# Patient Record
Sex: FEMALE | Race: BLACK OR AFRICAN AMERICAN | NOT HISPANIC OR LATINO
[De-identification: names, ages, dates, MRNs, and addresses within clinical notes are randomized per-mention and may not be internally consistent; named-entity substitution may affect disease eponyms.]

---

## 2017-02-08 ENCOUNTER — APPOINTMENT (OUTPATIENT)
Dept: FAMILY MEDICINE | Facility: CLINIC | Age: 82
End: 2017-02-08

## 2017-02-08 VITALS
HEART RATE: 68 BPM | RESPIRATION RATE: 14 BRPM | OXYGEN SATURATION: 98 % | HEIGHT: 62 IN | DIASTOLIC BLOOD PRESSURE: 70 MMHG | SYSTOLIC BLOOD PRESSURE: 108 MMHG | WEIGHT: 144.31 LBS | BODY MASS INDEX: 26.56 KG/M2

## 2017-03-15 ENCOUNTER — APPOINTMENT (OUTPATIENT)
Dept: CARDIOLOGY | Facility: CLINIC | Age: 82
End: 2017-03-15

## 2017-05-02 ENCOUNTER — RX RENEWAL (OUTPATIENT)
Age: 82
End: 2017-05-02

## 2017-05-05 ENCOUNTER — RX RENEWAL (OUTPATIENT)
Age: 82
End: 2017-05-05

## 2017-06-07 ENCOUNTER — APPOINTMENT (OUTPATIENT)
Dept: FAMILY MEDICINE | Facility: CLINIC | Age: 82
End: 2017-06-07

## 2017-06-07 VITALS
RESPIRATION RATE: 15 BRPM | HEIGHT: 62 IN | TEMPERATURE: 98 F | SYSTOLIC BLOOD PRESSURE: 189 MMHG | DIASTOLIC BLOOD PRESSURE: 81 MMHG | OXYGEN SATURATION: 98 % | WEIGHT: 144 LBS | HEART RATE: 70 BPM | BODY MASS INDEX: 26.5 KG/M2

## 2017-06-07 VITALS — DIASTOLIC BLOOD PRESSURE: 86 MMHG | SYSTOLIC BLOOD PRESSURE: 126 MMHG

## 2017-06-19 ENCOUNTER — RESULT CHARGE (OUTPATIENT)
Age: 82
End: 2017-06-19

## 2017-06-20 ENCOUNTER — APPOINTMENT (OUTPATIENT)
Dept: CARDIOLOGY | Facility: CLINIC | Age: 82
End: 2017-06-20

## 2017-06-20 ENCOUNTER — NON-APPOINTMENT (OUTPATIENT)
Age: 82
End: 2017-06-20

## 2017-06-20 VITALS — SYSTOLIC BLOOD PRESSURE: 110 MMHG | DIASTOLIC BLOOD PRESSURE: 80 MMHG | HEART RATE: 70 BPM

## 2017-06-20 VITALS
HEIGHT: 60 IN | HEART RATE: 63 BPM | SYSTOLIC BLOOD PRESSURE: 174 MMHG | OXYGEN SATURATION: 100 % | WEIGHT: 144 LBS | BODY MASS INDEX: 28.27 KG/M2 | DIASTOLIC BLOOD PRESSURE: 78 MMHG | RESPIRATION RATE: 17 BRPM

## 2017-06-22 ENCOUNTER — RX RENEWAL (OUTPATIENT)
Age: 82
End: 2017-06-22

## 2017-07-03 LAB — HBA1C MFR BLD HPLC: 6.2

## 2017-07-14 ENCOUNTER — APPOINTMENT (OUTPATIENT)
Dept: FAMILY MEDICINE | Facility: CLINIC | Age: 82
End: 2017-07-14

## 2017-07-14 VITALS
BODY MASS INDEX: 28.19 KG/M2 | WEIGHT: 143.56 LBS | OXYGEN SATURATION: 100 % | HEART RATE: 80 BPM | HEIGHT: 60 IN | SYSTOLIC BLOOD PRESSURE: 100 MMHG | RESPIRATION RATE: 16 BRPM | DIASTOLIC BLOOD PRESSURE: 68 MMHG

## 2017-07-14 LAB
GLUCOSE BLDC GLUCOMTR-MCNC: ABNORMAL
GLUCOSE SERPL-MCNC: ABNORMAL

## 2017-10-11 ENCOUNTER — APPOINTMENT (OUTPATIENT)
Dept: FAMILY MEDICINE | Facility: CLINIC | Age: 82
End: 2017-10-11

## 2017-10-16 ENCOUNTER — APPOINTMENT (OUTPATIENT)
Dept: FAMILY MEDICINE | Facility: CLINIC | Age: 82
End: 2017-10-16
Payer: MEDICARE

## 2017-10-16 VITALS
DIASTOLIC BLOOD PRESSURE: 84 MMHG | BODY MASS INDEX: 28.17 KG/M2 | RESPIRATION RATE: 16 BRPM | HEART RATE: 81 BPM | SYSTOLIC BLOOD PRESSURE: 120 MMHG | WEIGHT: 143.5 LBS | TEMPERATURE: 98.1 F | OXYGEN SATURATION: 100 % | HEIGHT: 60 IN

## 2017-10-16 LAB — GLUCOSE BLDC GLUCOMTR-MCNC: ABNORMAL

## 2017-10-16 PROCEDURE — G0008: CPT

## 2017-10-16 PROCEDURE — 90686 IIV4 VACC NO PRSV 0.5 ML IM: CPT

## 2017-10-16 PROCEDURE — 99213 OFFICE O/P EST LOW 20 MIN: CPT | Mod: 25

## 2018-01-17 ENCOUNTER — APPOINTMENT (OUTPATIENT)
Dept: CARDIOLOGY | Facility: CLINIC | Age: 83
End: 2018-01-17

## 2018-01-31 ENCOUNTER — RX RENEWAL (OUTPATIENT)
Age: 83
End: 2018-01-31

## 2018-02-13 ENCOUNTER — RX RENEWAL (OUTPATIENT)
Age: 83
End: 2018-02-13

## 2018-02-21 ENCOUNTER — APPOINTMENT (OUTPATIENT)
Dept: FAMILY MEDICINE | Facility: CLINIC | Age: 83
End: 2018-02-21
Payer: MEDICARE

## 2018-02-21 VITALS
HEIGHT: 60 IN | OXYGEN SATURATION: 97 % | RESPIRATION RATE: 16 BRPM | HEART RATE: 77 BPM | BODY MASS INDEX: 28.48 KG/M2 | DIASTOLIC BLOOD PRESSURE: 70 MMHG | TEMPERATURE: 98 F | SYSTOLIC BLOOD PRESSURE: 90 MMHG | WEIGHT: 145.06 LBS

## 2018-02-21 PROCEDURE — 99213 OFFICE O/P EST LOW 20 MIN: CPT

## 2018-04-11 ENCOUNTER — RX RENEWAL (OUTPATIENT)
Age: 83
End: 2018-04-11

## 2018-05-15 ENCOUNTER — RX RENEWAL (OUTPATIENT)
Age: 83
End: 2018-05-15

## 2018-06-04 ENCOUNTER — RX RENEWAL (OUTPATIENT)
Age: 83
End: 2018-06-04

## 2018-06-04 LAB — HBA1C MFR BLD HPLC: 5.7

## 2018-06-20 ENCOUNTER — APPOINTMENT (OUTPATIENT)
Dept: FAMILY MEDICINE | Facility: CLINIC | Age: 83
End: 2018-06-20
Payer: MEDICARE

## 2018-06-20 VITALS
HEIGHT: 62 IN | BODY MASS INDEX: 27.05 KG/M2 | DIASTOLIC BLOOD PRESSURE: 75 MMHG | SYSTOLIC BLOOD PRESSURE: 109 MMHG | HEART RATE: 69 BPM | OXYGEN SATURATION: 97 % | WEIGHT: 147 LBS | RESPIRATION RATE: 15 BRPM

## 2018-06-20 PROCEDURE — 99213 OFFICE O/P EST LOW 20 MIN: CPT

## 2018-06-20 NOTE — PHYSICAL EXAM
[No Acute Distress] : no acute distress [Well Nourished] : well nourished [Well Developed] : well developed [Well-Appearing] : well-appearing [Normal Sclera/Conjunctiva] : normal sclera/conjunctiva [PERRL] : pupils equal round and reactive to light [Normal Outer Ear/Nose] : the outer ears and nose were normal in appearance [No JVD] : no jugular venous distention [Supple] : supple [Clear to Auscultation] : lungs were clear to auscultation bilaterally [Regular Rhythm] : with a regular rhythm [No Murmur] : no murmur heard [No Edema] : there was no peripheral edema [Soft] : abdomen soft [Non Tender] : non-tender [No HSM] : no HSM [Normal Bowel Sounds] : normal bowel sounds [No Hernias] : no hernias [Normal Supraclavicular Nodes] : no supraclavicular lymphadenopathy [Normal Anterior Cervical Nodes] : no anterior cervical lymphadenopathy [No CVA Tenderness] : no CVA  tenderness [No Spinal Tenderness] : no spinal tenderness [Comprehensive Foot Exam Normal] : Right and left foot were examined and both feet are normal. No ulcers in either foot. Toes are normal and with full ROM.  Normal tactile sensation with monofilament testing throughout both feet

## 2018-06-20 NOTE — DATA REVIEWED
[FreeTextEntry1] : She is doing well we will let water a lipid profile as one has not been done and also renal and HbA1c she will do a she'll receive a packet for FITP test

## 2018-06-20 NOTE — HISTORY OF PRESENT ILLNESS
[FreeTextEntry1] : Diabetes,coronary artery disease hyperlipidemia [de-identified] : The patient is seen here today for her diabetes coronary artery disease and hyperlipidemia she feels well review of systems is totally unremarkable no chest pain lightheadedness shortness of breath etc. she has a visit with her ophthalmologist about 6 months ago with a good report he is in need of refill of the metoprolol she will be visiting with cardiology within a week

## 2018-07-12 ENCOUNTER — APPOINTMENT (OUTPATIENT)
Dept: CARDIOLOGY | Facility: CLINIC | Age: 83
End: 2018-07-12
Payer: MEDICARE

## 2018-07-12 ENCOUNTER — NON-APPOINTMENT (OUTPATIENT)
Age: 83
End: 2018-07-12

## 2018-07-12 VITALS
HEART RATE: 60 BPM | OXYGEN SATURATION: 98 % | WEIGHT: 141 LBS | DIASTOLIC BLOOD PRESSURE: 70 MMHG | RESPIRATION RATE: 15 BRPM | SYSTOLIC BLOOD PRESSURE: 120 MMHG | BODY MASS INDEX: 25.79 KG/M2

## 2018-07-12 VITALS — SYSTOLIC BLOOD PRESSURE: 100 MMHG | DIASTOLIC BLOOD PRESSURE: 70 MMHG | HEART RATE: 72 BPM

## 2018-07-12 PROCEDURE — 99214 OFFICE O/P EST MOD 30 MIN: CPT

## 2018-07-12 PROCEDURE — 93000 ELECTROCARDIOGRAM COMPLETE: CPT

## 2018-08-06 ENCOUNTER — RX RENEWAL (OUTPATIENT)
Age: 83
End: 2018-08-06

## 2018-08-06 ENCOUNTER — LABORATORY RESULT (OUTPATIENT)
Age: 83
End: 2018-08-06

## 2018-10-24 ENCOUNTER — APPOINTMENT (OUTPATIENT)
Dept: FAMILY MEDICINE | Facility: CLINIC | Age: 83
End: 2018-10-24
Payer: MEDICARE

## 2018-10-24 VITALS
SYSTOLIC BLOOD PRESSURE: 94 MMHG | OXYGEN SATURATION: 97 % | WEIGHT: 142 LBS | DIASTOLIC BLOOD PRESSURE: 68 MMHG | HEART RATE: 66 BPM | TEMPERATURE: 98.2 F | BODY MASS INDEX: 26.13 KG/M2 | HEIGHT: 62 IN | RESPIRATION RATE: 14 BRPM

## 2018-10-24 PROCEDURE — 90686 IIV4 VACC NO PRSV 0.5 ML IM: CPT

## 2018-10-24 PROCEDURE — G0008: CPT

## 2018-10-24 PROCEDURE — 99213 OFFICE O/P EST LOW 20 MIN: CPT | Mod: 25

## 2018-10-24 NOTE — HISTORY OF PRESENT ILLNESS
[FreeTextEntry1] : CAD, HPTN DM [de-identified] : Patient is seen here in followup she feels well review of systems is unremarkable she is also here for flu shot she is up-to-date on her pneumococcal vaccines review of systems fails to reveal any new issues she does have some chronic low back pain. He has followup with cardiology back in July with a good report her metoprolol was reduced to 50 mg twice a day

## 2018-10-24 NOTE — ASSESSMENT
[FreeTextEntry1] : Patient is doing quite well she is given a flu shot we'll have her glipizide 2.5 discontinued in an attempt to eventually get her office are fine. His we'll repeat a renal profile and HbA1c in a month or so\mark Weber

## 2018-10-24 NOTE — REVIEW OF SYSTEMS
[Joint Pain] : no joint pain [Muscle Weakness] : no muscle weakness [Muscle Pain] : no muscle pain [Back Pain] : back pain [Negative] : Genitourinary

## 2018-10-24 NOTE — PHYSICAL EXAM
[No Acute Distress] : no acute distress [Well Nourished] : well nourished [Well Developed] : well developed [Normal Sclera/Conjunctiva] : normal sclera/conjunctiva [PERRL] : pupils equal round and reactive to light [No JVD] : no jugular venous distention [Supple] : supple [No Lymphadenopathy] : no lymphadenopathy [No Respiratory Distress] : no respiratory distress  [Clear to Auscultation] : lungs were clear to auscultation bilaterally [No Accessory Muscle Use] : no accessory muscle use [Normal Rate] : normal rate  [Regular Rhythm] : with a regular rhythm [No Murmur] : no murmur heard [No Abdominal Bruit] : a ~M bruit was not heard ~T in the abdomen [Soft] : abdomen soft [No HSM] : no HSM [No CVA Tenderness] : no CVA  tenderness [No Spinal Tenderness] : no spinal tenderness [No Joint Swelling] : no joint swelling [Grossly Normal Strength/Tone] : grossly normal strength/tone [Normal Gait] : normal gait [Memory Grossly Normal] : memory grossly normal [Normal Mood] : the mood was normal

## 2018-11-02 ENCOUNTER — RX RENEWAL (OUTPATIENT)
Age: 83
End: 2018-11-02

## 2019-01-15 ENCOUNTER — APPOINTMENT (OUTPATIENT)
Dept: CARDIOLOGY | Facility: CLINIC | Age: 84
End: 2019-01-15
Payer: MEDICARE

## 2019-01-15 LAB
HBA1C MFR BLD HPLC: 5.5
LDLC SERPL DIRECT ASSAY-MCNC: 58

## 2019-01-25 ENCOUNTER — RX RENEWAL (OUTPATIENT)
Age: 84
End: 2019-01-25

## 2019-01-30 ENCOUNTER — APPOINTMENT (OUTPATIENT)
Dept: FAMILY MEDICINE | Facility: CLINIC | Age: 84
End: 2019-01-30

## 2019-01-31 ENCOUNTER — RX RENEWAL (OUTPATIENT)
Age: 84
End: 2019-01-31

## 2019-02-04 ENCOUNTER — APPOINTMENT (OUTPATIENT)
Dept: FAMILY MEDICINE | Facility: CLINIC | Age: 84
End: 2019-02-04
Payer: MEDICARE

## 2019-02-04 VITALS
RESPIRATION RATE: 16 BRPM | SYSTOLIC BLOOD PRESSURE: 122 MMHG | HEART RATE: 75 BPM | DIASTOLIC BLOOD PRESSURE: 74 MMHG | WEIGHT: 142.25 LBS | OXYGEN SATURATION: 98 % | TEMPERATURE: 97.4 F | BODY MASS INDEX: 26.18 KG/M2 | HEIGHT: 62 IN

## 2019-02-04 PROCEDURE — 99213 OFFICE O/P EST LOW 20 MIN: CPT

## 2019-02-04 NOTE — PHYSICAL EXAM
[No Acute Distress] : no acute distress [Well Nourished] : well nourished [Well Developed] : well developed [Well-Appearing] : well-appearing [Normal Sclera/Conjunctiva] : normal sclera/conjunctiva [PERRL] : pupils equal round and reactive to light [EOMI] : extraocular movements intact [Normal Outer Ear/Nose] : the outer ears and nose were normal in appearance [Normal Oropharynx] : the oropharynx was normal [Normal TMs] : both tympanic membranes were normal [Normal Nasal Mucosa] : the nasal mucosa was normal [No JVD] : no jugular venous distention [Supple] : supple [No Lymphadenopathy] : no lymphadenopathy [Thyroid Normal, No Nodules] : the thyroid was normal and there were no nodules present [No Respiratory Distress] : no respiratory distress  [Clear to Auscultation] : lungs were clear to auscultation bilaterally [No Accessory Muscle Use] : no accessory muscle use [Normal Rate] : normal rate  [Regular Rhythm] : with a regular rhythm [No Murmur] : no murmur heard [No Edema] : there was no peripheral edema [Soft] : abdomen soft [Non Tender] : non-tender [Non-distended] : non-distended [No Masses] : no abdominal mass palpated [No HSM] : no HSM [Normal Supraclavicular Nodes] : no supraclavicular lymphadenopathy [Normal Posterior Cervical Nodes] : no posterior cervical lymphadenopathy [Normal Anterior Cervical Nodes] : no anterior cervical lymphadenopathy [No CVA Tenderness] : no CVA  tenderness [No Spinal Tenderness] : no spinal tenderness [No Joint Swelling] : no joint swelling [No Rash] : no rash [No Skin Lesions] : no skin lesions [Normal Gait] : normal gait [Coordination Grossly Intact] : coordination grossly intact [Speech Grossly Normal] : speech grossly normal

## 2019-02-04 NOTE — REVIEW OF SYSTEMS
[Headache] : no headache [Fainting] : no fainting [Memory Loss] : no memory loss [Negative] : Musculoskeletal

## 2019-02-04 NOTE — HISTORY OF PRESENT ILLNESS
[FreeTextEntry1] : diabetes hypertension [de-identified] : \par Patient is here to followup on her diabetes and hypertension also arthroscopic cardiovascular disease she feels well is due to see cardiology next week at the time of her last visit due to excellent glucose control we cut her glipizide in half to 2.5 mg laboratory work done several weeks ago reveals her fasting sugar to be below 100 and her HbA1c to be 5.5 which is excellent control on the lower dosage review of systems is unremarkable

## 2019-02-04 NOTE — ASSESSMENT
[FreeTextEntry1] : The patient is doing quite well interval history is unremarkable exam is unremarkable she is doing well on 2.5 the glipizide we will discontinue this medication entirely and repeat her renal profile and afebrile and A1c in 3 weeks she will follow up with cardiology as scheduled next week and here in several months barring any unexpected events

## 2019-02-07 ENCOUNTER — NON-APPOINTMENT (OUTPATIENT)
Age: 84
End: 2019-02-07

## 2019-02-07 ENCOUNTER — APPOINTMENT (OUTPATIENT)
Dept: CARDIOLOGY | Facility: CLINIC | Age: 84
End: 2019-02-07
Payer: MEDICARE

## 2019-02-07 VITALS
HEIGHT: 62 IN | BODY MASS INDEX: 26.13 KG/M2 | SYSTOLIC BLOOD PRESSURE: 186 MMHG | WEIGHT: 142 LBS | OXYGEN SATURATION: 99 % | DIASTOLIC BLOOD PRESSURE: 72 MMHG | HEART RATE: 75 BPM | RESPIRATION RATE: 16 BRPM

## 2019-02-07 VITALS — SYSTOLIC BLOOD PRESSURE: 140 MMHG | DIASTOLIC BLOOD PRESSURE: 84 MMHG | HEART RATE: 76 BPM

## 2019-02-07 PROCEDURE — 93000 ELECTROCARDIOGRAM COMPLETE: CPT

## 2019-02-07 PROCEDURE — 93306 TTE W/DOPPLER COMPLETE: CPT

## 2019-02-07 PROCEDURE — 99214 OFFICE O/P EST MOD 30 MIN: CPT

## 2019-02-07 NOTE — PHYSICAL EXAM
[General Appearance - Well Developed] : well developed [Normal Appearance] : normal appearance [Well Groomed] : well groomed [General Appearance - Well Nourished] : well nourished [No Deformities] : no deformities [General Appearance - In No Acute Distress] : no acute distress [Normal Jugular Venous A Waves Present] : normal jugular venous A waves present [Normal Jugular Venous V Waves Present] : normal jugular venous V waves present [No Jugular Venous Giang A Waves] : no jugular venous giang A waves [Respiration, Rhythm And Depth] : normal respiratory rhythm and effort [Exaggerated Use Of Accessory Muscles For Inspiration] : no accessory muscle use [Auscultation Breath Sounds / Voice Sounds] : lungs were clear to auscultation bilaterally [Abdomen Soft] : soft [Abdomen Tenderness] : non-tender [Abdomen Mass (___ Cm)] : no abdominal mass palpated [FreeTextEntry1] : patient unable to walk on treadmill [Nail Clubbing] : no clubbing of the fingernails [Cyanosis, Localized] : no localized cyanosis [Petechial Hemorrhages (___cm)] : no petechial hemorrhages [Skin Color & Pigmentation] : normal skin color and pigmentation [] : no rash [No Venous Stasis] : no venous stasis [Skin Lesions] : no skin lesions [No Skin Ulcers] : no skin ulcer [No Xanthoma] : no  xanthoma was observed [Oriented To Time, Place, And Person] : oriented to person, place, and time [Affect] : the affect was normal [Mood] : the mood was normal [No Anxiety] : not feeling anxious [5th Left ICS - MCL] : palpated at the 5th LICS in the midclavicular line [Normal] : normal [Normal Rate] : normal [Rhythm Regular] : regular [I] : a grade 1 [Right Carotid Bruit] : no bruit heard over the right carotid [Left Carotid Bruit] : left carotid bruit heard [Right Femoral Bruit] : no bruit heard over the right femoral artery [Left Femoral Bruit] : no bruit heard over the left femoral artery [No Abnormalities] : the abdominal aorta was not enlarged and no bruit was heard [No Pitting Edema] : no pitting edema present [Rt] : no varicose veins of the right leg [Lt] : no varicose veins of the left leg

## 2019-02-07 NOTE — REASON FOR VISIT
[Follow-Up - Clinic] : a clinic follow-up of [Coronary Artery Disease] : coronary artery disease [Hyperlipidemia] : hyperlipidemia [Hypertension] : hypertension [FreeTextEntry1] : Patient returns for followup. Feeling well. Offers no complaints of chest discomfort shortness of breath palpitations dizziness or syncope. LDL cholesterol in July was 58. She underwent echocardiography today which revealed normal left ventricular systolic function with concentric left ventricular hypertrophy and mild to moderate mitral insufficiency\par

## 2019-02-07 NOTE — ASSESSMENT
[FreeTextEntry1] : : Impression:\par 1. Hypertension well-controlled today\par 2. Probable  coronary disease based on vasodilator myocardial perfusion imaging. On appropriate medical therapy with aspirin statin beta blocker and ARB  and without any symptoms whatsoever to suggest angina pectoris. Patient has refused cardiac catheterization\par 3.Dyslipidemia with LDL at goal at last check\par \par Plan:\par 1.continue current regimen

## 2019-03-11 ENCOUNTER — RX RENEWAL (OUTPATIENT)
Age: 84
End: 2019-03-11

## 2019-04-25 ENCOUNTER — RX RENEWAL (OUTPATIENT)
Age: 84
End: 2019-04-25

## 2019-04-29 LAB — HBA1C MFR BLD HPLC: 6.5

## 2019-05-06 ENCOUNTER — APPOINTMENT (OUTPATIENT)
Dept: FAMILY MEDICINE | Facility: CLINIC | Age: 84
End: 2019-05-06
Payer: MEDICARE

## 2019-05-06 VITALS
OXYGEN SATURATION: 97 % | SYSTOLIC BLOOD PRESSURE: 114 MMHG | HEART RATE: 69 BPM | BODY MASS INDEX: 26.14 KG/M2 | RESPIRATION RATE: 16 BRPM | DIASTOLIC BLOOD PRESSURE: 64 MMHG | HEIGHT: 62 IN | WEIGHT: 142.06 LBS

## 2019-05-06 PROCEDURE — 99213 OFFICE O/P EST LOW 20 MIN: CPT

## 2019-05-06 NOTE — PHYSICAL EXAM
[No Acute Distress] : no acute distress [Well Nourished] : well nourished [Well Developed] : well developed [Well-Appearing] : well-appearing [Normal Sclera/Conjunctiva] : normal sclera/conjunctiva [Normal Oropharynx] : the oropharynx was normal [Normal Outer Ear/Nose] : the outer ears and nose were normal in appearance [Supple] : supple [No JVD] : no jugular venous distention [No Respiratory Distress] : no respiratory distress  [No Lymphadenopathy] : no lymphadenopathy [Thyroid Normal, No Nodules] : the thyroid was normal and there were no nodules present [Clear to Auscultation] : lungs were clear to auscultation bilaterally [Normal Rate] : normal rate  [No Murmur] : no murmur heard [Regular Rhythm] : with a regular rhythm [No Edema] : there was no peripheral edema [No HSM] : no HSM [Soft] : abdomen soft

## 2019-05-06 NOTE — HISTORY OF PRESENT ILLNESS
[de-identified] : Patient is here in followup for diabetes her glipizide had been reduced from 5 mg daily to 2.5 mg her A1c at at this last checking did go up from 5.5-6.5 fasting sugar 132 review of systems is unremarkable patient otherwise feels well she is recently seen cardiology with a good report review of systems is unremarkable [FreeTextEntry1] : diabetes

## 2019-05-06 NOTE — PLAN
[FreeTextEntry1] : The patient's recent blood work reveals her A1c was 9-6.5 from 5.5 and her fasting sugars up to 132 her renal status is stable. We had reduced her glipizide to 2.5 mg and has been a somewhat significant increase in A1c we will start her on 500 mg of metformin once a day as her creatinine clearance is above 40 and we will observe the results the hope will be that if he maintains good control we may be able to actually discontinue the sulfonylurea she will have repeat A1c in one month followup visit in 3 months but telephonically just after her lab work

## 2019-06-07 ENCOUNTER — RX RENEWAL (OUTPATIENT)
Age: 84
End: 2019-06-07

## 2019-07-05 ENCOUNTER — RX RENEWAL (OUTPATIENT)
Age: 84
End: 2019-07-05

## 2019-07-23 ENCOUNTER — RX RENEWAL (OUTPATIENT)
Age: 84
End: 2019-07-23

## 2019-07-30 LAB — HBA1C MFR BLD HPLC: 6.2

## 2019-08-05 ENCOUNTER — APPOINTMENT (OUTPATIENT)
Dept: FAMILY MEDICINE | Facility: CLINIC | Age: 84
End: 2019-08-05
Payer: MEDICARE

## 2019-08-05 VITALS
WEIGHT: 140.06 LBS | BODY MASS INDEX: 25.77 KG/M2 | DIASTOLIC BLOOD PRESSURE: 72 MMHG | SYSTOLIC BLOOD PRESSURE: 110 MMHG | HEART RATE: 70 BPM | RESPIRATION RATE: 16 BRPM | HEIGHT: 62 IN | OXYGEN SATURATION: 98 %

## 2019-08-05 PROCEDURE — G0439: CPT

## 2019-08-05 NOTE — HISTORY OF PRESENT ILLNESS
[FreeTextEntry1] : diabetes [de-identified] : Shahram diabetes CAD and hypertension has been feeling quite well has seen cardiology recently and will be seen again in the near future reports are good recent laboratory reveals an HbA1c of 6.2 that was after we reduced her glipizide from 5-2.5 mg. Based on that we will DC her glipizide and maintain her on metformin alone review of systems is unremarkable

## 2019-08-05 NOTE — HEALTH RISK ASSESSMENT
[Good] : ~his/her~  mood as  good [] : No [No] : No [2 - 4 times a month (2 pts)] : 2-4 times a month (2 points) [Never (0 pts)] : Never (0 points) [One fall no injury in past year] : Patient reported one fall in the past year without injury [de-identified] : tripped on irregular sidewalk [0] : 2) Feeling down, depressed, or hopeless: Not at all (0) [Patient reported mammogram was normal] : Patient reported mammogram was normal [Patient declined PAP Smear] : Patient declined PAP Smear [Patient declined bone density test] : Patient declined bone density test [Patient reported colonoscopy was normal] : Patient reported colonoscopy was normal [HIV test declined] : HIV test declined [Hepatitis C test declined] : Hepatitis C test declined [Change in mental status noted] : No change in mental status noted [Behavior] : denies difficulty with behavior [Language] : denies difficulty with language [Learning/Retaining New Information] : denies difficulty learning/retaining new information [Handling Complex Tasks] : denies difficulty handling complex tasks [Spatial Ability and Orientation] : denies difficulty with spatial ability and orientation [Reasoning] : denies difficulty with reasoning [None] : None [Retired] : retired [Alone] : lives alone [Single] : single [High Risk Behavior] : no high risk behavior [Fully functional (bathing, dressing, toileting, transferring, walking, feeding)] : Fully functional (bathing, dressing, toileting, transferring, walking, feeding) [Feels Safe at Home] : Feels safe at home [Reports changes in hearing] : Reports no changes in hearing [Fully functional (using the telephone, shopping, preparing meals, housekeeping, doing laundry, using] : Fully functional and needs no help or supervision to perform IADLs (using the telephone, shopping, preparing meals, housekeeping, doing laundry, using transportation, managing medications and managing finances) [Reports changes in dental health] : Reports no changes in dental health [Reports changes in vision] : Reports no changes in vision [Carbon Monoxide Detector] : carbon monoxide detector [Smoke Detector] : smoke detector [Seat Belt] :  uses seat belt [MammogramDate] : 8/2010 [ColonoscopyDate] : 8/2013 [MammogramComments] : normal [ColonoscopyComments] : declibned repeat [With Patient/Caregiver] : With Patient/Caregiver [Designated Healthcare Proxy] : Designated healthcare proxy [Name: ___] : Health Care Proxy's Name: [unfilled]  [Relationship: ___] : Relationship: [unfilled] [AdvancecareDate] : 08/19

## 2019-08-05 NOTE — PLAN
[FreeTextEntry1] : The patient's medications have been reviewed and reconciled her immunizations are reviewed from her diagnoses reviewed her exam is essentially unremarkable she is doing quite well in all areas we will order repeat lab work in several months she will followup on the effect of discontinuing the glipizide we'll helpful to maintain her on metformin alone last lab work revealed the renal function to be stable she is not in favor of the knee and preventive services such as mammogram colonoscopy pelvic tach etc. she has had a hysterectomy

## 2019-08-22 ENCOUNTER — NON-APPOINTMENT (OUTPATIENT)
Age: 84
End: 2019-08-22

## 2019-08-22 ENCOUNTER — APPOINTMENT (OUTPATIENT)
Dept: CARDIOLOGY | Facility: CLINIC | Age: 84
End: 2019-08-22
Payer: MEDICARE

## 2019-08-22 VITALS
HEART RATE: 62 BPM | SYSTOLIC BLOOD PRESSURE: 192 MMHG | WEIGHT: 140 LBS | DIASTOLIC BLOOD PRESSURE: 72 MMHG | BODY MASS INDEX: 25.76 KG/M2 | OXYGEN SATURATION: 100 % | HEIGHT: 62 IN | RESPIRATION RATE: 16 BRPM

## 2019-08-22 VITALS — DIASTOLIC BLOOD PRESSURE: 70 MMHG | HEART RATE: 72 BPM | SYSTOLIC BLOOD PRESSURE: 120 MMHG

## 2019-08-22 VITALS — SYSTOLIC BLOOD PRESSURE: 186 MMHG | DIASTOLIC BLOOD PRESSURE: 70 MMHG

## 2019-08-22 PROCEDURE — 99214 OFFICE O/P EST MOD 30 MIN: CPT

## 2019-08-22 PROCEDURE — 93000 ELECTROCARDIOGRAM COMPLETE: CPT

## 2019-08-22 NOTE — REASON FOR VISIT
[Follow-Up - Clinic] : a clinic follow-up of [Coronary Artery Disease] : coronary artery disease [Hypertension] : hypertension [FreeTextEntry1] : Patient returns for followup. Feeling well. Offers no complaints of chest discomfort shortness of breath palpitations dizziness or syncope.\par

## 2019-10-28 ENCOUNTER — RX RENEWAL (OUTPATIENT)
Age: 84
End: 2019-10-28

## 2019-12-09 ENCOUNTER — APPOINTMENT (OUTPATIENT)
Dept: FAMILY MEDICINE | Facility: CLINIC | Age: 84
End: 2019-12-09
Payer: MEDICARE

## 2019-12-09 VITALS
OXYGEN SATURATION: 97 % | HEART RATE: 71 BPM | HEIGHT: 62 IN | SYSTOLIC BLOOD PRESSURE: 100 MMHG | BODY MASS INDEX: 25.59 KG/M2 | WEIGHT: 139.06 LBS | RESPIRATION RATE: 16 BRPM | DIASTOLIC BLOOD PRESSURE: 60 MMHG

## 2019-12-09 PROCEDURE — 90686 IIV4 VACC NO PRSV 0.5 ML IM: CPT

## 2019-12-09 PROCEDURE — 99213 OFFICE O/P EST LOW 20 MIN: CPT | Mod: 25

## 2019-12-09 PROCEDURE — G0008: CPT

## 2019-12-09 NOTE — HISTORY OF PRESENT ILLNESS
[FreeTextEntry1] : diabetes mellitus CAD [de-identified] : Seen here in followup on her diabetes mellitus and coronary artery disease she said that its worst he several months ago with a good cardiological report no change in medications at her last visit with us we reduced her glipizide from 5 mg to 2.5 mg and her recent laboratory work reveals an HbA1c of 6.1 and a fasting sugar of 118 which represent excellent control she also is now taking metformin 500 mg once a day recent laboratory work also showed her creatinine to be 1.17 and her LDL is 70

## 2019-12-09 NOTE — ASSESSMENT
[FreeTextEntry1] : The patient is given an influenza shot her recent laboratory work is excellent we will discontinue her glipizide at this time maintain her on metformin and repeat a renal profile HbA1c in 4-5 weeks with a followup visit if needed it is felt that her diabetes is under excellent control and we may be up to avoid a sulfonylurea and its risks of hypoglycemia except

## 2019-12-13 LAB
HBA1C MFR BLD HPLC: 6.1
LDLC SERPL DIRECT ASSAY-MCNC: 70
MICROALBUMIN/CREAT 24H UR-RTO: 46

## 2020-01-21 ENCOUNTER — RX RENEWAL (OUTPATIENT)
Age: 85
End: 2020-01-21

## 2020-01-27 ENCOUNTER — RX RENEWAL (OUTPATIENT)
Age: 85
End: 2020-01-27

## 2020-03-11 ENCOUNTER — APPOINTMENT (OUTPATIENT)
Dept: CARDIOLOGY | Facility: CLINIC | Age: 85
End: 2020-03-11
Payer: MEDICARE

## 2020-03-11 ENCOUNTER — NON-APPOINTMENT (OUTPATIENT)
Age: 85
End: 2020-03-11

## 2020-03-11 VITALS — SYSTOLIC BLOOD PRESSURE: 192 MMHG | DIASTOLIC BLOOD PRESSURE: 86 MMHG

## 2020-03-11 VITALS
OXYGEN SATURATION: 99 % | HEIGHT: 62 IN | SYSTOLIC BLOOD PRESSURE: 198 MMHG | WEIGHT: 136 LBS | RESPIRATION RATE: 16 BRPM | HEART RATE: 62 BPM | DIASTOLIC BLOOD PRESSURE: 68 MMHG | BODY MASS INDEX: 25.03 KG/M2

## 2020-03-11 VITALS — SYSTOLIC BLOOD PRESSURE: 100 MMHG | DIASTOLIC BLOOD PRESSURE: 72 MMHG | HEART RATE: 72 BPM

## 2020-03-11 PROCEDURE — 99214 OFFICE O/P EST MOD 30 MIN: CPT

## 2020-03-11 PROCEDURE — 93000 ELECTROCARDIOGRAM COMPLETE: CPT

## 2020-03-11 NOTE — ASSESSMENT
[FreeTextEntry1] : : Impression:\par 1. Hypertension well-controlled today\par 2. Probable  coronary disease based on vasodilator myocardial perfusion imaging. On appropriate medical therapy with aspirin statin beta blocker and ARB  and without any symptoms whatsoever to suggest angina pectoris. Patient has refused cardiac catheterization\par 3.Dyslipidemia with LDL at goal \par \par Plan:\par 1.continue current regimen

## 2020-03-11 NOTE — REASON FOR VISIT
[Follow-Up - Clinic] : a clinic follow-up of [Coronary Artery Disease] : coronary artery disease [Hypertension] : hypertension [FreeTextEntry1] : Patient returns for followup. Feeling well. Offers no complaints of chest discomfort shortness of breath palpitations dizziness or syncope.Recent lipid profile included an LDL cholesterol of 70\par

## 2020-04-10 ENCOUNTER — APPOINTMENT (OUTPATIENT)
Dept: FAMILY MEDICINE | Facility: CLINIC | Age: 85
End: 2020-04-10
Payer: MEDICARE

## 2020-04-10 PROCEDURE — 99442: CPT

## 2020-04-13 ENCOUNTER — APPOINTMENT (OUTPATIENT)
Dept: FAMILY MEDICINE | Facility: CLINIC | Age: 85
End: 2020-04-13

## 2020-05-15 ENCOUNTER — RX RENEWAL (OUTPATIENT)
Age: 85
End: 2020-05-15

## 2020-06-24 ENCOUNTER — APPOINTMENT (OUTPATIENT)
Dept: FAMILY MEDICINE | Facility: CLINIC | Age: 85
End: 2020-06-24
Payer: MEDICARE

## 2020-06-24 VITALS
OXYGEN SATURATION: 96 % | DIASTOLIC BLOOD PRESSURE: 68 MMHG | RESPIRATION RATE: 16 BRPM | SYSTOLIC BLOOD PRESSURE: 90 MMHG | TEMPERATURE: 99 F | HEIGHT: 62 IN | WEIGHT: 130 LBS | HEART RATE: 63 BPM | BODY MASS INDEX: 23.92 KG/M2

## 2020-06-24 VITALS — SYSTOLIC BLOOD PRESSURE: 106 MMHG | DIASTOLIC BLOOD PRESSURE: 70 MMHG

## 2020-06-24 PROCEDURE — 99214 OFFICE O/P EST MOD 30 MIN: CPT

## 2020-06-24 NOTE — PLAN
[FreeTextEntry1] : \par Hyperlipidemia: stable. Continue medication as ordered, continue to monitor status\par \par Hypertension: Well controlled, on the low end. Trial d/c of hydrochlorothiazide. Continue to monitor BP. continue medications as ordered. Continue to monitor status\par \par Diabetes: See how pt is doing at next visit/blood work to see if she needs order placed for glucometer. D/c glipizide d/t age and CR. Increase metformin to 500 mg BID, f/u in a couple weeks to see how pt is doing and check blood work. Continue to monitor GFR and status

## 2020-06-24 NOTE — HISTORY OF PRESENT ILLNESS
[FreeTextEntry1] : follow up [de-identified] : Pt says she has been doing well and staying at home during pandemic. She says she has been sleeping well but sleep schedule disturbed from watching tv and reading during pandemic. Pt eating and drinking well, and going to the bathroom okay. Pt denies any SOB, cough, chest pain, palpitations, N/V/D/C, fever, chills, headache, dizziness, sore throat, depression, or anxiety. Pt has not been checking sugars at home her machine broke and needs a new one. No other health concerns today.\par

## 2020-07-15 ENCOUNTER — APPOINTMENT (OUTPATIENT)
Dept: FAMILY MEDICINE | Facility: CLINIC | Age: 85
End: 2020-07-15
Payer: MEDICARE

## 2020-07-15 VITALS
WEIGHT: 131 LBS | OXYGEN SATURATION: 96 % | DIASTOLIC BLOOD PRESSURE: 60 MMHG | SYSTOLIC BLOOD PRESSURE: 116 MMHG | HEIGHT: 62 IN | RESPIRATION RATE: 16 BRPM | BODY MASS INDEX: 24.11 KG/M2 | TEMPERATURE: 98.8 F | HEART RATE: 68 BPM

## 2020-07-15 VITALS — DIASTOLIC BLOOD PRESSURE: 60 MMHG | SYSTOLIC BLOOD PRESSURE: 112 MMHG

## 2020-07-15 PROCEDURE — 99213 OFFICE O/P EST LOW 20 MIN: CPT

## 2020-07-15 NOTE — PLAN
[FreeTextEntry1] : 7/15/2020: \par HLD + DM: well controlled, continue medication as ordered\par \par HTN: Well controlled. Wrote out directions for pt to d/c HCTZ for now. Continue to monitor and check BP at next visit\par \par Pt has f/u with cardiology in Sept, will f/u in fall and come for flu shot\par \par 6/24/2020:\par Hyperlipidemia: stable. Continue medication as ordered, continue to monitor status\par \par Hypertension: Well controlled, on the low end. Trial d/c of hydrochlorothiazide. Continue to monitor BP. continue medications as ordered. Continue to monitor status\par \par Diabetes: See how pt is doing at next visit/blood work to see if she needs order placed for glucometer. D/c glipizide d/t age and CR. Increase metformin to 500 mg BID, f/u in a couple weeks to see how pt is doing and check blood work. Continue to monitor GFR and status

## 2020-07-15 NOTE — PHYSICAL EXAM
[Normal Sclera/Conjunctiva] : normal sclera/conjunctiva [No Edema] : there was no peripheral edema [Normal] : affect was normal and insight and judgment were intact [Pedal Pulses Present] : the pedal pulses are present

## 2020-07-15 NOTE — HISTORY OF PRESENT ILLNESS
[de-identified] : 7/15/2020: Pt reports that she has been doing well and trying to maintain her weight which is hard not being able to do anything during the pandemic. She increased her metformin to BID and d/c glipizide a long time ago. The HCTZ was not written down so she forgot to trial d/c this medication. She reports no changes from last visit or any other health concerns today.\par \par 6/24/2020: Pt says she has been doing well and staying at home during pandemic. She says she has been sleeping well but sleep schedule disturbed from watching tv and reading during pandemic. Pt eating and drinking well, and going to the bathroom okay. Pt denies any SOB, cough, chest pain, palpitations, N/V/D/C, fever, chills, headache, dizziness, sore throat, depression, or anxiety. Pt has not been checking sugars at home her machine broke and needs a new one. No other health concerns today.\par  [FreeTextEntry1] : follow up

## 2020-08-10 ENCOUNTER — RX RENEWAL (OUTPATIENT)
Age: 85
End: 2020-08-10

## 2020-08-21 ENCOUNTER — RX RENEWAL (OUTPATIENT)
Age: 85
End: 2020-08-21

## 2020-10-01 ENCOUNTER — NON-APPOINTMENT (OUTPATIENT)
Age: 85
End: 2020-10-01

## 2020-10-01 ENCOUNTER — APPOINTMENT (OUTPATIENT)
Dept: CARDIOLOGY | Facility: CLINIC | Age: 85
End: 2020-10-01
Payer: MEDICARE

## 2020-10-01 VITALS
HEART RATE: 71 BPM | WEIGHT: 130 LBS | OXYGEN SATURATION: 100 % | TEMPERATURE: 97.5 F | BODY MASS INDEX: 23.92 KG/M2 | RESPIRATION RATE: 16 BRPM | DIASTOLIC BLOOD PRESSURE: 78 MMHG | SYSTOLIC BLOOD PRESSURE: 120 MMHG | HEIGHT: 62 IN

## 2020-10-01 VITALS — HEART RATE: 72 BPM | DIASTOLIC BLOOD PRESSURE: 80 MMHG | SYSTOLIC BLOOD PRESSURE: 110 MMHG

## 2020-10-01 PROCEDURE — 93000 ELECTROCARDIOGRAM COMPLETE: CPT

## 2020-10-01 PROCEDURE — 99214 OFFICE O/P EST MOD 30 MIN: CPT

## 2020-11-06 ENCOUNTER — RX RENEWAL (OUTPATIENT)
Age: 85
End: 2020-11-06

## 2020-11-09 ENCOUNTER — RX RENEWAL (OUTPATIENT)
Age: 85
End: 2020-11-09

## 2020-11-10 ENCOUNTER — APPOINTMENT (OUTPATIENT)
Dept: FAMILY MEDICINE | Facility: CLINIC | Age: 85
End: 2020-11-10
Payer: MEDICARE

## 2020-11-10 VITALS
OXYGEN SATURATION: 98 % | BODY MASS INDEX: 23.74 KG/M2 | SYSTOLIC BLOOD PRESSURE: 130 MMHG | DIASTOLIC BLOOD PRESSURE: 80 MMHG | TEMPERATURE: 97.8 F | WEIGHT: 129 LBS | RESPIRATION RATE: 16 BRPM | HEART RATE: 67 BPM | HEIGHT: 62 IN

## 2020-11-10 PROCEDURE — 99072 ADDL SUPL MATRL&STAF TM PHE: CPT

## 2020-11-10 PROCEDURE — 99213 OFFICE O/P EST LOW 20 MIN: CPT | Mod: 25

## 2020-11-10 PROCEDURE — 90662 IIV NO PRSV INCREASED AG IM: CPT

## 2020-11-10 PROCEDURE — G0008: CPT

## 2020-11-10 NOTE — PLAN
[FreeTextEntry1] : Patient seems to doing well she is asymptomatic has seen cardiology approximately 1 month ago with good reports she denies any chest pain or shortness of breath or other issues laboratory work in June was reasonable we will repeat a A1c a CBC and a renal profile and she will be given an influenza shot she is up-to-date with pneumococcal shots.

## 2020-11-10 NOTE — HISTORY OF PRESENT ILLNESS
[FreeTextEntry1] : CAD IFG [de-identified] : Patient here for follow-up on her CAD impaired fasting glucose diastolic dysfunction etc. she has seen cardiology with a good report 1 month ago review of systems is unremarkable she is in need of a flu shot

## 2020-11-10 NOTE — PHYSICAL EXAM
[No Acute Distress] : no acute distress [Well Nourished] : well nourished [Well Developed] : well developed [Well-Appearing] : well-appearing [Normal Sclera/Conjunctiva] : normal sclera/conjunctiva [PERRL] : pupils equal round and reactive to light [Normal Outer Ear/Nose] : the outer ears and nose were normal in appearance [Normal Oropharynx] : the oropharynx was normal [No JVD] : no jugular venous distention [No Lymphadenopathy] : no lymphadenopathy [No Respiratory Distress] : no respiratory distress  [No Accessory Muscle Use] : no accessory muscle use [Normal Rate] : normal rate  [Regular Rhythm] : with a regular rhythm [No Murmur] : no murmur heard [No Edema] : there was no peripheral edema [Soft] : abdomen soft [Non-distended] : non-distended [No Masses] : no abdominal mass palpated [No HSM] : no HSM [Normal Bowel Sounds] : normal bowel sounds [Normal Supraclavicular Nodes] : no supraclavicular lymphadenopathy [Normal Posterior Cervical Nodes] : no posterior cervical lymphadenopathy [Normal Anterior Cervical Nodes] : no anterior cervical lymphadenopathy [No CVA Tenderness] : no CVA  tenderness [No Spinal Tenderness] : no spinal tenderness [No Focal Deficits] : no focal deficits [Normal Gait] : normal gait [Speech Grossly Normal] : speech grossly normal [Memory Grossly Normal] : memory grossly normal [Normal Affect] : the affect was normal

## 2020-11-16 ENCOUNTER — RX RENEWAL (OUTPATIENT)
Age: 85
End: 2020-11-16

## 2021-01-29 ENCOUNTER — RX RENEWAL (OUTPATIENT)
Age: 86
End: 2021-01-29

## 2021-02-01 ENCOUNTER — RX RENEWAL (OUTPATIENT)
Age: 86
End: 2021-02-01

## 2021-02-22 LAB — HBA1C MFR BLD HPLC: 5.5

## 2021-03-26 ENCOUNTER — APPOINTMENT (OUTPATIENT)
Dept: FAMILY MEDICINE | Facility: CLINIC | Age: 86
End: 2021-03-26
Payer: MEDICARE

## 2021-03-26 VITALS
TEMPERATURE: 97.5 F | DIASTOLIC BLOOD PRESSURE: 82 MMHG | HEIGHT: 62 IN | BODY MASS INDEX: 23.08 KG/M2 | HEART RATE: 81 BPM | SYSTOLIC BLOOD PRESSURE: 122 MMHG | RESPIRATION RATE: 16 BRPM | OXYGEN SATURATION: 100 % | WEIGHT: 125.44 LBS

## 2021-03-26 PROCEDURE — 99214 OFFICE O/P EST MOD 30 MIN: CPT

## 2021-03-26 PROCEDURE — 99072 ADDL SUPL MATRL&STAF TM PHE: CPT

## 2021-03-26 NOTE — PHYSICAL EXAM
[Normal Sclera/Conjunctiva] : normal sclera/conjunctiva [No Edema] : there was no peripheral edema [No Extremity Clubbing/Cyanosis] : no extremity clubbing/cyanosis [Normal Gait] : normal gait [Normal] : affect was normal and insight and judgment were intact [Comprehensive Foot Exam Normal] : Right and left foot were examined and both feet are normal. No ulcers in either foot. Toes are normal and with full ROM.  Normal tactile sensation with monofilament testing throughout both feet

## 2021-03-26 NOTE — PLAN
[FreeTextEntry1] : \par CAD/HTN/HLD: continue care and medications under cardiology, well controlled at this time. Pt has f/u on 4/29/21. Patient verbalized understanding. \par \par DM: reviewed blood work, last a1c 5.6. Well controlled continue medications as ordered. Will order blood work for 3ms with microalbumin/cr, will return for f/u in 3 months. Optho referral placed. Diabetic foot exam normal. Patient verbalized understanding. \par \par Phone numbers/online website provided to patient to schedule covid vaccine. Patient verbalized understanding.

## 2021-03-26 NOTE — HISTORY OF PRESENT ILLNESS
[FreeTextEntry1] : diabetes f/u [de-identified] : Patient reports she is not routinely checking blood sugar of pressures at home, reports she has been taking medications as directed and has been doing well. Patient needs new optho, no issues last saw a year ago insurance no longer covered at that practice. She has been unable to get in to get covid vaccine. She checks feet daily, does not see podiatry, declines any neuropathy or issues. Pt reports sleeping well, eating and drinking well, and going to the bathroom okay. Pt denies any SOB, cough, chest pain, palpitations, N/V/D/C, fever, chills, headache, or dizziness. No other health concerns today.

## 2021-04-07 ENCOUNTER — APPOINTMENT (OUTPATIENT)
Dept: DISASTER EMERGENCY | Facility: OTHER | Age: 86
End: 2021-04-07

## 2021-04-28 ENCOUNTER — APPOINTMENT (OUTPATIENT)
Dept: DISASTER EMERGENCY | Facility: OTHER | Age: 86
End: 2021-04-28

## 2021-05-25 ENCOUNTER — APPOINTMENT (OUTPATIENT)
Dept: CARDIOLOGY | Facility: CLINIC | Age: 86
End: 2021-05-25
Payer: MEDICARE

## 2021-05-25 ENCOUNTER — NON-APPOINTMENT (OUTPATIENT)
Age: 86
End: 2021-05-25

## 2021-05-25 VITALS — SYSTOLIC BLOOD PRESSURE: 140 MMHG | DIASTOLIC BLOOD PRESSURE: 90 MMHG | HEART RATE: 72 BPM

## 2021-05-25 VITALS
WEIGHT: 125 LBS | TEMPERATURE: 96.2 F | OXYGEN SATURATION: 98 % | BODY MASS INDEX: 24.54 KG/M2 | DIASTOLIC BLOOD PRESSURE: 100 MMHG | SYSTOLIC BLOOD PRESSURE: 153 MMHG | HEIGHT: 60 IN | RESPIRATION RATE: 16 BRPM | HEART RATE: 75 BPM

## 2021-05-25 PROCEDURE — 99214 OFFICE O/P EST MOD 30 MIN: CPT

## 2021-05-25 PROCEDURE — 93000 ELECTROCARDIOGRAM COMPLETE: CPT

## 2021-05-25 NOTE — REASON FOR VISIT
[Hyperlipidemia] : hyperlipidemia [Hypertension] : hypertension [Coronary Artery Disease] : coronary artery disease [FreeTextEntry1] : Patient returns for followup. Feeling well. Offers no complaints of chest discomfort shortness of breath palpitations dizziness or syncope.Most recent lipid profile reveals a total cholesterol 137 HDL 46 LDL 73\par

## 2021-05-25 NOTE — ASSESSMENT
[FreeTextEntry1] : : Impression:\par 1. Hypertension reasonably well controlled today in a patient who has not yet taken her medication\par 2. Probable  coronary disease based on vasodilator myocardial perfusion imaging. On appropriate medical therapy with aspirin statin beta blocker and ARB  and without any symptoms whatsoever to suggest angina pectoris. Patient has refused cardiac catheterization\par 3.Dyslipidemia with LDL at goal\par \par Plan:\par 1.continue current regimen\par 2. Patient advised to take her medication prior to coming to the office next time\par

## 2021-06-18 ENCOUNTER — APPOINTMENT (OUTPATIENT)
Dept: FAMILY MEDICINE | Facility: CLINIC | Age: 86
End: 2021-06-18

## 2021-06-23 ENCOUNTER — APPOINTMENT (OUTPATIENT)
Dept: FAMILY MEDICINE | Facility: CLINIC | Age: 86
End: 2021-06-23
Payer: MEDICARE

## 2021-06-23 VITALS
RESPIRATION RATE: 16 BRPM | BODY MASS INDEX: 24.74 KG/M2 | HEIGHT: 60 IN | DIASTOLIC BLOOD PRESSURE: 88 MMHG | WEIGHT: 126 LBS | SYSTOLIC BLOOD PRESSURE: 130 MMHG | HEART RATE: 80 BPM | TEMPERATURE: 97.5 F

## 2021-06-23 DIAGNOSIS — E87.5 HYPERKALEMIA: ICD-10-CM

## 2021-06-23 DIAGNOSIS — I51.89 OTHER ILL-DEFINED HEART DISEASES: ICD-10-CM

## 2021-06-23 PROCEDURE — 99213 OFFICE O/P EST LOW 20 MIN: CPT

## 2021-06-23 NOTE — ASSESSMENT
[FreeTextEntry1] : Potassium is now well within normal range she does not need any refills other laboratory is acceptable she does demonstrate proteinuria creatinine is fairly stable she is on ARB's has had her Covid shots will be followed up again in 3 months or as needed no cardiopulmonary symptomatology

## 2021-06-23 NOTE — HISTORY OF PRESENT ILLNESS
[de-identified] : Patient with chronic kidney disease and hypertension hyperlipidemia seen here in follow-up had an elevated potassium but repeat was normal she has increased her fluid intake taking her usual medications had a visit with  and he was happy with her general progress review of systems is unremarkable she thinks she might of lost a pound or 2 our record indicates her weight to be essentially stable

## 2021-06-23 NOTE — HISTORY OF PRESENT ILLNESS
[de-identified] : Patient with chronic kidney disease and hypertension hyperlipidemia seen here in follow-up had an elevated potassium but repeat was normal she has increased her fluid intake taking her usual medications had a visit with  and he was happy with her general progress review of systems is unremarkable she thinks she might of lost a pound or 2 our record indicates her weight to be essentially stable

## 2021-06-23 NOTE — PHYSICAL EXAM
[No Acute Distress] : no acute distress [Well Nourished] : well nourished [Well Developed] : well developed [Normal Sclera/Conjunctiva] : normal sclera/conjunctiva [PERRL] : pupils equal round and reactive to light [No JVD] : no jugular venous distention [No Respiratory Distress] : no respiratory distress  [No Accessory Muscle Use] : no accessory muscle use [Clear to Auscultation] : lungs were clear to auscultation bilaterally [Normal Rate] : normal rate  [Regular Rhythm] : with a regular rhythm [No Murmur] : no murmur heard [No Edema] : there was no peripheral edema [Soft] : abdomen soft [Non Tender] : non-tender [No HSM] : no HSM [Normal Supraclavicular Nodes] : no supraclavicular lymphadenopathy [Normal Anterior Cervical Nodes] : no anterior cervical lymphadenopathy [No CVA Tenderness] : no CVA  tenderness [No Spinal Tenderness] : no spinal tenderness

## 2021-07-23 ENCOUNTER — INPATIENT (INPATIENT)
Facility: HOSPITAL | Age: 86
LOS: 1 days | Discharge: ANOTHER HOSPITAL | DRG: 291 | End: 2021-07-25
Attending: INTERNAL MEDICINE | Admitting: INTERNAL MEDICINE
Payer: COMMERCIAL

## 2021-07-23 ENCOUNTER — EMERGENCY (EMERGENCY)
Facility: HOSPITAL | Age: 86
LOS: 1 days | Discharge: HOME | End: 2021-07-23
Attending: STUDENT IN AN ORGANIZED HEALTH CARE EDUCATION/TRAINING PROGRAM | Admitting: STUDENT IN AN ORGANIZED HEALTH CARE EDUCATION/TRAINING PROGRAM
Payer: COMMERCIAL

## 2021-07-23 VITALS — SYSTOLIC BLOOD PRESSURE: 104 MMHG | HEART RATE: 135 BPM | DIASTOLIC BLOOD PRESSURE: 69 MMHG | RESPIRATION RATE: 25 BRPM

## 2021-07-23 VITALS
DIASTOLIC BLOOD PRESSURE: 70 MMHG | RESPIRATION RATE: 18 BRPM | WEIGHT: 126.1 LBS | HEIGHT: 63 IN | TEMPERATURE: 96 F | HEART RATE: 149 BPM | SYSTOLIC BLOOD PRESSURE: 110 MMHG | OXYGEN SATURATION: 98 %

## 2021-07-23 LAB
ALBUMIN SERPL ELPH-MCNC: 3.6 G/DL — SIGNIFICANT CHANGE UP (ref 3.3–5)
ALP SERPL-CCNC: 64 U/L — SIGNIFICANT CHANGE UP (ref 30–120)
ALT FLD-CCNC: 41 U/L DA — SIGNIFICANT CHANGE UP (ref 10–60)
ANION GAP SERPL CALC-SCNC: 16 MMOL/L — SIGNIFICANT CHANGE UP (ref 5–17)
AST SERPL-CCNC: 42 U/L — HIGH (ref 10–40)
BASOPHILS # BLD AUTO: 0.02 K/UL — SIGNIFICANT CHANGE UP (ref 0–0.2)
BASOPHILS NFR BLD AUTO: 0.3 % — SIGNIFICANT CHANGE UP (ref 0–2)
BILIRUB SERPL-MCNC: 0.4 MG/DL — SIGNIFICANT CHANGE UP (ref 0.2–1.2)
BUN SERPL-MCNC: 41 MG/DL — HIGH (ref 7–23)
CALCIUM SERPL-MCNC: 9 MG/DL — SIGNIFICANT CHANGE UP (ref 8.4–10.5)
CHLORIDE SERPL-SCNC: 100 MMOL/L — SIGNIFICANT CHANGE UP (ref 96–108)
CO2 SERPL-SCNC: 18 MMOL/L — LOW (ref 22–31)
CREAT SERPL-MCNC: 2.3 MG/DL — HIGH (ref 0.5–1.3)
EOSINOPHIL # BLD AUTO: 0.03 K/UL — SIGNIFICANT CHANGE UP (ref 0–0.5)
EOSINOPHIL NFR BLD AUTO: 0.5 % — SIGNIFICANT CHANGE UP (ref 0–6)
GLUCOSE BLDC GLUCOMTR-MCNC: 129 MG/DL — HIGH (ref 70–99)
GLUCOSE SERPL-MCNC: 156 MG/DL — HIGH (ref 70–99)
HCT VFR BLD CALC: 43.2 % — SIGNIFICANT CHANGE UP (ref 34.5–45)
HGB BLD-MCNC: 13.3 G/DL — SIGNIFICANT CHANGE UP (ref 11.5–15.5)
IMM GRANULOCYTES NFR BLD AUTO: 0.3 % — SIGNIFICANT CHANGE UP (ref 0–1.5)
LACTATE SERPL-SCNC: 4.5 MMOL/L — CRITICAL HIGH (ref 0.7–2)
LACTATE SERPL-SCNC: 5.9 MMOL/L — CRITICAL HIGH (ref 0.7–2)
LIDOCAIN IGE QN: 155 U/L — SIGNIFICANT CHANGE UP (ref 73–393)
LYMPHOCYTES # BLD AUTO: 1.18 K/UL — SIGNIFICANT CHANGE UP (ref 1–3.3)
LYMPHOCYTES # BLD AUTO: 17.8 % — SIGNIFICANT CHANGE UP (ref 13–44)
MCHC RBC-ENTMCNC: 26.4 PG — LOW (ref 27–34)
MCHC RBC-ENTMCNC: 30.8 GM/DL — LOW (ref 32–36)
MCV RBC AUTO: 85.9 FL — SIGNIFICANT CHANGE UP (ref 80–100)
MONOCYTES # BLD AUTO: 0.17 K/UL — SIGNIFICANT CHANGE UP (ref 0–0.9)
MONOCYTES NFR BLD AUTO: 2.6 % — SIGNIFICANT CHANGE UP (ref 2–14)
NEUTROPHILS # BLD AUTO: 5.22 K/UL — SIGNIFICANT CHANGE UP (ref 1.8–7.4)
NEUTROPHILS NFR BLD AUTO: 78.5 % — HIGH (ref 43–77)
NRBC # BLD: 0 /100 WBCS — SIGNIFICANT CHANGE UP (ref 0–0)
NT-PROBNP SERPL-SCNC: HIGH PG/ML (ref 0–450)
PLATELET # BLD AUTO: 255 K/UL — SIGNIFICANT CHANGE UP (ref 150–400)
POTASSIUM SERPL-MCNC: 4.9 MMOL/L — SIGNIFICANT CHANGE UP (ref 3.5–5.3)
POTASSIUM SERPL-SCNC: 4.9 MMOL/L — SIGNIFICANT CHANGE UP (ref 3.5–5.3)
PROT SERPL-MCNC: 7.3 G/DL — SIGNIFICANT CHANGE UP (ref 6–8.3)
RBC # BLD: 5.03 M/UL — SIGNIFICANT CHANGE UP (ref 3.8–5.2)
RBC # FLD: 14.6 % — HIGH (ref 10.3–14.5)
SARS-COV-2 RNA SPEC QL NAA+PROBE: SIGNIFICANT CHANGE UP
SODIUM SERPL-SCNC: 134 MMOL/L — LOW (ref 135–145)
TROPONIN I SERPL-MCNC: 0 NG/ML — LOW (ref 0.02–0.06)
WBC # BLD: 6.64 K/UL — SIGNIFICANT CHANGE UP (ref 3.8–10.5)
WBC # FLD AUTO: 6.64 K/UL — SIGNIFICANT CHANGE UP (ref 3.8–10.5)

## 2021-07-23 PROCEDURE — 36415 COLL VENOUS BLD VENIPUNCTURE: CPT

## 2021-07-23 PROCEDURE — 96361 HYDRATE IV INFUSION ADD-ON: CPT

## 2021-07-23 PROCEDURE — 87040 BLOOD CULTURE FOR BACTERIA: CPT

## 2021-07-23 PROCEDURE — 85025 COMPLETE CBC W/AUTO DIFF WBC: CPT

## 2021-07-23 PROCEDURE — 99291 CRITICAL CARE FIRST HOUR: CPT

## 2021-07-23 PROCEDURE — 99284 EMERGENCY DEPT VISIT MOD MDM: CPT

## 2021-07-23 PROCEDURE — 74176 CT ABD & PELVIS W/O CONTRAST: CPT

## 2021-07-23 PROCEDURE — 93005 ELECTROCARDIOGRAM TRACING: CPT

## 2021-07-23 PROCEDURE — 83880 ASSAY OF NATRIURETIC PEPTIDE: CPT

## 2021-07-23 PROCEDURE — 94664 DEMO&/EVAL PT USE INHALER: CPT

## 2021-07-23 PROCEDURE — 80053 COMPREHEN METABOLIC PANEL: CPT

## 2021-07-23 PROCEDURE — 87635 SARS-COV-2 COVID-19 AMP PRB: CPT

## 2021-07-23 PROCEDURE — 96365 THER/PROPH/DIAG IV INF INIT: CPT

## 2021-07-23 PROCEDURE — 99291 CRITICAL CARE FIRST HOUR: CPT | Mod: 25

## 2021-07-23 PROCEDURE — 93010 ELECTROCARDIOGRAM REPORT: CPT

## 2021-07-23 PROCEDURE — 71045 X-RAY EXAM CHEST 1 VIEW: CPT | Mod: 26

## 2021-07-23 PROCEDURE — 71045 X-RAY EXAM CHEST 1 VIEW: CPT

## 2021-07-23 PROCEDURE — 94660 CPAP INITIATION&MGMT: CPT

## 2021-07-23 PROCEDURE — 83690 ASSAY OF LIPASE: CPT

## 2021-07-23 PROCEDURE — 84484 ASSAY OF TROPONIN QUANT: CPT

## 2021-07-23 PROCEDURE — 74176 CT ABD & PELVIS W/O CONTRAST: CPT | Mod: 26,MA

## 2021-07-23 PROCEDURE — 82962 GLUCOSE BLOOD TEST: CPT

## 2021-07-23 PROCEDURE — 83605 ASSAY OF LACTIC ACID: CPT

## 2021-07-23 PROCEDURE — 96375 TX/PRO/DX INJ NEW DRUG ADDON: CPT

## 2021-07-23 RX ORDER — SODIUM CHLORIDE 9 MG/ML
1000 INJECTION INTRAMUSCULAR; INTRAVENOUS; SUBCUTANEOUS ONCE
Refills: 0 | Status: COMPLETED | OUTPATIENT
Start: 2021-07-23 | End: 2021-07-23

## 2021-07-23 RX ORDER — PIPERACILLIN AND TAZOBACTAM 4; .5 G/20ML; G/20ML
3.38 INJECTION, POWDER, LYOPHILIZED, FOR SOLUTION INTRAVENOUS ONCE
Refills: 0 | Status: COMPLETED | OUTPATIENT
Start: 2021-07-23 | End: 2021-07-23

## 2021-07-23 RX ORDER — FAMOTIDINE 10 MG/ML
20 INJECTION INTRAVENOUS ONCE
Refills: 0 | Status: COMPLETED | OUTPATIENT
Start: 2021-07-23 | End: 2021-07-23

## 2021-07-23 RX ORDER — ONDANSETRON 8 MG/1
4 TABLET, FILM COATED ORAL ONCE
Refills: 0 | Status: COMPLETED | OUTPATIENT
Start: 2021-07-23 | End: 2021-07-23

## 2021-07-23 RX ORDER — METOPROLOL TARTRATE 50 MG
5 TABLET ORAL ONCE
Refills: 0 | Status: COMPLETED | OUTPATIENT
Start: 2021-07-23 | End: 2021-07-23

## 2021-07-23 RX ADMIN — PIPERACILLIN AND TAZOBACTAM 200 GRAM(S): 4; .5 INJECTION, POWDER, LYOPHILIZED, FOR SOLUTION INTRAVENOUS at 21:04

## 2021-07-23 RX ADMIN — SODIUM CHLORIDE 1000 MILLILITER(S): 9 INJECTION INTRAMUSCULAR; INTRAVENOUS; SUBCUTANEOUS at 20:00

## 2021-07-23 RX ADMIN — PIPERACILLIN AND TAZOBACTAM 3.38 GRAM(S): 4; .5 INJECTION, POWDER, LYOPHILIZED, FOR SOLUTION INTRAVENOUS at 21:34

## 2021-07-23 RX ADMIN — ONDANSETRON 4 MILLIGRAM(S): 8 TABLET, FILM COATED ORAL at 18:05

## 2021-07-23 RX ADMIN — SODIUM CHLORIDE 1000 MILLILITER(S): 9 INJECTION INTRAMUSCULAR; INTRAVENOUS; SUBCUTANEOUS at 19:30

## 2021-07-23 RX ADMIN — Medication 5 MILLIGRAM(S): at 18:27

## 2021-07-23 RX ADMIN — FAMOTIDINE 20 MILLIGRAM(S): 10 INJECTION INTRAVENOUS at 18:05

## 2021-07-23 RX ADMIN — SODIUM CHLORIDE 1000 MILLILITER(S): 9 INJECTION INTRAMUSCULAR; INTRAVENOUS; SUBCUTANEOUS at 18:05

## 2021-07-23 RX ADMIN — SODIUM CHLORIDE 1000 MILLILITER(S): 9 INJECTION INTRAMUSCULAR; INTRAVENOUS; SUBCUTANEOUS at 19:24

## 2021-07-23 NOTE — ED PROVIDER NOTE - PMH
HLD (hyperlipidemia)    HTN (hypertension)     Atherosclerosis    CKD (chronic kidney disease)    DM (diabetes mellitus)    HLD (hyperlipidemia)    HTN (hypertension)    Hyperkalemia

## 2021-07-23 NOTE — CONSULT NOTE ADULT - SUBJECTIVE AND OBJECTIVE BOX
Patient is a 85y old  Female who presents with a chief complaint of N/V abdominal pain     HPI: 86y/o female with PMH of CKD, dCHF, HTN, HLD, DM presents to  with 3 day history of nausea, vomiting, ab pain, Niece Rashmi saw the patient today and she started to have sharp abdominal pain which is why she brought her to the ER. In the Er noted to be tachycardic. Appeared to be sinus tach, however EKG read SVT and ER provider spoke to Dr. Vargas who thought it could be a slow flutter and recommended beta blocker. She got 2L IVF and 5mg IVP lopressor. IVF seemed to help, but after the lopressor her BP dropped. She then went to CT scan for the abdominal pain, she went to lie flat and had trouble breathing requiring BiPAP.     Patient seen and examined at the bedside. SHe was found sitting up in bed on BiPAP 10/5 40%, RR 23. She is awake and alert and offers no complaints at this time. /77.     PAST MEDICAL & SURGICAL HISTORY:  HTN (hypertension)    HLD (hyperlipidemia)    DM (diabetes mellitus)    Hyperkalemia    CKD (chronic kidney disease)    Atherosclerosis        Review of Systems:  CONSTITUTIONAL: No fever, chills, or fatigue  EYES: No eye pain, visual disturbances, or discharge  ENMT:  No difficulty hearing, tinnitus, vertigo; No sinus or throat pain  NECK: No pain or stiffness  RESPIRATORY: No cough, wheezing, chills or hemoptysis; No shortness of breath  CARDIOVASCULAR: No chest pain, palpitations, dizziness, or leg swelling  GASTROINTESTINAL: No abdominal or epigastric pain. No nausea, vomiting, or hematemesis; No diarrhea or constipation. No melena or hematochezia.  GENITOURINARY: No dysuria, frequency, hematuria, or incontinence  NEUROLOGICAL: No headaches, memory loss, loss of strength, numbness, or tremors  SKIN: No itching, burning, rashes, or lesions   MUSCULOSKELETAL: No joint pain or swelling; No muscle, back, or extremity pain  PSYCHIATRIC: No depression, anxiety, mood swings, or difficulty sleeping      Medications:                                  ICU Vital Signs Last 24 Hrs  T(C): 36.1 (23 Jul 2021 22:02), Max: 36.1 (23 Jul 2021 22:02)  T(F): 97 (23 Jul 2021 22:02), Max: 97 (23 Jul 2021 22:02)  HR: 135 (23 Jul 2021 22:18) (118 - 149)  BP: 124/70 (23 Jul 2021 22:18) (80/- - 124/70)  RR: 25 (23 Jul 2021 22:18) (17 - 40)  SpO2: 98% (23 Jul 2021 19:10) (97% - 98%)          I&O's Detail        LABS:                        13.3   6.64  )-----------( 255      ( 23 Jul 2021 18:09 )             43.2     07-23    134<L>  |  100  |  41<H>  ----------------------------<  156<H>  4.9   |  18<L>  |  2.30<H>    Ca    9.0      23 Jul 2021 18:03    TPro  7.3  /  Alb  3.6  /  TBili  0.4  /  DBili  x   /  AST  42<H>  /  ALT  41  /  AlkPhos  64  07-23      CARDIAC MARKERS ( 23 Jul 2021 18:03 )  .000 ng/mL / x     / x     / x     / x          CAPILLARY BLOOD GLUCOSE            CULTURES:      Physical Examination:    General:  Alert, oriented, interactive, nonfocal    HEENT: Pupils equal, reactive to light.  Symmetric.    PULM: rales b/l     CVS: tachycardic rate and regular rhythm, no murmurs, rubs, or gallops    ABD: Soft, nondistended, nontender, normoactive bowel sounds, no masses    EXT: No edema, nontender    SKIN: Warm and well perfused, no rashes noted.    NEURO: A&Ox3, strength 5/5 all extremities, cranial nerves grossly intact, no focal deficits    POCUS: b line b/l lung pattern, LV systolic function appears severely reduced, no pericardial effusion, unable to visualize IVC      RADIOLOGY: EXAM:  CT ABDOMEN AND PELVIS                                  PROCEDURE DATE:  07/23/2021          INTERPRETATION:  CLINICAL INFORMATION: nausea, vomiting, abdominal pain nausea, vomiting, abdominal pain    COMPARISON: 6.1.12.    CONTRAST/COMPLICATIONS:  IV Contrast: NONE  Oral Contrast: NONE  Complications: None reported at time of study completion    PROCEDURE:  CT of the Abdomen and Pelvis was performed.  Sagittal and coronal reformats were performed.    FINDINGS:    LOWER CHEST: Small rightpleural effusion.    LIVER: Within normal limits.  BILE DUCTS: Normal caliber.  GALLBLADDER: Within normal limits.  SPLEEN: Within normal limits.  PANCREAS: Within normal limits.  ADRENALS: Within normal limits.  KIDNEYS/URETERS: Mild renal atrophy.    BLADDER: Within normal limits.  REPRODUCTIVE ORGANS: Hysterectomy. No adnexal mass.    BOWEL: No bowel obstruction.  PERITONEUM: Mild ascites.  VESSELS:  Within normal limits.  RETROPERITONEUM/LYMPH NODES: No lymphadenopathy.  ABDOMINAL WALL: Within normal limits.  BONES: Within normal limits.    IMPRESSION: Motion artifact limits evaluation of the upper abdomen.    No cause for abdominal pain is visualized.    Right pleural effusion.        --- End of Report ---      TARAN ALMANZA MD; Attending Radiologist  This document has been electronically signed. Jul 23 2021  8:33PM        TIME SPENT: 45 min   Evaluating/treating patient, reviewing data/labs/imaging, discussing case with multidisciplinary team, discussing plan/goals of care with patient/family. Non-inclusive of procedure time.

## 2021-07-23 NOTE — ED CLERICAL - CLERICAL COMMENTS
called [facility] ems for transport to [facility] at [id number]. [facility] em[facility] eta within the hour

## 2021-07-23 NOTE — CONSULT NOTE ADULT - ASSESSMENT
84y/o female with PMH of CKD, dCHF, HTN, HLD, DM presents to  with 3 day history of nausea, vomiting, ab pain. Found to have lactate of 5, tachycardia, hypotension post beta blocker, and orthopnea. Symptoms most likely from reduced LV function which appears to be new after looking at [facility]. Cannot r/o sepsis tho with lactate elevation and left shift.     Problem List:  1) Cardiomyopathy   2) acute pulmonary edema   3) r/o sepsis   4) DANE on CKD  5) acute hypoxic respiratory failure    Being transferred to [facility] ER due to bed situation at   Would cover for sepsis given lactate elevation and left shit, hypothermia, she is from home so zosyn should cover, obtain blood cultures urine culture, UA, legionella, GI PCR, MRSA screen  S/p 2L IVF in ER, would hold off on further IVF for now as her LV function appears poor and most likely why she had pulmonary edema after lying flat requiring BiPAP.  Appears to be sinus tachycardia on monitor at rate of 135. This tachycardia may be compensatory due to LV dysfunction and would NOT treat with beta blocker. It would be better to leave the patient at 130 HR then to give betablocker  Would benefit from lasix would dose 60mg IV lasix now. It is hard to evaluate fluid status as she had vomiting for a few days with poor PO intake. BNP is also 14K.   Currently on BiPAP 10/5 40% FiO2, RR 22, appears comfortable, attempt to wean to nasal canula  Will NEED official echo and cardio consult  Troponin neg, but would trend x3 along with CPK  Trend lactate  Spoke to arnaldo Caraballo and updated her on patient's clinical condition and transfer to [facility]. She will be transferred to [facility] ER and be evaluated by [facility] ICU for possible admission  Please call with questions or concerns

## 2021-07-23 NOTE — ED PROVIDER NOTE - CLINICAL SUMMARY MEDICAL DECISION MAKING FREE TEXT BOX
PT here w/ n/v/d possibly related to bad sandwich however tachycardic in ED. Will hydrate, check EKG and get CT abd.

## 2021-07-23 NOTE — ED PROVIDER NOTE - PROGRESS NOTE DETAILS
HR was consistently 146-148, EKG reviewed by Dr. Vines, likely atypical aflutter, recommending rate control. patient has did not take her metoprolol today. metoprolol 5mg give, only slight decrease of hr- HR now 138 but BP 80/63. Fluid bolus in progress. patient without complaint- states she feels fine. HR was consistently 146-148, read by EKG machine as SVT, but P waves noted. EKG reviewed by Dr. Vines, likely atypical aflutter, recommending rate control. patient has did not take her metoprolol today. metoprolol 5mg give, only slight decrease of hr- HR now 138 but BP 80/63. Fluid bolus in progress. patient without complaint- states she feels fine. patient given 2 left NS, HR improved to 120s, BP still low. patient sent to CT where she became acutely short of breath with increased work of breathing and abdominal breathing- patient started on BiPAP with improvement of symptoms. IC team at  contacted, requesting ICU team evaluation here. patient seen by ICU PA here, recommending transfer to  ED for further evaluation but ICU team. Dr. Good aware of case. Lactate elevated, will cover with Zosyn.

## 2021-07-23 NOTE — ED PROVIDER NOTE - OBJECTIVE STATEMENT
84 y/o F w/ PMHx of DM, HTN on metformin, HLD, hyperkalemia ,atherosclerosis, CKD, presents to the ED c/o vomiting x 4 days. Pt niece at bedside states pt has abd pain but denies fevers. Pt reports she ate a bad sandwich and symptoms began. Pt endorses dizziness, abd pain and n/v/d. Denies chest pain, SOB, hematuria and blood in stool. Pt had hysterectomy at 29 years old. PCP: Dr. Patel.  Medication: Olmesartan (20 mg), Atorvastatin, Doxazosin (2mg), Metoprolol (100mg 1/2 tablet a day). 86 y/o F w/ PMHx of DM, HTN, HLD, hyperkalemia ,atherosclerosis, CKD, presents to the ED c/o vomiting x 4 days. Pt niece at bedside states pt has abd pain but denies fevers. Pt reports she ate a bad sandwich and symptoms began. Pt endorses dizziness, abd pain and n/v/d. Denies chest pain, SOB, hematuria and blood in stool. Pt had hysterectomy at 29 years old. PCP: Dr. Patel.  Medication: Olmesartan (20 mg), Atorvastatin, Doxazosin (2mg), Metoprolol (100mg 1/2 tablet a day).

## 2021-07-23 NOTE — ED PROVIDER NOTE - CRITICAL CARE ATTENDING CONTRIBUTION TO CARE
Upon my evaluation, this patient has a high probability of imminent or life-threatening deterioration which required my direct attention, intervention and personal management.  I have personally provided the amount of critical care time documented above excluding time spent on separate procedures.    Time spent in: direct patient care (not related to procedures), additional history taking, interpretation of diagnostic studies, documentation, consultation with other physicians, discussion with patient's family directly relating to patient's condition.

## 2021-07-23 NOTE — ED PROVIDER NOTE - PHYSICAL EXAMINATION
Vital signs as available reviewed.  General:  Comfortable, no acute distress.  Head:  Normocephalic, atraumatic.  Eyes:  Conjunctiva pink, no icterus.  Cardiovascular:  Tachycardic rate, no obvious murmur.  Respiratory:  Clear to auscultation, good air entry bilaterally.  Abdomen:  Soft, non-tender.  Musculoskeletal:  No deformity or calf tenderness.  Neurologic: Alert and oriented, moving all extremities.  Skin:  Warm and dry.

## 2021-07-23 NOTE — ED PROVIDER NOTE - DOMESTIC TRAVEL HIGH RISK QUESTION
Pt has signed on to [facility].  They will fill medications and get DME.  DME will arrive before 1700 to his home.  [facility] transport stretcher is set up for 1700 . Pt has around 6 steps and is currently a lift. They are aware of private cost of stretcher.  DC orders need to be faxed to [phone].  Updated pt/sister and bedside RN.  Kathryn DUKE CTS [id number]     No

## 2021-07-23 NOTE — ED ADULT NURSE NOTE - OBJECTIVE STATEMENT
84 y/o female received aox4 ambulatory c/o nausea and vomiting x4 days, pt reports that she hasn't been able to keep any food down. placed on cardiac monitor, HR noted sinus tachy at 144 bpm. IVL started, bloods drawn and sent to lab. IVF initiated.

## 2021-07-23 NOTE — ED PROVIDER NOTE - NS ED ROS FT
Constitutional: No fever.  Neurological: No headache, +dizziness.  Eyes: No vision changes.   Ears, Nose, Mouth, Throat: No congestion.  Cardiovascular: No chest pain.  Respiratory: No difficulty breathing.  Gastrointestinal: +nausea, +vomiting, +diarrhea, +abd pain  Genitourinary: No dysuria.  Musculoskeletal: No joint pain.  Integumentary (skin and/or breast): No rash.

## 2021-07-23 NOTE — ED ADULT NURSE NOTE - PMH
Atherosclerosis    CKD (chronic kidney disease)    DM (diabetes mellitus)    HLD (hyperlipidemia)    HTN (hypertension)    Hyperkalemia

## 2021-07-24 VITALS — SYSTOLIC BLOOD PRESSURE: 120 MMHG | RESPIRATION RATE: 20 BRPM | DIASTOLIC BLOOD PRESSURE: 88 MMHG | HEART RATE: 137 BPM

## 2021-07-24 DIAGNOSIS — J96.90 RESPIRATORY FAILURE, UNSPECIFIED, UNSPECIFIED WHETHER WITH HYPOXIA OR HYPERCAPNIA: ICD-10-CM

## 2021-07-24 LAB
A1C WITH ESTIMATED AVERAGE GLUCOSE RESULT: 5.9 % — HIGH (ref 4–5.6)
ALBUMIN SERPL ELPH-MCNC: 3.2 G/DL — LOW (ref 3.3–5)
ALP SERPL-CCNC: 61 U/L — SIGNIFICANT CHANGE UP (ref 40–120)
ALT FLD-CCNC: 47 U/L — SIGNIFICANT CHANGE UP (ref 12–78)
ANION GAP SERPL CALC-SCNC: 11 MMOL/L — SIGNIFICANT CHANGE UP (ref 5–17)
ANION GAP SERPL CALC-SCNC: 12 MMOL/L — SIGNIFICANT CHANGE UP (ref 5–17)
ANION GAP SERPL CALC-SCNC: 13 MMOL/L — SIGNIFICANT CHANGE UP (ref 5–17)
APPEARANCE UR: ABNORMAL
AST SERPL-CCNC: 47 U/L — HIGH (ref 15–37)
BASE EXCESS BLDA CALC-SCNC: -11.9 MMOL/L — LOW (ref -2–2)
BASE EXCESS BLDA CALC-SCNC: -12.3 MMOL/L — LOW (ref -2–2)
BASOPHILS # BLD AUTO: 0.01 K/UL — SIGNIFICANT CHANGE UP (ref 0–0.2)
BASOPHILS NFR BLD AUTO: 0.1 % — SIGNIFICANT CHANGE UP (ref 0–2)
BILIRUB SERPL-MCNC: 0.4 MG/DL — SIGNIFICANT CHANGE UP (ref 0.2–1.2)
BILIRUB UR-MCNC: NEGATIVE — SIGNIFICANT CHANGE UP
BLOOD GAS COMMENTS ARTERIAL: SIGNIFICANT CHANGE UP
BLOOD GAS COMMENTS ARTERIAL: SIGNIFICANT CHANGE UP
BUN SERPL-MCNC: 44 MG/DL — HIGH (ref 7–23)
BUN SERPL-MCNC: 44 MG/DL — HIGH (ref 7–23)
BUN SERPL-MCNC: 46 MG/DL — HIGH (ref 7–23)
CALCIUM SERPL-MCNC: 7.9 MG/DL — LOW (ref 8.5–10.1)
CALCIUM SERPL-MCNC: 8.1 MG/DL — LOW (ref 8.5–10.1)
CALCIUM SERPL-MCNC: 8.3 MG/DL — LOW (ref 8.5–10.1)
CHLORIDE SERPL-SCNC: 103 MMOL/L — SIGNIFICANT CHANGE UP (ref 96–108)
CHLORIDE SERPL-SCNC: 109 MMOL/L — HIGH (ref 96–108)
CHLORIDE SERPL-SCNC: 109 MMOL/L — HIGH (ref 96–108)
CHOLEST SERPL-MCNC: 92 MG/DL — SIGNIFICANT CHANGE UP
CO2 SERPL-SCNC: 17 MMOL/L — LOW (ref 22–31)
CO2 SERPL-SCNC: 18 MMOL/L — LOW (ref 22–31)
CO2 SERPL-SCNC: 25 MMOL/L — SIGNIFICANT CHANGE UP (ref 22–31)
COLOR SPEC: YELLOW — SIGNIFICANT CHANGE UP
CREAT SERPL-MCNC: 2.3 MG/DL — HIGH (ref 0.5–1.3)
CREAT SERPL-MCNC: 2.7 MG/DL — HIGH (ref 0.5–1.3)
CREAT SERPL-MCNC: 2.7 MG/DL — HIGH (ref 0.5–1.3)
DIFF PNL FLD: ABNORMAL
EOSINOPHIL # BLD AUTO: 0 K/UL — SIGNIFICANT CHANGE UP (ref 0–0.5)
EOSINOPHIL NFR BLD AUTO: 0 % — SIGNIFICANT CHANGE UP (ref 0–6)
ESTIMATED AVERAGE GLUCOSE: 123 MG/DL — HIGH (ref 68–114)
GLUCOSE SERPL-MCNC: 108 MG/DL — HIGH (ref 70–99)
GLUCOSE SERPL-MCNC: 113 MG/DL — HIGH (ref 70–99)
GLUCOSE SERPL-MCNC: 92 MG/DL — SIGNIFICANT CHANGE UP (ref 70–99)
GLUCOSE UR QL: NEGATIVE — SIGNIFICANT CHANGE UP
HCO3 BLDA-SCNC: 15 MMOL/L — LOW (ref 23–27)
HCO3 BLDA-SCNC: 16 MMOL/L — LOW (ref 23–27)
HCT VFR BLD CALC: 42.6 % — SIGNIFICANT CHANGE UP (ref 34.5–45)
HDLC SERPL-MCNC: 36 MG/DL — LOW
HGB BLD-MCNC: 12.9 G/DL — SIGNIFICANT CHANGE UP (ref 11.5–15.5)
HOROWITZ INDEX BLDA+IHG-RTO: 28 — SIGNIFICANT CHANGE UP
HOROWITZ INDEX BLDA+IHG-RTO: 32 — SIGNIFICANT CHANGE UP
IMM GRANULOCYTES NFR BLD AUTO: 0.6 % — SIGNIFICANT CHANGE UP (ref 0–1.5)
KETONES UR-MCNC: ABNORMAL
LACTATE SERPL-SCNC: 3.9 MMOL/L — HIGH (ref 0.7–2)
LACTATE SERPL-SCNC: 4.4 MMOL/L — CRITICAL HIGH (ref 0.7–2)
LACTATE SERPL-SCNC: 4.9 MMOL/L — CRITICAL HIGH (ref 0.7–2)
LACTATE SERPL-SCNC: 5 MMOL/L — CRITICAL HIGH (ref 0.7–2)
LEUKOCYTE ESTERASE UR-ACNC: ABNORMAL
LIPID PNL WITH DIRECT LDL SERPL: 42 MG/DL — SIGNIFICANT CHANGE UP
LYMPHOCYTES # BLD AUTO: 1.4 K/UL — SIGNIFICANT CHANGE UP (ref 1–3.3)
LYMPHOCYTES # BLD AUTO: 16.8 % — SIGNIFICANT CHANGE UP (ref 13–44)
MAGNESIUM SERPL-MCNC: 1.6 MG/DL — SIGNIFICANT CHANGE UP (ref 1.6–2.6)
MAGNESIUM SERPL-MCNC: 1.7 MG/DL — SIGNIFICANT CHANGE UP (ref 1.6–2.6)
MAGNESIUM SERPL-MCNC: 2.3 MG/DL — SIGNIFICANT CHANGE UP (ref 1.6–2.6)
MCHC RBC-ENTMCNC: 25.8 PG — LOW (ref 27–34)
MCHC RBC-ENTMCNC: 30.3 GM/DL — LOW (ref 32–36)
MCV RBC AUTO: 85.2 FL — SIGNIFICANT CHANGE UP (ref 80–100)
MONOCYTES # BLD AUTO: 0.39 K/UL — SIGNIFICANT CHANGE UP (ref 0–0.9)
MONOCYTES NFR BLD AUTO: 4.7 % — SIGNIFICANT CHANGE UP (ref 2–14)
NEUTROPHILS # BLD AUTO: 6.48 K/UL — SIGNIFICANT CHANGE UP (ref 1.8–7.4)
NEUTROPHILS NFR BLD AUTO: 77.8 % — HIGH (ref 43–77)
NITRITE UR-MCNC: NEGATIVE — SIGNIFICANT CHANGE UP
NON HDL CHOLESTEROL: 56 MG/DL — SIGNIFICANT CHANGE UP
NRBC # BLD: 0 /100 WBCS — SIGNIFICANT CHANGE UP (ref 0–0)
PCO2 BLDA: 25 MMHG — LOW (ref 32–46)
PCO2 BLDA: 28 MMHG — LOW (ref 32–46)
PH BLDA: 7.29 — LOW (ref 7.35–7.45)
PH BLDA: 7.33 — LOW (ref 7.35–7.45)
PH UR: 5 — SIGNIFICANT CHANGE UP (ref 5–8)
PHOSPHATE SERPL-MCNC: 4.5 MG/DL — SIGNIFICANT CHANGE UP (ref 2.5–4.5)
PHOSPHATE SERPL-MCNC: 5.6 MG/DL — HIGH (ref 2.5–4.5)
PHOSPHATE SERPL-MCNC: 5.9 MG/DL — HIGH (ref 2.5–4.5)
PLATELET # BLD AUTO: 171 K/UL — SIGNIFICANT CHANGE UP (ref 150–400)
PO2 BLDA: 155 MMHG — HIGH (ref 74–108)
PO2 BLDA: 170 MMHG — HIGH (ref 74–108)
POTASSIUM SERPL-MCNC: 3.7 MMOL/L — SIGNIFICANT CHANGE UP (ref 3.5–5.3)
POTASSIUM SERPL-MCNC: 4.6 MMOL/L — SIGNIFICANT CHANGE UP (ref 3.5–5.3)
POTASSIUM SERPL-MCNC: 5.7 MMOL/L — HIGH (ref 3.5–5.3)
POTASSIUM SERPL-SCNC: 3.7 MMOL/L — SIGNIFICANT CHANGE UP (ref 3.5–5.3)
POTASSIUM SERPL-SCNC: 4.6 MMOL/L — SIGNIFICANT CHANGE UP (ref 3.5–5.3)
POTASSIUM SERPL-SCNC: 5.7 MMOL/L — HIGH (ref 3.5–5.3)
PROT SERPL-MCNC: 6.8 G/DL — SIGNIFICANT CHANGE UP (ref 6–8.3)
PROT UR-MCNC: 100
RBC # BLD: 5 M/UL — SIGNIFICANT CHANGE UP (ref 3.8–5.2)
RBC # FLD: 14.7 % — HIGH (ref 10.3–14.5)
SAO2 % BLDA: 99 % — HIGH (ref 92–96)
SAO2 % BLDA: 99 % — HIGH (ref 92–96)
SODIUM SERPL-SCNC: 138 MMOL/L — SIGNIFICANT CHANGE UP (ref 135–145)
SODIUM SERPL-SCNC: 139 MMOL/L — SIGNIFICANT CHANGE UP (ref 135–145)
SODIUM SERPL-SCNC: 140 MMOL/L — SIGNIFICANT CHANGE UP (ref 135–145)
SP GR SPEC: 1.02 — SIGNIFICANT CHANGE UP (ref 1.01–1.02)
TRIGL SERPL-MCNC: 73 MG/DL — SIGNIFICANT CHANGE UP
TROPONIN I SERPL-MCNC: <.015 NG/ML — SIGNIFICANT CHANGE UP (ref 0.01–0.04)
UROBILINOGEN FLD QL: 4
WBC # BLD: 8.33 K/UL — SIGNIFICANT CHANGE UP (ref 3.8–10.5)
WBC # FLD AUTO: 8.33 K/UL — SIGNIFICANT CHANGE UP (ref 3.8–10.5)

## 2021-07-24 PROCEDURE — 99291 CRITICAL CARE FIRST HOUR: CPT | Mod: 25

## 2021-07-24 PROCEDURE — 71045 X-RAY EXAM CHEST 1 VIEW: CPT | Mod: 26

## 2021-07-24 PROCEDURE — 93308 TTE F-UP OR LMTD: CPT | Mod: 26,GC

## 2021-07-24 PROCEDURE — 76604 US EXAM CHEST: CPT | Mod: 26

## 2021-07-24 PROCEDURE — 99291 CRITICAL CARE FIRST HOUR: CPT

## 2021-07-24 RX ORDER — MILRINONE LACTATE 1 MG/ML
0.12 INJECTION, SOLUTION INTRAVENOUS
Qty: 20 | Refills: 0 | Status: DISCONTINUED | OUTPATIENT
Start: 2021-07-24 | End: 2021-07-25

## 2021-07-24 RX ORDER — SODIUM BICARBONATE 1 MEQ/ML
0.26 SYRINGE (ML) INTRAVENOUS
Qty: 150 | Refills: 0 | Status: DISCONTINUED | OUTPATIENT
Start: 2021-07-24 | End: 2021-07-24

## 2021-07-24 RX ORDER — DILTIAZEM HCL 120 MG
15 CAPSULE, EXT RELEASE 24 HR ORAL ONCE
Refills: 0 | Status: DISCONTINUED | OUTPATIENT
Start: 2021-07-24 | End: 2021-07-24

## 2021-07-24 RX ORDER — SODIUM CHLORIDE 9 MG/ML
500 INJECTION INTRAMUSCULAR; INTRAVENOUS; SUBCUTANEOUS ONCE
Refills: 0 | Status: COMPLETED | OUTPATIENT
Start: 2021-07-24 | End: 2021-07-24

## 2021-07-24 RX ORDER — SODIUM CHLORIDE 9 MG/ML
1000 INJECTION, SOLUTION INTRAVENOUS
Refills: 0 | Status: DISCONTINUED | OUTPATIENT
Start: 2021-07-24 | End: 2021-07-25

## 2021-07-24 RX ORDER — AMIODARONE HYDROCHLORIDE 400 MG/1
150 TABLET ORAL ONCE
Refills: 0 | Status: COMPLETED | OUTPATIENT
Start: 2021-07-24 | End: 2021-07-24

## 2021-07-24 RX ORDER — PHENYLEPHRINE HYDROCHLORIDE 10 MG/ML
0.5 INJECTION INTRAVENOUS
Qty: 40 | Refills: 0 | Status: DISCONTINUED | OUTPATIENT
Start: 2021-07-24 | End: 2021-07-25

## 2021-07-24 RX ORDER — DILTIAZEM HCL 120 MG
10 CAPSULE, EXT RELEASE 24 HR ORAL ONCE
Refills: 0 | Status: DISCONTINUED | OUTPATIENT
Start: 2021-07-24 | End: 2021-07-24

## 2021-07-24 RX ORDER — INSULIN HUMAN 100 [IU]/ML
10 INJECTION, SOLUTION SUBCUTANEOUS ONCE
Refills: 0 | Status: COMPLETED | OUTPATIENT
Start: 2021-07-24 | End: 2021-07-24

## 2021-07-24 RX ORDER — SODIUM BICARBONATE 1 MEQ/ML
50 SYRINGE (ML) INTRAVENOUS ONCE
Refills: 0 | Status: COMPLETED | OUTPATIENT
Start: 2021-07-24 | End: 2021-07-24

## 2021-07-24 RX ORDER — PIPERACILLIN AND TAZOBACTAM 4; .5 G/20ML; G/20ML
3.38 INJECTION, POWDER, LYOPHILIZED, FOR SOLUTION INTRAVENOUS ONCE
Refills: 0 | Status: DISCONTINUED | OUTPATIENT
Start: 2021-07-24 | End: 2021-07-27

## 2021-07-24 RX ORDER — METOPROLOL TARTRATE 50 MG
50 TABLET ORAL
Refills: 0 | Status: DISCONTINUED | OUTPATIENT
Start: 2021-07-24 | End: 2021-07-24

## 2021-07-24 RX ORDER — PIPERACILLIN AND TAZOBACTAM 4; .5 G/20ML; G/20ML
3.38 INJECTION, POWDER, LYOPHILIZED, FOR SOLUTION INTRAVENOUS EVERY 6 HOURS
Refills: 0 | Status: DISCONTINUED | OUTPATIENT
Start: 2021-07-24 | End: 2021-07-27

## 2021-07-24 RX ORDER — CHLORHEXIDINE GLUCONATE 213 G/1000ML
1 SOLUTION TOPICAL
Refills: 0 | Status: DISCONTINUED | OUTPATIENT
Start: 2021-07-24 | End: 2021-07-25

## 2021-07-24 RX ORDER — METOPROLOL TARTRATE 50 MG
25 TABLET ORAL
Refills: 0 | Status: DISCONTINUED | OUTPATIENT
Start: 2021-07-24 | End: 2021-07-24

## 2021-07-24 RX ORDER — SODIUM CHLORIDE 9 MG/ML
500 INJECTION, SOLUTION INTRAVENOUS ONCE
Refills: 0 | Status: COMPLETED | OUTPATIENT
Start: 2021-07-24 | End: 2021-07-24

## 2021-07-24 RX ORDER — FUROSEMIDE 40 MG
40 TABLET ORAL ONCE
Refills: 0 | Status: COMPLETED | OUTPATIENT
Start: 2021-07-24 | End: 2021-07-24

## 2021-07-24 RX ORDER — DEXTROSE 50 % IN WATER 50 %
50 SYRINGE (ML) INTRAVENOUS ONCE
Refills: 0 | Status: COMPLETED | OUTPATIENT
Start: 2021-07-24 | End: 2021-07-24

## 2021-07-24 RX ORDER — CEFTRIAXONE 500 MG/1
1000 INJECTION, POWDER, FOR SOLUTION INTRAMUSCULAR; INTRAVENOUS ONCE
Refills: 0 | Status: COMPLETED | OUTPATIENT
Start: 2021-07-24 | End: 2021-07-24

## 2021-07-24 RX ORDER — ASPIRIN/CALCIUM CARB/MAGNESIUM 324 MG
81 TABLET ORAL DAILY
Refills: 0 | Status: DISCONTINUED | OUTPATIENT
Start: 2021-07-24 | End: 2021-07-25

## 2021-07-24 RX ORDER — CEFTRIAXONE 500 MG/1
INJECTION, POWDER, FOR SOLUTION INTRAMUSCULAR; INTRAVENOUS
Refills: 0 | Status: DISCONTINUED | OUTPATIENT
Start: 2021-07-24 | End: 2021-07-25

## 2021-07-24 RX ORDER — DEXTROSE 50 % IN WATER 50 %
25 SYRINGE (ML) INTRAVENOUS ONCE
Refills: 0 | Status: DISCONTINUED | OUTPATIENT
Start: 2021-07-24 | End: 2021-07-25

## 2021-07-24 RX ORDER — ATORVASTATIN CALCIUM 80 MG/1
10 TABLET, FILM COATED ORAL AT BEDTIME
Refills: 0 | Status: DISCONTINUED | OUTPATIENT
Start: 2021-07-24 | End: 2021-07-25

## 2021-07-24 RX ORDER — SODIUM ZIRCONIUM CYCLOSILICATE 10 G/10G
5 POWDER, FOR SUSPENSION ORAL ONCE
Refills: 0 | Status: COMPLETED | OUTPATIENT
Start: 2021-07-24 | End: 2021-07-24

## 2021-07-24 RX ORDER — METOPROLOL TARTRATE 50 MG
25 TABLET ORAL EVERY 6 HOURS
Refills: 0 | Status: DISCONTINUED | OUTPATIENT
Start: 2021-07-24 | End: 2021-07-25

## 2021-07-24 RX ORDER — INSULIN LISPRO 100/ML
VIAL (ML) SUBCUTANEOUS
Refills: 0 | Status: DISCONTINUED | OUTPATIENT
Start: 2021-07-24 | End: 2021-07-25

## 2021-07-24 RX ORDER — METOPROLOL TARTRATE 50 MG
5 TABLET ORAL ONCE
Refills: 0 | Status: COMPLETED | OUTPATIENT
Start: 2021-07-24 | End: 2021-07-24

## 2021-07-24 RX ORDER — SODIUM BICARBONATE 1 MEQ/ML
0.2 SYRINGE (ML) INTRAVENOUS
Qty: 150 | Refills: 0 | Status: DISCONTINUED | OUTPATIENT
Start: 2021-07-24 | End: 2021-07-24

## 2021-07-24 RX ORDER — NOREPINEPHRINE BITARTRATE/D5W 8 MG/250ML
0.05 PLASTIC BAG, INJECTION (ML) INTRAVENOUS
Qty: 8 | Refills: 0 | Status: DISCONTINUED | OUTPATIENT
Start: 2021-07-24 | End: 2021-07-24

## 2021-07-24 RX ORDER — AMIODARONE HYDROCHLORIDE 400 MG/1
400 TABLET ORAL EVERY 8 HOURS
Refills: 0 | Status: DISCONTINUED | OUTPATIENT
Start: 2021-07-24 | End: 2021-07-24

## 2021-07-24 RX ORDER — SODIUM CHLORIDE 9 MG/ML
1000 INJECTION, SOLUTION INTRAVENOUS
Refills: 0 | Status: DISCONTINUED | OUTPATIENT
Start: 2021-07-24 | End: 2021-07-24

## 2021-07-24 RX ORDER — HEPARIN SODIUM 5000 [USP'U]/ML
5000 INJECTION INTRAVENOUS; SUBCUTANEOUS EVERY 8 HOURS
Refills: 0 | Status: DISCONTINUED | OUTPATIENT
Start: 2021-07-24 | End: 2021-07-25

## 2021-07-24 RX ORDER — DEXTROSE 50 % IN WATER 50 %
15 SYRINGE (ML) INTRAVENOUS ONCE
Refills: 0 | Status: DISCONTINUED | OUTPATIENT
Start: 2021-07-24 | End: 2021-07-25

## 2021-07-24 RX ORDER — AMIODARONE HYDROCHLORIDE 400 MG/1
0.5 TABLET ORAL
Qty: 900 | Refills: 0 | Status: DISCONTINUED | OUTPATIENT
Start: 2021-07-24 | End: 2021-07-25

## 2021-07-24 RX ORDER — CEFTRIAXONE 500 MG/1
1000 INJECTION, POWDER, FOR SOLUTION INTRAMUSCULAR; INTRAVENOUS EVERY 24 HOURS
Refills: 0 | Status: DISCONTINUED | OUTPATIENT
Start: 2021-07-25 | End: 2021-07-25

## 2021-07-24 RX ORDER — DEXTROSE 50 % IN WATER 50 %
12.5 SYRINGE (ML) INTRAVENOUS ONCE
Refills: 0 | Status: DISCONTINUED | OUTPATIENT
Start: 2021-07-24 | End: 2021-07-25

## 2021-07-24 RX ORDER — AMIODARONE HYDROCHLORIDE 400 MG/1
1 TABLET ORAL
Qty: 900 | Refills: 0 | Status: DISCONTINUED | OUTPATIENT
Start: 2021-07-24 | End: 2021-07-25

## 2021-07-24 RX ORDER — CALCIUM GLUCONATE 100 MG/ML
1 VIAL (ML) INTRAVENOUS ONCE
Refills: 0 | Status: COMPLETED | OUTPATIENT
Start: 2021-07-24 | End: 2021-07-24

## 2021-07-24 RX ORDER — AMIODARONE HYDROCHLORIDE 400 MG/1
TABLET ORAL
Refills: 0 | Status: DISCONTINUED | OUTPATIENT
Start: 2021-07-24 | End: 2021-07-24

## 2021-07-24 RX ORDER — GLUCAGON INJECTION, SOLUTION 0.5 MG/.1ML
1 INJECTION, SOLUTION SUBCUTANEOUS ONCE
Refills: 0 | Status: DISCONTINUED | OUTPATIENT
Start: 2021-07-24 | End: 2021-07-25

## 2021-07-24 RX ORDER — MAGNESIUM SULFATE 500 MG/ML
2 VIAL (ML) INJECTION ONCE
Refills: 0 | Status: COMPLETED | OUTPATIENT
Start: 2021-07-24 | End: 2021-07-24

## 2021-07-24 RX ADMIN — Medication 50 GRAM(S): at 08:26

## 2021-07-24 RX ADMIN — SODIUM CHLORIDE 500 MILLILITER(S): 9 INJECTION, SOLUTION INTRAVENOUS at 22:19

## 2021-07-24 RX ADMIN — Medication 25 MILLIGRAM(S): at 01:58

## 2021-07-24 RX ADMIN — Medication 75 MEQ/KG/HR: at 04:13

## 2021-07-24 RX ADMIN — CHLORHEXIDINE GLUCONATE 1 APPLICATION(S): 213 SOLUTION TOPICAL at 08:26

## 2021-07-24 RX ADMIN — ATORVASTATIN CALCIUM 10 MILLIGRAM(S): 80 TABLET, FILM COATED ORAL at 21:03

## 2021-07-24 RX ADMIN — HEPARIN SODIUM 5000 UNIT(S): 5000 INJECTION INTRAVENOUS; SUBCUTANEOUS at 06:17

## 2021-07-24 RX ADMIN — Medication 40 MILLIGRAM(S): at 14:18

## 2021-07-24 RX ADMIN — Medication 100 GRAM(S): at 06:17

## 2021-07-24 RX ADMIN — CEFTRIAXONE 100 MILLIGRAM(S): 500 INJECTION, POWDER, FOR SOLUTION INTRAMUSCULAR; INTRAVENOUS at 08:25

## 2021-07-24 RX ADMIN — Medication 5.6 MICROGRAM(S)/KG/MIN: at 15:11

## 2021-07-24 RX ADMIN — AMIODARONE HYDROCHLORIDE 618 MILLIGRAM(S): 400 TABLET ORAL at 08:22

## 2021-07-24 RX ADMIN — AMIODARONE HYDROCHLORIDE 400 MILLIGRAM(S): 400 TABLET ORAL at 21:03

## 2021-07-24 RX ADMIN — Medication 2: at 21:18

## 2021-07-24 RX ADMIN — Medication 50 MILLIEQUIVALENT(S): at 08:19

## 2021-07-24 RX ADMIN — AMIODARONE HYDROCHLORIDE 33.3 MG/MIN: 400 TABLET ORAL at 23:10

## 2021-07-24 RX ADMIN — HEPARIN SODIUM 5000 UNIT(S): 5000 INJECTION INTRAVENOUS; SUBCUTANEOUS at 14:19

## 2021-07-24 RX ADMIN — SODIUM CHLORIDE 1000 MILLILITER(S): 9 INJECTION INTRAMUSCULAR; INTRAVENOUS; SUBCUTANEOUS at 03:39

## 2021-07-24 RX ADMIN — INSULIN HUMAN 10 UNIT(S): 100 INJECTION, SOLUTION SUBCUTANEOUS at 08:23

## 2021-07-24 RX ADMIN — PHENYLEPHRINE HYDROCHLORIDE 11.2 MICROGRAM(S)/KG/MIN: 10 INJECTION INTRAVENOUS at 15:32

## 2021-07-24 RX ADMIN — MILRINONE LACTATE 2.24 MICROGRAM(S)/KG/MIN: 1 INJECTION, SOLUTION INTRAVENOUS at 16:22

## 2021-07-24 RX ADMIN — Medication 50 MILLILITER(S): at 08:20

## 2021-07-24 RX ADMIN — Medication 100 MEQ/KG/HR: at 06:55

## 2021-07-24 RX ADMIN — HEPARIN SODIUM 5000 UNIT(S): 5000 INJECTION INTRAVENOUS; SUBCUTANEOUS at 21:03

## 2021-07-24 RX ADMIN — ATORVASTATIN CALCIUM 10 MILLIGRAM(S): 80 TABLET, FILM COATED ORAL at 01:58

## 2021-07-24 RX ADMIN — Medication 81 MILLIGRAM(S): at 14:19

## 2021-07-24 RX ADMIN — AMIODARONE HYDROCHLORIDE 600 MILLIGRAM(S): 400 TABLET ORAL at 23:00

## 2021-07-24 RX ADMIN — SODIUM ZIRCONIUM CYCLOSILICATE 5 GRAM(S): 10 POWDER, FOR SUSPENSION ORAL at 06:18

## 2021-07-24 RX ADMIN — Medication 5 MILLIGRAM(S): at 21:18

## 2021-07-24 RX ADMIN — AMIODARONE HYDROCHLORIDE 400 MILLIGRAM(S): 400 TABLET ORAL at 14:19

## 2021-07-24 NOTE — PROVIDER CONTACT NOTE (EICU) - RECOMMENDATIONS
Plan  1. If there is recurrent abd pain, please consult surgery for ischemic bowel  2. Given high lactate and acidosis/hyperkalemia, need to address goals of care.  If pt is not fully functional, consider palliative care  3. LR at 125cc/hr  4. Trend lactate  5. NPO for now

## 2021-07-24 NOTE — PROVIDER CONTACT NOTE (EICU) - ASSESSMENT
Problem List  1. Concern of ischemic bowel, however, as per ICU PA, pt doesn't have abd pain now  2. Lactic acidosis  3. DANE, presumed, with hyperkalemia, non anion gap metabolic acidosis  4. Goals of care

## 2021-07-24 NOTE — PROGRESS NOTE ADULT - ATTENDING COMMENTS
Discussed with arnaldo Caraballo at bedside 85F PMH HTN, HLD, CAD, CKD, and prediabetes on metformin who presents with nausea, vomiting, and abdominal pain for 3 days, found to have atrial flutter with rapid ventricular response with acute systolic heart failure, mitral regurgitation, acute kidney injury, lactic acidosis, and hyperkalemia. Also with UTI. Now with cardiogenic shock.    1. Neuro: stable neurologic status, AAOx3, moving all extremities  2. CV: worsening hypotension today with cool extremities, bedside ultrasound with severe systolic dysfunction and at least moderate mitral regurgitation. VTI is 10-12 cm. Start milrinone. Continue amiodarone oral load, rate improved to 70s-90s. Change metoprolol to 25 mg q6h. Trend cardiac enzymes and lactate. Diuresis with furosemide. Start aspirin and statin  3. Pulm: stable respiratory status currently, on room air with SpO2>90%. B lines on ultrasound, diuresis with furosemide  4. GI: improved abdominal pain, start DASH/TLC diet  5. Renal: DANE 2/2 ATN, likely cardiorenal. Strict I/O's, trend kidney function and lytes  6. ID: UTI, start ceftriaxone. Follow-up blood and urine cultures  7. Heme: HSQ for DVT ppx  8. Endo: pre-DM, continue insulin coverage scale, a1c 5.9  9. Skin: no lines, midline placed today, keep oviedo  10. Dispo: prognosis guarded, discussed with patient and arnaldo Caraballo at bedside  CC time spent: 45 min

## 2021-07-24 NOTE — CONSULT NOTE ADULT - ASSESSMENT
Pt is an 84 y/o female with PMHx as above here with:    Assessment:  1. Dehydration/hypovolemia  2. Lactic acidosis  3. SOB- likely mild CHF exacerbation, resolved, on NC  4. Tachycardia- sepsis vs BB withdrawal, did not take metoprolol today, ?rate dependent LV dysfunction  5. R/o sepsis    Plan:  - Seen and examined in ED. Off BiPAP, on NC, denied ever having SOB. On NC, saturating high 90s, BP stable although still w/ tachycardia.   - Written for half dose home PO metoprolol given hx of hypotension after 5mg IV lopressor  - Metoprolol 25mg BID, first dose stat, w/ hold parameters  - Trend Fuentes, initially trop neg, BNP elevated  - Hold lasix, hold further IVF for now, drinking fluids w/o complaint, denies thirst  - Formal TTE  - Cardiology consult  - No leukocytosis, afebrile currently, f/u cultures, monitor off abx for now  - Trend lactate, stat ordered now    Dispo: In light of above, improvement in respiratory status, no acute complaints, BP stable, pt does not need ICU at this time, if condition worsens, please contact ICU for further evaluation. Will f/u later this evening.  Pt is an 86 y/o female with PMHx as above here with:    Assessment:  1. Dehydration/hypovolemia  2. Lactic acidosis  3. SOB- likely mild CHF exacerbation, resolved, on NC  4. Tachycardia- sepsis vs BB withdrawal, did not take metoprolol today, ?rate dependent LV dysfunction  5. DANE on CKD  6. R/o sepsis    Plan:  - Seen and examined in ED. Off BiPAP, on NC, denied ever having SOB. On NC, saturating high 90s, BP stable although still w/ tachycardia.   - Written for half dose home PO metoprolol given hx of hypotension after 5mg IV lopressor  - Metoprolol 25mg BID, first dose stat, w/ hold parameters  - Trend Fuentes, initially trop neg, BNP elevated  - Hold lasix, hold further IVF for now, drinking fluids w/o complaint, denies thirst  - Formal TTE  - Cardiology consult  - Trend Cr, avoid nephrotoxins, adhere to renal dose adjustments, strict I/Os, nephro consult  - No leukocytosis, afebrile currently, f/u cultures, monitor off abx for now  - Trend lactate, stat ordered now    Dispo: In light of above, improvement in respiratory status, no acute complaints, BP stable, pt does not need ICU at this time, if condition worsens, please contact ICU for further evaluation. Will f/u later this evening.  Pt is an 84 y/o female with PMHx as above here with:    Assessment:  1. Dehydration/hypovolemia  2. Lactic acidosis  3. SOB- likely mild CHF exacerbation, resolved, on NC  4. Tachycardia- sepsis vs BB withdrawal, did not take metoprolol today, ?rate dependent LV dysfunction  5. DANE on CKD  6. R/o sepsis    Plan:  - Seen and examined in ED. Off BiPAP, on NC, denied ever having SOB. Saturating high 90s. Maintain spo2 >90%  - BP stable although still w/ tachycardia.   - Written for half dose home PO metoprolol given hx of hypotension after 5mg IV lopressor  - Metoprolol 25mg BID, first dose stat, w/ hold parameters  - Trend Fuentes, initially trop neg, BNP elevated  - Hold lasix, hold further IVF for now, drinking fluids w/o complaint, denies thirst  - Formal TTE  - Cardiology consult  - Trend Cr, avoid nephrotoxins, adhere to renal dose adjustments, strict I/Os, nephro consult  - No leukocytosis, afebrile currently, f/u cultures, monitor off abx for now  - Trend lactate, stat ordered now    Dispo: In light of above, improvement in respiratory status, no acute complaints, BP stable, pt does not need ICU at this time, if condition worsens, please contact ICU for further evaluation. Will f/u later this evening.  Pt is an 84 y/o female with PMHx as above here with:    Assessment:  1. Dehydration/hypovolemia  2. Lactic acidosis  3. SOB- likely mild CHF exacerbation, resolved, on NC  4. Tachycardia- sepsis vs BB withdrawal, did not take metoprolol today, ?rate dependent LV dysfunction  5. DANE on CKD  6. R/o sepsis    Plan:  - Seen and examined in ED. Off BiPAP, on NC, denied ever having SOB. Saturating high 90s. Maintain spo2 >90%  - BP stable although still w/ tachycardia.   - Written for half dose home PO metoprolol given hx of hypotension after 5mg IV lopressor  - Metoprolol 25mg BID, first dose stat, w/ hold parameters  - Trend Fuentes, initially trop neg, BNP elevated  - Hold lasix, hold further IVF for now, drinking fluids w/o complaint, denies thirst  - Formal TTE  - Cardiology consult  - Trend Cr, avoid nephrotoxins, adhere to renal dose adjustments, strict I/Os, nephro consult  - No leukocytosis, afebrile currently, f/u cultures, monitor off abx for now  - Trend lactate, stat ordered now    Dispo: In light of above, improvement in respiratory status, no acute complaints, BP stable, pt does not need ICU at this time, if condition worsens, please contact ICU for further evaluation. Will f/u later this evening.     F/u: Lactate went up, but appears clinically the same, comfortable, asymptomatic, NAD. Ordered for gentle bicarb gtt and slow bolus over an hour, suspect still hypovolemic. BP stable. HR still in 130s. Monte ordered.     d/w eICU Dr Hooks, agrees pt does not need ICU at this time.

## 2021-07-24 NOTE — ED PROVIDER NOTE - OBJECTIVE STATEMENT
86 y/o F w/ PMHx of DM, HTN, HLD, hyperkalemia ,atherosclerosis, CKD, presents to the ED c/o vomiting x 4 days. Pt niece at bedside states pt has abd pain but denies fevers. Pt reports she ate a bad sandwich and symptoms began. Pt endorses dizziness, abd pain and n/v/d. Denies chest pain, SOB, hematuria and blood in stool. Pt had hysterectomy at 29 years old. PCP: Dr. Patel.  Medication: Olmesartan (20 mg), Atorvastatin, Doxazosin (2mg), Metoprolol (100mg 1/2 tablet a day).  at [facility] was treated for respiratory failure  due to  CHF,  effusions,  rapid flutter and started on BiPap CT of the abdomen was negative for the  acute abdominal pain, patient was transferred to  for admission also discussion with the ICU at  b prior to the transfer, we will notify of arrival 86 y/o F w/ PMHx of DM, HTN, HLD, hyperkalemia ,atherosclerosis, CKD, presents to the ED c/o vomiting x 4 days. Pt niece at bedside states pt has abd pain but denies fevers. Pt reports she ate a bad sandwich and symptoms began. Pt endorses dizziness, abd pain and n/v/d. Denies chest pain, SOB, hematuria and blood in stool. Pt had hysterectomy at 29 years old. PCP: Dr. Patel.  Medication: Olmesartan (20 mg), Atorvastatin, Doxazosin (2mg), Metoprolol (100mg 1/2 tablet a day).  at [facility] was treated for respiratory failure  due to  CHF,  effusions,  rapid flutter and started on BiPap CT of the abdomen was negative for the  acute abdominal pain, elevated lactate, treated with IV Zosyn, patient was transferred to  for admission also discussion with the ICU at  b prior to the transfer, we will notify of arrival

## 2021-07-24 NOTE — ED ADULT NURSE REASSESSMENT NOTE - NS ED NURSE REASSESS COMMENT FT1
24 g IV inserted in right top of thumb. ICU PA aware. As per ICU PA only start calcium gluconate at this time and restart sodium bicarbonate infusion after completion.

## 2021-07-24 NOTE — H&P ADULT - HISTORY OF PRESENT ILLNESS
85F PMH HTN, HLD, CAD, CKD, and prediabetes on metformin who presents with nausea, vomiting, and abdominal pain for 3 days, found to have atrial flutter with rapid ventricular response with acute systolic heart failure, mitral regurgitation, acute kidney injury, lactic acidosis, hyperkalemia, UTI and was transferred to [place] for further eval .  Pt is an 84 y/o female with PMHx of HFpEF, HTN, HLD, CAD, CKD, DM on metformin presented to [facility] w/ several days of abd pain, n/v/d after eating "bad turkey sandwich". Per pt, she denies being SOB @ that time. States she just "freaked out" and got "worked up" because she almost fell off table. BP improved, SOB improved, sent over to [facility] on BIPAP for ICU evaluation as without available space in [facility].

## 2021-07-24 NOTE — ED ADULT TRIAGE NOTE - CHIEF COMPLAINT QUOTE
transfer from [place], on bipap, went to [place] for eval for dizziness, vomiting and tachycardia.

## 2021-07-24 NOTE — ED ADULT NURSE NOTE - OBJECTIVE STATEMENT
84 y/o F patient presents to ED from home c/o 86 y/o F patient presents to ED from [facility] via EMS as transfer for admission. Patient reports she went to [place] ED with c/o dizziness and N/V/D x4 days after eating a bad sandwich. As per EMS patient was found to be tachycardic and short of breath at [place] and was placed on bipap. Patient arrives to ED A&Ox4 and on bipap. Patient placed on 3 L NC by ICU PA. Patient denies HA, dizziness, chest pain, abdominal pain, nausea, chills. Safety and comfort measures provided and maintained. ICU PA bedside.

## 2021-07-24 NOTE — CONSULT NOTE ADULT - SUBJECTIVE AND OBJECTIVE BOX
History of Present Illness:    Past Medical/Surgical History:    Medications:    Family History: Non-contributory family history of premature cardiovascular atherosclerotic disease    Social History: No tobacco, alcohol or drug use    Review of Systems:  General: No fevers, chills, weight loss or gain  Skin: No rashes, color changes  Cardiovascular: No chest pain, orthopnea  Respiratory: No shortness of breath, cough  Gastrointestinal: No nausea, abdominal pain  Genitourinary: No incontinence, pain with urination  Musculoskeletal: No pain, swelling, decreased range of motion  Neurological: No headache, weakness  Psychiatric: No depression, anxiety  Endocrine: No weight loss or gain, increased thirst  All other systems are comprehensively negative.    Physical Exam:  Vitals:        Vital Signs Last 24 Hrs  T(C): 36.3 (24 Jul 2021 05:10), Max: 36.3 (24 Jul 2021 05:10)  T(F): 97.4 (24 Jul 2021 05:10), Max: 97.4 (24 Jul 2021 05:10)  HR: 134 (24 Jul 2021 05:10) (134 - 137)  BP: 143/77 (24 Jul 2021 05:10) (103/59 - 143/77)  BP(mean): --  RR: 18 (24 Jul 2021 05:10) (18 - 20)  SpO2: --  General: NAD  HEENT: MMM  Neck: No JVD, no carotid bruit  Lungs: CTAB  CV: RRR, nl S1/S2, no M/R/G  Abdomen: S/NT/ND, +BS  Extremities: No LE edema, no cyanosis  Neuro: AAOx3, non-focal  Skin: No rash    Labs:                        12.9   8.33  )-----------( 171      ( 24 Jul 2021 03:36 )             42.6     07-24    138  |  109<H>  |  44<H>  ----------------------------<  108<H>  5.7<H>   |  17<L>  |  2.30<H>    Ca    8.1<L>      24 Jul 2021 03:36  Phos  5.9     07-24  Mg     1.7     07-24    TPro  6.8  /  Alb  3.2<L>  /  TBili  0.4  /  DBili  x   /  AST  47<H>  /  ALT  47  /  AlkPhos  61  07-24    CARDIAC MARKERS ( 24 Jul 2021 03:36 )  <.015 ng/mL / x     / x     / x     / x              ECG:      History of Present Illness: The patient is an 85 year old female with a history of HTN, HL, DM, CKD, CAD, chronic diastolic heart failure who presented with abdominal pain, nausea, vomiting, diarrhea. She states these symptoms started about one week ago. There has also been lightheadedness and dyspnea on exertion. No chest pain, palpitations or lower extremity swelling. In ED, she was noted to be in rapid atrial flutter, given metoprolol, her BPs dropped to 80s systolic.    Past Medical/Surgical History:  HTN, HL, DM, CKD, CAD, chronic diastolic heart failure    Medications:  MEDICATIONS  (STANDING):  aMIOdarone    Tablet   Oral   aMIOdarone    Tablet 400 milliGRAM(s) Oral every 8 hours  aMIOdarone IVPB 150 milliGRAM(s) IV Intermittent once  atorvastatin 10 milliGRAM(s) Oral at bedtime  cefTRIAXone   IVPB      cefTRIAXone   IVPB 1000 milliGRAM(s) IV Intermittent once  chlorhexidine 2% Cloths 1 Application(s) Topical <User Schedule>  dextrose 40% Gel 15 Gram(s) Oral once  dextrose 5%. 1000 milliLiter(s) (50 mL/Hr) IV Continuous <Continuous>  dextrose 5%. 1000 milliLiter(s) (100 mL/Hr) IV Continuous <Continuous>  dextrose 50% Injectable 25 Gram(s) IV Push once  dextrose 50% Injectable 12.5 Gram(s) IV Push once  dextrose 50% Injectable 25 Gram(s) IV Push once  dextrose 50% Injectable 50 milliLiter(s) IV Push once  glucagon  Injectable 1 milliGRAM(s) IntraMuscular once  heparin   Injectable 5000 Unit(s) SubCutaneous every 8 hours  insulin lispro (ADMELOG) corrective regimen sliding scale   SubCutaneous Before meals and at bedtime  insulin regular  human recombinant 10 Unit(s) IV Push once  magnesium sulfate  IVPB 2 Gram(s) IV Intermittent once  metoprolol tartrate 50 milliGRAM(s) Oral two times a day  sodium bicarbonate  Infusion 0.263 mEq/kG/Hr (100 mL/Hr) IV Continuous <Continuous>  sodium bicarbonate  Injectable 50 milliEquivalent(s) IV Push once    MEDICATIONS  (PRN):      Family History: Non-contributory family history of premature cardiovascular atherosclerotic disease    Social History: No tobacco, alcohol or drug use    Review of Systems:  General: No fevers, chills, weight loss or gain  Skin: No rashes, color changes  Cardiovascular: No chest pain, orthopnea  Respiratory: No shortness of breath, cough  Gastrointestinal: (+) nausea, abdominal pain  Genitourinary: No incontinence, pain with urination  Musculoskeletal: No pain, swelling, decreased range of motion  Neurological: No headache, weakness  Psychiatric: No depression, anxiety  Endocrine: No weight loss or gain, increased thirst  All other systems are comprehensively negative.    Physical Exam:  Vitals:        Vital Signs Last 24 Hrs  T(C): 36.3 (24 Jul 2021 05:10), Max: 36.3 (24 Jul 2021 05:10)  T(F): 97.4 (24 Jul 2021 05:10), Max: 97.4 (24 Jul 2021 05:10)  HR: 134 (24 Jul 2021 05:10) (134 - 137)  BP: 143/77 (24 Jul 2021 05:10) (103/59 - 143/77)  BP(mean): --  RR: 18 (24 Jul 2021 05:10) (18 - 20)  SpO2: --  General: NAD  HEENT: MMM  Neck: No JVD, no carotid bruit  Lungs: CTAB  CV: Tachycardic, nl S1/S2, no M/R/G  Abdomen: S/NT/ND, +BS  Extremities: No LE edema, no cyanosis, +cold extremities  Neuro: AAOx3, non-focal  Skin: No rash    Labs:                        12.9   8.33  )-----------( 171      ( 24 Jul 2021 03:36 )             42.6     07-24    138  |  109<H>  |  44<H>  ----------------------------<  108<H>  5.7<H>   |  17<L>  |  2.30<H>    Ca    8.1<L>      24 Jul 2021 03:36  Phos  5.9     07-24  Mg     1.7     07-24    TPro  6.8  /  Alb  3.2<L>  /  TBili  0.4  /  DBili  x   /  AST  47<H>  /  ALT  47  /  AlkPhos  61  07-24    CARDIAC MARKERS ( 24 Jul 2021 03:36 )  <.015 ng/mL / x     / x     / x     / x              ECG: Atypical atrial flutter, nonspecific ST abnormality    Telemetry: Atrial flutter

## 2021-07-24 NOTE — PROGRESS NOTE ADULT - ASSESSMENT
Neuro: Stable, no active concerns  Cardio: Acutely decompensated Heart Failure  Neuro: Stable, no active concerns  Cardio: Acutely decompensated Heart Failure--elevated BNP, mildly fluid OL on exam, no prior TTE noted per chart, but patient with history of HFpEF, bedside Echo showing moderate to severe LV systolic dysfunction, f/u official TTE. Will give trial of diuresis with lasix 40mg IVP x 1 for now, trops negative.  Pt with Uncontrolled Afib this AM--start amio load and lopressor. Hx of CAD--On ASA and statin.   Pulm: Overnight ABG c/w Metabolic acidosis, was placed on BIPAP overnight, now on 3L NC.   GI: Abdominal pain improved, DASH, carb consistent diet  Renal: DANE, Cr 2.7 this AM, likely pre-renal, s/p 2L IVF on admission, would be cautious with hydration given LV systolic dysfunction. Strict I's and O's, daily weight checks.   ID: Afebrile, no leukocytosis, UA with 11-25 wbc's and Moderate bacteria: start Rocephin for UTI. F/u Blood and Urine cx  Heme: On Heparin subQ for DVT PPX  Endo: DM on sliding scale  Skin: No oviedo, no lines        Neuro: Stable, no active concerns  Cardio: Acutely decompensated Heart Failure--elevated BNP, mildly fluid OL on exam, no prior TTE noted per chart, but patient with history of HFpEF, bedside Echo showing moderate to severe LV systolic dysfunction, f/u official TTE. Will give trial of diuresis with lasix 40mg IVP x 1 for now, trops negative.  Pt with Uncontrolled Afib this AM--start amio load and lopressor. Hx of CAD--On ASA and statin.   Pulm: Overnight ABG c/w Metabolic acidosis, was placed on BIPAP overnight, now on 3L NC.   GI: Abdominal pain improved, DASH, carb consistent diet  Renal: DANE, Cr 2.7 this AM, likely pre-renal, s/p 2L IVF on admission, would be cautious with hydration given LV systolic dysfunction. Strict I's and O's, daily weight checks.   ID: Afebrile, no leukocytosis, UA with 11-25 wbc's and Moderate bacteria: start Rocephin for UTI. F/u Blood and Urine cx  Heme: On Heparin subQ for DVT PPX  Endo: DM on sliding scale  Skin: No oviedo, no lines  Dispo: FULL CODE

## 2021-07-24 NOTE — CHART NOTE - NSCHARTNOTEFT_GEN_A_CORE
ICU f/u  Tachycardic, although mildly improved  BP has remained stable, if even higher w/ SBP stable in 120-130s  Some bouts of confusion, although last conversation A&Ox3  Lactate persistent, byproduct of lost IV access and insufficient resuscitation  Hyperkalemia cocktail incomplete also d/t lost IV access  Given multiple medical problems, need for access and persistent lactate, will move to ICU.  Of note, persistent lactate could also be from poor LV function, although more likely hypovolemia given n/v/d last few days. Inotropic support may seem risky given normotensive and already tachycardic to 130s. Will await TTE today.   d/w EICU. Still w/o abd pain, low suspicion for ischemic bowel, may need re-scan w/ IVC if can tolerate from renal standpoint.     Pt is an 86 y/o female with PMHx as above here with:    Assessment:  1. Dehydration/hypovolemia  2. Lactic acidosis/uremia  3. DANE on CKD  4. Hyperkalemia  5. R/o LV failure  6. Sepsis- UTI    Additional Plan, see consult note as well:    Admit to MICU  Gain IV access upon arrival  Stat hyperkalemia cocktail, PO lokelma  Increase bicarb gtt to 100cc/hr  Rocephin for UTI, f/u cultures  Monte placed, strict I/Os, trend cr, avoid nephrotoxins, adhere to renal dose adjustments  Consider re-scanning if continued rising lactate, w/ or w/o contrast  Increase metoprolol to 50 BID w/ hold parameters  F/u TTE, cardio consult  Trend lactate      Critical Care time: 35 mins assessing presenting problems of acute illness that poses high probability of life threatening deterioration or end organ damage/dysfunction.  Medical decision making including initiating plan of care, reviewing data, reviewing radiology, direct patient bedside evaluation and interpretation of vital signs, any necessary ventilator management, discussion with multidisciplinary team, discussing goals of care with patient/family, all non inclusive of procedures ICU f/u  Tachycardic, although mildly improved  BP has remained stable, if even higher w/ SBP stable in 120-130s  Some bouts of confusion, although last conversation A&Ox3  Lactate persistent, byproduct of lost IV access and insufficient resuscitation  Hyperkalemia cocktail incomplete also d/t lost IV access  Given multiple medical problems, need for access and persistent lactate, will move to ICU.  Of note, persistent lactate could also be from poor LV function, although more likely hypovolemia given n/v/d last few days. Inotropic support may seem risky given normotensive and already tachycardic to 130s. Will await TTE today.   d/w EICU. Still w/o abd pain, low suspicion for ischemic bowel, may need re-scan w/ IVC if can tolerate from renal standpoint.     Pt is an 84 y/o female with PMHx as above here with:    Assessment:  1. Dehydration/hypovolemia  2. Lactic acidosis/uremia  3. DANE on CKD  4. Hyperkalemia  5. R/o LV failure  6. Sepsis- UTI    Additional Plan, see consult note as well:    Admit to MICU  Gain IV access upon arrival  Stat hyperkalemia cocktail, PO lokelma  Increase bicarb gtt to 100cc/hr  Rocephin for UTI, f/u cultures  Monte placed, strict I/Os, trend cr, avoid nephrotoxins, adhere to renal dose adjustments  Consider re-scanning if continued rising lactate, w/ or w/o contrast  Increase metoprolol to 50 BID w/ hold parameters  F/u TTE, cardio consult  Trend lactate, check metformin level      Critical Care time: 35 mins assessing presenting problems of acute illness that poses high probability of life threatening deterioration or end organ damage/dysfunction.  Medical decision making including initiating plan of care, reviewing data, reviewing radiology, direct patient bedside evaluation and interpretation of vital signs, any necessary ventilator management, discussion with multidisciplinary team, discussing goals of care with patient/family, all non inclusive of procedures ICU f/u  Tachycardic, although mildly improved  BP has remained stable, if even higher w/ SBP stable in 120-130s  Some bouts of confusion, although last conversation A&Ox3  Lactate persistent, byproduct of lost IV access and insufficient resuscitation  Hyperkalemia cocktail incomplete also d/t lost IV access  Given multiple medical problems, need for access and persistent lactate, will move to ICU.  Of note, persistent lactate could also be from poor LV function, although more likely hypovolemia given n/v/d last few days. Inotropic support may seem risky given normotensive and already tachycardic to 130s. Will await TTE today.   d/w EICU. Still w/o abd pain, low suspicion for ischemic bowel, may need re-scan w/ IVC if can tolerate from renal standpoint.     Pt is an 84 y/o female with PMHx of HFpEF, HTN, HLD, CAD, CKD, DM on metformin here with:    Assessment:  1. Dehydration/hypovolemia  2. Metabolic acidosis- lactate, uremia  3. DANE on CKD  4. Hyperkalemia  5. Sepsis- UTI  6. R/o LV function    Additional Plan, see consult note as well:    Admit to MICU  Gain IV access upon arrival  Stat hyperkalemia cocktail, PO lokelma  Increase bicarb gtt to 100cc/hr  Rocephin for UTI, f/u cultures  Monte placed, strict I/Os, trend cr, avoid nephrotoxins, adhere to renal dose adjustments  Consider re-scanning if continued rising lactate, w/ or w/o contrast  Increase metoprolol to 50 BID w/ hold parameters  F/u TTE, cardio consult  Trend lactate, check metformin level      Critical Care time: 35 mins assessing presenting problems of acute illness that poses high probability of life threatening deterioration or end organ damage/dysfunction.  Medical decision making including initiating plan of care, reviewing data, reviewing radiology, direct patient bedside evaluation and interpretation of vital signs, any necessary ventilator management, discussion with multidisciplinary team, discussing goals of care with patient/family, all non inclusive of procedures ICU f/u  Tachycardic, although mildly improved  BP has remained stable, if even higher w/ SBP stable in 120-130s  Some bouts of confusion, although last conversation A&Ox3  Lactate persistent, byproduct of lost IV access and insufficient resuscitation  Hyperkalemia cocktail incomplete also d/t lost IV access  Given multiple medical problems, need for access and persistent lactate, will move to ICU.  Of note, persistent lactate could also be from poor LV function, although more likely hypovolemia given n/v/d last few days. Inotropic support may seem risky given normotensive and already tachycardic to 130s. Will await TTE today.   d/w EICU. Still w/o abd pain, low suspicion for ischemic bowel, may need re-scan w/ IVC if can tolerate from renal standpoint.     Pt is an 84 y/o female with PMHx of HFpEF, HTN, HLD, CAD, CKD, DM on metformin here with:    Assessment:  1. Dehydration/hypovolemia  2. Metabolic acidosis- lactate, uremia  3. DANE on CKD  4. Hyperkalemia  5. Sepsis- UTI  6. R/o LV function    Additional Plan, see consult note as well:    Admit to MICU  Gain IV access upon arrival  Stat hyperkalemia cocktail, PO lokelma  Increase bicarb gtt to 100cc/hr  Rocephin for UTI, f/u cultures  Monte placed, strict I/Os, trend cr, avoid nephrotoxins, adhere to renal dose adjustments  Consider re-scanning if continued rising lactate, w/ or w/o contrast  On BB w/ hold parameters  F/u TTE, cardio consult  Trend lactate, check metformin level      Critical Care time: 35 mins assessing presenting problems of acute illness that poses high probability of life threatening deterioration or end organ damage/dysfunction.  Medical decision making including initiating plan of care, reviewing data, reviewing radiology, direct patient bedside evaluation and interpretation of vital signs, any necessary ventilator management, discussion with multidisciplinary team, discussing goals of care with patient/family, all non inclusive of procedures

## 2021-07-24 NOTE — H&P ADULT - BP NONINVASIVE DIASTOLIC (MM HG)
[Restricted in physically strenuous activity but ambulatory and able to carry out work of a light or sedentary nature] : Status 1- Restricted in physically strenuous activity but ambulatory and able to carry out work of a light or sedentary nature, e.g., light house work, office work
[Thin] : thin
[Normal] : no peripheral adenopathy appreciated
[de-identified] : severe trismus, naccot evaluate oral cavity. no neck adenopathy 
61
no...

## 2021-07-24 NOTE — H&P ADULT - MS EXT PE MLT D E PC
Follow up in office tomorrow   Call office with questions or concerns   Keep eye shield in place until follow up tomorrow   Tylenol or motrin over the counter as needed for pain   Resume regular diet as tolerated and home medications as prescribed  No heavy lifting/bending       Preventing Surgical Site Infections     Hand washing reduces the risk of infection.   One risk of having surgery is an infection at the surgical site. The surgical site is any cut the surgeon makes in the skin to do the operation. Surgical site infections can range from minor skin infections to severe or even fatal ones involving tissue under the skin or organs. This sheet tells you more about surgical site infections, what hospitals are doing to prevent them, and how they are treated if they do occur. It also tells you what you can do to prevent these infections.  What causes surgical site infections?  Germs are everywhere. They’re on your skin, in the air, and on things you touch. Many germs are good. Some are harmful. Surgical site infections occur when harmful germs enter your body through the incision in your skin. Some infections are caused by germs that are in the air or on objects. But most are caused by germs found on and in your own body.  Who is at risk for surgical site infections?  Anyone can have a surgical site infection. Your risk is greater if you:  · Are an older adult  · Have a weak immune system or other health conditions or illnesses such as diabetes  · Take certain medicines such as steroids  · Are a smoker  · Have certain types of operations, such as abdominal surgery  · Have poor nutrition  · Are very overweight  · Have an operation that lasts longer than 2 hours  What are the symptoms of a surgical site infection?  · The infection usually starts with increased skin redness, pain, and swelling around the incision. Later you may notice a cloudy or greenish-yellow discharge from the incision and it may develop a foul  odor. The incision may separate or open up. You are also likely to have a fever and may feel very ill.  · Symptoms can appear any time from hours to weeks after surgery. Implants such as an artificial knee or hip can become infected at any time after the operation.    How are surgical site infections treated?  · Surgical site infections are treated with antibiotics. The type of medicine you get will depend on the germ thought to be causing the infection. Most serious wound infections need local wound care, and in some cases, further surgery.  · An infected skin wound may be reopened and cleaned. Sometimes, deep wounds need to be packed with gauze that is changed often until the wound starts to heal from the inside out. Your healthcare provider will figure out the best care needed to treat your surgical site infection.  · If an infection occurs where an implant is placed, the implant may be removed.  · If you have an infection deeper in your body, you may need another operation to treat it.    What hospitals do to prevent surgical site infections  Many hospitals take these steps to help prevent surgical site infections:  · Handwashing. Before the operation, your surgeon and all operating room staff scrub their hands and arms with an antiseptic soap.  · Clean skin. The site where your incision is made is carefully cleaned with an antiseptic solution.  · Sterile clothing and drapes. Members of your surgical team wear medical uniforms (scrub suits), long-sleeved surgical gowns, masks, caps, shoe covers, and sterile gloves. Your body is fully covered with a large sterile sheet (sterile drape) except for the spot where the incision is made.  · Clean air. Operating rooms have special air filters and positive pressure airflow to prevent unfiltered air from entering the room.  · Careful use of antibiotics. Antibiotics are given no more than 60 minutes before the incision is made and generally stopped within 24 hours after  surgery, depending on the type of surgery. This helps kill germs but prevents problems that can occur when antibiotics are taken longer.  · Controlled blood sugar levels. Your blood sugar level may rise because of the stress of the surgery. Your blood sugar level is watched closely to make sure it stays within a normal range. High blood sugar delays wound healing and increases the chances for infection.  · Controlled body temperature. A lower-than-normal temperature during or after surgery prevents oxygen from reaching the wound and makes it harder for your body to fight infection. Hospitals may warm IV fluids, increase the temperature in the operating room, and provide warm-air blankets.  · Proper hair removal. Any hair that must be removed is clipped right before the incision, not shaved with a razor. This prevents tiny nicks and cuts through which germs can enter.  · Wound care. After surgery, a closed wound is covered with a sterile dressing for a day or two. Open wounds are packed with sterile gauze and covered with a sterile dressing.  What you can do to prevent surgical site infections  · Ask questions. Learn what your hospital is doing to prevent infection.  · If your doctor tells you to, shower or bathe with plain soap the night before and the day of your operation. Follow the instructions you are given. You may be asked to use a special cleanser that you don’t rinse off.  · If you smoke, stop for the longest duration possible before and after the operation. Ask your doctor about ways to quit.  · Take antibiotics only when your healthcare provider tells you to. Using antibiotics when they’re not needed can create germs that are harder to kill. Also, finish the entire prescription of your antibiotics, even if you feel better.  · Be sure healthcare workers clean their hands with plain soap and water or with an alcohol-based hand  before and after caring for you. Don’t be afraid to remind them.  · After  surgery, eat healthy foods. Care for your incision as directed by your doctor or nurse.    When to call your healthcare provider  Call your healthcare provider if you have any of the following:  · Increased soreness, pain, or tenderness at the surgical site  · A red streak, increased redness, or puffiness near the incision  · Yellowish, cloudy, or bad-smelling discharge from the incision  · Stitches that dissolve before the wound heals  · Fever of 100.4°F (38°C) or higher, or as directed by your healthcare provider  · A tired feeling that doesn’t go away        How to Use Eye Drops     Step 1       Step 2         Step 3       Step 4      Step 1  · Wash your hands.  · Sit down and tilt your head back. Or, lie down with your head flat and look straight up at the ceiling.  Step 2  · Make a pocket in the lower lid of one eye. You can do this by pulling your lower lid down gently with your index finger. Or gently pull your lower lid out between your thumb and index finger.  · Look up.  Step 3  · Squeeze one eye drop into your lower lid. Be careful that the tip of the bottle does not touch any part of your eye or face.  · If you aren't sure that the drop got in, you can quickly squeeze a second eye drop. Your eye will only hold a very small amount of liquid. Any extra will run down your cheek. You can then wipe the extra liquid off your cheek.   · Gently close your eye. Don’t blink or wipe your eye.  · If you tend to close your eye before the drop gets in, try this: Close your eye and put one drop in the inside corner of your eye. Open your eye and let the drop run into your lower lid. Then close your eye again.  Step 4  · Keep your eye closed.  · Press down with your index finger on the inside corner of your eye. Count to 10 slowly. This helps keep the eye drop from getting into your bloodstream.  · Wipe away any extra drops before opening your eye.  · Repeat steps 1 through 4 for your other eye.  · If you use more  than one kind of drop in each eye, wait at least 5 minutes between drops.  CAUTION  Don’t share eye drops, and don’t use anyone else's medicine. Sharing eye drops can spread infection. It can also lead to complications if you use the wrong medicine.   Helpful tip   Try following steps 1 through 4 while looking into a mirror. Using a mirror can be helpful because it lets you see the eye drop go into your lower lid. This will help you avoid wasting any drops.               no pedal edema

## 2021-07-24 NOTE — ED PROVIDER NOTE - CARE PLAN
Principal Discharge DX:	Respiratory failure  Secondary Diagnosis:	CHF (congestive heart failure)  Secondary Diagnosis:	Atrial flutter  Secondary Diagnosis:	Abdominal pain  Secondary Diagnosis:	Lactic acid acidosis

## 2021-07-24 NOTE — ED ADULT NURSE REASSESSMENT NOTE - NS ED NURSE REASSESS COMMENT FT1
Monte catheter inserted as ordered by ICU PA using sterile technique. Second RN present to confirm sterility. Bedside drainage to gravity. Stat lock in place. Patient tolerated well.

## 2021-07-24 NOTE — CONSULT NOTE ADULT - ASSESSMENT
Pt is an 85W w/ PMHx of HFpEF, HTN, HLD, CAD, CKD, DM on metformin presented to [facility] w/ several days of abd pain, n/v/d after eating "bad turkey sandwich".   In ED @ [facility], pt found to be tachycardic w/ soft BP, lactate elevated to 5.9. Given 2L IVF and sent for CT A/P.  Pt continued to have persistently elevated lactate--was subsequently admitted to the ICU  ID c/s for UTI    Acute cystitis  UA+, UCx pending  BCx pending  trend temps/wbc/lactate  C/w ceftriaxone 1gm q24h    Abdominal Pain  N/V/D  -improved  -stool studies if worsening diarrhea    DANE  -renally dose medications    Covering for Dr. Sena  Infectious Diseases will continue to follow. Please call with any questions.   Sandra Rosales M.D.  [facility], Division of Infectious Diseases [phone]  For over the weekend and after hours, please call [phone]

## 2021-07-24 NOTE — H&P ADULT - NSICDXPASTMEDICALHX_GEN_ALL_CORE_FT
PAST MEDICAL HISTORY:  CAD (coronary artery disease)     HTN (hypertension)     Stage 4 chronic kidney disease

## 2021-07-24 NOTE — CONSULT NOTE ADULT - SUBJECTIVE AND OBJECTIVE BOX
Patient is a 85y old  Female who presents with a chief complaint of     BRIEF HOSPITAL COURSE:   Pt is an 84 y/o female with PMHx of HFpEF, HTN, HLD, CAD, CKD, DM on metformin presented to [facility] w/ several days of abd pain, n/v/d after eating "bad turkey sandwich". In ED @ [facility], pt found to be tachycardic w/ soft BP, lactate elevated to 5.9. Given 2L IVF and sent for CT A/P. While laying flat, pt became acutely SOB requiring BiPAP. Per pt, she denies being SOB @ that time. States she just "freaked out" and got "worked up" because she almost fell off table. BP improved, SOB improved, sent over to \[facility]\"" on BIPAP for ICU evaluation as without     Seen and examined in ED. Remains tachycardic to 130s-140s, appears sinus at times, although pt feels much better. Removed off BiPAP on 2-3L NC w/ Spo2 in high 90s. Breathing comfortably. /91, MAP high 80s, 90s.  Denies SOB, CP, h/a, dizziness, abd pain, n/v/d or any other acute complaints at this time.     PAST MEDICAL & SURGICAL HISTORY:    Allergies    No Known Allergies    Intolerances    Review of Systems:  CONSTITUTIONAL: No fever, chills, or fatigue  EYES: No eye pain, visual disturbances, or discharge  ENMT:  No difficulty hearing, tinnitus, vertigo; No sinus or throat pain  NECK: No pain or stiffness  RESPIRATORY: No cough, wheezing, chills or hemoptysis; No shortness of breath  CARDIOVASCULAR: No chest pain, palpitations, dizziness, or leg swelling  GASTROINTESTINAL: No abdominal or epigastric pain. No nausea, vomiting, or hematemesis; No diarrhea or constipation. No melena or hematochezia.  GENITOURINARY: No dysuria, frequency, hematuria, or incontinence  NEUROLOGICAL: No headaches, memory loss, loss of strength, numbness, or tremors  SKIN: No itching, burning, rashes, or lesions   MUSCULOSKELETAL: No joint pain or swelling; No muscle, back, or extremity pain  PSYCHIATRIC: No depression, anxiety, mood swings, or difficulty sleeping      Medications:    metoprolol tartrate 25 milliGRAM(s) Oral two times a day          heparin   Injectable 5000 Unit(s) SubCutaneous every 8 hours        atorvastatin 10 milliGRAM(s) Oral at bedtime  dextrose 40% Gel 15 Gram(s) Oral once  dextrose 50% Injectable 25 Gram(s) IV Push once  dextrose 50% Injectable 12.5 Gram(s) IV Push once  dextrose 50% Injectable 25 Gram(s) IV Push once  glucagon  Injectable 1 milliGRAM(s) IntraMuscular once  insulin lispro (ADMELOG) corrective regimen sliding scale   SubCutaneous Before meals and at bedtime    dextrose 5%. 1000 milliLiter(s) IV Continuous <Continuous>  dextrose 5%. 1000 milliLiter(s) IV Continuous <Continuous>                ICU Vital Signs Last 24 Hrs  T(C): --  T(F): --  HR: 137 (24 Jul 2021 00:00) (137 - 137)  BP: 120/88 (24 Jul 2021 00:00) (120/88 - 120/88)  BP(mean): --  ABP: --  ABP(mean): --  RR: 20 (24 Jul 2021 00:00) (20 - 20)  SpO2: --    Vital Signs Last 24 Hrs  T(C): --  T(F): --  HR: 137 (24 Jul 2021 00:00) (137 - 137)  BP: 120/88 (24 Jul 2021 00:00) (120/88 - 120/88)  BP(mean): --  RR: 20 (24 Jul 2021 00:00) (20 - 20)  SpO2: --        I&O's Detail        LABS:                CAPILLARY BLOOD GLUCOSE            CULTURES:      Physical Examination:      General: Well appearing, lying in bed in NAD      HEENT: Pupils equal, reactive to light.  Symmetric. No scleral icterus or injection    PULM: Clear to auscultation bilaterally, no wheezes, rales or rhonchi, no significant sputum production or increased respiratory effort    NECK: Supple, no lymphadenopathy, trachea midline    CVS: Sinus tach, no murmurs, +s1/s2    ABD: Soft, nondistended, nontender, normoactive bowel sounds    EXT: No edema, nontender    SKIN: Warm and well perfused    NEURO: Alert, oriented, interactive, nonfocal      POCUS: faint scattered b-lines, small rt pleural effusion    RADIOLOGY: CT a/p @ [facility] w/ small rt pleural effusion, otherwise poor upper abdominal study, no obvious acute pathology per radiology report   Patient is a 85y old  Female who presents with a chief complaint of     BRIEF HOSPITAL COURSE:   Pt is an 84 y/o female with PMHx of HFpEF, HTN, HLD, CAD, CKD, DM on metformin presented to [facility] w/ several days of abd pain, n/v/d after eating "bad turkey sandwich". In ED @ [facility], pt found to be tachycardic w/ soft BP, lactate elevated to 5.9. Given 2L IVF and sent for CT A/P. While laying flat, pt became acutely SOB requiring BiPAP. Per pt, she denies being SOB @ that time. States she just "freaked out" and got "worked up" because she almost fell off table. BP improved, SOB improved, sent over to [facility] on BIPAP for ICU evaluation as without available space in [facility].     Seen and examined in ED. Remains tachycardic to 130s-140s, appears sinus at times, although pt feels much better. Removed off BiPAP on 2-3L NC w/ Spo2 in high 90s. Breathing comfortably. /91, MAP high 80s, 90s.  Denies SOB, CP, h/a, dizziness, abd pain, n/v/d or any other acute complaints at this time.     PAST MEDICAL & SURGICAL HISTORY:    Allergies    No Known Allergies    Intolerances    Review of Systems:  CONSTITUTIONAL: No fever, chills, or fatigue  EYES: No eye pain, visual disturbances, or discharge  ENMT:  No difficulty hearing, tinnitus, vertigo; No sinus or throat pain  NECK: No pain or stiffness  RESPIRATORY: No cough, wheezing, chills or hemoptysis; No shortness of breath  CARDIOVASCULAR: No chest pain, palpitations, dizziness, or leg swelling  GASTROINTESTINAL: No abdominal or epigastric pain. No nausea, vomiting, or hematemesis; No diarrhea or constipation. No melena or hematochezia.  GENITOURINARY: No dysuria, frequency, hematuria, or incontinence  NEUROLOGICAL: No headaches, memory loss, loss of strength, numbness, or tremors  SKIN: No itching, burning, rashes, or lesions   MUSCULOSKELETAL: No joint pain or swelling; No muscle, back, or extremity pain  PSYCHIATRIC: No depression, anxiety, mood swings, or difficulty sleeping      Medications:    metoprolol tartrate 25 milliGRAM(s) Oral two times a day          heparin   Injectable 5000 Unit(s) SubCutaneous every 8 hours        atorvastatin 10 milliGRAM(s) Oral at bedtime  dextrose 40% Gel 15 Gram(s) Oral once  dextrose 50% Injectable 25 Gram(s) IV Push once  dextrose 50% Injectable 12.5 Gram(s) IV Push once  dextrose 50% Injectable 25 Gram(s) IV Push once  glucagon  Injectable 1 milliGRAM(s) IntraMuscular once  insulin lispro (ADMELOG) corrective regimen sliding scale   SubCutaneous Before meals and at bedtime    dextrose 5%. 1000 milliLiter(s) IV Continuous <Continuous>  dextrose 5%. 1000 milliLiter(s) IV Continuous <Continuous>                ICU Vital Signs Last 24 Hrs  T(C): --  T(F): --  HR: 137 (24 Jul 2021 00:00) (137 - 137)  BP: 120/88 (24 Jul 2021 00:00) (120/88 - 120/88)  BP(mean): --  ABP: --  ABP(mean): --  RR: 20 (24 Jul 2021 00:00) (20 - 20)  SpO2: --    Vital Signs Last 24 Hrs  T(C): --  T(F): --  HR: 137 (24 Jul 2021 00:00) (137 - 137)  BP: 120/88 (24 Jul 2021 00:00) (120/88 - 120/88)  BP(mean): --  RR: 20 (24 Jul 2021 00:00) (20 - 20)  SpO2: --        I&O's Detail        LABS:                CAPILLARY BLOOD GLUCOSE            CULTURES:      Physical Examination:      General: Well appearing, lying in bed in NAD      HEENT: Pupils equal, reactive to light.  Symmetric. No scleral icterus or injection    PULM: Clear to auscultation bilaterally, no wheezes, rales or rhonchi, no significant sputum production or increased respiratory effort    NECK: Supple, no lymphadenopathy, trachea midline    CVS: Sinus tach, no murmurs, +s1/s2    ABD: Soft, nondistended, nontender, normoactive bowel sounds    EXT: No edema, nontender    SKIN: Warm and well perfused    NEURO: Alert, oriented, interactive, nonfocal      POCUS: faint scattered b-lines, small rt pleural effusion    RADIOLOGY: CT a/p @ [facility] w/ small rt pleural effusion, otherwise poor upper abdominal study, no obvious acute pathology per radiology report

## 2021-07-24 NOTE — ED PROVIDER NOTE - CLINICAL SUMMARY MEDICAL DECISION MAKING FREE TEXT BOX
transfer from  for admission ... Dx respiratory failure, CHF, atrial flutter and abdominal pain , ICU consult and admission

## 2021-07-24 NOTE — CONSULT NOTE ADULT - CONSULT REASON
Atrial flutter
SOB
atrial arrhythmia  pleural eff  s/p BIPAP - resp distress  CKD  Met acidosis  lactic acidosis
Sepsis, UTI

## 2021-07-24 NOTE — CHART NOTE - NSCHARTNOTEFT_GEN_A_CORE
:  Alfa Villegas MD    INDICATION:    Respiratory Failure (J96.00)  Shock (R57.9)    PROCEDURE:  [x] LIMITED ECHO  [x] LIMITED CHEST  [ ] LIMITED RETROPERITONEAL  [ ] LIMITED ABDOMINAL  [ ] LIMITED DVT  [ ] NEEDLE GUIDANCE VASCULAR  [ ] NEEDLE GUIDANCE THORACENTESIS  [ ] NEEDLE GUIDANCE PARACENTESIS  [ ] NEEDLE GUIDANCE PERICARDIOCENTESIS  [ ] OTHER    FINDINGS:  Scattered B lines anteriorly bilaterally with small right pleural effusion  Severe LV systolic dysfunction  LV>RV  No significant pericardial effusion  Mild-moderate TR with estimated PASP 45-50 mm Hg consistent with mild pulmonary HTN  IVC dilated to 2.6 cm without inspiratory variation    INTERPRETATION:  Mild cardiogenic pulmonary edema  Severe LV systolic dysfunction  Normal RV size and systolic function  Mild pulmonary hypertension  Dilated IVC consistent with volume overloaded state    Images stored in ICU Ultrasound

## 2021-07-24 NOTE — H&P ADULT - ASSESSMENT
Pt is an 86 y/o female with PMHx as above here with:    Assessment:  1. Dehydration/hypovolemia  2. Lactic acidosis  3. SOB- likely mild CHF exacerbation, resolved, on NC  4. Tachycardia- sepsis vs BB withdrawal, did not take metoprolol today, ?rate dependent LV dysfunction  5. DANE on CKD  6. R/o sepsis    P- Formal TTE  - Cardiology consult called.   -fluids, pressors , antibiotics as per ICU

## 2021-07-24 NOTE — PROGRESS NOTE ADULT - SUBJECTIVE AND OBJECTIVE BOX
Interval events: Patient admitted overnight,      Review of Systems:  Constitutional: no fever, chills, fatigue  Neuro: no headache, numbness, weakness  Resp: no cough, wheezing, shortness of breath  CVS: no chest pain, palpitations, leg swelling  GI: no abdominal pain, nausea, vomiting, diarrhea   : no dysuria, frequency, incontinence  Skin: no itching, burning, rashes, or lesions   Msk: no joint pain or swelling  Psych: no depression, anxiety    T(F): 97.5 (21 @ 12:00), Max: 97.5 (21 @ 12:00)  HR: 96 (21 @ 12:00) (96 - 137)  BP: 87/63 (21 @ 12:00) (87/63 - 163/103)  RR: 20 (21 @ 12:00) (18 - 30)  SpO2: --  Wt(kg): --        CAPILLARY BLOOD GLUCOSE      POCT Blood Glucose.: 120 mg/dL (2021 11:41)    I&O's Summary    2021 07:01  -  2021 12:37  --------------------------------------------------------  IN: 500 mL / OUT: 20 mL / NET: 480 mL        Physical Exam:     Gen:  Neuro:  HEENT:  CV:  Pulm:  GI:  Ext:  Skin:    Meds:  heparin   Injectable 5000 Unit(s) SubCutaneous every 8 hours    cefTRIAXone   IVPB        aMIOdarone    Tablet   Oral   aMIOdarone    Tablet 400 milliGRAM(s) Oral every 8 hours  metoprolol tartrate 50 milliGRAM(s) Oral two times a day    atorvastatin 10 milliGRAM(s) Oral at bedtime  dextrose 40% Gel 15 Gram(s) Oral once  dextrose 50% Injectable 25 Gram(s) IV Push once  dextrose 50% Injectable 12.5 Gram(s) IV Push once  dextrose 50% Injectable 25 Gram(s) IV Push once  glucagon  Injectable 1 milliGRAM(s) IntraMuscular once  insulin lispro (ADMELOG) corrective regimen sliding scale   SubCutaneous Before meals and at bedtime              dextrose 5%. 1000 milliLiter(s) IV Continuous <Continuous>  dextrose 5%. 1000 milliLiter(s) IV Continuous <Continuous>  sodium bicarbonate  Infusion 0.263 mEq/kG/Hr IV Continuous <Continuous>      chlorhexidine 2% Cloths 1 Application(s) Topical <User Schedule>                                  12.9   8.33  )-----------( 171      ( 2021 03:36 )             42.6       07-24    140  |  109<H>  |  46<H>  ----------------------------<  113<H>  4.6   |  18<L>  |  2.70<H>    Ca    7.9<L>      2021 12:07  Phos  5.9     -  Mg     1.6     -24    TPro  6.8  /  Alb  3.2<L>  /  TBili  0.4  /  DBili  x   /  AST  47<H>  /  ALT  47  /  AlkPhos  61  -24    Lactate 5.0           07-24 @ 05:25    Lactate 4.9           07-24 @ 01:36      CARDIAC MARKERS ( 2021 12:07 )  .027 ng/mL / x     / x     / x     / x      CARDIAC MARKERS ( 2021 03:36 )  <.015 ng/mL / x     / x     / x     / x            Urinalysis Basic - ( 2021 05:01 )    Color: Yellow / Appearance: Slightly Turbid / S.025 / pH: x  Gluc: x / Ketone: Trace  / Bili: Negative / Urobili: 4   Blood: x / Protein: 100 / Nitrite: Negative   Leuk Esterase: Small / RBC: 25-50 /HPF / WBC 11-25   Sq Epi: x / Non Sq Epi: Moderate / Bacteria: Moderate            ABG - ( 2021 10:12 )  pH, Arterial: 7.33  pH, Blood: x     /  pCO2: 25    /  pO2: 170   / HCO3: 16    / Base Excess: -11.9 /  SaO2: 99                  Radiology: ***    Bedside Lung U/S: ***    Bedside Cardiac U/S: ***    CENTRAL LINE: Y/N   DATE INSERTED:   REMOVE: Y/N    BRADFORD: Y/N      DATE INSERTED:        REMOVE: Y/N    A-LINE: Y/N     DATE INSERTED:              REMOVE: Y/N    GLOBAL ISSUE/BEST PRACTICE:  Analgesia:  Sedation:  CAM-ICU:  HOB elevation: yes  Stress ulcer prophylaxis:  VTE prophylaxis:  Glycemic control:  Nutrition:    CODE STATUS: *** Interval events: Patient admitted overnight, tachycardic this AM 120s-130s, was started on Amio loading dose. Denies any chest pain, shortness of breath. Abdominal pain has improved.     Review of Systems:  Constitutional: no fever, chills, fatigue  Neuro: no headache, numbness, weakness  Resp: no cough, wheezing, shortness of breath  CVS: no chest pain, palpitations, leg swelling  GI: no abdominal pain, nausea, vomiting, diarrhea   : no dysuria, frequency, incontinence  Skin: no itching, burning, rashes, or lesions   Msk: no joint pain or swelling  Psych: no depression, anxiety    T(F): 97.5 (21 @ 12:00), Max: 97.5 (21 @ 12:00)  HR: 96 (21 @ 12:00) (96 - 137)  BP: 87/63 (21 @ 12:00) ( - 163/103)  RR: 20 (21 @ 12:00) (18 - 30)  SpO2: --  Wt(kg): --        CAPILLARY BLOOD GLUCOSE      POCT Blood Glucose.: 120 mg/dL (2021 11:41)    I&O's Summary    2021 07:01  -  2021 12:37  --------------------------------------------------------  IN: 500 mL / OUT: 20 mL / NET: 480 mL        Physical Exam:     T(C): 36.4 (21 @ 12:00), Max: 36.4 (21 @ 12:00)  HR: 96 (21 @ 12:00) (96 - 137)  BP: 87/63 (21 @ 12:00) (63 - 163/103)  RR: 20 (21 @ 12:00) (18 - 30)  SpO2: --    GENERAL: patient appears well, no acute distress, appropriately interactive  EYES: sclera clear, no exudates  ENMT: oropharynx clear without erythema, no exudates, moist mucous membranes  LUNGS: decreased bs at lung bases b/l; no wheezing or rhonchi appreciated  HEART: soft S1/S2, regular rate and rhythm, no murmurs noted; trace b/l lower extremity edema  GASTROINTESTINAL: abdomen is soft, nontender, nondistended, normoactive bowel sounds  INTEGUMENT: good skin turgor, warm skin, appears well perfused  MUSCULOSKELETAL: no clubbing or cyanosis, no obvious deformity  NEUROLOGIC: awake, alert, oriented x3, good muscle tone in all 4 extremities        Meds:  heparin   Injectable 5000 Unit(s) SubCutaneous every 8 hours    cefTRIAXone   IVPB        aMIOdarone    Tablet   Oral   aMIOdarone    Tablet 400 milliGRAM(s) Oral every 8 hours  metoprolol tartrate 50 milliGRAM(s) Oral two times a day    atorvastatin 10 milliGRAM(s) Oral at bedtime  dextrose 40% Gel 15 Gram(s) Oral once  dextrose 50% Injectable 25 Gram(s) IV Push once  dextrose 50% Injectable 12.5 Gram(s) IV Push once  dextrose 50% Injectable 25 Gram(s) IV Push once  glucagon  Injectable 1 milliGRAM(s) IntraMuscular once  insulin lispro (ADMELOG) corrective regimen sliding scale   SubCutaneous Before meals and at bedtime              dextrose 5%. 1000 milliLiter(s) IV Continuous <Continuous>  dextrose 5%. 1000 milliLiter(s) IV Continuous <Continuous>  sodium bicarbonate  Infusion 0.263 mEq/kG/Hr IV Continuous <Continuous>      chlorhexidine 2% Cloths 1 Application(s) Topical <User Schedule>                                  12.9   8.33  )-----------( 171      ( 2021 03:36 )             42.6       -24    140  |  109<H>  |  46<H>  ----------------------------<  113<H>  4.6   |  18<L>  |  2.70<H>    Ca    7.9<L>      2021 12:07  Phos  5.9       Mg     1.6         TPro  6.8  /  Alb  3.2<L>  /  TBili  0.4  /  DBili  x   /  AST  47<H>  /  ALT  47  /  AlkPhos  61  -    Lactate 5.0           - @ 05:25    Lactate 4.9            @ 01:36      CARDIAC MARKERS ( 2021 12:07 )  .027 ng/mL / x     / x     / x     / x      CARDIAC MARKERS ( 2021 03:36 )  <.015 ng/mL / x     / x     / x     / x            Urinalysis Basic - ( 2021 05:01 )    Color: Yellow / Appearance: Slightly Turbid / S.025 / pH: x  Gluc: x / Ketone: Trace  / Bili: Negative / Urobili: 4   Blood: x / Protein: 100 / Nitrite: Negative   Leuk Esterase: Small / RBC: 25-50 /HPF / WBC 11-25   Sq Epi: x / Non Sq Epi: Moderate / Bacteria: Moderate            ABG - ( 2021 10:12 )  pH, Arterial: 7.33  pH, Blood: x     /  pCO2: 25    /  pO2: 170   / HCO3: 16    / Base Excess: -11.9 /  SaO2: 99                  Radiology: ***    Bedside Lung U/S: ***    Bedside Cardiac U/S: ***    CENTRAL LINE: Y/N   DATE INSERTED:   REMOVE: Y/N    BRADFORD: Y/N      DATE INSERTED:        REMOVE: Y/N    A-LINE: Y/N     DATE INSERTED:              REMOVE: Y/N    GLOBAL ISSUE/BEST PRACTICE:  Analgesia:  Sedation:  CAM-ICU:  HOB elevation: yes  Stress ulcer prophylaxis:  VTE prophylaxis:  Glycemic control:  Nutrition:    CODE STATUS: *** Interval events: Patient admitted overnight, tachycardic this AM 120s-130s, was started on Amio loading dose. Denies any chest pain, shortness of breath. Abdominal pain has improved.     Review of Systems:  Constitutional: no fever, chills, fatigue  Neuro: no headache, numbness, weakness  Resp: no cough, wheezing, shortness of breath  CVS: no chest pain, palpitations, leg swelling  GI: no abdominal pain, nausea, vomiting, diarrhea   : no dysuria, frequency, incontinence  Skin: no itching, burning, rashes, or lesions   Msk: no joint pain or swelling  Psych: no depression, anxiety    T(F): 97.5 (21 @ 12:00), Max: 97.5 (21 @ 12:00)  HR: 96 (21 @ 12:00) (96 - 137)  BP: 87/63 (21 @ 12:00) ( - 163/103)  RR: 20 (21 @ 12:00) (18 - 30)  SpO2: --  Wt(kg): --        CAPILLARY BLOOD GLUCOSE      POCT Blood Glucose.: 120 mg/dL (2021 11:41)    I&O's Summary    2021 07:01  -  2021 12:37  --------------------------------------------------------  IN: 500 mL / OUT: 20 mL / NET: 480 mL        Physical Exam:     T(C): 36.4 (21 @ 12:00), Max: 36.4 (21 @ 12:00)  HR: 96 (21 @ 12:00) (96 - 137)  BP: 87/63 (21 @ 12:00) (63 - 163/103)  RR: 20 (21 @ 12:00) (18 - 30)  SpO2: --    GENERAL: patient appears well, no acute distress, appropriately interactive  EYES: sclera clear, no exudates  ENMT: oropharynx clear without erythema, no exudates, moist mucous membranes  LUNGS: decreased bs at lung bases b/l; no wheezing or rhonchi appreciated  HEART: soft S1/S2, regular rate and rhythm, no murmurs noted; trace b/l lower extremity edema  GASTROINTESTINAL: abdomen is soft, nontender, nondistended, normoactive bowel sounds  INTEGUMENT: good skin turgor, warm skin, appears well perfused  MUSCULOSKELETAL: no clubbing or cyanosis, no obvious deformity  NEUROLOGIC: awake, alert, oriented x3, good muscle tone in all 4 extremities        Meds:  heparin   Injectable 5000 Unit(s) SubCutaneous every 8 hours    cefTRIAXone   IVPB        aMIOdarone    Tablet   Oral   aMIOdarone    Tablet 400 milliGRAM(s) Oral every 8 hours  metoprolol tartrate 50 milliGRAM(s) Oral two times a day    atorvastatin 10 milliGRAM(s) Oral at bedtime  dextrose 40% Gel 15 Gram(s) Oral once  dextrose 50% Injectable 25 Gram(s) IV Push once  dextrose 50% Injectable 12.5 Gram(s) IV Push once  dextrose 50% Injectable 25 Gram(s) IV Push once  glucagon  Injectable 1 milliGRAM(s) IntraMuscular once  insulin lispro (ADMELOG) corrective regimen sliding scale   SubCutaneous Before meals and at bedtime              dextrose 5%. 1000 milliLiter(s) IV Continuous <Continuous>  dextrose 5%. 1000 milliLiter(s) IV Continuous <Continuous>  sodium bicarbonate  Infusion 0.263 mEq/kG/Hr IV Continuous <Continuous>      chlorhexidine 2% Cloths 1 Application(s) Topical <User Schedule>                                  12.9   8.33  )-----------( 171      ( 2021 03:36 )             42.6       -24    140  |  109<H>  |  46<H>  ----------------------------<  113<H>  4.6   |  18<L>  |  2.70<H>    Ca    7.9<L>      2021 12:07  Phos  5.9       Mg     1.6         TPro  6.8  /  Alb  3.2<L>  /  TBili  0.4  /  DBili  x   /  AST  47<H>  /  ALT  47  /  AlkPhos  61  -    Lactate 5.0           - @ 05:25    Lactate 4.9            @ 01:36      CARDIAC MARKERS ( 2021 12:07 )  .027 ng/mL / x     / x     / x     / x      CARDIAC MARKERS ( 2021 03:36 )  <.015 ng/mL / x     / x     / x     / x            Urinalysis Basic - ( 2021 05:01 )    Color: Yellow / Appearance: Slightly Turbid / S.025 / pH: x  Gluc: x / Ketone: Trace  / Bili: Negative / Urobili: 4   Blood: x / Protein: 100 / Nitrite: Negative   Leuk Esterase: Small / RBC: 25-50 /HPF / WBC 11-25   Sq Epi: x / Non Sq Epi: Moderate / Bacteria: Moderate            ABG - ( 2021 10:12 )  pH, Arterial: 7.33  pH, Blood: x     /  pCO2: 25    /  pO2: 170   / HCO3: 16    / Base Excess: -11.9 /  SaO2: 99                  Radiology:   CT abd/pelvis:     IMPRESSION: Motion artifact limits evaluation of the upper abdomen.    No cause for abdominal pain is visualized.    Right pleural effusion.    Bedside Cardiac U/S: IVC 2.5, appears to have moderate TR, moderate-severe LV systolic dysfunction    CENTRAL LINE: N    BRADFORD: Y/N      DATE INSERTED:        REMOVE: Y/N    A-LINE: Y/N     DATE INSERTED:              REMOVE: Y/N    GLOBAL ISSUE/BEST PRACTICE:  Analgesia:  Sedation:  CAM-ICU:  HOB elevation: yes  Stress ulcer prophylaxis:  VTE prophylaxis:  Glycemic control:  Nutrition:    CODE STATUS: *** Interval events: Patient admitted overnight, tachycardic this AM 120s-130s, was started on Amio loading dose. Denies any chest pain, shortness of breath. Abdominal pain has improved.     Review of Systems:  Constitutional: no fever, chills, fatigue  Neuro: no headache, numbness, weakness  Resp: no cough, wheezing, shortness of breath  CVS: no chest pain, palpitations, leg swelling  GI: no abdominal pain, nausea, vomiting, diarrhea   : no dysuria, frequency, incontinence  Skin: no itching, burning, rashes, or lesions   Msk: no joint pain or swelling  Psych: no depression, anxiety    T(F): 97.5 (21 @ 12:00), Max: 97.5 (21 @ 12:00)  HR: 96 (21 @ 12:00) (96 - 137)  BP: 87/63 (21 @ 12:00) ( - 163/103)  RR: 20 (21 @ 12:00) (18 - 30)  SpO2: --  Wt(kg): --        CAPILLARY BLOOD GLUCOSE      POCT Blood Glucose.: 120 mg/dL (2021 11:41)    I&O's Summary    2021 07:01  -  2021 12:37  --------------------------------------------------------  IN: 500 mL / OUT: 20 mL / NET: 480 mL        Physical Exam:     T(C): 36.4 (21 @ 12:00), Max: 36.4 (21 @ 12:00)  HR: 96 (21 @ 12:00) (96 - 137)  BP: 87/63 (21 @ 12:00) (63 - 163/103)  RR: 20 (21 @ 12:00) (18 - 30)  SpO2: --    GENERAL: patient appears well, no acute distress, appropriately interactive  EYES: sclera clear, no exudates  ENMT: oropharynx clear without erythema, no exudates, moist mucous membranes  LUNGS: decreased bs at lung bases b/l; no wheezing or rhonchi appreciated  HEART: soft S1/S2, regular rate and rhythm, no murmurs noted; trace b/l lower extremity edema  GASTROINTESTINAL: abdomen is soft, nontender, nondistended, normoactive bowel sounds  INTEGUMENT: good skin turgor, warm skin, appears well perfused  MUSCULOSKELETAL: no clubbing or cyanosis, no obvious deformity  NEUROLOGIC: awake, alert, oriented x3, good muscle tone in all 4 extremities        Meds:  heparin   Injectable 5000 Unit(s) SubCutaneous every 8 hours    cefTRIAXone   IVPB        aMIOdarone    Tablet   Oral   aMIOdarone    Tablet 400 milliGRAM(s) Oral every 8 hours  metoprolol tartrate 50 milliGRAM(s) Oral two times a day    atorvastatin 10 milliGRAM(s) Oral at bedtime  dextrose 40% Gel 15 Gram(s) Oral once  dextrose 50% Injectable 25 Gram(s) IV Push once  dextrose 50% Injectable 12.5 Gram(s) IV Push once  dextrose 50% Injectable 25 Gram(s) IV Push once  glucagon  Injectable 1 milliGRAM(s) IntraMuscular once  insulin lispro (ADMELOG) corrective regimen sliding scale   SubCutaneous Before meals and at bedtime              dextrose 5%. 1000 milliLiter(s) IV Continuous <Continuous>  dextrose 5%. 1000 milliLiter(s) IV Continuous <Continuous>  sodium bicarbonate  Infusion 0.263 mEq/kG/Hr IV Continuous <Continuous>      chlorhexidine 2% Cloths 1 Application(s) Topical <User Schedule>                                  12.9   8.33  )-----------( 171      ( 2021 03:36 )             42.6       -24    140  |  109<H>  |  46<H>  ----------------------------<  113<H>  4.6   |  18<L>  |  2.70<H>    Ca    7.9<L>      2021 12:07  Phos  5.9       Mg     1.6         TPro  6.8  /  Alb  3.2<L>  /  TBili  0.4  /  DBili  x   /  AST  47<H>  /  ALT  47  /  AlkPhos  61  -    Lactate 5.0           - @ 05:25    Lactate 4.9            @ 01:36      CARDIAC MARKERS ( 2021 12:07 )  .027 ng/mL / x     / x     / x     / x      CARDIAC MARKERS ( 2021 03:36 )  <.015 ng/mL / x     / x     / x     / x            Urinalysis Basic - ( 2021 05:01 )    Color: Yellow / Appearance: Slightly Turbid / S.025 / pH: x  Gluc: x / Ketone: Trace  / Bili: Negative / Urobili: 4   Blood: x / Protein: 100 / Nitrite: Negative   Leuk Esterase: Small / RBC: 25-50 /HPF / WBC 11-25   Sq Epi: x / Non Sq Epi: Moderate / Bacteria: Moderate            ABG - ( 2021 10:12 )  pH, Arterial: 7.33  pH, Blood: x     /  pCO2: 25    /  pO2: 170   / HCO3: 16    / Base Excess: -11.9 /  SaO2: 99                  Radiology:   CT abd/pelvis:     IMPRESSION: Motion artifact limits evaluation of the upper abdomen.    No cause for abdominal pain is visualized.    Right pleural effusion.    Bedside Cardiac U/S: IVC 2.5, appears to have moderate TR, moderate-severe LV systolic dysfunction    CENTRAL LINE: N    BRADFORD: N    A-LINE: N    GLOBAL ISSUE/BEST PRACTICE:  Analgesia: N  Sedation: N  HOB elevation: yes  Stress ulcer prophylaxis: N  VTE prophylaxis: Y  Glycemic control: Y  Nutrition: Y    CODE STATUS: FULL CODE

## 2021-07-24 NOTE — ED ADULT NURSE REASSESSMENT NOTE - NS ED NURSE REASSESS COMMENT FT1
Patient noted to be increasingly confused about place/situation at this time. ICU JOSE G Preston made aware. Charge nurse aware.

## 2021-07-24 NOTE — CONSULT NOTE ADULT - SUBJECTIVE AND OBJECTIVE BOX
[facility], Division of Infectious Diseases  TRAVIS Quintero, SUZY Andrews  [phone]    PAYAM PAPPAS  85y, Female  [id number]    HPI--  HPI:   Pt is an 86 y/o female with PMHx of HFpEF, HTN, HLD, CAD, CKD, DM on metformin presented to [facility] w/ several days of abd pain, n/v/d after eating "bad turkey sandwich". In ED @ [facility], pt found to be tachycardic w/ soft BP, lactate elevated to 5.9. Given 2L IVF and sent for CT A/P. While laying flat, pt became acutely SOB requiring BiPAP. Per pt, she denies being SOB @ that time. States she just "freaked out" and got "worked up" because she almost fell off table. BP improved, SOB improved, sent over to [facility] on BIPAP for ICU evaluation as without available space in [facility].     Seen and examined in ED. Remains tachycardic to 130s-140s, appears sinus at times, although pt feels much better. Removed off BiPAP on 2-3L NC w/ Spo2 in high 90s. Breathing comfortably. /91, MAP high 80s, 90s.  Denies SOB, CP, h/a, dizziness, abd pain, n/v/d or any other acute complaints at this time.   Pt continued to have persistently elevated lactate--was subsequently admitted to the ICU  Additional notes reviewed. tachycardic this AM 120s-130s, was started on Amio loading dose. Denies any chest pain, shortness of breath. Abdominal pain has improved.     Active Medications--  aMIOdarone    Tablet   Oral   aMIOdarone    Tablet 400 milliGRAM(s) Oral every 8 hours  aspirin  chewable 81 milliGRAM(s) Oral daily  atorvastatin 10 milliGRAM(s) Oral at bedtime  cefTRIAXone   IVPB      chlorhexidine 2% Cloths 1 Application(s) Topical <User Schedule>  dextrose 40% Gel 15 Gram(s) Oral once  dextrose 5%. 1000 milliLiter(s) IV Continuous <Continuous>  dextrose 5%. 1000 milliLiter(s) IV Continuous <Continuous>  dextrose 50% Injectable 25 Gram(s) IV Push once  dextrose 50% Injectable 12.5 Gram(s) IV Push once  dextrose 50% Injectable 25 Gram(s) IV Push once  furosemide   Injectable 40 milliGRAM(s) IV Push once  glucagon  Injectable 1 milliGRAM(s) IntraMuscular once  heparin   Injectable 5000 Unit(s) SubCutaneous every 8 hours  insulin lispro (ADMELOG) corrective regimen sliding scale   SubCutaneous Before meals and at bedtime  metoprolol tartrate 50 milliGRAM(s) Oral two times a day  sodium bicarbonate  Infusion 0.263 mEq/kG/Hr IV Continuous <Continuous>    Antimicrobials:   cefTRIAXone   IVPB        Immunologic:     ROS:  CONSTITUTIONAL: No fevers or chills. No weakness or headache. No weight changes.  EYES/ENT: No visual or hearing changes. No sore throat or throat pain .  NECK: No pain or stiffness  RESPIRATORY: No cough, wheezing, or hemoptysis. No shortness of breath  CARDIOVASCULAR: No chest pain or palpitations  GASTROINTESTINAL: No abdominal pain. No nausea or vomiting. No diarrhea or constipation.  GENITOURINARY: No dysuria, frequency or hematuria  NEUROLOGICAL: No numbness or weakness  SKIN: No itching or rashes  PSYCHIATRIC: Pleasant. Appropriate affect    Allergies: No Known Allergies    PMH --   PSH --   FH --   Social History --  EtOH: denies   Tobacco: denies   Drug Use: denies     Travel/Environmental/Occupational History:    Physical Exam--  Vital Signs Last 24 Hrs  T(F): 97.5 (2021 12:00), Max: 97.5 (2021 12:00)  HR: 75 (2021 13:00) (75 - 137)  BP: 94/52 (2021 13:00) (87/63 - 163/103)  RR: 18 (2021 13:00) (18 - 30)  SpO2: --  General: nontoxic-appearing, no acute distress  HEENT: NC/AT,  Neck: Not rigid. No sense of mass. No LAD  Lungs: decreased b/l breath sounds  Heart: Regular rate and rhythm. No murmur, rub or gallop.  Abdomen: Soft. Nondistended. Nontender. Bowel sounds present. No organomegaly.  Extremities: No cyanosis or clubbing. No edema.   Skin: Warm. Dry. Good turgor. No rash. No vasculitic stigmata.  Psychiatric: Appropriate affect and mood for situation.     Laboratory & Imaging Data--  CBC:                       12.9   8.33  )-----------( 171      ( 2021 03:36 )             42.6     CMP: 07-    140  |  109<H>  |  46<H>  ----------------------------<  113<H>  4.6   |  18<L>  |  2.70<H>    Ca    7.9<L>      2021 12:07  Phos  5.6       Mg     1.6         TPro  6.8  /  Alb  3.2<L>  /  TBili  0.4  /  DBili  x   /  AST  47<H>  /  ALT  47  /  AlkPhos  61      LIVER FUNCTIONS - ( 2021 03:36 )  Alb: 3.2 g/dL / Pro: 6.8 g/dL / ALK PHOS: 61 U/L / ALT: 47 U/L / AST: 47 U/L / GGT: x           Urinalysis Basic - ( 2021 05:01 )    Color: Yellow / Appearance: Slightly Turbid / S.025 / pH: x  Gluc: x / Ketone: Trace  / Bili: Negative / Urobili: 4   Blood: x / Protein: 100 / Nitrite: Negative   Leuk Esterase: Small / RBC: 25-50 /HPF / WBC 11-25   Sq Epi: x / Non Sq Epi: Moderate / Bacteria: Moderate        Microbiology: reviewed      Radiology: reviewed  PROCEDURE:  [x] LIMITED ECHO  [x] LIMITED CHEST  [ ] LIMITED RETROPERITONEAL  [ ] LIMITED ABDOMINAL  [ ] LIMITED DVT  [ ] NEEDLE GUIDANCE VASCULAR  [ ] NEEDLE GUIDANCE THORACENTESIS  [ ] NEEDLE GUIDANCE PARACENTESIS  [ ] NEEDLE GUIDANCE PERICARDIOCENTESIS  [ ] OTHER    FINDINGS:  Scattered B lines anteriorly bilaterally with small right pleural effusion  Severe LV systolic dysfunction  LV>RV  No significant pericardial effusion  Mild-moderate TR with estimated PASP 45-50 mm Hg consistent with mild pulmonary HTN  IVC dilated to 2.6 cm without inspiratory variation    INTERPRETATION:  Mild cardiogenic pulmonary edema  Severe LV systolic dysfunction  Normal RV size and systolic function  Mild pulmonary hypertension  Dilated IVC consistent with volume overloaded state    Images stored in ICU Ultrasound.

## 2021-07-24 NOTE — CONSULT NOTE ADULT - ASSESSMENT
The patient is an 85 year old female with a history of HTN, HL, DM, CKD, CAD, chronic diastolic heart failure who presented with abdominal pain, nausea, vomiting, diarrhea.    Plan:  - Dehydration and electrolyte abnormalities may have preceded atrial flutter  - Given ongoing lactic acidosis, possible new systolic heart failure, will start antiarrhythmic therapy in attempts to restore sinus rhythm  - Give amiodarone 150 mg IV. Since patient has poor IV access, start amiodarone 400 mg q8h.  - Continue metoprolol tartrate 50 mg bid  - Can give intermittent pushes of diltiazem or if needed, can start diltiazem drip in addition to above therapy  - Patient will likely eventually need full dose anticoagulation for thromboprophylaxis. However, due to DANE and possible abdominal issues that may require procedures/surgery, would hold off for now.  - Check echo  - Continue IV fluids  - On ceftriaxone for sepsis  - ICU care        31 minutes of critical care time spent with the patient and coordinating care with nursing, hospitalist, and consultants. Patient is critically ill requiring ICU care. The patient is high risk for deterioration and death. The patient is an 85 year old female with a history of HTN, HL, DM, CKD, CAD, chronic diastolic heart failure who presented with abdominal pain, nausea, vomiting, diarrhea.    Plan:  - Dehydration and electrolyte abnormalities may have preceded atrial flutter  - Given ongoing lactic acidosis, possible new systolic heart failure, will start antiarrhythmic therapy in attempts to restore sinus rhythm  - Give amiodarone 150 mg IV. Since patient has poor IV access, start amiodarone 400 mg q8h.  - Continue metoprolol tartrate 50 mg bid  - Can give intermittent pushes of diltiazem or if needed, can start diltiazem drip in addition to above therapy  - Patient will likely eventually need full dose anticoagulation for thromboprophylaxis. However, due to DANE and possible abdominal issues that may require procedures/surgery, would hold off for now.  - Check echo  - Continue IV fluids  - On ceftriaxone for sepsis  - ICU care    ADDENDUM:  - HR improved with metoprolol and amiodarone - rhythm atrial flutter with variable block  - However, BP trended down to 70-80s systolic  - Hold metoprolol; continue amiodarone load  - Cardiogenic shock - to be started on milrinone and norepinephrine  - Patient will eventually need to be transferred to tertiary center for further heart failure work-up/treatment, ischemic evaluation, and EP eval    31 minutes of critical care time spent with the patient and coordinating care with nursing, hospitalist, and consultants. Patient is critically ill requiring ICU care. The patient is high risk for deterioration and death.

## 2021-07-24 NOTE — CONSULT NOTE ADULT - SUBJECTIVE AND OBJECTIVE BOX
Date/Time Patient Seen:  		  Referring MD:   Data Reviewed	       Patient is a 85y old  Female who presents with a chief complaint of     Subjective/HPI    in bed  seen and examined  on o2 support  vs noted  labs reviewed  Imaging reviewed  H and P reviewed  [facility] record reviewed    ct abd noted    s/p BIPAP use  Cardio eval noted    History of Present Illness: The patient is an 85 year old female with a history of HTN, HL, DM, CKD, CAD, chronic diastolic heart failure who presented with abdominal pain, nausea, vomiting, diarrhea. She states these symptoms started about one week ago. There has also been lightheadedness and dyspnea on exertion. No chest pain, palpitations or lower extremity swelling. In ED, she was noted to be in rapid atrial flutter, given metoprolol, her BPs dropped to 80s systolic.    Past Medical/Surgical History:  HTN, HL, DM, CKD, CAD, chronic diastolic heart failure    ICU f/u  Tachycardic, although mildly improved  BP has remained stable, if even higher w/ SBP stable in 120-130s  Some bouts of confusion, although last conversation A&Ox3  Lactate persistent, byproduct of lost IV access and insufficient resuscitation  Hyperkalemia cocktail incomplete also d/t lost IV access  Given multiple medical problems, need for access and persistent lactate, will move to ICU.  Of note, persistent lactate could also be from poor LV function, although more likely hypovolemia given n/v/d last few days. Inotropic support may seem risky given normotensive and already tachycardic to 130s. Will await TTE today.   d/w EICU. Still w/o abd pain, low suspicion for ischemic bowel, may need re-scan w/ IVC if can tolerate from renal standpoint.            PAST MEDICAL & SURGICAL HISTORY:        Medication list         MEDICATIONS  (STANDING):  aMIOdarone    Tablet   Oral   aMIOdarone    Tablet 400 milliGRAM(s) Oral every 8 hours  atorvastatin 10 milliGRAM(s) Oral at bedtime  cefTRIAXone   IVPB      chlorhexidine 2% Cloths 1 Application(s) Topical <User Schedule>  dextrose 40% Gel 15 Gram(s) Oral once  dextrose 5%. 1000 milliLiter(s) (50 mL/Hr) IV Continuous <Continuous>  dextrose 5%. 1000 milliLiter(s) (100 mL/Hr) IV Continuous <Continuous>  dextrose 50% Injectable 25 Gram(s) IV Push once  dextrose 50% Injectable 12.5 Gram(s) IV Push once  dextrose 50% Injectable 25 Gram(s) IV Push once  glucagon  Injectable 1 milliGRAM(s) IntraMuscular once  heparin   Injectable 5000 Unit(s) SubCutaneous every 8 hours  insulin lispro (ADMELOG) corrective regimen sliding scale   SubCutaneous Before meals and at bedtime  metoprolol tartrate 50 milliGRAM(s) Oral two times a day  sodium bicarbonate  Infusion 0.263 mEq/kG/Hr (100 mL/Hr) IV Continuous <Continuous>    MEDICATIONS  (PRN):         Vitals log        ICU Vital Signs Last 24 Hrs  T(C): 36.3 (24 Jul 2021 07:23), Max: 36.3 (24 Jul 2021 05:10)  T(F): 97.3 (24 Jul 2021 07:23), Max: 97.4 (24 Jul 2021 05:10)  HR: 133 (24 Jul 2021 07:23) (133 - 137)  BP: 140/81 (24 Jul 2021 07:23) (103/59 - 143/77)  BP(mean): 103 (24 Jul 2021 07:23) (103 - 103)  ABP: --  ABP(mean): --  RR: 30 (24 Jul 2021 07:23) (18 - 30)  SpO2: --           Input and Output:  I&O's Detail      Lab Data                        12.9   8.33  )-----------( 171      ( 24 Jul 2021 03:36 )             42.6     07-24    138  |  109<H>  |  44<H>  ----------------------------<  108<H>  5.7<H>   |  17<L>  |  2.30<H>    Ca    8.1<L>      24 Jul 2021 03:36  Phos  5.9     07-24  Mg     1.7     07-24    TPro  6.8  /  Alb  3.2<L>  /  TBili  0.4  /  DBili  x   /  AST  47<H>  /  ALT  47  /  AlkPhos  61  07-24    ABG - ( 24 Jul 2021 02:00 )  pH, Arterial: 7.29  pH, Blood: x     /  pCO2: 28    /  pO2: 155   / HCO3: 15    / Base Excess: -12.3 /  SaO2: 99                CARDIAC MARKERS ( 24 Jul 2021 03:36 )  <.015 ng/mL / x     / x     / x     / x            Review of Systems	  s/p BIPAP  s/p resp distress      Objective     Physical Examination    heart s1s2  lung dec BS      Pertinent Lab findings & Imaging      Monte:  NO   Adequate UO     I&O's Detail           Discussed with:     Cultures:	        Radiology  ct abd reviewed - [facility] Study

## 2021-07-24 NOTE — CONSULT NOTE ADULT - ASSESSMENT
85F with a history of DM, HTN, HL, hyperkalemia, atherosclerosis, CKD,  met acidosis - lactic acidosis  atrial arrhythmia  ckd mohan -   CHF eval  I and O  serial abd exam - ct abd [facility]  on emp ABX -   monitor VS and HD and Sa  fio2 titration - off BIPAP - tolerating NC o2 support  TTE planned  on Anti Arrhythmic Rx regimen - Amio and B Blocker  Cardio following

## 2021-07-24 NOTE — PROVIDER CONTACT NOTE (CRITICAL VALUE NOTIFICATION) - ACTION/TREATMENT ORDERED:
will treat patient as per MD. no further orders at this time. will treat patient as per MD. no further orders at this time. ICU JOSE G Preston made aware.

## 2021-07-25 ENCOUNTER — INPATIENT (INPATIENT)
Facility: HOSPITAL | Age: 86
LOS: 19 days | Discharge: HOME | DRG: 246 | End: 2021-08-14
Attending: STUDENT IN AN ORGANIZED HEALTH CARE EDUCATION/TRAINING PROGRAM | Admitting: INTERNAL MEDICINE
Payer: COMMERCIAL

## 2021-07-25 ENCOUNTER — TRANSCRIPTION ENCOUNTER (OUTPATIENT)
Age: 86
End: 2021-07-25

## 2021-07-25 VITALS — SYSTOLIC BLOOD PRESSURE: 141 MMHG | DIASTOLIC BLOOD PRESSURE: 73 MMHG

## 2021-07-25 VITALS — DIASTOLIC BLOOD PRESSURE: 83 MMHG | SYSTOLIC BLOOD PRESSURE: 185 MMHG | RESPIRATION RATE: 31 BRPM | HEART RATE: 82 BPM

## 2021-07-25 DIAGNOSIS — Z90.710 ACQUIRED ABSENCE OF BOTH CERVIX AND UTERUS: Chronic | ICD-10-CM

## 2021-07-25 DIAGNOSIS — R57.0 CARDIOGENIC SHOCK: ICD-10-CM

## 2021-07-25 PROBLEM — N18.9 CHRONIC KIDNEY DISEASE, UNSPECIFIED: Chronic | Status: ACTIVE | Noted: 2021-07-23

## 2021-07-25 PROBLEM — I10 ESSENTIAL (PRIMARY) HYPERTENSION: Chronic | Status: ACTIVE | Noted: 2021-07-23

## 2021-07-25 PROBLEM — E87.5 HYPERKALEMIA: Chronic | Status: ACTIVE | Noted: 2021-07-23

## 2021-07-25 PROBLEM — I70.90 UNSPECIFIED ATHEROSCLEROSIS: Chronic | Status: ACTIVE | Noted: 2021-07-23

## 2021-07-25 PROBLEM — E78.5 HYPERLIPIDEMIA, UNSPECIFIED: Chronic | Status: ACTIVE | Noted: 2021-07-23

## 2021-07-25 LAB
A1C WITH ESTIMATED AVERAGE GLUCOSE RESULT: 5.7 % — HIGH (ref 4–5.6)
ALBUMIN SERPL ELPH-MCNC: 2.4 G/DL — LOW (ref 3.3–5)
ALBUMIN SERPL ELPH-MCNC: 2.7 G/DL — LOW (ref 3.3–5)
ALBUMIN SERPL ELPH-MCNC: 2.8 G/DL — LOW (ref 3.3–5)
ALP SERPL-CCNC: 39 U/L — LOW (ref 40–120)
ALP SERPL-CCNC: 49 U/L — SIGNIFICANT CHANGE UP (ref 40–120)
ALP SERPL-CCNC: 51 U/L — SIGNIFICANT CHANGE UP (ref 40–120)
ALT FLD-CCNC: 33 U/L — SIGNIFICANT CHANGE UP (ref 10–45)
ALT FLD-CCNC: 39 U/L — SIGNIFICANT CHANGE UP (ref 10–45)
ALT FLD-CCNC: 42 U/L — SIGNIFICANT CHANGE UP (ref 12–78)
ANION GAP SERPL CALC-SCNC: 12 MMOL/L — SIGNIFICANT CHANGE UP (ref 5–17)
ANION GAP SERPL CALC-SCNC: 13 MMOL/L — SIGNIFICANT CHANGE UP (ref 5–17)
ANION GAP SERPL CALC-SCNC: 17 MMOL/L — SIGNIFICANT CHANGE UP (ref 5–17)
APTT BLD: 31.9 SEC — SIGNIFICANT CHANGE UP (ref 27.5–35.5)
APTT BLD: >200 SEC — CRITICAL HIGH (ref 27.5–35.5)
AST SERPL-CCNC: 27 U/L — SIGNIFICANT CHANGE UP (ref 10–40)
AST SERPL-CCNC: 31 U/L — SIGNIFICANT CHANGE UP (ref 15–37)
AST SERPL-CCNC: 35 U/L — SIGNIFICANT CHANGE UP (ref 10–40)
BASE EXCESS BLDA CALC-SCNC: 1.1 MMOL/L — SIGNIFICANT CHANGE UP (ref -2–2)
BASE EXCESS BLDV CALC-SCNC: 1.8 MMOL/L — SIGNIFICANT CHANGE UP (ref -2–2)
BASOPHILS # BLD AUTO: 0.05 K/UL — SIGNIFICANT CHANGE UP (ref 0–0.2)
BASOPHILS NFR BLD AUTO: 0.5 % — SIGNIFICANT CHANGE UP (ref 0–2)
BILIRUB SERPL-MCNC: 0.3 MG/DL — SIGNIFICANT CHANGE UP (ref 0.2–1.2)
BILIRUB SERPL-MCNC: 0.3 MG/DL — SIGNIFICANT CHANGE UP (ref 0.2–1.2)
BILIRUB SERPL-MCNC: 0.7 MG/DL — SIGNIFICANT CHANGE UP (ref 0.2–1.2)
BLD GP AB SCN SERPL QL: NEGATIVE — SIGNIFICANT CHANGE UP
BUN SERPL-MCNC: 39 MG/DL — HIGH (ref 7–23)
BUN SERPL-MCNC: 39 MG/DL — HIGH (ref 7–23)
BUN SERPL-MCNC: 41 MG/DL — HIGH (ref 7–23)
CALCIUM SERPL-MCNC: 7.3 MG/DL — LOW (ref 8.4–10.5)
CALCIUM SERPL-MCNC: 7.9 MG/DL — LOW (ref 8.5–10.1)
CALCIUM SERPL-MCNC: 8.1 MG/DL — LOW (ref 8.4–10.5)
CHLORIDE SERPL-SCNC: 101 MMOL/L — SIGNIFICANT CHANGE UP (ref 96–108)
CHLORIDE SERPL-SCNC: 103 MMOL/L — SIGNIFICANT CHANGE UP (ref 96–108)
CHLORIDE SERPL-SCNC: 98 MMOL/L — SIGNIFICANT CHANGE UP (ref 96–108)
CHOLEST SERPL-MCNC: 68 MG/DL — SIGNIFICANT CHANGE UP
CK MB BLD-MCNC: 1.5 % — SIGNIFICANT CHANGE UP (ref 0–3.5)
CK MB CFR SERPL CALC: 4 NG/ML — HIGH (ref 0–3.8)
CK SERPL-CCNC: 271 U/L — HIGH (ref 25–170)
CO2 BLDA-SCNC: 26 MMOL/L — SIGNIFICANT CHANGE UP (ref 22–30)
CO2 BLDV-SCNC: 28 MMOL/L — SIGNIFICANT CHANGE UP (ref 22–30)
CO2 SERPL-SCNC: 17 MMOL/L — LOW (ref 22–31)
CO2 SERPL-SCNC: 21 MMOL/L — LOW (ref 22–31)
CO2 SERPL-SCNC: 24 MMOL/L — SIGNIFICANT CHANGE UP (ref 22–31)
COVID-19 SPIKE DOMAIN AB INTERP: POSITIVE
COVID-19 SPIKE DOMAIN ANTIBODY RESULT: 213 U/ML — HIGH
CREAT SERPL-MCNC: 2.4 MG/DL — HIGH (ref 0.5–1.3)
CREAT SERPL-MCNC: 2.61 MG/DL — HIGH (ref 0.5–1.3)
CREAT SERPL-MCNC: 2.75 MG/DL — HIGH (ref 0.5–1.3)
EOSINOPHIL # BLD AUTO: 0.08 K/UL — SIGNIFICANT CHANGE UP (ref 0–0.5)
EOSINOPHIL NFR BLD AUTO: 0.8 % — SIGNIFICANT CHANGE UP (ref 0–6)
ESTIMATED AVERAGE GLUCOSE: 117 MG/DL — HIGH (ref 68–114)
GAS PNL BLDA: SIGNIFICANT CHANGE UP
GLUCOSE SERPL-MCNC: 217 MG/DL — HIGH (ref 70–99)
GLUCOSE SERPL-MCNC: 87 MG/DL — SIGNIFICANT CHANGE UP (ref 70–99)
GLUCOSE SERPL-MCNC: 93 MG/DL — SIGNIFICANT CHANGE UP (ref 70–99)
HCO3 BLDA-SCNC: 25 MMOL/L — SIGNIFICANT CHANGE UP (ref 21–29)
HCO3 BLDV-SCNC: 27 MMOL/L — SIGNIFICANT CHANGE UP (ref 21–29)
HCT VFR BLD CALC: 23.4 % — LOW (ref 34.5–45)
HCT VFR BLD CALC: 25.7 % — LOW (ref 34.5–45)
HCT VFR BLD CALC: 26.1 % — LOW (ref 34.5–45)
HCT VFR BLD CALC: 27.4 % — LOW (ref 34.5–45)
HCT VFR BLD CALC: 32.1 % — LOW (ref 34.5–45)
HDLC SERPL-MCNC: 32 MG/DL — LOW
HGB BLD-MCNC: 10.5 G/DL — LOW (ref 11.5–15.5)
HGB BLD-MCNC: 7.7 G/DL — LOW (ref 11.5–15.5)
HGB BLD-MCNC: 8.2 G/DL — LOW (ref 11.5–15.5)
HGB BLD-MCNC: 8.6 G/DL — LOW (ref 11.5–15.5)
HGB BLD-MCNC: 8.8 G/DL — LOW (ref 11.5–15.5)
HOROWITZ INDEX BLDA+IHG-RTO: 21 — SIGNIFICANT CHANGE UP
HOROWITZ INDEX BLDV+IHG-RTO: 21 — SIGNIFICANT CHANGE UP
IMM GRANULOCYTES NFR BLD AUTO: 0.5 % — SIGNIFICANT CHANGE UP (ref 0–1.5)
INR BLD: 1.52 RATIO — HIGH (ref 0.88–1.16)
INR BLD: 1.88 RATIO — HIGH (ref 0.88–1.16)
LIPID PNL WITH DIRECT LDL SERPL: 27 MG/DL — SIGNIFICANT CHANGE UP
LYMPHOCYTES # BLD AUTO: 1.71 K/UL — SIGNIFICANT CHANGE UP (ref 1–3.3)
LYMPHOCYTES # BLD AUTO: 17.9 % — SIGNIFICANT CHANGE UP (ref 13–44)
MAGNESIUM SERPL-MCNC: 1.6 MG/DL — SIGNIFICANT CHANGE UP (ref 1.6–2.6)
MAGNESIUM SERPL-MCNC: 1.8 MG/DL — SIGNIFICANT CHANGE UP (ref 1.6–2.6)
MAGNESIUM SERPL-MCNC: 2.1 MG/DL — SIGNIFICANT CHANGE UP (ref 1.6–2.6)
MCHC RBC-ENTMCNC: 25.8 PG — LOW (ref 27–34)
MCHC RBC-ENTMCNC: 25.9 PG — LOW (ref 27–34)
MCHC RBC-ENTMCNC: 26.3 PG — LOW (ref 27–34)
MCHC RBC-ENTMCNC: 26.8 PG — LOW (ref 27–34)
MCHC RBC-ENTMCNC: 28.1 PG — SIGNIFICANT CHANGE UP (ref 27–34)
MCHC RBC-ENTMCNC: 31.9 GM/DL — LOW (ref 32–36)
MCHC RBC-ENTMCNC: 32.1 GM/DL — SIGNIFICANT CHANGE UP (ref 32–36)
MCHC RBC-ENTMCNC: 32.7 GM/DL — SIGNIFICANT CHANGE UP (ref 32–36)
MCHC RBC-ENTMCNC: 32.9 GM/DL — SIGNIFICANT CHANGE UP (ref 32–36)
MCHC RBC-ENTMCNC: 33 GM/DL — SIGNIFICANT CHANGE UP (ref 32–36)
MCV RBC AUTO: 79.1 FL — LOW (ref 80–100)
MCV RBC AUTO: 80.8 FL — SIGNIFICANT CHANGE UP (ref 80–100)
MCV RBC AUTO: 81.5 FL — SIGNIFICANT CHANGE UP (ref 80–100)
MCV RBC AUTO: 81.8 FL — SIGNIFICANT CHANGE UP (ref 80–100)
MCV RBC AUTO: 85.3 FL — SIGNIFICANT CHANGE UP (ref 80–100)
MONOCYTES # BLD AUTO: 0.93 K/UL — HIGH (ref 0–0.9)
MONOCYTES NFR BLD AUTO: 9.7 % — SIGNIFICANT CHANGE UP (ref 2–14)
NEUTROPHILS # BLD AUTO: 6.75 K/UL — SIGNIFICANT CHANGE UP (ref 1.8–7.4)
NEUTROPHILS NFR BLD AUTO: 70.6 % — SIGNIFICANT CHANGE UP (ref 43–77)
NON HDL CHOLESTEROL: 37 MG/DL — SIGNIFICANT CHANGE UP
NRBC # BLD: 0 /100 WBCS — SIGNIFICANT CHANGE UP (ref 0–0)
NT-PROBNP SERPL-SCNC: HIGH PG/ML (ref 0–300)
PCO2 BLDA: 36 MMHG — SIGNIFICANT CHANGE UP (ref 32–46)
PCO2 BLDV: 45 MMHG — SIGNIFICANT CHANGE UP (ref 35–50)
PH BLDA: 7.44 — SIGNIFICANT CHANGE UP (ref 7.35–7.45)
PH BLDV: 7.39 — SIGNIFICANT CHANGE UP (ref 7.35–7.45)
PHOSPHATE SERPL-MCNC: 3.9 MG/DL — SIGNIFICANT CHANGE UP (ref 2.5–4.5)
PHOSPHATE SERPL-MCNC: 4 MG/DL — SIGNIFICANT CHANGE UP (ref 2.5–4.5)
PHOSPHATE SERPL-MCNC: 4.3 MG/DL — SIGNIFICANT CHANGE UP (ref 2.5–4.5)
PLATELET # BLD AUTO: 158 K/UL — SIGNIFICANT CHANGE UP (ref 150–400)
PLATELET # BLD AUTO: 162 K/UL — SIGNIFICANT CHANGE UP (ref 150–400)
PLATELET # BLD AUTO: 162 K/UL — SIGNIFICANT CHANGE UP (ref 150–400)
PLATELET # BLD AUTO: 178 K/UL — SIGNIFICANT CHANGE UP (ref 150–400)
PLATELET # BLD AUTO: 233 K/UL — SIGNIFICANT CHANGE UP (ref 150–400)
PO2 BLDA: 130 MMHG — HIGH (ref 74–108)
PO2 BLDV: 35 MMHG — SIGNIFICANT CHANGE UP (ref 25–45)
POTASSIUM SERPL-MCNC: 3.4 MMOL/L — LOW (ref 3.5–5.3)
POTASSIUM SERPL-MCNC: 4.2 MMOL/L — SIGNIFICANT CHANGE UP (ref 3.5–5.3)
POTASSIUM SERPL-MCNC: 4.3 MMOL/L — SIGNIFICANT CHANGE UP (ref 3.5–5.3)
POTASSIUM SERPL-SCNC: 3.4 MMOL/L — LOW (ref 3.5–5.3)
POTASSIUM SERPL-SCNC: 4.2 MMOL/L — SIGNIFICANT CHANGE UP (ref 3.5–5.3)
POTASSIUM SERPL-SCNC: 4.3 MMOL/L — SIGNIFICANT CHANGE UP (ref 3.5–5.3)
PROT SERPL-MCNC: 4 G/DL — LOW (ref 6–8.3)
PROT SERPL-MCNC: 5.2 G/DL — LOW (ref 6–8.3)
PROT SERPL-MCNC: 5.7 G/DL — LOW (ref 6–8.3)
PROTHROM AB SERPL-ACNC: 17.4 SEC — HIGH (ref 10.6–13.6)
PROTHROM AB SERPL-ACNC: 21.9 SEC — HIGH (ref 10.6–13.6)
RBC # BLD: 2.87 M/UL — LOW (ref 3.8–5.2)
RBC # BLD: 3.06 M/UL — LOW (ref 3.8–5.2)
RBC # BLD: 3.18 M/UL — LOW (ref 3.8–5.2)
RBC # BLD: 3.35 M/UL — LOW (ref 3.8–5.2)
RBC # BLD: 4.06 M/UL — SIGNIFICANT CHANGE UP (ref 3.8–5.2)
RBC # FLD: 14.3 % — SIGNIFICANT CHANGE UP (ref 10.3–14.5)
RBC # FLD: 14.5 % — SIGNIFICANT CHANGE UP (ref 10.3–14.5)
RBC # FLD: 14.6 % — HIGH (ref 10.3–14.5)
RH IG SCN BLD-IMP: POSITIVE — SIGNIFICANT CHANGE UP
SAO2 % BLDA: 99 % — HIGH (ref 92–96)
SAO2 % BLDV: 56 % — LOW (ref 67–88)
SARS-COV-2 IGG+IGM SERPL QL IA: 213 U/ML — HIGH
SARS-COV-2 IGG+IGM SERPL QL IA: POSITIVE
SODIUM SERPL-SCNC: 132 MMOL/L — LOW (ref 135–145)
SODIUM SERPL-SCNC: 135 MMOL/L — SIGNIFICANT CHANGE UP (ref 135–145)
SODIUM SERPL-SCNC: 139 MMOL/L — SIGNIFICANT CHANGE UP (ref 135–145)
TRIGL SERPL-MCNC: 48 MG/DL — SIGNIFICANT CHANGE UP
TROPONIN T, HIGH SENSITIVITY RESULT: 310 NG/L — HIGH (ref 0–51)
WBC # BLD: 11.82 K/UL — HIGH (ref 3.8–10.5)
WBC # BLD: 12.88 K/UL — HIGH (ref 3.8–10.5)
WBC # BLD: 15.2 K/UL — HIGH (ref 3.8–10.5)
WBC # BLD: 9.57 K/UL — SIGNIFICANT CHANGE UP (ref 3.8–10.5)
WBC # BLD: 9.62 K/UL — SIGNIFICANT CHANGE UP (ref 3.8–10.5)
WBC # FLD AUTO: 11.82 K/UL — HIGH (ref 3.8–10.5)
WBC # FLD AUTO: 12.88 K/UL — HIGH (ref 3.8–10.5)
WBC # FLD AUTO: 15.2 K/UL — HIGH (ref 3.8–10.5)
WBC # FLD AUTO: 9.57 K/UL — SIGNIFICANT CHANGE UP (ref 3.8–10.5)
WBC # FLD AUTO: 9.62 K/UL — SIGNIFICANT CHANGE UP (ref 3.8–10.5)

## 2021-07-25 PROCEDURE — 84484 ASSAY OF TROPONIN QUANT: CPT

## 2021-07-25 PROCEDURE — 99292 CRITICAL CARE ADDL 30 MIN: CPT | Mod: 25

## 2021-07-25 PROCEDURE — 99291 CRITICAL CARE FIRST HOUR: CPT

## 2021-07-25 PROCEDURE — 99231 SBSQ HOSP IP/OBS SF/LOW 25: CPT

## 2021-07-25 PROCEDURE — 99291 CRITICAL CARE FIRST HOUR: CPT | Mod: 25

## 2021-07-25 PROCEDURE — 99283 EMERGENCY DEPT VISIT LOW MDM: CPT

## 2021-07-25 PROCEDURE — 83880 ASSAY OF NATRIURETIC PEPTIDE: CPT

## 2021-07-25 PROCEDURE — 86769 SARS-COV-2 COVID-19 ANTIBODY: CPT

## 2021-07-25 PROCEDURE — 82553 CREATINE MB FRACTION: CPT

## 2021-07-25 PROCEDURE — 99223 1ST HOSP IP/OBS HIGH 75: CPT

## 2021-07-25 PROCEDURE — 84100 ASSAY OF PHOSPHORUS: CPT

## 2021-07-25 PROCEDURE — 81001 URINALYSIS AUTO W/SCOPE: CPT

## 2021-07-25 PROCEDURE — 80048 BASIC METABOLIC PNL TOTAL CA: CPT

## 2021-07-25 PROCEDURE — 36556 INSERT NON-TUNNEL CV CATH: CPT

## 2021-07-25 PROCEDURE — 76604 US EXAM CHEST: CPT | Mod: 26

## 2021-07-25 PROCEDURE — 80053 COMPREHEN METABOLIC PANEL: CPT

## 2021-07-25 PROCEDURE — 82550 ASSAY OF CK (CPK): CPT

## 2021-07-25 PROCEDURE — 80061 LIPID PANEL: CPT

## 2021-07-25 PROCEDURE — 85610 PROTHROMBIN TIME: CPT

## 2021-07-25 PROCEDURE — 82962 GLUCOSE BLOOD TEST: CPT

## 2021-07-25 PROCEDURE — 93308 TTE F-UP OR LMTD: CPT | Mod: 26

## 2021-07-25 PROCEDURE — 36600 WITHDRAWAL OF ARTERIAL BLOOD: CPT

## 2021-07-25 PROCEDURE — 85730 THROMBOPLASTIN TIME PARTIAL: CPT

## 2021-07-25 PROCEDURE — 93010 ELECTROCARDIOGRAM REPORT: CPT

## 2021-07-25 PROCEDURE — 71045 X-RAY EXAM CHEST 1 VIEW: CPT | Mod: 26

## 2021-07-25 PROCEDURE — 83735 ASSAY OF MAGNESIUM: CPT

## 2021-07-25 PROCEDURE — 82803 BLOOD GASES ANY COMBINATION: CPT

## 2021-07-25 PROCEDURE — 83036 HEMOGLOBIN GLYCOSYLATED A1C: CPT

## 2021-07-25 PROCEDURE — 71045 X-RAY EXAM CHEST 1 VIEW: CPT

## 2021-07-25 PROCEDURE — 93306 TTE W/DOPPLER COMPLETE: CPT | Mod: 26

## 2021-07-25 PROCEDURE — 36415 COLL VENOUS BLD VENIPUNCTURE: CPT

## 2021-07-25 PROCEDURE — 85025 COMPLETE CBC W/AUTO DIFF WBC: CPT

## 2021-07-25 PROCEDURE — 36620 INSERTION CATHETER ARTERY: CPT

## 2021-07-25 PROCEDURE — G0480: CPT

## 2021-07-25 PROCEDURE — 83605 ASSAY OF LACTIC ACID: CPT

## 2021-07-25 PROCEDURE — 93306 TTE W/DOPPLER COMPLETE: CPT

## 2021-07-25 RX ORDER — DEXTROSE 50 % IN WATER 50 %
15 SYRINGE (ML) INTRAVENOUS ONCE
Refills: 0 | Status: DISCONTINUED | OUTPATIENT
Start: 2021-07-25 | End: 2021-07-28

## 2021-07-25 RX ORDER — GLUCAGON INJECTION, SOLUTION 0.5 MG/.1ML
1 INJECTION, SOLUTION SUBCUTANEOUS ONCE
Refills: 0 | Status: DISCONTINUED | OUTPATIENT
Start: 2021-07-25 | End: 2021-07-28

## 2021-07-25 RX ORDER — AMIODARONE HYDROCHLORIDE 400 MG/1
50 TABLET ORAL ONCE
Refills: 0 | Status: DISCONTINUED | OUTPATIENT
Start: 2021-07-25 | End: 2021-07-25

## 2021-07-25 RX ORDER — AMIODARONE HYDROCHLORIDE 400 MG/1
150 TABLET ORAL ONCE
Refills: 0 | Status: COMPLETED | OUTPATIENT
Start: 2021-07-25 | End: 2021-07-25

## 2021-07-25 RX ORDER — HEPARIN SODIUM 5000 [USP'U]/ML
2000 INJECTION INTRAVENOUS; SUBCUTANEOUS EVERY 6 HOURS
Refills: 0 | Status: DISCONTINUED | OUTPATIENT
Start: 2021-07-25 | End: 2021-07-25

## 2021-07-25 RX ORDER — ATORVASTATIN CALCIUM 80 MG/1
40 TABLET, FILM COATED ORAL AT BEDTIME
Refills: 0 | Status: DISCONTINUED | OUTPATIENT
Start: 2021-07-25 | End: 2021-07-25

## 2021-07-25 RX ORDER — DEXTROSE 50 % IN WATER 50 %
12.5 SYRINGE (ML) INTRAVENOUS ONCE
Refills: 0 | Status: DISCONTINUED | OUTPATIENT
Start: 2021-07-25 | End: 2021-07-28

## 2021-07-25 RX ORDER — AMIODARONE HYDROCHLORIDE 400 MG/1
0.5 TABLET ORAL
Qty: 900 | Refills: 0 | Status: DISCONTINUED | OUTPATIENT
Start: 2021-07-25 | End: 2021-07-26

## 2021-07-25 RX ORDER — HEPARIN SODIUM 5000 [USP'U]/ML
4500 INJECTION INTRAVENOUS; SUBCUTANEOUS EVERY 6 HOURS
Refills: 0 | Status: DISCONTINUED | OUTPATIENT
Start: 2021-07-25 | End: 2021-07-25

## 2021-07-25 RX ORDER — HEPARIN SODIUM 5000 [USP'U]/ML
INJECTION INTRAVENOUS; SUBCUTANEOUS
Qty: 25000 | Refills: 0 | Status: DISCONTINUED | OUTPATIENT
Start: 2021-07-25 | End: 2021-07-25

## 2021-07-25 RX ORDER — NOREPINEPHRINE BITARTRATE/D5W 8 MG/250ML
0.25 PLASTIC BAG, INJECTION (ML) INTRAVENOUS
Qty: 8 | Refills: 0 | Status: DISCONTINUED | OUTPATIENT
Start: 2021-07-25 | End: 2021-07-26

## 2021-07-25 RX ORDER — DEXTROSE 50 % IN WATER 50 %
25 SYRINGE (ML) INTRAVENOUS ONCE
Refills: 0 | Status: DISCONTINUED | OUTPATIENT
Start: 2021-07-25 | End: 2021-08-14

## 2021-07-25 RX ORDER — SODIUM CHLORIDE 9 MG/ML
1000 INJECTION, SOLUTION INTRAVENOUS
Refills: 0 | Status: DISCONTINUED | OUTPATIENT
Start: 2021-07-25 | End: 2021-07-28

## 2021-07-25 RX ORDER — DOXAZOSIN MESYLATE 4 MG
2 TABLET ORAL AT BEDTIME
Refills: 0 | Status: DISCONTINUED | OUTPATIENT
Start: 2021-07-25 | End: 2021-07-26

## 2021-07-25 RX ORDER — METOPROLOL TARTRATE 50 MG
50 TABLET ORAL
Refills: 0 | Status: DISCONTINUED | OUTPATIENT
Start: 2021-07-25 | End: 2021-07-25

## 2021-07-25 RX ORDER — HEPARIN SODIUM 5000 [USP'U]/ML
5000 INJECTION INTRAVENOUS; SUBCUTANEOUS EVERY 8 HOURS
Refills: 0 | Status: DISCONTINUED | OUTPATIENT
Start: 2021-07-25 | End: 2021-07-25

## 2021-07-25 RX ORDER — METOPROLOL TARTRATE 50 MG
25 TABLET ORAL EVERY 6 HOURS
Refills: 0 | Status: DISCONTINUED | OUTPATIENT
Start: 2021-07-25 | End: 2021-07-25

## 2021-07-25 RX ORDER — MAGNESIUM SULFATE 500 MG/ML
2 VIAL (ML) INJECTION ONCE
Refills: 0 | Status: COMPLETED | OUTPATIENT
Start: 2021-07-25 | End: 2021-07-25

## 2021-07-25 RX ORDER — DIGOXIN 250 MCG
0.5 TABLET ORAL ONCE
Refills: 0 | Status: COMPLETED | OUTPATIENT
Start: 2021-07-25 | End: 2021-07-25

## 2021-07-25 RX ORDER — ACETAMINOPHEN 500 MG
1000 TABLET ORAL ONCE
Refills: 0 | Status: COMPLETED | OUTPATIENT
Start: 2021-07-25 | End: 2021-07-25

## 2021-07-25 RX ORDER — FUROSEMIDE 40 MG
40 TABLET ORAL ONCE
Refills: 0 | Status: COMPLETED | OUTPATIENT
Start: 2021-07-25 | End: 2021-07-25

## 2021-07-25 RX ORDER — CHLORHEXIDINE GLUCONATE 213 G/1000ML
1 SOLUTION TOPICAL
Refills: 0 | Status: DISCONTINUED | OUTPATIENT
Start: 2021-07-25 | End: 2021-07-29

## 2021-07-25 RX ORDER — NOREPINEPHRINE BITARTRATE/D5W 8 MG/250ML
0.05 PLASTIC BAG, INJECTION (ML) INTRAVENOUS
Qty: 8 | Refills: 0 | Status: DISCONTINUED | OUTPATIENT
Start: 2021-07-25 | End: 2021-07-25

## 2021-07-25 RX ORDER — HEPARIN SODIUM 5000 [USP'U]/ML
4500 INJECTION INTRAVENOUS; SUBCUTANEOUS ONCE
Refills: 0 | Status: COMPLETED | OUTPATIENT
Start: 2021-07-25 | End: 2021-07-25

## 2021-07-25 RX ORDER — POTASSIUM CHLORIDE 20 MEQ
40 PACKET (EA) ORAL DAILY
Refills: 0 | Status: DISCONTINUED | OUTPATIENT
Start: 2021-07-25 | End: 2021-07-25

## 2021-07-25 RX ORDER — ATORVASTATIN CALCIUM 80 MG/1
10 TABLET, FILM COATED ORAL AT BEDTIME
Refills: 0 | Status: DISCONTINUED | OUTPATIENT
Start: 2021-07-25 | End: 2021-07-29

## 2021-07-25 RX ORDER — CALCIUM GLUCONATE 100 MG/ML
2 VIAL (ML) INTRAVENOUS ONCE
Refills: 0 | Status: COMPLETED | OUTPATIENT
Start: 2021-07-25 | End: 2021-07-25

## 2021-07-25 RX ORDER — MILRINONE LACTATE 1 MG/ML
0.12 INJECTION, SOLUTION INTRAVENOUS
Qty: 20 | Refills: 0 | Status: DISCONTINUED | OUTPATIENT
Start: 2021-07-25 | End: 2021-07-26

## 2021-07-25 RX ADMIN — Medication 81 MILLIGRAM(S): at 11:28

## 2021-07-25 RX ADMIN — HEPARIN SODIUM 4500 UNIT(S): 5000 INJECTION INTRAVENOUS; SUBCUTANEOUS at 08:05

## 2021-07-25 RX ADMIN — ATORVASTATIN CALCIUM 10 MILLIGRAM(S): 80 TABLET, FILM COATED ORAL at 22:21

## 2021-07-25 RX ADMIN — Medication 5.87 MICROGRAM(S)/KG/MIN: at 17:35

## 2021-07-25 RX ADMIN — HEPARIN SODIUM 1100 UNIT(S)/HR: 5000 INJECTION INTRAVENOUS; SUBCUTANEOUS at 08:05

## 2021-07-25 RX ADMIN — Medication 40 MILLIGRAM(S): at 10:49

## 2021-07-25 RX ADMIN — Medication 0.5 MICROGRAM(S): at 10:40

## 2021-07-25 RX ADMIN — CEFTRIAXONE 100 MILLIGRAM(S): 500 INJECTION, POWDER, FOR SOLUTION INTRAMUSCULAR; INTRAVENOUS at 05:46

## 2021-07-25 RX ADMIN — CHLORHEXIDINE GLUCONATE 1 APPLICATION(S): 213 SOLUTION TOPICAL at 05:45

## 2021-07-25 RX ADMIN — MILRINONE LACTATE 2.35 MICROGRAM(S)/KG/MIN: 1 INJECTION, SOLUTION INTRAVENOUS at 18:36

## 2021-07-25 RX ADMIN — Medication 29.3 MICROGRAM(S)/KG/MIN: at 22:20

## 2021-07-25 RX ADMIN — Medication 50 GRAM(S): at 23:59

## 2021-07-25 RX ADMIN — Medication 25 MILLIGRAM(S): at 10:51

## 2021-07-25 RX ADMIN — AMIODARONE HYDROCHLORIDE 16.7 MG/MIN: 400 TABLET ORAL at 18:35

## 2021-07-25 RX ADMIN — PHENYLEPHRINE HYDROCHLORIDE 11.2 MICROGRAM(S)/KG/MIN: 10 INJECTION INTRAVENOUS at 05:45

## 2021-07-25 RX ADMIN — Medication 2: at 11:29

## 2021-07-25 RX ADMIN — HEPARIN SODIUM 5000 UNIT(S): 5000 INJECTION INTRAVENOUS; SUBCUTANEOUS at 05:45

## 2021-07-25 RX ADMIN — Medication 400 MILLIGRAM(S): at 23:13

## 2021-07-25 RX ADMIN — AMIODARONE HYDROCHLORIDE 16.7 MG/MIN: 400 TABLET ORAL at 22:20

## 2021-07-25 RX ADMIN — Medication 40 MILLIEQUIVALENT(S): at 08:04

## 2021-07-25 RX ADMIN — AMIODARONE HYDROCHLORIDE 618 MILLIGRAM(S): 400 TABLET ORAL at 10:45

## 2021-07-25 NOTE — PROGRESS NOTE ADULT - ASSESSMENT
85F with a history of DM, HTN, HL, hyperkalemia, atherosclerosis, CKD,  met acidosis - lactic acidosis  atrial arrhythmia  ckd mohan -   CHF eval - TTE report - on Ionotropic Meds - Pressors - MAP monitoring - I and O  serial abd exam - ct abd reviewed ([facility] ED study)  on emp ABX - ID eval noted - reviewed -   monitor VS and HD and Sa  fio2 titration - off BIPAP - tolerating NC o2 support  TTE planned  on Anti Arrhythmic Rx regimen - Amio and B Blocker  Cardio following

## 2021-07-25 NOTE — DISCHARGE NOTE NURSING/CASE MANAGEMENT/SOCIAL WORK - PATIENT PORTAL LINK FT
You can access the FollowMyHealth Patient Portal offered by [facility] by registering at the following website: [email or web address]. By joining [employer]’s FollowMyHealth portal, you will also be able to view your health information using other applications (apps) compatible with our system.

## 2021-07-25 NOTE — PROGRESS NOTE ADULT - SUBJECTIVE AND OBJECTIVE BOX
Date/Time Patient Seen:  		  Referring MD:   Data Reviewed	       Patient is a 85y old  Female who presents with a chief complaint of Transfer from [facility] for abdominal pain (24 Jul 2021 16:42)      Subjective/HPI     PAST MEDICAL & SURGICAL HISTORY:  HTN (hypertension)    CAD (coronary artery disease)    Stage 4 chronic kidney disease          Medication list         MEDICATIONS  (STANDING):  aMIOdarone Infusion 1 mG/Min (33.3 mL/Hr) IV Continuous <Continuous>  aMIOdarone Infusion 0.5 mG/Min (16.7 mL/Hr) IV Continuous <Continuous>  aspirin  chewable 81 milliGRAM(s) Oral daily  atorvastatin 10 milliGRAM(s) Oral at bedtime  cefTRIAXone   IVPB      cefTRIAXone   IVPB 1000 milliGRAM(s) IV Intermittent every 24 hours  chlorhexidine 2% Cloths 1 Application(s) Topical <User Schedule>  dextrose 40% Gel 15 Gram(s) Oral once  dextrose 5%. 1000 milliLiter(s) (50 mL/Hr) IV Continuous <Continuous>  dextrose 5%. 1000 milliLiter(s) (100 mL/Hr) IV Continuous <Continuous>  dextrose 50% Injectable 25 Gram(s) IV Push once  dextrose 50% Injectable 12.5 Gram(s) IV Push once  dextrose 50% Injectable 25 Gram(s) IV Push once  glucagon  Injectable 1 milliGRAM(s) IntraMuscular once  heparin   Injectable 5000 Unit(s) SubCutaneous every 8 hours  insulin lispro (ADMELOG) corrective regimen sliding scale   SubCutaneous Before meals and at bedtime  milrinone Infusion 0.125 MICROgram(s)/kG/Min (2.24 mL/Hr) IV Continuous <Continuous>  phenylephrine    Infusion 0.5 MICROgram(s)/kG/Min (11.2 mL/Hr) IV Continuous <Continuous>    MEDICATIONS  (PRN):         Vitals log        ICU Vital Signs Last 24 Hrs  T(C): 36.5 (25 Jul 2021 04:17), Max: 36.5 (25 Jul 2021 00:03)  T(F): 97.7 (25 Jul 2021 04:17), Max: 97.7 (25 Jul 2021 00:03)  HR: 125 (25 Jul 2021 05:00) (72 - 149)  BP: 67/46 (25 Jul 2021 02:00) (67/46 - 166/93)  BP(mean): 75 (25 Jul 2021 01:00) (48 - 131)  ABP: 83/64 (25 Jul 2021 05:00) (72/58 - 91/60)  ABP(mean): 73 (25 Jul 2021 05:00) (64 - 76)  RR: 27 (25 Jul 2021 05:00) (18 - 33)  SpO2: 100% (25 Jul 2021 05:00) (92% - 100%)           Input and Output:  I&O's Detail    24 Jul 2021 07:01  -  25 Jul 2021 05:56  --------------------------------------------------------  IN:    Amiodarone: 532.9 mL    IV PiggyBack: 50 mL    IV PiggyBack: 200 mL    IV PiggyBack: 100 mL    Milrinone: 30.9 mL    Norepinephrine: 5.6 mL    Phenylephrine: 188.1 mL    Sodium Bicarbonate: 1200 mL  Total IN: 2307.5 mL    OUT:    Indwelling Catheter - Urethral (mL): 1340 mL  Total OUT: 1340 mL    Total NET: 967.5 mL          Lab Data                        12.9   8.33  )-----------( 171      ( 24 Jul 2021 03:36 )             42.6     07-24    139  |  103  |  44<H>  ----------------------------<  92  3.7   |  25  |  2.70<H>    Ca    8.3<L>      24 Jul 2021 22:29  Phos  4.5     07-24  Mg     2.3     07-24    TPro  6.8  /  Alb  3.2<L>  /  TBili  0.4  /  DBili  x   /  AST  47<H>  /  ALT  47  /  AlkPhos  61  07-24    ABG - ( 24 Jul 2021 10:12 )  pH, Arterial: 7.33  pH, Blood: x     /  pCO2: 25    /  pO2: 170   / HCO3: 16    / Base Excess: -11.9 /  SaO2: 99                CARDIAC MARKERS ( 24 Jul 2021 12:07 )  .027 ng/mL / x     / 153 U/L / x     / 3.4 ng/mL  CARDIAC MARKERS ( 24 Jul 2021 03:36 )  <.015 ng/mL / x     / x     / x     / x            Review of Systems	      Objective     Physical Examination    heart s1s2  lung dec BS      Pertinent Lab findings & Imaging      Mell:  NO   Adequate UO     I&O's Detail    24 Jul 2021 07:01  -  25 Jul 2021 05:56  --------------------------------------------------------  IN:    Amiodarone: 532.9 mL    IV PiggyBack: 50 mL    IV PiggyBack: 200 mL    IV PiggyBack: 100 mL    Milrinone: 30.9 mL    Norepinephrine: 5.6 mL    Phenylephrine: 188.1 mL    Sodium Bicarbonate: 1200 mL  Total IN: 2307.5 mL    OUT:    Indwelling Catheter - Urethral (mL): 1340 mL  Total OUT: 1340 mL    Total NET: 967.5 mL               Discussed with:     Cultures:	        Radiology

## 2021-07-25 NOTE — DIETITIAN INITIAL EVALUATION ADULT. - OTHER INFO
84 y/o female with pmhx of HTN, HLD, CAD, CKD who presented with nausea/vomiting, abdominal pain x 3 days found to be in aflutter with RVR and acute CHF with newly found severe systolic dysfunction (TTE pending official read) resulting in DANE, lactic acidosis.     Pt A+Ox4 during visit. Observed consuming breakfast 86 y/o female with pmhx of HTN, HLD, CAD, CKD who presented with nausea/vomiting, abdominal pain x 3 days found to be in aflutter with RVR and acute CHF with newly found severe systolic dysfunction (TTE pending official read) resulting in DANE, lactic acidosis.     Pt A+Ox4 during visit. Observed consuming breakfast meal. Tolerating well with fair appetite/po intake. No report difficulty chewing/swallowing. No N/V/abdominal pain. Last BM pta. States UBW 126lbs. Current weight 132lbs. Increase in weight likely in setting of fluid retention. Generalized 1+edema. Pta follows no added salt diet and watches sugars with hx of pre-diabetes. Usually consumes 2 meals/day with 1-2 snack throughtout the day. Declined use of supplements and vitamins/minerals pta. Declined diet education. 84 y/o female with pmhx of HTN, HLD, CAD, CKD who presented with nausea/vomiting, abdominal pain x 3 days found to be in aflutter with RVR and acute CHF with newly found severe systolic dysfunction (TTE pending official read) resulting in DANE, lactic acidosis.     Pt A+Ox4 during visit; observed consuming breakfast meal. Tolerating well with fair appetite/po intake. No report difficulty chewing/swallowing. No N/V/abdominal pain. Last BM pta. States UBW 126lbs. Current weight 132lbs. Increase in weight likely in setting of fluid retention. Generalized 1+edema. Pta follows no added salt diet and watches sugars with hx of pre-diabetes. Usually consumes 2 meals/day with 1-2 snack throughout the day. Declined use of supplements and vitamins/minerals pta. Declined diet education. 86 y/o female with pmhx of HTN, HLD, CAD, CKD who presented with nausea/vomiting, abdominal pain x 3 days found to be in aflutter with RVR and acute CHF with newly found severe systolic dysfunction (TTE pending official read) resulting in DANE, lactic acidosis.     Pt A+Ox4 during visit; observed consuming breakfast meal. Tolerating well with fair appetite/po intake, ~75% breakfast consumed per plate waste observation. No report difficulty chewing/swallowing. No further N/V/abdominal pain. Last BM pta. States UBW 126lbs. Current weight 132lbs. Increase in weight likely in setting of fluid retention. Generalized 1+edema. Pta follows no added salt diet and watches sugars with hx of pre-diabetes. Usually consumes 2 meals/day with 1-2 snack throughout the day.  Declined use of supplements and vitamins/minerals pta. Declined diet education. Declined need for ONS, encourage continued consumption of HBV protein sources with meals.

## 2021-07-25 NOTE — PROCEDURE NOTE - NSPROCDETAILS_GEN_ALL_CORE
location identified, draped/prepped, sterile technique used/sterile dressing applied/sterile technique, catheter placed/Trendelenburg position/ultrasound guidance
location identified, draped/prepped, sterile technique used/blood seen on insertion/dressing applied/flushes easily/secured in place
location identified, draped/prepped, sterile technique used, needle inserted/introduced/positive blood return obtained via catheter/connected to a pressurized flush line/sutured in place/hemostasis with direct pressure, dressing applied/Seldinger technique/all materials/supplies accounted for at end of procedure

## 2021-07-25 NOTE — H&P ADULT - ATTENDING COMMENTS
Patient seen and examined. Agree with assessment and plan as outlined above.  85 year-old woman with HTN, DM admitted to outside hospital with suspected cardiogenic shock initially treated with IVFs for suspected abdominal process. Patient with initially elevated lactate started on IV inotropic support and low dose pressors. TTE reviewed consistent with biventricular systolic dysfunction with bilateral pleural effusions. On examination, patient not in distress but with evidence of right sided volume overload. Bedside IJ TLC placed. Check cvp and central O2 sat. Titrate milrinone based on central O2 sat. Low dose pressor support to maintain map>60 mmHg. Elevated creatinine likely due to ATN. Hold nephrotoxins and maintain map>60 mmHg. If patient not improving, to consider IABP for cardiogenic shock. IV heparin gtt for atrial fibrillation. Patient seen and examined. Agree with assessment and plan as outlined above.  85 year-old woman with HTN, DM admitted to outside hospital with suspected cardiogenic shock initially treated with IVFs for suspected abdominal process. Patient with initially elevated lactate started on IV inotropic support and low dose pressors. TTE reviewed consistent with biventricular systolic dysfunction with bilateral pleural effusions. On examination, patient not in distress but with evidence of right sided volume overload. Bedside IJ TLC placed. Check cvp and central O2 sat. Titrate milrinone based on central O2 sat. Low dose pressor support to maintain map>60 mmHg. Elevated creatinine likely due to ATN. Hold nephrotoxins and maintain map>60 mmHg. If patient not improving, to consider IABP for cardiogenic shock. Heparin gtt on hold secondary to right axillary hematoma at site of prior arterial line.

## 2021-07-25 NOTE — PROGRESS NOTE ADULT - SUBJECTIVE AND OBJECTIVE BOX
Patient is a 85y old  Female who presents with a chief complaint of Transfer from [place] for abdominal pain (2021 05:56)    24 hour events: ***    REVIEW OF SYSTEMS  Constitutional: No fever, chills, fatigue  Neuro: No headache, numbness, weakness  Resp: No cough, wheezing, shortness of breath  CVS: No chest pain, palpitations, leg swelling  GI: No abdominal pain, nausea, vomiting, diarrhea   : No dysuria, frequency, incontinence  Skin: No itching, burning, rashes, or lesions   Msk: No joint pain or swelling  Psych: No depression, anxiety, mood swings  Heme: No bleeding    T(F): 97.7 (21 @ 04:17), Max: 97.7 (21 @ 00:03)  HR: 132 (21 @ 07:00) (72 - 149)  BP: 67/46 (21 @ 02:00) (67/46 - 166/93)  RR: 25 (21 @ 07:00) (18 - 33)  SpO2: 100% (21 @ 07:00) (92% - 100%)  Wt(kg): --            I&O's Summary     @ 07:01  -   @ 07:00  --------------------------------------------------------  IN: 2384.6 mL / OUT: 1425 mL / NET: 959.6 mL      PHYSICAL EXAM  General:   CNS:   HEENT:   Resp:   CVS:   Abd:   Ext:   Skin:     MEDICATIONS  cefTRIAXone   IVPB   cefTRIAXone   IVPB IV Intermittent    aMIOdarone Infusion IV Continuous  aMIOdarone Infusion IV Continuous  milrinone Infusion IV Continuous  phenylephrine    Infusion IV Continuous    atorvastatin Oral  dextrose 40% Gel Oral  dextrose 50% Injectable IV Push  dextrose 50% Injectable IV Push  dextrose 50% Injectable IV Push  glucagon  Injectable IntraMuscular  insulin lispro (ADMELOG) corrective regimen sliding scale SubCutaneous          aspirin  chewable Oral  heparin   Injectable IV Push  heparin   Injectable IV Push PRN  heparin   Injectable IV Push PRN  heparin  Infusion. IV Continuous        dextrose 5%. IV Continuous  dextrose 5%. IV Continuous  potassium chloride    Tablet ER Oral      chlorhexidine 2% Cloths Topical                            10.5   9.57  )-----------( 233      ( 2021 06:02 )             32.1       07-    139  |  103  |  41<H>  ----------------------------<  93  3.4<L>   |  24  |  2.40<H>    Ca    7.9<L>      2021 06:02  Phos  4.0       Mg     2.1         TPro  5.7<L>  /  Alb  2.7<L>  /  TBili  0.3  /  DBili  x   /  AST  31  /  ALT  42  /  AlkPhos  51      Lactate 3.9            @ 22:29    Lactate 4.4           07 @ 12:07      CARDIAC MARKERS ( 2021 12:07 )  .027 ng/mL / x     / 153 U/L / x     / 3.4 ng/mL  CARDIAC MARKERS ( 2021 03:36 )  <.015 ng/mL / x     / x     / x     / x            Urinalysis Basic - ( 2021 05:01 )    Color: Yellow / Appearance: Slightly Turbid / S.025 / pH: x  Gluc: x / Ketone: Trace  / Bili: Negative / Urobili: 4   Blood: x / Protein: 100 / Nitrite: Negative   Leuk Esterase: Small / RBC: 25-50 /HPF / WBC 11-25   Sq Epi: x / Non Sq Epi: Moderate / Bacteria: Moderate            Radiology: ***  Bedside lung ultrasound: ***  Bedside ECHO: ***    CENTRAL LINE: Y/N          DATE INSERTED:              REMOVE: Y/N  BRADFORD: Y/N                        DATE INSERTED:              REMOVE: Y/N  A-LINE: Y/N                       DATE INSERTED:              REMOVE: Y/N    GLOBAL ISSUE/BEST PRACTICE  Analgesia:   Sedation:   CAM-ICU:   HOB elevation: yes  Stress ulcer prophylaxis:   VTE prophylaxis:   Glycemic control:   Nutrition:     CODE STATUS: ***  [facility] discussion: Y       Patient is a 85F who presents with a chief complaint of Transfer from [place] for abdominal pain (2021 05:56).    24 Hour Events:  Patient HR persistently >130 overnight with short stints of rate control. Patient was given lopressor and switched from amiodarone PO to amiodarone infusion with HR maintained between 130-150. Patient was also started on [place] for low SBP overnight.     REVIEW OF SYSTEMS  Constitutional: No fever, chills, fatigue.  Neuro: No headache, numbness, weakness.  Resp: No cough, wheezing, shortness of breath.  CVS: No chest pain, palpitations, leg swelling.  GI: No abdominal pain, nausea, vomiting, diarrhea .  : No dysuria, frequency, incontinence.  Skin: No itching, burning, rashes, or lesions.   Msk: No joint pain or swelling.    VITAL SIGNS  [Today's VS reviewed by provider]  T(F): 97.7 (21 @ 04:17), Max: 97.7 (21 @ 00:03)  HR: 132 (21 @ 07:00) (72 - 149)  BP: 67/46 (21 @ 02:00) (67/46 - 166/93)  RR: 25 (21 @ 07:00) (18 - 33)  SpO2: 100% (21 @ 07:00) (92% - 100%)  Wt(kg): --            I&O's Summary     @ 07:01  -   @ 07:00  --------------------------------------------------------  IN: 2384.6 mL / OUT: 1425 mL / NET: 959.6 mL      PHYSICAL EXAM  General: Well-appearing, NAD.   CNS: ANOx3; NCAT.  HEENT: PERRLA, EOMI; MMM.   Resp: CTABL.   CVS: Pronounced tachycardia; Normal S1, S2; No m/r/g.   Abd: [place], NTND.   Ext: No C/C/E.  Skin: WWP.     MEDICATIONS  cefTRIAXone   IVPB   cefTRIAXone   IVPB IV Intermittent    aMIOdarone Infusion IV Continuous  aMIOdarone Infusion IV Continuous  milrinone Infusion IV Continuous  phenylephrine    Infusion IV Continuous    atorvastatin Oral  dextrose 40% Gel Oral  dextrose 50% Injectable IV Push  dextrose 50% Injectable IV Push  dextrose 50% Injectable IV Push  glucagon  Injectable IntraMuscular  insulin lispro (ADMELOG) corrective regimen sliding scale SubCutaneous          aspirin  chewable Oral  heparin   Injectable IV Push  heparin   Injectable IV Push PRN  heparin   Injectable IV Push PRN  heparin  Infusion. IV Continuous        dextrose 5%. IV Continuous  dextrose 5%. IV Continuous  potassium chloride    Tablet ER Oral      chlorhexidine 2% Cloths Topical                            10.5   9.57  )-----------( 233      ( 2021 06:02 )             32.1       07-25    139  |  103  |  41<H>  ----------------------------<  93  3.4<L>   |  24  |  2.40<H>    Ca    7.9<L>      2021 06:02  Phos  4.0     07-25  Mg     2.1     07-25    TPro  5.7<L>  /  Alb  2.7<L>  /  TBili  0.3  /  DBili  x   /  AST  31  /  ALT  42  /  AlkPhos  51  07-25    Lactate 3.9           07-24 @ 22:29    Lactate 4.4           07-24 @ 12:07      CARDIAC MARKERS ( 2021 12:07 )  .027 ng/mL / x     / 153 U/L / x     / 3.4 ng/mL  CARDIAC MARKERS ( 2021 03:36 )  <.015 ng/mL / x     / x     / x     / x            Urinalysis Basic - ( 2021 05:01 )    Color: Yellow / Appearance: Slightly Turbid / S.025 / pH: x  Gluc: x / Ketone: Trace  / Bili: Negative / Urobili: 4   Blood: x / Protein: 100 / Nitrite: Negative   Leuk Esterase: Small / RBC: 25-50 /HPF / WBC 11-25   Sq Epi: x / Non Sq Epi: Moderate / Bacteria: Moderate      Radiology: Yes  EXAM:  ECHO TTE WO CON COMP W DOPP    PROCEDURE DATE:  2021    INTERPRETATION:  Ordering Physician: MIC CHAN [id number]  Indication: Acute systolic heart failure  Technician: NISA  Study Quality: Good  A complete echocardiographic study was performed utilizing standard protocol including spectral and color Doppler in all echocardiographic windows.  Height: 160 cm  Weight: 57 kg  BSA: 1.59 m2  Blood Pressure: 163/103 mmHg  MEASUREMENTS  IVS: 1.4 cm  PWT: 0.8 cm  LA: 3.7 cm  AO: 2.8 cm  LVIDd: 3.9 cm  LVIDs: 3.7 cm  LVEF: 25%  RVSP: 32 mmHg  RA Pressure: 8 mmHg  FINDINGS  General: The patient was in rapid atrial flutter during the study period.  Left Ventricle: The left ventricle is normal in size. Increased left ventricular wall thickness. There is global hypokinesis. The left ventricular systolic function is severely reduced with an estimated EF of 25%.  Right Ventricle: The right ventricle is normal in size and function.  Left Atrium: The left atrium is dilated.  Right Atrium: The right atrium is dilated.  Mitral Valve: The mitral valve leaflets are tethered beyond the annular plane. Moderate central mitral regurgitation.  Aortic Valve: Aortic valve sclerosis. No aortic regurgitation.  Tricuspid Valve: The tricuspid valve is structurally normal. Moderate tricuspid regurgitation. Estimated pulmonary artery systolic pressure is 32 mmHg.  Pulmonic Valve: The pulmonic valve is not well visualized.  Diastolic Function: Diastolic dysfunction with elevated left ventricular filling pressures.  Pericardium/Pleura: No pericardial effusion visualized.  Aorta: The aortic root is normal in size.  IMPRESSION:  1. Severely reduced left ventricular systolic function.  2. Increased left ventricular wall thickness.  3. Bi-atrial enlargement.  4. Moderate mitral regurgitation.  5. Moderate tricuspid regurgitation.    Bedside lung ultrasound: No.   Bedside ECHO: No.     CENTRAL LINE: N  BRADFORD: Y  A-LINE: Y --> Malfunctioning, d/c'ed    GLOBAL ISSUE/BEST PRACTICE  Analgesia: No.  Sedation: No.  HOB Elevation: Yes.  Stress Ulcer Prophylaxis: No.    VTE Prophylaxis: Yes.    Glycemic Control: Yes.  Nutrition: Yes.    CODE STATUS: Full Code.

## 2021-07-25 NOTE — H&P ADULT - ASSESSMENT
====================ASSESSMENT AND PLAN==============      ====================CARDIOVASCULAR==================    Mechaincal Circulatory Support:  [ ] IABP   [ ] Impella 2.5   [ ] Impella CP  Settings:     ==Hemodynamics==     (Date) CVP:      PCWP:        PA S/D:            Cardiac Output:             Cardiac Index:            SVR:    Preload (fluids, diuretics):  Afterload (anti-hypertensives, pressors):  Inotropes:    ==Pump==    Echo Date:  LVEF:                              Regional Wall Motion Abnormaility?:  [ ]Yes   [ ] No, If Yes, Details  Diastolic function:  RV function:   Any change frim prior?: [ ] Yes   [ ] No, If Yes, Details:   Volume status:    ==Coronaries==    Last ischemic workup:   Antiplatelet regimen:   Anticoagulant:   Statin:   Beta blocker:    ==Rhythm==    Current rhythm:  AM EKG Interpretation:   Anti-arrhythmic therapies:   TVP with settings:         ====================== NEUROLOGY=====================  Sedation [ ]Yes   [ ] No  Delirium [ ]Yes   [ ] No      ==================== RESPIRATORY======================  Mechanical Ventilation [ ]    BIPAP [ ]   HFNC [ ]   NC [ ]                 ===================== RENAL =========================    Renal Replacement Therapy:  [ ] CRRT      [ ] IHD, Last Session:    Fluid removal:     [ ] Diuretic therapy, Regimen:       ==================== GASTROINTESTINAL===================    Diet:  Last BM:   Indication for Stress Ulcer Prophylaxis, [ ] Yes    [ ] No   If Yes, Medication:       ========================INFECTIOUS DISEASE================  T(C): --  WBC Count: 6.64 K/uL (07-23-21 @ 18:09)      Culture - Blood (collected 07-23-21 @ 21:10)  Source: .Blood Blood-Peripheral  Preliminary Report (07-24-21 @ 22:01):    No growth to date.    Culture - Blood (collected 07-23-21 @ 21:10)  Source: .Blood Blood-Peripheral  Preliminary Report (07-24-21 @ 22:01):    No growth to date.        Current Antibiotics/Antifungals with start date:       ===================HEMATOLOGIC/ONC ===================  Hemoglobin: 13.3 g/dL (07-23-21 @ 18:09)    Platelet Count - Automated: 255 K/uL (07-23-21 @ 18:09)    Chemical VTE Prophylaxis:  [ ] Lovenox    [ ] SQH   [ ]NA  Systemic Anticogaulation:  [ ] Yes    [ ] No,  If Yes, Medication and Indication:       =======================    ENDOCRINE  =====================  Insulin drip  [ ] Yes    [ ] No  Basal Insulin [ ] Yes    [ ] No  Bolus insulin [ ] Yes    [ ] No  Sliding Scale  [ ] Yes    [ ] No  Hgb A1c            ======================= LINES/TUBES  =====================  [place]: (Date placed)  Central Line: (Date placed)  HD Catheter: (Date placed)  Arterial Line: (Date placed)  Endotracheal Tube: (Date placed)  Monte: (Date placed)     ====================ASSESSMENT AND PLAN==============  85 F w/ PMH HFpEF, HTN, HLD, CAD, C2D, DM transferred to [facility] from [facility] for further work up/management for CHF    ====================CARDIOVASCULAR==================          ====================== NEUROLOGY=====================  Sedation [ ]Yes   [ ] No  Delirium [ ]Yes   [ ] No      ==================== RESPIRATORY======================  Mechanical Ventilation [ ]    BIPAP [ ]   HFNC [ ]   NC [ ]                 ===================== RENAL =========================    Renal Replacement Therapy:  [ ] CRRT      [ ] IHD, Last Session:    Fluid removal:     [ ] Diuretic therapy, Regimen:       ==================== GASTROINTESTINAL===================    Diet:  Last BM:   Indication for Stress Ulcer Prophylaxis, [ ] Yes    [ ] No   If Yes, Medication:       ========================INFECTIOUS DISEASE================  T(C): --  WBC Count: 6.64 K/uL (07-23-21 @ 18:09)      Culture - Blood (collected 07-23-21 @ 21:10)  Source: .Blood Blood-Peripheral  Preliminary Report (07-24-21 @ 22:01):    No growth to date.    Culture - Blood (collected 07-23-21 @ 21:10)  Source: .Blood Blood-Peripheral  Preliminary Report (07-24-21 @ 22:01):    No growth to date.        Current Antibiotics/Antifungals with start date:       ===================HEMATOLOGIC/ONC ===================  Hemoglobin: 13.3 g/dL (07-23-21 @ 18:09)    Platelet Count - Automated: 255 K/uL (07-23-21 @ 18:09)    Chemical VTE Prophylaxis:  [ ] Lovenox    [ ] SQH   [ ]NA  Systemic Anticogaulation:  [ ] Yes    [ ] No,  If Yes, Medication and Indication:       =======================    ENDOCRINE  =====================  Insulin drip  [ ] Yes    [ ] No  Basal Insulin [ ] Yes    [ ] No  Bolus insulin [ ] Yes    [ ] No  Sliding Scale  [ ] Yes    [ ] No  Hgb A1c            ======================= LINES/TUBES  =====================  [place]: (Date placed)  Central Line: (Date placed)  HD Catheter: (Date placed)  Arterial Line: (Date placed)  Endotracheal Tube: (Date placed)  Monte: (Date placed)     ====================ASSESSMENT AND PLAN==============  85 F w/ PMH HFpEF, HTN, HLD, CAD, C2D, DM transferred to [facility] from [facility] for further work up/management for CHF    ====================CARDIOVASCULAR==================  - Atorvastatin 10 mg q24  - doxasozin 2 mg q 24  - 50 mg metoprolol tartrate q 12  - F/U echo  - Possible ischemic w/u    ====================== NEUROLOGY=====================  AxOx3  - No active issues      ==================== RESPIRATORY======================  - Satting well on room air  - No active issues      ===================== RENAL =========================  Creatinine elevated at 2.3 Unclear if etiology is DANE or CKD  - Will f/u w/ PCP to establish baseline    ==================== GASTROINTESTINAL===================    Diet: DASH  No active issues      ========================INFECTIOUS DISEASE================  T(C): --  WBC Count: 6.64 K/uL (07-23-21 @ 18:09)      Culture - Blood (collected 07-23-21 @ 21:10)  Source: .Blood Blood-Peripheral  Preliminary Report (07-24-21 @ 22:01):    No growth to date.    Culture - Blood (collected 07-23-21 @ 21:10)  Source: .Blood Blood-Peripheral  Preliminary Report (07-24-21 @ 22:01):    No growth to date.    - No active issues      ===================HEMATOLOGIC/ONC ===================  Hemoglobin: 13.3 g/dL (07-23-21 @ 18:09)    Platelet Count - Automated: 255 K/uL (07-23-21 @ 18:09)    Chemical VTE Prophylaxis:  [ ] Lovenox    [X ] SQH   [ ]NA   No active issues      =======================    ENDOCRINE  =====================  Insulin drip  [ ] Yes    [ ] No  Basal Insulin [ ] Yes    [ ] No  Bolus insulin [ ] Yes    [ ] No  Sliding Scale  [X ] Yes    [ ] No  - History of diabetes; On SSI  - CTM  - f/u TSH, lipis, h1ac        ======================= LINES/TUBES  =====================  [place]: (Date placed)  Central Line: (Date placed)  HD Catheter: (Date placed)  Arterial Line: (Date placed)  Endotracheal Tube: (Date placed)  Monte: (Date placed)     ====================ASSESSMENT AND PLAN==============  85 F w/ PMH HFpEF, HTN, HLD, CAD, C2D, DM transferred to [facility] from [facility] for further work up/management for CHF    ====================CARDIOVASCULAR==================  - Atorvastatin 10 mg q24  - doxasozin 2 mg q 24  - 50 mg metoprolol tartrate q 12  - holding omesartan given unclear etiology of creatinine levels (DANE vs CKD)  - F/U echo  - Possible ischemic w/u    ====================== NEUROLOGY=====================  AxOx3  - No active issues      ==================== RESPIRATORY======================  - Satting well on room air  - No active issues      ===================== RENAL =========================  Creatinine elevated at 2.3 Unclear if etiology is DANE or CKD  - Will f/u w/ PCP to establish baseline    ==================== GASTROINTESTINAL===================    Diet: DASH  No active issues      ========================INFECTIOUS DISEASE================  T(C): --  WBC Count: 6.64 K/uL (07-23-21 @ 18:09)      Culture - Blood (collected 07-23-21 @ 21:10)  Source: .Blood Blood-Peripheral  Preliminary Report (07-24-21 @ 22:01):    No growth to date.    Culture - Blood (collected 07-23-21 @ 21:10)  Source: .Blood Blood-Peripheral  Preliminary Report (07-24-21 @ 22:01):    No growth to date.    - No active issues      ===================HEMATOLOGIC/ONC ===================  Hemoglobin: 13.3 g/dL (07-23-21 @ 18:09)    Platelet Count - Automated: 255 K/uL (07-23-21 @ 18:09)    Chemical VTE Prophylaxis:  [ ] Lovenox    [X ] SQH   [ ]NA   No active issues      =======================    ENDOCRINE  =====================  Insulin drip  [ ] Yes    [ ] No  Basal Insulin [ ] Yes    [ ] No  Bolus insulin [ ] Yes    [ ] No  Sliding Scale  [X ] Yes    [ ] No  - History of diabetes; On SSI  - CTM  - f/u TSH, lipis, h1ac        ======================= LINES/TUBES  =====================  [place]: (Date placed)  Central Line: (Date placed)  HD Catheter: (Date placed)  Arterial Line: (Date placed)  Endotracheal Tube: (Date placed)  Monte: (Date placed)     ====================ASSESSMENT AND PLAN==============  85 F w/ PMH HFpEF, HTN, HLD, CAD, C2D, DM transferred to [facility] from [facility] for further work up/management for CHF    ====================CARDIOVASCULAR==================  - Atorvastatin 10 mg q24  - doxasozin 2 mg q 24  - 50 mg metoprolol tartrate q 12  - holding omesartan given unclear etiology of creatinine levels (DANE vs CKD)  - F/U echo  - Possible ischemic w/u    ====================== NEUROLOGY=====================  AxOx3  - No active issues      ==================== RESPIRATORY======================  - Satting well on room air  - No active issues      ===================== RENAL =========================  Creatinine elevated at 2.3 Unclear if etiology is DANE or CKD  - Will f/u w/ PCP to establish baseline  - f/u urine lytes, U/S    ==================== GASTROINTESTINAL===================    Diet: DASH  No active issues      ========================INFECTIOUS DISEASE================  T(C): --  WBC Count: 6.64 K/uL (07-23-21 @ 18:09)      Culture - Blood (collected 07-23-21 @ 21:10)  Source: .Blood Blood-Peripheral  Preliminary Report (07-24-21 @ 22:01):    No growth to date.    Culture - Blood (collected 07-23-21 @ 21:10)  Source: .Blood Blood-Peripheral  Preliminary Report (07-24-21 @ 22:01):    No growth to date.    - No active issues      ===================HEMATOLOGIC/ONC ===================  Hemoglobin: 13.3 g/dL (07-23-21 @ 18:09)    Platelet Count - Automated: 255 K/uL (07-23-21 @ 18:09)    Chemical VTE Prophylaxis:  [ ] Lovenox    [X ] SQH   [ ]NA   No active issues      =======================    ENDOCRINE  =====================  Insulin drip  [ ] Yes    [ ] No  Basal Insulin [ ] Yes    [ ] No  Bolus insulin [ ] Yes    [ ] No  Sliding Scale  [X ] Yes    [ ] No  - History of diabetes; On SSI  - CTM  - f/u TSH, lipis, h1ac        ======================= LINES/TUBES  =====================  [place]: (Date placed)  Central Line: (Date placed)  HD Catheter: (Date placed)  Arterial Line: (Date placed)  Endotracheal Tube: (Date placed)  Monte: (Date placed)

## 2021-07-25 NOTE — PROGRESS NOTE ADULT - SUBJECTIVE AND OBJECTIVE BOX
Chief Complaint: Abdominal pain    Interval Events: Started on milrinone and phenylephrine with some improvement in hemodynamics and kidney function.    Review of Systems:  General: No fevers, chills, weight loss or gain  Skin: No rashes, color changes  Cardiovascular: No chest pain, orthopnea  Respiratory: No shortness of breath, cough  Gastrointestinal: No nausea, abdominal pain  Genitourinary: No incontinence, pain with urination  Musculoskeletal: No pain, swelling, decreased range of motion  Neurological: No headache, weakness  Psychiatric: No depression, anxiety  Endocrine: No weight loss or gain, increased thirst  All other systems are comprehensively negative.    Physical Exam:  Vitals:        Vital Signs Last 24 Hrs  T(C): 36.5 (25 Jul 2021 04:17), Max: 36.5 (25 Jul 2021 00:03)  T(F): 97.7 (25 Jul 2021 04:17), Max: 97.7 (25 Jul 2021 00:03)  HR: 127 (25 Jul 2021 07:48) (72 - 149)  BP: 67/46 (25 Jul 2021 02:00) (67/46 - 166/93)  BP(mean): 75 (25 Jul 2021 01:00) (48 - 131)  RR: 25 (25 Jul 2021 07:00) (18 - 33)  SpO2: 100% (25 Jul 2021 07:00) (92% - 100%)  General: NAD  HEENT: MMM  Neck: No JVD, no carotid bruit  Lungs: CTAB  CV: RRR, nl S1/S2, no M/R/G  Abdomen: S/NT/ND, +BS  Extremities: No LE edema, no cyanosis  Neuro: AAOx3, non-focal  Skin: No rash    Labs:                        10.5   9.57  )-----------( 233      ( 25 Jul 2021 06:02 )             32.1     07-25    139  |  103  |  41<H>  ----------------------------<  93  3.4<L>   |  24  |  2.40<H>    Ca    7.9<L>      25 Jul 2021 06:02  Phos  4.0     07-25  Mg     2.1     07-25    TPro  5.7<L>  /  Alb  2.7<L>  /  TBili  0.3  /  DBili  x   /  AST  31  /  ALT  42  /  AlkPhos  51  07-25    CARDIAC MARKERS ( 24 Jul 2021 12:07 )  .027 ng/mL / x     / 153 U/L / x     / 3.4 ng/mL  CARDIAC MARKERS ( 24 Jul 2021 03:36 )  <.015 ng/mL / x     / x     / x     / x          PT/INR - ( 25 Jul 2021 08:26 )   PT: 17.4 sec;   INR: 1.52 ratio         PTT - ( 25 Jul 2021 08:26 )  PTT:31.9 sec    Telemetry: Atrial flutter

## 2021-07-25 NOTE — H&P ADULT - NSHPLABSRESULTS_GEN_ALL_CORE
LABS: Personally reviewed labs, imaging, and ECG                          13.3   6.64  )-----------( 255      ( 23 Jul 2021 18:09 )             43.2       07-23    134<L>  |  100  |  41<H>  ----------------------------<  156<H>  4.9   |  18<L>  |  2.30<H>    Ca    9.0      23 Jul 2021 18:03    TPro  7.3  /  Alb  3.6  /  TBili  0.4  /  DBili  x   /  AST  42<H>  /  ALT  41  /  AlkPhos  64  07-23       LIVER FUNCTIONS - ( 23 Jul 2021 18:03 )  Alb: 3.6 g/dL / Pro: 7.3 g/dL / ALK PHOS: 64 U/L / ALT: 41 U/L DA / AST: 42 U/L / GGT: x                            Lactate Trend  07-23 @ 20:08 Lactate:4.5   07-23 @ 18:03 Lactate:5.9       CARDIAC MARKERS ( 23 Jul 2021 18:03 )  .000 ng/mL / x     / x     / x     / x            CAPILLARY BLOOD GLUCOSE      POCT Blood Glucose.: 129 mg/dL (23 Jul 2021 23:17)        Culture Results:   No growth to date. (07-23 @ 21:10)  Culture Results:   No growth to date. (07-23 @ 21:10)      RADIOLOGY & ADDITIONAL TESTS:

## 2021-07-25 NOTE — DISCHARGE NOTE PROVIDER - REASON FOR ADMISSION
Transfer from [place] for abdominal pain.  Transfer from [place] for abdominal pain Transfer from [place] for abdominal pain.

## 2021-07-25 NOTE — PROVIDER CONTACT NOTE (CHANGE IN STATUS NOTIFICATION) - ASSESSMENT
Right arm increasingly swelling and firm.  Pt remains to have +1 pulse on right radial however pt endorses difficulty moving right arm and tightness.  NP Wolfgang at bedside compressing hematoma. Heparin gtt turned off as ordered. Repeat CBC sent and T&S pending for patient.

## 2021-07-25 NOTE — PROGRESS NOTE ADULT - ASSESSMENT
The patient is an 85 year old female with a history of HTN, HL, DM, CKD, CAD, chronic diastolic heart failure who presented with abdominal pain, nausea, vomiting, diarrhea.    Plan:  - Continue milrinone 0.125 mcg/kg/min for cardiogenic shock  - Continue phenylephrine and titrate to MAP of 65  - Atrial flutter rates varying from 80s to 130s depending on block  - Received amiodarone 150 mg IV. Continue amiodarone drip.  - Continue heparin drip  - Echo with severely reduced LV systolic function, moderate MR, moderate TR  - Does not appear significantly wet on exam although would give intermittent furosemide to try and make net even  - The patient remains in critical condition and will need further tertiary care. She will need EP eval and likely need ALANNA/DCCV and eventual ablation. She will need a left and right heart catheterization. Additionally, if hemodynamics do not improve, she may need to be evaluated by the heart failure team.  - Will discuss with [facility] to transfer for further care    31 minutes of critical care time spent with the patient and coordinating care with nursing, hospitalist, and consultants. Patient is critically ill requiring ICU care. The patient is high risk for deterioration and death.

## 2021-07-25 NOTE — PROGRESS NOTE ADULT - SUBJECTIVE AND OBJECTIVE BOX
Patient is a 85y old  Female who presents with a chief complaint of     BRIEF HOSPITAL COURSE: 86 y/o female with pmhx of HTN, HLD, CAD, CKD who presented with nausea/vomiting, abdominal pain x 3 days foudn to be in aflutter with RVR and acute CHF with newly found severe systolic dysfunction (TTE pending official read) resulting in DANE, lactic acidosis.     Events last 24 hours: hyperkalemia improved. started on milrinone with improvement in urine output. hypotensive, bolused 500 cc and restarted phenylephrine. abdominal pain resolved.    PAST MEDICAL & SURGICAL HISTORY:      Review of Systems:  CONSTITUTIONAL: No fever, chills, or fatigue  EYES: No eye pain, visual disturbances, or discharge  ENMT:  No difficulty hearing, tinnitus, vertigo; No sinus or throat pain  NECK: No pain or stiffness  RESPIRATORY: No cough, wheezing, chills or hemoptysis; No shortness of breath  CARDIOVASCULAR: No chest pain, palpitations, dizziness, or leg swelling  GASTROINTESTINAL: No abdominal or epigastric pain. No nausea, vomiting, or hematemesis; No diarrhea or constipation. No melena or hematochezia.  GENITOURINARY: No dysuria, frequency, hematuria, or incontinence  NEUROLOGICAL: No headaches, memory loss, loss of strength, numbness, or tremors  SKIN: No itching, burning, rashes, or lesions   MUSCULOSKELETAL: No joint pain or swelling; No muscle, back, or extremity pain  PSYCHIATRIC: No depression, anxiety, mood swings, or difficulty sleeping      Medications:  cefTRIAXone   IVPB        aMIOdarone Infusion 1 mG/Min IV Continuous <Continuous>  aMIOdarone Infusion 0.5 mG/Min IV Continuous <Continuous>  metoprolol tartrate 25 milliGRAM(s) Oral every 6 hours  milrinone Infusion 0.125 MICROgram(s)/kG/Min IV Continuous <Continuous>  phenylephrine    Infusion 0.5 MICROgram(s)/kG/Min IV Continuous <Continuous>          aspirin  chewable 81 milliGRAM(s) Oral daily  heparin   Injectable 5000 Unit(s) SubCutaneous every 8 hours        atorvastatin 10 milliGRAM(s) Oral at bedtime  dextrose 40% Gel 15 Gram(s) Oral once  dextrose 50% Injectable 25 Gram(s) IV Push once  dextrose 50% Injectable 12.5 Gram(s) IV Push once  dextrose 50% Injectable 25 Gram(s) IV Push once  glucagon  Injectable 1 milliGRAM(s) IntraMuscular once  insulin lispro (ADMELOG) corrective regimen sliding scale   SubCutaneous Before meals and at bedtime    dextrose 5%. 1000 milliLiter(s) IV Continuous <Continuous>  dextrose 5%. 1000 milliLiter(s) IV Continuous <Continuous>      chlorhexidine 2% Cloths 1 Application(s) Topical <User Schedule>            ICU Vital Signs Last 24 Hrs  T(C): 36.4 (2021 20:25), Max: 36.4 (2021 12:00)  T(F): 97.5 (2021 20:25), Max: 97.5 (2021 12:00)  HR: 121 (2021 00:00) (72 - 149)  BP: 70/50 (2021 00:00) (69/53 - 166/93)  BP(mean): 55 (2021 00:00) (48 - 131)  ABP: --  ABP(mean): --  RR: 22 (2021 00:00) (18 - 32)  SpO2: 100% (2021 23:00) (92% - 100%)      ABG - ( 2021 10:12 )  pH, Arterial: 7.33  pH, Blood: x     /  pCO2: 25    /  pO2: 170   / HCO3: 16    / Base Excess: -11.9 /  SaO2: 99                  I&O's Detail    2021 07:01  -  2021 00:03  --------------------------------------------------------  IN:    Amiodarone: 66.6 mL    IV PiggyBack: 200 mL    IV PiggyBack: 50 mL    IV PiggyBack: 50 mL    Milrinone: 17.7 mL    Norepinephrine: 5.6 mL    Phenylephrine: 33.6 mL    Sodium Bicarbonate: 1200 mL  Total IN: 1623.5 mL    OUT:    Indwelling Catheter - Urethral (mL): 970 mL  Total OUT: 970 mL    Total NET: 653.5 mL            LABS:                        12.9   8.33  )-----------( 171      ( 2021 03:36 )             42.6     07-24    139  |  103  |  44<H>  ----------------------------<  92  3.7   |  25  |  2.70<H>    Ca    8.3<L>      2021 22:29  Phos  4.5     07  Mg     2.3     -24    TPro  6.8  /  Alb  3.2<L>  /  TBili  0.4  /  DBili  x   /  AST  47<H>  /  ALT  47  /  AlkPhos  61  07-24      CARDIAC MARKERS ( 2021 12:07 )  .027 ng/mL / x     / 153 U/L / x     / 3.4 ng/mL  CARDIAC MARKERS ( 2021 03:36 )  <.015 ng/mL / x     / x     / x     / x          CAPILLARY BLOOD GLUCOSE      POCT Blood Glucose.: 165 mg/dL (2021 21:16)      Urinalysis Basic - ( 2021 05:01 )    Color: Yellow / Appearance: Slightly Turbid / S.025 / pH: x  Gluc: x / Ketone: Trace  / Bili: Negative / Urobili: 4   Blood: x / Protein: 100 / Nitrite: Negative   Leuk Esterase: Small / RBC: 25-50 /HPF / WBC 11-25   Sq Epi: x / Non Sq Epi: Moderate / Bacteria: Moderate      CULTURES:      Physical Examination:    General: No acute distress.      HEENT: Pupils equal, reactive to light.  Symmetric.    PULM: Clear to auscultation bilaterally, no significant sputum production    NECK: Supple, no lymphadenopathy, trachea midline    CVS: Regular rate and rhythm, no murmurs, rubs, or gallops    ABD: Soft, nondistended, nontender, normoactive bowel sounds, no masses    EXT: No edema, nontender    SKIN: cool extremities no rashes noted.    NEURO: Alert, oriented, interactive, nonfocal    DEVICES: midline, oviedo    RADIOLOGY: admission cxr reviewed    CRITICAL CARE TIME SPENT: 45 minutes of critical care time spent providing medical care for patient's acute illness/conditions that impairs at least one vital organ system and/or poses a high risk of imminent or life threatening deterioration in the patient's condition. It includes time spent evaluating and treating the patient's acute illness as well as time spent reviewing labs, radiology, discussing goals of care with patient and/or patient's family, and discussing the case with a multidisciplinary team in an effort to prevent further life threatening deterioration or end organ damage. This time is independent of any procedures performed.

## 2021-07-25 NOTE — CONSULT NOTE ADULT - ASSESSMENT
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 On 7/21, patient said" she ate a bad turkey sandwich" and woke up 7/22 with bad abdominal pain, N/V, and dizziness. She then went to OSH ED where she was found to be tachycardic, hypotension, elevated lactate at 5.9 and transferred to [facility] for concern of cardiogenic shock. At OSH, axillary arterial line was placed in the ICU. Upon arrival in the CCU, noted to be hypotensive and Hgb drop from 13 to 8 in addition to R proximal arm swelling/TTP.     1. Combined hemorrhagic and cardiogenic shock     -MvO2 56 on milrinone 0.125, trend MvO2 with CVP   -transfuse to goal Hg>8, monitor CVP via TLC during transfuse process, IV lasix PRN, vascular surgery eval   -eventually will need PA-C for filling pressure assessment once acute hemorrhagic component is stabilized   -introduce afterload reducing agents when able, hold off for now   -repeat TTE in AM and eventual ischemic evaluation when able   -monitor on telemetry and maintain goal K~4.5 and Mg~2.5    Discussed with Dr. Chen     Thank you for allowing us to participate in the care of your patient. If you have any questions or concerns please do not hesitate to contact us 24/7.   All Cardiology service information can be found 24/7 on [email or web address], password: [email or web address] MD  PGY-5 Cardiology Fellow, [facility]

## 2021-07-25 NOTE — CONSULT NOTE ADULT - TIME BILLING
- Review of notes/records, telemetry, vital signs and daily labs.   - General and cardiovascular physical examination.  - Generation of cardiovascular treatment plan.  - Coordination of care with primary team.

## 2021-07-25 NOTE — CONSULT NOTE ADULT - ATTENDING COMMENTS
86 y/o female with h/o chronic HFpEF, HTN, DLP, CAD (h/o abnormal stress test), DM 2 and CKD 2 who presented to [facility] with nausea, vomiting and dizziness. She was found to be tachycardic, hypotensive with elevated lactate concerning for cardiogenic shock. She was given IVF, empiric antibiotics and underwent abd CT which ws unremarkable. Her ekg showed new onset aflutter and her TTE showed newly diagnosed LV systolic dysfunction with LVEF 25%, mod MR and mod TR. She was started on milrinone and levophed gtt. She was transferred to [facility] for further management. Her hospital course was complicated by tachypnea requiring bipap placement, acute anemia requiring PRBCs transfusion thought to be due bleeding from arterial line and heparin gtt. Her milrinone gtt was dc’ed due to hypotension. Her CVP has been low ~1-2 suggesting hypovolemia and she has been volume resuscitated.   Agree with fluid repletion for now.   Should monitor CVP, SVO2, LFTs and lactate.  Eventually will need ischemia w/u. Has h/o abnormal stress test.   Rhythm control for aflutter when feasible.

## 2021-07-25 NOTE — PATIENT PROFILE ADULT - NSPROGENOTHERPROVIDER_GEN_A_NUR
Called to notify of unexpected dramatic drop in hgb from 15 down to 8.1.  He was hemodynamically stable at diabetes check yesterday, if still asymptomatic, recommend WIC immediately to recheck and if low/ lower,  further plan from there.      If sx of shortness of breath, palp, lightheaded, chest pain, etc - needs ER immediately.    Regardless we need a colonoscopy asap as this is likely the most likely source of bleeding.     I talked to both Regis and his wife Ninfa and they are heading to [facility] now because Regis feels fine, no sx at all. No rectal bleeding, no black stools. No sign of bleeding or feeling unwell at all.    none

## 2021-07-25 NOTE — CONSULT NOTE ADULT - ATTENDING COMMENTS
I have discussed the case with the surgical house staff. I have personally seen, examined, and participated in the care of this patient. I have reviewed all pertinent clinical information.    s/p brachial maryellen removal. initial consult with concern for bleeding.   right arm swelling  right arm with motor and sensory intact. doppler brachial radial signals. hand not ischemic.   h/h remains relatively stable with slight downward trend.  duplex without evidence of active bleeding or pseudoaneurysm. there is a hematoma.    recommendations  right arm elevation and light compression.   trend h/h  if there is ongoing concern for bleed, recommend cta RUE.  reconsult vascular as needed.

## 2021-07-25 NOTE — PROGRESS NOTE ADULT - ATTENDING COMMENTS
85F PMH HTN, HLD, CAD, CKD, and prediabetes on metformin who presents with nausea, vomiting, and abdominal pain for 3 days, found to have atrial flutter with rapid ventricular response with acute systolic heart failure, mitral regurgitation, acute kidney injury, lactic acidosis, and hyperkalemia. Also with UTI. Now with cardiogenic shock.    1. Neuro: stable neurologic status, AAOx3, moving all extremities  2. CV: cardiogenic shock, continue milrinone at 0.125 mcg/kg/min. VTI is reduced to 8cm today (from 10-12 yesterday). Continue phenylephrine for goal MAP>65. Continue amiodarone IV load, give additional 150 mg IVPB. Give 0.5 mg digoxin and start metoprolol 25 mg q6h. Diuresis with furosemide 40 mg IV. Continue aspirin and statin  3. Pulm: stable respiratory status currently, on room air with SpO2>90%. B lines on ultrasound, diuresis with furosemide  4. GI: resolved abdominal pain, continue DASH/TLC diet  5. Renal: DANE 2/2 ATN, likely cardiorenal. Strict I/O's, trend kidney function and lytes  6. ID: UTI, continue ceftriaxone. Follow-up blood and urine cultures  7. Heme: HSQ for DVT ppx  8. Endo: pre-DM, continue insulin coverage scale, a1c 5.9  9. Skin: no lines, L midline placed 7/24, keep oviedo  10. Dispo: prognosis guarded, discussed with patient at bedside. Plan for transfer to [facility] CCU for EP & interventional cardiology eval  CC time spent: 45 min 85F PMH HTN, HLD, CAD, CKD, and prediabetes on metformin who presents with nausea, vomiting, and abdominal pain for 3 days, found to have atrial flutter with rapid ventricular response with acute systolic heart failure, mitral regurgitation, acute kidney injury, lactic acidosis, and hyperkalemia. Also with UTI. Now with cardiogenic shock.    1. Neuro: stable neurologic status, AAOx3, moving all extremities  2. CV: cardiogenic shock, continue milrinone at 0.125 mcg/kg/min. VTI is reduced to 8cm today (from 10-12 yesterday). Continue phenylephrine for goal MAP>65. Continue amiodarone IV load, give additional 150 mg IVPB. Give 0.5 mg digoxin and start metoprolol 25 mg q6h. A/C with heparin. Diuresis with furosemide 40 mg IV. Continue aspirin and statin  3. Pulm: stable respiratory status currently, on room air with SpO2>90%. B lines on ultrasound, diuresis with furosemide  4. GI: resolved abdominal pain, continue DASH/TLC diet  5. Renal: DANE 2/2 ATN, likely cardiorenal. Strict I/O's, trend kidney function and lytes  6. ID: UTI, continue ceftriaxone. Follow-up blood and urine cultures  7. Heme: HSQ for DVT ppx  8. Endo: pre-DM, continue insulin coverage scale, a1c 5.9  9. Skin: no lines, L midline placed 7/24, keep oviedo  10. Dispo: prognosis guarded, discussed with patient at bedside. Plan for transfer to [facility] CCU for EP & interventional cardiology eval  CC time spent: 45 min

## 2021-07-25 NOTE — CONSULT NOTE ADULT - SUBJECTIVE AND OBJECTIVE BOX
CC: Patient is a 85y old  Female who presents with a chief complaint of Transfer for Cardiogenic Shock (25 Jul 2021 14:00)    HPI: 85 F w/ PMH HFpEF, HTN, HLD, CAD, C2D, DM transferred to [facility] from OSH for further management. On 7/21, patient said" she ate a bad turkey sandwich" and woke up 7/22 with bad abdominal pain, N/V, and dizziness. She then went to OSH ED where she was found to be tachycardic w/ soft BP, elevated lactate at 5.9. She was given 2 L IVF and sent for CT a/P but be came acutely SOB while laying flat in CT requiring BiPAP. Patient denies being SOB of that time or ever. She said she just had some anxiety.  In [facility] ED, patient vitals were 110/70,  sinus rhythm, and has extremely unremarkable ROS with no current abdominal pain, N/V, or dizziness She is saturating on room air in high 90s  (25 Jul 2021 14:00)    Vascular surgery called this evening for increased swelling in RUE. Per nurse patient had a R brachial a-line from OSH that was removed yesterday. Patient on hep gtt, now held. Patient had some swelling yesterday that was worsened today. Patient endorsing some pain in RUE.      PMH  HTN (hypertension)  HLD (hyperlipidemia)  DM (diabetes mellitus)  Hyperkalemia  CKD (chronic kidney disease)  Atherosclerosis    PSH  S/P hysterectomy      Physical Exam  T(C): --  HR: 116 (07-25-21 @ 19:00) (82 - 116)  BP: 122/106 (07-25-21 @ 13:30) (113/94 - 185/83)  RR: 58 (07-25-21 @ 19:00) (18 - 58)  SpO2: 96% (07-25-21 @ 19:00) (92% - 99%)  Wt(kg): --  Tmax: T(C): --  Wt(kg): --    Gen: NAD  HEENT: normocephalic, atraumatic, no scleral icterus  Pulm: unlabored respirations  Ext: R proximal arm swelling and ecchymosis, remains soft  +radial, brachial and axillary signals  motor and sensory intact     07-25-21  -  07-25-21  --------------------------------------------------------  IN:    Norepinephrine: 11.8 mL    Oral Fluid: 240 mL  Total IN: 251.8 mL    OUT:    Indwelling Catheter - Urethral (mL): 1050 mL  Total OUT: 1050 mL    Total NET: -798.2 mL        Labs:                        7.7    12.88 )-----------( 178      ( 25 Jul 2021 18:48 )             23.4     07-25    135  |  101  |  39<H>  ----------------------------<  87  4.3   |  21<L>  |  2.61<H>    Ca    8.1<L>      25 Jul 2021 15:54  Phos  3.9     07-25  Mg     1.8     07-25    TPro  5.2<L>  /  Alb  2.8<L>  /  TBili  0.3  /  DBili  x   /  AST  35  /  ALT  39  /  AlkPhos  49  07-25    PT/INR - ( 25 Jul 2021 15:54 )   PT: 21.9 sec;   INR: 1.88 ratio         PTT - ( 25 Jul 2021 15:54 )  PTT:>200.0 sec    ABG - ( 25 Jul 2021 15:50 )  pH, Arterial: 7.44  pH, Blood: x     /  pCO2: 36    /  pO2: 130   / HCO3: 25    / Base Excess: 1.1   /  SaO2: 99

## 2021-07-25 NOTE — H&P ADULT - NSHPPHYSICALEXAM_GEN_ALL_CORE
VITALS:   T(C): --  HR: 92 (07-25-21 @ 13:23) (82 - 92)  BP: 113/94 (07-25-21 @ 13:23) (113/94 - 185/83)  RR: 25 (07-25-21 @ 13:23) (25 - 31)  SpO2: --    PHYSICAL EXAM:     GENERAL: NAD, lying in bed comfortably  HEAD:  Atraumatic, Normocephalic  EYES: EOMI, PERRLA, conjunctiva and sclera clear  ENT: Moist mucous membranes  NECK: Supple, No JVD  CHEST/LUNG: Clear to auscultation bilaterally; No rales, rhonchi, wheezing, or rubs. Unlabored respirations  HEART: Regular rate and rhythm; No murmurs, rubs, or gallops  ABDOMEN: normal bowel sounds; Soft, nontender, nondistended  EXTREMITIES:  2+ Peripheral Pulses, brisk capillary refill. No clubbing, cyanosis, or edema  Neurological:  A&Ox3, no focal deficits   SKIN: No rashes or lesions  PSYCH: normal affect and mood VITALS:   T(C): --  HR: 92 (07-25-21 @ 13:23) (82 - 92)  BP: 113/94 (07-25-21 @ 13:23) (113/94 - 185/83)  RR: 25 (07-25-21 @ 13:23) (25 - 31)  SpO2: --    PHYSICAL EXAM:     GENERAL: NAD, lying in bed comfortably  HEAD:  Atraumatic, Normocephalic  EYES: EOMI, conjunctiva and sclera clear  ENT: Moist mucous membranes  NECK: Supple, No JVD  CHEST/LUNG: Slight rales on right lower lung field. No rhonchi, wheezing, or rubs. Unlabored respirations  HEART: Regular rate and rhythm; No murmurs, rubs, or gallops  ABDOMEN: normal bowel sounds; Soft, nontender, nondistended  EXTREMITIES:  2+ Peripheral Pulses, brisk capillary refill. No clubbing, cyanosis, or edema  Neurological:  A&Ox3, no focal deficits   SKIN: No rashes or lesions  PSYCH: normal affect and mood

## 2021-07-25 NOTE — PROGRESS NOTE ADULT - SUBJECTIVE AND OBJECTIVE BOX
PAYAM PAPPAS  MRN-[id number]  Patient is a 85y old  Female who presents with a chief complaint of Transfer for Cardiogenic Shock (25 Jul 2021 19:09)    HPI:  HPI: 85 F w/ PMH HFpEF, HTN, HLD, CAD, C2D, DM transferred to [facility] from OSH for further management. On 7/21, patient said" she ate a bad turkey sandwich" and woke up 7/22 with bad abdominal pain, N/V, and dizziness. She then went to OSH ED where she was found to be tachycardic w/ soft BP, elevated lactate at 5.9. She was given 2 L IVF and sent for CT a/P but be came acutely SOB while laying flat in CT requiring BiPAP. Patient denies being SOB of that time or ever. She said she just had some anxiety.    In [facility] ED today, patient vitals were 110/70,  sinus rhythm, and has extremely unremarkable ROS with no current abdominal pain, N/V, or dizziness She is saturating on room air in high 90s  (25 Jul 2021 14:00)      Hospital Course:  7/25 cardiogenic shock    24 HOUR EVENTS:    REVIEW OF SYSTEMS:   Constitutional: No fevers, or chills  Eyes/ENT: No visual changes  Respiratory: No cough, wheezing, hemoptysis  Cardiovascular: No chest pain, no palpitations  Gastrointestinal: No abdominal pain.   Genitourinary: No dysuria  Neurological: No numbness, no weakness  Skin: No itching, rashes    ICU Vital Signs Last 24 Hrs  T(C): 36.4 (25 Jul 2021 19:15), Max: 36.4 (25 Jul 2021 19:15)  T(F): 97.6 (25 Jul 2021 19:15), Max: 97.6 (25 Jul 2021 19:15)  HR: 116 (25 Jul 2021 19:30) (82 - 116)  BP: 122/106 (25 Jul 2021 13:30) (113/94 - 185/83)  BP(mean): 111 (25 Jul 2021 13:30) (100 - 111)  ABP: 88/34 (25 Jul 2021 19:30) (78/34 - 158/54)  ABP(mean): 48 (25 Jul 2021 19:30) (46 - 116)  RR: 28 (25 Jul 2021 19:30) (18 - 58)  SpO2: 96% (25 Jul 2021 19:15) (92% - 99%)      CVP(mm Hg): --  CO: --  CI: --  PA: --  PA(mean): --  PA(direct): --  PCWP: --  LA: --  RA: --  SVR: --  SVRI: --  PVR: --  PVRI: --  I&O's Summary    25 Jul 2021 07:01  -  25 Jul 2021 19:37  --------------------------------------------------------  IN: 283.7 mL / OUT: 1050 mL / NET: -766.3 mL        CAPILLARY BLOOD GLUCOSE    CAPILLARY BLOOD GLUCOSE      POCT Blood Glucose.: 129 mg/dL (23 Jul 2021 23:17)      PHYSICAL EXAM:   General: No acute distress  Eyes: EOMI, PERRLA, conjunctiva and sclera clear  Chest/Lung: Slight rales on right lower lung field. No rhonchi, wheezing, or rubs. Unlabored respirations  Heart: Regular rate, regular rhythm. Normal S1/S2. No murmurs, rubs, or gallops.  Abdomen: Soft, nontender, nondistended. Normal bowel sounds.  Extremites: 2+ peripheral pulses B/L. No clubbing, cyanosis, or edema.  Neurology: A&O x3, no focal deficits  Skin: No rashes or lesions  ============================I/O===========================   I&O's Detail    25 Jul 2021 07:01  -  25 Jul 2021 19:37  --------------------------------------------------------  IN:    Amiodarone: 16.7 mL    Milrinone: 2.3 mL    Norepinephrine: 11.8 mL    Oral Fluid: 240 mL    PRBCs (Packed Red Blood Cells): 12.9 mL  Total IN: 283.7 mL    OUT:    Indwelling Catheter - Urethral (mL): 1050 mL  Total OUT: 1050 mL    Total NET: -766.3 mL        ============================ LABS =========================                        7.7    12.88 )-----------( 178      ( 25 Jul 2021 18:48 )             23.4     07-25    135  |  101  |  39<H>  ----------------------------<  87  4.3   |  21<L>  |  2.61<H>    Ca    8.1<L>      25 Jul 2021 15:54  Phos  3.9     07-25  Mg     1.8     07-25    TPro  5.2<L>  /  Alb  2.8<L>  /  TBili  0.3  /  DBili  x   /  AST  35  /  ALT  39  /  AlkPhos  49  07-25    LIVER FUNCTIONS - ( 25 Jul 2021 15:54 )  Alb: 2.8 g/dL / Pro: 5.2 g/dL / ALK PHOS: 49 U/L / ALT: 39 U/L / AST: 35 U/L / GGT: x           PT/INR - ( 25 Jul 2021 15:54 )   PT: 21.9 sec;   INR: 1.88 ratio         PTT - ( 25 Jul 2021 15:54 )  PTT:>200.0 sec  ABG - ( 25 Jul 2021 15:50 )  pH, Arterial: 7.44  pH, Blood: x     /  pCO2: 36    /  pO2: 130   / HCO3: 25    / Base Excess: 1.1   /  SaO2: 99                ======================Micro/Rad/Cardio=================  Telemetry: Reviewed   EKG: Reviewed  CXR: Reviewed  Culture: Reviewed   Echo: Reviewed  Cath: Reviewed  ======================================================  PAST MEDICAL & SURGICAL HISTORY:  HTN (hypertension)    HLD (hyperlipidemia)    DM (diabetes mellitus)    Hyperkalemia    CKD (chronic kidney disease)    Atherosclerosis    S/P hysterectomy      ====================ASSESSMENT ==============  Cardiogenic Shock  Hx of CAD  Hx of HTN  DMT2  Anemia  CKD  Leukocytosis       Plan:  ====================== NEUROLOGY=====================  A&Ox3, nonfocal   - Continue to monitor neuro status per protocol      ==================== RESPIRATORY======================  - Satting well on room air  - No active issues    ====================CARDIOVASCULAR==================  Cardiogenic shock  - Atorvastatin 10 mg q24  - doxasozin 2 mg q 24  - 50 mg metoprolol tartrate q 12  - holding omesartan given unclear etiology of creatinine levels (DANE vs CKD)  - F/U echo  - Possible ischemic w/u    Hx of HTN  - c/w Levophed for pressor support  - Inotropic support with IV Primacor     Hx of CAD  - C/w Lipitor     aMIOdarone Infusion 0.5 mG/Min (16.7 mL/Hr) IV Continuous <Continuous>  doxazosin 2 milliGRAM(s) Oral at bedtime  metoprolol tartrate 50 milliGRAM(s) Oral two times a day  milrinone Infusion 0.125 MICROgram(s)/kG/Min (2.35 mL/Hr) IV Continuous <Continuous>  norepinephrine Infusion 0.05 MICROgram(s)/kG/Min (5.87 mL/Hr) IV Continuous <Continuous>    ===================HEMATOLOGIC/ONC ===================  Anemia  - Continue to monitor CBC  - Continue Heparin for venous thromboembolism prophylaxis.     heparin   Injectable 5000 Unit(s) SubCutaneous every 8 hours    ===================== RENAL =========================  Creatinine elevated at 2.71 Unclear if etiology DANE or CKD  - Will f/u w/ PCP to establish baseline  - f/u urine lytes, U/S      ==================== GASTROINTESTINAL===================  -Diet: DASH  -No active issues      =======================    ENDOCRINE  =====================  History of Type 2 Diabetes Mellitus, requiring glucose control with insulin gtt, titrate as per insulin drip protocol along with hourly glucose checks.     dextrose 5%. 1000 milliLiter(s) (50 mL/Hr) IV Continuous <Continuous>  dextrose 5%. 1000 milliLiter(s) (100 mL/Hr) IV Continuous <Continuous>  dextrose 40% Gel 15 Gram(s) Oral once  dextrose 50% Injectable 25 Gram(s) IV Push once  dextrose 50% Injectable 12.5 Gram(s) IV Push once  dextrose 50% Injectable 25 Gram(s) IV Push once  glucagon  Injectable 1 milliGRAM(s) IntraMuscular once    ========================INFECTIOUS DISEASE================  Afebrile, but elevated WBC at 12.88  Monitor WBC levels      Patient requires continuous monitoring with bedside rhythm monitoring, pulse ox monitoring, and intermittent blood gas analysis. Care plan discussed with ICU care team. Patient remained critical and at risk for life threatening decompensation.  Patient seen, examined and plan discussed with CCU team during rounds.     I have personally provided 35 minutes of critical care time excluding time spent on separate procedures.    By signing my name below, I, Gladys Martins, attest that this documentation has been prepared under the direction and in the presence of Jazmin Goss NP  Electronically signed: Nasrin Hendrickson, 07-25-21 @ 19:37    I, Jazmin Goss NP, personally performed the services described in this documentation. all medical record entries made by the scribe were at my direction and in my presence. I have reviewed the chart and agree that the record reflects my personal performance and is accurate and complete  Electronically signed: Jazmin Goss NP       PAYAM PAPPAS  MRN-[id number]  Patient is a 85y old  Female who presents with a chief complaint of Transfer for Cardiogenic Shock (25 Jul 2021 19:09)    HPI:  HPI: 85 F w/ PMH HFpEF, HTN, HLD, CAD, C2D, DM transferred to [facility] from OSH for further management. On 7/21, patient said" she ate a bad turkey sandwich" and woke up 7/22 with bad abdominal pain, N/V, and dizziness. She then went to OSH ED where she was found to be tachycardic w/ soft BP, elevated lactate at 5.9. She was given 2 L IVF and sent for CT a/P but be came acutely SOB while laying flat in CT requiring BiPAP. Patient denies being SOB of that time or ever. She said she just had some anxiety.    In [facility] ED today, patient vitals were 110/70,  sinus rhythm, and has extremely unremarkable ROS with no current abdominal pain, N/V, or dizziness She is saturating on room air in high 90s  (25 Jul 2021 14:00)      Hospital Course:  7/25. R arm increased swelling 2/2 arterial line.  Vascular consulted. 2U PRBC give     24 HOUR EVENTS:      REVIEW OF SYSTEMS:   Constitutional: No fevers, or chills  Eyes/ENT: No visual changes  Respiratory: No cough, wheezing, hemoptysis  Cardiovascular: No chest pain, no palpitations  Gastrointestinal: No abdominal pain.   Genitourinary: No dysuria  Neurological: No numbness, no weakness  Skin: No itching, rashes    ICU Vital Signs Last 24 Hrs  T(C): 36.4 (25 Jul 2021 19:15), Max: 36.4 (25 Jul 2021 19:15)  T(F): 97.6 (25 Jul 2021 19:15), Max: 97.6 (25 Jul 2021 19:15)  HR: 116 (25 Jul 2021 19:30) (82 - 116)  BP: 122/106 (25 Jul 2021 13:30) (113/94 - 185/83)  BP(mean): 111 (25 Jul 2021 13:30) (100 - 111)  ABP: 88/34 (25 Jul 2021 19:30) (78/34 - 158/54)  ABP(mean): 48 (25 Jul 2021 19:30) (46 - 116)  RR: 28 (25 Jul 2021 19:30) (18 - 58)  SpO2: 96% (25 Jul 2021 19:15) (92% - 99%)      I&O's Summary    25 Jul 2021 07:01  -  25 Jul 2021 19:37  --------------------------------------------------------  IN: 283.7 mL / OUT: 1050 mL / NET: -766.3 mL        CAPILLARY BLOOD GLUCOSE    CAPILLARY BLOOD GLUCOSE      POCT Blood Glucose.: 129 mg/dL (23 Jul 2021 23:17)      PHYSICAL EXAM:   General: No acute distress  Eyes: EOMI, PERRLA, conjunctiva and sclera clear  Chest/Lung: Slight rales on right lower lung field. No rhonchi, wheezing, or rubs. Unlabored respirations  Heart: Regular rate, regular rhythm. Normal S1/S2. No murmurs, rubs, or gallops.  Abdomen: Soft, nontender, nondistended. Normal bowel sounds.  Extremites: 2+ peripheral pulses B/L. No clubbing, cyanosis, or edema.  Neurology: A&O x3, no focal deficits  Skin: No rashes or lesions  ============================I/O===========================   I&O's Detail    25 Jul 2021 07:01  -  25 Jul 2021 19:37  --------------------------------------------------------  IN:    Amiodarone: 16.7 mL    Milrinone: 2.3 mL    Norepinephrine: 11.8 mL    Oral Fluid: 240 mL    PRBCs (Packed Red Blood Cells): 12.9 mL  Total IN: 283.7 mL    OUT:    Indwelling Catheter - Urethral (mL): 1050 mL  Total OUT: 1050 mL    Total NET: -766.3 mL        ============================ LABS =========================                        7.7    12.88 )-----------( 178      ( 25 Jul 2021 18:48 )             23.4     07-25    135  |  101  |  39<H>  ----------------------------<  87  4.3   |  21<L>  |  2.61<H>    Ca    8.1<L>      25 Jul 2021 15:54  Phos  3.9     07-25  Mg     1.8     07-25    TPro  5.2<L>  /  Alb  2.8<L>  /  TBili  0.3  /  DBili  x   /  AST  35  /  ALT  39  /  AlkPhos  49  07-25    LIVER FUNCTIONS - ( 25 Jul 2021 15:54 )  Alb: 2.8 g/dL / Pro: 5.2 g/dL / ALK PHOS: 49 U/L / ALT: 39 U/L / AST: 35 U/L / GGT: x           PT/INR - ( 25 Jul 2021 15:54 )   PT: 21.9 sec;   INR: 1.88 ratio         PTT - ( 25 Jul 2021 15:54 )  PTT:>200.0 sec  ABG - ( 25 Jul 2021 15:50 )  pH, Arterial: 7.44  pH, Blood: x     /  pCO2: 36    /  pO2: 130   / HCO3: 25    / Base Excess: 1.1   /  SaO2: 99                ======================Micro/Rad/Cardio=================  Telemetry: Reviewed   EKG: Reviewed  CXR: Reviewed  Culture: Reviewed   Echo: Reviewed  Cath: Reviewed  ======================================================  PAST MEDICAL & SURGICAL HISTORY:  HTN (hypertension)    HLD (hyperlipidemia)    DM (diabetes mellitus)    Hyperkalemia    CKD (chronic kidney disease)    Atherosclerosis    S/P hysterectomy      ====================ASSESSMENT ==============  Cardiogenic Shock  Hx of CAD  Hx of HTN  DMT2  Anemia  CKD  Leukocytosis       Plan:  ====================== NEUROLOGY=====================  A&Ox3, nonfocal   - Continue to monitor neuro status per protocol      ==================== RESPIRATORY======================  - Satting well on room air  - No active issues    ====================CARDIOVASCULAR==================  Cardiogenic shock  - Atorvastatin 10 mg q24  -c/w milrinone 0.125, trending VBG sat/lactate/CMP  -c/w levophed gtt, titrate with goal MAP >60-65  - doxasozin 2 mg q 24  - holding BB due to hypotension   - holding omesartan given unclear etiology of creatinine levels (DANE vs CKD)  - F/U echo  - Possible ischemic w/u      Hx of CAD  - C/w Lipitor     aMIOdarone Infusion 0.5 mG/Min (16.7 mL/Hr) IV Continuous <Continuous>  doxazosin 2 milliGRAM(s) Oral at bedtime  metoprolol tartrate 50 milliGRAM(s) Oral two times a day  milrinone Infusion 0.125 MICROgram(s)/kG/Min (2.35 mL/Hr) IV Continuous <Continuous>  norepinephrine Infusion 0.05 MICROgram(s)/kG/Min (5.87 mL/Hr) IV Continuous <Continuous>    ===================HEMATOLOGIC/ONC ===================  Hemorrhage   -R arm hematoma  -Planned for CTA, risk vs benefit if active bleeding noted with need OR. Vascular is following   -s/p 2U PRBC, trending H/H  -Neuro/pulse checks to right arm q1h as per vascular   -holding heparin gtt due to active bleeding     heparin   Injectable 5000 Unit(s) SubCutaneous every 8 hours    ===================== RENAL =========================  Creatinine elevated at 2.71 Unclear if etiology DANE or CKD  - Will f/u w/ PCP to establish baseline  - f/u urine lytes, U/S      ==================== GASTROINTESTINAL===================  -Diet: DASH  -No active issues      =======================    ENDOCRINE  =====================  History of Type 2 Diabetes Mellitus, requiring glucose control with insulin gtt, titrate as per insulin drip protocol along with hourly glucose checks.     dextrose 5%. 1000 milliLiter(s) (50 mL/Hr) IV Continuous <Continuous>  dextrose 5%. 1000 milliLiter(s) (100 mL/Hr) IV Continuous <Continuous>  dextrose 40% Gel 15 Gram(s) Oral once  dextrose 50% Injectable 25 Gram(s) IV Push once  dextrose 50% Injectable 12.5 Gram(s) IV Push once  dextrose 50% Injectable 25 Gram(s) IV Push once  glucagon  Injectable 1 milliGRAM(s) IntraMuscular once    ========================INFECTIOUS DISEASE================  Afebrile, but elevated WBC at 12.88  Monitor WBC levels      Patient requires continuous monitoring with bedside rhythm monitoring, pulse ox monitoring, and intermittent blood gas analysis. Care plan discussed with ICU care team. Patient remained critical and at risk for life threatening decompensation.  Patient seen, examined and plan discussed with CCU team during rounds.     I have personally provided 35 minutes of critical care time excluding time spent on separate procedures.    By signing my name below, I, Gladys Martins, attest that this documentation has been prepared under the direction and in the presence of Jazmin Goss NP  Electronically signed: Nasrin Hendrickson, 07-25-21 @ 19:37    I, Jazmin Goss NP, personally performed the services described in this documentation. all medical record entries made by the scribe were at my direction and in my presence. I have reviewed the chart and agree that the record reflects my personal performance and is accurate and complete  Electronically signed: Jazmin Goss NP

## 2021-07-25 NOTE — PROGRESS NOTE ADULT - ASSESSMENT
Patient is a 85F w/PMHX HTN, HLD, CAD, CKD, and pre-diabetes on metformin who initially presented to [facility] with 3d nausea, vomiting, and abd pain, found to be in acute systolic heart failure and atrial flutter w/RVR, as well as MR, DANE, lactic acidosis, hyperkalemia, and UTI.  Subsequently transferred to [facility] for ICU evaluation.  Atrial flutter with RVR treated with amiodarone and lopressor; CHF treated with renally maximized-dosed milrinone in the setting of DANE.  TTE  (7/24) with showing severely reduced left ventricular end systolic dysfunction with LVEF of 25%, increased left ventricular wall thickness, bi-atrial enlargement, and moderate tricuspid and mitral regurgitation. Patient awaiting transfer to [facility] for synchronized cardioversion and possible cardiac catheterization / ischemic workup.    #Neurologic  - ANOx3 w/o neurologic issues at present; Patient mentating well without analgesic requirements.    #Cardiovascular  - S/p cardiogenic shock.   - S/p TTE (7/24) showing severely reduced left ventricular end systolic dysfunction with LVEF of 25%.  - Milrinone 0.125 ug/kg/min  - Amiodarone   - Metoprolol   - Digoxin    #Respiratory  #RFUE  -  #Gastrointestinal/Diet  - DASH/TLC Diet.    #Endocrine  - Corrective ISS.     #Heme/AC  - SQH.  - [id number].     #Disposition  - Transfer to [facility] for synchronized cardioversion and cardiac catheterization with ischemic workup.

## 2021-07-25 NOTE — CONSULT NOTE ADULT - ASSESSMENT
85y year old Female w RUE swelling after brachial a line removal on hep gtt (now held)    - Recommend stat RUE CTA to r/o active bleed  - Adequately hydrate for CTA  - Neurovascular checks to R arm  - Plan discussed with vascualr surgery fellow, Dr. Lee    Vascular Surgery  [id number]

## 2021-07-25 NOTE — DISCHARGE NOTE PROVIDER - NSDCMRMEDTOKEN_GEN_ALL_CORE_FT
atorvastatin 10 mg oral tablet: 1 tab(s) orally once a day  doxazosin 2 mg oral tablet: 1 tab(s) orally once a day  Lopressor 100 mg oral tablet: 0.5 tab(s) orally 2 times a day  metFORMIN 500 mg oral tablet: 1 tab(s) orally 2 times a day  olmesartan 20 mg oral tablet: 1 tab(s) orally once a day

## 2021-07-25 NOTE — CHART NOTE - NSCHARTNOTEFT_GEN_A_CORE
:  Alfa Villegas MD    INDICATION:    Respiratory Failure (J96.00)  Shock (R57.9)    PROCEDURE:  [x] LIMITED ECHO  [x] LIMITED CHEST  [ ] LIMITED RETROPERITONEAL  [ ] LIMITED ABDOMINAL  [ ] LIMITED DVT  [ ] NEEDLE GUIDANCE VASCULAR  [ ] NEEDLE GUIDANCE THORACENTESIS  [ ] NEEDLE GUIDANCE PARACENTESIS  [ ] NEEDLE GUIDANCE PERICARDIOCENTESIS  [ ] OTHER    FINDINGS:  Scattered B lines anteriorly bilaterally with small right pleural effusion  Severe LV systolic dysfunction  At least moderate mitral regurgitation  LVOT VTI 8.5 cm  LV>RV  No significant pericardial effusion  IVC dilated >2cm without inspiratory variation    INTERPRETATION:  Mild cardiogenic pulmonary edema  Severe LV systolic dysfunction with low cardiac output  At least moderate MR  Normal RV size and systolic function  Dilated IVC consistent with volume overloaded state    Images stored in ICU Ultrasound

## 2021-07-25 NOTE — H&P ADULT - HISTORY OF PRESENT ILLNESS
HPI:   HPI: 85 F w/ PMH HFpEF, HTN, HLD, CAD, C2D, DM transferred to [facility] from [facility] for further management. On 7/21, patient said" she ate a bad turkey sandwich" and woke up 7/22 with bad abdominal pain, N/V, and dizziness. She sa   HPI: 85 F w/ PMH HFpEF, HTN, HLD, CAD, C2D, DM transferred to [facility] from OSH for further management. On 7/21, patient said" she ate a bad turkey sandwich" and woke up 7/22 with bad abdominal pain, N/V, and dizziness. She then went to [facility] ED where she was found to be tachycardic w/ soft BP, elevated lactate at 5.9. She was given 2 L IVF and sent for CT a/P but be came acutely SOB while laying flat in CT requiring BiPAP. Patient denies being SOB of that time or ever. She said she just had some anxiety.    In [facility] ED today, patient vitals were 110/70,  sinus rhythm, and has extremely unremarkable ROS with no current abdominal pain, N/V, or dizziness She is saturating on room air in high 90s

## 2021-07-25 NOTE — PROGRESS NOTE ADULT - ASSESSMENT
86 y/o female with pmhx of HTN, HLD, CAD, CKD now with:    1. acute systolic heart failure  2. cardiogenic shock  3. DANE  4. lactic acidosis  5. UTI  5. afib RVR    -rates have remained persistently > 130 with short stints of rate control. bp initially stable earlier in night and was given additional dose of lopressor. hours later became hypotensive. converted PO amio to IV amio loading dose and rates have improved from 150 to 130 thus far. titrating phenylephrine for goal MAP 65-70. based on renal function, milrinone is at max dose of 0.125 mcg. f/u CrCl and adjust dose as necessary.    - lactate improving  - urine output improving. strict I&O. no urgent need for HD  - TTE pending official read. trend cardiac enzymes. if stabilized, will likely need a cath eventually.  - anticoagulation being held for now per cardio, consider starting in am.  - on rocephrin for UTI. cultures pending.

## 2021-07-25 NOTE — PROVIDER CONTACT NOTE (CHANGE IN STATUS NOTIFICATION) - SITUATION
Pt transfer from OSH s/p removal of right axillary a-line at OSH. Upon arrival to [facility] CICU, pt with no complaints of pain but RN noted to have swelling at right axillary site. ARASELI Goss and MD Alarcon at bedside and aware at this time.

## 2021-07-25 NOTE — CONSULT NOTE ADULT - SUBJECTIVE AND OBJECTIVE BOX
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 On , patient said" she ate a bad turkey sandwich" and woke up  with bad abdominal pain, N/V, and dizziness. She then went to OSH ED where she was found to be tachycardic, hypotension, elevated lactate at 5.9 and transferred to [facility] for concern of cardiogenic shock. At OSH, axillary arterial line was placed in the ICU. Upon arrival in the CCU, noted to be hypotensive and Hgb drop from 13 to 8 in addition to R proximal arm swelling/TTP.     PMHx:   HTN (hypertension)    HLD (hyperlipidemia)    DM (diabetes mellitus)    Hyperkalemia    CKD (chronic kidney disease)    Atherosclerosis        PSHx:   S/P hysterectomy        Allergies:  No Known Allergies      Home Meds:    Current Medications:   aMIOdarone Infusion 0.5 mG/Min IV Continuous <Continuous>  atorvastatin 10 milliGRAM(s) Oral at bedtime  chlorhexidine 4% Liquid 1 Application(s) Topical <User Schedule>  dextrose 40% Gel 15 Gram(s) Oral once  dextrose 5%. 1000 milliLiter(s) IV Continuous <Continuous>  dextrose 5%. 1000 milliLiter(s) IV Continuous <Continuous>  dextrose 50% Injectable 25 Gram(s) IV Push once  dextrose 50% Injectable 12.5 Gram(s) IV Push once  dextrose 50% Injectable 25 Gram(s) IV Push once  doxazosin 2 milliGRAM(s) Oral at bedtime  glucagon  Injectable 1 milliGRAM(s) IntraMuscular once  milrinone Infusion 0.125 MICROgram(s)/kG/Min IV Continuous <Continuous>  norepinephrine Infusion 0.25 MICROgram(s)/kG/Min IV Continuous <Continuous>      FAMILY HISTORY:  FH: MI (myocardial infarction) (Father, Mother)        Social History: Personally reviewed   No tobacco, EtOH or IVDU     REVIEW OF SYSTEMS:  Constitutional:     [x ] negative [ ] fevers [ ] chills [ ] weight loss [ ] weight gain  HEENT:                  [x ] negative [ ] dry eyes [ ] eye irritation [ ] postnasal drip [ ] nasal congestion  CV:                         [ x] negative  [ ] chest pain [ ] orthopnea [ ] palpitations [ ] murmur  Resp:                     [x ] negative [ ] cough [ ] shortness of breath [ ] dyspnea [ ] wheezing [ ] sputum [ ]hemoptysis  GI:                          [ x] negative [ ] nausea [ ] vomiting [ ] diarrhea [ ] constipation [ ] abd pain [ ] dysphagia   :                        [ x] negative [ ] dysuria [ ] nocturia [ ] hematuria [ ] increased urinary frequency  Musculoskeletal: [x ] negative [ ] back pain [ ] myalgias [ ] arthralgias [ ] fracture  Skin:                       [ x] negative [ ] rash [ ] itch  Neurological:        [ x] negative [ ] headache [ ] dizziness [ ] syncope [ ] weakness [ ] numbness  Psychiatric:           [ x] negative [ ] anxiety [ ] depression  Endocrine:            [ x] negative [ ] diabetes [ ] thyroid problem  Heme/Lymph:      [ x] negative [ ] anemia [ ] bleeding problem  Allergic/Immune: [ x] negative [ ] itchy eyes [ ] nasal discharge [ ] hives [ ] angioedema    [ x] All other systems negative  [ ] Unable to assess ROS due to      Physical Exam:  T(F): 97.7 (), Max: 98.1 ()  HR: 122 () (82 - 122)  BP: 122/106 () (113/94 - 185/83)  RR: 44 ()  SpO2: 98% ()    NAD on NC   +RIJ TLC   Bibasilar crackles   Tachycardiac, regular rhythm, no MRG   Soft, NTND   Warm ext, 1+ edema, R proximal arm ecchymosis and swelling, mild TTP, intact sensory and motor function       Cardiovascular Diagnostic Testing:  TTE 2021   Conclusions:  1. Calcified aortic valve with  grosslymoderately  decreased opening. Peak transaortic valve gradient equals  15 mm Hg, mean transaortic valve gradient equals 9 mm Hg.  2. Moderately dilated left atrium.  LA volume index = 46  cc/m2.  3. Endocardial visualization enhanced with intravenous  injection of Ultrasonic Enhancing Agent (Definity). Severe  global left ventricular systolic dysfunction.  4. Normal right ventricular size with decreased right  ventricular systolic function.  5. Bilateral pleural effusions.      Imaging:      Labs: Personally reviewed                        7.7    1288 )-----------( 178      ( 2021 18:48 )             23.4         135  |  101  |  39<H>  ----------------------------<  87  4.3   |  21<L>  |  2.61<H>    Ca    8.1<L>      2021 15:54  Phos  3.9       Mg     1.8         TPro  5.2<L>  /  Alb  2.8<L>  /  TBili  0.3  /  DBili  x   /  AST  35  /  ALT  39  /  AlkPhos  49      PT/INR - ( 2021 15:54 )   PT: 21.9 sec;   INR: 1.88 ratio         PTT - ( 2021 15:54 )  PTT:>200.0 sec  Serum Pro-Brain Natriuretic Peptide: 77958 pg/mL ( @ 15:54)  Serum Pro-Brain Natriuretic Peptide: 19386 pg/mL ( @ 21:26)    Total Cholesterol: 68  LDL: --  HDL: 32  T

## 2021-07-25 NOTE — H&P ADULT - NSHPSOCIALHISTORY_GEN_ALL_CORE
Marital Status:  Living Situation:  Occupation:  Tobacco Use:  Alcohol Use:  Drug Use:  Sexual History: Tobacco Use: denies  Alcohol Use: 1x beer per day  Drug Use: denies

## 2021-07-25 NOTE — H&P ADULT - NSHPREVIEWOFSYSTEMS_GEN_ALL_CORE
REVIEW OF SYSTEMS:  CONSTITUTIONAL: No fever, chills, night sweats, or fatigue  EYES: No eye pain, visual disturbances, or discharge  ENMT:  No difficulty hearing, tinnitus, vertigo; No sinus or throat pain  NECK: No pain or stiffness  RESPIRATORY: No cough, wheezing, or hemoptysis; No shortness of breath  CARDIOVASCULAR: No chest pain, palpitations, dizziness, or leg swelling  GASTROINTESTINAL: No abdominal or epigastric pain. No nausea, vomiting, or hematemesis; No diarrhea or constipation. No melena or hematochezia.  GENITOURINARY: No dysuria, frequency, hematuria, or incontinence  NEUROLOGICAL: No headaches, memory loss, loss of strength, numbness, or tremors  SKIN: No itching, burning, rashes, or lesions   LYMPH NODES: No enlarged glands  ENDOCRINE: No heat or cold intolerance; No hair loss  MUSCULOSKELETAL: No joint pain or swelling; No muscle, back, or extremity pain  PSYCHIATRIC: No depression, anxiety, mood swings, or difficulty sleeping  HEME/LYMPH: No easy bruising, or bleeding gums  ALLERGY AND IMMUNOLOGIC: No hives or eczema

## 2021-07-25 NOTE — DISCHARGE NOTE PROVIDER - NSDCCPCAREPLAN_GEN_ALL_CORE_FT
PRINCIPAL DISCHARGE DIAGNOSIS  Diagnosis: CHF (congestive heart failure)  Assessment and Plan of Treatment:       SECONDARY DISCHARGE DIAGNOSES  Diagnosis: Atrial flutter  Assessment and Plan of Treatment:     Diagnosis: Abdominal pain  Assessment and Plan of Treatment:     Diagnosis: Lactic acid acidosis  Assessment and Plan of Treatment:     Diagnosis: CHF (congestive heart failure)  Assessment and Plan of Treatment:

## 2021-07-25 NOTE — PROGRESS NOTE ADULT - SUBJECTIVE AND OBJECTIVE BOX
[facility], Division of Infectious Diseases  TRAVIS Quintero Y. Patel, S. Shah  [phone]    Name: PAYAM PAPPAS  Age: 85y  Gender: Female  MRN: [id number]    Interval History:  Patient seen and examined at bedside this morning in the ICU  No acute overnight events. Eating breakfast  No complaints  Notes reviewed    Antibiotics:  cefTRIAXone   IVPB      cefTRIAXone   IVPB 1000 milliGRAM(s) IV Intermittent every 24 hours      Medications:  aMIOdarone Infusion 1 mG/Min IV Continuous <Continuous>  aMIOdarone Infusion 0.5 mG/Min IV Continuous <Continuous>  aspirin  chewable 81 milliGRAM(s) Oral daily  atorvastatin 10 milliGRAM(s) Oral at bedtime  cefTRIAXone   IVPB      cefTRIAXone   IVPB 1000 milliGRAM(s) IV Intermittent every 24 hours  chlorhexidine 2% Cloths 1 Application(s) Topical <User Schedule>  dextrose 40% Gel 15 Gram(s) Oral once  dextrose 5%. 1000 milliLiter(s) IV Continuous <Continuous>  dextrose 5%. 1000 milliLiter(s) IV Continuous <Continuous>  dextrose 50% Injectable 25 Gram(s) IV Push once  dextrose 50% Injectable 12.5 Gram(s) IV Push once  dextrose 50% Injectable 25 Gram(s) IV Push once  glucagon  Injectable 1 milliGRAM(s) IntraMuscular once  heparin   Injectable 4500 Unit(s) IV Push every 6 hours PRN  heparin   Injectable 2000 Unit(s) IV Push every 6 hours PRN  heparin  Infusion.  Unit(s)/Hr IV Continuous <Continuous>  insulin lispro (ADMELOG) corrective regimen sliding scale   SubCutaneous Before meals and at bedtime  milrinone Infusion 0.125 MICROgram(s)/kG/Min IV Continuous <Continuous>  phenylephrine    Infusion 0.5 MICROgram(s)/kG/Min IV Continuous <Continuous>  potassium chloride    Tablet ER 40 milliEquivalent(s) Oral daily      Review of Systems:  A 10-point review of systems was obtained.   Review of systems otherwise negative except as previously noted.    Allergies: No Known Allergies    For details regarding the patient's past medical history, social history, family history, and other miscellaneous elements, please refer the initial infectious diseases consultation and/or the admitting history and physical examination for this admission.    Objective:  Vitals:   T(C): 36.5 (21 @ 04:17), Max: 36.5 (21 @ 00:03)  HR: 127 (21 @ 07:48) (72 - 149)  BP: 67/46 (21 @ 02:00) (67/46 - 166/93)  RR: 25 (21 @ 07:00) (18 - 33)  SpO2: 100% (21 @ 07:00) (92% - 100%)    Physical Examination:  General: no acute distress  HEENT: NC/AT, EOMI, anicteric, no oral lesions  Neck: supple, no palpable LAD  Cardio: S1, S2 heard, RRR, no murmurs  Resp: decreased b/l breath sounds  Abd: soft, NT, ND, + bowel sounds  Ext: no edema or cyanosis  Skin: warm, dry, no visible rash      Laboratory Studies:  CBC:                       10.5   9.57  )-----------( 233      ( 2021 06:02 )             32.1     CMP:     139  |  103  |  41<H>  ----------------------------<  93  3.4<L>   |  24  |  2.40<H>    Ca    7.9<L>      2021 06:02  Phos  4.0       Mg     2.1         TPro  5.7<L>  /  Alb  2.7<L>  /  TBili  0.3  /  DBili  x   /  AST  31  /  ALT  42  /  AlkPhos  51  07-25    LIVER FUNCTIONS - ( 2021 06:02 )  Alb: 2.7 g/dL / Pro: 5.7 g/dL / ALK PHOS: 51 U/L / ALT: 42 U/L / AST: 31 U/L / GGT: x           Urinalysis Basic - ( 2021 05:01 )    Color: Yellow / Appearance: Slightly Turbid / S.025 / pH: x  Gluc: x / Ketone: Trace  / Bili: Negative / Urobili: 4   Blood: x / Protein: 100 / Nitrite: Negative   Leuk Esterase: Small / RBC: 25-50 /HPF / WBC 11-25   Sq Epi: x / Non Sq Epi: Moderate / Bacteria: Moderate        Microbiology: reviewed      Radiology: reviewed

## 2021-07-25 NOTE — PROCEDURE NOTE - ADDITIONAL PROCEDURE DETAILS
under ultrasound guidance  procedure performed independent of documented critical care time
under ultrasound guidance, appropriate anatomy identified. needle tip visualized entering appropriate vessel, wire visualized in appropriate vasculature. images obtained.    procedure performed independent of documented critical care time    diagnosis: cardiogenic shock. unreliable NIV on inotropes/pressors

## 2021-07-25 NOTE — DISCHARGE NOTE PROVIDER - HOSPITAL COURSE
85F PMH HTN, HLD, CAD, CKD, and prediabetes on metformin who presents with nausea, vomiting, and abdominal pain for 3 days, found to have atrial flutter with rapid ventricular response with acute systolic heart failure, mitral regurgitation, acute kidney injury, lactic acidosis, hyperkalemia, UTI and was transferred to [place] for further eval .  Pt is an 86 y/o female with PMHx of HFpEF, HTN, HLD, CAD, CKD, DM on metformin presented to [facility] w/ several days of abd pain, n/v/d after eating "bad turkey sandwich". Per pt, she denies being SOB @ that time. States she just "freaked out" and got "worked up" because she almost fell off table. BP improved, SOB improved, sent over to hospitals on BIPAP for ICU evaluation as without available space in [facility].     BRIEF HOSPITAL COURSE: 86 y/o female with pmhx of HTN, HLD, CAD, CKD who presented with nausea/vomiting, abdominal pain x 3 days foudn to be in aflutter with RVR and acute CHF with newly found severe systolic dysfunction (TTE pending official read) resulting in DANE, lactic acidosis.     Events last 24 hours: hyperkalemia improved. started on milrinone with improvement in urine output. hypotensive, bolused 500 cc and restarted phenylephrine. abdominal pain resolved.      Transfer to [facility] on 7/25 for treatement of atrial flutter/RVR w/ synchronizxed cardioversion and possible History and Physical   Patient is a 84 y/o Female w/PMHX HTN, HLD, CAD, CKD, and pre-diabetes on metformin who initially presented to [facility] with 3d nausea, vomiting, and abd pain after "[eating] a bad turkey sandwich", found to have found to be in acute systolic heart failure and atrial flutter w/RVR, as well as MR, DANE, lactic acidosis, hyperkalemia, and UTI.  Patient states there was no shortness of beath at the time her initial admission, but states she "freaked out" and got "worked up" because she almost fell off table. Patients BP and SOB improved, and she was subsequently transferred to [facility] on BiPAP for ICU evaluation.     Hospital Course:   7/24/21  Patient admitted to [facility] with HTN, HLD, CAD, DANE vs.CKD, pre-diabetes on metformin, and hyperkalemia. Atrial flutter with RVR treated with amiodarone. CHF treated with renally dosed milrinone in the setting of DANE. Hyperkalemia (5.7) treated with sodium bicarbonate and lokelma with restoration of normokalemia. At [place], patient underwent TTE  (7/24) with showing severely reduced left ventricular end systolic dysfunction with LVEF of 25%, increased left ventricular wall thickness, bi-atrial enlargement, and moderate tricuspid and mitral regurgitation. Started on milrinone with improvement in urine output. hypotensive; Bolused 500 cc and restarted phenylephrine. abdominal pain resolved.     7/25/21  Dc'ed malfunctioning A-line, renally dose milrinone , Metoprolol 25q6h, back on Dane, Amio 150 mg IV push x1, 0.5 mg IV Digoxin (x1), and 40 Lasix IVP.       Transfer to [facility] on 7/25 for treatement of atrial flutter/RVR w/ synchronizxed cardioversion and possible

## 2021-07-26 LAB
A1C WITH ESTIMATED AVERAGE GLUCOSE RESULT: 5.6 % — SIGNIFICANT CHANGE UP (ref 4–5.6)
ALBUMIN SERPL ELPH-MCNC: 2.5 G/DL — LOW (ref 3.3–5)
ALBUMIN SERPL ELPH-MCNC: 2.6 G/DL — LOW (ref 3.3–5)
ALP SERPL-CCNC: 43 U/L — SIGNIFICANT CHANGE UP (ref 40–120)
ALP SERPL-CCNC: 43 U/L — SIGNIFICANT CHANGE UP (ref 40–120)
ALT FLD-CCNC: 23 U/L — SIGNIFICANT CHANGE UP (ref 10–45)
ALT FLD-CCNC: 34 U/L — SIGNIFICANT CHANGE UP (ref 10–45)
ANION GAP SERPL CALC-SCNC: 11 MMOL/L — SIGNIFICANT CHANGE UP (ref 5–17)
ANION GAP SERPL CALC-SCNC: 13 MMOL/L — SIGNIFICANT CHANGE UP (ref 5–17)
AST SERPL-CCNC: 15 U/L — SIGNIFICANT CHANGE UP (ref 10–40)
AST SERPL-CCNC: 26 U/L — SIGNIFICANT CHANGE UP (ref 10–40)
BASE EXCESS BLDV CALC-SCNC: -1.9 MMOL/L — SIGNIFICANT CHANGE UP (ref -2–2)
BASE EXCESS BLDV CALC-SCNC: -2.3 MMOL/L — LOW (ref -2–2)
BASE EXCESS BLDV CALC-SCNC: 0.3 MMOL/L — SIGNIFICANT CHANGE UP (ref -2–2)
BILIRUB SERPL-MCNC: 0.4 MG/DL — SIGNIFICANT CHANGE UP (ref 0.2–1.2)
BILIRUB SERPL-MCNC: 0.8 MG/DL — SIGNIFICANT CHANGE UP (ref 0.2–1.2)
BUN SERPL-MCNC: 35 MG/DL — HIGH (ref 7–23)
BUN SERPL-MCNC: 38 MG/DL — HIGH (ref 7–23)
CA-I SERPL-SCNC: 1.12 MMOL/L — SIGNIFICANT CHANGE UP (ref 1.12–1.3)
CA-I SERPL-SCNC: 1.12 MMOL/L — SIGNIFICANT CHANGE UP (ref 1.12–1.3)
CA-I SERPL-SCNC: 1.16 MMOL/L — SIGNIFICANT CHANGE UP (ref 1.12–1.3)
CALCIUM SERPL-MCNC: 7.7 MG/DL — LOW (ref 8.4–10.5)
CALCIUM SERPL-MCNC: 8.3 MG/DL — LOW (ref 8.4–10.5)
CHLORIDE BLDV-SCNC: 102 MMOL/L — SIGNIFICANT CHANGE UP (ref 96–108)
CHLORIDE BLDV-SCNC: 103 MMOL/L — SIGNIFICANT CHANGE UP (ref 96–108)
CHLORIDE BLDV-SCNC: 103 MMOL/L — SIGNIFICANT CHANGE UP (ref 96–108)
CHLORIDE SERPL-SCNC: 100 MMOL/L — SIGNIFICANT CHANGE UP (ref 96–108)
CHLORIDE SERPL-SCNC: 104 MMOL/L — SIGNIFICANT CHANGE UP (ref 96–108)
CO2 BLDV-SCNC: 24 MMOL/L — SIGNIFICANT CHANGE UP (ref 22–30)
CO2 BLDV-SCNC: 24 MMOL/L — SIGNIFICANT CHANGE UP (ref 22–30)
CO2 BLDV-SCNC: 26 MMOL/L — SIGNIFICANT CHANGE UP (ref 22–30)
CO2 SERPL-SCNC: 18 MMOL/L — LOW (ref 22–31)
CO2 SERPL-SCNC: 22 MMOL/L — SIGNIFICANT CHANGE UP (ref 22–31)
CREAT ?TM UR-MCNC: 38 MG/DL — SIGNIFICANT CHANGE UP
CREAT ?TM UR-MCNC: 38 MG/DL — SIGNIFICANT CHANGE UP
CREAT SERPL-MCNC: 2.57 MG/DL — HIGH (ref 0.5–1.3)
CREAT SERPL-MCNC: 2.63 MG/DL — HIGH (ref 0.5–1.3)
ESTIMATED AVERAGE GLUCOSE: 114 MG/DL — SIGNIFICANT CHANGE UP (ref 68–114)
GAS PNL BLDV: 129 MMOL/L — LOW (ref 135–145)
GAS PNL BLDV: 133 MMOL/L — LOW (ref 135–145)
GAS PNL BLDV: 135 MMOL/L — SIGNIFICANT CHANGE UP (ref 135–145)
GAS PNL BLDV: SIGNIFICANT CHANGE UP
GLUCOSE BLDC GLUCOMTR-MCNC: 121 MG/DL — HIGH (ref 70–99)
GLUCOSE BLDC GLUCOMTR-MCNC: 124 MG/DL — HIGH (ref 70–99)
GLUCOSE BLDC GLUCOMTR-MCNC: 143 MG/DL — HIGH (ref 70–99)
GLUCOSE BLDV-MCNC: 123 MG/DL — HIGH (ref 70–99)
GLUCOSE BLDV-MCNC: 142 MG/DL — HIGH (ref 70–99)
GLUCOSE BLDV-MCNC: 161 MG/DL — HIGH (ref 70–99)
GLUCOSE SERPL-MCNC: 124 MG/DL — HIGH (ref 70–99)
GLUCOSE SERPL-MCNC: 164 MG/DL — HIGH (ref 70–99)
HCO3 BLDV-SCNC: 23 MMOL/L — SIGNIFICANT CHANGE UP (ref 21–29)
HCO3 BLDV-SCNC: 23 MMOL/L — SIGNIFICANT CHANGE UP (ref 21–29)
HCO3 BLDV-SCNC: 25 MMOL/L — SIGNIFICANT CHANGE UP (ref 21–29)
HCT VFR BLD CALC: 23.6 % — LOW (ref 34.5–45)
HCT VFR BLD CALC: 24.4 % — LOW (ref 34.5–45)
HCT VFR BLD CALC: 29.9 % — LOW (ref 34.5–45)
HCT VFR BLD CALC: 33.9 % — LOW (ref 34.5–45)
HCT VFR BLDA CALC: 27 % — LOW (ref 39–50)
HCT VFR BLDA CALC: 32 % — LOW (ref 39–50)
HCT VFR BLDA CALC: 35 % — LOW (ref 39–50)
HGB BLD CALC-MCNC: 10.4 G/DL — LOW (ref 11.5–15.5)
HGB BLD CALC-MCNC: 11.4 G/DL — LOW (ref 11.5–15.5)
HGB BLD CALC-MCNC: 8.6 G/DL — LOW (ref 11.5–15.5)
HGB BLD-MCNC: 10.2 G/DL — LOW (ref 11.5–15.5)
HGB BLD-MCNC: 11.3 G/DL — LOW (ref 11.5–15.5)
HGB BLD-MCNC: 7.9 G/DL — LOW (ref 11.5–15.5)
HGB BLD-MCNC: 8.3 G/DL — LOW (ref 11.5–15.5)
HOROWITZ INDEX BLDV+IHG-RTO: 21 — SIGNIFICANT CHANGE UP
HOROWITZ INDEX BLDV+IHG-RTO: 28 — SIGNIFICANT CHANGE UP
LACTATE BLDV-MCNC: 1.4 MMOL/L — SIGNIFICANT CHANGE UP (ref 0.7–2)
LACTATE BLDV-MCNC: 1.6 MMOL/L — SIGNIFICANT CHANGE UP (ref 0.7–2)
LACTATE BLDV-MCNC: 2.6 MMOL/L — HIGH (ref 0.7–2)
MAGNESIUM SERPL-MCNC: 1.8 MG/DL — SIGNIFICANT CHANGE UP (ref 1.6–2.6)
MAGNESIUM SERPL-MCNC: 2.1 MG/DL — SIGNIFICANT CHANGE UP (ref 1.6–2.6)
MCHC RBC-ENTMCNC: 29.3 PG — SIGNIFICANT CHANGE UP (ref 27–34)
MCHC RBC-ENTMCNC: 29.3 PG — SIGNIFICANT CHANGE UP (ref 27–34)
MCHC RBC-ENTMCNC: 29.5 PG — SIGNIFICANT CHANGE UP (ref 27–34)
MCHC RBC-ENTMCNC: 29.7 PG — SIGNIFICANT CHANGE UP (ref 27–34)
MCHC RBC-ENTMCNC: 33.3 GM/DL — SIGNIFICANT CHANGE UP (ref 32–36)
MCHC RBC-ENTMCNC: 33.5 GM/DL — SIGNIFICANT CHANGE UP (ref 32–36)
MCHC RBC-ENTMCNC: 34 GM/DL — SIGNIFICANT CHANGE UP (ref 32–36)
MCHC RBC-ENTMCNC: 34.1 GM/DL — SIGNIFICANT CHANGE UP (ref 32–36)
MCV RBC AUTO: 86.2 FL — SIGNIFICANT CHANGE UP (ref 80–100)
MCV RBC AUTO: 86.9 FL — SIGNIFICANT CHANGE UP (ref 80–100)
MCV RBC AUTO: 87.4 FL — SIGNIFICANT CHANGE UP (ref 80–100)
MCV RBC AUTO: 88.5 FL — SIGNIFICANT CHANGE UP (ref 80–100)
NRBC # BLD: 0 /100 WBCS — SIGNIFICANT CHANGE UP (ref 0–0)
OSMOLALITY SERPL: 288 MOSMOL/KG — SIGNIFICANT CHANGE UP (ref 280–301)
OSMOLALITY UR: 296 MOS/KG — LOW (ref 300–900)
OTHER CELLS CSF MANUAL: 11 ML/DL — LOW (ref 18–22)
OTHER CELLS CSF MANUAL: 9 ML/DL — LOW (ref 18–22)
OTHER CELLS CSF MANUAL: 9 ML/DL — LOW (ref 18–22)
PCO2 BLDV: 42 MMHG — SIGNIFICANT CHANGE UP (ref 35–50)
PCO2 BLDV: 43 MMHG — SIGNIFICANT CHANGE UP (ref 35–50)
PCO2 BLDV: 43 MMHG — SIGNIFICANT CHANGE UP (ref 35–50)
PH BLDV: 7.35 — SIGNIFICANT CHANGE UP (ref 7.35–7.45)
PH BLDV: 7.35 — SIGNIFICANT CHANGE UP (ref 7.35–7.45)
PH BLDV: 7.39 — SIGNIFICANT CHANGE UP (ref 7.35–7.45)
PHOSPHATE SERPL-MCNC: 3.7 MG/DL — SIGNIFICANT CHANGE UP (ref 2.5–4.5)
PHOSPHATE SERPL-MCNC: 4.6 MG/DL — HIGH (ref 2.5–4.5)
PLATELET # BLD AUTO: 113 K/UL — LOW (ref 150–400)
PLATELET # BLD AUTO: 115 K/UL — LOW (ref 150–400)
PLATELET # BLD AUTO: 89 K/UL — LOW (ref 150–400)
PLATELET # BLD AUTO: 96 K/UL — LOW (ref 150–400)
PO2 BLDV: 37 MMHG — SIGNIFICANT CHANGE UP (ref 25–45)
PO2 BLDV: 38 MMHG — SIGNIFICANT CHANGE UP (ref 25–45)
PO2 BLDV: 42 MMHG — SIGNIFICANT CHANGE UP (ref 25–45)
POTASSIUM BLDV-SCNC: 3.9 MMOL/L — SIGNIFICANT CHANGE UP (ref 3.5–5.3)
POTASSIUM BLDV-SCNC: 3.9 MMOL/L — SIGNIFICANT CHANGE UP (ref 3.5–5.3)
POTASSIUM BLDV-SCNC: 4 MMOL/L — SIGNIFICANT CHANGE UP (ref 3.5–5.3)
POTASSIUM SERPL-MCNC: 4.2 MMOL/L — SIGNIFICANT CHANGE UP (ref 3.5–5.3)
POTASSIUM SERPL-MCNC: 4.4 MMOL/L — SIGNIFICANT CHANGE UP (ref 3.5–5.3)
POTASSIUM SERPL-SCNC: 4.2 MMOL/L — SIGNIFICANT CHANGE UP (ref 3.5–5.3)
POTASSIUM SERPL-SCNC: 4.4 MMOL/L — SIGNIFICANT CHANGE UP (ref 3.5–5.3)
PROT ?TM UR-MCNC: 9 MG/DL — SIGNIFICANT CHANGE UP (ref 0–12)
PROT SERPL-MCNC: 4.3 G/DL — LOW (ref 6–8.3)
PROT SERPL-MCNC: 4.3 G/DL — LOW (ref 6–8.3)
PROT/CREAT UR-RTO: 0.2 RATIO — SIGNIFICANT CHANGE UP (ref 0–0.2)
RBC # BLD: 2.7 M/UL — LOW (ref 3.8–5.2)
RBC # BLD: 2.83 M/UL — LOW (ref 3.8–5.2)
RBC # BLD: 3.44 M/UL — LOW (ref 3.8–5.2)
RBC # BLD: 3.83 M/UL — SIGNIFICANT CHANGE UP (ref 3.8–5.2)
RBC # FLD: 15.6 % — HIGH (ref 10.3–14.5)
RBC # FLD: 15.7 % — HIGH (ref 10.3–14.5)
RBC # FLD: 15.7 % — HIGH (ref 10.3–14.5)
RBC # FLD: 15.9 % — HIGH (ref 10.3–14.5)
SAO2 % BLDV: 66 % — LOW (ref 67–88)
SAO2 % BLDV: 68 % — SIGNIFICANT CHANGE UP (ref 67–88)
SAO2 % BLDV: 73 % — SIGNIFICANT CHANGE UP (ref 67–88)
SODIUM SERPL-SCNC: 131 MMOL/L — LOW (ref 135–145)
SODIUM SERPL-SCNC: 137 MMOL/L — SIGNIFICANT CHANGE UP (ref 135–145)
SODIUM UR-SCNC: 70 MMOL/L — SIGNIFICANT CHANGE UP
UUN UR-MCNC: 277 MG/DL — SIGNIFICANT CHANGE UP
WBC # BLD: 13.27 K/UL — HIGH (ref 3.8–10.5)
WBC # BLD: 16.19 K/UL — HIGH (ref 3.8–10.5)
WBC # BLD: 9.45 K/UL — SIGNIFICANT CHANGE UP (ref 3.8–10.5)
WBC # BLD: 9.52 K/UL — SIGNIFICANT CHANGE UP (ref 3.8–10.5)
WBC # FLD AUTO: 13.27 K/UL — HIGH (ref 3.8–10.5)
WBC # FLD AUTO: 16.19 K/UL — HIGH (ref 3.8–10.5)
WBC # FLD AUTO: 9.45 K/UL — SIGNIFICANT CHANGE UP (ref 3.8–10.5)
WBC # FLD AUTO: 9.52 K/UL — SIGNIFICANT CHANGE UP (ref 3.8–10.5)

## 2021-07-26 PROCEDURE — 71045 X-RAY EXAM CHEST 1 VIEW: CPT | Mod: 26

## 2021-07-26 PROCEDURE — 99291 CRITICAL CARE FIRST HOUR: CPT

## 2021-07-26 PROCEDURE — 93010 ELECTROCARDIOGRAM REPORT: CPT

## 2021-07-26 PROCEDURE — 36556 INSERT NON-TUNNEL CV CATH: CPT

## 2021-07-26 PROCEDURE — 93931 UPPER EXTREMITY STUDY: CPT | Mod: 26,RT

## 2021-07-26 PROCEDURE — 99223 1ST HOSP IP/OBS HIGH 75: CPT

## 2021-07-26 PROCEDURE — 99291 CRITICAL CARE FIRST HOUR: CPT | Mod: 25

## 2021-07-26 PROCEDURE — 99292 CRITICAL CARE ADDL 30 MIN: CPT | Mod: 25

## 2021-07-26 PROCEDURE — 76770 US EXAM ABDO BACK WALL COMP: CPT | Mod: 26

## 2021-07-26 RX ORDER — INSULIN LISPRO 100/ML
VIAL (ML) SUBCUTANEOUS
Refills: 0 | Status: DISCONTINUED | OUTPATIENT
Start: 2021-07-26 | End: 2021-08-14

## 2021-07-26 RX ORDER — DIGOXIN 250 MCG
125 TABLET ORAL EVERY OTHER DAY
Refills: 0 | Status: DISCONTINUED | OUTPATIENT
Start: 2021-07-26 | End: 2021-07-27

## 2021-07-26 RX ORDER — METFORMIN HYDROCHLORIDE 850 MG/1
1 TABLET ORAL
Qty: 0 | Refills: 0 | DISCHARGE

## 2021-07-26 RX ORDER — INSULIN LISPRO 100/ML
VIAL (ML) SUBCUTANEOUS AT BEDTIME
Refills: 0 | Status: DISCONTINUED | OUTPATIENT
Start: 2021-07-26 | End: 2021-08-14

## 2021-07-26 RX ORDER — SODIUM CHLORIDE 9 MG/ML
500 INJECTION INTRAMUSCULAR; INTRAVENOUS; SUBCUTANEOUS ONCE
Refills: 0 | Status: COMPLETED | OUTPATIENT
Start: 2021-07-26 | End: 2021-07-26

## 2021-07-26 RX ORDER — NOREPINEPHRINE BITARTRATE/D5W 8 MG/250ML
0.22 PLASTIC BAG, INJECTION (ML) INTRAVENOUS
Qty: 8 | Refills: 0 | Status: DISCONTINUED | OUTPATIENT
Start: 2021-07-26 | End: 2021-07-26

## 2021-07-26 RX ORDER — ALBUMIN HUMAN 25 %
250 VIAL (ML) INTRAVENOUS ONCE
Refills: 0 | Status: COMPLETED | OUTPATIENT
Start: 2021-07-26 | End: 2021-07-26

## 2021-07-26 RX ORDER — OLMESARTAN MEDOXOMIL 5 MG/1
1 TABLET, FILM COATED ORAL
Qty: 0 | Refills: 0 | DISCHARGE

## 2021-07-26 RX ORDER — METOPROLOL TARTRATE 50 MG
0.5 TABLET ORAL
Qty: 0 | Refills: 0 | DISCHARGE

## 2021-07-26 RX ORDER — DOXAZOSIN MESYLATE 4 MG
1 TABLET ORAL
Qty: 0 | Refills: 0 | DISCHARGE

## 2021-07-26 RX ORDER — ATORVASTATIN CALCIUM 80 MG/1
1 TABLET, FILM COATED ORAL
Qty: 0 | Refills: 0 | DISCHARGE

## 2021-07-26 RX ADMIN — Medication 125 MILLILITER(S): at 11:35

## 2021-07-26 RX ADMIN — ATORVASTATIN CALCIUM 10 MILLIGRAM(S): 80 TABLET, FILM COATED ORAL at 21:00

## 2021-07-26 RX ADMIN — Medication 1000 MILLIGRAM(S): at 00:00

## 2021-07-26 RX ADMIN — Medication 25.8 MICROGRAM(S)/KG/MIN: at 09:08

## 2021-07-26 RX ADMIN — CHLORHEXIDINE GLUCONATE 1 APPLICATION(S): 213 SOLUTION TOPICAL at 05:59

## 2021-07-26 RX ADMIN — Medication 1 DROP(S): at 17:53

## 2021-07-26 RX ADMIN — SODIUM CHLORIDE 500 MILLILITER(S): 9 INJECTION INTRAMUSCULAR; INTRAVENOUS; SUBCUTANEOUS at 06:35

## 2021-07-26 RX ADMIN — SODIUM CHLORIDE 500 MILLILITER(S): 9 INJECTION INTRAMUSCULAR; INTRAVENOUS; SUBCUTANEOUS at 09:07

## 2021-07-26 RX ADMIN — MILRINONE LACTATE 2.35 MICROGRAM(S)/KG/MIN: 1 INJECTION, SOLUTION INTRAVENOUS at 09:07

## 2021-07-26 RX ADMIN — Medication 200 GRAM(S): at 01:15

## 2021-07-26 RX ADMIN — Medication 125 MICROGRAM(S): at 21:45

## 2021-07-26 NOTE — PROGRESS NOTE ADULT - SUBJECTIVE AND OBJECTIVE BOX
PAYAM PAPPAS  MRN-[id number]  Patient is a 85y old  Female who presents with a chief complaint of Transfer for Cardiogenic Shock (26 Jul 2021 13:17)    HPI:  HPI: 85 F w/ PMH HFpEF, HTN, HLD, CAD, C2D, DM transferred to [facility] from OSH for further management. On 7/21, patient said" she ate a bad turkey sandwich" and woke up 7/22 with bad abdominal pain, N/V, and dizziness. She then went to OSH ED where she was found to be tachycardic w/ soft BP, elevated lactate at 5.9. She was given 2 L IVF and sent for CT a/P but be came acutely SOB while laying flat in CT requiring BiPAP. Patient denies being SOB of that time or ever. She said she just had some anxiety.    In [facility] ED today, patient vitals were 110/70,  sinus rhythm, and has extremely unremarkable ROS with no current abdominal pain, N/V, or dizziness She is saturating on room air in high 90s  (25 Jul 2021 14:00)      Hospital Course:  7/25. R arm increased swelling 2/2 arterial line.  Vascular consulted. 2U PRBC give     24 HOUR EVENTS:    REVIEW OF SYSTEMS:   Constitutional: No weakness, fevers, or chills  Eyes/ENT: No visual changes  Respiratory: No cough, wheezing, hemoptysis  Cardiovascular: No chest pain, no palpitations  Gastrointestinal: No abdominal pain. No nausea, vomiting, hematemesis.   Genitourinary: No dysuria  Neurological: No numbness, no weakness  Skin: No itching, rashes    ICU Vital Signs Last 24 Hrs  T(C): 37 (26 Jul 2021 19:00), Max: 37 (26 Jul 2021 19:00)  T(F): 98.6 (26 Jul 2021 19:00), Max: 98.6 (26 Jul 2021 19:00)  HR: 130 (26 Jul 2021 21:00) (104 - 138)  BP: 112/52 (26 Jul 2021 21:00) (75/50 - 188/71)  BP(mean): 76 (26 Jul 2021 21:00) (64 - 108)  ABP: 112/42 (26 Jul 2021 15:15) (60/44 - 140/54)  ABP(mean): 62 (26 Jul 2021 15:15) (50 - 104)  RR: 30 (26 Jul 2021 21:00) (7 - 39)  SpO2: 100% (26 Jul 2021 21:00) (91% - 100%)        I&O's Summary    25 Jul 2021 07:01  -  26 Jul 2021 07:00  --------------------------------------------------------  IN: 1988.1 mL / OUT: 1390 mL / NET: 598.1 mL    26 Jul 2021 07:01  -  26 Jul 2021 22:22  --------------------------------------------------------  IN: 1135.1 mL / OUT: 1030 mL / NET: 105.1 mL      CAPILLARY BLOOD GLUCOSE      POCT Blood Glucose.: 143 mg/dL (26 Jul 2021 21:42)      PHYSICAL EXAM:   General: No acute distress  Eyes: EOMI, PERRLA, conjunctiva and sclera clear  Chest/Lung: Slight rales on right lower lung field. No rhonchi, wheezing, or rubs. Unlabored respirations  Heart: Regular rate, regular rhythm. Normal S1/S2. No murmurs, rubs, or gallops.  Abdomen: Soft, nontender, nondistended. Normal bowel sounds.  Extremites: 2+ peripheral pulses B/L. No clubbing, cyanosis, or edema.  Neurology: A&O x3, no focal deficits  Skin: No rashes or lesions    ============================I/O===========================   I&O's Detail    25 Jul 2021 07:01  -  26 Jul 2021 07:00  --------------------------------------------------------  IN:    Amiodarone: 100.2 mL    IV PiggyBack: 700 mL    Milrinone: 27.6 mL    Norepinephrine: 81.8 mL    Norepinephrine: 125.8 mL    Norepinephrine: 54 mL    Oral Fluid: 360 mL    PRBCs (Packed Red Blood Cells): 38.7 mL    Sodium Chloride 0.9% Bolus: 500 mL  Total IN: 1988.1 mL    OUT:    Indwelling Catheter - Urethral (mL): 1390 mL  Total OUT: 1390 mL    Total NET: 598.1 mL      26 Jul 2021 07:01  -  26 Jul 2021 22:22  --------------------------------------------------------  IN:    IV PiggyBack: 250 mL    Milrinone: 6.9 mL    Norepinephrine: 78.2 mL    Oral Fluid: 300 mL    Sodium Chloride 0.9% Bolus: 500 mL  Total IN: 1135.1 mL    OUT:    Indwelling Catheter - Urethral (mL): 1030 mL  Total OUT: 1030 mL    Total NET: 105.1 mL        ============================ LABS =========================                        7.9    9.45  )-----------( 89       ( 26 Jul 2021 20:15 )             23.6     07-26    137  |  104  |  35<H>  ----------------------------<  124<H>  4.2   |  22  |  2.57<H>    Ca    7.7<L>      26 Jul 2021 16:08  Phos  3.7     07-26  Mg     1.8     07-26    TPro  4.3<L>  /  Alb  2.6<L>  /  TBili  0.4  /  DBili  x   /  AST  15  /  ALT  23  /  AlkPhos  43  07-26    Troponin T, High Sensitivity Result: 310 ng/L (07-25-21 @ 15:54)    CKMB Units: 4.0 ng/mL (07-25-21 @ 15:54)  CKMB Units: 3.4 ng/mL (07-24-21 @ 12:07)    Creatine Kinase, Serum: 271 U/L (07-25-21 @ 15:54)  Creatine Kinase, Serum: 153 U/L (07-24-21 @ 12:07)    CPK Mass Assay %: 1.5 % (07-25-21 @ 15:54)  CPK Mass Assay %: 2.2 % (07-24-21 @ 12:07)        LIVER FUNCTIONS - ( 26 Jul 2021 16:08 )  Alb: 2.6 g/dL / Pro: 4.3 g/dL / ALK PHOS: 43 U/L / ALT: 23 U/L / AST: 15 U/L / GGT: x           PT/INR - ( 25 Jul 2021 15:54 )   PT: 21.9 sec;   INR: 1.88 ratio         PTT - ( 25 Jul 2021 15:54 )  PTT:>200.0 sec  ABG - ( 25 Jul 2021 23:20 )  pH, Arterial: 7.42  pH, Blood: x     /  pCO2: 30    /  pO2: 132   / HCO3: 19    / Base Excess: -4.2  /  SaO2: 99                Blood Gas Venous - Lactate: 1.4 mmoL/L (07-26-21 @ 16:03)  Blood Gas Venous - Lactate: 1.6 mmoL/L (07-26-21 @ 08:47)  Blood Gas Venous - Lactate: 2.6 mmoL/L (07-26-21 @ 02:55)  Blood Gas Arterial, Lactate: 4.9 mmol/L (07-25-21 @ 23:20)  Lactate, Blood: 3.9 mmol/L (07-24-21 @ 22:29)  Lactate, Blood: 4.4 mmol/L (07-24-21 @ 12:07)  Lactate, Blood: 5.0 mmol/L (07-24-21 @ 05:25)  Lactate, Blood: 4.9 mmol/L (07-24-21 @ 01:36)      ======================Micro/Rad/Cardio=================  Telemetry: Reviewed   EKG: Reviewed  CXR: Reviewed  Culture: Reviewed   Echo: Reviewed  ======================================================  PAST MEDICAL & SURGICAL HISTORY:  HTN (hypertension)    HLD (hyperlipidemia)    DM (diabetes mellitus)    Hyperkalemia    CKD (chronic kidney disease)    Atherosclerosis    HTN (hypertension)    CAD (coronary artery disease)    Stage 4 chronic kidney disease    S/P hysterectomy      ====================ASSESSMENT ==============  AdHF / Cardiogenic shock   Aflutter vs atach   Hx of CAD  Hypertension   CKD   Anemia   DMT2     Plan:  ====================== NEUROLOGY=====================  A&Ox3, nonfocal   - Continue to monitor neuro status per protocol    ==================== RESPIRATORY======================  Stable on RA, SpO2 %  - continue pulse ox monitoring, follow ABGs     ====================CARDIOVASCULAR==================  Cardiogenic shock  - holding BB due to hypotension   - holding omesartan given unclear etiology of creatinine levels (DANE vs CKD)  - TTE 7/25: 29%, severe global LV dysfunction, decreased RV function   - Possible ischemic w/u    Aflutter vs Atach   - rate control with digoxin   - tele monitoring     Hx of CAD  - C/w Lipitor     atorvastatin 10 milliGRAM(s) Oral at bedtime  digoxin     Tablet 125 MICROGram(s) Oral every other day    ===================HEMATOLOGIC/ONC ===================  Hemorrhage   -R arm hematoma  -Planned for CTA, risk vs benefit if active bleeding noted with need OR. Vascular is following   -s/p 2U PRBC, trending H/H  -Neuro/pulse checks to right arm q1h as per vascular   -holding heparin gtt due to active bleeding     ===================== RENAL =========================  Creatinine elevated at 2.57 Unclear if etiology DANE or CKD  - Will f/u w/ PCP to establish baseline  - continue monitoring urine output, I&Os, BUN/Cr     ==================== GASTROINTESTINAL===================  No active issues   - Tolerating PO DASH/TLC diet.     =======================    ENDOCRINE  =====================  Hx of DM2   - glucose control with admelog SS     dextrose 40% Gel 15 Gram(s) Oral once  glucagon  Injectable 1 milliGRAM(s) IntraMuscular once  insulin lispro (ADMELOG) corrective regimen sliding scale   SubCutaneous three times a day before meals  insulin lispro (ADMELOG) corrective regimen sliding scale   SubCutaneous at bedtime    ========================INFECTIOUS DISEASE================  Afebrile, WBC within normal limits  - Continue trending WBC and monitoring fever curve     Patient requires continuous monitoring with bedside rhythm monitoring, pulse ox monitoring, and intermittent blood gas analysis. Care plan discussed with ICU care team. Patient remained critical and at risk for life threatening decompensation.  Patient seen, examined and plan discussed with CCU team during rounds.     I have personally provided 35 minutes of critical care time excluding time spent on separate procedures.    By signing my name below, IRachel, attest that this documentation has been prepared under the direction and in the presence of Jazmin Goss NP   Electronically signed: Nasrin Desai, 07-26-21 @ 22:22    Jazmin ROBERTO, personally performed the services described in this documentation. all medical record entries made by the scribe were at my direction and in my presence. I have reviewed the chart and agree that the record reflects my personal performance and is accurate and complete  Electronically signed: Jazmin Goss NP        PAYAM PAPPAS  MRN-[id number]  Patient is a 85y old  Female who presents with a chief complaint of Transfer for Cardiogenic Shock (26 Jul 2021 13:17)    HPI:  HPI: 85 F w/ PMH HFpEF, HTN, HLD, CAD, C2D, DM transferred to [facility] from OSH for further management. On 7/21, patient said" she ate a bad turkey sandwich" and woke up 7/22 with bad abdominal pain, N/V, and dizziness. She then went to OSH ED where she was found to be tachycardic w/ soft BP, elevated lactate at 5.9. She was given 2 L IVF and sent for CT a/P but be came acutely SOB while laying flat in CT requiring BiPAP. Patient denies being SOB of that time or ever. She said she just had some anxiety.    In [facility] ED today, patient vitals were 110/70,  sinus rhythm, and has extremely unremarkable ROS with no current abdominal pain, N/V, or dizziness She is saturating on room air in high 90s  (25 Jul 2021 14:00)      Hospital Course:  7/25. R arm increased swelling 2/2 arterial line.  Vascular consulted. 2U PRBC given     24 HOUR EVENTS:  weaned off milrinone gtt.     REVIEW OF SYSTEMS:   Constitutional: No weakness, fevers, or chills  Eyes/ENT: No visual changes  Respiratory: No cough, wheezing, hemoptysis  Cardiovascular: No chest pain, no palpitations  Gastrointestinal: No abdominal pain. No nausea, vomiting, hematemesis.   Genitourinary: No dysuria  Neurological: No numbness, no weakness  Skin: No itching, rashes    ICU Vital Signs Last 24 Hrs  T(C): 37 (26 Jul 2021 19:00), Max: 37 (26 Jul 2021 19:00)  T(F): 98.6 (26 Jul 2021 19:00), Max: 98.6 (26 Jul 2021 19:00)  HR: 130 (26 Jul 2021 21:00) (104 - 138)  BP: 112/52 (26 Jul 2021 21:00) (75/50 - 188/71)  BP(mean): 76 (26 Jul 2021 21:00) (64 - 108)  ABP: 112/42 (26 Jul 2021 15:15) (60/44 - 140/54)  ABP(mean): 62 (26 Jul 2021 15:15) (50 - 104)  RR: 30 (26 Jul 2021 21:00) (7 - 39)  SpO2: 100% (26 Jul 2021 21:00) (91% - 100%)        I&O's Summary    25 Jul 2021 07:01  -  26 Jul 2021 07:00  --------------------------------------------------------  IN: 1988.1 mL / OUT: 1390 mL / NET: 598.1 mL    26 Jul 2021 07:01  -  26 Jul 2021 22:22  --------------------------------------------------------  IN: 1135.1 mL / OUT: 1030 mL / NET: 105.1 mL      CAPILLARY BLOOD GLUCOSE      POCT Blood Glucose.: 143 mg/dL (26 Jul 2021 21:42)      PHYSICAL EXAM:   General: No acute distress  Eyes: EOMI, PERRLA, conjunctiva and sclera clear  Chest/Lung: Slight rales on right lower lung field. No rhonchi, wheezing, or rubs. Unlabored respirations  Heart: Regular rate, regular rhythm. Normal S1/S2. No murmurs, rubs, or gallops.  Abdomen: Soft, nontender, nondistended. Normal bowel sounds.  Extremites: 2+ peripheral pulses B/L. No clubbing, cyanosis, or edema.  Neurology: A&O x3, no focal deficits  Skin: No rashes or lesions    ============================I/O===========================   I&O's Detail    25 Jul 2021 07:01  -  26 Jul 2021 07:00  --------------------------------------------------------  IN:    Amiodarone: 100.2 mL    IV PiggyBack: 700 mL    Milrinone: 27.6 mL    Norepinephrine: 81.8 mL    Norepinephrine: 125.8 mL    Norepinephrine: 54 mL    Oral Fluid: 360 mL    PRBCs (Packed Red Blood Cells): 38.7 mL    Sodium Chloride 0.9% Bolus: 500 mL  Total IN: 1988.1 mL    OUT:    Indwelling Catheter - Urethral (mL): 1390 mL  Total OUT: 1390 mL    Total NET: 598.1 mL      26 Jul 2021 07:01  -  26 Jul 2021 22:22  --------------------------------------------------------  IN:    IV PiggyBack: 250 mL    Milrinone: 6.9 mL    Norepinephrine: 78.2 mL    Oral Fluid: 300 mL    Sodium Chloride 0.9% Bolus: 500 mL  Total IN: 1135.1 mL    OUT:    Indwelling Catheter - Urethral (mL): 1030 mL  Total OUT: 1030 mL    Total NET: 105.1 mL        ============================ LABS =========================                        7.9    9.45  )-----------( 89       ( 26 Jul 2021 20:15 )             23.6     07-26    137  |  104  |  35<H>  ----------------------------<  124<H>  4.2   |  22  |  2.57<H>    Ca    7.7<L>      26 Jul 2021 16:08  Phos  3.7     07-26  Mg     1.8     07-26    TPro  4.3<L>  /  Alb  2.6<L>  /  TBili  0.4  /  DBili  x   /  AST  15  /  ALT  23  /  AlkPhos  43  07-26    Troponin T, High Sensitivity Result: 310 ng/L (07-25-21 @ 15:54)    CKMB Units: 4.0 ng/mL (07-25-21 @ 15:54)  CKMB Units: 3.4 ng/mL (07-24-21 @ 12:07)    Creatine Kinase, Serum: 271 U/L (07-25-21 @ 15:54)  Creatine Kinase, Serum: 153 U/L (07-24-21 @ 12:07)    CPK Mass Assay %: 1.5 % (07-25-21 @ 15:54)  CPK Mass Assay %: 2.2 % (07-24-21 @ 12:07)        LIVER FUNCTIONS - ( 26 Jul 2021 16:08 )  Alb: 2.6 g/dL / Pro: 4.3 g/dL / ALK PHOS: 43 U/L / ALT: 23 U/L / AST: 15 U/L / GGT: x           PT/INR - ( 25 Jul 2021 15:54 )   PT: 21.9 sec;   INR: 1.88 ratio         PTT - ( 25 Jul 2021 15:54 )  PTT:>200.0 sec  ABG - ( 25 Jul 2021 23:20 )  pH, Arterial: 7.42  pH, Blood: x     /  pCO2: 30    /  pO2: 132   / HCO3: 19    / Base Excess: -4.2  /  SaO2: 99                Blood Gas Venous - Lactate: 1.4 mmoL/L (07-26-21 @ 16:03)  Blood Gas Venous - Lactate: 1.6 mmoL/L (07-26-21 @ 08:47)  Blood Gas Venous - Lactate: 2.6 mmoL/L (07-26-21 @ 02:55)  Blood Gas Arterial, Lactate: 4.9 mmol/L (07-25-21 @ 23:20)  Lactate, Blood: 3.9 mmol/L (07-24-21 @ 22:29)  Lactate, Blood: 4.4 mmol/L (07-24-21 @ 12:07)  Lactate, Blood: 5.0 mmol/L (07-24-21 @ 05:25)  Lactate, Blood: 4.9 mmol/L (07-24-21 @ 01:36)      ======================Micro/Rad/Cardio=================  Telemetry: Reviewed   EKG: Reviewed  CXR: Reviewed  Culture: Reviewed   Echo: Reviewed  ======================================================  PAST MEDICAL & SURGICAL HISTORY:  HTN (hypertension)    HLD (hyperlipidemia)    DM (diabetes mellitus)    Hyperkalemia    CKD (chronic kidney disease)    Atherosclerosis    HTN (hypertension)    CAD (coronary artery disease)    Stage 4 chronic kidney disease    S/P hysterectomy      ====================ASSESSMENT ==============  AdHF / Cardiogenic shock   Aflutter vs atach   Hx of CAD  Hypertension   CKD   Anemia   DMT2     Plan:  ====================== NEUROLOGY=====================  A&Ox3, nonfocal   - Continue to monitor neuro status per protocol    ==================== RESPIRATORY======================  Stable on RA, SpO2 %  - continue pulse ox monitoring, follow ABGs     ====================CARDIOVASCULAR==================  Cardiogenic shock  - holding BB due to hypotension   - holding omesartan given unclear etiology of creatinine levels (DANE vs CKD)  - TTE 7/25: 29%, severe global LV dysfunction, decreased RV function   - Possible ischemic w/u  -weaned off milrinone gtt, trending VBG sats     Aflutter vs Atach   - rate control with digoxin   - tele monitoring     Hx of CAD  - C/w Lipitor     atorvastatin 10 milliGRAM(s) Oral at bedtime  digoxin     Tablet 125 MICROGram(s) Oral every other day    ===================HEMATOLOGIC/ONC ===================  Hemorrhage   -R arm hematoma  -Planned for CTA, risk vs benefit if active bleeding noted with need OR. Vascular is following   -s/p 2U PRBC, trending H/H  -Neuro/pulse checks to right arm q1h as per vascular   -holding heparin gtt due to active bleeding     ===================== RENAL =========================  Creatinine elevated at 2.57 Unclear if etiology DANE or CKD  - Will f/u w/ PCP to establish baseline  - continue monitoring urine output, I&Os, BUN/Cr     ==================== GASTROINTESTINAL===================  No active issues   - Tolerating PO DASH/TLC diet.     =======================    ENDOCRINE  =====================  Hx of DM2   - glucose control with admelog SS     dextrose 40% Gel 15 Gram(s) Oral once  glucagon  Injectable 1 milliGRAM(s) IntraMuscular once  insulin lispro (ADMELOG) corrective regimen sliding scale   SubCutaneous three times a day before meals  insulin lispro (ADMELOG) corrective regimen sliding scale   SubCutaneous at bedtime    ========================INFECTIOUS DISEASE================  Afebrile, WBC within normal limits  - Continue trending WBC and monitoring fever curve     Patient requires continuous monitoring with bedside rhythm monitoring, pulse ox monitoring, and intermittent blood gas analysis. Care plan discussed with ICU care team. Patient remained critical and at risk for life threatening decompensation.  Patient seen, examined and plan discussed with CCU team during rounds.     I have personally provided 35 minutes of critical care time excluding time spent on separate procedures.    By signing my name below, I, Rachel Fong, attest that this documentation has been prepared under the direction and in the presence of Jazmin Goss NP   Electronically signed: Nasrin Desai, 07-26-21 @ 22:22    Jazmin ROBERTO, personally performed the services described in this documentation. all medical record entries made by the scribe were at my direction and in my presence. I have reviewed the chart and agree that the record reflects my personal performance and is accurate and complete  Electronically signed: Jazmin Goss, ARASELI

## 2021-07-26 NOTE — PROGRESS NOTE ADULT - SUBJECTIVE AND OBJECTIVE BOX
Events:    Review Of Systems:  Constitutional: denies fever, chills, Fatigue   HEENT: denies Blurred vision, Eye Pain, Headache   Respiratory: denies Cough, Wheezing , Shortness of breath  Cardiovascular: denies Chest Pain, Palpitations,  VEGA   Gastrointestinal: denies Abdominal Pain, Diarrhea, Constipation   Genitourinary: denies Nocturia, Dysuria, Incontinence  Extremities: denies Swelling, Joint Pain  Neurologic: denies Focal deficit, Paresthesias, Syncope  Lymphatic: denies Swelling, Lymphadenopathy   Skin: denies Rash, Ecchymoses, Wounds   Psychiatry: denies Depression, Suicidal/Homicidal Ideation, anxiety  [X ] 10 point review of systems is otherwise negative except as mentioned above         Medications:  atorvastatin 10 milliGRAM(s) Oral at bedtime  chlorhexidine 4% Liquid 1 Application(s) Topical <User Schedule>  dextrose 40% Gel 15 Gram(s) Oral once  dextrose 5%. 1000 milliLiter(s) IV Continuous <Continuous>  dextrose 5%. 1000 milliLiter(s) IV Continuous <Continuous>  dextrose 50% Injectable 25 Gram(s) IV Push once  dextrose 50% Injectable 12.5 Gram(s) IV Push once  dextrose 50% Injectable 25 Gram(s) IV Push once  glucagon  Injectable 1 milliGRAM(s) IntraMuscular once  milrinone Infusion 0.125 MICROgram(s)/kG/Min IV Continuous <Continuous>  norepinephrine Infusion 0.22 MICROgram(s)/kG/Min IV Continuous <Continuous>  sodium chloride 0.9% Bolus 500 milliLiter(s) IV Bolus once    PMH/PSH/FH/SH: [ ] Unchanged  Vitals:  T(C): 36.6 (21 @ 04:00), Max: 36.9 (21 @ 02:10)  HR: 138 (21 @ 08:00) (82 - 138)  BP: 83/57 (21 @ 04:00) (83/57 - 185/83)  BP(mean): 100 (21 @ 04:45) (65 - 111)  RR: 22 (21 @ 08:00) (7 - 59)  SpO2: 100% (21 @ 04:00) (91% - 100%)  Wt(kg): --  Daily     Daily Weight in k.9 (2021 06:30)  I&O's Summary    2021 07:  -  2021 07:00  --------------------------------------------------------  IN: 1988.1 mL / OUT: 1390 mL / NET: 598.1 mL    2021 07:  -  2021 08:21  --------------------------------------------------------  IN: 16.4 mL / OUT: 85 mL / NET: -68.6 mL        Physical Exam:  Appearance: [ ] Normal [ ] NAD  Eyes: [ ] PERRL [ ] EOMI  HENT: [ ] Normal oral muscosa [ ]NC/AT  Cardiovascular: [ ] S1 [ ] S2 [ ] RRR [ ] No m/r/g [ ]No edema [ ] JVP  Procedural Access Site: [ ] No hematoma [ ] Non-tender to palpation [ ] 2+ pulse [ ] No bruit [ ] No Ecchymosis  Respiratory: [ ] Clear to auscultation bilaterally  Gastrointestinal: [ ] Soft [ ] Non-tender [ ] Non-distended [ ] BS+  Musculoskeletal: [ ] No clubbing [ ] No joint deformity   Neurologic: [ ] Non-focal  Lymphatic: [ ] No lymphadenopathy  Psychiatry: [ ] AAOx3 [ ] Mood & affect appropriate  Skin: [ ] No rashes [ ] No ecchymoses [ ] No cyanosis        131<L>  |  100  |  38<H>  ----------------------------<  164<H>  4.4   |  18<L>  |  2.63<H>    Ca    8.3<L>      2021 02:58  Phos  4.6       Mg     2.1         TPro  4.3<L>  /  Alb  2.5<L>  /  TBili  0.8  /  DBili  x   /  AST  26  /  ALT  34  /  AlkPhos  43      PT/INR - ( 2021 15:54 )   PT: 21.9 sec;   INR: 1.88 ratio         PTT - ( 2021 15:54 )  PTT:>200.0 sec  CARDIAC MARKERS ( 2021 15:54 )  x     / x     / 271 U/L / x     / 4.0 ng/mL      Serum Pro-Brain Natriuretic Peptide: 27047 pg/mL ( @ 15:54)  Serum Pro-Brain Natriuretic Peptide: 14288 pg/mL ( @ 21:26)    Total Cholesterol: 68  LDL: --  HDL: 32  T        ECG:    Echo:    Stress Testing:     Cath:    Imaging:    Interpretation of Telemetry:   HPI:  85 F w/ PMH HFpEF, HTN, HLD, CAD, C2D, DM transferred to [facility] from OSH for further management. On , patient said" she ate a bad turkey sandwich" and woke up  with bad abdominal pain, N/V, and dizziness. [facility] then went to [facility] where she was found to be tachycardic w/ soft BP, elevated lactate at 5.9. She was given 2 L IVF and sent for CT a/P but be came acutely SOB while laying flat in CT requiring BiPAP. Patient denies being SOB of that time or ever. She said she just had some anxiety.In [facility] ED today, patient vitals were 110/70,  sinus rhythm, and has extremely unremarkable ROS with no current abdominal pain, N/V, or dizziness She is saturating on room air in high 90s  (2021 14:00). She was started on Milrinone and noted to have an acute HGB drop of 13 to 8.5. She was transfused 2 U of PRBCs and given fluid and HGB responded. She was noted to have acute swelling of her RUE at the site of an axillary a line. Heparin was discontinued. Levophed was started.     Events: No acute events. Milrinone was discontinued as pt lactate cleared and looks to be more in hypovolemic shock. Pt given 500 cc of NS and albumin w appropriate response in UO.     Review Of Systems:  Constitutional: denies fever, chills, Fatigue   HEENT: denies Blurred vision, Eye Pain, Headache   Respiratory: denies Cough, Wheezing , Shortness of breath  Cardiovascular: denies Chest Pain, Palpitations,  VEGA   Gastrointestinal: denies Abdominal Pain, Diarrhea, Constipation   Genitourinary: denies Nocturia, Dysuria, Incontinence  Extremities: denies Swelling, Joint Pain  Neurologic: denies Focal deficit, Paresthesias, Syncope  Lymphatic: denies Swelling, Lymphadenopathy   Skin: denies Rash, Ecchymoses, Wounds   Psychiatry: denies Depression, Suicidal/Homicidal Ideation, anxiety  [X ] 10 point review of systems is otherwise negative except as mentioned above         Medications:  atorvastatin 10 milliGRAM(s) Oral at bedtime  chlorhexidine 4% Liquid 1 Application(s) Topical <User Schedule>  dextrose 40% Gel 15 Gram(s) Oral once  dextrose 5%. 1000 milliLiter(s) IV Continuous <Continuous>  dextrose 5%. 1000 milliLiter(s) IV Continuous <Continuous>  dextrose 50% Injectable 25 Gram(s) IV Push once  dextrose 50% Injectable 12.5 Gram(s) IV Push once  dextrose 50% Injectable 25 Gram(s) IV Push once  glucagon  Injectable 1 milliGRAM(s) IntraMuscular once  milrinone Infusion 0.125 MICROgram(s)/kG/Min IV Continuous <Continuous>  norepinephrine Infusion 0.22 MICROgram(s)/kG/Min IV Continuous <Continuous>  sodium chloride 0.9% Bolus 500 milliLiter(s) IV Bolus once    Vitals:  T(C): 36.6 (21 @ 04:00), Max: 36.9 (21 @ 02:10)  HR: 138 (21 @ 08:00) (82 - 138)  BP: 83/57 (21 @ 04:00) (83/57 - 185/83)  BP(mean): 100 (21 @ 04:45) (65 - 111)  RR: 22 (21 @ 08:00) (7 - 59)  SpO2: 100% (21 @ 04:00) (91% - 100%)    Daily     Daily Weight in k.9 (2021 06:30)  I&O's Summary    2021 07:  -  2021 07:00  --------------------------------------------------------  IN: 1988.1 mL / OUT: 1390 mL / NET: 598.1 mL    2021 07:01  -  2021 08:21  --------------------------------------------------------  IN: 16.4 mL / OUT: 85 mL / NET: -68.6 mL    Physical Exam:  Appearance: [X ] Normal [X ] NAD  Eyes: [X ] PERRL [X ] EOMI  HENT: [X ] Normal oral muscosa [ X]NC/AT  Cardiovascular: [ X] S1 [ X] S2 [X] RRR . No edema. NO JVD  Procedural Access Site: RUE softly distended. + radial pulse palpable. warm digits.   Respiratory: [X ] Clear to auscultation bilaterally  Gastrointestinal: [ X] Soft [X ] Non-tender [ X] Non-distended   Musculoskeletal: [X ] No clubbing [X ] No joint deformity   Neurologic: [X ] Non-focal  Lymphatic: [X ] No lymphadenopathy  Psychiatry: [ X] AAOx3 [X ] Mood & affect appropriate  Skin: [X ] No rashes [ x] No ecchymoses [X ] No cyanosis        131<L>  |  100  |  38<H>  ----------------------------<  164<H>  4.4   |  18<L>  |  2.63<H>    Ca    8.3<L>      2021 02:58  Phos  4.6       Mg     2.1         TPro  4.3<L>  /  Alb  2.5<L>  /  TBili  0.8  /  DBili  x   /  AST  26  /  ALT  34  /  AlkPhos  43      PT/INR - ( 2021 15:54 )   PT: 21.9 sec;   INR: 1.88 ratio         PTT - ( 2021 15:54 )  PTT:>200.0 sec  CARDIAC MARKERS ( 2021 15:54 )  x     / x     / 271 U/L / x     / 4.0 ng/mL    Serum Pro-Brain Natriuretic Peptide: 26409 pg/mL ( @ 15:54)  Serum Pro-Brain Natriuretic Peptide: 41755 pg/mL ( @ 21:26)    Total Cholesterol: 68  HDL: 32  T    ECG: < from: 12 Lead ECG (21 @ 17:04) >  Diagnosis Line Supraventricular tachycardia  Nonspecific ST and T wave abnormality  Abnormal ECG  No previous ECGs available    Echo: < from: TTE with Doppler (w/Cont) (21 @ 13:58) >  1. Calcified aortic valve with  grosslymoderately  decreased opening. Peak transaortic valve gradient equals  15 mm Hg, mean transaortic valve gradient equals 9 mm Hg.  2. Moderately dilated left atrium.  LA volume index = 46  cc/m2.  3. Endocardial visualization enhanced with intravenous  injection of Ultrasonic Enhancing Agent (Definity). Severe  global left ventricular systolic dysfunction.  4. Normal right ventricular size with decreased right  ventricular systolic function.  5. Bilateral pleural effusions.    Imaging: < from: US Duplex Arterial Upper Ext Ltd, Right (21 @ 12:32) >  Impression:    No right axillary arterial pseudoaneurysm.    Elongated, thin fluid collectionlikely representing hematoma measuring 6.2 cm x 6 mm x 2.4 cm at the right upper arm anteromedially.    Interpretation of Telemetry:   HPI:  85 F w/ PMH HFpEF, HTN, HLD, CAD, C2D, DM transferred to [facility] from OSH for further management. On , patient said" she ate a bad turkey sandwich" and woke up  with bad abdominal pain, N/V, and dizziness. She then went to [facility] ED where she was found to be tachycardic w/ soft BP, elevated lactate at 5.9. She was given 2 L IVF and sent for CT a/P but be came acutely SOB while laying flat in CT requiring BiPAP. Patient denies being SOB of that time or ever. She said she just had some anxiety.In [facility] ED today, patient vitals were 110/70,  sinus rhythm, and has extremely unremarkable ROS with no current abdominal pain, N/V, or dizziness She is saturating on room air in high 90s  (2021 14:00). She was started on Milrinone and noted to have an acute HGB drop of 13 to 8.5. She was transfused 2 U of PRBCs and given fluid and HGB responded. She was noted to have acute swelling of her RUE at the site of an axillary a line. Heparin was discontinued. Levophed was started.     Events: No acute events. Milrinone was discontinued as pt lactate cleared and looks to be more in hypovolemic shock. Pt given 500 cc of NS and albumin w appropriate response in UO.     Review Of Systems:  Constitutional: denies fever, chills, Fatigue   HEENT: denies Blurred vision, Eye Pain, Headache   Respiratory: denies Cough, Wheezing , Shortness of breath  Cardiovascular: denies Chest Pain, Palpitations,  VEGA   Gastrointestinal: denies Abdominal Pain, Diarrhea, Constipation   Genitourinary: denies Nocturia, Dysuria, Incontinence  Extremities: denies Swelling, Joint Pain  Neurologic: denies Focal deficit, Paresthesias, Syncope  Lymphatic: denies Swelling, Lymphadenopathy   Skin: denies Rash, Ecchymoses, Wounds   Psychiatry: denies Depression, Suicidal/Homicidal Ideation, anxiety  [X ] 10 point review of systems is otherwise negative except as mentioned above         Medications:  atorvastatin 10 milliGRAM(s) Oral at bedtime  chlorhexidine 4% Liquid 1 Application(s) Topical <User Schedule>  dextrose 40% Gel 15 Gram(s) Oral once  dextrose 5%. 1000 milliLiter(s) IV Continuous <Continuous>  dextrose 5%. 1000 milliLiter(s) IV Continuous <Continuous>  dextrose 50% Injectable 25 Gram(s) IV Push once  dextrose 50% Injectable 12.5 Gram(s) IV Push once  dextrose 50% Injectable 25 Gram(s) IV Push once  glucagon  Injectable 1 milliGRAM(s) IntraMuscular once  milrinone Infusion 0.125 MICROgram(s)/kG/Min IV Continuous <Continuous>  norepinephrine Infusion 0.22 MICROgram(s)/kG/Min IV Continuous <Continuous>  sodium chloride 0.9% Bolus 500 milliLiter(s) IV Bolus once    Vitals:  T(C): 36.6 (21 @ 04:00), Max: 36.9 (21 @ 02:10)  HR: 138 (21 @ 08:00) (82 - 138)  BP: 83/57 (21 @ 04:00) (83/57 - 185/83)  BP(mean): 100 (21 @ 04:45) (65 - 111)  RR: 22 (21 @ 08:00) (7 - 59)  SpO2: 100% (21 @ 04:00) (91% - 100%)    Daily     Daily Weight in k.9 (2021 06:30)  I&O's Summary    2021 07:  -  2021 07:00  --------------------------------------------------------  IN: 1988.1 mL / OUT: 1390 mL / NET: 598.1 mL    2021 07:01  -  2021 08:21  --------------------------------------------------------  IN: 16.4 mL / OUT: 85 mL / NET: -68.6 mL    Physical Exam:  Appearance: [X ] Normal [X ] NAD  Eyes: [X ] PERRL [X ] EOMI  HENT: [X ] Normal oral muscosa [ X]NC/AT  Cardiovascular: [ X] S1 [ X] S2 [X] RRR . No edema. NO JVD  Procedural Access Site: RUE softly distended. + radial pulse palpable. warm digits.   Respiratory: [X ] Clear to auscultation bilaterally  Gastrointestinal: [ X] Soft [X ] Non-tender [ X] Non-distended   Musculoskeletal: [X ] No clubbing [X ] No joint deformity   Neurologic: [X ] Non-focal  Lymphatic: [X ] No lymphadenopathy  Psychiatry: [ X] AAOx3 [X ] Mood & affect appropriate  Skin: [X ] No rashes [ x] No ecchymoses [X ] No cyanosis        131<L>  |  100  |  38<H>  ----------------------------<  164<H>  4.4   |  18<L>  |  2.63<H>    Ca    8.3<L>      2021 02:58  Phos  4.6       Mg     2.1         TPro  4.3<L>  /  Alb  2.5<L>  /  TBili  0.8  /  DBili  x   /  AST  26  /  ALT  34  /  AlkPhos  43      PT/INR - ( 2021 15:54 )   PT: 21.9 sec;   INR: 1.88 ratio         PTT - ( 2021 15:54 )  PTT:>200.0 sec  CARDIAC MARKERS ( 2021 15:54 )  x     / x     / 271 U/L / x     / 4.0 ng/mL    Serum Pro-Brain Natriuretic Peptide: 70197 pg/mL ( @ 15:54)  Serum Pro-Brain Natriuretic Peptide: 30903 pg/mL ( @ 21:26)    Total Cholesterol: 68  HDL: 32  T    ECG: < from: 12 Lead ECG (21 @ 17:04) >  Diagnosis Line Supraventricular tachycardia  Nonspecific ST and T wave abnormality  Abnormal ECG  No previous ECGs available    Echo: < from: TTE with Doppler (w/Cont) (21 @ 13:58) >  1. Calcified aortic valve with  grosslymoderately  decreased opening. Peak transaortic valve gradient equals  15 mm Hg, mean transaortic valve gradient equals 9 mm Hg.  2. Moderately dilated left atrium.  LA volume index = 46  cc/m2.  3. Endocardial visualization enhanced with intravenous  injection of Ultrasonic Enhancing Agent (Definity). Severe  global left ventricular systolic dysfunction.  4. Normal right ventricular size with decreased right  ventricular systolic function.  5. Bilateral pleural effusions.    Imaging: < from: US Duplex Arterial Upper Ext Ltd, Right (21 @ 12:32) >  Impression:    No right axillary arterial pseudoaneurysm.    Elongated, thin fluid collectionlikely representing hematoma measuring 6.2 cm x 6 mm x 2.4 cm at the right upper arm anteromedially.    Interpretation of Telemetry: ATach vs AFlutter? 130s

## 2021-07-26 NOTE — PROGRESS NOTE ADULT - ASSESSMENT
85y year old Female w RUE swelling after brachial a line removal on hep gtt (now held)    - RUE duplex ordered, please make sure done today  - Neurovascular checks to R arm    Vascular Surgery  [id number]

## 2021-07-26 NOTE — PROCEDURE NOTE - NSINDICATIONS_GEN_A_CORE
critical illness/emergency venous access/hemodynamic monitoring/hypertonic/irritant infusion/venous access/volume resuscitation
arterial puncture to obtain ABG's/blood sampling/critical patient/monitoring purposes

## 2021-07-26 NOTE — PROGRESS NOTE ADULT - SUBJECTIVE AND OBJECTIVE BOX
GENERAL SURGERY PROGRESS NOTE     PAYAM PAPPAS  85y  Female  Hospital day :1d    OVERNIGHT EVENTS: no acute events overnight     T(F): 97.8 (07-26-21 @ 04:00), Max: 98.4 (07-26-21 @ 02:10)  HR: 106 (07-26-21 @ 08:30) (82 - 138)  BP: 83/57 (07-26-21 @ 04:00) (83/57 - 185/83)  ABP: 110/62 (07-26-21 @ 08:30) (60/44 - 158/54)  ABP(mean): 80 (07-26-21 @ 08:30) (46 - 116)  RR: 22 (07-26-21 @ 08:30) (7 - 59)  SpO2: 100% (07-26-21 @ 04:00) (91% - 100%)    DIET/FLUIDS: dextrose 5%. 1000 milliLiter(s) IV Continuous <Continuous>  dextrose 5%. 1000 milliLiter(s) IV Continuous <Continuous>  sodium chloride 0.9% Bolus 500 milliLiter(s) IV Bolus once      URINE:   07-25-21 @ 07:01  -  07-26-21 @ 07:00  --------------------------------------------------------  OUT: 1390 mL     Indwelling Urethral Catheter:     Connect To:  Leg Bag    Indication:  Improved Comfort Care (07-25-21 @ 18:21)    PHYSICAL EXAM:  Gen: NAD  HEENT: normocephalic, atraumatic, no scleral icterus  Pulm: unlabored respirations  Ext: R proximal arm swelling and ecchymosis, remains soft, palpable radial pulse this AM  motor and sensory intact     LABS  Labs:  CAPILLARY BLOOD GLUCOSE                              11.3   16.19 )-----------( 113      ( 26 Jul 2021 02:58 )             33.9         07-26    131<L>  |  100  |  38<H>  ----------------------------<  164<H>  4.4   |  18<L>  |  2.63<H>      Calcium, Total Serum: 8.3 mg/dL (07-26-21 @ 02:58)      LFTs:             4.3  | 0.8  | 26       ------------------[43      ( 26 Jul 2021 02:58 )  2.5  | x    | 34          Lipase:x      Amylase:x         Blood Gas Venous - Lactate: 1.6 mmoL/L (07-26-21 @ 08:47)  Blood Gas Venous - Lactate: 2.6 mmoL/L (07-26-21 @ 02:55)  Blood Gas Arterial, Lactate: 4.9 mmol/L (07-25-21 @ 23:20)  Lactate, Blood: 4.5 mmol/L (07-23-21 @ 20:08)  Lactate, Blood: 5.9 mmol/L (07-23-21 @ 18:03)    ABG - ( 25 Jul 2021 23:20 )  pH: 7.42  /  pCO2: 30    /  pO2: 132   / HCO3: 19    / Base Excess: -4.2  /  SaO2: 99              ABG - ( 25 Jul 2021 15:50 )  pH: 7.44  /  pCO2: 36    /  pO2: 130   / HCO3: 25    / Base Excess: 1.1   /  SaO2: 99                Coags:     21.9   ----< 1.88    ( 25 Jul 2021 15:54 )     >200.0       CARDIAC MARKERS ( 25 Jul 2021 15:54 )  x     / x     / 271 U/L / x     / 4.0 ng/mL      Serum Pro-Brain Natriuretic Peptide: 80113 pg/mL (07-25-21 @ 15:54)  Serum Pro-Brain Natriuretic Peptide: 45455 pg/mL (07-23-21 @ 21:26)          Culture - Blood (collected 23 Jul 2021 21:10)  Source: .Blood Blood-Peripheral  Preliminary Report (24 Jul 2021 22:01):    No growth to date.    Culture - Blood (collected 23 Jul 2021 21:10)  Source: .Blood Blood-Peripheral  Preliminary Report (24 Jul 2021 22:01):    No growth to date.

## 2021-07-26 NOTE — PROCEDURE NOTE - NSCHLORHEXIDINEBATH_GEN_A_CORE
4% solution/2% wipes/To be discontinued when line removed
4% solution/2% wipes/To be discontinued when line removed

## 2021-07-26 NOTE — PROGRESS NOTE ADULT - ASSESSMENT
84 yo F w/ pmh of HFpEF, HTN, HLD, CAD, T2DM, tx to [facility] after being found to be in AdHF possible tachycardia induced with new DANE. She was also noted to have acute hypovolemic shock secondary to anemia requiring blood transfusions.     Neuro  - A and O x 4 no concerns. Not in any acute pain. Maintain bedrest for today.     Resp  - On 4 L NC wean as tolerated.  - Chest XR showing no consolidations     CV   AFlutter vs ATach  -    84 yo F w/ pmh of HFpEF, HTN, HLD, CAD, T2DM, tx to [facility] after being found to be in AdHF possible tachycardia induced with new DANE. She was also noted to have acute hypovolemic shock secondary to anemia requiring blood transfusions.     Neuro  - A and O x 4 no concerns. Not in any acute pain. Maintain bedrest for today.     Resp  - On 4 L NC wean as tolerated.  - Chest XR showing no consolidations     CV   AFlutter vs ATach  - EP consulted  - Will resume AC once CBC remains stable    AdHF  - Likely tachy induced, no cardiac cath due to DANE  - Appears hypovoilemic , fluid resuscitate gently. CVP 0  - Wean Levophed off  - Central line piulled back 4 cm repeat Chest XR    GI  - Continue DASH diet, PPI  - Bowel regimen      - Keep oviedo in place for crit care monitoring    Renal  - Monitor Cr, new DANE likely secondary to AdHF and hypovolemic shock. ELectrolytes stable. Check urine lytes    ID  - WBC 16, no fevers, continue to monitor off antibiotics    heme  - Off AC and hepari, will resume heparin gtt low dose when CBC remains stable    Vascular  - Hannah access bleeding now appears stable. Duplex without pseudo. Vascular following. No e/o compartment. CTA deferred given DANE    Endo  - Glucose 170-207. Glucose monitoring     Mimi Douglas, DNP

## 2021-07-26 NOTE — PROGRESS NOTE ADULT - ATTENDING COMMENTS
85 year-old woman with HTN, DM admitted to outside hospital with suspected cardiogenic shock, hospital course complicated by hematoma of the RUE and component of hemorrhagic shock    Lactate normalized and pressors have been weaning down.  Milrinone on hold, will check PA sat and lactate to make sure remains stable.   Continue statin  New cardiomyopathy as per previous TTE 2019 (NL EF) possibly related to tachycardia?, will obtain EPS consult  Per outpatient notes pt previously refused cath   RUE with good cap refill, radial pulse by doppler, warm extremity, continue to monitor   If H/H remains stable after 2 unit PRBCs, then may consider restarting AC.  Monitor PLTS closely.    DANE likely in setting of shock, previous creat was normal as per labs 06/2021 (allscripts), check urine lytes   no fever, elevated WBC, no obvious signs of infection.  FS adequate   Adjust RIJ (07/25)and re-check xray

## 2021-07-26 NOTE — CONSULT NOTE ADULT - ASSESSMENT
85 year old female PMH T2DM, HTN, HLD  HTN, HLD, CAD, C2D, DM transferred to [facility] from [facility] where she presented with abdominal pain. Patient states she ate  a "bad turkey sandwich" and woke up 7/22 with bad abdominal pain, N/V, and dizziness. She went to ED where she was found to be tachycardic with soft B/P, elevated lactate at 5.9. She received  2 L IVF and sent for CT a/p but be came acutely SOB while laying flat in CT requiring BiPAP. Patient states she was never SOB and that it was anxiety. ECHO reveal new low EF, TTE 2/2019 with normal EF now 25 %.    1. New onset HFrEF 25% in the setting of tachycardia   2. New onset Atrial Flutter with RVR     continue to monitor on telemetry  Keep K+>4, MG++>2       85 year old female PMH T2DM, HTN, HLD  HTN, HLD, CAD, C2D, DM transferred to [facility] from [facility] where she presented with abdominal pain. Patient states she ate  a "bad turkey sandwich" and woke up 7/22 with bad abdominal pain, N/V, and dizziness. She went to ED where she was found to be tachycardic with soft B/P, elevated lactate at 5.9. She received  2 L IVF and sent for CT a/p but be came acutely SOB while laying flat in CT requiring BiPAP. Patient states she was never SOB and that it was anxiety. ECHO reveal new low EF, TTE 2/2019 with normal EF now 25 %.    1. New onset HFrEF 25% in the setting of tachycardia   2. New onset Atrial Flutter with RVR     continue to monitor on telemetry  Keep K+>4, MG++>2  further recommendation pending discussion with attending      85 year old female PMH T2DM, HTN, HLD  HTN, HLD, CAD, CKD, (per Dr Ross cardiology in [facility] CAD dx probable based on myocardial profusion scan, May 2021, patient refused cardiac cath) transferred to [facility] from [facility] where she presented with abdominal pain. Patient states she ate  a "bad turkey sandwich" and woke up 7/22 with bad abdominal pain, N/V, and dizziness. She went to ED where she was found to be tachycardic with soft B/P, elevated lactate at 5.9. She received  2 L IVF and sent for CT a/p but be came acutely SOB while laying flat in CT requiring BiPAP. Patient states she was never SOB and that it was anxiety. ECHO reveal new low EF, TTE 2/2019 with normal EF now 25 %.    1. New onset HFrEF 25% in the setting of tachycardia   2. New onset Atrial Flutter with RVR     continue to monitor on telemetry  Keep K+>4, MG++>2  further recommendation pending discussion with attending      85 year old female PMH T2DM, HTN, HLD  HTN, HLD, CAD, CKD, (per Dr Ross cardiology in [facility] CAD dx probable based on myocardial profusion scan, May 2021, patient refused cardiac cath) transferred to [facility] from [facility] where she presented with abdominal pain. Patient states she ate  a "bad turkey sandwich" and woke up 7/22 with bad abdominal pain, N/V, and dizziness. She went to ED where she was found to be tachycardic with soft B/P, elevated lactate at 5.9. She received  2 L IVF and sent for CT a/p but be came acutely SOB while laying flat in CT requiring BiPAP. Patient states she was never SOB and that it was anxiety. ECHO reveal new low EF, TTE 2/2019 with normal EF now 25 %.    1. New onset HFrEF 25% in the setting of tachycardia   2. New onset Atrial Flutter with RVR     continue to monitor on telemetry  Keep K+>4, MG++>2  further recommendation pending discussion with attending     addendum  rate control until patient can tolerate uninterrupted AC  can consider small dose digoxin  plans for eventual ALANNA/DCCV   [id number]

## 2021-07-26 NOTE — CONSULT NOTE ADULT - SUBJECTIVE AND OBJECTIVE BOX
Chief Complaint : palpitations    HPI: 85 year old female PMH T2DM, HTN, HLD CAD HFpEF, HTN, HLD, CAD, C2D, DM transferred to [facility] from OSH for further management. On 7/21, patient said" she ate a bad turkey sandwich" and woke up 7/22 with bad abdominal pain, N/V, and dizziness. She then went to [place] ED where she was found to be tachycardic w/ soft BP, elevated lactate at 5.9. She was given 2 L IVF and sent for CT a/P but be came acutely SOB while laying flat in CT requiring BiPAP. Patient denies being SOB of that time or ever. She said she just had some anxiety.    In [facility] ED today, patient vitals were 110/70,  sinus rhythm, and has extremely unremarkable ROS with no current abdominal pain, N/V, or dizziness She is saturating on room air in high 90s  (25 Jul 2021 14:00)    PAST MEDICAL & SURGICAL HISTORY:  HTN (hypertension)  HLD (hyperlipidemia)  DM (diabetes mellitus)  Hyperkalemia  CKD (chronic kidney disease)  Atherosclerosis  HTN (hypertension)  CAD (coronary artery disease)    S/P hysterectomy    SOCIAL HISTORY:  FAMILY HISTORY:  FH: MI (myocardial infarction) (Father, Mother)        MEDICATIONS  (STANDING):  atorvastatin 10 milliGRAM(s) Oral at bedtime  chlorhexidine 4% Liquid 1 Application(s) Topical <User Schedule>  dextrose 40% Gel 15 Gram(s) Oral once  dextrose 5%. 1000 milliLiter(s) (50 mL/Hr) IV Continuous <Continuous>  dextrose 5%. 1000 milliLiter(s) (100 mL/Hr) IV Continuous <Continuous>  dextrose 50% Injectable 25 Gram(s) IV Push once  dextrose 50% Injectable 12.5 Gram(s) IV Push once  dextrose 50% Injectable 25 Gram(s) IV Push once  glucagon  Injectable 1 milliGRAM(s) IntraMuscular once  insulin lispro (ADMELOG) corrective regimen sliding scale   SubCutaneous three times a day before meals  insulin lispro (ADMELOG) corrective regimen sliding scale   SubCutaneous at bedtime  norepinephrine Infusion 0.22 MICROgram(s)/kG/Min (25.8 mL/Hr) IV Continuous <Continuous>    MEDICATIONS  (PRN):      Allergies    No Known Allergies    Intolerances          REVIEW OF SYSTEM:    Constitutional: denies fever, chills, fatigue  Neuro: denies headache, numbness, weakness, dizziness  Resp: denies cough, wheezing, shortness of breath  CVS: denies chest pain, palpitations, leg swelling  GI: denies abdominal pain, nausea, vomiting, diarrhea   : denies dysuria, frequency, incontinence  Skin: denies itching, burning, rashes, or lesions   Msk: denies joint pain or swelling                Vital Signs Last 24 Hrs  T(C): 36.6 (26 Jul 2021 04:00), Max: 36.9 (26 Jul 2021 02:10)  T(F): 97.8 (26 Jul 2021 04:00), Max: 98.4 (26 Jul 2021 02:10)  HR: 118 (26 Jul 2021 14:00) (104 - 138)  BP: 106/53 (26 Jul 2021 14:00) (83/57 - 188/71)  BP(mean): 77 (26 Jul 2021 14:00) (65 - 108)  RR: 18 (26 Jul 2021 14:00) (7 - 59)  SpO2: 100% (26 Jul 2021 14:00) (91% - 100%)    Physical Exam:  General : well developed, well nourished,  and no acute distress  Neuro : Alert and oriented x 3, no focal deficits  HEENT : Sclera clear, no JVD, no Lymphadeopathy, no carotid bruits, neck supple  Lungs: Clear to Ascultation, no wheezing , rales or rhonchi   Cardiovascular : + 1 +2, RRR, no murmurs, no rubs  GI : abdomen soft, NT, ND, + BS   : no suprapubic tenderness  Extremities : No edema, + 2 DP and +2 PT, feet warm   Skin : Left infraclavicular implant site intact,  no hematoma, no bleeding,  no ecchymosis,   right groin flat no hematoma, no bleeding, no ecchymosis     TELE:   EKG:    LABS:                        10.2   13.27 )-----------( 115      ( 26 Jul 2021 08:53 )             29.9     07-26    131<L>  |  100  |  38<H>  ----------------------------<  164<H>  4.4   |  18<L>  |  2.63<H>    Ca    8.3<L>      26 Jul 2021 02:58  Phos  4.6     07-26  Mg     2.1     07-26    TPro  4.3<L>  /  Alb  2.5<L>  /  TBili  0.8  /  DBili  x   /  AST  26  /  ALT  34  /  AlkPhos  43  07-26    PT/INR - ( 25 Jul 2021 15:54 )   PT: 21.9 sec;   INR: 1.88 ratio         PTT - ( 25 Jul 2021 15:54 )  PTT:>200.0 sec      RADIOLOGY & ADDITIONAL STUDIES:    IMPRESSION:  ECHO 7/24/21  1. Severely reduced left ventricular systolic function. EF 25 %  2. Increased left ventricular wallthickness.  3. Bi-atrial enlargement.  4. Moderate mitral regurgitation.  5. Moderate tricuspid regurgitation.               Chief Complaint : palpitations, denies any symptoms of SOB    HPI: 85 year old female PMH T2DM, HTN, HLD  HTN, HLD, CAD, C2D, DM transferred to [facility] from [facility] where she presented with abdominal pain. Patient states she ate  a "bad turkey sandwich" and woke up 7/22 with bad abdominal pain, N/V, and dizziness. She went to ED where she was found to be tachycardic with soft B/P, elevated lactate at 5.9. She received  2 L IVF and sent for CT a/p but be came acutely SOB while laying flat in CT requiring BiPAP. Patient denies being SOB of that time or ever. She said she just had some anxiety. In [facility] ED today, patient vitals were 110/70,  sinus rhythm, and has extremely unremarkable ROS with no current abdominal pain, N/V, or dizziness.     PAST MEDICAL & SURGICAL HISTORY:  HTN (hypertension)  HLD (hyperlipidemia)  DM (diabetes mellitus)  Hyperkalemia  CKD (chronic kidney disease)  Atherosclerosis  HTN (hypertension)  CAD (coronary artery disease)    S/P hysterectomy    SOCIAL HISTORY:  FAMILY HISTORY:  FH: MI (myocardial infarction) (Father, Mother)    MEDICATIONS  (STANDING):  atorvastatin 10 milliGRAM(s) Oral at bedtime  chlorhexidine 4% Liquid 1 Application(s) Topical <User Schedule>  dextrose 40% Gel 15 Gram(s) Oral once  dextrose 5%. 1000 milliLiter(s) (50 mL/Hr) IV Continuous <Continuous>  dextrose 5%. 1000 milliLiter(s) (100 mL/Hr) IV Continuous <Continuous>  dextrose 50% Injectable 25 Gram(s) IV Push once  dextrose 50% Injectable 12.5 Gram(s) IV Push once  dextrose 50% Injectable 25 Gram(s) IV Push once  glucagon  Injectable 1 milliGRAM(s) IntraMuscular once  insulin lispro (ADMELOG) corrective regimen sliding scale   SubCutaneous three times a day before meals  insulin lispro (ADMELOG) corrective regimen sliding scale   SubCutaneous at bedtime  norepinephrine Infusion 0.22 MICROgram(s)/kG/Min (25.8 mL/Hr) IV Continuous <Continuous>    AllergiesNo Known Allergies    REVIEW OF SYSTEM:  Constitutional: denies fever, chills, fatigue  Neuro: denies headache, numbness, weakness, dizziness  Resp: denies cough, wheezing, shortness of breath  CVS: denies chest pain, + palpitations, leg swelling  GI: denies abdominal pain, nausea, vomiting, diarrhea   : denies dysuria, frequency, incontinence  Skin: denies itching, burning, rashes, or lesions   Msk: denies joint pain or swelling     Vital Signs Last 24 Hrs  T(C): 36.6 (26 Jul 2021 04:00), Max: 36.9 (26 Jul 2021 02:10)  T(F): 97.8 (26 Jul 2021 04:00), Max: 98.4 (26 Jul 2021 02:10)  HR: 118 (26 Jul 2021 14:00) (104 - 138)  BP: 106/53 (26 Jul 2021 14:00) (83/57 - 188/71)  BP(mean): 77 (26 Jul 2021 14:00) (65 - 108)  RR: 18 (26 Jul 2021 14:00) (7 - 59)  SpO2: 100% (26 Jul 2021 14:00) (91% - 100%)    Physical Exam:  General : Elderly well developed, well nourished,  and no acute distress  Neuro : Alert and oriented x 3, no focal deficits,   HEENT : Sclera clear, no JVD, RIJ no carotid bruits, neck supple, right eye chronic ptosis since childhood   Lungs: Clear to Ascultation, no wheezing , rales or rhonchi   Cardiovascular : + 1 +2, RRR, no murmurs, no rubs  GI : abdomen soft, NT, ND, + BS   : no suprapubic tenderness  Extremities : trace edema BLE ,+ 2 DP and +2 PT, feet warm   Skin : warm dry    TELE:   EKG:  Serum Pro-Brain Natriuretic Peptide: 00259 pg/mL (07.25.21 @ 15:54)   LABS:                      10.2   13.27 )-----------( 115      ( 26 Jul 2021 08:53 )             29.9     07-26    131<L>  |  100  |  38<H>  ----------------------------<  164<H>  4.4   |  18<L>  |  2.63<H>    Ca    8.3<L>      26 Jul 2021 02:58  Phos  4.6     07-26  Mg     2.1     07-26    TPro  4.3<L>  /  Alb  2.5<L>  /  TBili  0.8  /  DBili  x   /  AST  26  /  ALT  34  /  AlkPhos  43  07-26    PT/INR - ( 25 Jul 2021 15:54 )   PT: 21.9 sec;   INR: 1.88 ratio    PTT - ( 25 Jul 2021 15:54 )  PTT:>200.0 sec    RADIOLOGY & ADDITIONAL STUDIES:    IMPRESSION:  ECHO 7/24/21  1. Severely reduced left ventricular systolic function. EF 25 %  2. Increased left ventricular wallthickness.  3. Bi-atrial enlargement.  4. Moderate mitral regurgitation.  5. Moderate tricuspid regurgitation.               Chief Complaint : palpitations, denies any symptoms of SOB    HPI: 85 year old female PMH T2DM, HTN, HLD  HTN, HLD, CAD, C2D, DM transferred to [facility] from [facility] where she presented with abdominal pain. Patient states she ate  a "bad turkey sandwich" and woke up 7/22 with bad abdominal pain, N/V, and dizziness. She went to ED where she was found to be tachycardic with soft B/P, elevated lactate at 5.9. She received  2 L IVF and sent for CT a/p but be came acutely SOB while laying flat in CT requiring BiPAP. Patient denies being SOB of that time or ever. She said she just had some anxiety. In [facility] ED today, patient vitals were 110/70,  sinus rhythm, and has extremely unremarkable ROS with no current abdominal pain, N/V, or dizziness.     PAST MEDICAL & SURGICAL HISTORY:  HTN (hypertension)  HLD (hyperlipidemia)  DM (diabetes mellitus)  Hyperkalemia  CKD (chronic kidney disease)  Atherosclerosis  HTN (hypertension)  CAD (coronary artery disease)    S/P hysterectomy    SOCIAL HISTORY:  FAMILY HISTORY:  FH: MI (myocardial infarction) (Father, Mother)    MEDICATIONS  (STANDING):  atorvastatin 10 milliGRAM(s) Oral at bedtime  chlorhexidine 4% Liquid 1 Application(s) Topical <User Schedule>  dextrose 40% Gel 15 Gram(s) Oral once  dextrose 5%. 1000 milliLiter(s) (50 mL/Hr) IV Continuous <Continuous>  dextrose 5%. 1000 milliLiter(s) (100 mL/Hr) IV Continuous <Continuous>  dextrose 50% Injectable 25 Gram(s) IV Push once  dextrose 50% Injectable 12.5 Gram(s) IV Push once  dextrose 50% Injectable 25 Gram(s) IV Push once  glucagon  Injectable 1 milliGRAM(s) IntraMuscular once  insulin lispro (ADMELOG) corrective regimen sliding scale   SubCutaneous three times a day before meals  insulin lispro (ADMELOG) corrective regimen sliding scale   SubCutaneous at bedtime  norepinephrine Infusion 0.22 MICROgram(s)/kG/Min (25.8 mL/Hr) IV Continuous <Continuous>    AllergiesNo Known Allergies    REVIEW OF SYSTEM:  Constitutional: denies fever, chills, fatigue  Neuro: denies headache, numbness, weakness, dizziness  Resp: denies cough, wheezing, shortness of breath  CVS: denies chest pain, + palpitations, leg swelling  GI: denies abdominal pain, nausea, vomiting, diarrhea   : denies dysuria, frequency, incontinence  Skin: denies itching, burning, rashes, or lesions   Msk: denies joint pain or swelling     Vital Signs Last 24 Hrs  T(C): 36.6 (26 Jul 2021 04:00), Max: 36.9 (26 Jul 2021 02:10)  T(F): 97.8 (26 Jul 2021 04:00), Max: 98.4 (26 Jul 2021 02:10)  HR: 118 (26 Jul 2021 14:00) (104 - 138)  BP: 106/53 (26 Jul 2021 14:00) (83/57 - 188/71)  BP(mean): 77 (26 Jul 2021 14:00) (65 - 108)  RR: 18 (26 Jul 2021 14:00) (7 - 59)  SpO2: 100% (26 Jul 2021 14:00) (91% - 100%)    Physical Exam:  General : Elderly well developed, well nourished,  and no acute distress  Neuro : Alert and oriented x 3, no focal deficits,   HEENT : Sclera clear, no JVD, RIJ no carotid bruits, neck supple, right eye chronic ptosis since childhood   Lungs: Clear to Ascultation, no wheezing , rales or rhonchi   Cardiovascular : + 1 +2, RRR, no murmurs, no rubs  GI : abdomen soft, NT, ND, + BS   : no suprapubic tenderness  Extremities : trace edema BLE ,+ 2 DP and +2 PT, feet warm   Skin : warm dry    TELE: Atrial flutter 2:1 ,3:1   EKG: Atrial flutter 124 bpm  Serum Pro-Brain Natriuretic Peptide: 48869 pg/mL (07.25.21 @ 15:54)   LABS:                      10.2   13.27 )-----------( 115      ( 26 Jul 2021 08:53 )             29.9     07-26    131<L>  |  100  |  38<H>  ----------------------------<  164<H>  4.4   |  18<L>  |  2.63<H>    Ca    8.3<L>      26 Jul 2021 02:58  Phos  4.6     07-26  Mg     2.1     07-26    TPro  4.3<L>  /  Alb  2.5<L>  /  TBili  0.8  /  DBili  x   /  AST  26  /  ALT  34  /  AlkPhos  43  07-26    PT/INR - ( 25 Jul 2021 15:54 )   PT: 21.9 sec;   INR: 1.88 ratio    PTT - ( 25 Jul 2021 15:54 )  PTT:>200.0 sec    RADIOLOGY & ADDITIONAL STUDIES:    IMPRESSION:  ECHO 7/24/21  1. Severely reduced left ventricular systolic function. EF 25 %  2. Increased left ventricular wallthickness.  3. Bi-atrial enlargement.  4. Moderate mitral regurgitation.  5. Moderate tricuspid regurgitation.               Chief Complaint : palpitations, denies any symptoms of SOB    HPI: 85 year old female PMH T2DM, HTN, HLD  HTN, HLD, CAD, C2D, DM transferred to [facility] from [facility] where she presented with abdominal pain. Patient states she ate  a "bad turkey sandwich" and woke up 7/22 with bad abdominal pain, N/V, and dizziness. She went to ED where she was found to be tachycardic with soft B/P, elevated lactate at 5.9. She received  2 L IVF and sent for CT a/p but be came acutely SOB while laying flat in CT requiring BiPAP. Patient denies being SOB of that time or ever. She said she just had some anxiety. In [facility] ED today, patient vitals were 110/70,  sinus rhythm, and has extremely unremarkable ROS with no current abdominal pain, N/V, or dizziness.     PAST MEDICAL & SURGICAL HISTORY:  HTN (hypertension)  HLD (hyperlipidemia)  DM (diabetes mellitus)  Hyperkalemia  CKD (chronic kidney disease)  Atherosclerosis  HTN (hypertension)  CAD (coronary artery disease)    S/P hysterectomy    SOCIAL HISTORY:  FAMILY HISTORY:  FH: MI (myocardial infarction) (Father, Mother)    MEDICATIONS  (STANDING):  atorvastatin 10 milliGRAM(s) Oral at bedtime  chlorhexidine 4% Liquid 1 Application(s) Topical <User Schedule>  dextrose 40% Gel 15 Gram(s) Oral once  dextrose 5%. 1000 milliLiter(s) (50 mL/Hr) IV Continuous <Continuous>  dextrose 5%. 1000 milliLiter(s) (100 mL/Hr) IV Continuous <Continuous>  dextrose 50% Injectable 25 Gram(s) IV Push once  dextrose 50% Injectable 12.5 Gram(s) IV Push once  dextrose 50% Injectable 25 Gram(s) IV Push once  glucagon  Injectable 1 milliGRAM(s) IntraMuscular once  insulin lispro (ADMELOG) corrective regimen sliding scale   SubCutaneous three times a day before meals  insulin lispro (ADMELOG) corrective regimen sliding scale   SubCutaneous at bedtime  norepinephrine Infusion 0.22 MICROgram(s)/kG/Min (25.8 mL/Hr) IV Continuous <Continuous>    AllergiesNo Known Allergies    REVIEW OF SYSTEM:  Constitutional: denies fever, chills, fatigue  Neuro: denies headache, numbness, weakness, dizziness  Resp: denies cough, wheezing, shortness of breath  CVS: denies chest pain, + palpitations, leg swelling  GI: denies abdominal pain, nausea, vomiting, diarrhea   : denies dysuria, frequency, incontinence  Skin: denies itching, burning, rashes, or lesions   Msk: denies joint pain or swelling     Vital Signs Last 24 Hrs  T(C): 36.6 (26 Jul 2021 04:00), Max: 36.9 (26 Jul 2021 02:10)  T(F): 97.8 (26 Jul 2021 04:00), Max: 98.4 (26 Jul 2021 02:10)  HR: 118 (26 Jul 2021 14:00) (104 - 138)  BP: 106/53 (26 Jul 2021 14:00) (83/57 - 188/71)  BP(mean): 77 (26 Jul 2021 14:00) (65 - 108)  RR: 18 (26 Jul 2021 14:00) (7 - 59)  SpO2: 100% (26 Jul 2021 14:00) (91% - 100%)    Physical Exam:  General : Elderly well developed, well nourished,  and no acute distress  Neuro : Alert and oriented x 3, no focal deficits,   HEENT : Sclera clear, no JVD, RIJ no carotid bruits, neck supple, right eye chronic ptosis since childhood   Lungs: Clear to Ascultation, no wheezing , rales or rhonchi   Cardiovascular : + 1 +2, RRR, no murmurs, no rubs  GI : abdomen soft, NT, ND, + BS   : no suprapubic tenderness  Extremities : trace edema BLE ,+ 2 DP and +2 PT, feet warm   Skin : warm dry    TELE: Atrial flutter 2:1 ,3:1   EKG: Atrial flutter 124 bpm  Serum Pro-Brain Natriuretic Peptide: 36933 pg/mL (07.25.21 @ 15:54)   LABS:    Troponin T, High Sensitivity Result: 310: Specimen not hemolyzed                     10.2   13.27 )-----------( 115      ( 26 Jul 2021 08:53 )             29.9     07-26    131<L>  |  100  |  38<H>  ----------------------------<  164<H>  4.4   |  18<L>  |  2.63<H>    Ca    8.3<L>      26 Jul 2021 02:58  Phos  4.6     07-26  Mg     2.1     07-26    TPro  4.3<L>  /  Alb  2.5<L>  /  TBili  0.8  /  DBili  x   /  AST  26  /  ALT  34  /  AlkPhos  43  07-26    PT/INR - ( 25 Jul 2021 15:54 )   PT: 21.9 sec;   INR: 1.88 ratio    PTT - ( 25 Jul 2021 15:54 )  PTT:>200.0 sec    RADIOLOGY & ADDITIONAL STUDIES:    IMPRESSION:  ECHO 7/24/21  1. Severely reduced left ventricular systolic function. EF 25 %  2. Increased left ventricular wallthickness.  3. Bi-atrial enlargement.  4. Moderate mitral regurgitation.  5. Moderate tricuspid regurgitation.

## 2021-07-26 NOTE — PROCEDURE NOTE - NSPOSTCAREGUIDE_GEN_A_CORE
(2) assistive person
Verbal/written post procedure instructions were given to patient/caregiver/Instructed patient/caregiver to follow-up with primary care physician/Instructed patient/caregiver regarding signs and symptoms of infection/Keep the cast/splint/dressing clean and dry/Care for catheter as per unit/ICU protocols
Verbal/written post procedure instructions were given to patient/caregiver/Instructed patient/caregiver to follow-up with primary care physician/Instructed patient/caregiver regarding signs and symptoms of infection/Keep the cast/splint/dressing clean and dry/Care for catheter as per unit/ICU protocols

## 2021-07-27 DIAGNOSIS — I10 ESSENTIAL (PRIMARY) HYPERTENSION: ICD-10-CM

## 2021-07-27 DIAGNOSIS — I48.92 UNSPECIFIED ATRIAL FLUTTER: ICD-10-CM

## 2021-07-27 DIAGNOSIS — N17.9 ACUTE KIDNEY FAILURE, UNSPECIFIED: ICD-10-CM

## 2021-07-27 DIAGNOSIS — E11.69 TYPE 2 DIABETES MELLITUS WITH OTHER SPECIFIED COMPLICATION: ICD-10-CM

## 2021-07-27 DIAGNOSIS — D62 ACUTE POSTHEMORRHAGIC ANEMIA: ICD-10-CM

## 2021-07-27 DIAGNOSIS — I25.10 ATHEROSCLEROTIC HEART DISEASE OF NATIVE CORONARY ARTERY WITHOUT ANGINA PECTORIS: ICD-10-CM

## 2021-07-27 DIAGNOSIS — I25.118 ATHEROSCLEROTIC HEART DISEASE OF NATIVE CORONARY ARTERY WITH OTHER FORMS OF ANGINA PECTORIS: ICD-10-CM

## 2021-07-27 DIAGNOSIS — I50.21 ACUTE SYSTOLIC (CONGESTIVE) HEART FAILURE: ICD-10-CM

## 2021-07-27 LAB
ALBUMIN SERPL ELPH-MCNC: 2.6 G/DL — LOW (ref 3.3–5)
ALP SERPL-CCNC: 46 U/L — SIGNIFICANT CHANGE UP (ref 40–120)
ALT FLD-CCNC: 23 U/L — SIGNIFICANT CHANGE UP (ref 10–45)
ANION GAP SERPL CALC-SCNC: 10 MMOL/L — SIGNIFICANT CHANGE UP (ref 5–17)
AST SERPL-CCNC: 17 U/L — SIGNIFICANT CHANGE UP (ref 10–40)
BILIRUB SERPL-MCNC: 0.5 MG/DL — SIGNIFICANT CHANGE UP (ref 0.2–1.2)
BUN SERPL-MCNC: 32 MG/DL — HIGH (ref 7–23)
CALCIUM SERPL-MCNC: 8 MG/DL — LOW (ref 8.4–10.5)
CHLORIDE SERPL-SCNC: 106 MMOL/L — SIGNIFICANT CHANGE UP (ref 96–108)
CO2 SERPL-SCNC: 23 MMOL/L — SIGNIFICANT CHANGE UP (ref 22–31)
COVID-19 SPIKE DOMAIN AB INTERP: POSITIVE
COVID-19 SPIKE DOMAIN ANTIBODY RESULT: 116 U/ML — HIGH
CREAT SERPL-MCNC: 2.62 MG/DL — HIGH (ref 0.5–1.3)
GLUCOSE BLDC GLUCOMTR-MCNC: 131 MG/DL — HIGH (ref 70–99)
GLUCOSE BLDC GLUCOMTR-MCNC: 134 MG/DL — HIGH (ref 70–99)
GLUCOSE BLDC GLUCOMTR-MCNC: 153 MG/DL — HIGH (ref 70–99)
GLUCOSE BLDC GLUCOMTR-MCNC: 87 MG/DL — SIGNIFICANT CHANGE UP (ref 70–99)
GLUCOSE SERPL-MCNC: 94 MG/DL — SIGNIFICANT CHANGE UP (ref 70–99)
HCT VFR BLD CALC: 23.9 % — LOW (ref 34.5–45)
HCT VFR BLD CALC: 25.1 % — LOW (ref 34.5–45)
HGB BLD-MCNC: 7.8 G/DL — LOW (ref 11.5–15.5)
HGB BLD-MCNC: 8.1 G/DL — LOW (ref 11.5–15.5)
MAGNESIUM SERPL-MCNC: 1.8 MG/DL — SIGNIFICANT CHANGE UP (ref 1.6–2.6)
MCHC RBC-ENTMCNC: 28.9 PG — SIGNIFICANT CHANGE UP (ref 27–34)
MCHC RBC-ENTMCNC: 29.1 PG — SIGNIFICANT CHANGE UP (ref 27–34)
MCHC RBC-ENTMCNC: 32.3 GM/DL — SIGNIFICANT CHANGE UP (ref 32–36)
MCHC RBC-ENTMCNC: 32.6 GM/DL — SIGNIFICANT CHANGE UP (ref 32–36)
MCV RBC AUTO: 88.5 FL — SIGNIFICANT CHANGE UP (ref 80–100)
MCV RBC AUTO: 90.3 FL — SIGNIFICANT CHANGE UP (ref 80–100)
NRBC # BLD: 0 /100 WBCS — SIGNIFICANT CHANGE UP (ref 0–0)
NRBC # BLD: 0 /100 WBCS — SIGNIFICANT CHANGE UP (ref 0–0)
PHOSPHATE SERPL-MCNC: 3.3 MG/DL — SIGNIFICANT CHANGE UP (ref 2.5–4.5)
PLATELET # BLD AUTO: 102 K/UL — LOW (ref 150–400)
PLATELET # BLD AUTO: 94 K/UL — LOW (ref 150–400)
POTASSIUM SERPL-MCNC: 4.1 MMOL/L — SIGNIFICANT CHANGE UP (ref 3.5–5.3)
POTASSIUM SERPL-SCNC: 4.1 MMOL/L — SIGNIFICANT CHANGE UP (ref 3.5–5.3)
PROT SERPL-MCNC: 4.5 G/DL — LOW (ref 6–8.3)
RBC # BLD: 2.7 M/UL — LOW (ref 3.8–5.2)
RBC # BLD: 2.78 M/UL — LOW (ref 3.8–5.2)
RBC # FLD: 15.9 % — HIGH (ref 10.3–14.5)
RBC # FLD: 16 % — HIGH (ref 10.3–14.5)
SARS-COV-2 IGG+IGM SERPL QL IA: 116 U/ML — HIGH
SARS-COV-2 IGG+IGM SERPL QL IA: POSITIVE
SODIUM SERPL-SCNC: 139 MMOL/L — SIGNIFICANT CHANGE UP (ref 135–145)
TSH SERPL-MCNC: 2.62 UIU/ML — SIGNIFICANT CHANGE UP (ref 0.27–4.2)
WBC # BLD: 9.31 K/UL — SIGNIFICANT CHANGE UP (ref 3.8–10.5)
WBC # BLD: 9.82 K/UL — SIGNIFICANT CHANGE UP (ref 3.8–10.5)
WBC # FLD AUTO: 9.31 K/UL — SIGNIFICANT CHANGE UP (ref 3.8–10.5)
WBC # FLD AUTO: 9.82 K/UL — SIGNIFICANT CHANGE UP (ref 3.8–10.5)

## 2021-07-27 PROCEDURE — 99291 CRITICAL CARE FIRST HOUR: CPT

## 2021-07-27 PROCEDURE — 93010 ELECTROCARDIOGRAM REPORT: CPT

## 2021-07-27 PROCEDURE — 99233 SBSQ HOSP IP/OBS HIGH 50: CPT

## 2021-07-27 PROCEDURE — 71045 X-RAY EXAM CHEST 1 VIEW: CPT | Mod: 26

## 2021-07-27 RX ORDER — HEPARIN SODIUM 5000 [USP'U]/ML
5000 INJECTION INTRAVENOUS; SUBCUTANEOUS EVERY 8 HOURS
Refills: 0 | Status: DISCONTINUED | OUTPATIENT
Start: 2021-07-27 | End: 2021-08-01

## 2021-07-27 RX ORDER — METOPROLOL TARTRATE 50 MG
12.5 TABLET ORAL ONCE
Refills: 0 | Status: COMPLETED | OUTPATIENT
Start: 2021-07-27 | End: 2021-07-27

## 2021-07-27 RX ORDER — METOPROLOL TARTRATE 50 MG
25 TABLET ORAL EVERY 8 HOURS
Refills: 0 | Status: DISCONTINUED | OUTPATIENT
Start: 2021-07-27 | End: 2021-07-27

## 2021-07-27 RX ORDER — LANOLIN ALCOHOL/MO/W.PET/CERES
3 CREAM (GRAM) TOPICAL ONCE
Refills: 0 | Status: COMPLETED | OUTPATIENT
Start: 2021-07-27 | End: 2021-07-27

## 2021-07-27 RX ORDER — POLYETHYLENE GLYCOL 3350 17 G/17G
17 POWDER, FOR SOLUTION ORAL DAILY
Refills: 0 | Status: DISCONTINUED | OUTPATIENT
Start: 2021-07-27 | End: 2021-08-14

## 2021-07-27 RX ORDER — MAGNESIUM SULFATE 500 MG/ML
2 VIAL (ML) INJECTION ONCE
Refills: 0 | Status: COMPLETED | OUTPATIENT
Start: 2021-07-27 | End: 2021-07-27

## 2021-07-27 RX ORDER — METOPROLOL TARTRATE 50 MG
25 TABLET ORAL
Refills: 0 | Status: DISCONTINUED | OUTPATIENT
Start: 2021-07-27 | End: 2021-07-27

## 2021-07-27 RX ORDER — METOPROLOL TARTRATE 50 MG
37.5 TABLET ORAL
Refills: 0 | Status: DISCONTINUED | OUTPATIENT
Start: 2021-07-27 | End: 2021-07-29

## 2021-07-27 RX ORDER — HYDRALAZINE HCL 50 MG
10 TABLET ORAL EVERY 8 HOURS
Refills: 0 | Status: DISCONTINUED | OUTPATIENT
Start: 2021-07-27 | End: 2021-07-27

## 2021-07-27 RX ORDER — POLYETHYLENE GLYCOL 3350 17 G/17G
17 POWDER, FOR SOLUTION ORAL ONCE
Refills: 0 | Status: DISCONTINUED | OUTPATIENT
Start: 2021-07-27 | End: 2021-07-27

## 2021-07-27 RX ADMIN — Medication 50 GRAM(S): at 03:35

## 2021-07-27 RX ADMIN — ATORVASTATIN CALCIUM 10 MILLIGRAM(S): 80 TABLET, FILM COATED ORAL at 21:24

## 2021-07-27 RX ADMIN — Medication 37.5 MILLIGRAM(S): at 19:01

## 2021-07-27 RX ADMIN — Medication 25 MILLIGRAM(S): at 12:04

## 2021-07-27 RX ADMIN — Medication 12.5 MILLIGRAM(S): at 13:30

## 2021-07-27 RX ADMIN — CHLORHEXIDINE GLUCONATE 1 APPLICATION(S): 213 SOLUTION TOPICAL at 05:08

## 2021-07-27 RX ADMIN — HEPARIN SODIUM 5000 UNIT(S): 5000 INJECTION INTRAVENOUS; SUBCUTANEOUS at 21:24

## 2021-07-27 RX ADMIN — Medication 3 MILLIGRAM(S): at 23:38

## 2021-07-27 RX ADMIN — Medication 1 DROP(S): at 21:30

## 2021-07-27 RX ADMIN — POLYETHYLENE GLYCOL 3350 17 GRAM(S): 17 POWDER, FOR SOLUTION ORAL at 12:02

## 2021-07-27 NOTE — PROGRESS NOTE ADULT - PROBLEM SELECTOR PLAN 2
- continue telemetry monitoring  - appreciate EP's evaluation and recommendations  - beta blocker initiated today, monitor hemodynamics closely, pending plan for ALANNA/DCCV once patient able to tolerate uninterrupted AC as below

## 2021-07-27 NOTE — PROGRESS NOTE ADULT - PROBLEM SELECTOR PLAN 2
- hsTrop 310 on admission which may be due to demand ischemia  - c/w statin, off AC/ASA d/t bleeding  - will start BB as above and will need eventual ischemic eval

## 2021-07-27 NOTE — PROGRESS NOTE ADULT - PROBLEM SELECTOR PLAN 1
- newly diagnosed HFrEF (had previous hx of HFpEF), needs ischemic evaluation once DANE has resolved  - appreciate HF team's evaluation and recommendations

## 2021-07-27 NOTE — PHYSICAL THERAPY INITIAL EVALUATION ADULT - PERTINENT HX OF CURRENT PROBLEM, REHAB EVAL
86 yo F w/ pmh of HFpEF, HTN, HLD, CAD, T2DM, tx to [facility] after being found to be in AdHF possible tachycardia induced with new DANE. She was also noted to have acute hypovolemic shock secondary to anemia requiring blood transfusions.

## 2021-07-27 NOTE — PROGRESS NOTE ADULT - ATTENDING COMMENTS
85 year-old woman with HTN, DM admitted to outside hospital with suspected cardiogenic shock, hospital course complicated by hematoma of the RUE and component of hemorrhagic shock    Lactate normalized and pressors have been weaned down and she remains hemodynamically stable.  On inotrope since yesterday.    Continue statin  New cardiomyopathy as per previous TTE 2019 (NL EF) possibly related to tachycardia?, EPS consult appreciated   Will start BB at this time   Per outpatient notes pt previously refused cath   RUE with good cap refill, radial pulse by doppler, warm extremity, continue to monitor   H/H has downtrended but now remains stable after 2 unit PRBCs, then may consider restarting AC   DANE likely in setting of shock, previous creat was normal as per labs 06/2021 (allscripts)  no fever, elevated WBC, no obvious signs of infection.  FS adequate   Will d/c NANCY (07/25) and ivelisse

## 2021-07-27 NOTE — DIETITIAN INITIAL EVALUATION ADULT. - ADD RECOMMEND
1. Monitor diet, PO intake, GI status, weight, labs, skin integrity 2. Continue current DASH diet as tolerated 3. Reinforce diet education as accepted

## 2021-07-27 NOTE — PROGRESS NOTE ADULT - ASSESSMENT
84 yo F w/ pmh of HFpEF, HTN, HLD, CAD, T2DM, tx to [facility] after being found to be in AdHF possible tachycardia induced with new DANE. She was also noted to have acute hypovolemic shock secondary to anemia requiring blood transfusions.     Neuro  - A and O x 4 no concerns. Not in any acute pain. Maintain bedrest for today.     Resp  - No oxygen requirements  - Chest XR showing no consolidations   - no active issues    CV   AFlutter vs ATach  - EP consulted  - Will resume AC once CBC remains stable  - Hgb stable, but can consider transfusing since Hgb < 8  - rate control with Lopressor 12.5 BID with goal of transitioning to Toprol XL. HR and BP better today  - consider starting digoxin per EP  - monitor on tele  - plan for eventual ALANNA/cardioversion    AdHF  - Likely tachy induced, no cardiac cath due to DANE  - appears euvolemic today with stable BP    GI  - Continue DASH diet, PPI  - Bowel regimen      - Keep oviedo in place for crit care monitoring    Renal  - Monitor Cr, new DANE likely secondary to AdHF and hypovolemic shock. ELectrolytes stable. Check urine lytes  - replete Mg    ID  - WBC 16, no fevers, continue to monitor off antibiotics    heme  - Off AC and heparin, will resume heparin gtt low dose when CBC remains stable    Vascular  - Hannah access bleeding now appears stable. Duplex without pseudo. Vascular following. No e/o compartment. CTA deferred given DANE    Endo  - Glucose 170-207. Glucose monitoring    84 yo F w/ pmh of HFpEF, HTN, HLD, CAD, T2DM, tx to [facility] after being found to be in AdHF possible tachycardia induced with new DANE. She was also noted to have acute hypovolemic shock secondary to anemia requiring blood transfusions.     Neuro  - A and O x 4 no concerns. Not in any acute pain.     Resp  - No oxygen requirements  - Chest XR showing no consolidations   - no active issues    CV   AFlutter vs ATach  - EP consulted  - Will resume AC once CBC remains stable  - Hgb stable, but can consider transfusing since Hgb < 8  - rate control with Lopressor 25 BID with goal of transitioning to Toprol XL. HR and BP better today  - monitor on tele  - plan for eventual ALANNA/cardioversion    AdHF  - Likely tachy induced, no cardiac cath due to DANE  - appears euvolemic today with stable BP    GI  - Continue DASH diet, PPI  - Bowel regimen      - Keep oviedo in place for crit care monitoring    Renal  - Monitor Cr, new DANE likely secondary to AdHF and hypovolemic shock. ELectrolytes stable. Check urine lytes  - replete Mg    ID  - WBC 16, no fevers, continue to monitor off antibiotics    heme  - Off AC and heparin, will resume heparin gtt low dose when CBC remains stable    Vascular  - Hannah access bleeding now appears stable. Duplex without pseudo. Vascular following. No e/o compartment. CTA deferred given DANE    Endo  - Glucose 170-207. Glucose monitoring

## 2021-07-27 NOTE — PROGRESS NOTE ADULT - PROBLEM SELECTOR PLAN 5
- requiring PRBC transfusion during hospitalization for hypervolemic shock  - A-line access bleeding now appears stable; RUE duplex without evidence of active bleeding or pseudoaneurysm, although there is a hematoma; no e/o compartment syndrome; CTA deferred given DANE  - f/u vascular recommendations  - monitor H&H closely  - per EP, once anemia stabilizes and patient able to be on uninterrupted AC, plan for ALANNA/DCCV for Aflutter as above

## 2021-07-27 NOTE — DIETITIAN INITIAL EVALUATION ADULT. - ORAL INTAKE PTA/DIET HISTORY
Pt was eating well with no changes in appetite. Pt was following a low salt diet, reports preparing own meals and consumes 2.5 meals/day. Confirms no known food allergies. Denies Hx of chewing or swallowing issues. Reports taking potassium supplement and garlic supplement PTA.

## 2021-07-27 NOTE — PROGRESS NOTE ADULT - PROBLEM SELECTOR PLAN 5
- s/p 2U PRBC and Hgb currently stable  - vascular surgery following  - RUE duplex without evidence of active bleeding or pseudoaneurysm, although there is a hematoma

## 2021-07-27 NOTE — CHART NOTE - NSCHARTNOTEFT_GEN_A_CORE
MAR Accept Note  Transfer to:  Medicine   Accepting Attending Physician:  Dr. Theresa Martinez  Assigned Room: 3 U 355W     Patient seen and examined.   Labs and data reviewed.   No findings precluding transfer of service.       HPI/CICU COURSE:   Please refer to MICU transfer note for full details. Briefly, this is a 85 F w/ PMH HFpEF, HTN, HLD, CAD, C2D, DM transferred to [facility] from OSH for further management. On 7/21, patient said" she ate a bad turkey sandwich" and woke up 7/22 with bad abdominal pain, N/V, and dizziness. She then went to [facility] ED where she was found to be tachycardic w/ soft BP, elevated lactate at 5.9. She was given 2 L IVF and sent for CT a/P but be came acutely SOB while laying flat in CT requiring BiPAP. Patient denies being SOB of that time or ever. She said she just had some anxiety.  In [facility] ED today, patient vitals were 110/70,  sinus rhythm, and has extremely unremarkable ROS with no current abdominal pain, N/V, or dizziness She is saturating on room air in high 90s  (25 Jul 2021 14:00). She was started on Milrinone and noted to have an acute HGB drop of 13 to 8.5. She was transfused 2 U of PRBCs and given fluid and HGB responded. She was noted to have acute swelling of her RUE at the site of an axillary a line. Heparin was discontinued. Levophed was started.  Milrinone was discontinued as pt lactate cleared and looks to be more in hypovolemic shock. Pt given 500 cc of NS and albumin w appropriate response in UO. CBC remained stable. Pt doing well off Milrinone.           FOR FOLLOW-UP:  [] Monitor Hgb s/p 2 units of pRBCs during this admission   [] monitor RUE hematoma, currently heparin on hold 2/2 to hematoma  [] f/u Vascualr agnieszka Wylie MD  Internal Medicine PGY-3

## 2021-07-27 NOTE — PROGRESS NOTE ADULT - ATTENDING COMMENTS
Pt denies SOB, pain or any other symptom. She is eager to go home.   BP has improved but remains tachycardic.  CVP remains low.   OK to start low dose betablocker to help with rate control.   Eventually will need ischemia work up and rhythm control.

## 2021-07-27 NOTE — DIETITIAN INITIAL EVALUATION ADULT. - PERTINENT LABORATORY DATA
7/26: POCT Gluc: 87, 143  BUN: 32 H, Cr: 2.62 H, Ca: 8.0 L, eGFR: 16 L   7/26: HbA1c: 5.6% (within normal limits)

## 2021-07-27 NOTE — DIETITIAN INITIAL EVALUATION ADULT. - PERTINENT MEDS FT
MEDICATIONS  (STANDING):  artificial  tears Solution 1 Drop(s) Both EYES two times a day  atorvastatin 10 milliGRAM(s) Oral at bedtime  chlorhexidine 4% Liquid 1 Application(s) Topical <User Schedule>  dextrose 40% Gel 15 Gram(s) Oral once  dextrose 5%. 1000 milliLiter(s) (50 mL/Hr) IV Continuous <Continuous>  dextrose 5%. 1000 milliLiter(s) (100 mL/Hr) IV Continuous <Continuous>  dextrose 50% Injectable 25 Gram(s) IV Push once  dextrose 50% Injectable 12.5 Gram(s) IV Push once  dextrose 50% Injectable 25 Gram(s) IV Push once  glucagon  Injectable 1 milliGRAM(s) IntraMuscular once  heparin   Injectable 5000 Unit(s) SubCutaneous every 8 hours  insulin lispro (ADMELOG) corrective regimen sliding scale   SubCutaneous three times a day before meals  insulin lispro (ADMELOG) corrective regimen sliding scale   SubCutaneous at bedtime  metoprolol tartrate 25 milliGRAM(s) Oral two times a day  polyethylene glycol 3350 17 Gram(s) Oral daily

## 2021-07-27 NOTE — PROGRESS NOTE ADULT - ASSESSMENT
85 year old female PMH T2DM, HTN, HLD  HTN, HLD, CAD, CKD, (per Dr Ross cardiology in [facility] CAD dx probable based on myocardial profusion scan, May 2021, patient refused cardiac cath) transferred to [facility] from [facility] where she presented with abdominal pain. Patient states she ate  a "bad turkey sandwich" and woke up 7/22 with bad abdominal pain, N/V, and dizziness. She went to ED where she was found to be tachycardic with soft B/P, elevated lactate at 5.9. She received  2 L IVF and sent for CT a/p but be came acutely SOB while laying flat in CT requiring BiPAP. Patient states she was never SOB and that it was anxiety. ECHO reveal new low EF, TTE 2/2019 with normal EF now 25 %.    1. New onset HFrEF 25% in the setting of tachycardia   2. New onset Atrial Flutter with RVR     continue to monitor on telemetry  Keep K+>4, MG++>2  off pressors and started on metoprolol 25mg BID  with improved rate control   can consider small dose digoxin if needed  plans rate control until patient can tolerate uninterrupted AC for eventual ALANNA/DCCV   [id number] 85 year old female PMH T2DM, HTN, HLD  HTN, HLD, CAD, CKD, (per Dr Ross cardiology in [facility] CAD dx probable based on myocardial profusion scan, May 2021, patient refused cardiac cath) transferred to [facility] from [facility] where she presented with abdominal pain. Patient states she ate  a "bad turkey sandwich" and woke up 7/22 with bad abdominal pain, N/V, and dizziness. She went to ED where she was found to be tachycardic with soft B/P, elevated lactate at 5.9. She received  2 L IVF and sent for CT a/p but be came acutely SOB while laying flat in CT requiring BiPAP. Patient states she was never SOB and that it was anxiety. ECHO reveal new low EF, TTE 2/2019 with normal EF now 25 %.    1. New onset HFrEF 25% in the setting of tachycardia   2. New onset Atrial Flutter with RVR     continue to monitor on telemetry  Keep K+>4, MG++>2  off pressors and started on metoprolol 25mg BID  with improved rate control   can consider small dose digoxin if needed  plans rate control until patient can tolerate uninterrupted AC for eventual ALANNA/DCCV   [id number]  addendum 18:36  titrate metoprolol for rate control as B/P permits  EP will Sign off recall when able to tolerate AC uninterrupted for ALANNA DCCV 85 year old female PMH T2DM, HTN, HLD  HTN, HLD, CAD, CKD, (per Dr Ross cardiology in [facility] CAD dx probable based on myocardial profusion scan, May 2021, patient refused cardiac cath) transferred to [facility] from [facility] where she presented with abdominal pain. Patient states she ate  a "bad turkey sandwich" and woke up 7/22 with bad abdominal pain, N/V, and dizziness. She went to ED where she was found to be tachycardic with soft B/P, elevated lactate at 5.9. She received  2 L IVF and sent for CT a/p but be came acutely SOB while laying flat in CT requiring BiPAP. Patient states she was never SOB and that it was anxiety. ECHO reveal new low EF, TTE 2/2019 with normal EF now 25 %.    1. New onset HFrEF 25% in the setting of tachycardia   2. New onset Atrial Flutter with RVR     continue to monitor on telemetry  Keep K+>4, MG++>2  off pressors and started on metoprolol 25mg BID  with improved rate control   can consider small dose digoxin if needed  plans rate control until patient can tolerate uninterrupted AC for eventual ALANNA/DCCV   [id number]  addendum 18:36  titrate metoprolol for rate control as B/P permits  noted EKG with low voltage, atrial arrhythmia, CKD we are recommending Amyloid work up with pyrophosphate scan and check light chains  EP will Sign off recall when able to tolerate AC uninterrupted for ALANNA DCCV  spoke with Latasha ARAIZA medicine team

## 2021-07-27 NOTE — PROGRESS NOTE ADULT - PROBLEM SELECTOR PLAN 4
- May be related to ATN given episodes of hypotension  - She now appears hemodynamically stable and slightly hypovolemic with low CVP  - PRN IVF as above and will monitor renal function closely - Per outpatient records, SCr was 1.14 on 5/21/21  - May be related to ATN given episodes of hypotension  - She now appears hemodynamically stable and slightly hypovolemic with low CVP  - PRN IVF as above and will monitor renal function closely

## 2021-07-27 NOTE — PROGRESS NOTE ADULT - PROBLEM SELECTOR PLAN 1
- please check central venous saturation and lactate  - if stable, would start BB  - agree with PRN albumin; goal CVP 6-8  - etiology of acute decline in LVEF may be tachy induced, but will need eventual ischemic eval once DANE has resolved - please check central venous saturation and lactate  - if stable, would start BB  - agree with PRN albumin; goal CVP 6-8  - acute decline in LVEF may be tachy induced, but will need eventual ischemic eval once DANE has resolved - please check central venous saturation and lactate  - if stable, would start BB  - agree with PRN albumin; goal CVP 6-8  - newly diagnosed HFrEF, needs ischemic eval once DANE has resolved

## 2021-07-27 NOTE — PROGRESS NOTE ADULT - ASSESSMENT
86 y/o female with h/o chronic HFpEF, HTN, DLP, CAD (h/o abnormal stress test), DM 2 and CKD 2 who presented to [facility] with nausea, vomiting and dizziness. She was found to be tachycardic, hypotensive with elevated lactate concerning for cardiogenic shock. She was given IVF, empiric antibiotics and underwent abd CT which ws unremarkable. Her ekg showed new onset aflutter and her TTE showed newly diagnosed LV systolic dysfunction with LVEF 25%, mod MR and mod TR. She was started on milrinone and levophed gtt. She was transferred to [facility] for further management. Her hospital course was complicated by tachypnea requiring bipap placement, acute anemia requiring PRBCs transfusion thought to be due bleeding from arterial line and heparin gtt. Her milrinone gtt was dc’ed 7/26 due to hypotension. Her CVP has been low (3 today) suggesting hypovolemia and she has been volume resuscitated. Currently hemodynamically stable off inotropes, but remains with an DANE. She is hypertensive and her Afib/flutter is not rate controlled.

## 2021-07-27 NOTE — DIETITIAN INITIAL EVALUATION ADULT. - PHYSCIAL ASSESSMENT
71 110% IBW (126 pounds UBW used for calculations)  Skin: no pressure injuries per flow sheets well nourished

## 2021-07-27 NOTE — PROGRESS NOTE ADULT - ASSESSMENT
85yoF w/ PMHx significant for HFpEF, HTN, HLD, CAD, NIDDM, was tx to [facility] from [place] after being found to be in ADHF / new acute systolic heart failure, possibly tachycardia induced (new Aflutter) with new DANE, requiring CICU for inotropes / pressors / BiPAP, now off; course c/b acute hypovolemic shock secondary to blood loss anemia requiring PRBC transfusion.

## 2021-07-27 NOTE — PHYSICAL THERAPY INITIAL EVALUATION ADULT - ADDITIONAL COMMENTS
Pt lives alone in a basement apt w/ 2-3 steps to neg. PTA indep w/ all ADLs w/ use of st cane during outdoor excursions. Pt reports assisted by niece (lives nearby) w/ groceries. Pt states able to stay at niece's home ([address]STE) if needed.

## 2021-07-27 NOTE — DIETITIAN INITIAL EVALUATION ADULT. - OTHER INFO
Upon RD visit, pt reports appetite has been intact. Pt reports consuming eggs, toast, juice and buttered roll at breakfast this morning, reports consuming >75%. Pt denies nausea, vomiting, constipation or diarrhea at this time. Last bowel movement 7/27 per flow sheets.    Pt reports UBW is ~126 pounds and denies any recent weight changes PTA. Per RD note from 7/25/21 (before pt transferred), pt was 132 pounds (likely edema). Current dosing weight of 137 pounds (7/27/21), weight gain likely secondary to edema. Will continue to monitor trends as available. Pt also confirms she is 5 feet, 3 inches.    Pt reports using no salt at home while cooking and declines need for additional diet education at this time. RD encouraged continued adequate intake at this time.

## 2021-07-27 NOTE — PROGRESS NOTE ADULT - PROBLEM SELECTOR PLAN 4
- in setting of CKD 2 as reported prior to this hospitalization  - etiology likely related to presenting cardiogenic shock and hypervolemic shock d/t blood loss  - continue to closely monitor along w/ UOP and electrolytes  - once improved, will continue ischemic workup w/ angiography per cardiology  - home ARB on hold

## 2021-07-27 NOTE — PROGRESS NOTE ADULT - SUBJECTIVE AND OBJECTIVE BOX
MEDICINE ACCEPTANCE NOTE - INCOMPLETE NOTE    Arian Silva M.D.  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 On 7/21, patient said" she ate a bad turkey sandwich" and woke up 7/22 with bad abdominal pain, N/V, and dizziness. She then went to [facility] ED where she was found to be tachycardic w/ soft BP, elevated lactate at 5.9. She was given 2 L IVF and sent for CT a/P but be came acutely SOB while laying flat in CT requiring BiPAP. Patient denies being SOB of that time or ever. She said she just had some anxiety.    In [facility] ED today, patient vitals were 110/70,  sinus rhythm, and has extremely unremarkable ROS with no current abdominal pain, N/V, or dizziness She is saturating on room air in high 90s  (25 Jul 2021 14:00)    PAST MEDICAL & SURGICAL HISTORY: HTN (hypertension); HLD (hyperlipidemia); DM (diabetes mellitus); Hyperkalemia; CKD (chronic kidney disease); Atherosclerosis; HTN (hypertension); CAD (coronary artery disease); Stage 4 chronic kidney disease; S/P hysterectomy    SOCIAL HISTORY:     FAMILY HISTORY: MI (myocardial infarction) (Father, Mother)    ALLERGIES: No Known Allergies    HOSPITAL COURSE:     SUBJECTIVE / ACUTE INTERVAL EVENTS:    Review of Systems  · Negative General Symptoms: no fever; no chills  · Negative Skin Symptoms: no rash; no itching  · Negative Ophthalmologic Symptoms: no scleral injection L; no scleral injection R  · Negative ENMT Symptoms: no throat pain; no dysphagia; no nasal congestion  · Negative Respiratory and Thorax Symptoms: no cough; no wheezing; no pleuritic chest pain  · Negative Cardiovascular Symptoms: no chest pain; no palpitations; no peripheral edema  · Negative Gastrointestinal Symptoms: no nausea; no vomiting; no diarrhea; no constipation; no change in bowel habits; no abdominal pain  · Negative General Genitourinary Symptoms: no flank pain L; no flank pain R; no dysuria  · Negative Musculoskeletal Symptoms: no joint swelling; no neck pain; no back pain  · Neurological: negative  · Psychiatric: negative  · Hematology/Lymphatics: negative  · Endocrine: negative    OBJECTIVE:   Vital Signs Last 24 Hrs  T(C): 36.9 (27 Jul 2021 14:30), Max: 37.1 (27 Jul 2021 01:00)  T(F): 98.4 (27 Jul 2021 14:30), Max: 98.7 (27 Jul 2021 01:00)  HR: 73 (27 Jul 2021 14:30) (68 - 140)  BP: 154/87 (27 Jul 2021 14:30) (108/58 - 173/82)  BP(mean): 74 (27 Jul 2021 13:05) (74 - 126)  RR: 18 (27 Jul 2021 14:30) (5 - 32)  SpO2: 96% (27 Jul 2021 14:30) (61% - 100%)    I&O's Summary  26 Jul 2021 07:01  -  27 Jul 2021 07:00  --------------------------------------------------------  IN: 1185.1 mL / OUT: 2405 mL / NET: -1219.9 mL    27 Jul 2021 07:01  -  27 Jul 2021 16:42  --------------------------------------------------------  IN: 300 mL / OUT: 700 mL / NET: -400 mL    Physical Examination:  GEN: thin man, laying in stretcher in NAD  PSYCH: A&Ox3, mood and affect appear appropriate   SKIN: intact, no e/o rash  NEURO: no focal neurologic deficits appreciated; CN II-XII intact, sensation intact B/L, motor 5/5 in all mm groups  EYES: PERRL, anicteric, EOMI, no vertical/horizontal nystagmus  HEAD: NC, AT  NECK: supple  RESPI: no accessory muscle use, B/L air entry, CTAB   CARDIO: regular rate/rhythm, no LE edema B/L  ABD: soft, NT, ND, +BS  EXT: patient able to move all extremities spontaneously  VASC: peripheral pulses palpated    Labs:  CAPILLARY BLOOD GLUCOSE  POCT Blood Glucose.: 131 mg/dL (27 Jul 2021 12:22)  POCT Blood Glucose.: 87 mg/dL (27 Jul 2021 08:31)  POCT Blood Glucose.: 143 mg/dL (26 Jul 2021 21:42)  POCT Blood Glucose.: 124 mg/dL (26 Jul 2021 17:17)                        8.1    9.82  )-----------( 102      ( 27 Jul 2021 11:18 )             25.1     07-27  139  |  106  |  32<H>  ----------------------------<  94  4.1   |  23  |  2.62<H>    Ca    8.0<L>      27 Jul 2021 02:23  Phos  3.3     07-27  Mg     1.8     07-27    TPro  4.5<L>  /  Alb  2.6<L>  /  TBili  0.5  /  DBili  x   /  AST  17  /  ALT  23  /  AlkPhos  46  07-27    Imaging Personally Reviewed:    Consultant(s) Notes Reviewed:    Care Discussed with Consultants/Other Providers:    MEDICATIONS  (STANDING):  artificial  tears Solution 1 Drop(s) Both EYES two times a day  atorvastatin 10 milliGRAM(s) Oral at bedtime  chlorhexidine 4% Liquid 1 Application(s) Topical <User Schedule>  dextrose 40% Gel 15 Gram(s) Oral once  dextrose 5%. 1000 milliLiter(s) (50 mL/Hr) IV Continuous <Continuous>  dextrose 5%. 1000 milliLiter(s) (100 mL/Hr) IV Continuous <Continuous>  dextrose 50% Injectable 25 Gram(s) IV Push once  dextrose 50% Injectable 12.5 Gram(s) IV Push once  dextrose 50% Injectable 25 Gram(s) IV Push once  glucagon  Injectable 1 milliGRAM(s) IntraMuscular once  heparin   Injectable 5000 Unit(s) SubCutaneous every 8 hours  insulin lispro (ADMELOG) corrective regimen sliding scale   SubCutaneous three times a day before meals  insulin lispro (ADMELOG) corrective regimen sliding scale   SubCutaneous at bedtime  metoprolol tartrate 37.5 milliGRAM(s) Oral two times a day  polyethylene glycol 3350 17 Gram(s) Oral daily   MEDICINE ACCEPTANCE NOTE - INCOMPLETE NOTE    Arian Silva M.D.  Division of Hospital Medicine  TEAMS or Pager: [phone]    HISTORY OF PRESENTING ILLNESS / HOSPITAL COURSE: 86 y/o female with h/o chronic HFpEF, HTN, DLP, CAD (h/o abnormal stress test), DM 2 and CKD 2 who presented to [facility] with nausea, vomiting and dizziness. She was found to be tachycardic, hypotensive with elevated lactate concerning for cardiogenic shock. She was given IVF, empiric antibiotics and underwent abd CT which ws unremarkable. Her ekg showed new onset aflutter and her TTE showed newly diagnosed LV systolic dysfunction with LVEF 25%, mod MR and mod TR. She was started on milrinone and levophed gtt. She was transferred to [facility] for further management. Her hospital course was complicated by tachypnea requiring bipap placement, acute anemia requiring PRBCs transfusion thought to be due bleeding from arterial line and heparin gtt. Her milrinone gtt was dc’ed 7/26 due to hypotension. Her CVP has been low (3 today) suggesting hypovolemia and she has been volume resuscitated. Currently hemodynamically stable off inotropes, but remains with an DANE. She is hypertensive and her Afib/flutter is not rate controlled.    PAST MEDICAL & SURGICAL HISTORY: HTN (hypertension); HLD (hyperlipidemia); DM (diabetes mellitus); Hyperkalemia; CKD (chronic kidney disease); Atherosclerosis; HTN (hypertension); CAD (coronary artery disease); Stage 4 chronic kidney disease; S/P hysterectomy    SOCIAL HISTORY: Nonsmoker, 1beer a day, no illicit drug use     FAMILY HISTORY: MI (myocardial infarction) (Father, Mother)    ALLERGIES: No Known Allergies    HOME MEDICATIONS:  atorvastatin 10 mg oral tablet: 1 tab(s) orally once a day (25 Jul 2021 14:34)  doxazosin 2 mg oral tablet: 1 tab(s) orally once a day (25 Jul 2021 14:34)  metFORMIN 500 mg oral tablet: 1 tab(s) orally 2 times a day (25 Jul 2021 14:34)  metoprolol tartrate 50 mg oral tablet: 1 tab(s) orally 2 times a day (25 Jul 2021 14:34)  olmesartan 20 mg oral tablet: 1 tab(s) orally once a day (25 Jul 2021 14:34)    SUBJECTIVE / ACUTE INTERVAL EVENTS:    Review of Systems  · Negative General Symptoms: no fever; no chills  · Negative Skin Symptoms: no rash; no itching  · Negative Ophthalmologic Symptoms: no scleral injection L; no scleral injection R  · Negative ENMT Symptoms: no throat pain; no dysphagia; no nasal congestion  · Negative Respiratory and Thorax Symptoms: no cough; no wheezing; no pleuritic chest pain  · Negative Cardiovascular Symptoms: no chest pain; no palpitations; no peripheral edema  · Negative Gastrointestinal Symptoms: no nausea; no vomiting; no diarrhea; no constipation; no change in bowel habits; no abdominal pain  · Negative General Genitourinary Symptoms: no flank pain L; no flank pain R; no dysuria  · Negative Musculoskeletal Symptoms: no joint swelling; no neck pain; no back pain  · Neurological: negative  · Psychiatric: negative  · Hematology/Lymphatics: negative  · Endocrine: negative    OBJECTIVE:   Vital Signs Last 24 Hrs  T(F): 98.4 (27 Jul 2021 14:30), Max: 98.7 (27 Jul 2021 01:00)  HR: 73 (27 Jul 2021 14:30) (68 - 140)  BP: 154/87 (27 Jul 2021 14:30) (108/58 - 173/82)  RR: 18 (27 Jul 2021 14:30) (5 - 32)  SpO2: 96% (27 Jul 2021 14:30) (61% - 100%)    I&O's Summary  26 Jul 2021 07:01  -  27 Jul 2021 07:00  --------------------------------------------------------  IN: 1185.1 mL / OUT: 2405 mL / NET: -1219.9 mL    27 Jul 2021 07:01  -  27 Jul 2021 16:42  --------------------------------------------------------  IN: 300 mL / OUT: 700 mL / NET: -400 mL    Physical Examination:  GEN: elderly woman, laying in bed in NAD  PSYCH: A&Ox3, mood and affect appear appropriate   SKIN: intact, no e/o rash  NEURO: no focal neurologic deficits appreciated; CN II-XII intact, sensation intact B/L, motor 5/5 in all mm groups  EYES: PERRL, anicteric, EOMI, no vertical/horizontal nystagmus  HEAD: NC, AT  NECK: supple  RESPI: no accessory muscle use, B/L air entry, CTAB   CARDIO: regular rate/rhythm, no LE edema B/L  ABD: soft, NT, ND, +BS  EXT: patient able to move all extremities spontaneously  VASC: peripheral pulses palpated    Labs:  CAPILLARY BLOOD GLUCOSE  POCT Blood Glucose.: 131 mg/dL (27 Jul 2021 12:22)  POCT Blood Glucose.: 87 mg/dL (27 Jul 2021 08:31)  POCT Blood Glucose.: 143 mg/dL (26 Jul 2021 21:42)  POCT Blood Glucose.: 124 mg/dL (26 Jul 2021 17:17)                        8.1    9.82  )-----------( 102      ( 27 Jul 2021 11:18 )             25.1     07-27  139  |  106  |  32<H>  ----------------------------<  94  4.1   |  23  |  2.62<H>    Ca    8.0<L>      27 Jul 2021 02:23  Phos  3.3     07-27  Mg     1.8     07-27    TPro  4.5<L>  /  Alb  2.6<L>  /  TBili  0.5  /  DBili  x   /  AST  17  /  ALT  23  /  AlkPhos  46  07-27    Imaging Personally Reviewed:  - CXR from 7/27 AM as reported: Right IJ line with tip at SVC/right atrial junction, not significantly changed in position. New opacity over right chest, may be artifact. Increased hazy right basilar opacity, likely a small layering right pleural effusion with associated passive atelectasis. Small left pleural effusion with likely associated passive atelectasis, not significantly changed. Left midlung linear atelectasis.    Consultant(s) Notes Reviewed: CCU, Heart Failure  Care Discussed with Consultants/Other Providers:    CURRENT MEDICATIONS  (STANDING):  artificial  tears Solution 1 Drop(s) Both EYES two times a day  atorvastatin 10 milliGRAM(s) Oral at bedtime  chlorhexidine 4% Liquid 1 Application(s) Topical <User Schedule>  dextrose 40% Gel 15 Gram(s) Oral once  dextrose 5%. 1000 milliLiter(s) (50 mL/Hr) IV Continuous <Continuous>  dextrose 5%. 1000 milliLiter(s) (100 mL/Hr) IV Continuous <Continuous>  dextrose 50% Injectable 25 Gram(s) IV Push once  dextrose 50% Injectable 12.5 Gram(s) IV Push once  dextrose 50% Injectable 25 Gram(s) IV Push once  glucagon  Injectable 1 milliGRAM(s) IntraMuscular once  heparin   Injectable 5000 Unit(s) SubCutaneous every 8 hours  insulin lispro (ADMELOG) corrective regimen sliding scale   SubCutaneous three times a day before meals  insulin lispro (ADMELOG) corrective regimen sliding scale   SubCutaneous at bedtime  metoprolol tartrate 37.5 milliGRAM(s) Oral two times a day  polyethylene glycol 3350 17 Gram(s) Oral daily   MEDICINE ACCEPTANCE NOTE - INCOMPLETE NOTE    Arian Silva M.D.  Division of Hospital Medicine  TEAMS or Pager: [phone]    HISTORY OF PRESENTING ILLNESS / HOSPITAL COURSE: 86 y/o female with h/o chronic HFpEF, HTN, DLP, CAD (h/o abnormal stress test), DM 2 and CKD 2 who presented to [facility] with nausea, vomiting and dizziness. She was found to be tachycardic, hypotensive with elevated lactate concerning for cardiogenic shock. She was given IVF, empiric antibiotics and underwent abd CT which ws unremarkable. Her ekg showed new onset aflutter and her TTE showed newly diagnosed LV systolic dysfunction with LVEF 25%, mod MR and mod TR. She was started on milrinone and levophed gtt. She was transferred to [facility] for further management. Her hospital course was complicated by tachypnea requiring bipap placement, acute anemia requiring PRBCs transfusion thought to be due bleeding from arterial line and heparin gtt. Her milrinone gtt was dc’ed 7/26 due to hypotension. Her CVP has been low (3 today) suggesting hypovolemia and she has been volume resuscitated. Currently hemodynamically stable off inotropes, but remains with an DANE. She is hypertensive and her Afib/flutter is not rate controlled.    PAST MEDICAL & SURGICAL HISTORY: HTN (hypertension); HLD (hyperlipidemia); DM (diabetes mellitus); Hyperkalemia; CKD (chronic kidney disease); Atherosclerosis; HTN (hypertension); CAD (coronary artery disease); Stage 4 chronic kidney disease; S/P hysterectomy    SOCIAL HISTORY: Nonsmoker, 1beer a day, no illicit drug use     FAMILY HISTORY: MI (myocardial infarction) (Father, Mother)    ALLERGIES: No Known Allergies    HOME MEDICATIONS:  atorvastatin 10 mg oral tablet: 1 tab(s) orally once a day (25 Jul 2021 14:34)  doxazosin 2 mg oral tablet: 1 tab(s) orally once a day (25 Jul 2021 14:34)  metFORMIN 500 mg oral tablet: 1 tab(s) orally 2 times a day (25 Jul 2021 14:34)  metoprolol tartrate 50 mg oral tablet: 1 tab(s) orally 2 times a day (25 Jul 2021 14:34)  olmesartan 20 mg oral tablet: 1 tab(s) orally once a day (25 Jul 2021 14:34)    SUBJECTIVE / ACUTE INTERVAL EVENTS: Patient seen and examined. No acute complaints or events upon transfer to medicine floor.     Review of Systems  · Negative General Symptoms: no fever; no chills  · Negative Skin Symptoms: no rash; no itching  · Negative Ophthalmologic Symptoms: no scleral injection L; no scleral injection R  · Negative ENMT Symptoms: no throat pain; no dysphagia; no nasal congestion  · Negative Respiratory and Thorax Symptoms: no cough; no wheezing; no pleuritic chest pain  · Negative Cardiovascular Symptoms: no chest pain; no palpitations; no peripheral edema  · Negative Gastrointestinal Symptoms: no nausea; no vomiting; no diarrhea; no constipation; no change in bowel habits; no abdominal pain  · Negative General Genitourinary Symptoms: no flank pain L; no flank pain R; no dysuria  · Negative Musculoskeletal Symptoms: no joint swelling; no neck pain; no back pain  · Neurological: negative  · Psychiatric: negative  · Hematology/Lymphatics: negative  · Endocrine: negative    OBJECTIVE:   Vital Signs Last 24 Hrs  T(F): 98.4 (27 Jul 2021 14:30), Max: 98.7 (27 Jul 2021 01:00)  HR: 73 (27 Jul 2021 14:30) (68 - 140)  BP: 154/87 (27 Jul 2021 14:30) (108/58 - 173/82)  RR: 18 (27 Jul 2021 14:30) (5 - 32)  SpO2: 96% (27 Jul 2021 14:30) (61% - 100%)    I&O's Summary  26 Jul 2021 07:01  -  27 Jul 2021 07:00  --------------------------------------------------------  IN: 1185.1 mL / OUT: 2405 mL / NET: -1219.9 mL    27 Jul 2021 07:01  -  27 Jul 2021 16:42  --------------------------------------------------------  IN: 300 mL / OUT: 700 mL / NET: -400 mL    Physical Examination:  GEN: elderly woman, laying in bed in NAD  PSYCH: A&Ox3, mood and affect appear appropriate   SKIN: intact, no e/o rash  NEURO: no focal neurologic deficits appreciated  EYES: PERRL, anicteric  HEAD: NC, AT  NECK: supple, no e/o elevated JVP  RESPI: no accessory muscle use, B/L air entry, B/L crackles  CARDIO: irregular rate/rhythm, no LE edema B/L  ABD: soft, NT, ND  EXT: patient able to move all extremities spontaneously  VASC: peripheral pulses palpated    Labs:  CAPILLARY BLOOD GLUCOSE  POCT Blood Glucose.: 131 mg/dL (27 Jul 2021 12:22)  POCT Blood Glucose.: 87 mg/dL (27 Jul 2021 08:31)  POCT Blood Glucose.: 143 mg/dL (26 Jul 2021 21:42)  POCT Blood Glucose.: 124 mg/dL (26 Jul 2021 17:17)                        8.1    9.82  )-----------( 102      ( 27 Jul 2021 11:18 )             25.1     07-27  139  |  106  |  32<H>  ----------------------------<  94  4.1   |  23  |  2.62<H>    Ca    8.0<L>      27 Jul 2021 02:23  Phos  3.3     07-27  Mg     1.8     07-27    TPro  4.5<L>  /  Alb  2.6<L>  /  TBili  0.5  /  DBili  x   /  AST  17  /  ALT  23  /  AlkPhos  46  07-27    Imaging Personally Reviewed:  - CXR from 7/27 AM as reported: Right IJ line with tip at SVC/right atrial junction, not significantly changed in position. New opacity over right chest, may be artifact. Increased hazy right basilar opacity, likely a small layering right pleural effusion with associated passive atelectasis. Small left pleural effusion with likely associated passive atelectasis, not significantly changed. Left midlung linear atelectasis.    Consultant(s) Notes Reviewed: CCU, Heart Failure, EP  Care Discussed with Consultants/Other Providers: NP April    CURRENT MEDICATIONS  (STANDING):  artificial  tears Solution 1 Drop(s) Both EYES two times a day  atorvastatin 10 milliGRAM(s) Oral at bedtime  chlorhexidine 4% Liquid 1 Application(s) Topical <User Schedule>  dextrose 40% Gel 15 Gram(s) Oral once  dextrose 5%. 1000 milliLiter(s) (50 mL/Hr) IV Continuous <Continuous>  dextrose 5%. 1000 milliLiter(s) (100 mL/Hr) IV Continuous <Continuous>  dextrose 50% Injectable 25 Gram(s) IV Push once  dextrose 50% Injectable 12.5 Gram(s) IV Push once  dextrose 50% Injectable 25 Gram(s) IV Push once  glucagon  Injectable 1 milliGRAM(s) IntraMuscular once  heparin   Injectable 5000 Unit(s) SubCutaneous every 8 hours  insulin lispro (ADMELOG) corrective regimen sliding scale   SubCutaneous three times a day before meals  insulin lispro (ADMELOG) corrective regimen sliding scale   SubCutaneous at bedtime  metoprolol tartrate 37.5 milliGRAM(s) Oral two times a day  polyethylene glycol 3350 17 Gram(s) Oral daily   MEDICINE ACCEPTANCE NOTE    Arian Silva M.D.  Division of Hospital Medicine  TEAMS or Pager: [phone]    HISTORY OF PRESENTING ILLNESS / HOSPITAL COURSE: 86 y/o female with h/o chronic HFpEF, HTN, DLP, CAD (h/o abnormal stress test), DM 2 and CKD 2 who presented to [facility] with nausea, vomiting and dizziness. She was found to be tachycardic, hypotensive with elevated lactate concerning for cardiogenic shock. She was given IVF, empiric antibiotics and underwent abd CT which ws unremarkable. Her ekg showed new onset aflutter and her TTE showed newly diagnosed LV systolic dysfunction with LVEF 25%, mod MR and mod TR. She was started on milrinone and levophed gtt. She was transferred to [facility] for further management. Her hospital course was complicated by tachypnea requiring bipap placement, acute anemia requiring PRBCs transfusion thought to be due bleeding from arterial line and heparin gtt. Her milrinone gtt was dc’ed 7/26 due to hypotension. Her CVP has been low (3 today) suggesting hypovolemia and she has been volume resuscitated. Currently hemodynamically stable off inotropes, but remains with an DANE. She is hypertensive and her Afib/flutter is not rate controlled.    PAST MEDICAL & SURGICAL HISTORY: HTN (hypertension); HLD (hyperlipidemia); DM (diabetes mellitus); Hyperkalemia; CKD (chronic kidney disease); Atherosclerosis; HTN (hypertension); CAD (coronary artery disease); Stage 4 chronic kidney disease; S/P hysterectomy    SOCIAL HISTORY: Nonsmoker, 1beer a day, no illicit drug use     FAMILY HISTORY: MI (myocardial infarction) (Father, Mother)    ALLERGIES: No Known Allergies    HOME MEDICATIONS:  atorvastatin 10 mg oral tablet: 1 tab(s) orally once a day (25 Jul 2021 14:34)  doxazosin 2 mg oral tablet: 1 tab(s) orally once a day (25 Jul 2021 14:34)  metFORMIN 500 mg oral tablet: 1 tab(s) orally 2 times a day (25 Jul 2021 14:34)  metoprolol tartrate 50 mg oral tablet: 1 tab(s) orally 2 times a day (25 Jul 2021 14:34)  olmesartan 20 mg oral tablet: 1 tab(s) orally once a day (25 Jul 2021 14:34)    SUBJECTIVE / ACUTE INTERVAL EVENTS: Patient seen and examined. No acute complaints or events upon transfer to medicine floor.     Review of Systems  · Negative General Symptoms: no fever; no chills  · Negative Skin Symptoms: no rash; no itching  · Negative Ophthalmologic Symptoms: no scleral injection L; no scleral injection R  · Negative ENMT Symptoms: no throat pain; no dysphagia; no nasal congestion  · Negative Respiratory and Thorax Symptoms: no cough; no wheezing; no pleuritic chest pain  · Negative Cardiovascular Symptoms: no chest pain; no palpitations; no peripheral edema  · Negative Gastrointestinal Symptoms: no nausea; no vomiting; no diarrhea; no constipation; no change in bowel habits; no abdominal pain  · Negative General Genitourinary Symptoms: no flank pain L; no flank pain R; no dysuria  · Negative Musculoskeletal Symptoms: no joint swelling; no neck pain; no back pain  · Neurological: negative  · Psychiatric: negative  · Hematology/Lymphatics: negative  · Endocrine: negative    OBJECTIVE:   Vital Signs Last 24 Hrs  T(F): 98.4 (27 Jul 2021 14:30), Max: 98.7 (27 Jul 2021 01:00)  HR: 73 (27 Jul 2021 14:30) (68 - 140)  BP: 154/87 (27 Jul 2021 14:30) (108/58 - 173/82)  RR: 18 (27 Jul 2021 14:30) (5 - 32)  SpO2: 96% (27 Jul 2021 14:30) (61% - 100%)    I&O's Summary  26 Jul 2021 07:01  -  27 Jul 2021 07:00  --------------------------------------------------------  IN: 1185.1 mL / OUT: 2405 mL / NET: -1219.9 mL    27 Jul 2021 07:01  -  27 Jul 2021 16:42  --------------------------------------------------------  IN: 300 mL / OUT: 700 mL / NET: -400 mL    Physical Examination:  GEN: elderly woman, laying in bed in NAD  PSYCH: A&Ox3, mood and affect appear appropriate   SKIN: intact, no e/o rash  NEURO: no focal neurologic deficits appreciated  EYES: PERRL, anicteric  HEAD: NC, AT  NECK: supple, no e/o elevated JVP  RESPI: no accessory muscle use, B/L air entry, B/L crackles  CARDIO: irregular rate/rhythm, no LE edema B/L  ABD: soft, NT, ND  EXT: patient able to move all extremities spontaneously  VASC: peripheral pulses palpated    Labs:  CAPILLARY BLOOD GLUCOSE  POCT Blood Glucose.: 131 mg/dL (27 Jul 2021 12:22)  POCT Blood Glucose.: 87 mg/dL (27 Jul 2021 08:31)  POCT Blood Glucose.: 143 mg/dL (26 Jul 2021 21:42)  POCT Blood Glucose.: 124 mg/dL (26 Jul 2021 17:17)                        8.1    9.82  )-----------( 102      ( 27 Jul 2021 11:18 )             25.1     07-27  139  |  106  |  32<H>  ----------------------------<  94  4.1   |  23  |  2.62<H>    Ca    8.0<L>      27 Jul 2021 02:23  Phos  3.3     07-27  Mg     1.8     07-27    TPro  4.5<L>  /  Alb  2.6<L>  /  TBili  0.5  /  DBili  x   /  AST  17  /  ALT  23  /  AlkPhos  46  07-27    Imaging Personally Reviewed:  - CXR from 7/27 AM as reported: Right IJ line with tip at SVC/right atrial junction, not significantly changed in position. New opacity over right chest, may be artifact. Increased hazy right basilar opacity, likely a small layering right pleural effusion with associated passive atelectasis. Small left pleural effusion with likely associated passive atelectasis, not significantly changed. Left midlung linear atelectasis.    Consultant(s) Notes Reviewed: CCU, Heart Failure, EP  Care Discussed with Consultants/Other Providers: NP April    CURRENT MEDICATIONS  (STANDING):  artificial  tears Solution 1 Drop(s) Both EYES two times a day  atorvastatin 10 milliGRAM(s) Oral at bedtime  chlorhexidine 4% Liquid 1 Application(s) Topical <User Schedule>  dextrose 40% Gel 15 Gram(s) Oral once  dextrose 5%. 1000 milliLiter(s) (50 mL/Hr) IV Continuous <Continuous>  dextrose 5%. 1000 milliLiter(s) (100 mL/Hr) IV Continuous <Continuous>  dextrose 50% Injectable 25 Gram(s) IV Push once  dextrose 50% Injectable 12.5 Gram(s) IV Push once  dextrose 50% Injectable 25 Gram(s) IV Push once  glucagon  Injectable 1 milliGRAM(s) IntraMuscular once  heparin   Injectable 5000 Unit(s) SubCutaneous every 8 hours  insulin lispro (ADMELOG) corrective regimen sliding scale   SubCutaneous three times a day before meals  insulin lispro (ADMELOG) corrective regimen sliding scale   SubCutaneous at bedtime  metoprolol tartrate 37.5 milliGRAM(s) Oral two times a day  polyethylene glycol 3350 17 Gram(s) Oral daily

## 2021-07-27 NOTE — DIETITIAN INITIAL EVALUATION ADULT. - CHIEF COMPLAINT
"86 y/o Female w/ pmh of HFpEF, HTN, HLD, CAD, T2DM, tx to [facility] after being found to be in AdHF possible tachycardia induced with new DAEN. She was also noted to have acute hypovolemic shock secondary to anemia requiring blood transfusions"

## 2021-07-27 NOTE — PROGRESS NOTE ADULT - SUBJECTIVE AND OBJECTIVE BOX
Events:    Review Of Systems:  Constitutional: denies fever, chills, Fatigue   HEENT: denies Blurred vision, Eye Pain, Headache   Respiratory: denies Cough, Wheezing , Shortness of breath  Cardiovascular: denies Chest Pain, Palpitations,  VEGA   Gastrointestinal: denies Abdominal Pain, Diarrhea, Constipation   Genitourinary: denies Nocturia, Dysuria, Incontinence  Extremities: denies Swelling, Joint Pain  Neurologic: denies Focal deficit, Paresthesias, Syncope  Lymphatic: denies Swelling, Lymphadenopathy   Skin: denies Rash, Ecchymoses, Wounds   Psychiatry: denies Depression, Suicidal/Homicidal Ideation, anxiety  [X ] 10 point review of systems is otherwise negative except as mentioned above         Medications:  artificial  tears Solution 1 Drop(s) Both EYES two times a day  atorvastatin 10 milliGRAM(s) Oral at bedtime  chlorhexidine 4% Liquid 1 Application(s) Topical <User Schedule>  dextrose 40% Gel 15 Gram(s) Oral once  dextrose 5%. 1000 milliLiter(s) IV Continuous <Continuous>  dextrose 5%. 1000 milliLiter(s) IV Continuous <Continuous>  dextrose 50% Injectable 25 Gram(s) IV Push once  dextrose 50% Injectable 12.5 Gram(s) IV Push once  dextrose 50% Injectable 25 Gram(s) IV Push once  digoxin     Tablet 125 MICROGram(s) Oral every other day  glucagon  Injectable 1 milliGRAM(s) IntraMuscular once  insulin lispro (ADMELOG) corrective regimen sliding scale   SubCutaneous three times a day before meals  insulin lispro (ADMELOG) corrective regimen sliding scale   SubCutaneous at bedtime    PMH/PSH/FH/SH: [ ] Unchanged  Vitals:  T(C): 37.1 (07-27-21 @ 01:00), Max: 37.1 (07-27-21 @ 01:00)  HR: 130 (07-27-21 @ 06:05) (86 - 138)  BP: 156/64 (07-27-21 @ 06:05) (75/50 - 188/71)  BP(mean): 94 (07-27-21 @ 06:05) (64 - 108)  RR: 23 (07-27-21 @ 06:05) (7 - 30)  SpO2: 98% (07-27-21 @ 06:05) (92% - 100%)  Wt(kg): --  Daily     Daily   I&O's Summary    25 Jul 2021 07:01  -  26 Jul 2021 07:00  --------------------------------------------------------  IN: 1988.1 mL / OUT: 1390 mL / NET: 598.1 mL    26 Jul 2021 07:01  -  27 Jul 2021 06:35  --------------------------------------------------------  IN: 1185.1 mL / OUT: 2405 mL / NET: -1219.9 mL        Physical Exam:  Appearance: [ ] Normal [ ] NAD  Eyes: [ ] PERRL [ ] EOMI  HENT: [ ] Normal oral muscosa [ ]NC/AT  Cardiovascular: [ ] S1 [ ] S2 [ ] RRR [ ] No m/r/g [ ]No edema [ ] JVP  Procedural Access Site: [ ] No hematoma [ ] Non-tender to palpation [ ] 2+ pulse [ ] No bruit [ ] No Ecchymosis  Respiratory: [ ] Clear to auscultation bilaterally  Gastrointestinal: [ ] Soft [ ] Non-tender [ ] Non-distended [ ] BS+  Musculoskeletal: [ ] No clubbing [ ] No joint deformity   Neurologic: [ ] Non-focal  Lymphatic: [ ] No lymphadenopathy  Psychiatry: [ ] AAOx3 [ ] Mood & affect appropriate  Skin: [ ] No rashes [ ] No ecchymoses [ ] No cyanosis    07-27    139  |  106  |  32<H>  ----------------------------<  94  4.1   |  23  |  2.62<H>    Ca    8.0<L>      27 Jul 2021 02:23  Phos  3.3     07-27  Mg     1.8     07-27    TPro  4.5<L>  /  Alb  2.6<L>  /  TBili  0.5  /  DBili  x   /  AST  17  /  ALT  23  /  AlkPhos  46  07-27    PT/INR - ( 25 Jul 2021 15:54 )   PT: 21.9 sec;   INR: 1.88 ratio         PTT - ( 25 Jul 2021 15:54 )  PTT:>200.0 sec  CARDIAC MARKERS ( 25 Jul 2021 15:54 )  x     / x     / 271 U/L / x     / 4.0 ng/mL      Serum Pro-Brain Natriuretic Peptide: 27673 pg/mL (07-25 @ 15:54)  Serum Pro-Brain Natriuretic Peptide: 52019 pg/mL (07-24 @ 12:07)  Serum Pro-Brain Natriuretic Peptide: 78567 pg/mL (07-23 @ 21:26)          ECG:    Echo:    Stress Testing:     Cath:    Imaging:    Interpretation of Telemetry:       PATIENT:  PAYAM PAPPAS  [id number]    CHIEF COMPLAINT:  Patient is a 85y old  Female who presents with a chief complaint of Transfer for Cardiogenic Shock (27 Jul 2021 06:35)      INTERVAL HISTORY/OVERNIGHT EVENTS: NAOE. Patient states she feels well and has no symptoms. Denies f/c/n/v/d, SOB, orthopnea, CP, syncope, presyncope, palpitatoins      REVIEW OF SYSTEMS:    Constitutional:     [X] negative [ ] fevers [ ] chills [ ] weight loss [ ] weight gain  HEENT:                  [X] negative [ ] dry eyes [ ] eye irritation [ ] postnasal drip [ ] nasal congestion  CV:                         [X] negative  [ ] chest pain [ ] orthopnea [ ] palpitations [ ] murmur  Resp:                     [X] negative [ ] cough [ ] shortness of breath [ ] dyspnea [ ] wheezing [ ] sputum [ ] hemoptysis  GI:                          [X] negative [ ] nausea [ ] vomiting [ ] diarrhea [ ] constipation [ ] abd pain [ ] dysphagia   :                        [X] negative [ ] dysuria [ ] nocturia [ ] hematuria [ ] increased urinary frequency  Musculoskeletal: [X] negative [ ] back pain [ ] myalgias [ ] arthralgias [ ] fracture  Skin:                       [X] negative [ ] rash [ ] itch  Neurological:        [X] negative [ ] headache [ ] dizziness [ ] syncope [ ] weakness [ ] numbness  Psychiatric:           [X] negative [ ] anxiety [ ] depression  Endocrine:            [X] negative [ ] diabetes [ ] thyroid problem  Heme/Lymph:      [X] negative [ ] anemia [ ] bleeding problem  Allergic/Immune: [X] negative [ ] itchy eyes [ ] nasal discharge [ ] hives [ ] angioedema    [X] All other systems negative  [ ] Unable to assess ROS because ________.    MEDICATIONS:  MEDICATIONS  (STANDING):  artificial  tears Solution 1 Drop(s) Both EYES two times a day  atorvastatin 10 milliGRAM(s) Oral at bedtime  chlorhexidine 4% Liquid 1 Application(s) Topical <User Schedule>  dextrose 40% Gel 15 Gram(s) Oral once  dextrose 5%. 1000 milliLiter(s) (50 mL/Hr) IV Continuous <Continuous>  dextrose 5%. 1000 milliLiter(s) (100 mL/Hr) IV Continuous <Continuous>  dextrose 50% Injectable 25 Gram(s) IV Push once  dextrose 50% Injectable 12.5 Gram(s) IV Push once  dextrose 50% Injectable 25 Gram(s) IV Push once  glucagon  Injectable 1 milliGRAM(s) IntraMuscular once  insulin lispro (ADMELOG) corrective regimen sliding scale   SubCutaneous three times a day before meals  insulin lispro (ADMELOG) corrective regimen sliding scale   SubCutaneous at bedtime    MEDICATIONS  (PRN):      ALLERGIES:  Allergies    No Known Allergies    Intolerances        OBJECTIVE:  ICU Vital Signs Last 24 Hrs  T(C): 37.1 (27 Jul 2021 01:00), Max: 37.1 (27 Jul 2021 01:00)  T(F): 98.7 (27 Jul 2021 01:00), Max: 98.7 (27 Jul 2021 01:00)  HR: 124 (27 Jul 2021 07:01) (86 - 134)  BP: 139/61 (27 Jul 2021 07:01) (75/50 - 188/71)  BP(mean): 88 (27 Jul 2021 07:01) (64 - 108)  ABP: 112/42 (26 Jul 2021 15:15) (70/50 - 118/88)  ABP(mean): 62 (26 Jul 2021 15:15) (56 - 102)  RR: 16 (27 Jul 2021 07:01) (12 - 30)  SpO2: 97% (27 Jul 2021 07:01) (92% - 100%)      Adult Advanced Hemodynamics Last 24 Hrs  CVP(mm Hg): --  CVP(cm H2O): --  CO: --  CI: --  PA: --  PA(mean): --  PCWP: --  SVR: --  SVRI: --  PVR: --  PVRI: --  CAPILLARY BLOOD GLUCOSE      POCT Blood Glucose.: 143 mg/dL (26 Jul 2021 21:42)  POCT Blood Glucose.: 124 mg/dL (26 Jul 2021 17:17)  POCT Blood Glucose.: 121 mg/dL (26 Jul 2021 11:18)    CAPILLARY BLOOD GLUCOSE      POCT Blood Glucose.: 143 mg/dL (26 Jul 2021 21:42)    I&O's Summary    26 Jul 2021 07:01  -  27 Jul 2021 07:00  --------------------------------------------------------  IN: 1185.1 mL / OUT: 2405 mL / NET: -1219.9 mL      Daily     Daily     PHYSICAL EXAMINATION:  General: NAD  HEENT: PERRL, EOMI, moist mucous membranes  Neurology: A&Ox3, nonfocal, JAIME x 4  Respiratory: CTA B/L, normal respiratory effort, no wheezes, crackles, rales  CV: irregular, equal S1 S2. +JVP 3cm above clavicle at 30 degrees  Abdominal: Soft, NT, ND +BS, Last BM 6 days ago  Extremities: No edema, + peripheral pulses  Incisions: no gross swelling of RUE  Tubes: central line c/d/i    LABS:  ABG - ( 25 Jul 2021 23:20 )  pH, Arterial: 7.42  pH, Blood: x     /  pCO2: 30    /  pO2: 132   / HCO3: 19    / Base Excess: -4.2  /  SaO2: 99                                      7.8    9.31  )-----------( 94       ( 27 Jul 2021 02:23 )             23.9     07-27    139  |  106  |  32<H>  ----------------------------<  94  4.1   |  23  |  2.62<H>    Ca    8.0<L>      27 Jul 2021 02:23  Phos  3.3     07-27  Mg     1.8     07-27    TPro  4.5<L>  /  Alb  2.6<L>  /  TBili  0.5  /  DBili  x   /  AST  17  /  ALT  23  /  AlkPhos  46  07-27    LIVER FUNCTIONS - ( 27 Jul 2021 02:23 )  Alb: 2.6 g/dL / Pro: 4.5 g/dL / ALK PHOS: 46 U/L / ALT: 23 U/L / AST: 17 U/L / GGT: x           PT/INR - ( 25 Jul 2021 15:54 )   PT: 21.9 sec;   INR: 1.88 ratio         PTT - ( 25 Jul 2021 15:54 )  PTT:>200.0 sec    CARDIAC MARKERS ( 25 Jul 2021 15:54 )  x     / x     / 271 U/L / x     / 4.0 ng/mL          TELEMETRY:     EKG:     IMAGING:

## 2021-07-27 NOTE — DIETITIAN INITIAL EVALUATION ADULT. - REASON
Nutrition Focused Physical exam deferred at this time; pt with no overt signs of muscle/fat loss noted upon visual exam

## 2021-07-27 NOTE — PROGRESS NOTE ADULT - SUBJECTIVE AND OBJECTIVE BOX
Subjective:  - NAEO  - resting comfortably in bed with no complaints    Medications:  artificial  tears Solution 1 Drop(s) Both EYES two times a day  atorvastatin 10 milliGRAM(s) Oral at bedtime  chlorhexidine 4% Liquid 1 Application(s) Topical <User Schedule>  dextrose 40% Gel 15 Gram(s) Oral once  dextrose 5%. 1000 milliLiter(s) IV Continuous <Continuous>  dextrose 5%. 1000 milliLiter(s) IV Continuous <Continuous>  dextrose 50% Injectable 25 Gram(s) IV Push once  dextrose 50% Injectable 12.5 Gram(s) IV Push once  dextrose 50% Injectable 25 Gram(s) IV Push once  glucagon  Injectable 1 milliGRAM(s) IntraMuscular once  heparin   Injectable 5000 Unit(s) SubCutaneous every 8 hours  insulin lispro (ADMELOG) corrective regimen sliding scale   SubCutaneous three times a day before meals  insulin lispro (ADMELOG) corrective regimen sliding scale   SubCutaneous at bedtime  metoprolol tartrate 37.5 milliGRAM(s) Oral two times a day  metoprolol tartrate 12.5 milliGRAM(s) Oral once  polyethylene glycol 3350 17 Gram(s) Oral daily      ICU Vital Signs Last 24 Hrs  T(C): 36.9, Max: 37.1 ( @ 01:00)  HR: 140 (86 - 140)  BP: 167/81 (75/50 - 173/82)  BP(mean): 115 (64 - 126)  ABP: 112/42 (112/42 - 112/42)  ABP(mean): 62 (62 - 62)  RR: 9 (9 - 31)  SpO2: 96% (61% - 100%)    Weight in k.9 (21)  Weight in k.1 (21)      I&O's Summary Last 24 Hrs    IN: 1185.1 mL / OUT: 2405 mL / NET: -1219.9 mL      Tele: Afib/flutter 80-130s    Physical Exam:    General: No distress. Comfortable.  Neck: JVP not elevated.  Respiratory: Crackles at the bases bilaterally  CV: Irregularly irregular, tachycardic. Normal S1 and S2. No murmurs, rub, or gallops. Radial pulses normal.  Abdomen: Soft, non-distended, non-tender  Skin: No skin lesions  Extremities: Warm, no edema  Neurology: Non-focal, alert and oriented times three.   Psych: Affect normal    Labs:    ( 21 @ 11:18 )               8.1    9.82  )--------( 102                  25.1     ( 21 @ 02:23 )     139  |  106  |  32  ---------------------<  94  4.1  |  23  |  2.62    Ca 8.0  Phos 3.3  Mg 1.8    ( 21 @ 02:23 )  TPro  4.5  /  Alb  2.6  /  TBili  0.5  /  DBili  x   /  AST  17  /  ALT  23  /  AlkPhos  46  ( 21 @ 16:08 )  TPro  4.3  /  Alb  2.6  /  TBili  0.4  /  DBili  x   /  AST  15  /  ALT  23  /  AlkPhos  43    PTT/PT/INR - ( 21 @ 15:54 )  PTT: >200.0 sec / PT: 21.9 sec / INR: 1.88 ratio    ( 21 @ 15:54 )  TropHS 310   /    / CKMB x      ( 21 @ 12:07 )  TropHS x     /    / CKMB x        Serum Pro-Brain Natriuretic Peptide: 41692 (21 @ 15:54)    VBG - ( 21 @ 16:03 )  pH: 7.39  / pCO2: 42    / pO2: 42    / Oxygen saturation: 73        Blood Gas Venous - Lactate: 1.4 (21 @ 16:03)

## 2021-07-27 NOTE — CHART NOTE - NSCHARTNOTEFT_GEN_A_CORE
PAYAM PAPPAS  MRN-[id number]    HPI:  85 F w/ PMH HFpEF, HTN, HLD, CAD, C2D, DM transferred to [facility] from OSH for further management. On 7/21, patient said" she ate a bad turkey sandwich" and woke up 7/22 with bad abdominal pain, N/V, and dizziness. She then went to OSH ED where she was found to be tachycardic w/ soft BP, elevated lactate at 5.9. She was given 2 L IVF and sent for CT a/P but be came acutely SOB while laying flat in CT requiring BiPAP. Patient denies being SOB of that time or ever. She said she just had some anxiety.In [facility] ED today, patient vitals were 110/70,  sinus rhythm, and has extremely unremarkable ROS with no current abdominal pain, N/V, or dizziness She is saturating on room air in high 90s  (25 Jul 2021 14:00). She was started on Milrinone and noted to have an acute HGB drop of 13 to 8.5. She was transfused 2 U of PRBCs and given fluid and HGB responded. She was noted to have acute swelling of her RUE at the site of an axillary a line. Heparin was discontinued. Levophed was started.  Milrinone was discontinued as pt lactate cleared and looks to be more in hypovolemic shock. Pt given 500 cc of NS and albumin w appropriate response in UO. CBC remained stable. Pt doing well off Milrinone.        =============================  Vital Signs Last 24 Hrs  T(C): 36.9 (27 Jul 2021 12:00), Max: 37.1 (27 Jul 2021 01:00)  T(F): 98.4 (27 Jul 2021 12:00), Max: 98.7 (27 Jul 2021 01:00)  HR: 140 (27 Jul 2021 12:05) (86 - 140)  BP: 167/81 (27 Jul 2021 12:05) (75/50 - 188/71)  BP(mean): 115 (27 Jul 2021 12:05) (64 - 126)  RR: 9 (27 Jul 2021 12:05) (9 - 31)  SpO2: 96% (27 Jul 2021 11:05) (61% - 100%)  ICU Vital Signs Last 24 Hrs  T(C): 36.9 (27 Jul 2021 12:00), Max: 37.1 (27 Jul 2021 01:00)  T(F): 98.4 (27 Jul 2021 12:00), Max: 98.7 (27 Jul 2021 01:00)  HR: 140 (27 Jul 2021 12:05) (86 - 140)  BP: 167/81 (27 Jul 2021 12:05) (75/50 - 188/71)  BP(mean): 115 (27 Jul 2021 12:05) (64 - 126)  ABP: 112/42 (26 Jul 2021 15:15) (112/42 - 112/42)  ABP(mean): 62 (26 Jul 2021 15:15) (62 - 62)  RR: 9 (27 Jul 2021 12:05) (9 - 31)  SpO2: 96% (27 Jul 2021 11:05) (61% - 100%)    ==============================    ==========  TELEMETRY:   ==========    ============================================  MEDICATIONS  (STANDING):  artificial  tears Solution 1 Drop(s) Both EYES two times a day  atorvastatin 10 milliGRAM(s) Oral at bedtime  chlorhexidine 4% Liquid 1 Application(s) Topical <User Schedule>  dextrose 40% Gel 15 Gram(s) Oral once  dextrose 5%. 1000 milliLiter(s) (50 mL/Hr) IV Continuous <Continuous>  dextrose 5%. 1000 milliLiter(s) (100 mL/Hr) IV Continuous <Continuous>  dextrose 50% Injectable 25 Gram(s) IV Push once  dextrose 50% Injectable 12.5 Gram(s) IV Push once  dextrose 50% Injectable 25 Gram(s) IV Push once  glucagon  Injectable 1 milliGRAM(s) IntraMuscular once  heparin   Injectable 5000 Unit(s) SubCutaneous every 8 hours  insulin lispro (ADMELOG) corrective regimen sliding scale   SubCutaneous three times a day before meals  insulin lispro (ADMELOG) corrective regimen sliding scale   SubCutaneous at bedtime  metoprolol tartrate 25 milliGRAM(s) Oral two times a day  polyethylene glycol 3350 17 Gram(s) Oral daily    MEDICATIONS  (PRN):    ============================================    ==========  FOLLOW UP:  ==========  84 yo F w/ pmh of HFpEF, HTN, HLD, CAD, T2DM, tx to [facility] after being found to be in AdHF possible tachycardia induced with new DANE. She was also noted to have acute hypovolemic shock secondary to anemia requiring blood transfusions.     Neuro  - A and O x 4 no concerns. Not in any acute pain. OOB to chair with PT    CV   AFlutter vs ATach  - EP consulted  - Will resume AC once CBC remains stable, BB started    AdHF  - Likely tachy induced, no cardiac cath due to DANE  - Will remove central line    GI  - Continue DASH diet, PPI  - Bowel regimen      - Remove oviedo    Renal  - Monitor Cr, new DANE likely secondary to AdHF and hypovolemic shock. ELectrolytes stable. Check urine lytes. Pt voiding well     ID  - WBC 12, no fevers, continue to monitor off antibiotics    heme  - Off AC and hepari, heparin SQ started     Vascular  - Hannah access bleeding now appears stable. Duplex without pseudo. Vascular following. No e/o compartment. CTA deferred given DANE    Endo  - Glucose well controled    Mimi Douglas [facility] NP    #[phone] Yes

## 2021-07-27 NOTE — PROGRESS NOTE ADULT - SUBJECTIVE AND OBJECTIVE BOX
24H hour events: feeling better today, off oxygen    MEDICATIONS:  heparin   Injectable 5000 Unit(s) SubCutaneous every 8 hours  metoprolol tartrate 25 milliGRAM(s) Oral two times a day  polyethylene glycol 3350 17 Gram(s) Oral daily  atorvastatin 10 milliGRAM(s) Oral at bedtime  dextrose 40% Gel 15 Gram(s) Oral once  dextrose 50% Injectable 25 Gram(s) IV Push once  dextrose 50% Injectable 12.5 Gram(s) IV Push once  dextrose 50% Injectable 25 Gram(s) IV Push once  glucagon  Injectable 1 milliGRAM(s) IntraMuscular once  insulin lispro (ADMELOG) corrective regimen sliding scale   SubCutaneous three times a day before meals  insulin lispro (ADMELOG) corrective regimen sliding scale   SubCutaneous at bedtime    artificial  tears Solution 1 Drop(s) Both EYES two times a day  chlorhexidine 4% Liquid 1 Application(s) Topical <User Schedule>  dextrose 5%. 1000 milliLiter(s) IV Continuous <Continuous>  dextrose 5%. 1000 milliLiter(s) IV Continuous <Continuous>    REVIEW OF SYSTEMS:  See HPI, otherwise ROS negative.    PHYSICAL EXAM:  T(C): 36.9 (07-27-21 @ 08:00), Max: 37.1 (07-27-21 @ 01:00)  HR: 131 (07-27-21 @ 11:05) (86 - 138)  BP: 173/82 (07-27-21 @ 11:05) (75/50 - 188/71)  RR: 25 (07-27-21 @ 11:05) (12 - 31)  SpO2: 96% (07-27-21 @ 11:05) (61% - 100%)    I&O's Summary    26 Jul 2021 07:01  -  27 Jul 2021 07:00  --------------------------------------------------------  IN: 1185.1 mL / OUT: 2405 mL / NET: -1219.9 mL    27 Jul 2021 07:01  -  27 Jul 2021 11:53  --------------------------------------------------------  IN: 150 mL / OUT: 500 mL / NET: -350 mL    Appearance: Alert. NAD	  Cardiovascular: +S1S2 irregular rate rhythm no murmur  Respiratory: CTA B/L	  Psychiatry: A & O x 3, Mood & affect appropriate  Gastrointestinal:  Soft, NT. ND. +BS	  Skin: No rashes	  Neurologic: Non-focal  Extremities: BLE edema resolved  Vascular: Peripheral pulses palpable 2+ bilaterally    LABS:  Serum Pro-Brain Natriuretic Peptide: 83146 pg/mL (07.25.21 @ 15:54)   LABS:    Troponin T, High Sensitivity Result: 310: Specimen not hemolyzed        	    CBC Full  -  ( 27 Jul 2021 11:18 )  WBC Count : 9.82 K/uL  Hemoglobin : 8.1 g/dL  Hematocrit : 25.1 %  Platelet Count - Automated : 102 K/uL  Mean Cell Volume : 90.3 fl  Mean Cell Hemoglobin : 29.1 pg  Mean Cell Hemoglobin Concentration : 32.3 gm/dL  Auto Neutrophil # : x  Auto Lymphocyte # : x  Auto Monocyte # : x  Auto Eosinophil # : x  Auto Basophil # : x  Auto Neutrophil % : x  Auto Lymphocyte % : x  Auto Monocyte % : x  Auto Eosinophil % : x  Auto Basophil % : x    07-27    139  |  106  |  32<H>  ----------------------------<  94  4.1   |  23  |  2.62<H>  07-26    137  |  104  |  35<H>  ----------------------------<  124<H>  4.2   |  22  |  2.57<H>    Ca    8.0<L>      27 Jul 2021 02:23  Ca    7.7<L>      26 Jul 2021 16:08  Phos  3.3     07-27  Phos  3.7     07-26  Mg     1.8     07-27  Mg     1.8     07-26    TPro  4.5<L>  /  Alb  2.6<L>  /  TBili  0.5  /  DBili  x   /  AST  17  /  ALT  23  /  AlkPhos  46  07-27  TPro  4.3<L>  /  Alb  2.6<L>  /  TBili  0.4  /  DBili  x   /  AST  15  /  ALT  23  /  AlkPhos  43  07-26      proBNP: Serum Pro-Brain Natriuretic Peptide: 70741 pg/mL (07-25 @ 15:54)  Serum Pro-Brain Natriuretic Peptide: 03573 pg/mL (07-24 @ 12:07)  Serum Pro-Brain Natriuretic Peptide: 03600 pg/mL (07-23 @ 21:26)    TSH: Thyroid Stimulating Hormone, Serum: 2.62 uIU/mL (07.26.21 @ 20:44)   TELE: Atrial flutter 2:1 ,3:1   EKG: Atrial flutter 106 bpm  RADIOLOGY:  < from: TTE with Doppler (w/Cont) (07.25.21 @ 13:58) >  ------------------------------------------------------------------------  PROCEDURE: Transthoracic echocardiogram with 2-D, M-Mode  and complete spectral and color flow Doppler. Verbal  consent was obtained for injection of  Ultrasonic Enhancing  Agent following a discussion of risks and benefits.  Following intravenous injection of Ultrasonic Enhancing  Agent , harmonic imaging was performed.  INDICATION: Heart failure, unspecified (I50.9)  ------------------------------------------------------------------------  Dimensions:    Normal Values:  LA:     3.5    2.0 - 4.0 cm  Ao:     2.8    2.0 - 3.8 cm  SEPTUM: 0.9    0.6 - 1.2 cm  PWT:    1.0    0.6 - 1.1 cm  LVIDd:  4.4    3.0 - 5.6 cm  LVIDs:  3.8    1.8 - 4.0 cm  Derived variables:  LVMI: 87 g/m2  RWT: 0.45  Fractional short: 14 %  EF (Teicholtz): 29 %  Doppler Peak Velocity (m/sec): AoV=1.5  ------------------------------------------------------------------------  Observations:  Mitral Valve: Normal mitral valve. Mild-moderate mitral  regurgitation.  Aortic Valve/Aorta: Calcified aortic valve with  grossly  moderately decreased opening. Peak transaortic valve  gradient equals 15 mm Hg, mean transaortic valve gradient  equals 9 mm Hg.  Aortic Root: 2.8 cm.  Left Atrium: Moderately dilated left atrium.  LA volume  index = 46 cc/m2.  Left Ventricle: Endocardial visualization enhanced with  intravenous injection of Ultrasonic Enhancing Agent  (Definity). Severe global left ventricular systolic  dysfunction. Normal left ventricular internal dimensions  and wall thicknesses.  Right Heart: Normal right atrium. Normal right ventricular  size with decreased right ventricular systolic function.  Normal tricuspid valve.Mild tricuspid regurgitation.  Normal pulmonic valve.  Pericardium/Pleura: Normal pericardium with trace  pericardial effusion.  Bilateral pleural effusions.  Hemodynamic: Estimated right atrial pressure is 8 mm Hg.  Estimated right ventricular systolic pressure equals 31 mm  Hg, assuming right atrial pressure equals 8 mm Hg,  consistent with normal pulmonary pressures.  ------------------------------------------------------------------------  Conclusions:  1. Calcified aortic valve with  grosslymoderately  decreased opening. Peak transaortic valve gradient equals  15 mm Hg, mean transaortic valve gradient equals 9 mm Hg.  2. Moderately dilated left atrium.  LA volume index = 46  cc/m2.  3. Endocardial visualization enhanced with intravenous  injection of Ultrasonic Enhancing Agent (Definity). Severe  global left ventricular systolic dysfunction.  4. Normal right ventricular size with decreased right  ventricular systolic function.  5. Bilateral pleural effusions.  ------------------------------------------------------------------------  Confirmed on  7/25/2021 - 16:15:28 by Mikaela Pérez M.D.  ------------------------------------------------------------------------

## 2021-07-27 NOTE — SBIRT NOTE ADULT - NSSBIRTALCPOSREINDET_GEN_A_CORE
Patient endorses social ETOH use of 1 beer at gatherings. Patient denies abuse. Positive reinforcement provided

## 2021-07-27 NOTE — SBIRT NOTE ADULT - NSSBIRTDRGACTION/INTER_GEN_A_CORE
I reviewed the H&P, I examined the patient, and there are no changes in the patient's condition.     Positive reinforcement

## 2021-07-28 LAB
ANION GAP SERPL CALC-SCNC: 16 MMOL/L — SIGNIFICANT CHANGE UP (ref 5–17)
BUN SERPL-MCNC: 25 MG/DL — HIGH (ref 7–23)
CALCIUM SERPL-MCNC: 9.1 MG/DL — SIGNIFICANT CHANGE UP (ref 8.4–10.5)
CHLORIDE SERPL-SCNC: 104 MMOL/L — SIGNIFICANT CHANGE UP (ref 96–108)
CO2 SERPL-SCNC: 16 MMOL/L — LOW (ref 22–31)
CREAT SERPL-MCNC: 2.02 MG/DL — HIGH (ref 0.5–1.3)
CULTURE RESULTS: SIGNIFICANT CHANGE UP
CULTURE RESULTS: SIGNIFICANT CHANGE UP
GLUCOSE BLDC GLUCOMTR-MCNC: 109 MG/DL — HIGH (ref 70–99)
GLUCOSE BLDC GLUCOMTR-MCNC: 119 MG/DL — HIGH (ref 70–99)
GLUCOSE BLDC GLUCOMTR-MCNC: 131 MG/DL — HIGH (ref 70–99)
GLUCOSE BLDC GLUCOMTR-MCNC: 98 MG/DL — SIGNIFICANT CHANGE UP (ref 70–99)
GLUCOSE SERPL-MCNC: 115 MG/DL — HIGH (ref 70–99)
KAPPA LC SER QL IFE: 1.86 MG/DL — SIGNIFICANT CHANGE UP (ref 0.33–1.94)
KAPPA/LAMBDA FREE LIGHT CHAIN RATIO, SERUM: 0.67 RATIO — SIGNIFICANT CHANGE UP (ref 0.26–1.65)
LAMBDA LC SER QL IFE: 2.79 MG/DL — HIGH (ref 0.57–2.63)
MAGNESIUM SERPL-MCNC: 2.1 MG/DL — SIGNIFICANT CHANGE UP (ref 1.6–2.6)
POTASSIUM SERPL-MCNC: 5.5 MMOL/L — HIGH (ref 3.5–5.3)
POTASSIUM SERPL-SCNC: 5.5 MMOL/L — HIGH (ref 3.5–5.3)
SODIUM SERPL-SCNC: 136 MMOL/L — SIGNIFICANT CHANGE UP (ref 135–145)
SPECIMEN SOURCE: SIGNIFICANT CHANGE UP
SPECIMEN SOURCE: SIGNIFICANT CHANGE UP

## 2021-07-28 PROCEDURE — 78830 RP LOCLZJ TUM SPECT W/CT 1: CPT | Mod: 26

## 2021-07-28 PROCEDURE — 99233 SBSQ HOSP IP/OBS HIGH 50: CPT

## 2021-07-28 RX ADMIN — Medication 37.5 MILLIGRAM(S): at 05:45

## 2021-07-28 RX ADMIN — HEPARIN SODIUM 5000 UNIT(S): 5000 INJECTION INTRAVENOUS; SUBCUTANEOUS at 13:02

## 2021-07-28 RX ADMIN — CHLORHEXIDINE GLUCONATE 1 APPLICATION(S): 213 SOLUTION TOPICAL at 08:00

## 2021-07-28 RX ADMIN — HEPARIN SODIUM 5000 UNIT(S): 5000 INJECTION INTRAVENOUS; SUBCUTANEOUS at 21:56

## 2021-07-28 RX ADMIN — Medication 1 DROP(S): at 05:45

## 2021-07-28 RX ADMIN — Medication 37.5 MILLIGRAM(S): at 18:47

## 2021-07-28 RX ADMIN — HEPARIN SODIUM 5000 UNIT(S): 5000 INJECTION INTRAVENOUS; SUBCUTANEOUS at 05:46

## 2021-07-28 RX ADMIN — Medication 1 DROP(S): at 18:46

## 2021-07-28 RX ADMIN — ATORVASTATIN CALCIUM 10 MILLIGRAM(S): 80 TABLET, FILM COATED ORAL at 21:56

## 2021-07-28 NOTE — PROGRESS NOTE ADULT - PROBLEM SELECTOR PLAN 1
-newly diagnosed HFrEF (had previous hx of HFpEF), needs ischemic evaluation once DANE has resolved  -metoprolol tartrate 37.5 milliGRAM(s) Oral two times a day  -optimize gdmt as tolerated.   -appreciate HF team's evaluation and recommendations  -NM amyloid scan not suggestive of cardiac amyloidosis.

## 2021-07-28 NOTE — PROGRESS NOTE ADULT - SUBJECTIVE AND OBJECTIVE BOX
INTERNAL MEDICINE PROGRESS NOTE    Dr. Wilian Doran DO  Attending Physician  Division of Hospital Medicine  [facility]  Available via Microsoft Teams (preferred)  Also available via Pager [phone]    SUBJECTIVE:  No acute complaints.     REVIEW OF SYSTEMS   8 point review of systems negative except for above.     PAST MEDICAL & SURGICAL HISTORY:  HTN (hypertension)    HLD (hyperlipidemia)    DM (diabetes mellitus)    Hyperkalemia    CKD (chronic kidney disease)    Atherosclerosis    HTN (hypertension)    CAD (coronary artery disease)    Stage 4 chronic kidney disease    S/P hysterectomy        MEDICATIONS  (STANDING):  artificial  tears Solution 1 Drop(s) Both EYES two times a day  atorvastatin 10 milliGRAM(s) Oral at bedtime  chlorhexidine 4% Liquid 1 Application(s) Topical <User Schedule>  dextrose 50% Injectable 25 Gram(s) IV Push once  dextrose 50% Injectable 25 Gram(s) IV Push once  heparin   Injectable 5000 Unit(s) SubCutaneous every 8 hours  insulin lispro (ADMELOG) corrective regimen sliding scale   SubCutaneous three times a day before meals  insulin lispro (ADMELOG) corrective regimen sliding scale   SubCutaneous at bedtime  metoprolol tartrate 37.5 milliGRAM(s) Oral two times a day  polyethylene glycol 3350 17 Gram(s) Oral daily    MEDICATIONS  (PRN):      Allergies    No Known Allergies    Intolerances        T(C): 36.4 (07-28-21 @ 12:27), Max: 36.7 (07-27-21 @ 20:40)  T(F): 97.5 (07-28-21 @ 12:27), Max: 98.1 (07-27-21 @ 20:40)  HR: 73 (07-28-21 @ 12:27) (67 - 73)  BP: 148/70 (07-28-21 @ 12:27) (126/54 - 148/70)  ABP: --  ABP(mean): --  RR: 18 (07-28-21 @ 12:27) (18 - 18)  SpO2: 96% (07-28-21 @ 12:27) (96% - 100%)      CONSTITUTIONAL: No acute distress.   HEENT:  Conjunctiva clear B/L.  Moist oral mucosa.   Cardiovascular: RRR with no murmurs. No JVD noted. No lower extremity edema B/L. Extremities are warm and well perfused.   Respiratory: Dec BS at bases. No wrr. No accessory muscle use.   Gastrointestinal:  Soft, nontender. Non-distended. Non-rigid. No CVA tenderness B/L.  SKIN/MSK:  RUE wrapped with ace-bandage. Compartments soft. Deferred undressing.   Neurologic:  Alert and awake. Moving all extremities. Following commands. Making eye contact.  No numbless and tingling.   Psych:  Normal affect. Normal Mood.     LABS                        8.1    9.82  )-----------( 102      ( 27 Jul 2021 11:18 )             25.1     07-27    139  |  106  |  32<H>  ----------------------------<  94  4.1   |  23  |  2.62<H>    Ca    8.0<L>      27 Jul 2021 02:23  Phos  3.3     07-27  Mg     1.8     07-27    TPro  4.5<L>  /  Alb  2.6<L>  /  TBili  0.5  /  DBili  x   /  AST  17  /  ALT  23  /  AlkPhos  46  07-27          ALL RECENT STUDIES REVIEWED INCLUDING REPORTS AVAILABLE     CARE DISCUSSED WITH ALL CONSULTANTS

## 2021-07-28 NOTE — PROGRESS NOTE ADULT - NSPROGADDITIONALINFOA_GEN_ALL_CORE
All consultant contribution to care greatly appreciated.   LABS pending today. Patient was off unit of amyloid scan.

## 2021-07-28 NOTE — PROGRESS NOTE ADULT - ASSESSMENT
85yoF w/ PMHx significant for HFpEF, HTN, HLD, CAD, NIDDM, was tx to [facility] from [place] after being found to be in ADHF / new acute systolic heart failure, possibly tachycardia induced (new Aflutter) with new DANE, requiring CICU for inotropes / pressors / BiPAP, now off; course c/b acute hypovolemic shock secondary to blood loss anemia requiring PRBC transfusion.

## 2021-07-28 NOTE — PROGRESS NOTE ADULT - PROBLEM SELECTOR PLAN 5
- requiring PRBC transfusion during hospitalization for hypervolemic shock  - A-line access bleeding now appears stable; RUE duplex without evidence of active bleeding or pseudoaneurysm, although there is a hematoma; no e/o compartment syndrome; CTA deferred given DANE  - Appreciate vascular recommendations  - monitor H&H   - per EP, once anemia stabilizes and patient able to be on uninterrupted AC, plan for ALANNA/DCCV for Aflutter as above

## 2021-07-29 LAB
ANION GAP SERPL CALC-SCNC: 12 MMOL/L — SIGNIFICANT CHANGE UP (ref 5–17)
BUN SERPL-MCNC: 22 MG/DL — SIGNIFICANT CHANGE UP (ref 7–23)
CALCIUM SERPL-MCNC: 9 MG/DL — SIGNIFICANT CHANGE UP (ref 8.4–10.5)
CHLORIDE SERPL-SCNC: 104 MMOL/L — SIGNIFICANT CHANGE UP (ref 96–108)
CO2 SERPL-SCNC: 24 MMOL/L — SIGNIFICANT CHANGE UP (ref 22–31)
CREAT SERPL-MCNC: 2.01 MG/DL — HIGH (ref 0.5–1.3)
GLUCOSE BLDC GLUCOMTR-MCNC: 104 MG/DL — HIGH (ref 70–99)
GLUCOSE BLDC GLUCOMTR-MCNC: 109 MG/DL — HIGH (ref 70–99)
GLUCOSE BLDC GLUCOMTR-MCNC: 123 MG/DL — HIGH (ref 70–99)
GLUCOSE BLDC GLUCOMTR-MCNC: 147 MG/DL — HIGH (ref 70–99)
GLUCOSE SERPL-MCNC: 112 MG/DL — HIGH (ref 70–99)
HCT VFR BLD CALC: 27.1 % — LOW (ref 34.5–45)
HCT VFR BLD CALC: 27.2 % — LOW (ref 34.5–45)
HGB BLD-MCNC: 8.2 G/DL — LOW (ref 11.5–15.5)
HGB BLD-MCNC: 8.4 G/DL — LOW (ref 11.5–15.5)
MCHC RBC-ENTMCNC: 28.8 PG — SIGNIFICANT CHANGE UP (ref 27–34)
MCHC RBC-ENTMCNC: 29.1 PG — SIGNIFICANT CHANGE UP (ref 27–34)
MCHC RBC-ENTMCNC: 30.3 GM/DL — LOW (ref 32–36)
MCHC RBC-ENTMCNC: 30.9 GM/DL — LOW (ref 32–36)
MCV RBC AUTO: 94.1 FL — SIGNIFICANT CHANGE UP (ref 80–100)
MCV RBC AUTO: 95.1 FL — SIGNIFICANT CHANGE UP (ref 80–100)
NRBC # BLD: 0 /100 WBCS — SIGNIFICANT CHANGE UP (ref 0–0)
NRBC # BLD: 0 /100 WBCS — SIGNIFICANT CHANGE UP (ref 0–0)
PLATELET # BLD AUTO: 123 K/UL — LOW (ref 150–400)
PLATELET # BLD AUTO: 127 K/UL — LOW (ref 150–400)
POTASSIUM SERPL-MCNC: 3.9 MMOL/L — SIGNIFICANT CHANGE UP (ref 3.5–5.3)
POTASSIUM SERPL-SCNC: 3.9 MMOL/L — SIGNIFICANT CHANGE UP (ref 3.5–5.3)
RBC # BLD: 2.85 M/UL — LOW (ref 3.8–5.2)
RBC # BLD: 2.89 M/UL — LOW (ref 3.8–5.2)
RBC # FLD: 17.1 % — HIGH (ref 10.3–14.5)
RBC # FLD: 17.3 % — HIGH (ref 10.3–14.5)
SODIUM SERPL-SCNC: 140 MMOL/L — SIGNIFICANT CHANGE UP (ref 135–145)
WBC # BLD: 9.64 K/UL — SIGNIFICANT CHANGE UP (ref 3.8–10.5)
WBC # BLD: 9.72 K/UL — SIGNIFICANT CHANGE UP (ref 3.8–10.5)
WBC # FLD AUTO: 9.64 K/UL — SIGNIFICANT CHANGE UP (ref 3.8–10.5)
WBC # FLD AUTO: 9.72 K/UL — SIGNIFICANT CHANGE UP (ref 3.8–10.5)

## 2021-07-29 PROCEDURE — 99232 SBSQ HOSP IP/OBS MODERATE 35: CPT

## 2021-07-29 PROCEDURE — 99233 SBSQ HOSP IP/OBS HIGH 50: CPT

## 2021-07-29 RX ORDER — POTASSIUM CHLORIDE 20 MEQ
20 PACKET (EA) ORAL ONCE
Refills: 0 | Status: COMPLETED | OUTPATIENT
Start: 2021-07-29 | End: 2021-07-29

## 2021-07-29 RX ORDER — METOPROLOL TARTRATE 50 MG
50 TABLET ORAL DAILY
Refills: 0 | Status: DISCONTINUED | OUTPATIENT
Start: 2021-07-30 | End: 2021-08-04

## 2021-07-29 RX ORDER — LANOLIN ALCOHOL/MO/W.PET/CERES
3 CREAM (GRAM) TOPICAL ONCE
Refills: 0 | Status: COMPLETED | OUTPATIENT
Start: 2021-07-29 | End: 2021-07-29

## 2021-07-29 RX ORDER — ATORVASTATIN CALCIUM 80 MG/1
40 TABLET, FILM COATED ORAL AT BEDTIME
Refills: 0 | Status: DISCONTINUED | OUTPATIENT
Start: 2021-07-29 | End: 2021-08-14

## 2021-07-29 RX ORDER — HYDRALAZINE HCL 50 MG
10 TABLET ORAL EVERY 8 HOURS
Refills: 0 | Status: DISCONTINUED | OUTPATIENT
Start: 2021-07-29 | End: 2021-07-30

## 2021-07-29 RX ORDER — LANOLIN ALCOHOL/MO/W.PET/CERES
1 CREAM (GRAM) TOPICAL ONCE
Refills: 0 | Status: COMPLETED | OUTPATIENT
Start: 2021-07-29 | End: 2021-07-29

## 2021-07-29 RX ADMIN — HEPARIN SODIUM 5000 UNIT(S): 5000 INJECTION INTRAVENOUS; SUBCUTANEOUS at 22:29

## 2021-07-29 RX ADMIN — ATORVASTATIN CALCIUM 40 MILLIGRAM(S): 80 TABLET, FILM COATED ORAL at 22:28

## 2021-07-29 RX ADMIN — Medication 1 DROP(S): at 17:36

## 2021-07-29 RX ADMIN — Medication 20 MILLIEQUIVALENT(S): at 12:05

## 2021-07-29 RX ADMIN — HEPARIN SODIUM 5000 UNIT(S): 5000 INJECTION INTRAVENOUS; SUBCUTANEOUS at 05:48

## 2021-07-29 RX ADMIN — Medication 37.5 MILLIGRAM(S): at 05:47

## 2021-07-29 RX ADMIN — Medication 37.5 MILLIGRAM(S): at 17:36

## 2021-07-29 RX ADMIN — HEPARIN SODIUM 5000 UNIT(S): 5000 INJECTION INTRAVENOUS; SUBCUTANEOUS at 14:45

## 2021-07-29 RX ADMIN — Medication 1 MILLIGRAM(S): at 01:18

## 2021-07-29 RX ADMIN — Medication 10 MILLIGRAM(S): at 22:29

## 2021-07-29 RX ADMIN — Medication 30 MILLILITER(S): at 23:17

## 2021-07-29 RX ADMIN — Medication 3 MILLIGRAM(S): at 23:17

## 2021-07-29 RX ADMIN — Medication 1 DROP(S): at 05:47

## 2021-07-29 RX ADMIN — Medication 10 MILLIGRAM(S): at 14:45

## 2021-07-29 NOTE — PROGRESS NOTE ADULT - PROBLEM SELECTOR PLAN 1
-newly diagnosed HFrEF (had previous hx of HFpEF), needs ischemic evaluation once DANE has resolved  -metoprolol tartrate 37.5 milliGRAM(s) Oral two times a day  -hydral 10 mg po tid.   -optimize gdmt as tolerated.   -appreciate HF team's evaluation and recommendations  -NM amyloid scan not suggestive of cardiac amyloidosis.  -HF recs nephro input for pre-cath nephro optimization.

## 2021-07-29 NOTE — CONSULT NOTE ADULT - REASON FOR ADMISSION
Transfer for Cardiogenic Shock

## 2021-07-29 NOTE — PROGRESS NOTE ADULT - ASSESSMENT
86 y/o female with h/o chronic HFpEF, HTN, DLP, CAD (h/o abnormal stress test), DM 2 and CKD 2 who presented to [facility] with nausea, vomiting and dizziness. She was found to be tachycardic, hypotensive with elevated lactate concerning for cardiogenic shock. She was given IVF, empiric antibiotics and underwent abd CT which ws unremarkable. Her ekg showed new onset aflutter and her TTE showed newly diagnosed LV systolic dysfunction with LVEF 25%, mod MR and mod TR. She was started on milrinone and levophed gtt. She was transferred to [facility] for further management. Her hospital course was complicated by tachypnea requiring bipap placement, acute anemia requiring PRBCs transfusion thought to be due bleeding from arterial line and heparin gtt. Her milrinone gtt was dc’ed 7/26 due to hypotension. CVP prior to transfer out of CICU had been low and received gentle IV fluid hydration.     Hemodynamically stable off inotropic support. She appears euvolemic on exam though markedly hypertensive for her degree of systolic dysfunction. Her clinical course is complicated by persistent renal dysfunction though gradually improving Scr. Would consult nephrology to guide appropriate timing for ischemic evaluation by [facility]. Plan is also for rhythm control with DCCV once able to tolerate AC without interruption.

## 2021-07-29 NOTE — PROGRESS NOTE ADULT - SUBJECTIVE AND OBJECTIVE BOX
Subjective:  - underwent Tc-PYP yesterday that was not suggestive of ATTR cardiac amyloid    Medications:  artificial  tears Solution 1 Drop(s) Both EYES two times a day  atorvastatin 40 milliGRAM(s) Oral at bedtime  dextrose 50% Injectable 25 Gram(s) IV Push once  dextrose 50% Injectable 25 Gram(s) IV Push once  heparin   Injectable 5000 Unit(s) SubCutaneous every 8 hours  insulin lispro (ADMELOG) corrective regimen sliding scale   SubCutaneous three times a day before meals  insulin lispro (ADMELOG) corrective regimen sliding scale   SubCutaneous at bedtime  metoprolol tartrate 37.5 milliGRAM(s) Oral two times a day  polyethylene glycol 3350 17 Gram(s) Oral daily  potassium chloride   Powder 20 milliEquivalent(s) Oral once      Physical Exam:    Vitals:  Vital Signs Last 24 Hours  T(C): 36.7 (21 @ 04:00), Max: 36.7 (21 @ 04:00)  HR: 75 (21 @ 05:45) (51 - 91)  BP: 144/64 (21 @ 05:45) (129/69 - 169/84)  RR: 18 (21 @ 05:45) (18 - 18)  SpO2: 99% (21 @ 05:45) (96% - 100%)    Weight in k.2 ( @ 04:00)    I&O's Summary    2021 07:01  -  2021 07:00  --------------------------------------------------------  IN: 600 mL / OUT: 1 mL / NET: 599 mL    Tele: AF 70-90s    General: No distress. Comfortable.  Neck: JVP not elevated.  Respiratory: Crackles at the bases bilaterally  CV: Irregularly irregular, tachycardic. Normal S1 and S2. No murmurs, rub, or gallops. Radial pulses normal.  Abdomen: Soft, non-distended, non-tender  Skin: No skin lesions  Extremities: Warm, no edema  Neurology: Non-focal, alert and oriented times three.   Psych: Affect normal    Labs:                        8.2    9.72  )-----------( 127       05:59 )             27.1         140  |  104  |  22  ----------------------------<  112<H>  3.9   |  24  |  2.01<H>    Ca    9.0      2021 05:59  Mg     2.1                 Serum Pro-Brain Natriuretic Peptide: 53319 pg/mL ( @ 15:54)  Serum Pro-Brain Natriuretic Peptide: 43846 pg/mL ( @ 12:07)  Serum Pro-Brain Natriuretic Peptide: 04245 pg/mL ( @ 21:26)           Subjective:  - underwent Tc-PYP yesterday that was not suggestive of ATTR cardiac amyloid  - feeling well, able to walk without SOB, CP and palpitations     Medications:  artificial  tears Solution 1 Drop(s) Both EYES two times a day  atorvastatin 40 milliGRAM(s) Oral at bedtime  dextrose 50% Injectable 25 Gram(s) IV Push once  dextrose 50% Injectable 25 Gram(s) IV Push once  heparin   Injectable 5000 Unit(s) SubCutaneous every 8 hours  insulin lispro (ADMELOG) corrective regimen sliding scale   SubCutaneous three times a day before meals  insulin lispro (ADMELOG) corrective regimen sliding scale   SubCutaneous at bedtime  metoprolol tartrate 37.5 milliGRAM(s) Oral two times a day  polyethylene glycol 3350 17 Gram(s) Oral daily  potassium chloride   Powder 20 milliEquivalent(s) Oral once      Physical Exam:    Vitals:  Vital Signs Last 24 Hours  T(C): 36.7 (21 @ 04:00), Max: 36.7 (21 @ 04:00)  HR: 75 (21 @ 05:45) (51 - 91)  BP: 144/64 (21 @ 05:45) (129/69 - 169/84)  RR: 18 (21 @ 05:45) (18 - 18)  SpO2: 99% (21 @ 05:45) (96% - 100%)    Weight in k.2 ( @ 04:00)    I&O's Summary    2021 07:01  -  2021 07:00  --------------------------------------------------------  IN: 600 mL / OUT: 1 mL / NET: 599 mL    Tele: AF 70-90s    General: No distress. Comfortable.  Neck: JVP not elevated.  Respiratory: Crackles at the bases bilaterally  CV: Irregularly irregular, tachycardic. Normal S1 and S2. No murmurs, rub, or gallops. Radial pulses normal.  Abdomen: Soft, non-distended, non-tender  Skin: No skin lesions  Extremities: Warm, no LE edema. R hand +2 edema  Neurology: Non-focal, alert and oriented times three.   Psych: Affect normal    Labs:                        8.2    9.72  )-----------( 127      ( 2021 05:59 )             27.1         140  |  104  |  22  ----------------------------<  112<H>  3.9   |  24  |  2.01<H>    Ca    9.0      2021 05:59  Mg     2.1                 Serum Pro-Brain Natriuretic Peptide: 39877 pg/mL ( @ 15:54)  Serum Pro-Brain Natriuretic Peptide: 46455 pg/mL ( @ 12:07)  Serum Pro-Brain Natriuretic Peptide: 29502 pg/mL ( @ 21:26)

## 2021-07-29 NOTE — PROGRESS NOTE ADULT - ATTENDING COMMENTS
Pt reports she feels well.   Denies SOB, CP, dizziness or palpitations.  She is eager to go home.  Looks close to euvolemia, lukewarm extremities.  Has newly diagnosed HFrEF, risk factors for CAD and h/o abnormal nuclear stress test in the past. Will benefit from ischemia work up.   Plan for eventual ALANNA/DCCV once able to tolerate uninterrupted AC. Consider starting heparin gtt as bleeding has subsided.   Will add GDMT as tolerated.

## 2021-07-29 NOTE — PROGRESS NOTE ADULT - PROBLEM SELECTOR PLAN 4
- in setting of CKD 2 as reported prior to this hospitalization  - etiology likely related to presenting cardiogenic shock and hypervolemic shock d/t blood loss  - continue to closely monitor along w/ UOP and electrolytes  - home ARB on hold

## 2021-07-29 NOTE — CONSULT NOTE ADULT - SUBJECTIVE AND OBJECTIVE BOX
seen examined. check cr in am and lytes. will determine at that point whether angio in am. full consult to follow.  seen examined. check cr in am and lytes. will determine at that point whether angio in am. full consult to follow.       [facility]  [phone]  NEPHROLOGY      INITIAL CONSULT NOTE  --------------------------------------------------------------------------------  HPI:       85 F w/ PMH HFpEF, HTN, HLD, CAD, C2D, DM transferred to [facility] from OSH for further management. On 7/21, patient said" she ate a bad turkey sandwich" and woke up 7/22 with  abdominal pain, N/V, and dizziness. She then went to [facility] ED where she was found to be tachycardic w/ soft BP, elevated lactate at 5.9. She was given 2 L IVF and sent for CT a/P but be came acutely SOB while laying flat in CT requiring BiPAP.  pt was admitted. found to in chf/cardiogenic shoc.  with acute hypovolemic shock secondary to anemia requiring blood transfusions.   pt also had DANE.now is being considered for coronary angiogram in am. renal consult called. given cr still elevated             PAST HISTORY  --------------------------------------------------------------------------------  PAST MEDICAL & SURGICAL HISTORY:  HTN (hypertension)    HLD (hyperlipidemia)    DM (diabetes mellitus)    Hyperkalemia    CKD (chronic kidney disease)    Atherosclerosis    HTN (hypertension)    CAD (coronary artery disease)    Stage 4 chronic kidney disease    S/P hysterectomy      FAMILY HISTORY:  FH: MI (myocardial infarction) (Father, Mother)      PAST SOCIAL HISTORY:  denies tobacco u se     ALLERGIES & MEDICATIONS  --------------------------------------------------------------------------------  Allergies    No Known Allergies    Intolerances      Standing Inpatient Medications  artificial  tears Solution 1 Drop(s) Both EYES two times a day  atorvastatin 40 milliGRAM(s) Oral at bedtime  dextrose 50% Injectable 25 Gram(s) IV Push once  dextrose 50% Injectable 25 Gram(s) IV Push once  heparin   Injectable 5000 Unit(s) SubCutaneous every 8 hours  hydrALAZINE 10 milliGRAM(s) Oral every 8 hours  insulin lispro (ADMELOG) corrective regimen sliding scale   SubCutaneous three times a day before meals  insulin lispro (ADMELOG) corrective regimen sliding scale   SubCutaneous at bedtime  polyethylene glycol 3350 17 Gram(s) Oral daily    PRN Inpatient Medications      REVIEW OF SYSTEMS  --------------------------------------------------------------------------------  Gen: No  fevers/chills   Skin: No rashes  Head/Eyes/Ears/Mouth: No headache; Normal hearing;  No sinus pain/discomfort, sore throat  Respiratory: No dyspnea, cough, wheezing  CV: No chest pain, orthopnea  GI: No abdominal pain, diarrhea, nausea, vomiting, melena  : No dysuria, decrease urination or hesitancy urinating  hematuria  MSK: No joint pain/swelling; no back pain  Neuro: No dizziness/lightheadedness  also with no edema     All other systems were reviewed and are negative, except as noted.    VITALS/PHYSICAL EXAM  --------------------------------------------------------------------------------  T(C): 36.3 (07-29-21 @ 22:28), Max: 36.7 (07-29-21 @ 04:00)  HR: 112 (07-29-21 @ 22:28) (66 - 112)  BP: 155/91 (07-29-21 @ 22:28) (144/64 - 169/84)  RR: 18 (07-29-21 @ 22:28) (18 - 18)  SpO2: 98% (07-29-21 @ 22:28) (95% - 100%)  Wt(kg): --        07-28-21 @ 07:01  -  07-29-21 @ 07:00  --------------------------------------------------------  IN: 600 mL / OUT: 1 mL / NET: 599 mL    07-29-21 @ 07:01  -  07-29-21 @ 23:32  --------------------------------------------------------  IN: 420 mL / OUT: 0 mL / NET: 420 mL      Physical Exam:  	Gen: Non toxic comfortable appearing   	no jvd   	Pulm: decrease bs  no rales or ronchi or wheezing  	CV: RRR, S1S2; no rub  	Back: No CVA tenderness  	Abd: +BS, soft, nontender/nondistended  	: No suprapubic tenderness  	UE: Warm, no cyanosis  no clubbing,  no edema; no asterixis  	LE: Warm, no cyanosis  no clubbing, no edema  	Neuro: alert and oriented. speech coherent   	Psych: Normal affect and mood  	Skin: Warm, no decrease skin turgor     LABS/STUDIES  --------------------------------------------------------------------------------              8.2    9.72  >-----------<  127      [07-29-21 @ 05:59]              27.1     140  |  104  |  22  ----------------------------<  112      [07-29-21 @ 05:59]  3.9   |  24  |  2.01        Ca     9.0     [07-29-21 @ 05:59]      Mg     2.1     [07-28-21 @ 17:38]            Creatinine Trend:  SCr 2.01 [07-29 @ 05:59]  SCr 2.02 [07-28 @ 17:38]  SCr 2.62 [07-27 @ 02:23]  SCr 2.57 [07-26 @ 16:08]  SCr 2.63 [07-26 @ 02:58]    Urinalysis - [07-24-21 @ 05:01]      Color Yellow / Appearance Slightly Turbid / SG 1.025 / pH 5.0      Gluc Negative / Ketone Trace  / Bili Negative / Urobili 4       Blood Moderate / Protein 100 / Leuk Est Small / Nitrite Negative      RBC 25-50 / WBC 11-25 / Hyaline  / Gran 0-2 / Sq Epi  / Non Sq Epi Moderate / Bacteria Moderate    Urine Creatinine 38      [07-26-21 @ 10:31]  Urine Protein 9      [07-26-21 @ 10:31]  Urine Sodium 70      [07-26-21 @ 10:31]  Urine Urea Nitrogen 277      [07-26-21 @ 15:22]  Urine Osmolality 296      [07-26-21 @ 10:32]    TSH 2.62      [07-26-21 @ 20:44]  Lipid: chol 68, TG 48, HDL 32, LDL --      [07-25-21 @ 20:34]      Free Light Chains: kappa 1.86, lambda 2.79, ratio = 0.67      [07-28 @ 08:47]

## 2021-07-29 NOTE — PROGRESS NOTE ADULT - PROBLEM SELECTOR PLAN 1
- stop lopressor after evening dose and start Toprol XL 50 mg QD tomorrow in favor of GDMT  - start HDZN 10 mg TID for afterload reduction and will gradually uptitrate as tolerates   - deferring RASSi and MRA given degree of renal dysfunction  - daily standing weights and strict I&Os  - newly diagnosed HFrEF, needs ischemic evaluation once deemed appropriate by nephrology to be able to receive anticipated dye load with [facility]

## 2021-07-29 NOTE — PROGRESS NOTE ADULT - PROBLEM SELECTOR PLAN 2
- continue telemetry monitoring  - appreciate EP's evaluation and recommendations  - beta blocker initiated today, monitor hemodynamics closely  - plan for possible ALANNA/DCCV once patient able to tolerate uninterrupted AC per EP.

## 2021-07-29 NOTE — CONSULT NOTE ADULT - ASSESSMENT
85 F w/ PMH HFpEF, HTN, HLD, CAD, C2D, DM transferred to [facility] from OSH. pt was admitted. found to in chf/cardiogenic shoc.  with acute hypovolemic shock secondary to anemia requiring blood transfusions.   pt also had NEMESIO being considered for coronary angiogram in am.       acute hypovolemic shock secondary to anemia requiring blood transfusions.    1- NEMESIO   2- CHF  3- proteinuria   4- DM     Nemesio in setting of cardiogenic shock likely   to have urine pro/cr ratio   renal sono when stable  her cr is improving and based on am labs/cr will decide in am re: coronary angio in am or need to be postponed 1 more day   d;w pt risk of worsening renal function with coronary angiogram  she understands   hydralazine 10 mg tid   when scheduled for coronary angio to receive tirso procedure IVF hydration

## 2021-07-29 NOTE — PROGRESS NOTE ADULT - ASSESSMENT
85yoF w/ PMHx significant for HFpEF, HTN, HLD, CAD, NIDDM, was tx to [facility] from [place] after being found to be in ADHF / new acute systolic heart failure, possibly tachycardia induced (new Aflutter) with new DANE, requiring CICU for inotropes / pressors / BiPAP, now off; course c/b acute hypovolemic shock secondary to blood loss anemia requiring PRBC transfusion.

## 2021-07-29 NOTE — PROGRESS NOTE ADULT - SUBJECTIVE AND OBJECTIVE BOX
INTERNAL MEDICINE PROGRESS NOTE    Dr. Wilian Doran DO  Attending Physician  Division of Hospital Medicine  [facility]  Available via Microsoft Teams (preferred)  Also available via Pager [phone]    SUBJECTIVE:  No acute complaints.     REVIEW OF SYSTEMS   12 point review of systems negative except for above.     PAST MEDICAL & SURGICAL HISTORY:  HTN (hypertension)    HLD (hyperlipidemia)    DM (diabetes mellitus)    Hyperkalemia    CKD (chronic kidney disease)    Atherosclerosis    HTN (hypertension)    CAD (coronary artery disease)    Stage 4 chronic kidney disease    S/P hysterectomy        MEDICATIONS  (STANDING):  artificial  tears Solution 1 Drop(s) Both EYES two times a day  atorvastatin 40 milliGRAM(s) Oral at bedtime  dextrose 50% Injectable 25 Gram(s) IV Push once  dextrose 50% Injectable 25 Gram(s) IV Push once  heparin   Injectable 5000 Unit(s) SubCutaneous every 8 hours  hydrALAZINE 10 milliGRAM(s) Oral every 8 hours  insulin lispro (ADMELOG) corrective regimen sliding scale   SubCutaneous three times a day before meals  insulin lispro (ADMELOG) corrective regimen sliding scale   SubCutaneous at bedtime  metoprolol tartrate 37.5 milliGRAM(s) Oral two times a day  polyethylene glycol 3350 17 Gram(s) Oral daily    MEDICATIONS  (PRN):      Allergies    No Known Allergies    Intolerances        T(C): 36.3 (07-29-21 @ 09:29), Max: 36.7 (07-29-21 @ 04:00)  T(F): 97.4 (07-29-21 @ 09:29), Max: 98 (07-29-21 @ 04:00)  HR: 76 (07-29-21 @ 09:29) (51 - 91)  BP: 154/76 (07-29-21 @ 09:29) (129/69 - 169/84)  ABP: --  ABP(mean): --  RR: 18 (07-29-21 @ 09:29) (18 - 18)  SpO2: 95% (07-29-21 @ 09:29) (95% - 100%)      CONSTITUTIONAL: No acute distress.   HEENT:  Conjunctiva clear B/L.  Moist oral mucosa.   Cardiovascular: RRR with no murmurs. No JVD noted. No lower extremity edema B/L. Extremities are warm and well perfused. Radial pulses 2+ B/L. Dorsalis pedis pulses 2+ B/L.    Respiratory: Lungs CTAB. No wrr. No accessory muscle use.   Gastrointestinal:  Soft, nontender. Non-distended. Non-rigid. No CVA tenderness B/L.  Neurologic:  Alert and awake. Moving all extremities. Following commands. Making eye contact. No numbness or tingling.   MSK / Skin: RUE ecchymoses in dependent parts of arm. Compartments soft.   Psych:  Normal affect. Normal Mood.     LABS                        8.2    9.72  )-----------( 127      ( 29 Jul 2021 05:59 )             27.1     07-29    140  |  104  |  22  ----------------------------<  112<H>  3.9   |  24  |  2.01<H>    Ca    9.0      29 Jul 2021 05:59  Mg     2.1     07-28            ALL RECENT STUDIES REVIEWED INCLUDING REPORTS AVAILABLE     CARE DISCUSSED WITH ALL CONSULTANTS

## 2021-07-29 NOTE — PROGRESS NOTE ADULT - PROBLEM SELECTOR PLAN 4
- Per outpatient records, SCr was 1.14 on 5/21/21  - May be related to ATN given episodes of hypotension  - Cr had peaked to 2.7, gradually improving, now 2.01  - Nephrology consult to guide decision on appropriate timing of LHC in relation to anticipated dye load

## 2021-07-30 LAB
ANION GAP SERPL CALC-SCNC: 11 MMOL/L — SIGNIFICANT CHANGE UP (ref 5–17)
BUN SERPL-MCNC: 22 MG/DL — SIGNIFICANT CHANGE UP (ref 7–23)
CALCIUM SERPL-MCNC: 9.3 MG/DL — SIGNIFICANT CHANGE UP (ref 8.4–10.5)
CHLORIDE SERPL-SCNC: 103 MMOL/L — SIGNIFICANT CHANGE UP (ref 96–108)
CO2 SERPL-SCNC: 23 MMOL/L — SIGNIFICANT CHANGE UP (ref 22–31)
CREAT SERPL-MCNC: 1.73 MG/DL — HIGH (ref 0.5–1.3)
GLUCOSE BLDC GLUCOMTR-MCNC: 115 MG/DL — HIGH (ref 70–99)
GLUCOSE BLDC GLUCOMTR-MCNC: 124 MG/DL — HIGH (ref 70–99)
GLUCOSE BLDC GLUCOMTR-MCNC: 125 MG/DL — HIGH (ref 70–99)
GLUCOSE BLDC GLUCOMTR-MCNC: 139 MG/DL — HIGH (ref 70–99)
GLUCOSE SERPL-MCNC: 107 MG/DL — HIGH (ref 70–99)
HCT VFR BLD CALC: 27 % — LOW (ref 34.5–45)
HGB BLD-MCNC: 8.2 G/DL — LOW (ref 11.5–15.5)
MCHC RBC-ENTMCNC: 29.1 PG — SIGNIFICANT CHANGE UP (ref 27–34)
MCHC RBC-ENTMCNC: 30.4 GM/DL — LOW (ref 32–36)
MCV RBC AUTO: 95.7 FL — SIGNIFICANT CHANGE UP (ref 80–100)
METFORMIN SERPL-MCNC: 3.3 MCG/ML — SIGNIFICANT CHANGE UP
NRBC # BLD: 0 /100 WBCS — SIGNIFICANT CHANGE UP (ref 0–0)
PLATELET # BLD AUTO: 154 K/UL — SIGNIFICANT CHANGE UP (ref 150–400)
POTASSIUM SERPL-MCNC: 4.3 MMOL/L — SIGNIFICANT CHANGE UP (ref 3.5–5.3)
POTASSIUM SERPL-SCNC: 4.3 MMOL/L — SIGNIFICANT CHANGE UP (ref 3.5–5.3)
RBC # BLD: 2.82 M/UL — LOW (ref 3.8–5.2)
RBC # FLD: 17.7 % — HIGH (ref 10.3–14.5)
SODIUM SERPL-SCNC: 137 MMOL/L — SIGNIFICANT CHANGE UP (ref 135–145)
WBC # BLD: 9.93 K/UL — SIGNIFICANT CHANGE UP (ref 3.8–10.5)
WBC # FLD AUTO: 9.93 K/UL — SIGNIFICANT CHANGE UP (ref 3.8–10.5)

## 2021-07-30 PROCEDURE — 99233 SBSQ HOSP IP/OBS HIGH 50: CPT

## 2021-07-30 RX ORDER — HYDRALAZINE HCL 50 MG
20 TABLET ORAL THREE TIMES A DAY
Refills: 0 | Status: DISCONTINUED | OUTPATIENT
Start: 2021-07-30 | End: 2021-08-02

## 2021-07-30 RX ADMIN — Medication 10 MILLIGRAM(S): at 05:13

## 2021-07-30 RX ADMIN — Medication 50 MILLIGRAM(S): at 05:23

## 2021-07-30 RX ADMIN — Medication 1 DROP(S): at 05:13

## 2021-07-30 RX ADMIN — ATORVASTATIN CALCIUM 40 MILLIGRAM(S): 80 TABLET, FILM COATED ORAL at 22:19

## 2021-07-30 RX ADMIN — HEPARIN SODIUM 5000 UNIT(S): 5000 INJECTION INTRAVENOUS; SUBCUTANEOUS at 14:00

## 2021-07-30 RX ADMIN — Medication 1 DROP(S): at 18:15

## 2021-07-30 RX ADMIN — Medication 10 MILLIGRAM(S): at 14:00

## 2021-07-30 RX ADMIN — Medication 20 MILLIGRAM(S): at 22:19

## 2021-07-30 RX ADMIN — HEPARIN SODIUM 5000 UNIT(S): 5000 INJECTION INTRAVENOUS; SUBCUTANEOUS at 05:14

## 2021-07-30 RX ADMIN — HEPARIN SODIUM 5000 UNIT(S): 5000 INJECTION INTRAVENOUS; SUBCUTANEOUS at 22:19

## 2021-07-30 NOTE — OCCUPATIONAL THERAPY INITIAL EVALUATION ADULT - LIVES WITH, PROFILE
Pt lives alone in APT with 2 steps down to enter and then one level set up of bedroom/bathroom. Pt uses shower stall w/ bench. Pt uses cane for ambulation and was Independent with ADLs PTA/alone

## 2021-07-30 NOTE — PROGRESS NOTE ADULT - SUBJECTIVE AND OBJECTIVE BOX
[facility]   [phone]  RENAL FOLLOW UP NOTE  --------------------------------------------------------------------------------  Chief Complaint:    24 hour events/subjective:    seen earlier   states feels well and is not short of breath     PAST HISTORY  --------------------------------------------------------------------------------  No significant changes to PMH, PSH, FHx, SHx, unless otherwise noted    ALLERGIES & MEDICATIONS  --------------------------------------------------------------------------------  Allergies    No Known Allergies    Intolerances      Standing Inpatient Medications  artificial  tears Solution 1 Drop(s) Both EYES two times a day  atorvastatin 40 milliGRAM(s) Oral at bedtime  dextrose 50% Injectable 25 Gram(s) IV Push once  dextrose 50% Injectable 25 Gram(s) IV Push once  heparin   Injectable 5000 Unit(s) SubCutaneous every 8 hours  hydrALAZINE 20 milliGRAM(s) Oral three times a day  insulin lispro (ADMELOG) corrective regimen sliding scale   SubCutaneous three times a day before meals  insulin lispro (ADMELOG) corrective regimen sliding scale   SubCutaneous at bedtime  metoprolol succinate ER 50 milliGRAM(s) Oral daily  polyethylene glycol 3350 17 Gram(s) Oral daily    PRN Inpatient Medications      REVIEW OF SYSTEMS  --------------------------------------------------------------------------------    Gen: denies  fevers/chills,  CVS: denies chest pain/palpitations  Resp: denies SOB/Cough  GI: Denies N/V/Abd pain  : Denies dysuria      VITALS/PHYSICAL EXAM  --------------------------------------------------------------------------------  T(C): 36.9 (07-30-21 @ 21:22), Max: 36.9 (07-30-21 @ 21:22)  HR: 74 (07-30-21 @ 21:22) (74 - 124)  BP: 148/92 (07-30-21 @ 21:22) (110/67 - 167/87)  RR: 18 (07-30-21 @ 21:22) (18 - 18)  SpO2: 92% (07-30-21 @ 21:22) (92% - 100%)  Wt(kg): --        07-29-21 @ 07:01  -  07-30-21 @ 07:00  --------------------------------------------------------  IN: 420 mL / OUT: 0 mL / NET: 420 mL    07-30-21 @ 07:01  -  07-30-21 @ 23:13  --------------------------------------------------------  IN: 360 mL / OUT: 0 mL / NET: 360 mL      Physical Exam:  	    	Gen: Non toxic comfortable appearing   	no jvd   	Pulm: decrease bs  no rales or ronchi or wheezing  	CV: RRR, S1S2; no rub  	Abd: +BS, soft, nontender/nondistended  	: No suprapubic tenderness  	UE: Warm, no cyanosis  no clubbing,  no edema; no asterixis  	LE: Warm, no cyanosis  no clubbing, no edema  	Neuro: alert and oriented. speech coherent       LABS/STUDIES  --------------------------------------------------------------------------------              8.2    9.93  >-----------<  154      [07-30-21 @ 06:45]              27.0     137  |  103  |  22  ----------------------------<  107      [07-30-21 @ 06:45]  4.3   |  23  |  1.73        Ca     9.3     [07-30-21 @ 06:45]            Creatinine Trend:  SCr 1.73 [07-30 @ 06:45]  SCr 2.01 [07-29 @ 05:59]  SCr 2.02 [07-28 @ 17:38]  SCr 2.62 [07-27 @ 02:23]  SCr 2.57 [07-26 @ 16:08]              Urinalysis - [07-24-21 @ 05:01]      Color Yellow / Appearance Slightly Turbid / SG 1.025 / pH 5.0      Gluc Negative / Ketone Trace  / Bili Negative / Urobili 4       Blood Moderate / Protein 100 / Leuk Est Small / Nitrite Negative      RBC 25-50 / WBC 11-25 / Hyaline  / Gran 0-2 / Sq Epi  / Non Sq Epi Moderate / Bacteria Moderate    Urine Creatinine 38      [07-26-21 @ 10:31]  Urine Protein 9      [07-26-21 @ 10:31]  Urine Sodium 70      [07-26-21 @ 10:31]  Urine Urea Nitrogen 277      [07-26-21 @ 15:22]  Urine Osmolality 296      [07-26-21 @ 10:32]    TSH 2.62      [07-26-21 @ 20:44]  Lipid: chol 68, TG 48, HDL 32, LDL --      [07-25-21 @ 20:34]    Free Light Chains: kappa 1.86, lambda 2.79, ratio = 0.67      [07-28 @ 08:47]

## 2021-07-30 NOTE — OCCUPATIONAL THERAPY INITIAL EVALUATION ADULT - ADL RETRAINING, OT EVAL
GOAL: Pt will tolerated standing at the sink for 5 minutes to perform grooming ADLs Independently in 2 weeks

## 2021-07-30 NOTE — PROGRESS NOTE ADULT - ATTENDING COMMENTS
Clinically improved.   Needs ischemia work up and GDMT optimization.   No evidence of active bleeding should resume AC for aflutter.  PLan for LHC once cleared by nephrology, creatinine continues to improve.

## 2021-07-30 NOTE — PROGRESS NOTE ADULT - PROBLEM SELECTOR PLAN 2
- hsTrop 310 on admission which may be due to demand ischemia  - c/w statin, off AC/ASA d/t bleeding  - BB as above and will need eventual ischemic eval

## 2021-07-30 NOTE — PROGRESS NOTE ADULT - ASSESSMENT
85 F w/ PMH HFpEF, HTN, HLD, CAD, C2D, DM transferred to [facility] from OSH. pt was admitted. found to in chf/cardiogenic shoc.  with acute hypovolemic shock secondary to anemia requiring blood transfusions.   pt also had NEMESIO being considered for coronary angiogram in am.       acute hypovolemic shock secondary to anemia requiring blood transfusions.    1- NEMESIO   2- CHF  3- proteinuria   4- DM     Nemesio in setting of cardiogenic shock likely   cr is improving   renal sono   her cr is improving  d;w pt risk of worsening renal function with coronary angiogram  she understands   hydralazine 10 mg tid   when scheduled for coronary angio to receive tirso procedure IVF hydration   d/w chf team as well   monitor for fluid overload

## 2021-07-30 NOTE — PROGRESS NOTE ADULT - PROBLEM SELECTOR PLAN 1
- increase HDZN to 20 mg TID for further afterload reduction  - continue Toprol XL 50 mg QD   - deferring RASSi and MRA given degree of renal dysfunction  - daily standing weights and strict I&Os  - newly diagnosed HFrEF, needs ischemic evaluation once deemed appropriate by nephrology to be able to receive anticipated dye load with [facility] - increase HDZN to 20 mg TID for further afterload reduction  - continue Toprol XL 50 mg QD   - deferring RASSi and MRA given degree of renal dysfunction  - daily standing weights and strict I&Os  - newly diagnosed HFrEF, needs ischemic evaluation once deemed appropriate by nephrology to be able to receive anticipated dye load with [facility]. PLEASE contact HF team once nephrology has cleared patient.

## 2021-07-30 NOTE — PROGRESS NOTE ADULT - ASSESSMENT
84 y/o female with h/o chronic HFpEF, HTN, DLP, CAD (h/o abnormal stress test), DM 2 and CKD 2 who presented to [facility] with nausea, vomiting and dizziness. She was found to be tachycardic, hypotensive with elevated lactate concerning for cardiogenic shock. She was given IVF, empiric antibiotics and underwent abd CT which ws unremarkable. Her ekg showed new onset aflutter and her TTE showed newly diagnosed LV systolic dysfunction with LVEF 25%, mod MR and mod TR. She was started on milrinone and levophed gtt. She was transferred to [facility] for further management. Her hospital course was complicated by tachypnea requiring bipap placement, acute anemia requiring PRBCs transfusion thought to be due bleeding from arterial line and heparin gtt. Her milrinone gtt was dc’ed 7/26 due to hypotension. CVP prior to transfer out of CICU had been low and received gentle IV fluid hydration.     Hemodynamically stable off inotropic support. She appears euvolemic on exam though markedly hypertensive for her degree of systolic dysfunction. Her clinical course is complicated by persistent renal dysfunction though gradually improving Scr. She is in need of ischemic evaluation, will coordinate with nephrology to guide appropriate timing for LHC given anticipated dye load. Her Hgb has been stable without evidence of further bleed, if vascular surgery in agreement would resume AC with Heparin infusion for CVA prophylaxis. Plan is also for rhythm control with DCCV once able to tolerate AC without interruption.    84 y/o female with h/o chronic HFpEF, HTN, DLP, CAD (h/o abnormal stress test), DM 2 and CKD 2 who presented to [facility] with nausea, vomiting and dizziness. She was found to be tachycardic, hypotensive with elevated lactate concerning for cardiogenic shock. She was given IVF, empiric antibiotics and underwent abd CT which ws unremarkable. Her ekg showed new onset aflutter and her TTE showed newly diagnosed LV systolic dysfunction with LVEF 25%, mod MR and mod TR. She was started on milrinone and levophed gtt. She was transferred to [facility] for further management. Her hospital course was complicated by tachypnea requiring bipap placement, acute anemia requiring PRBCs transfusion thought to be due bleeding from arterial line and heparin gtt. Her milrinone gtt was dc’ed 7/26 due to hypotension. CVP prior to transfer out of CICU had been low and received gentle IV fluid hydration.     Hemodynamically stable off inotropic support. She appears euvolemic on exam though markedly hypertensive for her degree of systolic dysfunction. Her clinical course is complicated by persistent renal dysfunction though gradually improving Scr. She is in need of ischemic evaluation, will coordinate with nephrology to guide appropriate timing for LHC given anticipated dye load. Her Hgb has been stable without evidence of further bleed, if vascular surgery in agreement would resume AC with Heparin infusion for CVA prophylaxis. Plan is also for rhythm control with DCCV once able to tolerate AC without interruption.     Pertinent Cardiac Studies  TTE 7/25: LVIDd 4.4 cm, LVEF 29% (global), normal RV size with decreased systolic function, mod dilated LA, mild-mod MR, calcified AV with grossly mod decreased opening (peak/mean gradient 15/9 respectively), mild TR, est RVSP 31 mmHg

## 2021-07-30 NOTE — PROGRESS NOTE ADULT - PROBLEM SELECTOR PLAN 4
- Per outpatient records, SCr was 1.14 on 5/21/21  - May be related to ATN given episodes of hypotension  - Cr had peaked to 2.7, gradually improving, now 1.7  - Nephrology to guide decision on appropriate timing of LHC given anticipated dye load

## 2021-07-30 NOTE — PROGRESS NOTE ADULT - SUBJECTIVE AND OBJECTIVE BOX
INTERNAL MEDICINE PROGRESS NOTE    Dr. Wilian Doran DO  Attending Physician  Division of Hospital Medicine  [facility]  Available via Microsoft Teams (preferred)  Also available via Pager [phone]    SUBJECTIVE:  No acute complaints.     REVIEW OF SYSTEMS   12 point review of systems negative except for above.     PAST MEDICAL & SURGICAL HISTORY:  HTN (hypertension)    HLD (hyperlipidemia)    DM (diabetes mellitus)    Hyperkalemia    CKD (chronic kidney disease)    Atherosclerosis    HTN (hypertension)    CAD (coronary artery disease)    Stage 4 chronic kidney disease    S/P hysterectomy        MEDICATIONS  (STANDING):  artificial  tears Solution 1 Drop(s) Both EYES two times a day  atorvastatin 40 milliGRAM(s) Oral at bedtime  dextrose 50% Injectable 25 Gram(s) IV Push once  dextrose 50% Injectable 25 Gram(s) IV Push once  heparin   Injectable 5000 Unit(s) SubCutaneous every 8 hours  hydrALAZINE 10 milliGRAM(s) Oral every 8 hours  insulin lispro (ADMELOG) corrective regimen sliding scale   SubCutaneous three times a day before meals  insulin lispro (ADMELOG) corrective regimen sliding scale   SubCutaneous at bedtime  metoprolol succinate ER 50 milliGRAM(s) Oral daily  polyethylene glycol 3350 17 Gram(s) Oral daily    MEDICATIONS  (PRN):      Allergies    No Known Allergies    Intolerances        T(C): 36.8 (07-30-21 @ 04:00), Max: 36.8 (07-30-21 @ 04:00)  T(F): 98.2 (07-30-21 @ 04:00), Max: 98.2 (07-30-21 @ 04:00)  HR: 124 (07-30-21 @ 05:18) (73 - 124)  BP: 159/80 (07-30-21 @ 05:18) (145/84 - 167/87)  ABP: --  ABP(mean): --  RR: 18 (07-30-21 @ 05:18) (18 - 18)  SpO2: 98% (07-30-21 @ 05:18) (98% - 100%)      CONSTITUTIONAL: No acute distress.   HEENT:  Conjunctiva clear B/L.  Moist oral mucosa.   Cardiovascular: RRR with no murmurs. No JVD noted. No lower extremity edema B/L. Extremities are warm and well perfused. Radial pulses 2+ B/L. Dorsalis pedis pulses 2+ B/L.    Respiratory: Lungs CTAB. No wrr. No accessory muscle use.   Gastrointestinal:  Soft, nontender. Non-distended. Non-rigid. No CVA tenderness B/L.  Neurologic:  Alert and awake. Moving all extremities. Following commands. Making eye contact. No numbness or tingling.   MSK / Skin: RUE ecchymoses in dependent parts of arm. Compartments soft.   Psych:  Normal affect. Normal Mood.     LABS                        8.2    9.93  )-----------( 154      ( 30 Jul 2021 06:45 )             27.0     07-30    137  |  103  |  22  ----------------------------<  107<H>  4.3   |  23  |  1.73<H>    Ca    9.3      30 Jul 2021 06:45  Mg     2.1     07-28            ALL RECENT STUDIES REVIEWED INCLUDING REPORTS AVAILABLE     CARE DISCUSSED WITH ALL CONSULTANTS

## 2021-07-30 NOTE — PROGRESS NOTE ADULT - PROBLEM SELECTOR PLAN 1
-newly diagnosed HFrEF (had previous hx of HFpEF), needs ischemic evaluation once Cr stable  -metoprolol tartrate 50 milliGRAM(s) Oral two times a day  -hydral 10 mg po tid.   -optimize gdmt as tolerated.   -appreciate HF team's evaluation and recommendations  -NM amyloid scan not suggestive of cardiac amyloidosis.  -Nephro consulted for pre-cath optimization.  -Will need clearance from vascular surgery to trial heparin drip.

## 2021-07-30 NOTE — PROGRESS NOTE ADULT - PROBLEM SELECTOR PLAN 5
- s/p 2U PRBC and Hgb currently stable  - RUE duplex without evidence of active bleeding or pseudoaneurysm, although there is a hematoma

## 2021-07-30 NOTE — PROGRESS NOTE ADULT - SUBJECTIVE AND OBJECTIVE BOX
Subjective:  - walking with PT and able to climb stairs without difficulties    Medications:  artificial  tears Solution 1 Drop(s) Both EYES two times a day  atorvastatin 40 milliGRAM(s) Oral at bedtime  dextrose 50% Injectable 25 Gram(s) IV Push once  dextrose 50% Injectable 25 Gram(s) IV Push once  heparin   Injectable 5000 Unit(s) SubCutaneous every 8 hours  hydrALAZINE 10 milliGRAM(s) Oral every 8 hours  insulin lispro (ADMELOG) corrective regimen sliding scale   SubCutaneous three times a day before meals  insulin lispro (ADMELOG) corrective regimen sliding scale   SubCutaneous at bedtime  metoprolol succinate ER 50 milliGRAM(s) Oral daily  polyethylene glycol 3350 17 Gram(s) Oral daily    Tele: AF 70-90s, up to 120s occasionally     Physical Exam:    General: No distress. Comfortable.  Neck: JVP does not elevated  Respiratory: Diminished bases bilaterally   CV: Irregularly irregular. Normal S1 and S2. No murmurs, rub, or gallops. Radial pulses normal.  Abdomen: Soft, non-distended, non-tender  Skin: No skin lesions  Extremities: Warm, no LE edema. RUE +2 edema with overlying ecchymosis   Neurology: Non-focal, alert and oriented times three.   Psych: Affect normal        I&O's Summary    29 Jul 2021 07:01  -  30 Jul 2021 07:00  --------------------------------------------------------  IN: 420 mL / OUT: 0 mL / NET: 420 mL        Tele:    General: No distress. Comfortable.  HEENT: EOM intact.  Neck: Neck supple. JVP not elevated. No masses  Chest: Clear to auscultation bilaterally  CV: Normal S1 and S2. No murmurs, rub, or gallops. Radial pulses normal.  Abdomen: Soft, non-distended, non-tender  Skin: No rashes or skin breakdown  Neurology: Alert and oriented times three. Sensation intact  Psych: Affect normal    Labs:                        8.2    9.93  )-----------( 154      ( 30 Jul 2021 06:45 )             27.0     07-30    137  |  103  |  22  ----------------------------<  107<H>  4.3   |  23  |  1.73<H>    Ca    9.3      30 Jul 2021 06:45  Mg     2.1     07-28            Serum Pro-Brain Natriuretic Peptide: 47161 pg/mL (07-25 @ 15:54)  Serum Pro-Brain Natriuretic Peptide: 25648 pg/mL (07-24 @ 12:07)  Serum Pro-Brain Natriuretic Peptide: 70743 pg/mL (07-23 @ 21:26)

## 2021-07-30 NOTE — PROGRESS NOTE ADULT - PROBLEM SELECTOR PLAN 2
- continue telemetry monitoring  - appreciate EP's evaluation and recommendations  - beta blocker initiated today, monitor hemodynamics closely  - plan for possible ALANNA/DCCV once patient able to tolerate uninterrupted AC per EP.  -Will need clearance from vascular surgery to trial heparin drip.

## 2021-07-30 NOTE — OCCUPATIONAL THERAPY INITIAL EVALUATION ADULT - PERTINENT HX OF CURRENT PROBLEM, REHAB EVAL
84 y/o female with h/o chronic HFpEF, HTN, DLP, CAD (h/o abnormal stress test), DM 2 and CKD 2 who presented to [facility] with nausea, vomiting and dizziness.

## 2021-07-30 NOTE — OCCUPATIONAL THERAPY INITIAL EVALUATION ADULT - PRECAUTIONS/LIMITATIONS, REHAB EVAL
She was found to be tachycardic, hypotensive with elevated lactate concerning for cardiogenic shock. She was given IVF, empiric antibiotics and underwent abd CT which ws unremarkable. Her ekg showed new onset aflutter and her TTE showed newly diagnosed LV systolic dysfunction with LVEF 25%, mod MR and mod TR. She was started on milrinone and levophed gtt. She was transferred to [facility] for further management. Her hospital course was complicated by tachypnea requiring bipap placement, acute anemia requiring PRBCs transfusion thought to be due bleeding from arterial line and heparin gtt. Her milrinone gtt was dc’ed 7/26 due to hypotension. CVP prior to transfer out of CICU had been low and received gentle IV fluid hydration.

## 2021-07-30 NOTE — PROGRESS NOTE ADULT - ASSESSMENT
85yoF w/ PMHx significant for HFpEF, HTN, HLD, CAD, NIDDM, was tx to [facility] from [place] after being found to be in ADHF / new acute systolic heart failure, possibly tachycardia induced (new Aflutter) with new DANE, requiring CICU for inotropes / pressors / BiPAP, now off; course c/b acute hypovolemic shock secondary to blood loss anemia requiring PRBC transfusion.

## 2021-07-30 NOTE — OCCUPATIONAL THERAPY INITIAL EVALUATION ADULT - DIAGNOSIS, OT EVAL
Pt p/w decreased strength, endurance/activity tolerance and balance impairing independence with ADLs and functional mobility

## 2021-07-30 NOTE — OCCUPATIONAL THERAPY INITIAL EVALUATION ADULT - BALANCE TRAINING, PT EVAL
GOAL: Pt will demonstrate improved static/dynamic balance to GOOD w/ AD in order to increase safety and independence in ADLs within 2 weeks

## 2021-07-30 NOTE — PROGRESS NOTE ADULT - PROBLEM SELECTOR PLAN 5
- requiring PRBC transfusion during hospitalization for hypervolemic shock  - A-line access bleeding now appears stable; RUE duplex without evidence of active bleeding or pseudoaneurysm, although there is a hematoma; no e/o compartment syndrome; CTA deferred given DANE  - Appreciate vascular recommendations  - monitor H&H  -Will need clearance from vascular surgery to trial heparin drip.

## 2021-07-31 LAB
ANION GAP SERPL CALC-SCNC: 13 MMOL/L — SIGNIFICANT CHANGE UP (ref 5–17)
ANION GAP SERPL CALC-SCNC: 13 MMOL/L — SIGNIFICANT CHANGE UP (ref 5–17)
APTT BLD: 27.9 SEC — SIGNIFICANT CHANGE UP (ref 27.5–35.5)
BUN SERPL-MCNC: 22 MG/DL — SIGNIFICANT CHANGE UP (ref 7–23)
BUN SERPL-MCNC: 22 MG/DL — SIGNIFICANT CHANGE UP (ref 7–23)
CALCIUM SERPL-MCNC: 9.4 MG/DL — SIGNIFICANT CHANGE UP (ref 8.4–10.5)
CALCIUM SERPL-MCNC: 9.6 MG/DL — SIGNIFICANT CHANGE UP (ref 8.4–10.5)
CHLORIDE SERPL-SCNC: 101 MMOL/L — SIGNIFICANT CHANGE UP (ref 96–108)
CHLORIDE SERPL-SCNC: 104 MMOL/L — SIGNIFICANT CHANGE UP (ref 96–108)
CO2 SERPL-SCNC: 24 MMOL/L — SIGNIFICANT CHANGE UP (ref 22–31)
CO2 SERPL-SCNC: 25 MMOL/L — SIGNIFICANT CHANGE UP (ref 22–31)
CREAT SERPL-MCNC: 1.7 MG/DL — HIGH (ref 0.5–1.3)
CREAT SERPL-MCNC: 1.71 MG/DL — HIGH (ref 0.5–1.3)
GLUCOSE BLDC GLUCOMTR-MCNC: 105 MG/DL — HIGH (ref 70–99)
GLUCOSE BLDC GLUCOMTR-MCNC: 111 MG/DL — HIGH (ref 70–99)
GLUCOSE BLDC GLUCOMTR-MCNC: 90 MG/DL — SIGNIFICANT CHANGE UP (ref 70–99)
GLUCOSE BLDC GLUCOMTR-MCNC: 95 MG/DL — SIGNIFICANT CHANGE UP (ref 70–99)
GLUCOSE SERPL-MCNC: 111 MG/DL — HIGH (ref 70–99)
GLUCOSE SERPL-MCNC: 130 MG/DL — HIGH (ref 70–99)
HCT VFR BLD CALC: 30.5 % — LOW (ref 34.5–45)
HGB BLD-MCNC: 9 G/DL — LOW (ref 11.5–15.5)
INR BLD: 1.23 RATIO — HIGH (ref 0.88–1.16)
MAGNESIUM SERPL-MCNC: 1.9 MG/DL — SIGNIFICANT CHANGE UP (ref 1.6–2.6)
MCHC RBC-ENTMCNC: 28.8 PG — SIGNIFICANT CHANGE UP (ref 27–34)
MCHC RBC-ENTMCNC: 29.5 GM/DL — LOW (ref 32–36)
MCV RBC AUTO: 97.8 FL — SIGNIFICANT CHANGE UP (ref 80–100)
NRBC # BLD: 0 /100 WBCS — SIGNIFICANT CHANGE UP (ref 0–0)
PHOSPHATE SERPL-MCNC: 3.4 MG/DL — SIGNIFICANT CHANGE UP (ref 2.5–4.5)
PLATELET # BLD AUTO: 179 K/UL — SIGNIFICANT CHANGE UP (ref 150–400)
POTASSIUM SERPL-MCNC: 4.6 MMOL/L — SIGNIFICANT CHANGE UP (ref 3.5–5.3)
POTASSIUM SERPL-MCNC: 5.7 MMOL/L — HIGH (ref 3.5–5.3)
POTASSIUM SERPL-SCNC: 4.6 MMOL/L — SIGNIFICANT CHANGE UP (ref 3.5–5.3)
POTASSIUM SERPL-SCNC: 5.7 MMOL/L — HIGH (ref 3.5–5.3)
PROCALCITONIN SERPL-MCNC: 0.19 NG/ML — HIGH (ref 0.02–0.1)
PROTHROM AB SERPL-ACNC: 14.6 SEC — HIGH (ref 10.6–13.6)
RBC # BLD: 3.12 M/UL — LOW (ref 3.8–5.2)
RBC # FLD: 18.7 % — HIGH (ref 10.3–14.5)
SODIUM SERPL-SCNC: 138 MMOL/L — SIGNIFICANT CHANGE UP (ref 135–145)
SODIUM SERPL-SCNC: 142 MMOL/L — SIGNIFICANT CHANGE UP (ref 135–145)
WBC # BLD: 12.81 K/UL — HIGH (ref 3.8–10.5)
WBC # FLD AUTO: 12.81 K/UL — HIGH (ref 3.8–10.5)

## 2021-07-31 PROCEDURE — 99233 SBSQ HOSP IP/OBS HIGH 50: CPT

## 2021-07-31 RX ORDER — SODIUM ZIRCONIUM CYCLOSILICATE 10 G/10G
10 POWDER, FOR SUSPENSION ORAL ONCE
Refills: 0 | Status: COMPLETED | OUTPATIENT
Start: 2021-07-31 | End: 2021-07-31

## 2021-07-31 RX ADMIN — SODIUM ZIRCONIUM CYCLOSILICATE 10 GRAM(S): 10 POWDER, FOR SUSPENSION ORAL at 08:30

## 2021-07-31 RX ADMIN — Medication 20 MILLIGRAM(S): at 21:44

## 2021-07-31 RX ADMIN — Medication 20 MILLIGRAM(S): at 06:16

## 2021-07-31 RX ADMIN — POLYETHYLENE GLYCOL 3350 17 GRAM(S): 17 POWDER, FOR SOLUTION ORAL at 13:14

## 2021-07-31 RX ADMIN — Medication 50 MILLIGRAM(S): at 06:16

## 2021-07-31 RX ADMIN — HEPARIN SODIUM 5000 UNIT(S): 5000 INJECTION INTRAVENOUS; SUBCUTANEOUS at 21:44

## 2021-07-31 RX ADMIN — Medication 1 DROP(S): at 17:02

## 2021-07-31 RX ADMIN — ATORVASTATIN CALCIUM 40 MILLIGRAM(S): 80 TABLET, FILM COATED ORAL at 21:44

## 2021-07-31 RX ADMIN — HEPARIN SODIUM 5000 UNIT(S): 5000 INJECTION INTRAVENOUS; SUBCUTANEOUS at 06:16

## 2021-07-31 RX ADMIN — HEPARIN SODIUM 5000 UNIT(S): 5000 INJECTION INTRAVENOUS; SUBCUTANEOUS at 13:14

## 2021-07-31 RX ADMIN — Medication 1 DROP(S): at 06:16

## 2021-07-31 RX ADMIN — Medication 20 MILLIGRAM(S): at 13:14

## 2021-07-31 NOTE — PROGRESS NOTE ADULT - PROBLEM SELECTOR PLAN 4
- in setting of CKD 2 as reported prior to this hospitalization  - etiology likely related to presenting cardiogenic shock and hypervolemic shock d/t blood loss  - continue to closely monitor along w/ UOP and electrolytes  - home ARB on hold  - mild hyperK noted on 7/31. Lokelma given. Resolved. Low K diet.

## 2021-07-31 NOTE — CHART NOTE - NSCHARTNOTEFT_GEN_A_CORE
Vascular paged for follow up from 7/26/21 rec for initiating hep gtt post A line hematoma   discussed with becca ultimately made reference to primary team making final decisions on resuming AC but plans to call back  awaiting call back,   Dr Doran notified of above       Department of Medicine   KENNETH Bowens [facility] [phone]

## 2021-07-31 NOTE — PROGRESS NOTE ADULT - ASSESSMENT
85yoF w/ PMHx significant for HFpEF, HTN, HLD, CAD, NIDDM, was tx to [facility] from [place] after being found to be in ADHF / new acute systolic heart failure, possibly tachycardia induced (new Aflutter) with new DANE, requiring CICU for inotropes / pressors / BiPAP, now off; course c/b acute hypovolemic shock secondary to blood loss anemia requiring PRBC transfusion.

## 2021-07-31 NOTE — PROGRESS NOTE ADULT - PROBLEM SELECTOR PLAN 1
-newly diagnosed HFrEF (had previous hx of HFpEF), needs ischemic evaluation once Cr stable  -metoprolol tartrate 50 milliGRAM(s) Oral two times a day  -hydral 20 mg po tid.   -optimize gdmt as tolerated.   -appreciate HF team's evaluation and recommendations  -NM amyloid scan not suggestive of cardiac amyloidosis.  -Nephro consulted for pre-cath optimization.  -Will need clearance from vascular surgery to trial heparin drip. Pending consult for today.

## 2021-07-31 NOTE — PROGRESS NOTE ADULT - PROBLEM SELECTOR PLAN 2
- continue telemetry monitoring  - appreciate EP's evaluation and recommendations  - beta blocker initiated today, monitor hemodynamics closely  - plan for possible ALANNA/DCCV once patient able to tolerate uninterrupted AC per EP.  -Will need clearance from vascular surgery to trial heparin drip as above.

## 2021-07-31 NOTE — PROGRESS NOTE ADULT - SUBJECTIVE AND OBJECTIVE BOX
INTERNAL MEDICINE PROGRESS NOTE    Dr. Wilian Doran DO  Attending Physician  Division of Hospital Medicine  [facility]  Available via Microsoft Teams (preferred)  Also available via Pager [phone]    SUBJECTIVE:  No acute complaints.     REVIEW OF SYSTEMS   12 point review of systems negative except for above.     PAST MEDICAL & SURGICAL HISTORY:  HTN (hypertension)    HLD (hyperlipidemia)    DM (diabetes mellitus)    Hyperkalemia    CKD (chronic kidney disease)    Atherosclerosis    HTN (hypertension)    CAD (coronary artery disease)    Stage 4 chronic kidney disease    S/P hysterectomy        MEDICATIONS  (STANDING):  artificial  tears Solution 1 Drop(s) Both EYES two times a day  atorvastatin 40 milliGRAM(s) Oral at bedtime  dextrose 50% Injectable 25 Gram(s) IV Push once  dextrose 50% Injectable 25 Gram(s) IV Push once  heparin   Injectable 5000 Unit(s) SubCutaneous every 8 hours  hydrALAZINE 20 milliGRAM(s) Oral three times a day  insulin lispro (ADMELOG) corrective regimen sliding scale   SubCutaneous three times a day before meals  insulin lispro (ADMELOG) corrective regimen sliding scale   SubCutaneous at bedtime  metoprolol succinate ER 50 milliGRAM(s) Oral daily  polyethylene glycol 3350 17 Gram(s) Oral daily    MEDICATIONS  (PRN):      Allergies    No Known Allergies    Intolerances        T(C): 36.2 (07-31-21 @ 13:03), Max: 37.1 (07-31-21 @ 04:29)  T(F): 97.2 (07-31-21 @ 13:03), Max: 98.8 (07-31-21 @ 04:29)  HR: 75 (07-31-21 @ 13:03) (74 - 75)  BP: 163/63 (07-31-21 @ 13:03) (136/78 - 163/63)  ABP: --  ABP(mean): --  RR: 18 (07-31-21 @ 13:03) (18 - 18)  SpO2: 97% (07-31-21 @ 13:03) (92% - 97%)    CONSTITUTIONAL: No acute distress.   HEENT:  Conjunctiva clear B/L.  Moist oral mucosa.   Cardiovascular: RRR with no murmurs. No JVD noted. No lower extremity edema B/L. Extremities are warm and well perfused. Radial pulses 2+ B/L. Dorsalis pedis pulses 2+ B/L.    Respiratory: Lungs CTAB. No wrr. No accessory muscle use.   Gastrointestinal:  Soft, nontender. Non-distended. Non-rigid. No CVA tenderness B/L.  Neurologic:  Alert and awake. Moving all extremities. Following commands. Making eye contact. No numbness or tingling.   MSK / Skin: RUE ecchymoses in dependent parts of arm. Compartments soft.   Psych:  Normal affect. Normal Mood.     LABS                        9.0    12.81 )-----------( 179      ( 31 Jul 2021 07:09 )             30.5     07-31    138  |  101  |  22  ----------------------------<  130<H>  4.6   |  24  |  1.70<H>    Ca    9.4      31 Jul 2021 11:48  Phos  3.4     07-31  Mg     1.9     07-31      PT/INR - ( 31 Jul 2021 11:48 )   PT: 14.6 sec;   INR: 1.23 ratio         PTT - ( 31 Jul 2021 11:48 )  PTT:27.9 sec      ALL RECENT STUDIES REVIEWED INCLUDING REPORTS AVAILABLE     CARE DISCUSSED WITH ALL CONSULTANTS

## 2021-07-31 NOTE — PROGRESS NOTE ADULT - PROBLEM SELECTOR PLAN 5
- requiring PRBC transfusion during hospitalization for hypervolemic shock  - A-line access bleeding now appears stable; RUE duplex without evidence of active bleeding or pseudoaneurysm, although there is a hematoma; no e/o compartment syndrome; CTA deferred given DANE  - Appreciate vascular recommendations -- Will need clearance from vascular surgery to trial heparin drip as above.  - monitor H&H  -Will need clearance from vascular surgery to trial heparin drip as above.

## 2021-07-31 NOTE — PROGRESS NOTE ADULT - NSPROGADDITIONALINFOA_GEN_ALL_CORE
All consultant contribution to care greatly appreciated. Leukocytosis noted. No clear source of infection.  Monitor off abx.    All consultant contribution to care greatly appreciated.

## 2021-08-01 LAB
ANION GAP SERPL CALC-SCNC: 12 MMOL/L — SIGNIFICANT CHANGE UP (ref 5–17)
ANION GAP SERPL CALC-SCNC: 6 MMOL/L — SIGNIFICANT CHANGE UP (ref 5–17)
APTT BLD: 36.1 SEC — HIGH (ref 27.5–35.5)
BUN SERPL-MCNC: 24 MG/DL — HIGH (ref 7–23)
BUN SERPL-MCNC: 25 MG/DL — HIGH (ref 7–23)
CALCIUM SERPL-MCNC: 7 MG/DL — LOW (ref 8.4–10.5)
CALCIUM SERPL-MCNC: 9.7 MG/DL — SIGNIFICANT CHANGE UP (ref 8.4–10.5)
CHLORIDE SERPL-SCNC: 101 MMOL/L — SIGNIFICANT CHANGE UP (ref 96–108)
CHLORIDE SERPL-SCNC: 106 MMOL/L — SIGNIFICANT CHANGE UP (ref 96–108)
CO2 SERPL-SCNC: 25 MMOL/L — SIGNIFICANT CHANGE UP (ref 22–31)
CO2 SERPL-SCNC: 25 MMOL/L — SIGNIFICANT CHANGE UP (ref 22–31)
CREAT SERPL-MCNC: 1.6 MG/DL — HIGH (ref 0.5–1.3)
CREAT SERPL-MCNC: 1.93 MG/DL — HIGH (ref 0.5–1.3)
GLUCOSE BLDC GLUCOMTR-MCNC: 106 MG/DL — HIGH (ref 70–99)
GLUCOSE BLDC GLUCOMTR-MCNC: 111 MG/DL — HIGH (ref 70–99)
GLUCOSE BLDC GLUCOMTR-MCNC: 118 MG/DL — HIGH (ref 70–99)
GLUCOSE BLDC GLUCOMTR-MCNC: 98 MG/DL — SIGNIFICANT CHANGE UP (ref 70–99)
GLUCOSE SERPL-MCNC: 129 MG/DL — HIGH (ref 70–99)
GLUCOSE SERPL-MCNC: 87 MG/DL — SIGNIFICANT CHANGE UP (ref 70–99)
HCT VFR BLD CALC: 29.8 % — LOW (ref 34.5–45)
HGB BLD-MCNC: 9.1 G/DL — LOW (ref 11.5–15.5)
INR BLD: 1.21 RATIO — HIGH (ref 0.88–1.16)
MAGNESIUM SERPL-MCNC: 1.6 MG/DL — SIGNIFICANT CHANGE UP (ref 1.6–2.6)
MCHC RBC-ENTMCNC: 29.8 PG — SIGNIFICANT CHANGE UP (ref 27–34)
MCHC RBC-ENTMCNC: 30.5 GM/DL — LOW (ref 32–36)
MCV RBC AUTO: 97.7 FL — SIGNIFICANT CHANGE UP (ref 80–100)
NRBC # BLD: 0 /100 WBCS — SIGNIFICANT CHANGE UP (ref 0–0)
PLATELET # BLD AUTO: 208 K/UL — SIGNIFICANT CHANGE UP (ref 150–400)
POTASSIUM SERPL-MCNC: 5.1 MMOL/L — SIGNIFICANT CHANGE UP (ref 3.5–5.3)
POTASSIUM SERPL-MCNC: 5.4 MMOL/L — HIGH (ref 3.5–5.3)
POTASSIUM SERPL-SCNC: 5.1 MMOL/L — SIGNIFICANT CHANGE UP (ref 3.5–5.3)
POTASSIUM SERPL-SCNC: 5.4 MMOL/L — HIGH (ref 3.5–5.3)
PROTHROM AB SERPL-ACNC: 14.4 SEC — HIGH (ref 10.6–13.6)
RBC # BLD: 3.05 M/UL — LOW (ref 3.8–5.2)
RBC # FLD: 19.9 % — HIGH (ref 10.3–14.5)
SODIUM SERPL-SCNC: 137 MMOL/L — SIGNIFICANT CHANGE UP (ref 135–145)
SODIUM SERPL-SCNC: 138 MMOL/L — SIGNIFICANT CHANGE UP (ref 135–145)
WBC # BLD: 11.62 K/UL — HIGH (ref 3.8–10.5)
WBC # FLD AUTO: 11.62 K/UL — HIGH (ref 3.8–10.5)

## 2021-08-01 PROCEDURE — 99233 SBSQ HOSP IP/OBS HIGH 50: CPT

## 2021-08-01 RX ORDER — HEPARIN SODIUM 5000 [USP'U]/ML
1100 INJECTION INTRAVENOUS; SUBCUTANEOUS
Qty: 25000 | Refills: 0 | Status: DISCONTINUED | OUTPATIENT
Start: 2021-08-01 | End: 2021-08-02

## 2021-08-01 RX ADMIN — Medication 50 MILLIGRAM(S): at 05:42

## 2021-08-01 RX ADMIN — HEPARIN SODIUM 5000 UNIT(S): 5000 INJECTION INTRAVENOUS; SUBCUTANEOUS at 05:40

## 2021-08-01 RX ADMIN — Medication 1 DROP(S): at 05:41

## 2021-08-01 RX ADMIN — Medication 20 MILLIGRAM(S): at 05:41

## 2021-08-01 RX ADMIN — POLYETHYLENE GLYCOL 3350 17 GRAM(S): 17 POWDER, FOR SOLUTION ORAL at 23:18

## 2021-08-01 RX ADMIN — Medication 20 MILLIGRAM(S): at 13:06

## 2021-08-01 RX ADMIN — ATORVASTATIN CALCIUM 40 MILLIGRAM(S): 80 TABLET, FILM COATED ORAL at 22:19

## 2021-08-01 RX ADMIN — Medication 20 MILLIGRAM(S): at 22:23

## 2021-08-01 RX ADMIN — HEPARIN SODIUM 5000 UNIT(S): 5000 INJECTION INTRAVENOUS; SUBCUTANEOUS at 13:07

## 2021-08-01 RX ADMIN — Medication 1 DROP(S): at 17:13

## 2021-08-01 RX ADMIN — HEPARIN SODIUM 11 UNIT(S)/HR: 5000 INJECTION INTRAVENOUS; SUBCUTANEOUS at 22:20

## 2021-08-01 NOTE — PROGRESS NOTE ADULT - PROBLEM SELECTOR PLAN 5
- requiring PRBC transfusion during hospitalization for hypervolemic shock  - A-line access bleeding now appears stable; RUE duplex without evidence of active bleeding or pseudoaneurysm, although there is a hematoma; no e/o compartment syndrome; CTA deferred given DANE  - Appreciate vascular recommendations -- -Will need clearance from vascular surgery to trial heparin drip. Verbally told by team that they defer the decision to us. Coags and cbc pending. We favor heparin drip challenge soon.  -Will need clearance from vascular surgery to trial heparin drip as above.

## 2021-08-01 NOTE — PROGRESS NOTE ADULT - PROBLEM SELECTOR PLAN 1
-newly diagnosed HFrEF (had previous hx of HFpEF), needs ischemic evaluation once Cr stable  -metoprolol tartrate 50 milliGRAM(s) Oral two times a day  -hydral 20 mg po tid.   -optimize gdmt as tolerated.   -appreciate HF team's evaluation and recommendations  -NM amyloid scan not suggestive of cardiac amyloidosis.  -Nephro consulted for pre-cath optimization.  -Will need clearance from vascular surgery to trial heparin drip. Verbally told by team that they defer the decision to us. Coags and cbc pending. We favor heparin drip challenge soon.

## 2021-08-01 NOTE — PROGRESS NOTE ADULT - PROBLEM SELECTOR PLAN 4
- in setting of CKD 2 as reported prior to this hospitalization  - etiology likely related to presenting cardiogenic shock and hypervolemic shock d/t blood loss  - continue to closely monitor along w/ UOP and electrolytes  - home ARB on hold  - mild hyperK noted on 7/31. Lokelma given. Resolved. Low K diet.  - pseudohyperK on 8/1. Repeat pending.

## 2021-08-01 NOTE — CHART NOTE - NSCHARTNOTEFT_GEN_A_CORE
85y year old Female w RUE swelling after brachial a line removal (07/25), previously on heparin gtt iso A flutter, held after RUE swelling, vascular consulted to r/o active bleed and any need for vascular intervention, reconsulted to evaluate whether it is ok to restart AC for Atrial flutter.     -07/26 duplex without evidence of active bleeding or pseudoaneurysm, evidence of hematoma   -h/h has been stable   -no contraindication to restarting AC from Vascular Surgery perspective   -care per primary team     Vascular Surgery  [phone]

## 2021-08-01 NOTE — PROGRESS NOTE ADULT - ASSESSMENT
85yoF w/ PMHx significant for HFpEF, HTN, HLD, CAD, NIDDM, was tx to [facility] from [place] after being found to be in ADHF / new acute systolic heart failure, possibly tachycardia induced (new Aflutter) with new DANE, requiring CICU for inotropes / pressors / BiPAP, now off; course c/b acute hypovolemic shock secondary to blood loss anemia requiring PRBC transfusion.

## 2021-08-01 NOTE — PROGRESS NOTE ADULT - NSPROGADDITIONALINFOA_GEN_ALL_CORE
Leukocytosis noted. No clear source of infection.  Monitor off abx.  Labs pending for today.   All consultant contribution to care greatly appreciated.

## 2021-08-01 NOTE — PROGRESS NOTE ADULT - SUBJECTIVE AND OBJECTIVE BOX
INTERNAL MEDICINE PROGRESS NOTE    Dr. Wilian Doran DO  Attending Physician  Division of Hospital Medicine  [facility]  Available via Microsoft Teams (preferred)  Also available via Pager [phone]    SUBJECTIVE:  No acute complaints.     REVIEW OF SYSTEMS   12 point review of systems negative except for above.     PAST MEDICAL & SURGICAL HISTORY:  HTN (hypertension)    HLD (hyperlipidemia)    DM (diabetes mellitus)    Hyperkalemia    CKD (chronic kidney disease)    Atherosclerosis    HTN (hypertension)    CAD (coronary artery disease)    Stage 4 chronic kidney disease    S/P hysterectomy        MEDICATIONS  (STANDING):  artificial  tears Solution 1 Drop(s) Both EYES two times a day  atorvastatin 40 milliGRAM(s) Oral at bedtime  dextrose 50% Injectable 25 Gram(s) IV Push once  dextrose 50% Injectable 25 Gram(s) IV Push once  heparin   Injectable 5000 Unit(s) SubCutaneous every 8 hours  hydrALAZINE 20 milliGRAM(s) Oral three times a day  insulin lispro (ADMELOG) corrective regimen sliding scale   SubCutaneous three times a day before meals  insulin lispro (ADMELOG) corrective regimen sliding scale   SubCutaneous at bedtime  metoprolol succinate ER 50 milliGRAM(s) Oral daily  polyethylene glycol 3350 17 Gram(s) Oral daily    MEDICATIONS  (PRN):      Allergies    No Known Allergies    Intolerances        T(C): 36.9 (08-01-21 @ 04:38), Max: 36.9 (08-01-21 @ 04:38)  T(F): 98.4 (08-01-21 @ 04:38), Max: 98.4 (08-01-21 @ 04:38)  HR: 91 (08-01-21 @ 04:38) (72 - 91)  BP: 154/89 (08-01-21 @ 04:38) (154/89 - 165/90)  ABP: --  ABP(mean): --  RR: 18 (08-01-21 @ 04:38) (18 - 18)  SpO2: 96% (08-01-21 @ 04:38) (96% - 97%)      CONSTITUTIONAL: No acute distress.   HEENT:  Conjunctiva clear B/L.  Moist oral mucosa.   Cardiovascular: RRR with no murmurs. No JVD noted. No lower extremity edema B/L. Extremities are warm and well perfused. Radial pulses 2+ B/L. Dorsalis pedis pulses 2+ B/L.    Respiratory: Lungs CTAB. No wrr. No accessory muscle use.   Gastrointestinal:  Soft, nontender. Non-distended. Non-rigid. No CVA tenderness B/L.  Neurologic:  Alert and awake. Moving all extremities. Following commands. Making eye contact. No numbness or tingling.   MSK / Skin: RUE ecchymoses in dependent parts of arm. Compartments soft.   Psych:  Normal affect. Normal Mood.     LABS                        9.0    12.81 )-----------( 179      ( 31 Jul 2021 07:09 )             30.5     08-01    137  |  106  |  24<H>  ----------------------------<  87  5.4<H>   |  25  |  1.60<H>    Ca    7.0<L>      01 Aug 2021 09:09  Phos  3.4     07-31  Mg     1.6     08-01      PT/INR - ( 31 Jul 2021 11:48 )   PT: 14.6 sec;   INR: 1.23 ratio         PTT - ( 31 Jul 2021 11:48 )  PTT:27.9 sec      ALL RECENT STUDIES REVIEWED INCLUDING REPORTS AVAILABLE     CARE DISCUSSED WITH ALL CONSULTANTS

## 2021-08-02 LAB
APTT BLD: 198.9 SEC — CRITICAL HIGH (ref 27.5–35.5)
APTT BLD: >200 SEC — CRITICAL HIGH (ref 27.5–35.5)
APTT BLD: >200 SEC — CRITICAL HIGH (ref 27.5–35.5)
BUN SERPL-MCNC: 22 MG/DL — SIGNIFICANT CHANGE UP (ref 7–23)
CALCIUM SERPL-MCNC: 9.2 MG/DL — SIGNIFICANT CHANGE UP (ref 8.4–10.5)
CHLORIDE SERPL-SCNC: 100 MMOL/L — SIGNIFICANT CHANGE UP (ref 96–108)
CO2 SERPL-SCNC: 27 MMOL/L — SIGNIFICANT CHANGE UP (ref 22–31)
CREAT SERPL-MCNC: 1.74 MG/DL — HIGH (ref 0.5–1.3)
GLUCOSE BLDC GLUCOMTR-MCNC: 114 MG/DL — HIGH (ref 70–99)
GLUCOSE BLDC GLUCOMTR-MCNC: 118 MG/DL — HIGH (ref 70–99)
GLUCOSE BLDC GLUCOMTR-MCNC: 130 MG/DL — HIGH (ref 70–99)
GLUCOSE BLDC GLUCOMTR-MCNC: 134 MG/DL — HIGH (ref 70–99)
GLUCOSE SERPL-MCNC: 118 MG/DL — HIGH (ref 70–99)
HCT VFR BLD CALC: 31.5 % — LOW (ref 34.5–45)
HCT VFR BLD CALC: 32.3 % — LOW (ref 34.5–45)
HGB BLD-MCNC: 9.7 G/DL — LOW (ref 11.5–15.5)
HGB BLD-MCNC: 9.8 G/DL — LOW (ref 11.5–15.5)
MCHC RBC-ENTMCNC: 29.5 PG — SIGNIFICANT CHANGE UP (ref 27–34)
MCHC RBC-ENTMCNC: 30 PG — SIGNIFICANT CHANGE UP (ref 27–34)
MCHC RBC-ENTMCNC: 30.3 GM/DL — LOW (ref 32–36)
MCHC RBC-ENTMCNC: 30.8 GM/DL — LOW (ref 32–36)
MCV RBC AUTO: 97.3 FL — SIGNIFICANT CHANGE UP (ref 80–100)
MCV RBC AUTO: 97.5 FL — SIGNIFICANT CHANGE UP (ref 80–100)
NRBC # BLD: 0 /100 WBCS — SIGNIFICANT CHANGE UP (ref 0–0)
NRBC # BLD: 0 /100 WBCS — SIGNIFICANT CHANGE UP (ref 0–0)
PLATELET # BLD AUTO: 210 K/UL — SIGNIFICANT CHANGE UP (ref 150–400)
PLATELET # BLD AUTO: 225 K/UL — SIGNIFICANT CHANGE UP (ref 150–400)
POTASSIUM SERPL-MCNC: 3.9 MMOL/L — SIGNIFICANT CHANGE UP (ref 3.5–5.3)
POTASSIUM SERPL-SCNC: 3.9 MMOL/L — SIGNIFICANT CHANGE UP (ref 3.5–5.3)
RBC # BLD: 3.23 M/UL — LOW (ref 3.8–5.2)
RBC # BLD: 3.32 M/UL — LOW (ref 3.8–5.2)
RBC # FLD: 20.2 % — HIGH (ref 10.3–14.5)
RBC # FLD: 20.4 % — HIGH (ref 10.3–14.5)
SODIUM SERPL-SCNC: 138 MMOL/L — SIGNIFICANT CHANGE UP (ref 135–145)
WBC # BLD: 11.64 K/UL — HIGH (ref 3.8–10.5)
WBC # BLD: 12.67 K/UL — HIGH (ref 3.8–10.5)
WBC # FLD AUTO: 11.64 K/UL — HIGH (ref 3.8–10.5)
WBC # FLD AUTO: 12.67 K/UL — HIGH (ref 3.8–10.5)

## 2021-08-02 PROCEDURE — 99233 SBSQ HOSP IP/OBS HIGH 50: CPT

## 2021-08-02 PROCEDURE — 99232 SBSQ HOSP IP/OBS MODERATE 35: CPT

## 2021-08-02 RX ORDER — ISOSORBIDE DINITRATE 5 MG/1
10 TABLET ORAL THREE TIMES A DAY
Refills: 0 | Status: DISCONTINUED | OUTPATIENT
Start: 2021-08-02 | End: 2021-08-03

## 2021-08-02 RX ORDER — LANOLIN ALCOHOL/MO/W.PET/CERES
3 CREAM (GRAM) TOPICAL ONCE
Refills: 0 | Status: COMPLETED | OUTPATIENT
Start: 2021-08-02 | End: 2021-08-02

## 2021-08-02 RX ORDER — HYDRALAZINE HCL 50 MG
37.5 TABLET ORAL THREE TIMES A DAY
Refills: 0 | Status: DISCONTINUED | OUTPATIENT
Start: 2021-08-02 | End: 2021-08-03

## 2021-08-02 RX ORDER — HEPARIN SODIUM 5000 [USP'U]/ML
900 INJECTION INTRAVENOUS; SUBCUTANEOUS
Qty: 25000 | Refills: 0 | Status: DISCONTINUED | OUTPATIENT
Start: 2021-08-02 | End: 2021-08-03

## 2021-08-02 RX ADMIN — Medication 3 MILLIGRAM(S): at 21:16

## 2021-08-02 RX ADMIN — Medication 20 MILLIGRAM(S): at 06:10

## 2021-08-02 RX ADMIN — POLYETHYLENE GLYCOL 3350 17 GRAM(S): 17 POWDER, FOR SOLUTION ORAL at 21:16

## 2021-08-02 RX ADMIN — HEPARIN SODIUM 9 UNIT(S)/HR: 5000 INJECTION INTRAVENOUS; SUBCUTANEOUS at 07:41

## 2021-08-02 RX ADMIN — HEPARIN SODIUM 7 UNIT(S)/HR: 5000 INJECTION INTRAVENOUS; SUBCUTANEOUS at 16:04

## 2021-08-02 RX ADMIN — Medication 1 DROP(S): at 06:10

## 2021-08-02 RX ADMIN — Medication 1 DROP(S): at 16:08

## 2021-08-02 RX ADMIN — HEPARIN SODIUM 9 UNIT(S)/HR: 5000 INJECTION INTRAVENOUS; SUBCUTANEOUS at 15:31

## 2021-08-02 RX ADMIN — Medication 37.5 MILLIGRAM(S): at 21:16

## 2021-08-02 RX ADMIN — ATORVASTATIN CALCIUM 40 MILLIGRAM(S): 80 TABLET, FILM COATED ORAL at 21:16

## 2021-08-02 RX ADMIN — Medication 50 MILLIGRAM(S): at 06:10

## 2021-08-02 RX ADMIN — Medication 20 MILLIGRAM(S): at 13:06

## 2021-08-02 NOTE — PROGRESS NOTE ADULT - ASSESSMENT
84 y/o female with h/o chronic HFpEF, HTN, DLP, CAD (h/o abnormal stress test), DM 2 and CKD 2 who presented to [facility] with nausea, vomiting and dizziness. She was found to be tachycardic, hypotensive with elevated lactate concerning for cardiogenic shock. She was given IVF, empiric antibiotics and underwent abd CT which ws unremarkable. Her ekg showed new onset aflutter and her TTE showed newly diagnosed LV systolic dysfunction with LVEF 25%, mod MR and mod TR. She was started on milrinone and levophed gtt. She was transferred to [facility] for further management. Her hospital course was complicated by tachypnea requiring bipap placement, acute anemia requiring PRBCs transfusion thought to be due bleeding from arterial line and heparin gtt. Her milrinone gtt was dc’ed 7/26 due to hypotension. CVP prior to transfer out of CICU had been low and received gentle IV fluid hydration.     Hemodynamically stable off inotropic support. She appears euvolemic on exam though markedly hypertensive for her degree of systolic dysfunction. Her clinical course is complicated by persistent renal dysfunction though gradually improving. She is in need of ischemic evaluation, will coordinate with nephrology to guide appropriate timing for LHC given anticipated dye load. Her Hgb has been stable without evidence of further bleed and heparin was resumed on 8/1. Plan is also for rhythm control with DCCV once able to tolerate AC without interruption.     Pertinent Cardiac Studies  TTE 7/25: LVIDd 4.4 cm, LVEF 29% (global), normal RV size with decreased systolic function, mod dilated LA, mild-mod MR, calcified AV with grossly mod decreased opening (peak/mean gradient 15/9 respectively), mild TR, est RVSP 31 mmHg

## 2021-08-02 NOTE — PROGRESS NOTE ADULT - SUBJECTIVE AND OBJECTIVE BOX
Subjective:      Medications:  artificial  tears Solution 1 Drop(s) Both EYES two times a day  atorvastatin 40 milliGRAM(s) Oral at bedtime  dextrose 50% Injectable 25 Gram(s) IV Push once  dextrose 50% Injectable 25 Gram(s) IV Push once  heparin  Infusion 900 Unit(s)/Hr IV Continuous <Continuous>  hydrALAZINE 20 milliGRAM(s) Oral three times a day  insulin lispro (ADMELOG) corrective regimen sliding scale   SubCutaneous three times a day before meals  insulin lispro (ADMELOG) corrective regimen sliding scale   SubCutaneous at bedtime  metoprolol succinate ER 50 milliGRAM(s) Oral daily  polyethylene glycol 3350 17 Gram(s) Oral daily      ICU Vital Signs Last 24 Hrs  T(C): 36.4, Max: 37 ( @ 04:42)  HR: 60 (60 - 81)  BP: 152/75 (146/81 - 156/83)  BP(mean): --  ABP: --  ABP(mean): --  RR: 18 (18 - 18)  SpO2: 98% (97% - 100%)    Weight in k.7 (21)  Weight in k (21)      I&O's Summary Last 24 Hrs    IN: 1028 mL / OUT: 0 mL / NET: 1028 mL      Tele:    Physical Exam:    General: No distress. Comfortable.  HEENT: EOM intact.  Neck: JVP not elevated.  Respiratory: Clear to auscultation bilaterally  CV: RRR. Normal S1 and S2. No murmurs, rub, or gallops. Radial pulses normal.  Abdomen: Soft, non-distended, non-tender  Skin: No skin lesions  Extremities: Warm, no edema  Neurology: Non-focal, alert and oriented times three.   Psych: Affect normal    Labs:    ( 21 @ 04:42 )               9.8    12.67 )--------( 225                  32.3     ( 21 @ 13:09 )     138  |  100  |  22  ---------------------<  118  3.9  |  27  |  1.74    Ca 9.2  Phos x   Mg x       PTT/PT/INR - ( 21 @ 13:09 )  PTT: >200.0 sec / PT: x     / INR: x        ( 21 @ 15:54 )  TropHS 310   /    / CKMB x      ( 21 @ 12:07 )  TropHS x     /    / CKMB x        Serum Pro-Brain Natriuretic Peptide: [id number] (21 @ 15:54)             Subjective:  - NAEO; states she feels well with no complaints.    Medications:  artificial  tears Solution 1 Drop(s) Both EYES two times a day  atorvastatin 40 milliGRAM(s) Oral at bedtime  dextrose 50% Injectable 25 Gram(s) IV Push once  dextrose 50% Injectable 25 Gram(s) IV Push once  heparin  Infusion 900 Unit(s)/Hr IV Continuous <Continuous>  hydrALAZINE 20 milliGRAM(s) Oral three times a day  insulin lispro (ADMELOG) corrective regimen sliding scale   SubCutaneous three times a day before meals  insulin lispro (ADMELOG) corrective regimen sliding scale   SubCutaneous at bedtime  metoprolol succinate ER 50 milliGRAM(s) Oral daily  polyethylene glycol 3350 17 Gram(s) Oral daily      ICU Vital Signs Last 24 Hrs  T(C): 36.4, Max: 37 ( @ 04:42)  HR: 60 (60 - 81)  BP: 152/75 (146/81 - 156/83)  BP(mean): --  ABP: --  ABP(mean): --  RR: 18 (18 - 18)  SpO2: 98% (97% - 100%)    Weight in k.7 (21)  Weight in k (21)      I&O's Summary Last 24 Hrs    IN: 1028 mL / OUT: 0 mL / NET: 1028 mL      Tele: Afib/Aflutter 70-90s    Physical Exam:    General: No distress. Comfortable.  HEENT: EOM intact.  Neck: JVP not elevated.  Respiratory: Clear to auscultation bilaterally  CV: Irregularly irregular. Normal S1 and S2. No murmurs, rub, or gallops. Radial pulses normal.  Abdomen: Soft, non-distended, non-tender  Skin: No skin lesions  Extremities: Warm, no LE edema. RUE ecchymosis and mild swelling  Neurology: Non-focal, alert and oriented times three.   Psych: Affect normal    Labs:    ( 21 @ 04:42 )               9.8    12.67 )--------( 225                  32.3     ( 21 @ 13:09 )     138  |  100  |  22  ---------------------<  118  3.9  |  27  |  1.74    Ca 9.2  Phos x   Mg x       PTT/PT/INR - ( 21 @ 13:09 )  PTT: >200.0 sec / PT: x     / INR: x        ( 21 @ 15:54 )  TropHS 310   /    / CKMB x      ( 21 @ 12:07 )  TropHS x     /    / CKMB x        Serum Pro-Brain Natriuretic Peptide: [id number] (21 @ 15:54)

## 2021-08-02 NOTE — PROGRESS NOTE ADULT - SUBJECTIVE AND OBJECTIVE BOX
[facility]   [phone]  RENAL FOLLOW UP NOTE  --------------------------------------------------------------------------------  Chief Complaint:    24 hour events/subjective:    Patient seen and examined.   Susan OLIVEIRA CP    PAST HISTORY  --------------------------------------------------------------------------------  No significant changes to PMH, PSH, FHx, SHx, unless otherwise noted    ALLERGIES & MEDICATIONS  --------------------------------------------------------------------------------  Allergies    No Known Allergies    Intolerances      Standing Inpatient Medications  artificial  tears Solution 1 Drop(s) Both EYES two times a day  atorvastatin 40 milliGRAM(s) Oral at bedtime  dextrose 50% Injectable 25 Gram(s) IV Push once  dextrose 50% Injectable 25 Gram(s) IV Push once  heparin  Infusion 900 Unit(s)/Hr IV Continuous <Continuous>  hydrALAZINE 20 milliGRAM(s) Oral three times a day  insulin lispro (ADMELOG) corrective regimen sliding scale   SubCutaneous three times a day before meals  insulin lispro (ADMELOG) corrective regimen sliding scale   SubCutaneous at bedtime  metoprolol succinate ER 50 milliGRAM(s) Oral daily  polyethylene glycol 3350 17 Gram(s) Oral daily    PRN Inpatient Medications      REVIEW OF SYSTEMS  --------------------------------------------------------------------------------    Gen: denies fevers/chills,  CVS: denies chest pain/palpitations  Resp: denies SOB/Cough  GI: Denies N/V/Abd pain  : Denies dysuria/oliguria/hematuria    All other systems were reviewed and are negative, except as noted.    VITALS/PHYSICAL EXAM  --------------------------------------------------------------------------------  T(C): 36.4 (08-02-21 @ 12:03), Max: 37 (08-02-21 @ 04:42)  HR: 60 (08-02-21 @ 12:03) (60 - 81)  BP: 152/75 (08-02-21 @ 12:03) (146/81 - 156/83)  RR: 18 (08-02-21 @ 12:03) (18 - 18)  SpO2: 98% (08-02-21 @ 12:03) (97% - 100%)  Wt(kg): --        08-01-21 @ 07:01  -  08-02-21 @ 07:00  --------------------------------------------------------  IN: 1028 mL / OUT: 0 mL / NET: 1028 mL    08-02-21 @ 07:01  -  08-02-21 @ 15:36  --------------------------------------------------------  IN: 360 mL / OUT: 0 mL / NET: 360 mL      Physical Exam:  	  	Gen: Non toxic comfortable appearing   	Pulm: decrease breath sounds, no rales or ronchi or wheezing  	CV: No JVD. RRR, S1S2;  	Abd: +BS, soft, nontender/nondistended  	: No suprapubic tenderness  	UE: Warm, no cyanosis  no clubbing,  no edema;  	LE: Warm, no cyanosis  no clubbing, no edema  	Neuro: alert and oriented. speech coherent     LABS/STUDIES  --------------------------------------------------------------------------------              9.8    12.67 >-----------<  225      [08-02-21 @ 04:42]              32.3     138  |  100  |  22  ----------------------------<  118      [08-02-21 @ 13:09]  3.9   |  27  |  1.74        Ca     9.2     [08-02-21 @ 13:09]      Mg     1.6     [08-01-21 @ 09:09]      PT/INR: PT 14.4 , INR 1.21       [08-01-21 @ 15:53]  PTT: >200.0      [08-02-21 @ 13:09]      Creatinine Trend:  SCr 1.74 [08-02 @ 13:09]  SCr 1.93 [08-01 @ 15:53]  SCr 1.60 [08-01 @ 09:09]  SCr 1.70 [07-31 @ 11:48]  SCr 1.71 [07-31 @ 07:09]              Urinalysis - [07-24-21 @ 05:01]      Color Yellow / Appearance Slightly Turbid / SG 1.025 / pH 5.0      Gluc Negative / Ketone Trace  / Bili Negative / Urobili 4       Blood Moderate / Protein 100 / Leuk Est Small / Nitrite Negative      RBC 25-50 / WBC 11-25 / Hyaline  / Gran 0-2 / Sq Epi  / Non Sq Epi Moderate / Bacteria Moderate      TSH 2.62      [07-26-21 @ 20:44]  Lipid: chol 68, TG 48, HDL 32, LDL --      [07-25-21 @ 20:34]    Free Light Chains: kappa 1.86, lambda 2.79, ratio = 0.67      [07-28 @ 08:47]

## 2021-08-02 NOTE — PROGRESS NOTE ADULT - SUBJECTIVE AND OBJECTIVE BOX
[facility] Division of Hospital Medicine  Cathleen Pisano MD  Pager (JUDITH-GINA, 8A-5P): [phone]  Other Times:  [phone]      Patient is a 85y old  Female who presents with a chief complaint of Transfer for Cardiogenic Shock (02 Aug 2021 15:35)      SUBJECTIVE / OVERNIGHT EVENTS: no acute events overnight. no chest pain, palpitations, nor dyspnea. RUE hematoma stable with no new signs/symptoms of acute bleed.   ADDITIONAL REVIEW OF SYSTEMS:    MEDICATIONS  (STANDING):  artificial  tears Solution 1 Drop(s) Both EYES two times a day  atorvastatin 40 milliGRAM(s) Oral at bedtime  dextrose 50% Injectable 25 Gram(s) IV Push once  dextrose 50% Injectable 25 Gram(s) IV Push once  heparin  Infusion 900 Unit(s)/Hr (7 mL/Hr) IV Continuous <Continuous>  hydrALAZINE 20 milliGRAM(s) Oral three times a day  insulin lispro (ADMELOG) corrective regimen sliding scale   SubCutaneous three times a day before meals  insulin lispro (ADMELOG) corrective regimen sliding scale   SubCutaneous at bedtime  metoprolol succinate ER 50 milliGRAM(s) Oral daily  polyethylene glycol 3350 17 Gram(s) Oral daily    MEDICATIONS  (PRN):      CAPILLARY BLOOD GLUCOSE      POCT Blood Glucose.: 114 mg/dL (02 Aug 2021 12:37)  POCT Blood Glucose.: 118 mg/dL (02 Aug 2021 08:45)  POCT Blood Glucose.: 111 mg/dL (01 Aug 2021 21:37)  POCT Blood Glucose.: 118 mg/dL (01 Aug 2021 17:42)    I&O's Summary    01 Aug 2021 07:01  -  02 Aug 2021 07:00  --------------------------------------------------------  IN: 1028 mL / OUT: 0 mL / NET: 1028 mL    02 Aug 2021 07:01  -  02 Aug 2021 16:08  --------------------------------------------------------  IN: 360 mL / OUT: 0 mL / NET: 360 mL        PHYSICAL EXAM:  Vital Signs Last 24 Hrs  T(C): 36.4 (02 Aug 2021 12:03), Max: 37 (02 Aug 2021 04:42)  T(F): 97.5 (02 Aug 2021 12:03), Max: 98.6 (02 Aug 2021 04:42)  HR: 60 (02 Aug 2021 12:03) (60 - 81)  BP: 152/75 (02 Aug 2021 12:03) (146/81 - 156/83)  BP(mean): --  RR: 18 (02 Aug 2021 12:03) (18 - 18)  SpO2: 98% (02 Aug 2021 12:03) (97% - 100%)    CONSTITUTIONAL: NAD, well-developed, well-groomed  EYES: PERRLA; conjunctiva and sclera clear  ENMT: Moist oral mucosa, no pharyngeal injection or exudates; normal dentition  NECK: Supple, no palpable masses; no thyromegaly  RESPIRATORY: Normal respiratory effort; lungs are clear to auscultation bilaterally  CARDIOVASCULAR: irregularly irregular, normal S1 and S2, no murmur/rub/gallop; No lower extremity edema; Peripheral pulses are 2+ bilaterally  ABDOMEN: Soft, Nondistended,  Nontender to palpation, normoactive bowel sounds  MUSCULOSKELETAL:  No clubbing or cyanosis of digits; no joint swelling or tenderness to palpation  PSYCH: A+O to person, place, and time; affect appropriate  NEUROLOGY: CN 2-12 are intact and symmetric; no gross sensory deficits   SKIN: +extensive RUE ecchymoses, healing    LABS:                        9.8    12.67 )-----------( 225      ( 02 Aug 2021 04:42 )             32.3     08-02    138  |  100  |  22  ----------------------------<  118<H>  3.9   |  27  |  1.74<H>    Ca    9.2      02 Aug 2021 13:09  Mg     1.6     08-01      PT/INR - ( 01 Aug 2021 15:53 )   PT: 14.4 sec;   INR: 1.21 ratio         PTT - ( 02 Aug 2021 13:09 )  PTT:>200.0 sec          RADIOLOGY & ADDITIONAL TESTS:  Results Reviewed: stable leukocytosis, H/H stable, Cr improved today from 1.9 to 1.74  Imaging Personally Reviewed:  Electrocardiogram Personally Reviewed:    COORDINATION OF CARE:  Care Discussed with Consultants/Other Providers [Y]: medicine ARASELI Valentin  Prior or Outpatient Records Reviewed [Y/N]:

## 2021-08-02 NOTE — PROGRESS NOTE ADULT - ATTENDING COMMENTS
Seen, examined, and  agree with above as scribed by NP Odilon    pt with mohan   it seems her creatinine Is settling at this level.   if cr still same in am will proceed with LHC avoid LV gram and to receive NS ivf 60 cc hr 3 hr pre procedure and to cont 6 hours after angio  she will stand at risk for worsening renal function

## 2021-08-02 NOTE — PROGRESS NOTE ADULT - PROBLEM SELECTOR PLAN 1
- increase HDZN to 37.5 mg TID for further afterload reduction  - start ISDN 10 mg TID  - continue Toprol XL 50 mg QD   - deferring RASSi and MRA given degree of renal dysfunction  - daily standing weights and strict I&Os  - newly diagnosed HFrEF, needs ischemic evaluation once SCr plateaus; will also obtain RHC at time of angiogram

## 2021-08-02 NOTE — PROGRESS NOTE ADULT - ASSESSMENT
85 F w/ PMH HFpEF, HTN, HLD, CAD, C2D, DM transferred to [facility] from OSH. pt was admitted. found to in chf/cardiogenic shoc.  with acute hypovolemic shock secondary to anemia requiring blood transfusions.   pt also had NEMESIO being considered for coronary angiogram in am. acute hypovolemic shock secondary to anemia requiring blood transfusions.    1- NEMESIO   2- CHF  3- proteinuria   4- DM     Nemesio in setting of cardiogenic shock likely   creatinine was rising, creatinine improved today.  d/w pt risk of worsening renal function with coronary angiogram, she understands   when scheduled for coronary angio to receive tirso procedure IVF hydration   hydralazine 10 mg tid   anemia, trend hgb  monitor creatinine and electrolytes

## 2021-08-02 NOTE — PROGRESS NOTE ADULT - PROBLEM SELECTOR PLAN 2
- continue telemetry monitoring  - appreciate EP evaluation and recommendations  - cleared by vascular surgery to resume hep gtt as above, which was started on 8/1  - c/w metoprolol XL 50mg daily for rate control  - plan for possible ALANNA/DCCV once patient able to tolerate uninterrupted AC per EP

## 2021-08-02 NOTE — PROGRESS NOTE ADULT - PROBLEM SELECTOR PLAN 5
- requiring PRBC transfusion during hospitalization for hypovolemic shock  - A-line access bleeding now appears stable; RUE duplex without evidence of active bleeding or pseudoaneurysm, although there is a hematoma; no e/o compartment syndrome; CTA deferred given DANE and future plan for ischemic eval  - Appreciate vascular recommendations - cleared to resume hep gtt on 8/1

## 2021-08-02 NOTE — PROGRESS NOTE ADULT - ASSESSMENT
85yoF w/ PMHx significant for HFpEF, HTN, HLD, CAD, NIDDM, was tx to [facility] from [place] after being found to be in ADHF / new acute systolic heart failure, possibly tachycardia induced (new Aflutter) with new DANE, requiring CICU for inotropes / pressors / BiPAP, now off; course c/b acute hypovolemic shock secondary to blood loss anemia requiring PRBC transfusion.

## 2021-08-02 NOTE — PROGRESS NOTE ADULT - PROBLEM SELECTOR PLAN 1
newly diagnosed HFrEF (had previous hx of HFpEF). will need ischemic eval pending Cr stabilization and nephrology clearance  - c/w metoprolol XL 50mg daily, hydralazine 20mg PO TID  - continue to optimize GDMT as tolerated  - HF following, recs appreciated  - NM amyloid scan not suggestive of cardiac amyloidosis  - Nephro consulted for pre-cath optimization, will  need tirso-procedure IVF to reduce risk of SHANE  - cleared by vascular surgery to resume hep gtt, which was started on 8/1

## 2021-08-02 NOTE — PROGRESS NOTE ADULT - NSPROGADDITIONALINFOA_GEN_ALL_CORE
.  Cathleen Pisano MD  Division of Hospital Medicine  [facility]   Pager: [phone]    Plan discussed with patient and medicine NP Barbie.

## 2021-08-02 NOTE — PROGRESS NOTE ADULT - PROBLEM SELECTOR PLAN 4
- in setting of CKD 2 as reported prior to this hospitalization  - etiology likely related to presenting cardiogenic shock and hypervolemic shock d/t blood loss  - continue to closely monitor along w/ UOP and electrolytes  - home ARB on hold  - Nephrology following, recs appreciated  - renally dose medications to GFR, avoid nephrotoxic agents

## 2021-08-02 NOTE — PROGRESS NOTE ADULT - ATTENDING COMMENTS
85 yrs, history of HFpEF. Prior abn stress test. Not prev has angiogram.   HTN, DM, CKD.   Presented [facility] on 7.24 with N, V, dizzyness. hypotensive. elevated lacate. new flutter. new HFrEF.   Transferred 7.25, Milrinone, levo, BIPAP. Bleeding after A line removal. transfused.   admission Cr 2.3, peak 2.7, now 1.7. (baseline Cr 1.14 May 2021)  Transferred from CCU 7.27. initiated on HF meds.   meds: HDZN 20 tid, toprol XL 50. Heparin resumed ()   HR 70-90 Afib/flutter, 146/-161/, no standing weights.   08-02  138  |  100  |  22  ----------------------------<  118<H>  3.9   |  27  |  1.74<H>  Ca    9.2      02 Aug 2021 13:09  Mg     1.6     08-01                        9.8    12.67 )-----------( 225      ( 02 Aug 2021 04:42 )             32.3   New LV dysfunction. Multiple risk factors for CAD.   In afib/flutter.   Plan:  Make HDZN 37.5 tid, ISDN 10 tid, Digoxin .5  and .25 today. then .125 alt .25   For R/L cath when Cr plateaus.   ALANNA cardioversion after cath.   Please maintain PTT 55-65   Yemi Noble

## 2021-08-03 LAB
ANION GAP SERPL CALC-SCNC: 17 MMOL/L — SIGNIFICANT CHANGE UP (ref 5–17)
APTT BLD: 109.2 SEC — HIGH (ref 27.5–35.5)
APTT BLD: 46.8 SEC — HIGH (ref 27.5–35.5)
BUN SERPL-MCNC: 21 MG/DL — SIGNIFICANT CHANGE UP (ref 7–23)
CALCIUM SERPL-MCNC: 9.3 MG/DL — SIGNIFICANT CHANGE UP (ref 8.4–10.5)
CHLORIDE SERPL-SCNC: 99 MMOL/L — SIGNIFICANT CHANGE UP (ref 96–108)
CO2 SERPL-SCNC: 22 MMOL/L — SIGNIFICANT CHANGE UP (ref 22–31)
CREAT SERPL-MCNC: 1.76 MG/DL — HIGH (ref 0.5–1.3)
GLUCOSE BLDC GLUCOMTR-MCNC: 107 MG/DL — HIGH (ref 70–99)
GLUCOSE BLDC GLUCOMTR-MCNC: 113 MG/DL — HIGH (ref 70–99)
GLUCOSE BLDC GLUCOMTR-MCNC: 120 MG/DL — HIGH (ref 70–99)
GLUCOSE BLDC GLUCOMTR-MCNC: 90 MG/DL — SIGNIFICANT CHANGE UP (ref 70–99)
GLUCOSE SERPL-MCNC: 110 MG/DL — HIGH (ref 70–99)
HCT VFR BLD CALC: 31.6 % — LOW (ref 34.5–45)
HGB BLD-MCNC: 9.7 G/DL — LOW (ref 11.5–15.5)
MAGNESIUM SERPL-MCNC: 1.7 MG/DL — SIGNIFICANT CHANGE UP (ref 1.6–2.6)
MCHC RBC-ENTMCNC: 30.5 PG — SIGNIFICANT CHANGE UP (ref 27–34)
MCHC RBC-ENTMCNC: 30.7 GM/DL — LOW (ref 32–36)
MCV RBC AUTO: 99.4 FL — SIGNIFICANT CHANGE UP (ref 80–100)
NRBC # BLD: 0 /100 WBCS — SIGNIFICANT CHANGE UP (ref 0–0)
PHOSPHATE SERPL-MCNC: 4 MG/DL — SIGNIFICANT CHANGE UP (ref 2.5–4.5)
PLATELET # BLD AUTO: 195 K/UL — SIGNIFICANT CHANGE UP (ref 150–400)
POTASSIUM SERPL-MCNC: 3.9 MMOL/L — SIGNIFICANT CHANGE UP (ref 3.5–5.3)
POTASSIUM SERPL-SCNC: 3.9 MMOL/L — SIGNIFICANT CHANGE UP (ref 3.5–5.3)
RBC # BLD: 3.18 M/UL — LOW (ref 3.8–5.2)
RBC # FLD: 20.2 % — HIGH (ref 10.3–14.5)
SODIUM SERPL-SCNC: 138 MMOL/L — SIGNIFICANT CHANGE UP (ref 135–145)
WBC # BLD: 10.75 K/UL — HIGH (ref 3.8–10.5)
WBC # FLD AUTO: 10.75 K/UL — HIGH (ref 3.8–10.5)

## 2021-08-03 PROCEDURE — 99232 SBSQ HOSP IP/OBS MODERATE 35: CPT

## 2021-08-03 RX ORDER — MAGNESIUM SULFATE 500 MG/ML
2 VIAL (ML) INJECTION ONCE
Refills: 0 | Status: COMPLETED | OUTPATIENT
Start: 2021-08-03 | End: 2021-08-03

## 2021-08-03 RX ORDER — HEPARIN SODIUM 5000 [USP'U]/ML
400 INJECTION INTRAVENOUS; SUBCUTANEOUS
Qty: 25000 | Refills: 0 | Status: DISCONTINUED | OUTPATIENT
Start: 2021-08-03 | End: 2021-08-04

## 2021-08-03 RX ORDER — ISOSORBIDE DINITRATE 5 MG/1
20 TABLET ORAL THREE TIMES A DAY
Refills: 0 | Status: DISCONTINUED | OUTPATIENT
Start: 2021-08-04 | End: 2021-08-04

## 2021-08-03 RX ORDER — HEPARIN SODIUM 5000 [USP'U]/ML
500 INJECTION INTRAVENOUS; SUBCUTANEOUS
Qty: 25000 | Refills: 0 | Status: DISCONTINUED | OUTPATIENT
Start: 2021-08-03 | End: 2021-08-03

## 2021-08-03 RX ORDER — ISOSORBIDE DINITRATE 5 MG/1
10 TABLET ORAL ONCE
Refills: 0 | Status: COMPLETED | OUTPATIENT
Start: 2021-08-03 | End: 2021-08-03

## 2021-08-03 RX ORDER — HEPARIN SODIUM 5000 [USP'U]/ML
300 INJECTION INTRAVENOUS; SUBCUTANEOUS
Qty: 25000 | Refills: 0 | Status: DISCONTINUED | OUTPATIENT
Start: 2021-08-03 | End: 2021-08-03

## 2021-08-03 RX ORDER — HYDRALAZINE HCL 50 MG
50 TABLET ORAL THREE TIMES A DAY
Refills: 0 | Status: DISCONTINUED | OUTPATIENT
Start: 2021-08-03 | End: 2021-08-03

## 2021-08-03 RX ORDER — HYDRALAZINE HCL 50 MG
75 TABLET ORAL THREE TIMES A DAY
Refills: 0 | Status: DISCONTINUED | OUTPATIENT
Start: 2021-08-03 | End: 2021-08-04

## 2021-08-03 RX ADMIN — ISOSORBIDE DINITRATE 10 MILLIGRAM(S): 5 TABLET ORAL at 22:02

## 2021-08-03 RX ADMIN — Medication 50 GRAM(S): at 08:50

## 2021-08-03 RX ADMIN — HEPARIN SODIUM 5 UNIT(S)/HR: 5000 INJECTION INTRAVENOUS; SUBCUTANEOUS at 01:05

## 2021-08-03 RX ADMIN — Medication 50 MILLIGRAM(S): at 13:02

## 2021-08-03 RX ADMIN — HEPARIN SODIUM 4 UNIT(S)/HR: 5000 INJECTION INTRAVENOUS; SUBCUTANEOUS at 16:35

## 2021-08-03 RX ADMIN — Medication 1 DROP(S): at 17:21

## 2021-08-03 RX ADMIN — Medication 1 DROP(S): at 06:01

## 2021-08-03 RX ADMIN — Medication 50 MILLIGRAM(S): at 05:57

## 2021-08-03 RX ADMIN — POLYETHYLENE GLYCOL 3350 17 GRAM(S): 17 POWDER, FOR SOLUTION ORAL at 21:55

## 2021-08-03 RX ADMIN — Medication 37.5 MILLIGRAM(S): at 05:58

## 2021-08-03 RX ADMIN — ISOSORBIDE DINITRATE 10 MILLIGRAM(S): 5 TABLET ORAL at 13:00

## 2021-08-03 RX ADMIN — Medication 75 MILLIGRAM(S): at 21:56

## 2021-08-03 RX ADMIN — ISOSORBIDE DINITRATE 10 MILLIGRAM(S): 5 TABLET ORAL at 06:00

## 2021-08-03 RX ADMIN — HEPARIN SODIUM 3 UNIT(S)/HR: 5000 INJECTION INTRAVENOUS; SUBCUTANEOUS at 08:50

## 2021-08-03 RX ADMIN — ATORVASTATIN CALCIUM 40 MILLIGRAM(S): 80 TABLET, FILM COATED ORAL at 21:56

## 2021-08-03 NOTE — PROGRESS NOTE ADULT - SUBJECTIVE AND OBJECTIVE BOX
[facility]   [phone]  RENAL FOLLOW UP NOTE  --------------------------------------------------------------------------------  Chief Complaint:    24 hour events/subjective:    Patient seen and examined.   Susan OLIVEIRA CP    PAST HISTORY  --------------------------------------------------------------------------------  No significant changes to PMH, PSH, FHx, SHx, unless otherwise noted    ALLERGIES & MEDICATIONS  --------------------------------------------------------------------------------  Allergies    No Known Allergies    Intolerances      Standing Inpatient Medications  artificial  tears Solution 1 Drop(s) Both EYES two times a day  atorvastatin 40 milliGRAM(s) Oral at bedtime  dextrose 50% Injectable 25 Gram(s) IV Push once  dextrose 50% Injectable 25 Gram(s) IV Push once  heparin  Infusion 300 Unit(s)/Hr IV Continuous <Continuous>  hydrALAZINE 50 milliGRAM(s) Oral three times a day  insulin lispro (ADMELOG) corrective regimen sliding scale   SubCutaneous three times a day before meals  insulin lispro (ADMELOG) corrective regimen sliding scale   SubCutaneous at bedtime  isosorbide   dinitrate Tablet (ISORDIL) 10 milliGRAM(s) Oral three times a day  metoprolol succinate ER 50 milliGRAM(s) Oral daily  polyethylene glycol 3350 17 Gram(s) Oral daily    PRN Inpatient Medications      REVIEW OF SYSTEMS  --------------------------------------------------------------------------------    Gen: denies fevers/chills,  CVS: denies chest pain/palpitations  Resp: denies SOB/Cough  GI: Denies N/V/Abd pain  : Denies dysuria/oliguria/hematuria    All other systems were reviewed and are negative, except as noted.    VITALS/PHYSICAL EXAM  --------------------------------------------------------------------------------  T(C): 36.3 (08-03-21 @ 11:25), Max: 36.3 (08-02-21 @ 21:01)  HR: 64 (08-03-21 @ 11:25) (64 - 79)  BP: 163/71 (08-03-21 @ 11:25) (160/71 - 163/71)  RR: 18 (08-03-21 @ 11:25) (18 - 18)  SpO2: 95% (08-03-21 @ 11:25) (95% - 97%)  Wt(kg): --        08-02-21 @ 07:01  -  08-03-21 @ 07:00  --------------------------------------------------------  IN: 530 mL / OUT: 0 mL / NET: 530 mL    08-03-21 @ 07:01  -  08-03-21 @ 15:01  --------------------------------------------------------  IN: 490 mL / OUT: 0 mL / NET: 490 mL      Physical Exam:  	  	Gen: Non toxic comfortable appearing   	Pulm: decrease breath sounds, no rales or ronchi or wheezing  	CV: No JVD. RRR, S1S2;  	Abd: +BS, soft, nontender/nondistended  	: No suprapubic tenderness  	UE: Warm, no cyanosis  no clubbing,  no edema;  	LE: Warm, no cyanosis  no clubbing, no edema  	Neuro: alert and oriented. speech coherent     LABS/STUDIES  --------------------------------------------------------------------------------              9.7    10.75 >-----------<  195      [08-03-21 @ 06:48]              31.6     138  |  99  |  21  ----------------------------<  110      [08-03-21 @ 06:48]  3.9   |  22  |  1.76        Ca     9.3     [08-03-21 @ 06:48]      Mg     1.7     [08-03-21 @ 06:48]      Phos  4.0     [08-03-21 @ 06:48]      PT/INR: PT 14.4 , INR 1.21       [08-01-21 @ 15:53]  PTT: 109.2      [08-03-21 @ 06:47]      Creatinine Trend:  SCr 1.76 [08-03 @ 06:48]  SCr 1.74 [08-02 @ 13:09]  SCr 1.93 [08-01 @ 15:53]  SCr 1.60 [08-01 @ 09:09]  SCr 1.70 [07-31 @ 11:48]              Urinalysis - [07-24-21 @ 05:01]      Color Yellow / Appearance Slightly Turbid / SG 1.025 / pH 5.0      Gluc Negative / Ketone Trace  / Bili Negative / Urobili 4       Blood Moderate / Protein 100 / Leuk Est Small / Nitrite Negative      RBC 25-50 / WBC 11-25 / Hyaline  / Gran 0-2 / Sq Epi  / Non Sq Epi Moderate / Bacteria Moderate      TSH 2.62      [07-26-21 @ 20:44]  Lipid: chol 68, TG 48, HDL 32, LDL --      [07-25-21 @ 20:34]    Free Light Chains: kappa 1.86, lambda 2.79, ratio = 0.67      [07-28 @ 08:47]

## 2021-08-03 NOTE — PROGRESS NOTE ADULT - ASSESSMENT
85yoF w/ PMHx significant for HFpEF, HTN, HLD, CAD, NIDDM, was tx to [facility] from [place] after being found to be in ADHF / new acute systolic heart failure, possibly tachycardia induced (new Aflutter) with new DANE, requiring CICU for inotropes / pressors / BiPAP, now off; course c/b acute hypovolemic shock secondary to blood loss anemia requiring PRBC transfusions transferred to floors now pending R+LHC and subsequent ALANNA/DCCV.

## 2021-08-03 NOTE — PROGRESS NOTE ADULT - PROBLEM SELECTOR PLAN 5
- requiring PRBC transfusion during hospitalization for hypovolemic shock  - A-line access bleeding now appears stable; RUE duplex without evidence of active bleeding or pseudoaneurysm, although there is a hematoma; no e/o compartment syndrome; CTA deferred given DANE and future plan for ischemic eval  - Appreciate vascular recommendations - cleared to resume hep gtt on 8/1  - PTT goal 55-65, difficulty with her PTTs/draws and her hep gtt infusing distally near her thumb  - need to continue to monitor her H/H closely, currently stable in the 9s

## 2021-08-03 NOTE — PROGRESS NOTE ADULT - PROBLEM SELECTOR PLAN 2
- continue telemetry monitoring  - appreciate EP evaluation and recommendations  - cleared by vascular surgery to resume hep gtt as above, which was started on 8/1  - c/w metoprolol XL 50mg daily for rate control  - EP re-consulted today, rec continuing beta blocker and re-calling after R+LHC  - plan for ALANNA/DCCV once patient able to tolerate uninterrupted AC per EP after L+RHC

## 2021-08-03 NOTE — PROGRESS NOTE ADULT - SUBJECTIVE AND OBJECTIVE BOX
[facility] Division of Hospital Medicine  Cathleen Pisano MD  Pager (M-F, 8A-5P): [phone]  Other Times:  [phone]      Patient is a 85y old  Female who presents with a chief complaint of Transfer for Cardiogenic Shock (03 Aug 2021 15:00)      SUBJECTIVE / OVERNIGHT EVENTS: no acute events overnight. elevated PTTs for which her hep gtt adjusted. no chest pain nor dyspnea at time of my exam. voiding well with no issues.  ADDITIONAL REVIEW OF SYSTEMS:    MEDICATIONS  (STANDING):  artificial  tears Solution 1 Drop(s) Both EYES two times a day  atorvastatin 40 milliGRAM(s) Oral at bedtime  dextrose 50% Injectable 25 Gram(s) IV Push once  dextrose 50% Injectable 25 Gram(s) IV Push once  heparin  Infusion 400 Unit(s)/Hr (4 mL/Hr) IV Continuous <Continuous>  hydrALAZINE 50 milliGRAM(s) Oral three times a day  insulin lispro (ADMELOG) corrective regimen sliding scale   SubCutaneous three times a day before meals  insulin lispro (ADMELOG) corrective regimen sliding scale   SubCutaneous at bedtime  isosorbide   dinitrate Tablet (ISORDIL) 10 milliGRAM(s) Oral three times a day  metoprolol succinate ER 50 milliGRAM(s) Oral daily  polyethylene glycol 3350 17 Gram(s) Oral daily    MEDICATIONS  (PRN):      CAPILLARY BLOOD GLUCOSE      POCT Blood Glucose.: 120 mg/dL (03 Aug 2021 12:26)  POCT Blood Glucose.: 113 mg/dL (03 Aug 2021 08:47)  POCT Blood Glucose.: 134 mg/dL (02 Aug 2021 21:37)  POCT Blood Glucose.: 130 mg/dL (02 Aug 2021 17:27)    I&O's Summary    02 Aug 2021 07:01  -  03 Aug 2021 07:00  --------------------------------------------------------  IN: 530 mL / OUT: 0 mL / NET: 530 mL    03 Aug 2021 07:01  -  03 Aug 2021 16:42  --------------------------------------------------------  IN: 490 mL / OUT: 0 mL / NET: 490 mL        PHYSICAL EXAM:  Vital Signs Last 24 Hrs  T(C): 36.3 (03 Aug 2021 11:25), Max: 36.3 (02 Aug 2021 21:01)  T(F): 97.4 (03 Aug 2021 11:25), Max: 97.4 (03 Aug 2021 11:25)  HR: 64 (03 Aug 2021 11:25) (64 - 79)  BP: 163/71 (03 Aug 2021 11:25) (160/71 - 163/71)  BP(mean): --  RR: 18 (03 Aug 2021 11:25) (18 - 18)  SpO2: 95% (03 Aug 2021 11:25) (95% - 97%)    CONSTITUTIONAL: NAD, well-developed, well-groomed  EYES: PERRLA; conjunctiva and sclera clear  ENMT: Moist oral mucosa, no pharyngeal injection or exudates; normal dentition  NECK: Supple, no palpable masses; no thyromegaly  RESPIRATORY: Normal respiratory effort; lungs are clear to auscultation bilaterally  CARDIOVASCULAR: irregularly irregular, normal S1 and S2, no murmur/rub/gallop; No lower extremity edema; Peripheral pulses are 2+ bilaterally  ABDOMEN: Soft, Nondistended,  Nontender to palpation, normoactive bowel sounds  MUSCULOSKELETAL:  No clubbing or cyanosis of digits; no joint swelling or tenderness to palpation  PSYCH: A+O to person, place, and time; affect appropriate  NEUROLOGY: CN 2-12 are intact and symmetric; no gross sensory deficits   SKIN: +extensive RUE ecchymoses, healing, tracking down in dependent regions, extending to R posterior back    LABS:                        9.7    10.75 )-----------( 195      ( 03 Aug 2021 06:48 )             31.6     08-03    138  |  99  |  21  ----------------------------<  110<H>  3.9   |  22  |  1.76<H>    Ca    9.3      03 Aug 2021 06:48  Phos  4.0     08-03  Mg     1.7     08-03      PTT - ( 03 Aug 2021 15:26 )  PTT:46.8 sec            RADIOLOGY & ADDITIONAL TESTS:  Results Reviewed: stable H/H, elevated PTT for which hep gtt adjusted, Cr plateauing at 1.76 it apperas  Imaging Personally Reviewed:  Electrocardiogram Personally Reviewed:    COORDINATION OF CARE:  Care Discussed with Consultants/Other Providers [Y]: medicine ARASELI Selby  Prior or Outpatient Records Reviewed [Y]: Nephrology, HF, EP progress notes

## 2021-08-03 NOTE — PROGRESS NOTE ADULT - ASSESSMENT
85 F w/ PMH HFpEF, HTN, HLD, CAD, C2D, DM transferred to [facility] from OSH. pt was admitted. found to in chf/cardiogenic shoc.  with acute hypovolemic shock secondary to anemia requiring blood transfusions.   pt also had NEMESIO being considered for coronary angiogram in am. acute hypovolemic shock secondary to anemia requiring blood transfusions.    1- NEMESIO   2- CHF  3- proteinuria   4- DM     Nemesio in setting of cardiogenic shock likely   creatinine was rising, now improving back to 1.7 baseline.  d/w pt risk of worsening renal function with coronary angiogram, she understands   may proceed with cardiac cath.  when scheduled for coronary angio to receive tirso procedure IVF hydration   hydralazine 10 mg tid   anemia, trend hgb  monitor creatinine and electrolytes  discussed with NP

## 2021-08-03 NOTE — PROGRESS NOTE ADULT - PROBLEM SELECTOR PLAN 4
- in setting of CKD 2 as reported prior to this hospitalization  - etiology likely related to presenting cardiogenic shock and hypervolemic shock d/t blood loss  - Cr appears to be plateauing now at 1.76  - continue to closely monitor along w/ UOP and electrolytes  - home ARB on hold  - Nephrology following, recs appreciated  - renally dose medications to GFR, avoid nephrotoxic agents

## 2021-08-03 NOTE — PROGRESS NOTE ADULT - NSPROGADDITIONALINFOA_GEN_ALL_CORE
.  Cathleen Pisano MD  Division of Hospital Medicine  [facility]   Pager: [phone]    Plan discussed with patient, arnaldo Caraballo via phone, and medicine NP Sola.

## 2021-08-03 NOTE — PROGRESS NOTE ADULT - ATTENDING COMMENTS
Seen, examined, and  agree with above as scribed by NP Odilon    pt for coronary angio. cr seems to have stabilized at this high level still   will stand at risk for worsening renal function and risk of hd d/w pt as well. she understands

## 2021-08-03 NOTE — CHART NOTE - NSCHARTNOTEFT_GEN_A_CORE
85 year old female with PMH T2DM, HTN, HLD  HTN, HLD, CAD, CKD, (per Dr Ross cardiology in [facility] CAD dx probable based on myocardial profusion scan, May 2021, patient refused cardiac cath at the time) initially presented to [facility] with abdominal pain, nausea, vomiting and dizziness, found to be in atrial fibrillation with rapid ventricular response and hypotensive with elevated lactate concerning for cardiogenic shock. She was given IVF, empiric antibiotics and underwent abd CT which ws unremarkable. TTE showed newly diagnosed LV systolic dysfunction with LVEF 25%, mod MR and mod TR. She was started on milrinone and levophed gtt. She was transferred to [facility] for further management. She is off pressors.     1. New onset HFrEF 25% in the setting of AF RVR  2. New onset Atrial fibriilation/Flutter with RVR     -Currently rate controlled Afib with VHR 60-90's  -Continue BBlocker for rate control  -On Heparin drip for AC  -Plan for R/L heart cath when Cr plateaus  -Will eventually need ALANNA/DCCV on uninterrupted anticoagulation after cardiac cath  -Discussed with medicine ACP    MARGARETTE Lizarraga  [phone] 85 year old female with PMH T2DM, HTN, HLD  HTN, HLD, CAD, CKD, (per Dr Ross cardiology in [facility] CAD dx probable based on myocardial profusion scan, May 2021, patient refused cardiac cath at the time) initially presented to [facility] with abdominal pain, nausea, vomiting and dizziness, found to be in atrial fibrillation with rapid ventricular response and hypotensive with elevated lactate concerning for cardiogenic shock. She was given IVF, empiric antibiotics and underwent abd CT which ws unremarkable. TTE showed newly diagnosed LV systolic dysfunction with LVEF 25%, mod MR and mod TR. She was started on milrinone and levophed gtt. She was transferred to [facility] for further management. She is off pressors.     1. New onset HFrEF 25% in the setting of AF RVR  2. New onset Atrial fibriilation/Flutter with RVR     -Currently rate controlled Afib with VHR 60-90's  -Continue BBlocker for rate control  -On Heparin drip for AC  -Plan for R/L heart cath when Cr plateaus  -Will eventually need ALANNA/DCCV on uninterrupted anticoagulation after cardiac cath  -Call EP back post cath   -Discussed with medicine ACP    KIKA Marquez NP-C  [phone] 85 year old female with PMH T2DM, HTN, HLD  HTN, HLD, CAD, CKD, [facility] based on myocardial profusion scan, May 2021, patient refused cardiac cath at the time) initially presented to [facility] with abdominal pain, nausea, vomiting and dizziness, found to be in atrial fibrillation with rapid ventricular response and hypotensive with elevated lactate concerning for cardiogenic shock. She was given IVF, empiric antibiotics and underwent abd CT which ws unremarkable. TTE showed newly diagnosed LV systolic dysfunction with LVEF 25%, mod MR and mod TR. She was started on milrinone and levophed gtt. She was transferred to [facility] for further management. She is off pressors.     1. New onset HFrEF 25% in the setting of AF RVR  2. New onset Atrial fibriilation/Flutter with RVR     -Currently rate controlled Afib with VHR 60-90's  -Continue BBlocker for rate control  -On Heparin drip for AC  -Plan for R/L heart cath when Cr plateaus  -Will eventually need ALANNA/DCCV on uninterrupted anticoagulation after cardiac cath  -Call EP back post cath   -Discussed with medicine ACP and Dr. Ilene Mcnulty-Kisha, NP-C  [phone]

## 2021-08-04 LAB
ANION GAP SERPL CALC-SCNC: 13 MMOL/L — SIGNIFICANT CHANGE UP (ref 5–17)
APTT BLD: 20.1 SEC — LOW (ref 27.5–35.5)
APTT BLD: 49.8 SEC — HIGH (ref 27.5–35.5)
BUN SERPL-MCNC: 18 MG/DL — SIGNIFICANT CHANGE UP (ref 7–23)
CALCIUM SERPL-MCNC: 9 MG/DL — SIGNIFICANT CHANGE UP (ref 8.4–10.5)
CHLORIDE SERPL-SCNC: 100 MMOL/L — SIGNIFICANT CHANGE UP (ref 96–108)
CO2 SERPL-SCNC: 25 MMOL/L — SIGNIFICANT CHANGE UP (ref 22–31)
CREAT SERPL-MCNC: 1.56 MG/DL — HIGH (ref 0.5–1.3)
GLUCOSE BLDC GLUCOMTR-MCNC: 115 MG/DL — HIGH (ref 70–99)
GLUCOSE BLDC GLUCOMTR-MCNC: 118 MG/DL — HIGH (ref 70–99)
GLUCOSE BLDC GLUCOMTR-MCNC: 122 MG/DL — HIGH (ref 70–99)
GLUCOSE BLDC GLUCOMTR-MCNC: 98 MG/DL — SIGNIFICANT CHANGE UP (ref 70–99)
GLUCOSE SERPL-MCNC: 123 MG/DL — HIGH (ref 70–99)
HCT VFR BLD CALC: 33 % — LOW (ref 34.5–45)
HGB BLD-MCNC: 10 G/DL — LOW (ref 11.5–15.5)
MCHC RBC-ENTMCNC: 29.9 PG — SIGNIFICANT CHANGE UP (ref 27–34)
MCHC RBC-ENTMCNC: 30.3 GM/DL — LOW (ref 32–36)
MCV RBC AUTO: 98.5 FL — SIGNIFICANT CHANGE UP (ref 80–100)
NRBC # BLD: 0 /100 WBCS — SIGNIFICANT CHANGE UP (ref 0–0)
PLATELET # BLD AUTO: 195 K/UL — SIGNIFICANT CHANGE UP (ref 150–400)
POTASSIUM SERPL-MCNC: 3.4 MMOL/L — LOW (ref 3.5–5.3)
POTASSIUM SERPL-SCNC: 3.4 MMOL/L — LOW (ref 3.5–5.3)
RBC # BLD: 3.35 M/UL — LOW (ref 3.8–5.2)
RBC # FLD: 20.3 % — HIGH (ref 10.3–14.5)
SODIUM SERPL-SCNC: 138 MMOL/L — SIGNIFICANT CHANGE UP (ref 135–145)
WBC # BLD: 9.82 K/UL — SIGNIFICANT CHANGE UP (ref 3.8–10.5)
WBC # FLD AUTO: 9.82 K/UL — SIGNIFICANT CHANGE UP (ref 3.8–10.5)

## 2021-08-04 PROCEDURE — 99152 MOD SED SAME PHYS/QHP 5/>YRS: CPT

## 2021-08-04 PROCEDURE — 93460 R&L HRT ART/VENTRICLE ANGIO: CPT | Mod: 26

## 2021-08-04 PROCEDURE — 99232 SBSQ HOSP IP/OBS MODERATE 35: CPT

## 2021-08-04 RX ORDER — HEPARIN SODIUM 5000 [USP'U]/ML
600 INJECTION INTRAVENOUS; SUBCUTANEOUS
Qty: 25000 | Refills: 0 | Status: DISCONTINUED | OUTPATIENT
Start: 2021-08-04 | End: 2021-08-05

## 2021-08-04 RX ORDER — POTASSIUM CHLORIDE 20 MEQ
40 PACKET (EA) ORAL EVERY 4 HOURS
Refills: 0 | Status: COMPLETED | OUTPATIENT
Start: 2021-08-04 | End: 2021-08-04

## 2021-08-04 RX ORDER — CARVEDILOL PHOSPHATE 80 MG/1
9.38 CAPSULE, EXTENDED RELEASE ORAL EVERY 12 HOURS
Refills: 0 | Status: DISCONTINUED | OUTPATIENT
Start: 2021-08-05 | End: 2021-08-05

## 2021-08-04 RX ORDER — ISOSORBIDE DINITRATE 5 MG/1
20 TABLET ORAL ONCE
Refills: 0 | Status: COMPLETED | OUTPATIENT
Start: 2021-08-04 | End: 2021-08-04

## 2021-08-04 RX ORDER — SODIUM CHLORIDE 9 MG/ML
500 INJECTION INTRAMUSCULAR; INTRAVENOUS; SUBCUTANEOUS
Refills: 0 | Status: DISCONTINUED | OUTPATIENT
Start: 2021-08-04 | End: 2021-08-04

## 2021-08-04 RX ORDER — HYDRALAZINE HCL 50 MG
100 TABLET ORAL THREE TIMES A DAY
Refills: 0 | Status: DISCONTINUED | OUTPATIENT
Start: 2021-08-04 | End: 2021-08-08

## 2021-08-04 RX ORDER — SODIUM CHLORIDE 9 MG/ML
1000 INJECTION INTRAMUSCULAR; INTRAVENOUS; SUBCUTANEOUS
Refills: 0 | Status: DISCONTINUED | OUTPATIENT
Start: 2021-08-04 | End: 2021-08-04

## 2021-08-04 RX ORDER — ISOSORBIDE DINITRATE 5 MG/1
30 TABLET ORAL THREE TIMES A DAY
Refills: 0 | Status: DISCONTINUED | OUTPATIENT
Start: 2021-08-05 | End: 2021-08-08

## 2021-08-04 RX ADMIN — HEPARIN SODIUM 6 UNIT(S)/HR: 5000 INJECTION INTRAVENOUS; SUBCUTANEOUS at 03:48

## 2021-08-04 RX ADMIN — Medication 1 DROP(S): at 06:23

## 2021-08-04 RX ADMIN — SODIUM CHLORIDE 60 MILLILITER(S): 9 INJECTION INTRAMUSCULAR; INTRAVENOUS; SUBCUTANEOUS at 08:59

## 2021-08-04 RX ADMIN — HEPARIN SODIUM 6 UNIT(S)/HR: 5000 INJECTION INTRAVENOUS; SUBCUTANEOUS at 12:39

## 2021-08-04 RX ADMIN — Medication 40 MILLIEQUIVALENT(S): at 20:01

## 2021-08-04 RX ADMIN — ISOSORBIDE DINITRATE 20 MILLIGRAM(S): 5 TABLET ORAL at 12:40

## 2021-08-04 RX ADMIN — ISOSORBIDE DINITRATE 20 MILLIGRAM(S): 5 TABLET ORAL at 06:23

## 2021-08-04 RX ADMIN — Medication 75 MILLIGRAM(S): at 13:17

## 2021-08-04 RX ADMIN — ISOSORBIDE DINITRATE 20 MILLIGRAM(S): 5 TABLET ORAL at 21:19

## 2021-08-04 RX ADMIN — ISOSORBIDE DINITRATE 20 MILLIGRAM(S): 5 TABLET ORAL at 17:33

## 2021-08-04 RX ADMIN — HEPARIN SODIUM 6 UNIT(S)/HR: 5000 INJECTION INTRAVENOUS; SUBCUTANEOUS at 22:39

## 2021-08-04 RX ADMIN — Medication 100 MILLIGRAM(S): at 21:19

## 2021-08-04 RX ADMIN — Medication 40 MILLIEQUIVALENT(S): at 17:33

## 2021-08-04 RX ADMIN — ATORVASTATIN CALCIUM 40 MILLIGRAM(S): 80 TABLET, FILM COATED ORAL at 21:19

## 2021-08-04 RX ADMIN — Medication 75 MILLIGRAM(S): at 06:23

## 2021-08-04 RX ADMIN — Medication 50 MILLIGRAM(S): at 06:23

## 2021-08-04 NOTE — PROGRESS NOTE ADULT - PROBLEM SELECTOR PLAN 1
- increase HDZN to 100 mg TID for further afterload reduction  - increase ISDN to 30 mg TID  - stop Toprol XL 50 mg QD and start Coreg 9.375 mg BID  - deferring RASSi and MRA given degree of renal dysfunction  - daily standing weights and strict I&Os  - will hold off on diuretics for now, but avoid IVF given elevated filling pressures  - will plan for staged PCI this week

## 2021-08-04 NOTE — PROGRESS NOTE ADULT - ASSESSMENT
86 y/o female with h/o chronic HFpEF, HTN, DLP, CAD (h/o abnormal stress test), DM 2 and CKD 2 who presented to [facility] with nausea, vomiting and dizziness. She was found to be tachycardic, hypotensive with elevated lactate concerning for cardiogenic shock. She was given IVF, empiric antibiotics and underwent abd CT which ws unremarkable. Her ekg showed new onset aflutter and her TTE showed newly diagnosed LV systolic dysfunction with LVEF 25%, mod MR and mod TR. She was started on milrinone and levophed gtt. She was transferred to [facility] for further management. Her hospital course was complicated by tachypnea requiring bipap placement, acute anemia requiring PRBCs transfusion thought to be due bleeding from arterial line and heparin gtt. Her milrinone gtt was dc’ed 7/26 due to hypotension. CVP prior to transfer out of CICU had been low and received gentle IV fluid hydration.     Hemodynamically stable off inotropic support. She is s/p LHC which showed mLCx and RCA disease and RHC which showed elevated filling pressures and normal cardiac output; although markedly hypertensive for her degree of systolic dysfunction. Her clinical course is complicated by persistent renal dysfunction though gradually improving. Her Hgb has been stable without evidence of further bleed and heparin was resumed on 8/1. Will plan for staged PCI this week and then DCCV once able to tolerate AC without interruption.     Pertinent Cardiac Studies  [facility] 8/4/21: RA 13, RV 59/9, PA 57/21/36, PCWP 18, PA sat 59.2%, CO/CI (F) 5.15/3.18  [facility] 8/4/21: mLCx 70%, mid-distal RCA 60%    TTE 7/25: LVIDd 4.4 cm, LVEF 29% (global), normal RV size with decreased systolic function, mod dilated LA, mild-mod MR, calcified AV with grossly mod decreased opening (peak/mean gradient 15/9 respectively), mild TR, est RVSP 31 mmHg

## 2021-08-04 NOTE — PROGRESS NOTE ADULT - PROBLEM SELECTOR PLAN 2
Left message for patient regarding appointment on 6/24/2021 with Michael. We are asking her to come in 30 minutes early to be seen with RN.   - will plan for staged PCI as above  - c/w statin, off AC/ASA d/t bleeding  - BB as above

## 2021-08-04 NOTE — PROGRESS NOTE ADULT - SUBJECTIVE AND OBJECTIVE BOX
[facility] Division of Hospital Medicine  Cathleen Pisano MD  Pager (M-F, 8A-5P): [phone]  Other Times:  [phone]      Patient is a 85y old  Female who presents with a chief complaint of Transfer for Cardiogenic Shock (04 Aug 2021 14:41)      SUBJECTIVE / OVERNIGHT EVENTS: no acute events overnight. went for L+RHC  today. no chest pain nor dyspnea. feels overall better  ADDITIONAL REVIEW OF SYSTEMS:    MEDICATIONS  (STANDING):  artificial  tears Solution 1 Drop(s) Both EYES two times a day  atorvastatin 40 milliGRAM(s) Oral at bedtime  dextrose 50% Injectable 25 Gram(s) IV Push once  dextrose 50% Injectable 25 Gram(s) IV Push once  heparin  Infusion 600 Unit(s)/Hr (6 mL/Hr) IV Continuous <Continuous>  hydrALAZINE 75 milliGRAM(s) Oral three times a day  insulin lispro (ADMELOG) corrective regimen sliding scale   SubCutaneous three times a day before meals  insulin lispro (ADMELOG) corrective regimen sliding scale   SubCutaneous at bedtime  isosorbide   dinitrate Tablet (ISORDIL) 20 milliGRAM(s) Oral three times a day  metoprolol succinate ER 50 milliGRAM(s) Oral daily  polyethylene glycol 3350 17 Gram(s) Oral daily  potassium chloride    Tablet ER 40 milliEquivalent(s) Oral every 4 hours    MEDICATIONS  (PRN):      CAPILLARY BLOOD GLUCOSE      POCT Blood Glucose.: 122 mg/dL (04 Aug 2021 17:36)  POCT Blood Glucose.: 115 mg/dL (04 Aug 2021 12:34)  POCT Blood Glucose.: 98 mg/dL (04 Aug 2021 08:33)  POCT Blood Glucose.: 90 mg/dL (03 Aug 2021 21:30)    I&O's Summary    03 Aug 2021 07:01  -  04 Aug 2021 07:00  --------------------------------------------------------  IN: 851 mL / OUT: 300 mL / NET: 551 mL    04 Aug 2021 07:01  -  04 Aug 2021 17:41  --------------------------------------------------------  IN: 360 mL / OUT: 0 mL / NET: 360 mL        PHYSICAL EXAM:  Vital Signs Last 24 Hrs  T(C): 36.7 (04 Aug 2021 17:23), Max: 36.7 (04 Aug 2021 17:23)  T(F): 98.1 (04 Aug 2021 17:23), Max: 98.1 (04 Aug 2021 17:23)  HR: 76 (04 Aug 2021 17:23) (61 - 85)  BP: 157/76 (04 Aug 2021 17:23) (98/56 - 172/80)  BP(mean): --  RR: 14 (04 Aug 2021 16:50) (14 - 18)  SpO2: 95% (04 Aug 2021 16:50) (95% - 100%)    CONSTITUTIONAL: NAD, well-developed, well-groomed  EYES: PERRLA; conjunctiva and sclera clear  ENMT: Moist oral mucosa, no pharyngeal injection or exudates; normal dentition  NECK: Supple, no palpable masses; no thyromegaly  RESPIRATORY: Normal respiratory effort; lungs are clear to auscultation bilaterally  CARDIOVASCULAR: irregularly irregular, normal S1 and S2, no murmur/rub/gallop; No lower extremity edema; Peripheral pulses are 2+ bilaterally  ABDOMEN: Soft, Nondistended,  Nontender to palpation, normoactive bowel sounds  MUSCULOSKELETAL:  No clubbing or cyanosis of digits; no joint swelling or tenderness to palpation  PSYCH: A+O to person, place, and time; affect appropriate  NEUROLOGY: CN 2-12 are intact and symmetric; no gross sensory deficits   SKIN: +extensive RUE ecchymoses, healing, tracking down in dependent regions, extending to R posterior back    LABS:                        10.0   9.82  )-----------( 195      ( 04 Aug 2021 00:45 )             33.0     08-04    138  |  100  |  18  ----------------------------<  123<H>  3.4<L>   |  25  |  1.56<H>    Ca    9.0      04 Aug 2021 12:13  Phos  4.0     08-03  Mg     1.7     08-03      PTT - ( 04 Aug 2021 12:13 )  PTT:49.8 sec            RADIOLOGY & ADDITIONAL TESTS:  Results Reviewed:   Imaging Personally Reviewed:  Electrocardiogram Personally Reviewed:    COORDINATION OF CARE:  Care Discussed with Consultants/Other Providers [Y]: medicine NP Mena  Prior or Outpatient Records Reviewed [Y]: HF consult note

## 2021-08-04 NOTE — PROGRESS NOTE ADULT - SUBJECTIVE AND OBJECTIVE BOX
Subjective:      Medications:  artificial  tears Solution 1 Drop(s) Both EYES two times a day  atorvastatin 40 milliGRAM(s) Oral at bedtime  dextrose 50% Injectable 25 Gram(s) IV Push once  dextrose 50% Injectable 25 Gram(s) IV Push once  heparin  Infusion 400 Unit(s)/Hr IV Continuous <Continuous>  hydrALAZINE 75 milliGRAM(s) Oral three times a day  insulin lispro (ADMELOG) corrective regimen sliding scale   SubCutaneous three times a day before meals  insulin lispro (ADMELOG) corrective regimen sliding scale   SubCutaneous at bedtime  isosorbide   dinitrate Tablet (ISORDIL) 20 milliGRAM(s) Oral three times a day  metoprolol succinate ER 50 milliGRAM(s) Oral daily  polyethylene glycol 3350 17 Gram(s) Oral daily      ICU Vital Signs Last 24 Hrs  T(C): 36.6, Max: 36.6 ( @ 12:42)  HR: 78 (61 - 81)  BP: 143/57 (128/60 - 172/80)  BP(mean): --  ABP: --  ABP(mean): --  RR: 18 (18 - 18)  SpO2: 100% (97% - 100%)    Weight in k.4 (21)  Weight in k.2 (21)      I&O's Summary Last 24 Hrs    IN: 851 mL / OUT: 300 mL / NET: 551 mL      Tele:    Physical Exam:    General: No distress. Comfortable.  HEENT: EOM intact.  Neck: JVP not elevated.  Respiratory: Clear to auscultation bilaterally  CV: RRR. Normal S1 and S2. No murmurs, rub, or gallops. Radial pulses normal.  Abdomen: Soft, non-distended, non-tender  Skin: No skin lesions  Extremities: Warm, no edema  Neurology: Non-focal, alert and oriented times three.   Psych: Affect normal    Labs:    ( 21 @ 00:45 )               10.0   9.82  )--------( 195                  33.0     ( 21 @ 12:13 )     138  |  100  |  18  ---------------------<  123  3.4  |  25  |  1.56    Ca 9.0  Phos x   Mg x       PTT/PT/INR - ( 21 @ 12:13 )  PTT: 49.8 sec / PT: x     / INR: x        ( 21 @ 15:54 )  TropHS 310   /    / CKMB x      ( 21 @ 12:07 )  TropHS x     /    / CKMB x        Serum Pro-Brain Natriuretic Peptide: [id number] (21 @ 15:54)             Subjective:  - s/p L/RHC today which she tolerated well.    Medications:  artificial  tears Solution 1 Drop(s) Both EYES two times a day  atorvastatin 40 milliGRAM(s) Oral at bedtime  dextrose 50% Injectable 25 Gram(s) IV Push once  dextrose 50% Injectable 25 Gram(s) IV Push once  heparin  Infusion 400 Unit(s)/Hr IV Continuous <Continuous>  hydrALAZINE 75 milliGRAM(s) Oral three times a day  insulin lispro (ADMELOG) corrective regimen sliding scale   SubCutaneous three times a day before meals  insulin lispro (ADMELOG) corrective regimen sliding scale   SubCutaneous at bedtime  isosorbide   dinitrate Tablet (ISORDIL) 20 milliGRAM(s) Oral three times a day  metoprolol succinate ER 50 milliGRAM(s) Oral daily  polyethylene glycol 3350 17 Gram(s) Oral daily      ICU Vital Signs Last 24 Hrs  T(C): 36.6, Max: 36.6 ( @ 12:42)  HR: 78 (61 - 81)  BP: 143/57 (128/60 - 172/80)  BP(mean): --  ABP: --  ABP(mean): --  RR: 18 (18 - 18)  SpO2: 100% (97% - 100%)    Weight in k.4 (21)  Weight in k.2 (21)      I&O's Summary Last 24 Hrs    IN: 851 mL / OUT: 300 mL / NET: 551 mL      Tele: Afib 70-90s    Physical Exam:    General: No distress. Comfortable.  HEENT: EOM intact.  Neck: JVP elevated.  Respiratory: Clear to auscultation bilaterally  CV: Irregularly irregular. Normal S1 and S2. No murmurs, rub, or gallops. Radial pulses normal.  Abdomen: Soft, non-distended, non-tender  Skin: No skin lesions  Extremities: Warm, trace BLE edema  Neurology: Non-focal, alert and oriented times three.   Psych: Affect normal    Labs:    ( 21 @ 00:45 )               10.0   9.82  )--------( 195                  33.0     ( 21 @ 12:13 )     138  |  100  |  18  ---------------------<  123  3.4  |  25  |  1.56    Ca 9.0  Phos x   Mg x     PTT/PT/INR - ( 21 @ 12:13 )  PTT: 49.8 sec / PT: x     / INR: x        ( 21 @ 15:54 )  TropHS 310   /    / CKMB x      ( 21 @ 12:07 )  TropHS x     /    / CKMB x        Serum Pro-Brain Natriuretic Peptide: [id number] (21 @ 15:54)

## 2021-08-04 NOTE — PROGRESS NOTE ADULT - PROBLEM SELECTOR PLAN 2
- continue telemetry monitoring  - appreciate EP evaluation and recommendations  - cleared by vascular surgery to resume hep gtt as above, which was started on 8/1  - c/w metoprolol XL 50mg daily for rate control  - EP re-consulted today, rec continuing beta blocker and re-calling after R+LHC  - plan for ALANNA/DCCV tomorrow

## 2021-08-04 NOTE — PROGRESS NOTE ADULT - ATTENDING COMMENTS
meds: heparin (PTT vacillating, 49.8), HDZN 75 tid, ISDN 20 tid, toprol XL 50,   HR Afib 70-90, 128/-153/, 135 (134.9 7.29)  08-04  138  |  100  |  18  ----------------------------<  123<H>  3.4<L>   |  25  |  1.56<H>    Ca    9.0      04 Aug 2021 12:13  Phos  4.0     08-03  Mg     1.7     08-03  Plan:  For R/L cath today  For ALANNA DCCV tomorrow.   K/Mg supplements.   Change to Coreg 9.375 bid and escelate.   Could start aldactone after cath.   Possible d/c on NOAC end of week.  Yemi Noble

## 2021-08-04 NOTE — CHART NOTE - NSCHARTNOTEFT_GEN_A_CORE
Patient is s/p LHC today which showed mLCx 70% stenosis and mid-distal RCA 60% stenosis. Will need staged PCI this week. NPO order canceled; will plan for DCCV after staged PCI.

## 2021-08-04 NOTE — PROGRESS NOTE ADULT - NSPROGADDITIONALINFOA_GEN_ALL_CORE
.  Cathleen Pisano MD  Division of Hospital Medicine  [facility]   Pager: [phone]    Plan discussed with patient and medicine ARASELI San.

## 2021-08-04 NOTE — PROGRESS NOTE ADULT - PROBLEM SELECTOR PLAN 4
- Per outpatient records, SCr was 1.14 on 5/21/21  - May be related to ATN given episodes of hypotension  - Cr had peaked to 2.7, gradually improving, now 1.5

## 2021-08-04 NOTE — PROGRESS NOTE ADULT - ASSESSMENT
85yoF w/ PMHx significant for HFpEF, HTN, HLD, CAD, NIDDM, was tx to [facility] from [place] after being found to be in ADHF / new acute systolic heart failure, possibly tachycardia induced (new Aflutter) with new DANE, requiring CICU for inotropes / pressors / BiPAP, now off; course c/b acute hypovolemic shock secondary to blood loss anemia requiring PRBC transfusions transferred to floors now pending R+LHC and subsequent ALANNA/DCCV.

## 2021-08-04 NOTE — CHART NOTE - NSCHARTNOTEFT_GEN_A_CORE
MEDICINE NP    PAYAM PAPPAS  85y Female    Patient is a 85y old  Female who presents with a chief complaint of Transfer for Cardiogenic Shock (03 Aug 2021 16:41)       > Event Summary:  Notified by RN, Patient with sub-therapeutic aPTT =20.1 sec., on Patient Specific Heparin gtt for AFlutter    HPI : 86 yo F w/ pmh of HFpEF, HTN, HLD, CAD, T2DM.  Tx to [facility] after being found to be in new Acute Systolic HF possible Tachycardia induced (new Aflutter) with new DANE, requiring CICU for inotropes / pressors / BiPAP, now off; course c/b acute hypovolemic shock secondary to blood loss anemia requiring PRBC transfusions transferred to floors now pending R+LHC and subsequent ALANNA/DCCV.   -Patient Specific Heparin gtt w/ aPTT Goal 50-80.  Will increase Heparin by 200U/Hr to 600U/hr and c/w trending aPTT Q6hrs  -Monitor closely       PTT - ( 04 Aug 2021 02:09 )  PTT:20.1 sec                        10.0   9.82  )-----------( 195      ( 04 Aug 2021 00:45 )             33.0             SHOBHA Bond-BC  Medicine Department  #[id number] Statement Selected

## 2021-08-04 NOTE — PROGRESS NOTE ADULT - PROBLEM SELECTOR PLAN 1
newly diagnosed HFrEF (had previous hx of HFpEF). will need ischemic eval pending Cr stabilization and nephrology clearance  - c/w hydralazine increased to 75 mg TID, isordil 20mg TID  - changed metoprolol to carvedilol 9.375mg q12h to start tomorrow per HF recs  - continue to optimize GDMT as tolerated  - HF following, recs appreciated  - NM amyloid scan not suggestive of cardiac amyloidosis  - went for L+RHC today  - cleared by vascular surgery to resume hep gtt, which was started on 8/1

## 2021-08-05 LAB
ANION GAP SERPL CALC-SCNC: 14 MMOL/L — SIGNIFICANT CHANGE UP (ref 5–17)
APTT BLD: 42.3 SEC — HIGH (ref 27.5–35.5)
APTT BLD: 51.8 SEC — HIGH (ref 27.5–35.5)
APTT BLD: 91.5 SEC — HIGH (ref 27.5–35.5)
BUN SERPL-MCNC: 16 MG/DL — SIGNIFICANT CHANGE UP (ref 7–23)
CALCIUM SERPL-MCNC: 9.2 MG/DL — SIGNIFICANT CHANGE UP (ref 8.4–10.5)
CHLORIDE SERPL-SCNC: 99 MMOL/L — SIGNIFICANT CHANGE UP (ref 96–108)
CO2 SERPL-SCNC: 23 MMOL/L — SIGNIFICANT CHANGE UP (ref 22–31)
CREAT SERPL-MCNC: 1.47 MG/DL — HIGH (ref 0.5–1.3)
GLUCOSE BLDC GLUCOMTR-MCNC: 117 MG/DL — HIGH (ref 70–99)
GLUCOSE BLDC GLUCOMTR-MCNC: 120 MG/DL — HIGH (ref 70–99)
GLUCOSE BLDC GLUCOMTR-MCNC: 129 MG/DL — HIGH (ref 70–99)
GLUCOSE BLDC GLUCOMTR-MCNC: 134 MG/DL — HIGH (ref 70–99)
GLUCOSE SERPL-MCNC: 110 MG/DL — HIGH (ref 70–99)
HCT VFR BLD CALC: 31 % — LOW (ref 34.5–45)
HCT VFR BLD CALC: 32.8 % — LOW (ref 34.5–45)
HGB BLD-MCNC: 9.3 G/DL — LOW (ref 11.5–15.5)
HGB BLD-MCNC: 9.9 G/DL — LOW (ref 11.5–15.5)
MAGNESIUM SERPL-MCNC: 1.8 MG/DL — SIGNIFICANT CHANGE UP (ref 1.6–2.6)
MCHC RBC-ENTMCNC: 29.5 PG — SIGNIFICANT CHANGE UP (ref 27–34)
MCHC RBC-ENTMCNC: 30 GM/DL — LOW (ref 32–36)
MCHC RBC-ENTMCNC: 30.2 GM/DL — LOW (ref 32–36)
MCHC RBC-ENTMCNC: 30.5 PG — SIGNIFICANT CHANGE UP (ref 27–34)
MCV RBC AUTO: 100.9 FL — HIGH (ref 80–100)
MCV RBC AUTO: 98.4 FL — SIGNIFICANT CHANGE UP (ref 80–100)
NRBC # BLD: 0 /100 WBCS — SIGNIFICANT CHANGE UP (ref 0–0)
NRBC # BLD: 0 /100 WBCS — SIGNIFICANT CHANGE UP (ref 0–0)
PHOSPHATE SERPL-MCNC: 3.7 MG/DL — SIGNIFICANT CHANGE UP (ref 2.5–4.5)
PLATELET # BLD AUTO: 139 K/UL — LOW (ref 150–400)
PLATELET # BLD AUTO: 158 K/UL — SIGNIFICANT CHANGE UP (ref 150–400)
POTASSIUM SERPL-MCNC: 4.5 MMOL/L — SIGNIFICANT CHANGE UP (ref 3.5–5.3)
POTASSIUM SERPL-SCNC: 4.5 MMOL/L — SIGNIFICANT CHANGE UP (ref 3.5–5.3)
RBC # BLD: 3.15 M/UL — LOW (ref 3.8–5.2)
RBC # BLD: 3.25 M/UL — LOW (ref 3.8–5.2)
RBC # FLD: 20.4 % — HIGH (ref 10.3–14.5)
RBC # FLD: 20.8 % — HIGH (ref 10.3–14.5)
SODIUM SERPL-SCNC: 136 MMOL/L — SIGNIFICANT CHANGE UP (ref 135–145)
WBC # BLD: 8.04 K/UL — SIGNIFICANT CHANGE UP (ref 3.8–10.5)
WBC # BLD: 9.74 K/UL — SIGNIFICANT CHANGE UP (ref 3.8–10.5)
WBC # FLD AUTO: 8.04 K/UL — SIGNIFICANT CHANGE UP (ref 3.8–10.5)
WBC # FLD AUTO: 9.74 K/UL — SIGNIFICANT CHANGE UP (ref 3.8–10.5)

## 2021-08-05 PROCEDURE — 99233 SBSQ HOSP IP/OBS HIGH 50: CPT

## 2021-08-05 PROCEDURE — 99232 SBSQ HOSP IP/OBS MODERATE 35: CPT

## 2021-08-05 RX ORDER — ASPIRIN/CALCIUM CARB/MAGNESIUM 324 MG
81 TABLET ORAL DAILY
Refills: 0 | Status: DISCONTINUED | OUTPATIENT
Start: 2021-08-05 | End: 2021-08-14

## 2021-08-05 RX ORDER — SIMETHICONE 80 MG/1
80 TABLET, CHEWABLE ORAL ONCE
Refills: 0 | Status: COMPLETED | OUTPATIENT
Start: 2021-08-05 | End: 2021-08-05

## 2021-08-05 RX ORDER — FUROSEMIDE 40 MG
20 TABLET ORAL DAILY
Refills: 0 | Status: DISCONTINUED | OUTPATIENT
Start: 2021-08-05 | End: 2021-08-06

## 2021-08-05 RX ORDER — SPIRONOLACTONE 25 MG/1
12.5 TABLET, FILM COATED ORAL DAILY
Refills: 0 | Status: DISCONTINUED | OUTPATIENT
Start: 2021-08-05 | End: 2021-08-06

## 2021-08-05 RX ORDER — CARVEDILOL PHOSPHATE 80 MG/1
12.5 CAPSULE, EXTENDED RELEASE ORAL EVERY 12 HOURS
Refills: 0 | Status: DISCONTINUED | OUTPATIENT
Start: 2021-08-05 | End: 2021-08-08

## 2021-08-05 RX ORDER — MAGNESIUM SULFATE 500 MG/ML
2 VIAL (ML) INJECTION ONCE
Refills: 0 | Status: COMPLETED | OUTPATIENT
Start: 2021-08-05 | End: 2021-08-05

## 2021-08-05 RX ORDER — ACETAMINOPHEN 500 MG
650 TABLET ORAL ONCE
Refills: 0 | Status: COMPLETED | OUTPATIENT
Start: 2021-08-05 | End: 2021-08-05

## 2021-08-05 RX ORDER — CALCIUM CARBONATE 500(1250)
1 TABLET ORAL ONCE
Refills: 0 | Status: COMPLETED | OUTPATIENT
Start: 2021-08-05 | End: 2021-08-05

## 2021-08-05 RX ORDER — HEPARIN SODIUM 5000 [USP'U]/ML
650 INJECTION INTRAVENOUS; SUBCUTANEOUS
Qty: 25000 | Refills: 0 | Status: DISCONTINUED | OUTPATIENT
Start: 2021-08-05 | End: 2021-08-07

## 2021-08-05 RX ADMIN — ISOSORBIDE DINITRATE 30 MILLIGRAM(S): 5 TABLET ORAL at 13:58

## 2021-08-05 RX ADMIN — SIMETHICONE 80 MILLIGRAM(S): 80 TABLET, CHEWABLE ORAL at 17:19

## 2021-08-05 RX ADMIN — HEPARIN SODIUM 6.5 UNIT(S)/HR: 5000 INJECTION INTRAVENOUS; SUBCUTANEOUS at 06:34

## 2021-08-05 RX ADMIN — CARVEDILOL PHOSPHATE 12.5 MILLIGRAM(S): 80 CAPSULE, EXTENDED RELEASE ORAL at 17:19

## 2021-08-05 RX ADMIN — Medication 1 DROP(S): at 06:17

## 2021-08-05 RX ADMIN — Medication 650 MILLIGRAM(S): at 10:19

## 2021-08-05 RX ADMIN — ISOSORBIDE DINITRATE 30 MILLIGRAM(S): 5 TABLET ORAL at 21:36

## 2021-08-05 RX ADMIN — HEPARIN SODIUM 6.5 UNIT(S)/HR: 5000 INJECTION INTRAVENOUS; SUBCUTANEOUS at 13:09

## 2021-08-05 RX ADMIN — Medication 1 TABLET(S): at 17:18

## 2021-08-05 RX ADMIN — Medication 20 MILLIGRAM(S): at 17:19

## 2021-08-05 RX ADMIN — POLYETHYLENE GLYCOL 3350 17 GRAM(S): 17 POWDER, FOR SOLUTION ORAL at 21:38

## 2021-08-05 RX ADMIN — Medication 1 DROP(S): at 17:19

## 2021-08-05 RX ADMIN — Medication 100 MILLIGRAM(S): at 13:58

## 2021-08-05 RX ADMIN — Medication 50 GRAM(S): at 08:49

## 2021-08-05 RX ADMIN — HEPARIN SODIUM 6 UNIT(S)/HR: 5000 INJECTION INTRAVENOUS; SUBCUTANEOUS at 20:25

## 2021-08-05 RX ADMIN — Medication 100 MILLIGRAM(S): at 06:16

## 2021-08-05 RX ADMIN — SPIRONOLACTONE 12.5 MILLIGRAM(S): 25 TABLET, FILM COATED ORAL at 17:19

## 2021-08-05 RX ADMIN — Medication 650 MILLIGRAM(S): at 11:33

## 2021-08-05 RX ADMIN — Medication 81 MILLIGRAM(S): at 17:19

## 2021-08-05 RX ADMIN — ISOSORBIDE DINITRATE 30 MILLIGRAM(S): 5 TABLET ORAL at 06:33

## 2021-08-05 RX ADMIN — Medication 100 MILLIGRAM(S): at 21:36

## 2021-08-05 RX ADMIN — ATORVASTATIN CALCIUM 40 MILLIGRAM(S): 80 TABLET, FILM COATED ORAL at 21:36

## 2021-08-05 RX ADMIN — CARVEDILOL PHOSPHATE 9.38 MILLIGRAM(S): 80 CAPSULE, EXTENDED RELEASE ORAL at 06:17

## 2021-08-05 NOTE — PROGRESS NOTE ADULT - ASSESSMENT
85yoF w/ PMHx significant for HFpEF, HTN, HLD, CAD, NIDDM, was tx to [facility] from [place] after being found to be in ADHF / new acute systolic heart failure, possibly tachycardia induced (new Aflutter) with new DANE, requiring CICU for inotropes / pressors / BiPAP, now off; course c/b acute hypovolemic shock secondary to blood loss anemia requiring PRBC transfusions transferred to floors now s/p L+RHC showing elevated filling pressures, 70% stenosis of mLCX and 60% mid-distal RCA stenosis pending staged PCI and subsequent ALANNA/DCCV.  85yoF w/ PMHx significant for HFpEF, HTN, HLD, CAD, NIDDM, was tx to [facility] from [place] after being found to be in ADHF / new acute systolic heart failure, possibly tachycardia induced (new Aflutter) with new DANE, requiring CICU for inotropes / pressors / BiPAP, now off; course c/b acute hypovolemic shock secondary to blood loss anemia requiring PRBC transfusions transferred to floors now s/p L+RHC showing elevated filling pressures, 90% stenosis of mid circumflex pending staged PCI and subsequent ALANNA/DCCV.

## 2021-08-05 NOTE — PROGRESS NOTE ADULT - PROBLEM SELECTOR PLAN 1
newly diagnosed HFrEF (had previous hx of HFpEF)  - continue to optimize GDMT as tolerated  - HF following, recs greatly appreciated  - continue to optimize GDMT as tolerated  - carvedilol increased to 12.5mg q12h, hydralazine 100mg TID, isordil 30mg TID  - added lasix 20mg PO daily, spironolactone 12.5mg PO daily added  - NM amyloid scan not suggestive of cardiac amyloidosis  - s/p L+RHC showing elevated filling pressures, 70% stenosis of mLCX and 60% mid-distal RCA stenosis newly diagnosed HFrEF (had previous hx of HFpEF)  - continue to optimize GDMT as tolerated  - HF following, recs greatly appreciated  - continue to optimize GDMT as tolerated  - carvedilol increased to 12.5mg q12h  - c/w hydralazine 100mg TID, isordil 30mg TID  - added lasix 20mg PO daily and spironolactone 12.5mg PO daily  - NM amyloid scan not suggestive of cardiac amyloidosis  - s/p L+RHC showing elevated filling pressures, 90% stenosis of mid circumflex

## 2021-08-05 NOTE — PROGRESS NOTE ADULT - PROBLEM SELECTOR PLAN 1
- start lasix 20 mg PO QD  - start Spironolactone 12.5 mg QD  - increase Coreg to 12.5 mg BID for further afterload reduction  - continue HDZN 100 mg TID and ISDN 30 mg TID  - deferring RASSi and MRA given degree of renal dysfunction  - daily standing weights and strict I&Os

## 2021-08-05 NOTE — PROGRESS NOTE ADULT - SUBJECTIVE AND OBJECTIVE BOX
[facility]   [phone]  RENAL FOLLOW UP NOTE  --------------------------------------------------------------------------------  Chief Complaint:    24 hour events/subjective:    Patient seen and examined.   denies sob    PAST HISTORY  --------------------------------------------------------------------------------  No significant changes to PMH, PSH, FHx, SHx, unless otherwise noted    ALLERGIES & MEDICATIONS  --------------------------------------------------------------------------------  Allergies    No Known Allergies    Intolerances      Standing Inpatient Medications  artificial  tears Solution 1 Drop(s) Both EYES two times a day  aspirin enteric coated 81 milliGRAM(s) Oral daily  atorvastatin 40 milliGRAM(s) Oral at bedtime  calcium carbonate    500 mG (Tums) Chewable 1 Tablet(s) Chew once  carvedilol 12.5 milliGRAM(s) Oral every 12 hours  dextrose 50% Injectable 25 Gram(s) IV Push once  dextrose 50% Injectable 25 Gram(s) IV Push once  furosemide    Tablet 20 milliGRAM(s) Oral daily  heparin  Infusion 650 Unit(s)/Hr IV Continuous <Continuous>  hydrALAZINE 100 milliGRAM(s) Oral three times a day  insulin lispro (ADMELOG) corrective regimen sliding scale   SubCutaneous three times a day before meals  insulin lispro (ADMELOG) corrective regimen sliding scale   SubCutaneous at bedtime  isosorbide   dinitrate Tablet (ISORDIL) 30 milliGRAM(s) Oral three times a day  polyethylene glycol 3350 17 Gram(s) Oral daily  simethicone 80 milliGRAM(s) Chew once  spironolactone 12.5 milliGRAM(s) Oral daily    PRN Inpatient Medications      REVIEW OF SYSTEMS  --------------------------------------------------------------------------------    Gen: denies fevers/chills,  CVS: denies chest pain/palpitations  Resp: denies SOB/Cough  GI: Denies N/V/Abd pain  : Denies dysuria/oliguria/hematuria    All other systems were reviewed and are negative, except as noted.    VITALS/PHYSICAL EXAM  --------------------------------------------------------------------------------  T(C): 36.2 (08-05-21 @ 12:12), Max: 36.9 (08-05-21 @ 04:10)  HR: 80 (08-05-21 @ 12:38) (76 - 93)  BP: 120/60 (08-05-21 @ 12:38) (120/60 - 157/76)  RR: 18 (08-05-21 @ 12:12) (17 - 18)  SpO2: 98% (08-05-21 @ 12:12) (96% - 100%)  Wt(kg): --  Height (cm): 160 (08-04-21 @ 14:45)  Weight (kg): 59.9 (08-04-21 @ 14:45)  BMI (kg/m2): 23.4 (08-04-21 @ 14:45)  BSA (m2): 1.62 (08-04-21 @ 14:45)      08-04-21 @ 07:01  -  08-05-21 @ 07:00  --------------------------------------------------------  IN: 409 mL / OUT: 0 mL / NET: 409 mL    08-05-21 @ 07:01  -  08-05-21 @ 17:06  --------------------------------------------------------  IN: 240 mL / OUT: 0 mL / NET: 240 mL      Physical Exam:  	  	Gen: Non toxic comfortable appearing   	Pulm: decrease breath sounds, no rales or ronchi or wheezing  	CV: +JVD. RRR, S1S2;  	Abd: +BS, soft, nontender/nondistended  	: No suprapubic tenderness  	UE: Warm, no cyanosis  no clubbing, no edema;  	LE: Warm, no cyanosis  no clubbing, no edema  	Neuro: alert and oriented. speech coherent    LABS/STUDIES  --------------------------------------------------------------------------------              9.9    9.74  >-----------<  139      [08-05-21 @ 11:43]              32.8     136  |  99  |  16  ----------------------------<  110      [08-05-21 @ 05:27]  4.5   |  23  |  1.47        Ca     9.2     [08-05-21 @ 05:27]      Mg     1.8     [08-05-21 @ 05:27]      Phos  3.7     [08-05-21 @ 05:27]        PTT: 51.8       [08-05-21 @ 11:43]      Creatinine Trend:  SCr 1.47 [08-05 @ 05:27]  SCr 1.56 [08-04 @ 12:13]  SCr 1.76 [08-03 @ 06:48]  SCr 1.74 [08-02 @ 13:09]  SCr 1.93 [08-01 @ 15:53]              Urinalysis - [07-24-21 @ 05:01]      Color Yellow / Appearance Slightly Turbid / SG 1.025 / pH 5.0      Gluc Negative / Ketone Trace  / Bili Negative / Urobili 4       Blood Moderate / Protein 100 / Leuk Est Small / Nitrite Negative      RBC 25-50 / WBC 11-25 / Hyaline  / Gran 0-2 / Sq Epi  / Non Sq Epi Moderate / Bacteria Moderate      TSH 2.62      [07-26-21 @ 20:44]  Lipid: chol 68, TG 48, HDL 32, LDL --      [07-25-21 @ 20:34]    Free Light Chains: kappa 1.86, lambda 2.79, ratio = 0.67      [07-28 @ 08:47]

## 2021-08-05 NOTE — PROGRESS NOTE ADULT - ATTENDING COMMENTS
s/p R/L cath yesterday:  RA 13 (v16), PA 59/29 34, PCWP 18 (v21), LVEDP 18, PA sat 59, Ao 95, Leelee 5.1/3.1 148/50 83,   Coronary angiogram: severe  with tight lesion mid Cx.   meds: heparin (ptt 42), coreg 9.375 bid, HDZN 100 tid, ISDN 30 tid, statin insulin   HR afib 70-90, 120/-130/,   08-05    136  |  99  |  16  ----------------------------<  110<H>  4.5   |  23  |  1.47<H>  Cr 1.47, 1.56, 1.76, 1.74 admission 2.6 (May 1.1)   Ca    9.2      05 Aug 2021 05:27  Phos  3.7     08-05  Mg     1.8     08-05                        9.9    9.74  )-----------( 139      ( 05 Aug 2021 11:43 )             32.8   Plan:  Start lasix 20 QD, aldactone 12.5 QD  make coreg 12.5  bid.   start asa 81.   DAILY STANDING WEIGHTS   staged PCI to Cx tomorrow.   ALANNA DCCV after that.   Home on DOAC.   Yemi Noble

## 2021-08-05 NOTE — PROGRESS NOTE ADULT - ASSESSMENT
84 y/o female with h/o chronic HFpEF, HTN, DLP, CAD (h/o abnormal stress test), DM 2 and CKD 2 who presented to [facility] with nausea, vomiting and dizziness. She was found to be tachycardic, hypotensive with elevated lactate concerning for cardiogenic shock. She was given IVF, empiric antibiotics and underwent abd CT which ws unremarkable. Her ekg showed new onset aflutter and her TTE showed newly diagnosed LV systolic dysfunction with LVEF 25%, mod MR and mod TR. She was started on milrinone and levophed gtt. She was transferred to [facility] for further management. Her hospital course was complicated by tachypnea requiring bipap placement, acute anemia requiring PRBCs transfusion thought to be due bleeding from arterial line and heparin gtt. Her milrinone gtt was dc’ed 7/26 due to hypotension. CVP prior to transfer out of CICU had been low and received gentle IV fluid hydration.     Hemodynamically stable off inotropic support. She is s/p LHC which showed 90% stenosis of mLCx and RHC which revealing elevated filling pressures and normal cardiac output; although markedly hypertensive for her degree of systolic dysfunction. Her clinical course is complicated by persistent renal dysfunction though gradually improving. Her Hgb has been stable without evidence of further bleed and heparin was resumed on 8/1. Will plan for staged PCI tomorrow and ALANNA/DCCV 8/9 assuming she is able to tolerate AC without interruption.    Pertinent Cardiac Studies  [facility] 8/4/21: RA 13 (v16), PA 59/29/34, PCWP 18 (v21), LVEDP 18, PA 59%, Ao 95%, Leelee CO/CI 5.1/3.1, /50/83  C 8/4/21: mCx diffuse 90% stenosis, pCx 40% stenosis, pLAD 40% stenosis, mLAD 30% stenosis, OM1 40% stenosis, OM2 40% stenosis, pRCA 30% stenosis, mRCA 50% stenosis, dRCA 20% stenosis, RPDA 30% stenosis, normal LM  TTE 7/25: LVIDd 4.4 cm, LVEF 29% (global), normal RV size with decreased systolic function, mod dilated LA, mild-mod MR, calcified AV with grossly mod decreased opening (peak/mean gradient 15/9 respectively), mild TR, est RVSP 31 mmHg   86 y/o female with h/o chronic HFpEF, HTN, DLP, CAD (h/o abnormal stress test), DM 2 and CKD 2 who presented to [facility] with nausea, vomiting and dizziness. She was found to be tachycardic, hypotensive with elevated lactate concerning for cardiogenic shock. She was given IVF, empiric antibiotics and underwent abd CT which ws unremarkable. Her ekg showed new onset aflutter and her TTE showed newly diagnosed LV systolic dysfunction with LVEF 25%, mod MR and mod TR. She was started on milrinone and levophed gtt. She was transferred to [facility] for further management. Her hospital course was complicated by tachypnea requiring bipap placement, acute anemia requiring PRBCs transfusion thought to be due bleeding from arterial line and heparin gtt. Her milrinone gtt was dc’ed 7/26 due to hypotension. CVP prior to transfer out of CICU had been low and received gentle IV fluid hydration.     Hemodynamically stable off inotropic support. She is s/p LHC which showed 90% stenosis of mLCx and RHC which revealing elevated filling pressures and normal cardiac output; although markedly hypertensive for her degree of systolic dysfunction. Her clinical course is complicated by persistent renal dysfunction though gradually improving. Her Hgb has been stable without evidence of further bleed and heparin was resumed on 8/1. Will plan for staged PCI tomorrow and ALANNA/DCCV 8/9 assuming she is able to tolerate AC without interruption.    Pertinent Cardiac Studies  [facility] 8/4/21: RA 13 (v16), PA 59/29/34, PCWP 18 (v21), LVEDP 18, PA 59%, Ao 95%, Leelee CO/CI 5.1/3.1, /50/83  C 8/4/21: mCx diffuse 90% stenosis, pCx 40% stenosis, pLAD 40% stenosis, mLAD 30% stenosis, OM1 40% stenosis, OM2 40% stenosis, pRCA 30% stenosis, mRCA 50% stenosis, dRCA 20% stenosis, RPDA 30% stenosis, normal LM  TTE 7/25: LVIDd 4.4 cm, LVEF 29% (global), normal RV size with decreased systolic function, mod dilated LA, mild-mod MR, calcified AV with grossly mod decreased opening (peak/mean gradient 15/9 respectively), mild TR, est RVSP 31 mmHg    Please Call NS2 HF Spectra #[phone] for any questions or concerns

## 2021-08-05 NOTE — PROGRESS NOTE ADULT - SUBJECTIVE AND OBJECTIVE BOX
[facility] Division of Hospital Medicine  Cathleen Pisano MD  Pager (M-F, 8A-5P): [phone]  Other Times:  [phone]      Patient is a 85y old  Female who presents with a chief complaint of Transfer for Cardiogenic Shock (05 Aug 2021 17:05)      SUBJECTIVE / OVERNIGHT EVENTS: no acute events overnight. LHC showing 70% stenosis of mLCx and 60% mid distal RCA. Planned for staged  PCI tomorrow. feels well otherwise.   ADDITIONAL REVIEW OF SYSTEMS:    MEDICATIONS  (STANDING):  artificial  tears Solution 1 Drop(s) Both EYES two times a day  aspirin enteric coated 81 milliGRAM(s) Oral daily  atorvastatin 40 milliGRAM(s) Oral at bedtime  carvedilol 12.5 milliGRAM(s) Oral every 12 hours  dextrose 50% Injectable 25 Gram(s) IV Push once  dextrose 50% Injectable 25 Gram(s) IV Push once  furosemide    Tablet 20 milliGRAM(s) Oral daily  heparin  Infusion 650 Unit(s)/Hr (6.5 mL/Hr) IV Continuous <Continuous>  hydrALAZINE 100 milliGRAM(s) Oral three times a day  insulin lispro (ADMELOG) corrective regimen sliding scale   SubCutaneous three times a day before meals  insulin lispro (ADMELOG) corrective regimen sliding scale   SubCutaneous at bedtime  isosorbide   dinitrate Tablet (ISORDIL) 30 milliGRAM(s) Oral three times a day  polyethylene glycol 3350 17 Gram(s) Oral daily  spironolactone 12.5 milliGRAM(s) Oral daily    MEDICATIONS  (PRN):      CAPILLARY BLOOD GLUCOSE      POCT Blood Glucose.: 129 mg/dL (05 Aug 2021 18:06)  POCT Blood Glucose.: 120 mg/dL (05 Aug 2021 12:43)  POCT Blood Glucose.: 134 mg/dL (05 Aug 2021 08:54)  POCT Blood Glucose.: 118 mg/dL (04 Aug 2021 21:28)    I&O's Summary    04 Aug 2021 07:01  -  05 Aug 2021 07:00  --------------------------------------------------------  IN: 409 mL / OUT: 0 mL / NET: 409 mL    05 Aug 2021 07:01  -  05 Aug 2021 18:28  --------------------------------------------------------  IN: 240 mL / OUT: 0 mL / NET: 240 mL        PHYSICAL EXAM:  Vital Signs Last 24 Hrs  T(C): 36.2 (05 Aug 2021 12:12), Max: 36.9 (05 Aug 2021 04:10)  T(F): 97.1 (05 Aug 2021 12:12), Max: 98.4 (05 Aug 2021 04:10)  HR: 80 (05 Aug 2021 12:38) (80 - 93)  BP: 120/60 (05 Aug 2021 12:38) (120/60 - 142/79)  BP(mean): --  RR: 18 (05 Aug 2021 12:12) (17 - 18)  SpO2: 98% (05 Aug 2021 12:12) (96% - 100%)    CONSTITUTIONAL: NAD, well-developed, well-groomed  EYES: PERRLA; conjunctiva and sclera clear  ENMT: Moist oral mucosa, no pharyngeal injection or exudates; normal dentition  NECK: Supple, no palpable masses; no thyromegaly, +JVD  RESPIRATORY: Normal respiratory effort; lungs are clear to auscultation bilaterally  CARDIOVASCULAR: irregularly irregular, normal S1 and S2, no murmur/rub/gallop; No lower extremity edema; Peripheral pulses are 2+ bilaterally  ABDOMEN: Soft, Nondistended,  Nontender to palpation, normoactive bowel sounds  MUSCULOSKELETAL:  No clubbing or cyanosis of digits; no joint swelling or tenderness to palpation  PSYCH: A+O to person, place, and time; affect appropriate  NEUROLOGY: CN 2-12 are intact and symmetric; no gross sensory deficits   SKIN: +extensive RUE ecchymoses, healing, tracking down in dependent regions but overall improved especially on her back    LABS:                        9.9    9.74  )-----------( 139      ( 05 Aug 2021 11:43 )             32.8     08-05    136  |  99  |  16  ----------------------------<  110<H>  4.5   |  23  |  1.47<H>    Ca    9.2      05 Aug 2021 05:27  Phos  3.7     08-05  Mg     1.8     08-05      PTT - ( 05 Aug 2021 11:43 )  PTT:51.8 sec        RADIOLOGY & ADDITIONAL TESTS:  Results Reviewed:  Cr continues to improve, hypomagnesemia repleted  Imaging Personally Reviewed:  Electrocardiogram Personally Reviewed:    COORDINATION OF CARE:  Care Discussed with Consultants/Other Providers [Y]: medicine ARASELI San  Prior or Outpatient Records Reviewed [Y/N]:

## 2021-08-05 NOTE — PROGRESS NOTE ADULT - NSPROGADDITIONALINFOA_GEN_ALL_CORE
.  Cathleen Pisano MD  Division of Hospital Medicine  [facility]   Pager: [phone]    Plan discussed with patient, arnaldo Caraballo via phone, and medicine NP Mena.

## 2021-08-05 NOTE — PROGRESS NOTE ADULT - SUBJECTIVE AND OBJECTIVE BOX
Subjective:    Medications:  artificial  tears Solution 1 Drop(s) Both EYES two times a day  atorvastatin 40 milliGRAM(s) Oral at bedtime  carvedilol 9.375 milliGRAM(s) Oral every 12 hours  dextrose 50% Injectable 25 Gram(s) IV Push once  dextrose 50% Injectable 25 Gram(s) IV Push once  heparin  Infusion 650 Unit(s)/Hr IV Continuous <Continuous>  hydrALAZINE 100 milliGRAM(s) Oral three times a day  insulin lispro (ADMELOG) corrective regimen sliding scale   SubCutaneous three times a day before meals  insulin lispro (ADMELOG) corrective regimen sliding scale   SubCutaneous at bedtime  isosorbide   dinitrate Tablet (ISORDIL) 30 milliGRAM(s) Oral three times a day  polyethylene glycol 3350 17 Gram(s) Oral daily      Physical Exam:    Vitals:  Vital Signs Last 24 Hours  T(C): 36.2 (21 @ 12:12), Max: 36.9 (21 @ 04:10)  HR: 80 (21 @ 12:38) (67 - 93)  BP: 120/60 (21 @ 12:38) (88/53 - 157/76)  RR: 18 (21 @ 12:12) (14 - 18)  SpO2: 98% (21 @ 12:12) (95% - 100%)    Weight in k.1 ( @ 06:00)    I&O's Summary    04 Aug 2021 07:01  -  05 Aug 2021 07:00  --------------------------------------------------------  IN: 409 mL / OUT: 0 mL / NET: 409 mL        Tele:    General: No distress. Comfortable.  HEENT: EOM intact.  Neck: Neck supple. JVP not elevated. No masses  Chest: Clear to auscultation bilaterally  CV: Normal S1 and S2. No murmurs, rub, or gallops. Radial pulses normal.  Abdomen: Soft, non-distended, non-tender  Skin: No rashes or skin breakdown  Neurology: Alert and oriented times three. Sensation intact  Psych: Affect normal    Labs:                        9.9    9.74  )-----------( 139      ( 05 Aug 2021 11:43 )             32.8     08-    136  |  99  |  16  ----------------------------<  110<H>  4.5   |  23  |  1.47<H>    Ca    9.2      05 Aug 2021 05:27  Phos  3.7     08-  Mg     1.8     08-05      PTT - ( 05 Aug 2021 11:43 )  PTT:51.8 sec               Subjective:  - underwent R/LHC yesterday revealing 90% stenosis of mCx  - feeling well, up in chair without cardiac/respiratory complaints     Medications:  artificial  tears Solution 1 Drop(s) Both EYES two times a day  atorvastatin 40 milliGRAM(s) Oral at bedtime  carvedilol 9.375 milliGRAM(s) Oral every 12 hours  dextrose 50% Injectable 25 Gram(s) IV Push once  dextrose 50% Injectable 25 Gram(s) IV Push once  heparin  Infusion 650 Unit(s)/Hr IV Continuous <Continuous>  hydrALAZINE 100 milliGRAM(s) Oral three times a day  insulin lispro (ADMELOG) corrective regimen sliding scale   SubCutaneous three times a day before meals  insulin lispro (ADMELOG) corrective regimen sliding scale   SubCutaneous at bedtime  isosorbide   dinitrate Tablet (ISORDIL) 30 milliGRAM(s) Oral three times a day  polyethylene glycol 3350 17 Gram(s) Oral daily      Physical Exam:    Vitals:  Vital Signs Last 24 Hours  T(C): 36.2 (21 @ 12:12), Max: 36.9 (21 @ 04:10)  HR: 80 (21 @ 12:38) (67 - 93)  BP: 120/60 (21 @ 12:38) (88/53 - 157/76)  RR: 18 (21 @ 12:12) (14 - 18)  SpO2: 98% (21 @ 12:12) (95% - 100%)    Weight in k.1 ( @ 06:00)    I&O's Summary    04 Aug 2021 07:01  -  05 Aug 2021 07:00  --------------------------------------------------------  IN: 409 mL / OUT: 0 mL / NET: 409 mL    Tele: AF 70-90s    General: No distress. Comfortable.  HEENT: EOM intact.  Neck: JVP 12-14 cm H2O  Respiratory: Clear to auscultation bilaterally  CV: Irregularly irregular. Normal S1 and S2. No murmurs, rub, or gallops. Radial pulses normal.  Abdomen: Soft, non-distended, non-tender  Skin: No skin lesions  Extremities: Warm, +1 bilateral LE edema  Neurology: Non-focal, alert and oriented times three.   Psych: Affect normal    Labs:                        9.9    9.74  )-----------( 139      ( 05 Aug 2021 11:43 )             32.8     08-05    136  |  99  |  16  ----------------------------<  110<H>  4.5   |  23  |  1.47<H>    Ca    9.2      05 Aug 2021 05:27  Phos  3.7     08-05  Mg     1.8     08-05      PTT - ( 05 Aug 2021 11:43 )  PTT:51.8 sec

## 2021-08-05 NOTE — PROGRESS NOTE ADULT - ATTENDING COMMENTS
Seen, examined, and  agree with above as scribed by NP Sforza    cr steady and slightly better   s/p iv contrast yesterday   next angio in am   lasix 20 mg daily and aldactone 12.5 mg daily given + fluid overloaded  check cr in am

## 2021-08-05 NOTE — CHART NOTE - NSCHARTNOTEFT_GEN_A_CORE
IR Chart Note    IR consulted for clearance for bedside midline placement given low GFR. Chart reviewed, ok to place midline at bedside. Discussed with Dr. Green.

## 2021-08-05 NOTE — PROGRESS NOTE ADULT - ASSESSMENT
85 F w/ PMH HFpEF, HTN, HLD, CAD, C2D, DM transferred to [facility] from OSH. pt was admitted. found to in chf/cardiogenic shoc.  with acute hypovolemic shock secondary to anemia requiring blood transfusions.   pt also had NEMESIO being considered for coronary angiogram in am. acute hypovolemic shock secondary to anemia requiring blood transfusions.    1- NEMESIO   2- CHF  3- proteinuria   4- DM     Nemesio in setting of cardiogenic shock likely   creatinine improving.   plan for angio tomorrow  started on lasix 20 mg daily  and aldactone 12.5 mg daily  continue hydralazine 100 mg tid   anemia, trend hgb  monitor creatinine and electrolytes  heart failure team following  discussed with NP

## 2021-08-05 NOTE — PROGRESS NOTE ADULT - PROBLEM SELECTOR PLAN 2
- plan for staged PCI to [facility] 8/6  - BB, MRA and Nitrate as above  - continue with statin  - had been off ASA due to bleeding but given stable Hgb would resume EC ASA 81 mg QD

## 2021-08-05 NOTE — PROGRESS NOTE ADULT - PROBLEM SELECTOR PLAN 2
s/p R+LHC showing elevated filling pressures, 70% stenosis of mLCx and 60% mid-distal RCA stenosis  - planned for staged PCI of mLCx tomorrow  - c/w ASA 81mg daily  - c/w statin  - c/w carvedilol as above  - home ARB on hold in setting of DANE on CKD s/p L+RHC showing elevated filling pressures, 90% stenosis of mid circumflex   - planned for staged PCI of mid circumflex tomorrow  - c/w ASA 81mg daily  - c/w statin  - c/w carvedilol as above  - home ARB on hold in setting of DANE on CKD

## 2021-08-05 NOTE — PROGRESS NOTE ADULT - PROBLEM SELECTOR PLAN 5
- requiring PRBC transfusion during hospitalization for hypovolemic shock  - A-line access bleeding now appears stable; RUE duplex without evidence of active bleeding or pseudoaneurysm, although there is a hematoma; no e/o compartment syndrome; CTA deferred given DANE and future plan for ischemic eval  - Appreciate vascular recommendations - cleared to resume hep gtt on 8/1  - PTT goal 55-65, difficulty with her PTTs/draws and her hep gtt infusing distally near her thumb  - need to continue to monitor her H/H closely, currently stable in the 9s - requiring PRBC transfusion during hospitalization for hypovolemic shock  - A-line access bleeding now appears stable; RUE duplex without evidence of active bleeding or pseudoaneurysm, although there is a hematoma; no e/o compartment syndrome; CTA deferred given DANE and future plan for ischemic eval  - Appreciate vascular recommendations - cleared to resume hep gtt on 8/1  - PTT goal 55-65, difficulty with her PTTs/draws and her hep gtt infusing distally near her thumb  - will obtain midline in LUE for better access  - need to continue to monitor her H/H closely, currently stable in the 9s

## 2021-08-06 LAB
ANION GAP SERPL CALC-SCNC: 17 MMOL/L — SIGNIFICANT CHANGE UP (ref 5–17)
APTT BLD: 29.1 SEC — SIGNIFICANT CHANGE UP (ref 27.5–35.5)
APTT BLD: >200 SEC — CRITICAL HIGH (ref 27.5–35.5)
BUN SERPL-MCNC: 15 MG/DL — SIGNIFICANT CHANGE UP (ref 7–23)
CALCIUM SERPL-MCNC: 9.1 MG/DL — SIGNIFICANT CHANGE UP (ref 8.4–10.5)
CHLORIDE SERPL-SCNC: 94 MMOL/L — LOW (ref 96–108)
CO2 SERPL-SCNC: 22 MMOL/L — SIGNIFICANT CHANGE UP (ref 22–31)
CREAT SERPL-MCNC: 1.47 MG/DL — HIGH (ref 0.5–1.3)
GLUCOSE BLDC GLUCOMTR-MCNC: 107 MG/DL — HIGH (ref 70–99)
GLUCOSE BLDC GLUCOMTR-MCNC: 116 MG/DL — HIGH (ref 70–99)
GLUCOSE BLDC GLUCOMTR-MCNC: 121 MG/DL — HIGH (ref 70–99)
GLUCOSE BLDC GLUCOMTR-MCNC: 137 MG/DL — HIGH (ref 70–99)
GLUCOSE SERPL-MCNC: 259 MG/DL — HIGH (ref 70–99)
HCT VFR BLD CALC: 29.8 % — LOW (ref 34.5–45)
HGB BLD-MCNC: 8.9 G/DL — LOW (ref 11.5–15.5)
INR BLD: 1.1 RATIO — SIGNIFICANT CHANGE UP (ref 0.88–1.16)
MAGNESIUM SERPL-MCNC: 2.2 MG/DL — SIGNIFICANT CHANGE UP (ref 1.6–2.6)
MCHC RBC-ENTMCNC: 29.4 PG — SIGNIFICANT CHANGE UP (ref 27–34)
MCHC RBC-ENTMCNC: 29.9 GM/DL — LOW (ref 32–36)
MCV RBC AUTO: 98.3 FL — SIGNIFICANT CHANGE UP (ref 80–100)
NRBC # BLD: 0 /100 WBCS — SIGNIFICANT CHANGE UP (ref 0–0)
PHOSPHATE SERPL-MCNC: 6.1 MG/DL — HIGH (ref 2.5–4.5)
PLATELET # BLD AUTO: 134 K/UL — LOW (ref 150–400)
POTASSIUM SERPL-MCNC: 4 MMOL/L — SIGNIFICANT CHANGE UP (ref 3.5–5.3)
POTASSIUM SERPL-SCNC: 4 MMOL/L — SIGNIFICANT CHANGE UP (ref 3.5–5.3)
PROTHROM AB SERPL-ACNC: 13.1 SEC — SIGNIFICANT CHANGE UP (ref 10.6–13.6)
RBC # BLD: 3.03 M/UL — LOW (ref 3.8–5.2)
RBC # FLD: 20.6 % — HIGH (ref 10.3–14.5)
SODIUM SERPL-SCNC: 133 MMOL/L — LOW (ref 135–145)
WBC # BLD: 8.74 K/UL — SIGNIFICANT CHANGE UP (ref 3.8–10.5)
WBC # FLD AUTO: 8.74 K/UL — SIGNIFICANT CHANGE UP (ref 3.8–10.5)

## 2021-08-06 PROCEDURE — 99232 SBSQ HOSP IP/OBS MODERATE 35: CPT

## 2021-08-06 PROCEDURE — 92928 PRQ TCAT PLMT NTRAC ST 1 LES: CPT | Mod: LC

## 2021-08-06 PROCEDURE — 93010 ELECTROCARDIOGRAM REPORT: CPT

## 2021-08-06 PROCEDURE — 92978 ENDOLUMINL IVUS OCT C 1ST: CPT | Mod: 26,LC

## 2021-08-06 RX ORDER — HYDRALAZINE HCL 50 MG
10 TABLET ORAL THREE TIMES A DAY
Refills: 0 | Status: DISCONTINUED | OUTPATIENT
Start: 2021-08-06 | End: 2021-08-07

## 2021-08-06 RX ORDER — CLOPIDOGREL BISULFATE 75 MG/1
75 TABLET, FILM COATED ORAL DAILY
Refills: 0 | Status: DISCONTINUED | OUTPATIENT
Start: 2021-08-07 | End: 2021-08-14

## 2021-08-06 RX ORDER — HYDRALAZINE HCL 50 MG
5 TABLET ORAL ONCE
Refills: 0 | Status: COMPLETED | OUTPATIENT
Start: 2021-08-06 | End: 2021-08-06

## 2021-08-06 RX ORDER — HYDRALAZINE HCL 50 MG
10 TABLET ORAL ONCE
Refills: 0 | Status: COMPLETED | OUTPATIENT
Start: 2021-08-06 | End: 2021-08-06

## 2021-08-06 RX ADMIN — CARVEDILOL PHOSPHATE 12.5 MILLIGRAM(S): 80 CAPSULE, EXTENDED RELEASE ORAL at 05:50

## 2021-08-06 RX ADMIN — Medication 10 MILLIGRAM(S): at 23:47

## 2021-08-06 RX ADMIN — Medication 100 MILLIGRAM(S): at 13:43

## 2021-08-06 RX ADMIN — Medication 20 MILLIGRAM(S): at 05:49

## 2021-08-06 RX ADMIN — ISOSORBIDE DINITRATE 30 MILLIGRAM(S): 5 TABLET ORAL at 05:50

## 2021-08-06 RX ADMIN — Medication 81 MILLIGRAM(S): at 13:42

## 2021-08-06 RX ADMIN — Medication 1 DROP(S): at 05:49

## 2021-08-06 RX ADMIN — ISOSORBIDE DINITRATE 30 MILLIGRAM(S): 5 TABLET ORAL at 13:41

## 2021-08-06 RX ADMIN — Medication 100 MILLIGRAM(S): at 05:50

## 2021-08-06 RX ADMIN — Medication 10 MILLIGRAM(S): at 22:21

## 2021-08-06 RX ADMIN — Medication 5 MILLIGRAM(S): at 23:04

## 2021-08-06 NOTE — PROGRESS NOTE ADULT - SUBJECTIVE AND OBJECTIVE BOX
[facility] Division of Hospital Medicine  Cathleen Pisano MD  Pager (JUDITH-GINA, 7R-5P): [phone]  Other Times:  [phone]      Patient is a 85y old  Female who presents with a chief complaint of Transfer for Cardiogenic Shock (05 Aug 2021 18:26)      SUBJECTIVE / OVERNIGHT EVENTS: no acute events overnight. feels well without complaints. saw her pre staged PCI. doing well without complaints. lying flat at time of my exam with no issues.  Tele: afib 70-80s, transient 150s  ADDITIONAL REVIEW OF SYSTEMS:    MEDICATIONS  (STANDING):  artificial  tears Solution 1 Drop(s) Both EYES two times a day  aspirin enteric coated 81 milliGRAM(s) Oral daily  atorvastatin 40 milliGRAM(s) Oral at bedtime  carvedilol 12.5 milliGRAM(s) Oral every 12 hours  dextrose 50% Injectable 25 Gram(s) IV Push once  dextrose 50% Injectable 25 Gram(s) IV Push once  furosemide    Tablet 20 milliGRAM(s) Oral daily  heparin  Infusion 650 Unit(s)/Hr (5 mL/Hr) IV Continuous <Continuous>  hydrALAZINE 100 milliGRAM(s) Oral three times a day  insulin lispro (ADMELOG) corrective regimen sliding scale   SubCutaneous three times a day before meals  insulin lispro (ADMELOG) corrective regimen sliding scale   SubCutaneous at bedtime  isosorbide   dinitrate Tablet (ISORDIL) 30 milliGRAM(s) Oral three times a day  polyethylene glycol 3350 17 Gram(s) Oral daily  spironolactone 12.5 milliGRAM(s) Oral daily    MEDICATIONS  (PRN):      CAPILLARY BLOOD GLUCOSE      POCT Blood Glucose.: 107 mg/dL (06 Aug 2021 13:04)  POCT Blood Glucose.: 137 mg/dL (06 Aug 2021 08:59)  POCT Blood Glucose.: 117 mg/dL (05 Aug 2021 21:46)  POCT Blood Glucose.: 129 mg/dL (05 Aug 2021 18:06)    I&O's Summary    05 Aug 2021 07:01  -  06 Aug 2021 07:00  --------------------------------------------------------  IN: 552 mL / OUT: 0 mL / NET: 552 mL    06 Aug 2021 07:01  -  06 Aug 2021 17:58  --------------------------------------------------------  IN: 300 mL / OUT: 0 mL / NET: 300 mL        PHYSICAL EXAM:  Vital Signs Last 24 Hrs  T(C): 36.4 (06 Aug 2021 15:30), Max: 36.4 (06 Aug 2021 13:20)  T(F): 97.5 (06 Aug 2021 15:30), Max: 97.5 (06 Aug 2021 13:20)  HR: 69 (06 Aug 2021 15:21) (68 - 81)  BP: 132/96 (06 Aug 2021 15:30) (100/64 - 152/82)  BP(mean): --  RR: 18 (06 Aug 2021 15:30) (17 - 18)  SpO2: 99% (06 Aug 2021 15:30) (99% - 99%)    CONSTITUTIONAL: NAD, well-developed, well-groomed  EYES: PERRLA; conjunctiva and sclera clear  ENMT: Moist oral mucosa, no pharyngeal injection or exudates; normal dentition  NECK: Supple, no palpable masses; no thyromegaly, +JVD  RESPIRATORY: Normal respiratory effort; lungs are clear to auscultation bilaterally  CARDIOVASCULAR: irregularly irregular, normal S1 and S2, no murmur/rub/gallop; 1+ lower extremity edema; Peripheral pulses are 2+ bilaterally  ABDOMEN: Soft, Nondistended,  Nontender to palpation, normoactive bowel sounds  MUSCULOSKELETAL:  No clubbing or cyanosis of digits; no joint swelling or tenderness to palpation  PSYCH: A+O to person, place, and time; affect appropriate  NEUROLOGY: CN 2-12 are intact and symmetric; no gross sensory deficits   SKIN: +extensive RUE ecchymoses, healing, tracking down in dependent regions but overall improved especially on her back    LABS:                        8.9    8.74  )-----------( 134      ( 06 Aug 2021 07:09 )             29.8     08-06    133<L>  |  94<L>  |  15  ----------------------------<  259<H>  4.0   |  22  |  1.47<H>    Ca    9.1      06 Aug 2021 07:07  Phos  6.1     08-06  Mg     2.2     08-06      PT/INR - ( 06 Aug 2021 10:03 )   PT: 13.1 sec;   INR: 1.10 ratio         PTT - ( 06 Aug 2021 10:03 )  PTT:29.1 sec      RADIOLOGY & ADDITIONAL TESTS:  Results Reviewed: Cr stable  Imaging Personally Reviewed:  Electrocardiogram Personally Reviewed:    COORDINATION OF CARE:  Care Discussed with Consultants/Other Providers [Y]: medicine ARASELI Valentin  Prior or Outpatient Records Reviewed [Y/N]:

## 2021-08-06 NOTE — PROGRESS NOTE ADULT - ASSESSMENT
85yoF w/ PMHx significant for HFpEF, HTN, HLD, CAD, NIDDM, was tx to [facility] from [place] after being found to be in ADHF / new acute systolic heart failure, possibly tachycardia induced (new Aflutter) with new DANE, requiring CICU for inotropes / pressors / BiPAP, now off; course c/b acute hypovolemic shock secondary to blood loss anemia requiring PRBC transfusions transferred to floors now s/p L+RHC showing elevated filling pressures, 90% stenosis of mid circumflex pending staged PCI today and subsequent ALANNA/DCCV on Monday.

## 2021-08-06 NOTE — PROGRESS NOTE ADULT - PROBLEM SELECTOR PLAN 5
- requiring PRBC transfusion during hospitalization for hypovolemic shock  - A-line access bleeding now appears stable; RUE duplex without evidence of active bleeding or pseudoaneurysm, although there is a hematoma; no e/o compartment syndrome; CTA deferred given DANE and future plan for ischemic eval  - Appreciate vascular recommendations - cleared to resume hep gtt on 8/1  - PTT goal 55-65, difficulty with her PTTs/draws and her hep gtt infusing distally near her thumb  - obtained midline in LUE for better access  - need to continue to monitor her H/H closely, currently stable in the 9s

## 2021-08-06 NOTE — PROVIDER CONTACT NOTE (CRITICAL VALUE NOTIFICATION) - ACTION/TREATMENT ORDERED:
Leo MARX aware. Heparin on hold for 1 hour. Will continue to montior and will reassess pt and heparin rate in one hour.
Per nomogram, hold heparin infusion for one hour and restart rate at 9ml/hr. RN to continue to monitor pt.
Will discontinue  Heparin drip for now, will continue to monitor

## 2021-08-06 NOTE — PROGRESS NOTE ADULT - SUBJECTIVE AND OBJECTIVE BOX
[facility]   [phone]  RENAL FOLLOW UP NOTE  --------------------------------------------------------------------------------  Chief Complaint:    24 hour events/subjective:    seen earlier   states is not sob no cough     PAST HISTORY  --------------------------------------------------------------------------------  No significant changes to PMH, PSH, FHx, SHx, unless otherwise noted    ALLERGIES & MEDICATIONS  --------------------------------------------------------------------------------  Allergies    No Known Allergies    Intolerances      Standing Inpatient Medications  artificial  tears Solution 1 Drop(s) Both EYES two times a day  aspirin enteric coated 81 milliGRAM(s) Oral daily  atorvastatin 40 milliGRAM(s) Oral at bedtime  carvedilol 12.5 milliGRAM(s) Oral every 12 hours  dextrose 50% Injectable 25 Gram(s) IV Push once  dextrose 50% Injectable 25 Gram(s) IV Push once  furosemide    Tablet 20 milliGRAM(s) Oral daily  heparin  Infusion 650 Unit(s)/Hr IV Continuous <Continuous>  hydrALAZINE 100 milliGRAM(s) Oral three times a day  insulin lispro (ADMELOG) corrective regimen sliding scale   SubCutaneous three times a day before meals  insulin lispro (ADMELOG) corrective regimen sliding scale   SubCutaneous at bedtime  isosorbide   dinitrate Tablet (ISORDIL) 30 milliGRAM(s) Oral three times a day  polyethylene glycol 3350 17 Gram(s) Oral daily  spironolactone 12.5 milliGRAM(s) Oral daily    PRN Inpatient Medications      REVIEW OF SYSTEMS  --------------------------------------------------------------------------------    Gen: denies  fevers/chills,  CVS: denies chest pain/palpitations  Resp: denies SOB/Cough  GI: Denies N/V/Abd pain  : Denies dysuria/oliguria/hematuria        VITALS/PHYSICAL EXAM  --------------------------------------------------------------------------------  T(C): 36.4 (08-06-21 @ 15:30), Max: 36.4 (08-06-21 @ 13:20)  HR: 61 (08-06-21 @ 19:01) (61 - 86)  BP: 142/62 (08-06-21 @ 19:01) (100/64 - 162/76)  RR: 18 (08-06-21 @ 19:01) (16 - 18)  SpO2: 98% (08-06-21 @ 19:01) (97% - 99%)  Wt(kg): --  Height (cm): 160 (08-06-21 @ 15:21)  Weight (kg): 59.9 (08-06-21 @ 15:21)  BMI (kg/m2): 23.4 (08-06-21 @ 15:21)  BSA (m2): 1.62 (08-06-21 @ 15:21)      08-05-21 @ 07:01  -  08-06-21 @ 07:00  --------------------------------------------------------  IN: 552 mL / OUT: 0 mL / NET: 552 mL    08-06-21 @ 07:01  -  08-06-21 @ 20:53  --------------------------------------------------------  IN: 300 mL / OUT: 0 mL / NET: 300 mL      Physical Exam:  	    Gen: Non toxic comfortable appearing   	Pulm: decrease breath sounds, no rales or ronchi or wheezing  	CV: +JVD. RRR, S1S2;  	Abd: +BS, soft, nontender/nondistended  	: No suprapubic tenderness  	UE: Warm, no cyanosis  no clubbing, RUE edema   	LE: Warm, no cyanosis  no clubbing, 1+  edema  	Neuro: alert and oriented. speech coherent      LABS/STUDIES  --------------------------------------------------------------------------------              8.9    8.74  >-----------<  134      [08-06-21 @ 07:09]              29.8     133  |  94  |  15  ----------------------------<  259      [08-06-21 @ 07:07]  4.0   |  22  |  1.47        Ca     9.1     [08-06-21 @ 07:07]      Mg     2.2     [08-06-21 @ 07:07]      Phos  6.1     [08-06-21 @ 07:07]      PT/INR: PT 13.1 , INR 1.10       [08-06-21 @ 10:03]  PTT: 29.1       [08-06-21 @ 10:03]      Creatinine Trend:  SCr 1.47 [08-06 @ 07:07]  SCr 1.47 [08-05 @ 05:27]  SCr 1.56 [08-04 @ 12:13]  SCr 1.76 [08-03 @ 06:48]  SCr 1.74 [08-02 @ 13:09]              Urinalysis - [07-24-21 @ 05:01]      Color Yellow / Appearance Slightly Turbid / SG 1.025 / pH 5.0      Gluc Negative / Ketone Trace  / Bili Negative / Urobili 4       Blood Moderate / Protein 100 / Leuk Est Small / Nitrite Negative      RBC 25-50 / WBC 11-25 / Hyaline  / Gran 0-2 / Sq Epi  / Non Sq Epi Moderate / Bacteria Moderate      TSH 2.62      [07-26-21 @ 20:44]  Lipid: chol 68, TG 48, HDL 32, LDL --      [07-25-21 @ 20:34]    Free Light Chains: kappa 1.86, lambda 2.79, ratio = 0.67      [07-28 @ 08:47]

## 2021-08-06 NOTE — CHART NOTE - NSCHARTNOTEFT_GEN_A_CORE
Nutrition Follow Up Note  Patient seen for: follow up on 3 DSU    Chart reviewed, events noted.  "DN. new HFrEF, hemorrhagic shock/RUE hematoma, staged PCI Fri, ALANNA/DCCV Mon, hep gtt, home PT"    Source: [x] Patient       [x] EMR        [x] RN        [] Family at bedside       [] Other:    -If unable to interview patient: [] Trach/Vent/BiPAP  [] Disoriented/confused/inappropriate to interview    Diet Order:   Diet, DASH/TLC:   Sodium & Cholesterol Restricted  No Concentrated Potassium (21)    - Is current order appropriate/adequate? [] Yes  [x]  No: pt with elevated phosphorous level    - PO intake meals :   [] >75%  Adequate    [] 50-75%  Fair       [x] <50%  Poor  - PO intake of supplements if pt receiving: []>75% []50% []25%   as per   []flow sheet  [x]patient  []family/aide  []PCA  []Nurse  []RD observation    flow sheet indicates % of meals    - Nutrition-related concerns: pt reports less intake than is reported on flow sheets    pt seen by SLP []Yes [x]No    GI:  Last BM ___.   Bowel Regimen? [x] Yes   [] No      Weights:   Daily Weight in k.9 (), Weight in k.1 (), Weight in k.4 (), Weight in k.2 (), Weight in k.7 (), Weight in k (), Weight in k.8 ()    Nutritionally Pertinent MEDICATIONS  (STANDING):  atorvastatin  carvedilol  dextrose 50% Injectable  dextrose 50% Injectable  furosemide    Tablet  hydrALAZINE  insulin lispro (ADMELOG) corrective regimen sliding scale  insulin lispro (ADMELOG) corrective regimen sliding scale  isosorbide   dinitrate Tablet (ISORDIL)  polyethylene glycol 3350  spironolactone    Pertinent Labs:  @ 07:07: Na 133<L>, BUN 15, Cr 1.47<H>, <H>, K+ 4.0, Phos 6.1<H>, Mg 2.2, Alk Phos --, ALT/SGPT --, AST/SGOT --, HbA1c --    A1C with Estimated Average Glucose Result: 5.6 % (21 @ 20:15)  A1C with Estimated Average Glucose Result: 5.7 % (21 @ 20:34)  A1C with Estimated Average Glucose Result: 5.9 % (21 @ 10:22)    Finger Sticks:  POCT Blood Glucose.: 137 mg/dL ( @ 08:59)  POCT Blood Glucose.: 117 mg/dL ( @ 21:46)  POCT Blood Glucose.: 129 mg/dL ( @ 18:06)  POCT Blood Glucose.: 120 mg/dL ( @ 12:43)      Pressure Injuries as per nursing documentation: none  Edema: +2 right arm, left leg, right leg    Estimated Needs:   [x] no change since previous assessment  [] recalculated:     Previous Nutrition Diagnosis:  Decreased nutrient needs (sodium).   Nutrition Diagnosis is: [x] ongoing  [] resolved [] not applicable     New Nutrition Diagnosis: [] Not applicable  decreased nutrient needs (phosphorous)  related to renal dysfunction  as evidenced by elevated phosphorous      Nutrition Care Plan:  [] In Progress  [x] Achieved  [] Not applicable    Nutrition Interventions:     Education Provided:       [x] Yes:  [] No:   pt was educated on the importance of supplements to increase calorie and protein intake in light of RD's nutritional findings [x]Yes []N/A         Recommendations:         [] Continue current diet order     [x] Change diet to: DASH, no concentrated no concentrated K+, no concentrated phosphorous      [x] add oral nutrition supplement: Ensure x 1 daily        [] Add micronutrient supplementation:      [] Continue current micronutrient supplementation:        []Discussed recommendations with provider     [] Needed to escalate to provider     [x]Placed pending verification with NP/PA      []Placed pending verification with Team      []Placed sticker (malnutrition/BMI/underweight)     []Recommend swallow evaluation     [] monitor need for diet ed reinforcement     [] Other:     Monitoring and Evaluation:   Continue to monitor nutritional intake, tolerance to diet prescription, weights, labs, skin integrity      RD remains available upon request and will follow up per protocol  Carleen Jacques MA, RD, CDN #[id number] Nutrition Follow Up Note  Patient seen for: follow up on 3 DSU    Chart reviewed, events noted.  "DN. new HFrEF, hemorrhagic shock/RUE hematoma, staged PCI Fri, ALANNA/DCCV Mon, hep gtt, home PT"    Source: [x] Patient       [x] EMR        [x] RN        [] Family at bedside       [] Other:    -If unable to interview patient: [] Trach/Vent/BiPAP  [] Disoriented/confused/inappropriate to interview    Diet Order:   Diet, DASH/TLC:   Sodium & Cholesterol Restricted  No Concentrated Potassium (21)    - Is current order appropriate/adequate? [] Yes  [x]  No: pt with elevated phosphorous level    - PO intake meals :   [] >75%  Adequate    [] 50-75%  Fair       [x] <50%  Poor  - PO intake of supplements if pt receiving: []>75% []50% []25%   as per   []flow sheet  [x]patient  []family/aide  []PCA  []Nurse  []RD observation    flow sheet indicates % of meals    - Nutrition-related concerns: pt reports less intake than is reported on flow sheets. pt requesting hot chocolate, nurse refuses due to pt needing to avoid caffeine due to cardiac dysfunction.     pt seen by SLP []Yes [x]No    GI:  Last BM ___.   Bowel Regimen? [x] Yes   [] No      Weights:   Daily Weight in k.9 (), Weight in k.1 (), Weight in k.4 (), Weight in k.2 (), Weight in k.7 (), Weight in k (), Weight in k.8 ()    Nutritionally Pertinent MEDICATIONS  (STANDING):  atorvastatin  carvedilol  dextrose 50% Injectable  dextrose 50% Injectable  furosemide    Tablet  hydrALAZINE  insulin lispro (ADMELOG) corrective regimen sliding scale  insulin lispro (ADMELOG) corrective regimen sliding scale  isosorbide   dinitrate Tablet (ISORDIL)  polyethylene glycol 3350  spironolactone    Pertinent Labs:  @ 07:07: Na 133<L>, BUN 15, Cr 1.47<H>, <H>, K+ 4.0, Phos 6.1<H>, Mg 2.2, Alk Phos --, ALT/SGPT --, AST/SGOT --, HbA1c --    A1C with Estimated Average Glucose Result: 5.6 % (21 @ 20:15)  A1C with Estimated Average Glucose Result: 5.7 % (21 @ 20:34)  A1C with Estimated Average Glucose Result: 5.9 % (21 @ 10:22)    Finger Sticks:  POCT Blood Glucose.: 137 mg/dL ( @ 08:59)  POCT Blood Glucose.: 117 mg/dL ( @ 21:46)  POCT Blood Glucose.: 129 mg/dL ( @ 18:06)  POCT Blood Glucose.: 120 mg/dL ( @ 12:43)      Pressure Injuries as per nursing documentation: none  Edema: +2 right arm, left leg, right leg    Estimated Needs:   [x] no change since previous assessment  [] recalculated:     Previous Nutrition Diagnosis:  Decreased nutrient needs (sodium).   Nutrition Diagnosis is: [x] ongoing  [] resolved [] not applicable     New Nutrition Diagnosis: [] Not applicable  decreased nutrient needs (phosphorous)  related to renal dysfunction  as evidenced by elevated phosphorous      Nutrition Care Plan:  [] In Progress  [x] Achieved  [] Not applicable    Nutrition Interventions:     Education Provided:       [x] Yes:  [] No:   pt was educated on the importance of supplements to increase calorie and protein intake in light of RD's nutritional findings [x]Yes []N/A         Recommendations:         [] Continue current diet order     [x] Change diet to: DASH, no concentrated no concentrated K+, no concentrated phosphorous      [x] add oral nutrition supplement: Ensure x 1 daily        [] Add micronutrient supplementation:      [] Continue current micronutrient supplementation:        []Discussed recommendations with provider     [] Needed to escalate to provider     [x]Placed pending verification with NP/PA      []Placed pending verification with Team      []Placed sticker (malnutrition/BMI/underweight)     []Recommend swallow evaluation     [] monitor need for diet ed reinforcement     [] Other:     Monitoring and Evaluation:   Continue to monitor nutritional intake, tolerance to diet prescription, weights, labs, skin integrity      RD remains available upon request and will follow up per protocol  Carleen Jacques MA, RD, CDN #[id number] Nutrition Follow Up Note  Patient seen for: follow up on 3 DSU    Chart reviewed, events noted.  "DN. new HFrEF, hemorrhagic shock/RUE hematoma, staged PCI Fri, ALANNA/DCCV Mon, hep gtt, home PT"    Source: [x] Patient       [x] EMR        [x] RN        [] Family at bedside       [] Other:    -If unable to interview patient: [] Trach/Vent/BiPAP  [] Disoriented/confused/inappropriate to interview    Diet Order:   Diet, DASH/TLC:   Sodium & Cholesterol Restricted  No Concentrated Potassium (21)    - Is current order appropriate/adequate? [] Yes  [x]  No: pt with elevated phosphorous level    - PO intake meals :   [] >75%  Adequate    [] 50-75%  Fair       [x] <50%  Poor  - PO intake of supplements if pt receiving: []>75% []50% []25%   as per   []flow sheet  [x]patient  []family/aide  []PCA  []Nurse  []RD observation    flow sheet indicates % of meals    - Nutrition-related concerns: pt reports less intake than is reported on flow sheets. pt requesting hot chocolate, nurse refuses due to pt needing to avoid caffeine due to cardiac dysfunction. pt requesting juice in the morning-RD discussed no orange juice due to need for no concentrated no concentrated K+,   RD offered apple juice along with ensure clear-pt agreed. pt also requesting skim milk for breakfast, RD placed in CBORD. pt refusing education.     pt seen by SLP []Yes [x]No    GI:  Last BM ___.   Bowel Regimen? [x] Yes   [] No      Weights:   Daily Weight in k.9 (), Weight in k.1 (), Weight in k.4 (), Weight in k.2 (), Weight in k.7 (), Weight in k (), Weight in k.8 ()    Nutritionally Pertinent MEDICATIONS  (STANDING):  atorvastatin  carvedilol  dextrose 50% Injectable  dextrose 50% Injectable  furosemide    Tablet  hydrALAZINE  insulin lispro (ADMELOG) corrective regimen sliding scale  insulin lispro (ADMELOG) corrective regimen sliding scale  isosorbide   dinitrate Tablet (ISORDIL)  polyethylene glycol 3350  spironolactone    Pertinent Labs:  @ 07:07: Na 133<L>, BUN 15, Cr 1.47<H>, <H>, K+ 4.0, Phos 6.1<H>, Mg 2.2, Alk Phos --, ALT/SGPT --, AST/SGOT --, HbA1c --    A1C with Estimated Average Glucose Result: 5.6 % (21 @ 20:15)  A1C with Estimated Average Glucose Result: 5.7 % (21 @ 20:34)  A1C with Estimated Average Glucose Result: 5.9 % (21 @ 10:22)    Finger Sticks:  POCT Blood Glucose.: 137 mg/dL ( @ 08:59)  POCT Blood Glucose.: 117 mg/dL ( @ 21:46)  POCT Blood Glucose.: 129 mg/dL ( @ 18:06)  POCT Blood Glucose.: 120 mg/dL ( @ 12:43)      Pressure Injuries as per nursing documentation: none  Edema: +2 right arm, left leg, right leg    Estimated Needs:   [x] no change since previous assessment  [] recalculated:     Previous Nutrition Diagnosis:  Decreased nutrient needs (sodium).   Nutrition Diagnosis is: [x] ongoing  [] resolved [] not applicable     New Nutrition Diagnosis: [] Not applicable  decreased nutrient needs (phosphorous)  related to renal dysfunction  as evidenced by elevated phosphorous      Nutrition Care Plan:  [] In Progress  [x] Achieved  [] Not applicable    Nutrition Interventions:     Education Provided:       [x] Yes:  [] No:   pt was educated on the importance of supplements to increase calorie and protein intake in light of RD's nutritional findings [x]Yes []N/A         Recommendations:         [] Continue current diet order     [x] Change diet to: DASH, no concentrated no concentrated K+, no concentrated phosphorous      [x] add oral nutrition supplement: Ensure x 1 daily        [] Add micronutrient supplementation:      [] Continue current micronutrient supplementation:        []Discussed recommendations with provider     [] Needed to escalate to provider     [x]Placed pending verification with NP/PA      []Placed pending verification with Team      []Placed sticker (malnutrition/BMI/underweight)     []Recommend swallow evaluation     [] monitor need for diet ed reinforcement     [] Other:     Monitoring and Evaluation:   Continue to monitor nutritional intake, tolerance to diet prescription, weights, labs, skin integrity      RD remains available upon request and will follow up per protocol  Carleen Jacques MA, RD, CDN #[id number]

## 2021-08-06 NOTE — PROGRESS NOTE ADULT - PROBLEM SELECTOR PLAN 2
s/p L+RHC showing elevated filling pressures, 90% stenosis of mid circumflex   - planned for staged PCI of mid circumflex today  - c/w ASA 81mg daily  - c/w statin  - c/w carvedilol as above  - home ARB on hold in setting of DANE on CKD s/p L+RHC showing elevated filling pressures, 90% stenosis of mid circumflex   - s/p staged PCI of left circumflex with LARISSA x 3 placed  - c/w ASA 81mg daily + Plavix 75mg daily  - c/w statin  - c/w carvedilol as above  - home ARB on hold in setting of DANE on CKD

## 2021-08-06 NOTE — PROVIDER CONTACT NOTE (CRITICAL VALUE NOTIFICATION) - ASSESSMENT
no bleeding noted, Pt is for midline insertion in am
Pt is AOx4, verbalizes needs. Ambulatory. .9, Hep infusion pt specific and pt shows no s/s of bleeding.
Pt is A&ox4. VSS. No c/o pain, no s/s of bleeding. Pt is stable.

## 2021-08-06 NOTE — PROGRESS NOTE ADULT - ASSESSMENT
85 F w/ PMH HFpEF, HTN, HLD, CAD, C2D, DM transferred to [facility] from OSH. pt was admitted. found to in chf/cardiogenic shoc.  with acute hypovolemic shock secondary to anemia requiring blood transfusions.   pt also had NEMESIO being considered for coronary angiogram in am. acute hypovolemic shock secondary to anemia requiring blood transfusions.    1- NEMESIO   2- CHF  3- proteinuria   4- DM     Nemesio in setting of cardiogenic shock likely   creatinine improving.   s/p coronary angio with 200 cc iv contrast   started on lasix 20 mg daily/and aldactone 12.5 mg daily has received high doses of contrast hold diuretics for next 48 hour unless situation changes   continue hydralazine 100 mg tid   anemia, trend hgb  monitor creatinine and electrolytes  heart failure team following  discussed with NP

## 2021-08-06 NOTE — PROGRESS NOTE ADULT - NSPROGADDITIONALINFOA_GEN_ALL_CORE
.  Cathleen Pisano MD  Division of Hospital Medicine  [facility]   Pager: [phone]    Plan discussed with patient and medicine ARASELI San.

## 2021-08-06 NOTE — PROGRESS NOTE ADULT - PROBLEM SELECTOR PLAN 1
newly diagnosed HFrEF (had previous hx of HFpEF)  - continue to optimize GDMT as tolerated  - HF following, recs greatly appreciated  - continue to optimize GDMT as tolerated  - c/w carvedilol 12.5mg q12h, hydralazine 100mg TID, isordil 30mg TID  - c/w lasix 20mg PO daily and spironolactone 12.5mg PO daily  - NM amyloid scan not suggestive of cardiac amyloidosis  - s/p L+RHC showing elevated filling pressures, 90% stenosis of mid circumflex

## 2021-08-07 LAB
ANION GAP SERPL CALC-SCNC: 15 MMOL/L — SIGNIFICANT CHANGE UP (ref 5–17)
APTT BLD: 44.2 SEC — HIGH (ref 27.5–35.5)
APTT BLD: 75.4 SEC — HIGH (ref 27.5–35.5)
BUN SERPL-MCNC: 15 MG/DL — SIGNIFICANT CHANGE UP (ref 7–23)
CALCIUM SERPL-MCNC: 8.9 MG/DL — SIGNIFICANT CHANGE UP (ref 8.4–10.5)
CHLORIDE SERPL-SCNC: 98 MMOL/L — SIGNIFICANT CHANGE UP (ref 96–108)
CO2 SERPL-SCNC: 20 MMOL/L — LOW (ref 22–31)
CREAT SERPL-MCNC: 1.48 MG/DL — HIGH (ref 0.5–1.3)
GLUCOSE BLDC GLUCOMTR-MCNC: 116 MG/DL — HIGH (ref 70–99)
GLUCOSE BLDC GLUCOMTR-MCNC: 120 MG/DL — HIGH (ref 70–99)
GLUCOSE BLDC GLUCOMTR-MCNC: 130 MG/DL — HIGH (ref 70–99)
GLUCOSE BLDC GLUCOMTR-MCNC: 169 MG/DL — HIGH (ref 70–99)
GLUCOSE SERPL-MCNC: 166 MG/DL — HIGH (ref 70–99)
HCT VFR BLD CALC: 31.9 % — LOW (ref 34.5–45)
HGB BLD-MCNC: 9.9 G/DL — LOW (ref 11.5–15.5)
MAGNESIUM SERPL-MCNC: 1.8 MG/DL — SIGNIFICANT CHANGE UP (ref 1.6–2.6)
MCHC RBC-ENTMCNC: 30 PG — SIGNIFICANT CHANGE UP (ref 27–34)
MCHC RBC-ENTMCNC: 31 GM/DL — LOW (ref 32–36)
MCV RBC AUTO: 96.7 FL — SIGNIFICANT CHANGE UP (ref 80–100)
NRBC # BLD: 0 /100 WBCS — SIGNIFICANT CHANGE UP (ref 0–0)
PLATELET # BLD AUTO: 143 K/UL — LOW (ref 150–400)
POTASSIUM SERPL-MCNC: 4.6 MMOL/L — SIGNIFICANT CHANGE UP (ref 3.5–5.3)
POTASSIUM SERPL-SCNC: 4.6 MMOL/L — SIGNIFICANT CHANGE UP (ref 3.5–5.3)
RBC # BLD: 3.3 M/UL — LOW (ref 3.8–5.2)
RBC # FLD: 20.1 % — HIGH (ref 10.3–14.5)
SODIUM SERPL-SCNC: 133 MMOL/L — LOW (ref 135–145)
WBC # BLD: 8.38 K/UL — SIGNIFICANT CHANGE UP (ref 3.8–10.5)
WBC # FLD AUTO: 8.38 K/UL — SIGNIFICANT CHANGE UP (ref 3.8–10.5)

## 2021-08-07 PROCEDURE — 99232 SBSQ HOSP IP/OBS MODERATE 35: CPT

## 2021-08-07 RX ORDER — HEPARIN SODIUM 5000 [USP'U]/ML
600 INJECTION INTRAVENOUS; SUBCUTANEOUS
Qty: 25000 | Refills: 0 | Status: DISCONTINUED | OUTPATIENT
Start: 2021-08-07 | End: 2021-08-08

## 2021-08-07 RX ORDER — HEPARIN SODIUM 5000 [USP'U]/ML
500 INJECTION INTRAVENOUS; SUBCUTANEOUS
Qty: 25000 | Refills: 0 | Status: DISCONTINUED | OUTPATIENT
Start: 2021-08-07 | End: 2021-08-07

## 2021-08-07 RX ORDER — MAGNESIUM SULFATE 500 MG/ML
2 VIAL (ML) INJECTION ONCE
Refills: 0 | Status: COMPLETED | OUTPATIENT
Start: 2021-08-07 | End: 2021-08-07

## 2021-08-07 RX ADMIN — HEPARIN SODIUM 5 UNIT(S)/HR: 5000 INJECTION INTRAVENOUS; SUBCUTANEOUS at 07:01

## 2021-08-07 RX ADMIN — CARVEDILOL PHOSPHATE 12.5 MILLIGRAM(S): 80 CAPSULE, EXTENDED RELEASE ORAL at 06:09

## 2021-08-07 RX ADMIN — HEPARIN SODIUM 6 UNIT(S)/HR: 5000 INJECTION INTRAVENOUS; SUBCUTANEOUS at 15:12

## 2021-08-07 RX ADMIN — ISOSORBIDE DINITRATE 30 MILLIGRAM(S): 5 TABLET ORAL at 12:58

## 2021-08-07 RX ADMIN — HEPARIN SODIUM 6 UNIT(S)/HR: 5000 INJECTION INTRAVENOUS; SUBCUTANEOUS at 23:56

## 2021-08-07 RX ADMIN — Medication 1 DROP(S): at 06:08

## 2021-08-07 RX ADMIN — Medication 1 DROP(S): at 18:04

## 2021-08-07 RX ADMIN — CLOPIDOGREL BISULFATE 75 MILLIGRAM(S): 75 TABLET, FILM COATED ORAL at 12:58

## 2021-08-07 RX ADMIN — ATORVASTATIN CALCIUM 40 MILLIGRAM(S): 80 TABLET, FILM COATED ORAL at 22:27

## 2021-08-07 RX ADMIN — ISOSORBIDE DINITRATE 30 MILLIGRAM(S): 5 TABLET ORAL at 06:08

## 2021-08-07 RX ADMIN — ISOSORBIDE DINITRATE 30 MILLIGRAM(S): 5 TABLET ORAL at 18:05

## 2021-08-07 RX ADMIN — Medication 100 MILLIGRAM(S): at 15:13

## 2021-08-07 RX ADMIN — CARVEDILOL PHOSPHATE 12.5 MILLIGRAM(S): 80 CAPSULE, EXTENDED RELEASE ORAL at 18:05

## 2021-08-07 RX ADMIN — Medication 81 MILLIGRAM(S): at 12:58

## 2021-08-07 RX ADMIN — Medication 50 GRAM(S): at 18:05

## 2021-08-07 RX ADMIN — Medication 100 MILLIGRAM(S): at 06:08

## 2021-08-07 RX ADMIN — Medication 100 MILLIGRAM(S): at 22:26

## 2021-08-07 NOTE — PROGRESS NOTE ADULT - SUBJECTIVE AND OBJECTIVE BOX
[facility] Division of Hospital Medicine  Cathleen Pisano MD  Pager (M-F, 2C-9F): [phone]  Other Times:  [phone]      Patient is a 85y old  Female who presents with a chief complaint of Transfer for Cardiogenic Shock (06 Aug 2021 20:52)      SUBJECTIVE / OVERNIGHT EVENTS: no acute events overnight. feels well overall. no fever, chills, chest pain, nor dyspnea. tired today.  ADDITIONAL REVIEW OF SYSTEMS:    MEDICATIONS  (STANDING):  artificial  tears Solution 1 Drop(s) Both EYES two times a day  aspirin enteric coated 81 milliGRAM(s) Oral daily  atorvastatin 40 milliGRAM(s) Oral at bedtime  carvedilol 12.5 milliGRAM(s) Oral every 12 hours  clopidogrel Tablet 75 milliGRAM(s) Oral daily  dextrose 50% Injectable 25 Gram(s) IV Push once  dextrose 50% Injectable 25 Gram(s) IV Push once  heparin  Infusion 600 Unit(s)/Hr (6 mL/Hr) IV Continuous <Continuous>  hydrALAZINE 100 milliGRAM(s) Oral three times a day  insulin lispro (ADMELOG) corrective regimen sliding scale   SubCutaneous three times a day before meals  insulin lispro (ADMELOG) corrective regimen sliding scale   SubCutaneous at bedtime  isosorbide   dinitrate Tablet (ISORDIL) 30 milliGRAM(s) Oral three times a day  magnesium sulfate  IVPB 2 Gram(s) IV Intermittent once  polyethylene glycol 3350 17 Gram(s) Oral daily    MEDICATIONS  (PRN):      CAPILLARY BLOOD GLUCOSE      POCT Blood Glucose.: 120 mg/dL (07 Aug 2021 12:49)  POCT Blood Glucose.: 116 mg/dL (07 Aug 2021 09:04)  POCT Blood Glucose.: 121 mg/dL (06 Aug 2021 22:15)  POCT Blood Glucose.: 116 mg/dL (06 Aug 2021 18:51)    I&O's Summary    06 Aug 2021 07:01  -  07 Aug 2021 07:00  --------------------------------------------------------  IN: 300 mL / OUT: 300 mL / NET: 0 mL    07 Aug 2021 07:01  -  07 Aug 2021 15:23  --------------------------------------------------------  IN: 120 mL / OUT: 300 mL / NET: -180 mL        PHYSICAL EXAM:  Vital Signs Last 24 Hrs  T(C): 36.3 (07 Aug 2021 11:15), Max: 36.9 (06 Aug 2021 21:15)  T(F): 97.4 (07 Aug 2021 11:15), Max: 98.5 (06 Aug 2021 21:15)  HR: 80 (07 Aug 2021 11:15) (58 - 119)  BP: 154/76 (07 Aug 2021 11:15) (102/63 - 178/89)  BP(mean): --  RR: 17 (07 Aug 2021 11:15) (16 - 18)  SpO2: 99% (07 Aug 2021 11:15) (97% - 100%)    CONSTITUTIONAL: NAD, well-developed, well-groomed  EYES: PERRLA; conjunctiva and sclera clear  ENMT: Moist oral mucosa, no pharyngeal injection or exudates; normal dentition  NECK: Supple, no palpable masses; no thyromegaly, +JVD  RESPIRATORY: Normal respiratory effort; lungs are clear to auscultation bilaterally  CARDIOVASCULAR: irregularly irregular, normal S1 and S2, no murmur/rub/gallop; 1+ lower extremity edema; Peripheral pulses are 2+ bilaterally  ABDOMEN: Soft, Nondistended,  Nontender to palpation, normoactive bowel sounds  MUSCULOSKELETAL:  No clubbing or cyanosis of digits; no joint swelling or tenderness to palpation  PSYCH: A+O to person, place, and time; affect appropriate  NEUROLOGY: CN 2-12 are intact and symmetric; no gross sensory deficits   SKIN: +RUE ecchymoses improved/healing, tracking down in dependent regions but overall improved especially on her back, +R groin site c/d/i    LABS:                        9.9    8.38  )-----------( 143      ( 07 Aug 2021 14:42 )             31.9     08-07    133<L>  |  98  |  15  ----------------------------<  166<H>  4.6   |  20<L>  |  1.48<H>    Ca    8.9      07 Aug 2021 10:37  Phos  6.1     08-06  Mg     1.8     08-07      PT/INR - ( 06 Aug 2021 10:03 )   PT: 13.1 sec;   INR: 1.10 ratio         PTT - ( 07 Aug 2021 14:42 )  PTT:44.2 sec      RADIOLOGY & ADDITIONAL TESTS:  Results Reviewed: no leukocytosis, H/H stable, Cr stable, hypomagnesemia - repleted  Imaging Personally Reviewed:  Electrocardiogram Personally Reviewed:    COORDINATION OF CARE:  Care Discussed with Consultants/Other Providers [Y]: medicine ARASELI Valentin  Prior or Outpatient Records Reviewed [Y/N]:

## 2021-08-07 NOTE — CHART NOTE - NSCHARTNOTEFT_GEN_A_CORE
Removal of Femoral Sheath    Pulses in the right lower extremity are palpable & audible by doppler. The patient was placed in the supine position. The insertion site was identified and the sutures were removed per protocol.  The 7 Yoruba femoral sheath was then removed. Direct pressure was applied for  25 minutes.     Monitoring of the right groin and both lower extremities including neuro-vascular checks and vital signs every 15 minutes x 4, then every 30 minutes x 2, then every 1 hour was ordered.    Complications: None    Comments: Patient tolerated procedure well. Was hypertensive prior to procedure requiring Hydral IVP with good response (SBP 170s -> SBP 140s)

## 2021-08-07 NOTE — CHART NOTE - NSCHARTNOTEFT_GEN_A_CORE
Cardiac Cath Site Assessment Note     HPI:85yoF w/ PMHx significant for HFpEF, HTN, HLD, CAD, NIDDM, was tx to [facility] from [place] after being found to be in ADHF / new acute systolic heart failure, possibly tachycardia induced (new Aflutter) with new DANE, requiring CICU for inotropes / pressors / BiPAP, now off; course c/b acute hypovolemic shock secondary to blood loss anemia requiring PRBC transfusions transferred to floors now s/p L+RHC showing elevated filling pressures, 90% stenosis of mid circumflex pending staged PCI today and subsequent ALANNA/DCCV on Monday.    < from: Cardiac Cath Lab (08.06.21 @ 16:10) >    INTERVENTIONAL IMPRESSIONS: Status post angioplasty, IVUS and stenting  (drug-eluting/Synergy) of the left circumflex artery with resultant DENNIS 3  INTERVENTIONAL RECOMMENDATIONS: Keep right leg straight for 4 hours  following removal of sheaths  Continue aggressive medical management of coronary artery disease and  associated risk factors  Continue aspirin 81mg daily  Continue clopidogrel 75mg daily  Continue aggressive medical management of coronary artery disease and  associated factors  Findings discussed with Dr. Noble  Prepared and signed by  Joseph Loya MD    < end of copied text >      ECG: w/o significant ST or T wave changes   tele:  Afib 60-70 bpm with no over night events   denies chest pain       ( right) groin w/o bleeding or hematoma.  soft, nont tender.  Pulses in the (right) lower extremity are (palpable).   Denies chest pain, denies groin/leg/foot: pain, numbness or tingling       cont DAPT   all other care as per primary team

## 2021-08-07 NOTE — PROGRESS NOTE ADULT - PROBLEM SELECTOR PLAN 4
- in setting of CKD 2 as reported prior to this hospitalization  - etiology likely related to presenting cardiogenic shock and hypervolemic shock d/t blood loss  - Cr continues to improve now stable in 1.48 range  - continue to closely monitor along w/ UOP and electrolytes  - home ARB on hold  - Nephrology following, recs appreciated  - renally dose medications to GFR, avoid nephrotoxic agents

## 2021-08-07 NOTE — PROGRESS NOTE ADULT - NSPROGADDITIONALINFOA_GEN_ALL_CORE
.  Cathleen Pisano MD  Division of Hospital Medicine  [facility]   Pager: [phone]    Plan discussed with patient, arnaldo Caraballo via phone, Nephrology attending Dr. Teresa, and medicine NP Barbie.

## 2021-08-07 NOTE — PROGRESS NOTE ADULT - PROBLEM SELECTOR PLAN 2
s/p L+RHC showing elevated filling pressures, 90% stenosis of mid circumflex   - s/p staged PCI of left circumflex with LARISSA x 3 placed  - c/w ASA 81mg daily + Plavix 75mg daily  - c/w statin  - c/w carvedilol as above  - home ARB on hold in setting of DANE on CKD

## 2021-08-07 NOTE — PROGRESS NOTE ADULT - PROBLEM SELECTOR PLAN 1
newly diagnosed HFrEF (had previous hx of HFpEF)  - continue to optimize GDMT as tolerated  - HF following, recs greatly appreciated  - continue to optimize GDMT as tolerated  - c/w carvedilol 12.5mg q12h, hydralazine 100mg TID, isordil 30mg TID  - holding lasix 20mg PO daily and spironolactone 12.5mg PO daily as received 200cc IV contrast for LHC  - NM amyloid scan not suggestive of cardiac amyloidosis  - s/p L+RHC showing elevated filling pressures, 90% stenosis of mid circumflex

## 2021-08-08 LAB
ANION GAP SERPL CALC-SCNC: 12 MMOL/L — SIGNIFICANT CHANGE UP (ref 5–17)
APTT BLD: 77.2 SEC — HIGH (ref 27.5–35.5)
BUN SERPL-MCNC: 18 MG/DL — SIGNIFICANT CHANGE UP (ref 7–23)
CALCIUM SERPL-MCNC: 8.6 MG/DL — SIGNIFICANT CHANGE UP (ref 8.4–10.5)
CHLORIDE SERPL-SCNC: 98 MMOL/L — SIGNIFICANT CHANGE UP (ref 96–108)
CO2 SERPL-SCNC: 23 MMOL/L — SIGNIFICANT CHANGE UP (ref 22–31)
CREAT SERPL-MCNC: 1.68 MG/DL — HIGH (ref 0.5–1.3)
GLUCOSE BLDC GLUCOMTR-MCNC: 115 MG/DL — HIGH (ref 70–99)
GLUCOSE BLDC GLUCOMTR-MCNC: 118 MG/DL — HIGH (ref 70–99)
GLUCOSE BLDC GLUCOMTR-MCNC: 136 MG/DL — HIGH (ref 70–99)
GLUCOSE BLDC GLUCOMTR-MCNC: 136 MG/DL — HIGH (ref 70–99)
GLUCOSE SERPL-MCNC: 98 MG/DL — SIGNIFICANT CHANGE UP (ref 70–99)
HCT VFR BLD CALC: 28.3 % — LOW (ref 34.5–45)
HGB BLD-MCNC: 8.8 G/DL — LOW (ref 11.5–15.5)
MCHC RBC-ENTMCNC: 29.8 PG — SIGNIFICANT CHANGE UP (ref 27–34)
MCHC RBC-ENTMCNC: 31.1 GM/DL — LOW (ref 32–36)
MCV RBC AUTO: 95.9 FL — SIGNIFICANT CHANGE UP (ref 80–100)
NRBC # BLD: 0 /100 WBCS — SIGNIFICANT CHANGE UP (ref 0–0)
PLATELET # BLD AUTO: 127 K/UL — LOW (ref 150–400)
POTASSIUM SERPL-MCNC: 3.8 MMOL/L — SIGNIFICANT CHANGE UP (ref 3.5–5.3)
POTASSIUM SERPL-SCNC: 3.8 MMOL/L — SIGNIFICANT CHANGE UP (ref 3.5–5.3)
RBC # BLD: 2.95 M/UL — LOW (ref 3.8–5.2)
RBC # FLD: 19.9 % — HIGH (ref 10.3–14.5)
SODIUM SERPL-SCNC: 133 MMOL/L — LOW (ref 135–145)
WBC # BLD: 6.71 K/UL — SIGNIFICANT CHANGE UP (ref 3.8–10.5)
WBC # FLD AUTO: 6.71 K/UL — SIGNIFICANT CHANGE UP (ref 3.8–10.5)

## 2021-08-08 PROCEDURE — 99232 SBSQ HOSP IP/OBS MODERATE 35: CPT

## 2021-08-08 RX ORDER — ISOSORBIDE DINITRATE 5 MG/1
10 TABLET ORAL ONCE
Refills: 0 | Status: COMPLETED | OUTPATIENT
Start: 2021-08-08 | End: 2021-08-08

## 2021-08-08 RX ORDER — ISOSORBIDE DINITRATE 5 MG/1
40 TABLET ORAL THREE TIMES A DAY
Refills: 0 | Status: DISCONTINUED | OUTPATIENT
Start: 2021-08-08 | End: 2021-08-14

## 2021-08-08 RX ORDER — POTASSIUM CHLORIDE 20 MEQ
20 PACKET (EA) ORAL ONCE
Refills: 0 | Status: COMPLETED | OUTPATIENT
Start: 2021-08-08 | End: 2021-08-08

## 2021-08-08 RX ORDER — HEPARIN SODIUM 5000 [USP'U]/ML
500 INJECTION INTRAVENOUS; SUBCUTANEOUS
Qty: 25000 | Refills: 0 | Status: DISCONTINUED | OUTPATIENT
Start: 2021-08-08 | End: 2021-08-10

## 2021-08-08 RX ORDER — ISOSORBIDE DINITRATE 5 MG/1
40 TABLET ORAL THREE TIMES A DAY
Refills: 0 | Status: DISCONTINUED | OUTPATIENT
Start: 2021-08-08 | End: 2021-08-08

## 2021-08-08 RX ORDER — HYDRALAZINE HCL 50 MG
100 TABLET ORAL
Refills: 0 | Status: DISCONTINUED | OUTPATIENT
Start: 2021-08-08 | End: 2021-08-14

## 2021-08-08 RX ORDER — CARVEDILOL PHOSPHATE 80 MG/1
12.5 CAPSULE, EXTENDED RELEASE ORAL
Refills: 0 | Status: DISCONTINUED | OUTPATIENT
Start: 2021-08-08 | End: 2021-08-10

## 2021-08-08 RX ADMIN — CARVEDILOL PHOSPHATE 12.5 MILLIGRAM(S): 80 CAPSULE, EXTENDED RELEASE ORAL at 21:22

## 2021-08-08 RX ADMIN — Medication 1 DROP(S): at 06:37

## 2021-08-08 RX ADMIN — ISOSORBIDE DINITRATE 30 MILLIGRAM(S): 5 TABLET ORAL at 13:18

## 2021-08-08 RX ADMIN — Medication 100 MILLIGRAM(S): at 06:37

## 2021-08-08 RX ADMIN — Medication 100 MILLIGRAM(S): at 23:15

## 2021-08-08 RX ADMIN — CLOPIDOGREL BISULFATE 75 MILLIGRAM(S): 75 TABLET, FILM COATED ORAL at 08:42

## 2021-08-08 RX ADMIN — CARVEDILOL PHOSPHATE 12.5 MILLIGRAM(S): 80 CAPSULE, EXTENDED RELEASE ORAL at 06:37

## 2021-08-08 RX ADMIN — Medication 100 MILLIGRAM(S): at 13:18

## 2021-08-08 RX ADMIN — Medication 81 MILLIGRAM(S): at 08:42

## 2021-08-08 RX ADMIN — HEPARIN SODIUM 5 UNIT(S)/HR: 5000 INJECTION INTRAVENOUS; SUBCUTANEOUS at 00:09

## 2021-08-08 RX ADMIN — POLYETHYLENE GLYCOL 3350 17 GRAM(S): 17 POWDER, FOR SOLUTION ORAL at 21:22

## 2021-08-08 RX ADMIN — Medication 20 MILLIEQUIVALENT(S): at 08:42

## 2021-08-08 RX ADMIN — ISOSORBIDE DINITRATE 30 MILLIGRAM(S): 5 TABLET ORAL at 06:37

## 2021-08-08 RX ADMIN — ISOSORBIDE DINITRATE 10 MILLIGRAM(S): 5 TABLET ORAL at 14:32

## 2021-08-08 RX ADMIN — ISOSORBIDE DINITRATE 40 MILLIGRAM(S): 5 TABLET ORAL at 21:21

## 2021-08-08 RX ADMIN — HEPARIN SODIUM 5 UNIT(S)/HR: 5000 INJECTION INTRAVENOUS; SUBCUTANEOUS at 14:27

## 2021-08-08 RX ADMIN — ATORVASTATIN CALCIUM 40 MILLIGRAM(S): 80 TABLET, FILM COATED ORAL at 21:21

## 2021-08-08 RX ADMIN — Medication 1 DROP(S): at 17:41

## 2021-08-08 NOTE — PROGRESS NOTE ADULT - PROBLEM SELECTOR PLAN 5
Quantity Per Injection Site (Units): 9 - requiring PRBC transfusion during hospitalization for hypovolemic shock  - A-line access bleeding now appears stable; RUE duplex without evidence of active bleeding or pseudoaneurysm, although there is a hematoma; no e/o compartment syndrome; CTA deferred given DANE and future plan for ischemic eval  - Appreciate vascular recommendations - cleared to resume hep gtt on 8/1  - PTT goal 55-65, difficulty with her PTTs/draws and her hep gtt infusing distally near her thumb  - obtained midline in LUE for better access  - need to continue to monitor her H/H closely, currently stable in the 9s

## 2021-08-08 NOTE — PROGRESS NOTE ADULT - ASSESSMENT
85yoF w/ PMHx significant for HFpEF, HTN, HLD, CAD, NIDDM, was tx to [facility] from [place] after being found to be in ADHF / new acute systolic heart failure, possibly tachycardia induced (new Aflutter) with new DANE, requiring CICU for inotropes / pressors / BiPAP, now off; course c/b acute hypovolemic shock secondary to blood loss anemia requiring PRBC transfusions transferred to floors now s/p L+RHC showing elevated filling pressures, 90% stenosis of mid circumflex now s/p staged PCI with LARISSA x 3 to LCx. Awaiting ALANNA/DCCV on Monday.

## 2021-08-08 NOTE — PROGRESS NOTE ADULT - NSPROGADDITIONALINFOA_GEN_ALL_CORE
.  Cathleen Pisano MD  Division of Hospital Medicine  [facility]   Pager: [phone]    Plan discussed with patient, arnaldo Caraballo via phone on 8/7, and medicine NP Barbie. .  Cathleen Pisano MD  Division of Hospital Medicine  [facility]   Pager: [phone]    Plan discussed with patient, arnaldo Caraballo via phone on 8/7, HF JOSE G Bates, and medicine NP Barbie.

## 2021-08-08 NOTE — PROGRESS NOTE ADULT - PROBLEM SELECTOR PLAN 4
- in setting of CKD 2 as reported prior to this hospitalization  - etiology likely related to presenting cardiogenic shock and hypervolemic shock d/t blood loss  - Cr was improving to 1.48 range but increased to 1.68 today likely 2/2 to contrast load on Fri for LHC    - continue to hold lasix/spironolactone  - continue to closely monitor along w/ UOP and electrolytes  - home ARB on hold  - Nephrology following, recs appreciated  - renally dose medications to GFR, avoid nephrotoxic agents

## 2021-08-08 NOTE — PROGRESS NOTE ADULT - PROBLEM SELECTOR PLAN 1
newly diagnosed HFrEF (had previous hx of HFpEF)  - continue to optimize GDMT as tolerated  - HF following, recs greatly appreciated  - continue to optimize GDMT as tolerated  - c/w carvedilol 12.5mg q12h, hydralazine 100mg TID, isordil 30mg TID      * would be cautious with uptitrating carvedilol as had 3.6s pause overnight on 8/7  - holding lasix 20mg PO daily and spironolactone 12.5mg PO daily as received 200cc IV contrast for LHC  - NM amyloid scan not suggestive of cardiac amyloidosis  - s/p L+RHC showing elevated filling pressures, 90% stenosis of mid circumflex newly diagnosed HFrEF (had previous hx of HFpEF)  - continue to optimize GDMT as tolerated  - HF following, recs greatly appreciated  - continue to optimize GDMT as tolerated  - c/w carvedilol 12.5mg q12h, hydralazine 100mg TID, increased isordil to 40mg TID      * would be cautious with uptitrating carvedilol as had 3.6s pause overnight on 8/7  - holding lasix 20mg PO daily and spironolactone 12.5mg PO daily as received 200cc IV contrast for LHC  - NM amyloid scan not suggestive of cardiac amyloidosis  - s/p L+RHC showing elevated filling pressures, 90% stenosis of mid circumflex

## 2021-08-08 NOTE — PROGRESS NOTE ADULT - SUBJECTIVE AND OBJECTIVE BOX
[facility]   [phone]  RENAL FOLLOW UP NOTE  --------------------------------------------------------------------------------  Chief Complaint:    24 hour events/subjective:    seen earlier.   states no worsening short of breath except when ambulated with PT   states urinating well     PAST HISTORY  --------------------------------------------------------------------------------  No significant changes to PMH, PSH, FHx, SHx, unless otherwise noted    ALLERGIES & MEDICATIONS  --------------------------------------------------------------------------------  Allergies    No Known Allergies    Intolerances      Standing Inpatient Medications  artificial  tears Solution 1 Drop(s) Both EYES two times a day  aspirin enteric coated 81 milliGRAM(s) Oral daily  atorvastatin 40 milliGRAM(s) Oral at bedtime  carvedilol 12.5 milliGRAM(s) Oral <User Schedule>  clopidogrel Tablet 75 milliGRAM(s) Oral daily  dextrose 50% Injectable 25 Gram(s) IV Push once  dextrose 50% Injectable 25 Gram(s) IV Push once  heparin  Infusion 500 Unit(s)/Hr IV Continuous <Continuous>  hydrALAZINE 100 milliGRAM(s) Oral <User Schedule>  insulin lispro (ADMELOG) corrective regimen sliding scale   SubCutaneous three times a day before meals  insulin lispro (ADMELOG) corrective regimen sliding scale   SubCutaneous at bedtime  isosorbide   dinitrate Tablet (ISORDIL) 40 milliGRAM(s) Oral three times a day  polyethylene glycol 3350 17 Gram(s) Oral daily    PRN Inpatient Medications      REVIEW OF SYSTEMS  --------------------------------------------------------------------------------    Gen: denies  fevers/chills,  CVS: denies chest pain/palpitations  Resp: with PT +  SOB/Cough -   GI: Denies N/V/Abd pain  : Denies dysuria/oliguria/hematuria        VITALS/PHYSICAL EXAM  --------------------------------------------------------------------------------  T(C): 36.7 (08-08-21 @ 12:35), Max: 36.7 (08-07-21 @ 20:30)  HR: 71 (08-08-21 @ 12:35) (66 - 89)  BP: 156/91 (08-08-21 @ 12:35) (127/65 - 179/76)  RR: 18 (08-08-21 @ 12:35) (18 - 18)  SpO2: 100% (08-08-21 @ 12:35) (98% - 100%)  Wt(kg): --        08-07-21 @ 07:01  -  08-08-21 @ 07:00  --------------------------------------------------------  IN: 120 mL / OUT: 300 mL / NET: -180 mL    08-08-21 @ 07:01  -  08-08-21 @ 19:27  --------------------------------------------------------  IN: 540 mL / OUT: 0 mL / NET: 540 mL      Physical Exam:  	    Gen: Non toxic comfortable appearing   	Pulm: decrease breath sounds, no rales or ronchi or wheezing  	CV: +JVD. RRR, S1S2;  	Abd: +BS, soft, nontender/nondistended  	: No suprapubic tenderness  	UE: Warm, no cyanosis  no clubbing, RUE edema   	LE: Warm, no cyanosis  no clubbing, 1+  edema  	Neuro: alert and oriented. speech coherent      LABS/STUDIES  --------------------------------------------------------------------------------              8.8    6.71  >-----------<  127      [08-08-21 @ 06:27]              28.3     133  |  98  |  18  ----------------------------<  98      [08-08-21 @ 06:27]  3.8   |  23  |  1.68        Ca     8.6     [08-08-21 @ 06:27]      Mg     1.8     [08-07-21 @ 10:37]        PTT: 77.2       [08-08-21 @ 06:27]      Creatinine Trend:  SCr 1.68 [08-08 @ 06:27]  SCr 1.48 [08-07 @ 10:37]  SCr 1.47 [08-06 @ 07:07]  SCr 1.47 [08-05 @ 05:27]  SCr 1.56 [08-04 @ 12:13]              Urinalysis - [07-24-21 @ 05:01]      Color Yellow / Appearance Slightly Turbid / SG 1.025 / pH 5.0      Gluc Negative / Ketone Trace  / Bili Negative / Urobili 4       Blood Moderate / Protein 100 / Leuk Est Small / Nitrite Negative      RBC 25-50 / WBC 11-25 / Hyaline  / Gran 0-2 / Sq Epi  / Non Sq Epi Moderate / Bacteria Moderate      TSH 2.62      [07-26-21 @ 20:44]  Lipid: chol 68, TG 48, HDL 32, LDL --      [07-25-21 @ 20:34]    Free Light Chains: kappa 1.86, lambda 2.79, ratio = 0.67      [07-28 @ 08:47]

## 2021-08-08 NOTE — PROGRESS NOTE ADULT - SUBJECTIVE AND OBJECTIVE BOX
[facility] Division of Hospital Medicine  Cathleen Pisano MD  Pager (M-F, 2O-5N): [phone]  Other Times:  [phone]      Patient is a 85y old  Female who presents with a chief complaint of Transfer for Cardiogenic Shock (07 Aug 2021 15:23)      SUBJECTIVE / OVERNIGHT EVENTS: no acute events overnight. no lightheadedness, dizziness, chest pain nor dyspnea.   ADDITIONAL REVIEW OF SYSTEMS:    MEDICATIONS  (STANDING):  artificial  tears Solution 1 Drop(s) Both EYES two times a day  aspirin enteric coated 81 milliGRAM(s) Oral daily  atorvastatin 40 milliGRAM(s) Oral at bedtime  carvedilol 12.5 milliGRAM(s) Oral every 12 hours  clopidogrel Tablet 75 milliGRAM(s) Oral daily  dextrose 50% Injectable 25 Gram(s) IV Push once  dextrose 50% Injectable 25 Gram(s) IV Push once  heparin  Infusion 600 Unit(s)/Hr (5 mL/Hr) IV Continuous <Continuous>  hydrALAZINE 100 milliGRAM(s) Oral three times a day  insulin lispro (ADMELOG) corrective regimen sliding scale   SubCutaneous three times a day before meals  insulin lispro (ADMELOG) corrective regimen sliding scale   SubCutaneous at bedtime  isosorbide   dinitrate Tablet (ISORDIL) 30 milliGRAM(s) Oral three times a day  polyethylene glycol 3350 17 Gram(s) Oral daily  potassium chloride    Tablet ER 20 milliEquivalent(s) Oral once    MEDICATIONS  (PRN):      CAPILLARY BLOOD GLUCOSE      POCT Blood Glucose.: 169 mg/dL (07 Aug 2021 21:52)  POCT Blood Glucose.: 130 mg/dL (07 Aug 2021 17:19)  POCT Blood Glucose.: 120 mg/dL (07 Aug 2021 12:49)  POCT Blood Glucose.: 116 mg/dL (07 Aug 2021 09:04)    I&O's Summary    07 Aug 2021 07:01  -  08 Aug 2021 07:00  --------------------------------------------------------  IN: 120 mL / OUT: 300 mL / NET: -180 mL        PHYSICAL EXAM:  Vital Signs Last 24 Hrs  T(C): 36.3 (08 Aug 2021 04:50), Max: 36.7 (07 Aug 2021 20:30)  T(F): 97.4 (08 Aug 2021 04:50), Max: 98 (07 Aug 2021 20:30)  HR: 66 (08 Aug 2021 04:50) (65 - 89)  BP: 179/76 (08 Aug 2021 04:50) (147/78 - 179/76)  BP(mean): --  RR: 18 (08 Aug 2021 04:50) (17 - 18)  SpO2: 100% (08 Aug 2021 04:50) (98% - 100%)    CONSTITUTIONAL: NAD, well-developed, well-groomed  EYES: PERRLA; conjunctiva and sclera clear  ENMT: Moist oral mucosa, no pharyngeal injection or exudates; normal dentition  NECK: Supple, no palpable masses; no thyromegaly, +JVD  RESPIRATORY: Normal respiratory effort; lungs are clear to auscultation bilaterally  CARDIOVASCULAR: irregularly irregular, normal S1 and S2, no murmur/rub/gallop; 1+ lower extremity edema; Peripheral pulses are 2+ bilaterally  ABDOMEN: Soft, Nondistended,  Nontender to palpation, normoactive bowel sounds  MUSCULOSKELETAL:  No clubbing or cyanosis of digits; no joint swelling or tenderness to palpation  PSYCH: A+O to person, place, and time; affect appropriate  NEUROLOGY: CN 2-12 are intact and symmetric; no gross sensory deficits   SKIN: +extensive RUE ecchymoses, healing, tracking down in dependent regions but overall improved especially on her back    LABS:                        8.8    6.71  )-----------( 127      ( 08 Aug 2021 06:27 )             28.3     08-08    133<L>  |  98  |  18  ----------------------------<  98  3.8   |  23  |  1.68<H>    Ca    8.6      08 Aug 2021 06:27  Mg     1.8     08-07      PT/INR - ( 06 Aug 2021 10:03 )   PT: 13.1 sec;   INR: 1.10 ratio         PTT - ( 08 Aug 2021 06:27 )  PTT:77.2 sec            RADIOLOGY & ADDITIONAL TESTS:  Results Reviewed:  mild hypokalemia - repleted, Cr uptrending to 1.68 today likely from contrast 2 days ago  Imaging Personally Reviewed:  Electrocardiogram Personally Reviewed:    COORDINATION OF CARE:  Care Discussed with Consultants/Other Providers [Y]: medicine ARASELI Valentin  Prior or Outpatient Records Reviewed [Y]: nephrology progress note   [facility] Division of Hospital Medicine  Cathleen Pisano MD  Pager (JUDITH-F, 0R-1V): [phone]  Other Times:  [phone]      Patient is a 85y old  Female who presents with a chief complaint of Transfer for Cardiogenic Shock (07 Aug 2021 15:23)      SUBJECTIVE / OVERNIGHT EVENTS: no acute events overnight. no lightheadedness, dizziness, chest pain nor dyspnea. upset today about still being in the hospital.  ADDITIONAL REVIEW OF SYSTEMS:    MEDICATIONS  (STANDING):  artificial  tears Solution 1 Drop(s) Both EYES two times a day  aspirin enteric coated 81 milliGRAM(s) Oral daily  atorvastatin 40 milliGRAM(s) Oral at bedtime  carvedilol 12.5 milliGRAM(s) Oral every 12 hours  clopidogrel Tablet 75 milliGRAM(s) Oral daily  dextrose 50% Injectable 25 Gram(s) IV Push once  dextrose 50% Injectable 25 Gram(s) IV Push once  heparin  Infusion 600 Unit(s)/Hr (5 mL/Hr) IV Continuous <Continuous>  hydrALAZINE 100 milliGRAM(s) Oral three times a day  insulin lispro (ADMELOG) corrective regimen sliding scale   SubCutaneous three times a day before meals  insulin lispro (ADMELOG) corrective regimen sliding scale   SubCutaneous at bedtime  isosorbide   dinitrate Tablet (ISORDIL) 30 milliGRAM(s) Oral three times a day  polyethylene glycol 3350 17 Gram(s) Oral daily  potassium chloride    Tablet ER 20 milliEquivalent(s) Oral once    MEDICATIONS  (PRN):      CAPILLARY BLOOD GLUCOSE      POCT Blood Glucose.: 169 mg/dL (07 Aug 2021 21:52)  POCT Blood Glucose.: 130 mg/dL (07 Aug 2021 17:19)  POCT Blood Glucose.: 120 mg/dL (07 Aug 2021 12:49)  POCT Blood Glucose.: 116 mg/dL (07 Aug 2021 09:04)    I&O's Summary    07 Aug 2021 07:01  -  08 Aug 2021 07:00  --------------------------------------------------------  IN: 120 mL / OUT: 300 mL / NET: -180 mL        PHYSICAL EXAM:  Vital Signs Last 24 Hrs  T(C): 36.3 (08 Aug 2021 04:50), Max: 36.7 (07 Aug 2021 20:30)  T(F): 97.4 (08 Aug 2021 04:50), Max: 98 (07 Aug 2021 20:30)  HR: 66 (08 Aug 2021 04:50) (65 - 89)  BP: 179/76 (08 Aug 2021 04:50) (147/78 - 179/76)  BP(mean): --  RR: 18 (08 Aug 2021 04:50) (17 - 18)  SpO2: 100% (08 Aug 2021 04:50) (98% - 100%)    CONSTITUTIONAL: NAD, well-developed, well-groomed  EYES: PERRLA; conjunctiva and sclera clear  ENMT: Moist oral mucosa, no pharyngeal injection or exudates; normal dentition  NECK: Supple, no palpable masses; no thyromegaly, +JVD  RESPIRATORY: Normal respiratory effort; lungs are clear to auscultation bilaterally  CARDIOVASCULAR: irregularly irregular, normal S1 and S2, no murmur/rub/gallop; 1+ lower extremity edema; Peripheral pulses are 2+ bilaterally  ABDOMEN: Soft, Nondistended,  Nontender to palpation, normoactive bowel sounds  MUSCULOSKELETAL:  No clubbing or cyanosis of digits; no joint swelling or tenderness to palpation  PSYCH: A+O to person, place, and time; affect appropriate  NEUROLOGY: CN 2-12 are intact and symmetric; no gross sensory deficits   SKIN: +extensive RUE ecchymoses, healing, tracking down in dependent regions but overall improved especially on her back    LABS:                        8.8    6.71  )-----------( 127      ( 08 Aug 2021 06:27 )             28.3     08-08    133<L>  |  98  |  18  ----------------------------<  98  3.8   |  23  |  1.68<H>    Ca    8.6      08 Aug 2021 06:27  Mg     1.8     08-07      PT/INR - ( 06 Aug 2021 10:03 )   PT: 13.1 sec;   INR: 1.10 ratio         PTT - ( 08 Aug 2021 06:27 )  PTT:77.2 sec      RADIOLOGY & ADDITIONAL TESTS:  Results Reviewed:  mild hypokalemia - repleted, Cr uptrending to 1.68 today likely from contrast 2 days ago  Imaging Personally Reviewed:  Electrocardiogram Personally Reviewed:    COORDINATION OF CARE:  Care Discussed with Consultants/Other Providers [Y]: HF JOSE G Bates, medicine NP Barbie  Prior or Outpatient Records Reviewed [Y]: nephrology progress note   [facility] Division of Hospital Medicine  Cathleen Pisano MD  Pager (JUDITH-F, 3N-0V): [phone]  Other Times:  [phone]      Patient is a 85y old  Female who presents with a chief complaint of Transfer for Cardiogenic Shock (07 Aug 2021 15:23)      SUBJECTIVE / OVERNIGHT EVENTS: no acute events overnight. no lightheadedness, dizziness, chest pain nor dyspnea. upset today about still being in the hospital.  ADDITIONAL REVIEW OF SYSTEMS:    MEDICATIONS  (STANDING):  artificial  tears Solution 1 Drop(s) Both EYES two times a day  aspirin enteric coated 81 milliGRAM(s) Oral daily  atorvastatin 40 milliGRAM(s) Oral at bedtime  carvedilol 12.5 milliGRAM(s) Oral every 12 hours  clopidogrel Tablet 75 milliGRAM(s) Oral daily  dextrose 50% Injectable 25 Gram(s) IV Push once  dextrose 50% Injectable 25 Gram(s) IV Push once  heparin  Infusion 600 Unit(s)/Hr (5 mL/Hr) IV Continuous <Continuous>  hydrALAZINE 100 milliGRAM(s) Oral three times a day  insulin lispro (ADMELOG) corrective regimen sliding scale   SubCutaneous three times a day before meals  insulin lispro (ADMELOG) corrective regimen sliding scale   SubCutaneous at bedtime  isosorbide   dinitrate Tablet (ISORDIL) 30 milliGRAM(s) Oral three times a day  polyethylene glycol 3350 17 Gram(s) Oral daily  potassium chloride    Tablet ER 20 milliEquivalent(s) Oral once    MEDICATIONS  (PRN):      CAPILLARY BLOOD GLUCOSE      POCT Blood Glucose.: 169 mg/dL (07 Aug 2021 21:52)  POCT Blood Glucose.: 130 mg/dL (07 Aug 2021 17:19)  POCT Blood Glucose.: 120 mg/dL (07 Aug 2021 12:49)  POCT Blood Glucose.: 116 mg/dL (07 Aug 2021 09:04)    I&O's Summary    07 Aug 2021 07:01  -  08 Aug 2021 07:00  --------------------------------------------------------  IN: 120 mL / OUT: 300 mL / NET: -180 mL        PHYSICAL EXAM:  Vital Signs Last 24 Hrs  T(C): 36.3 (08 Aug 2021 04:50), Max: 36.7 (07 Aug 2021 20:30)  T(F): 97.4 (08 Aug 2021 04:50), Max: 98 (07 Aug 2021 20:30)  HR: 66 (08 Aug 2021 04:50) (65 - 89)  BP: 179/76 (08 Aug 2021 04:50) (147/78 - 179/76)  BP(mean): --  RR: 18 (08 Aug 2021 04:50) (17 - 18)  SpO2: 100% (08 Aug 2021 04:50) (98% - 100%)    CONSTITUTIONAL: NAD, well-developed, well-groomed  EYES: PERRLA; conjunctiva and sclera clear  ENMT: Moist oral mucosa, no pharyngeal injection or exudates; normal dentition  NECK: Supple, no palpable masses; no thyromegaly, +JVD  RESPIRATORY: Normal respiratory effort; lungs are clear to auscultation bilaterally  CARDIOVASCULAR: irregularly irregular, normal S1 and S2, no murmur/rub/gallop; 1+ lower extremity edema; Peripheral pulses are 2+ bilaterally  ABDOMEN: Soft, Nondistended,  Nontender to palpation, normoactive bowel sounds  MUSCULOSKELETAL:  No clubbing or cyanosis of digits; no joint swelling or tenderness to palpation  PSYCH: A+O to person, place, and time; affect appropriate  NEUROLOGY: CN 2-12 are intact and symmetric; no gross sensory deficits   SKIN: +R groin site c/d/i +RUE ecchymoses healing and much improved. +ecchymoses on low back improved     LABS:                        8.8    6.71  )-----------( 127      ( 08 Aug 2021 06:27 )             28.3     08-08    133<L>  |  98  |  18  ----------------------------<  98  3.8   |  23  |  1.68<H>    Ca    8.6      08 Aug 2021 06:27  Mg     1.8     08-07      PT/INR - ( 06 Aug 2021 10:03 )   PT: 13.1 sec;   INR: 1.10 ratio         PTT - ( 08 Aug 2021 06:27 )  PTT:77.2 sec      RADIOLOGY & ADDITIONAL TESTS:  Results Reviewed:  mild hypokalemia - repleted, Cr uptrending to 1.68 today likely from contrast 2 days ago  Imaging Personally Reviewed:  Electrocardiogram Personally Reviewed:    COORDINATION OF CARE:  Care Discussed with Consultants/Other Providers [Y]: HF JOSE G Bates, medicine NP Barbie  Prior or Outpatient Records Reviewed [Y]: nephrology progress note

## 2021-08-08 NOTE — PROGRESS NOTE ADULT - ASSESSMENT
85 F w/ PMH HFpEF, HTN, HLD, CAD, C2D, DM transferred to [facility] from OSH. pt was admitted. found to in chf/cardiogenic shoc.  with acute hypovolemic shock secondary to anemia requiring blood transfusions.   pt also had NEMESIO being considered for coronary angiogram in am. acute hypovolemic shock secondary to anemia requiring blood transfusions.    1- NEMESIO   2- CHF  3- proteinuria   4- DM     Nemesio in setting of cardiogenic shock likely   creatinine improving but s/p coronary angio with 200 cc iv contrast  cr is rising again slowly   diuretics lasix 20 mg daily/and aldactone 12.5 mg daily on hold   check cr in am again if stabilizes will restart diuretics    continue hydralazine 100 mg tid   anemia, trend hgb  monitor creatinine and electrolytes  heart failure team following

## 2021-08-09 LAB
ANION GAP SERPL CALC-SCNC: 13 MMOL/L — SIGNIFICANT CHANGE UP (ref 5–17)
APTT BLD: 71.8 SEC — HIGH (ref 27.5–35.5)
APTT BLD: 74.2 SEC — HIGH (ref 27.5–35.5)
BLD GP AB SCN SERPL QL: NEGATIVE — SIGNIFICANT CHANGE UP
BUN SERPL-MCNC: 20 MG/DL — SIGNIFICANT CHANGE UP (ref 7–23)
CALCIUM SERPL-MCNC: 8.8 MG/DL — SIGNIFICANT CHANGE UP (ref 8.4–10.5)
CHLORIDE SERPL-SCNC: 98 MMOL/L — SIGNIFICANT CHANGE UP (ref 96–108)
CO2 SERPL-SCNC: 22 MMOL/L — SIGNIFICANT CHANGE UP (ref 22–31)
CREAT SERPL-MCNC: 1.96 MG/DL — HIGH (ref 0.5–1.3)
GLUCOSE BLDC GLUCOMTR-MCNC: 106 MG/DL — HIGH (ref 70–99)
GLUCOSE BLDC GLUCOMTR-MCNC: 121 MG/DL — HIGH (ref 70–99)
GLUCOSE BLDC GLUCOMTR-MCNC: 140 MG/DL — HIGH (ref 70–99)
GLUCOSE BLDC GLUCOMTR-MCNC: 152 MG/DL — HIGH (ref 70–99)
GLUCOSE SERPL-MCNC: 115 MG/DL — HIGH (ref 70–99)
HCT VFR BLD CALC: 28.8 % — LOW (ref 34.5–45)
HGB BLD-MCNC: 8.8 G/DL — LOW (ref 11.5–15.5)
MCHC RBC-ENTMCNC: 29.5 PG — SIGNIFICANT CHANGE UP (ref 27–34)
MCHC RBC-ENTMCNC: 30.6 GM/DL — LOW (ref 32–36)
MCV RBC AUTO: 96.6 FL — SIGNIFICANT CHANGE UP (ref 80–100)
NRBC # BLD: 0 /100 WBCS — SIGNIFICANT CHANGE UP (ref 0–0)
PLATELET # BLD AUTO: 140 K/UL — LOW (ref 150–400)
POTASSIUM SERPL-MCNC: 4.1 MMOL/L — SIGNIFICANT CHANGE UP (ref 3.5–5.3)
POTASSIUM SERPL-SCNC: 4.1 MMOL/L — SIGNIFICANT CHANGE UP (ref 3.5–5.3)
RBC # BLD: 2.98 M/UL — LOW (ref 3.8–5.2)
RBC # FLD: 19.7 % — HIGH (ref 10.3–14.5)
RH IG SCN BLD-IMP: POSITIVE — SIGNIFICANT CHANGE UP
SODIUM SERPL-SCNC: 133 MMOL/L — LOW (ref 135–145)
WBC # BLD: 6.62 K/UL — SIGNIFICANT CHANGE UP (ref 3.8–10.5)
WBC # FLD AUTO: 6.62 K/UL — SIGNIFICANT CHANGE UP (ref 3.8–10.5)

## 2021-08-09 PROCEDURE — 93312 ECHO TRANSESOPHAGEAL: CPT | Mod: 26

## 2021-08-09 PROCEDURE — 92960 CARDIOVERSION ELECTRIC EXT: CPT

## 2021-08-09 PROCEDURE — 99223 1ST HOSP IP/OBS HIGH 75: CPT

## 2021-08-09 PROCEDURE — 99233 SBSQ HOSP IP/OBS HIGH 50: CPT

## 2021-08-09 PROCEDURE — 93010 ELECTROCARDIOGRAM REPORT: CPT | Mod: 77

## 2021-08-09 PROCEDURE — 93320 DOPPLER ECHO COMPLETE: CPT | Mod: 26

## 2021-08-09 PROCEDURE — 93325 DOPPLER ECHO COLOR FLOW MAPG: CPT | Mod: 26

## 2021-08-09 PROCEDURE — 99232 SBSQ HOSP IP/OBS MODERATE 35: CPT

## 2021-08-09 PROCEDURE — 93010 ELECTROCARDIOGRAM REPORT: CPT

## 2021-08-09 RX ORDER — CHLORHEXIDINE GLUCONATE 213 G/1000ML
1 SOLUTION TOPICAL
Refills: 0 | Status: DISCONTINUED | OUTPATIENT
Start: 2021-08-09 | End: 2021-08-14

## 2021-08-09 RX ORDER — SENNA PLUS 8.6 MG/1
2 TABLET ORAL AT BEDTIME
Refills: 0 | Status: DISCONTINUED | OUTPATIENT
Start: 2021-08-09 | End: 2021-08-14

## 2021-08-09 RX ADMIN — Medication 81 MILLIGRAM(S): at 13:38

## 2021-08-09 RX ADMIN — POLYETHYLENE GLYCOL 3350 17 GRAM(S): 17 POWDER, FOR SOLUTION ORAL at 20:24

## 2021-08-09 RX ADMIN — ATORVASTATIN CALCIUM 40 MILLIGRAM(S): 80 TABLET, FILM COATED ORAL at 20:21

## 2021-08-09 RX ADMIN — ISOSORBIDE DINITRATE 40 MILLIGRAM(S): 5 TABLET ORAL at 05:43

## 2021-08-09 RX ADMIN — HEPARIN SODIUM 5 UNIT(S)/HR: 5000 INJECTION INTRAVENOUS; SUBCUTANEOUS at 00:41

## 2021-08-09 RX ADMIN — CARVEDILOL PHOSPHATE 12.5 MILLIGRAM(S): 80 CAPSULE, EXTENDED RELEASE ORAL at 13:39

## 2021-08-09 RX ADMIN — CARVEDILOL PHOSPHATE 12.5 MILLIGRAM(S): 80 CAPSULE, EXTENDED RELEASE ORAL at 20:21

## 2021-08-09 RX ADMIN — ISOSORBIDE DINITRATE 40 MILLIGRAM(S): 5 TABLET ORAL at 13:38

## 2021-08-09 RX ADMIN — CLOPIDOGREL BISULFATE 75 MILLIGRAM(S): 75 TABLET, FILM COATED ORAL at 13:38

## 2021-08-09 RX ADMIN — Medication 100 MILLIGRAM(S): at 23:19

## 2021-08-09 RX ADMIN — Medication 100 MILLIGRAM(S): at 13:38

## 2021-08-09 RX ADMIN — SENNA PLUS 2 TABLET(S): 8.6 TABLET ORAL at 23:19

## 2021-08-09 RX ADMIN — Medication 1 DROP(S): at 17:52

## 2021-08-09 RX ADMIN — ISOSORBIDE DINITRATE 40 MILLIGRAM(S): 5 TABLET ORAL at 20:22

## 2021-08-09 NOTE — PROGRESS NOTE ADULT - ATTENDING COMMENTS
Patient is an 85 year old female with history of HFpEF, HTN, CAD, DM and CKD who presented with cardiogenic shock and new onset Aflutter. Her EF on this admission was noted to drop to 25%.  She initially was on levophed and milrinone which have been weaned off now. She underwent LHC which showed 90% Lcx lesion which was stented in a staged fashion. Today she underwent ALANNA/cardioversion  #ICM  -c/w coreg 12.5mg BID  - c/w hydralazine 100mg TID and isordil 30mg TID  - c/w aspirin and plavix for recent stent    #Aflutter  - s/p ALANNA/cardioversion today  - transition to NOAC tomorrow, will likely do triple therapy for 1 month and then plavix with noac afterwards    #DANE on CKD  - noted to have DANE today which is likely in the setting of contrast load, will continue to monitor tomorrow    #HTN  - unable to get RASSI due to creatinine, if persistantly an issue can consider adding amplodipine

## 2021-08-09 NOTE — PROGRESS NOTE ADULT - SUBJECTIVE AND OBJECTIVE BOX
[facility]   [phone]  RENAL FOLLOW UP NOTE  --------------------------------------------------------------------------------  Chief Complaint:    24 hour events/subjective:    Patient seen and examined.   Susan OLIVEIRA CP    PAST HISTORY  --------------------------------------------------------------------------------  No significant changes to PMH, PSH, FHx, SHx, unless otherwise noted    ALLERGIES & MEDICATIONS  --------------------------------------------------------------------------------  Allergies    No Known Allergies    Intolerances      Standing Inpatient Medications  artificial  tears Solution 1 Drop(s) Both EYES two times a day  aspirin enteric coated 81 milliGRAM(s) Oral daily  atorvastatin 40 milliGRAM(s) Oral at bedtime  carvedilol 12.5 milliGRAM(s) Oral <User Schedule>  chlorhexidine 2% Cloths 1 Application(s) Topical <User Schedule>  clopidogrel Tablet 75 milliGRAM(s) Oral daily  dextrose 50% Injectable 25 Gram(s) IV Push once  dextrose 50% Injectable 25 Gram(s) IV Push once  heparin  Infusion 500 Unit(s)/Hr IV Continuous <Continuous>  hydrALAZINE 100 milliGRAM(s) Oral <User Schedule>  insulin lispro (ADMELOG) corrective regimen sliding scale   SubCutaneous three times a day before meals  insulin lispro (ADMELOG) corrective regimen sliding scale   SubCutaneous at bedtime  isosorbide   dinitrate Tablet (ISORDIL) 40 milliGRAM(s) Oral three times a day  polyethylene glycol 3350 17 Gram(s) Oral daily    PRN Inpatient Medications      REVIEW OF SYSTEMS  --------------------------------------------------------------------------------    Gen: denies fevers/chills,  CVS: denies chest pain/palpitations  Resp: denies SOB/Cough  GI: Denies N/V/Abd pain  : Denies dysuria/oliguria/hematuria    All other systems were reviewed and are negative, except as noted.    VITALS/PHYSICAL EXAM  --------------------------------------------------------------------------------  T(C): 36.7 (08-09-21 @ 13:43), Max: 36.7 (08-08-21 @ 21:06)  HR: 79 (08-09-21 @ 13:43) (69 - 85)  BP: 159/72 (08-09-21 @ 13:43) (138/69 - 159/72)  RR: 18 (08-09-21 @ 13:43) (18 - 18)  SpO2: 98% (08-09-21 @ 13:43) (98% - 100%)  Wt(kg): --  Height (cm): 160 (08-09-21 @ 09:31)  Weight (kg): 59.9 (08-09-21 @ 09:31)  BMI (kg/m2): 23.4 (08-09-21 @ 09:31)  BSA (m2): 1.62 (08-09-21 @ 09:31)      08-08-21 @ 07:01  -  08-09-21 @ 07:00  --------------------------------------------------------  IN: 840 mL / OUT: 0 mL / NET: 840 mL    08-09-21 @ 07:01  -  08-09-21 @ 17:01  --------------------------------------------------------  IN: 100 mL / OUT: 0 mL / NET: 100 mL      Physical Exam:  	              Gen: Non toxic comfortable appearing   	Pulm: decrease breath sounds, no rales or ronchi or wheezing  	CV: +JVD. RRR, S1S2;  	Abd: +BS, soft, nontender/nondistended  	: No suprapubic tenderness  	UE: Warm, no cyanosis  no clubbing, no edema   	LE: Warm, no cyanosis  no clubbing, 1+ edema  	Neuro: alert and oriented. speech coherent    LABS/STUDIES  --------------------------------------------------------------------------------              8.8    6.62  >-----------<  140      [08-09-21 @ 07:26]              28.8     133  |  98  |  20  ----------------------------<  115      [08-09-21 @ 07:23]  4.1   |  22  |  1.96        Ca     8.8     [08-09-21 @ 07:23]        PTT: 71.8       [08-09-21 @ 07:26]      Creatinine Trend:  SCr 1.96 [08-09 @ 07:23]  SCr 1.68 [08-08 @ 06:27]  SCr 1.48 [08-07 @ 10:37]  SCr 1.47 [08-06 @ 07:07]  SCr 1.47 [08-05 @ 05:27]              Urinalysis - [07-24-21 @ 05:01]      Color Yellow / Appearance Slightly Turbid / SG 1.025 / pH 5.0      Gluc Negative / Ketone Trace  / Bili Negative / Urobili 4       Blood Moderate / Protein 100 / Leuk Est Small / Nitrite Negative      RBC 25-50 / WBC 11-25 / Hyaline  / Gran 0-2 / Sq Epi  / Non Sq Epi Moderate / Bacteria Moderate      TSH 2.62      [07-26-21 @ 20:44]  Lipid: chol 68, TG 48, HDL 32, LDL --      [07-25-21 @ 20:34]    Free Light Chains: kappa 1.86, lambda 2.79, ratio = 0.67      [07-28 @ 08:47]

## 2021-08-09 NOTE — PROGRESS NOTE ADULT - NSICDXPILOT_GEN_ALL_CORE
[place]

## 2021-08-09 NOTE — PROGRESS NOTE ADULT - ASSESSMENT
85 F w/ PMH HFpEF, HTN, HLD, CAD, C2D, DM transferred to [facility] from OSH. pt was admitted. found to in chf/cardiogenic shoc.  with acute hypovolemic shock secondary to anemia requiring blood transfusions.   pt also had NEMESIO being considered for coronary angiogram in am. acute hypovolemic shock secondary to anemia requiring blood transfusions.    1- NEMESIO   2- CHF  3- proteinuria   4- DM     Nemesio in setting of cardiogenic shock likely   creatinine improving but s/p coronary angio with 200 cc iv contrast, creatinine rising   diuretics lasix 20 mg daily/ aldactone 12.5 mg daily on hold   restart diuretics once creatinine stabilizes.  continue hydralazine 100 mg tid   anemia, trend hgb  strict I/O  monitor creatinine and electrolytes daily  heart failure team following

## 2021-08-09 NOTE — PROGRESS NOTE ADULT - PROBLEM SELECTOR PLAN 1
newly diagnosed HFrEF (had previous hx of HFpEF)  - HF following, recs greatly appreciated.   - continue to optimize GDMT as tolerated  - c/w carvedilol 12.5mg q12h, hydralazine 100mg TID, increased isordil to 40mg TID      * would be cautious with uptitrating carvedilol as had 3.6s pause overnight on 8/7  - resume diuretics possibly tomorrow if Cr stabilizes.   - NM amyloid scan not suggestive of cardiac amyloidosis  - s/p L+RHC showing elevated filling pressures, 90% stenosis of mid circumflex

## 2021-08-09 NOTE — PRE-ANESTHESIA EVALUATION ADULT - NSANTHPMHFT_GEN_ALL_CORE
85F PMH T2DM, HTN, HLD  HTN, HLD, CAD, CKD, (per Dr Ross cardiology in [facility] CAD dx probable based on myocardial profusion scan, May 2021, refused cardiac cath at the time) initially p/w [place] w/ abdominal pain, nausea, vomiting and dizziness, found to be in AF RVR and hypotensive with elevated lactate concerning for cardiogenic shock. s/p IVF, empiric abxs and underwent abd CT which ws unremarkable. TTE showed newly diagnosed LV systolic dysfunction with LVEF 25-29%, mod MR and mod TR. She was started on milrinone and levophed gtt. She was transferred to [facility] for further management. She is off pressors.   Dx: New onset AF RVR in setting of new HFrEF 25-29%   Plan: Eventual ALANNA/DCCV. Need R/L heart cath when crt plateau (crt 1.76 on 8/3). Tele 8/3: rate controlled Afib 60-90's on Toprol 50mg QD, On Heparin gtt       8/6 s/p 3 LARISSA

## 2021-08-09 NOTE — PROGRESS NOTE ADULT - PROBLEM SELECTOR PLAN 5
- requiring PRBC transfusion during hospitalization for hypovolemic shock  - A-line access bleeding now appears stable; RUE duplex without evidence of active bleeding or pseudoaneurysm, although there is a hematoma; no e/o compartment syndrome; CTA deferred given DANE and low suspicion for active blood loss.  - Appreciate vascular recommendations - cleared to resume hep gtt on 8/1  - plan to transition hep drip to Eliquis.

## 2021-08-09 NOTE — PROGRESS NOTE ADULT - SUBJECTIVE AND OBJECTIVE BOX
Subjective:  - s/p staged PCI to LCx    - s/p ALANNA/DCCV this AM  - reports feeling relatively well without cardiac/respiratory complaints     Medications:  artificial  tears Solution 1 Drop(s) Both EYES two times a day  aspirin enteric coated 81 milliGRAM(s) Oral daily  atorvastatin 40 milliGRAM(s) Oral at bedtime  carvedilol 12.5 milliGRAM(s) Oral <User Schedule>  chlorhexidine 2% Cloths 1 Application(s) Topical <User Schedule>  clopidogrel Tablet 75 milliGRAM(s) Oral daily  dextrose 50% Injectable 25 Gram(s) IV Push once  dextrose 50% Injectable 25 Gram(s) IV Push once  heparin  Infusion 500 Unit(s)/Hr IV Continuous <Continuous>  hydrALAZINE 100 milliGRAM(s) Oral <User Schedule>  insulin lispro (ADMELOG) corrective regimen sliding scale   SubCutaneous three times a day before meals  insulin lispro (ADMELOG) corrective regimen sliding scale   SubCutaneous at bedtime  isosorbide   dinitrate Tablet (ISORDIL) 40 milliGRAM(s) Oral three times a day  polyethylene glycol 3350 17 Gram(s) Oral daily      Physical Exam:    Vitals:  Vital Signs Last 24 Hours  T(C): 36.7 (21 @ 13:43), Max: 36.7 (21 @ 21:06)  HR: 79 (21 @ 13:43) (69 - 85)  BP: 159/72 (21 @ 13:43) (138/69 - 159/72)  RR: 18 (21 @ 13:43) (18 - 18)  SpO2: 98% (21 @ 13:43) (98% - 100%)    Weight in k.6 ( @ 04:31)    I&O's Summary    08 Aug 2021 07:  -  09 Aug 2021 07:00  --------------------------------------------------------  IN: 840 mL / OUT: 0 mL / NET: 840 mL    09 Aug 2021 07:  -  09 Aug 2021 15:36  --------------------------------------------------------  IN: 100 mL / OUT: 0 mL / NET: 100 mL    Tele: SB 50s    General: No distress. Comfortable.  HEENT: EOM intact.  Neck: JVP mildly elevated without HJR  Respiratory: Clear to auscultation bilaterally  CV: Irregularly irregular. Normal S1 and S2. No murmurs, rub, or gallops. Radial pulses normal.  Abdomen: Soft, non-distended, non-tender  Skin: No skin lesions  Extremities: Warm, +1 bilateral LE edema  Neurology: Non-focal, alert and oriented times three.   Psych: Affect normal    Labs:                        8.8    6.62  )-----------( 140      ( 09 Aug 2021 07:26 )             28.8     08-    133<L>  |  98  |  20  ----------------------------<  115<H>  4.1   |  22  |  1.96<H>    Ca    8.8      09 Aug 2021 07:23      PTT - ( 09 Aug 2021 07:26 )  PTT:71.8 sec

## 2021-08-09 NOTE — PROGRESS NOTE ADULT - SUBJECTIVE AND OBJECTIVE BOX
INTERNAL MEDICINE PROGRESS NOTE    Dr. Wilian Doran DO  Attending Physician  Division of Hospital Medicine  [facility]  Available via Microsoft Teams (preferred)  Also available via Pager [phone]    SUBJECTIVE:  No acute complaints.     REVIEW OF SYSTEMS   12 point review of systems negative except for above.     PAST MEDICAL & SURGICAL HISTORY:  HTN (hypertension)    HLD (hyperlipidemia)    DM (diabetes mellitus)    Hyperkalemia    CKD (chronic kidney disease)    Atherosclerosis    HTN (hypertension)    CAD (coronary artery disease)    Stage 4 chronic kidney disease    S/P hysterectomy        MEDICATIONS  (STANDING):  artificial  tears Solution 1 Drop(s) Both EYES two times a day  aspirin enteric coated 81 milliGRAM(s) Oral daily  atorvastatin 40 milliGRAM(s) Oral at bedtime  carvedilol 12.5 milliGRAM(s) Oral <User Schedule>  chlorhexidine 2% Cloths 1 Application(s) Topical <User Schedule>  clopidogrel Tablet 75 milliGRAM(s) Oral daily  dextrose 50% Injectable 25 Gram(s) IV Push once  dextrose 50% Injectable 25 Gram(s) IV Push once  heparin  Infusion 500 Unit(s)/Hr (5 mL/Hr) IV Continuous <Continuous>  hydrALAZINE 100 milliGRAM(s) Oral <User Schedule>  insulin lispro (ADMELOG) corrective regimen sliding scale   SubCutaneous three times a day before meals  insulin lispro (ADMELOG) corrective regimen sliding scale   SubCutaneous at bedtime  isosorbide   dinitrate Tablet (ISORDIL) 40 milliGRAM(s) Oral three times a day  polyethylene glycol 3350 17 Gram(s) Oral daily    MEDICATIONS  (PRN):      Allergies    No Known Allergies    Intolerances        T(C): 36.7 (08-09-21 @ 13:43), Max: 36.7 (08-08-21 @ 21:06)  T(F): 98 (08-09-21 @ 13:43), Max: 98.1 (08-08-21 @ 21:06)  HR: 79 (08-09-21 @ 13:43) (69 - 85)  BP: 159/72 (08-09-21 @ 13:43) (138/69 - 159/72)  ABP: --  ABP(mean): --  RR: 18 (08-09-21 @ 13:43) (18 - 18)  SpO2: 98% (08-09-21 @ 13:43) (98% - 100%)    CONSTITUTIONAL: No acute distress.   HEENT:  Conjunctiva clear B/L.  Moist oral mucosa.   Cardiovascular: RRR with no murmurs. No JVD noted. No lower extremity edema B/L. Extremities are warm and well perfused. Radial pulses 2+ B/L. Dorsalis pedis pulses 2+ B/L.    Respiratory: Lungs CTAB. No wrr. No accessory muscle use.   Gastrointestinal:  Soft, nontender. Non-distended. Non-rigid. No CVA tenderness B/L.  Neurologic:  Alert and awake. Moving all extremities. Following commands. Making eye contact. No numbness or tingling.   MSK / Skin: RUE ecchymoses in dependent parts of arm. Improving. Compartments soft.   Psych:  Normal affect. Normal Mood.     LABS                        8.8    6.62  )-----------( 140      ( 09 Aug 2021 07:26 )             28.8     08-09    133<L>  |  98  |  20  ----------------------------<  115<H>  4.1   |  22  |  1.96<H>    Ca    8.8      09 Aug 2021 07:23      PTT - ( 09 Aug 2021 07:26 )  PTT:71.8 sec      ALL RECENT STUDIES REVIEWED INCLUDING REPORTS AVAILABLE     CARE DISCUSSED WITH ALL CONSULTANTS

## 2021-08-09 NOTE — PROGRESS NOTE ADULT - ASSESSMENT
85 year old female PMH T2DM, HTN, HLD  HTN, HLD, CAD, CKD, (per Dr Ross cardiology in [facility] CAD dx probable based on myocardial profusion scan, May 2021, patient refused cardiac cath) transferred to [facility] from [facility] where she presented with abdominal pain. Patient states she ate  a "bad turkey sandwich" and woke up 7/22 with bad abdominal pain, N/V, and dizziness. She went to ED where she was found to be tachycardic with soft B/P, elevated lactate at 5.9. She received  2 L IVF and sent for CT a/p but be came acutely SOB while laying flat in CT requiring BiPAP. Patient states she was never SOB and that it was anxiety. ECHO reveal new low EF, TTE 2/2019 with normal EF now 25 %. Amyloid work up negative, clear by vascular for Full AC, CBC have stable no further bleeding     1. New onset HFrEF 25% in the setting of tachycardia   2. New onset Atrial Flutter/fib with RVR now rate controlled  3. CAD, 90 % stenosis midcx s/p LHC Status post angioplasty, IVUS and stenting  (drug-eluting/Synergy) of the left circumflex artery with resultant DENNIS 3    continue to monitor on telemetry  Keep K+>4, MG++>2  continue Coreg   NPO for ALANNA/DCCV today  continue uninterrupted full AC with heparin will need to be on NOAC going home, on  triple therapy will need to be addressed by cardiology   spoke with Latasha ARAIZA medicine team, Medicine attending Dr Nagy and Heart Failure NP Oralia  [phone] 85 year old female PMH T2DM, HTN, HLD  HTN, HLD, CAD, CKD, (per Dr Ross cardiology in [facility] CAD dx probable based on myocardial profusion scan, May 2021, patient refused cardiac cath) transferred to [facility] from [facility] where she presented with abdominal pain. Patient states she ate  a "bad turkey sandwich" and woke up 7/22 with bad abdominal pain, N/V, and dizziness. She went to ED where she was found to be tachycardic with soft B/P, elevated lactate at 5.9. She received  2 L IVF and sent for CT a/p but be came acutely SOB while laying flat in CT requiring BiPAP. Patient states she was never SOB and that it was anxiety. ECHO reveal new low EF, TTE 2/2019 with normal EF now 25 %. Amyloid work up negative, clear by vascular for Full AC, CBC have stable no further bleeding     1. New onset HFrEF 25% in the setting of tachycardia   2. New onset Atrial Flutter/fib with RVR now rate controlled  3. CAD, 90 % stenosis midcx s/p LHC Status post angioplasty, IVUS and stenting  (drug-eluting/Synergy) of the left circumflex artery with resultant DENNIS 3    continue to monitor on telemetry  Keep K+>4, MG++>2  continue Coreg   NPO for ALANNA/DCCV today  continue uninterrupted full AC with heparin will need to be on NOAC going home, on  triple therapy will need to be addressed by cardiology,   spoke with Latasha ARAIZA medicine team, Medicine attending Dr Nagy and Heart Failure NP [place]ia no further procedures planned stable for uninterrupted AC ongoing   [phone]  addendum   1000AM   now s/p successful DCCV with SB 40 's with 1st degree AV delay of 284ms           85 year old female PMH T2DM, HTN, HLD  HTN, HLD, CAD, CKD, (per Dr Ross cardiology in [facility] CAD dx probable based on myocardial profusion scan, May 2021, patient refused cardiac cath) transferred to [facility] from [facility] where she presented with abdominal pain. Patient states she ate  a "bad turkey sandwich" and woke up 7/22 with bad abdominal pain, N/V, and dizziness. She went to ED where she was found to be tachycardic with soft B/P, elevated lactate at 5.9. She received  2 L IVF and sent for CT a/p but be came acutely SOB while laying flat in CT requiring BiPAP. Patient states she was never SOB and that it was anxiety. ECHO reveal new low EF, TTE 2/2019 with normal EF now 25 %. Amyloid work up negative, clear by vascular for Full AC, CBC have stable no further bleeding     1. New onset HFrEF 25% in the setting of tachycardia   2. New onset Atrial Flutter/fib with RVR now rate controlled  3. CAD, 90 % stenosis midcx s/p LHC Status post angioplasty, IVUS and stenting  (drug-eluting/Synergy) of the left circumflex artery with resultant DENNIS 3    continue to monitor on telemetry  Keep K+>4, MG++>2  continue Coreg   NPO for ALANNA/DCCV today  continue uninterrupted full AC with heparin will need to be on NOAC going home, on  triple therapy will need to be addressed by cardiology,   spoke with Latasha ARAIZA medicine team, Medicine attending Dr Nagy and Heart Failure NP Oralia no further procedures planned stable for uninterrupted AC ongoing   [phone]  addendum   1000AM   now s/p successful DCCV with SB 40 's with 1st degree AV delay of 284ms (preexhisiting 1st degree AV delay since 2014, the greatest noted @ 240 ms)           85 year old female PMH T2DM, HTN, HLD  HTN, HLD, CAD, CKD, (per Dr Ross cardiology in [facility] CAD dx probable based on myocardial profusion scan, May 2021, patient refused cardiac cath) transferred to [facility] from [facility] where she presented with abdominal pain. Patient states she ate  a "bad turkey sandwich" and woke up 7/22 with bad abdominal pain, N/V, and dizziness. She went to ED where she was found to be tachycardic with soft B/P, elevated lactate at 5.9. She received  2 L IVF and sent for CT a/p but be came acutely SOB while laying flat in CT requiring BiPAP. Patient states she was never SOB and that it was anxiety. ECHO reveal new low EF, TTE 2/2019 with normal EF now 25 %. Amyloid work up negative, clear by vascular for Full AC, CBC have stable no further bleeding     1. New onset HFrEF 25% in the setting of tachycardia   2. New onset Atrial Flutter/fib with RVR now rate controlled  3. CAD, 90 % stenosis midcx s/p LHC Status post angioplasty, IVUS and stenting  (drug-eluting/Synergy) of the left circumflex artery with resultant DENNIS 3    continue to monitor on telemetry  Keep K+>4, MG++>2  continue Coreg   NPO for ALANNA/DCCV today  continue uninterrupted full AC with heparin will need to be on NOAC going home, on  triple therapy will need to be addressed by cardiology,   spoke with Latasha ARAIZA medicine team, Medicine attending Dr Nagy and Heart Failure NP Oralia no further procedures planned stable for uninterrupted AC ongoing     [phone]    addendum   1000AM   now s/p successful DCCV with SB 40 's with 1st degree AV delay of 284ms (preexhisiting 1st degree AV delay since 2014, the greatest noted @ 240 ms)  EF improved on ALANNA today  from admission with rate control, CMP with tachy component, continue with Coreg (GDMT) if not able tolerate would consider Amiodarone         85 year old female PMH T2DM, HTN, HLD  HTN, HLD, CAD, CKD, (per Dr Ross cardiology in [facility] CAD dx probable based on myocardial profusion scan, May 2021, patient refused cardiac cath) transferred to [facility] from [facility] where she presented with abdominal pain. Patient states she ate  a "bad turkey sandwich" and woke up 7/22 with bad abdominal pain, N/V, and dizziness. She went to ED where she was found to be tachycardic with soft B/P, elevated lactate at 5.9. She received  2 L IVF and sent for CT a/p but be came acutely SOB while laying flat in CT requiring BiPAP. Patient states she was never SOB and that it was anxiety. ECHO reveal new low EF, TTE 2/2019 with normal EF now 25 %. Amyloid work up negative, clear by vascular for Full AC, CBC have stable no further bleeding     1. New onset HFrEF 25% in the setting of tachycardia   2. New onset Atrial Flutter/fib with RVR now rate controlled  3. CAD, 90 % stenosis midcx s/p LHC Status post angioplasty, IVUS and stenting  (drug-eluting/Synergy) of the left circumflex artery with resultant DENNIS 3    continue to monitor on telemetry  Keep K+>4, MG++>2  continue Coreg   NPO for ALANNA/DCCV today  continue uninterrupted full AC with heparin will need to be on NOAC going home, on  triple therapy will need to be addressed by cardiology,   spoke with Latasha ARAIZA medicine team, Medicine attending Dr Nagy and Heart Failure NP Oralia no further procedures planned stable for uninterrupted AC ongoing     [phone]    addendum   1000AM   now s/p successful DCCV with SB 40 's with 1st degree AV delay of 284ms (preexhisiting 1st degree AV delay since 2014, the greatest noted @ 240 ms)now 284ms  EF improved on ALANNA today  from admission with rate control, CMP with tachy component, continue with Coreg (GDMT) if not able tolerate would consider Amiodarone

## 2021-08-09 NOTE — PROGRESS NOTE ADULT - PROBLEM SELECTOR PLAN 2
s/p L+RHC showing elevated filling pressures, 90% stenosis of mid circumflex   - s/p staged PCI of left circumflex with LARISSA x 3 placed  - c/w ASA 81mg daily + Plavix 75mg daily  - c/w high intensity statin  - c/w carvedilol as above  - home ARB on hold in setting of DANE on CKD

## 2021-08-09 NOTE — PROGRESS NOTE ADULT - SUBJECTIVE AND OBJECTIVE BOX
24H hour events: recall to EP post [facility] for DCCV today remains in Afib    MEDICATIONS:  aspirin enteric coated 81 milliGRAM(s) Oral daily  carvedilol 12.5 milliGRAM(s) Oral <User Schedule>  clopidogrel Tablet 75 milliGRAM(s) Oral daily  heparin  Infusion 500 Unit(s)/Hr IV Continuous <Continuous>  hydrALAZINE 100 milliGRAM(s) Oral <User Schedule>  isosorbide   dinitrate Tablet (ISORDIL) 40 milliGRAM(s) Oral three times a day  polyethylene glycol 3350 17 Gram(s) Oral daily  atorvastatin 40 milliGRAM(s) Oral at bedtime  dextrose 50% Injectable 25 Gram(s) IV Push once  dextrose 50% Injectable 25 Gram(s) IV Push once  insulin lispro (ADMELOG) corrective regimen sliding scale   SubCutaneous three times a day before meals  insulin lispro (ADMELOG) corrective regimen sliding scale   SubCutaneous at bedtime  artificial  tears Solution 1 Drop(s) Both EYES two times a day    REVIEW OF SYSTEMS:  See HPI, otherwise ROS negative.    PHYSICAL EXAM:  T(C): 36.4 (21 @ 08:19), Max: 36.7 (21 @ 12:35)  HR: 69 (21 @ 08:19) (69 - 85)  BP: 138/69 (21 @ 08:19) (138/69 - 156/91)  RR: 18 (21 @ 08:19) (18 - 18)  SpO2: 98% (21 @ 08:19) (98% - 100%)    I&O's Summary    08 Aug 2021 07:01  -  09 Aug 2021 07:00  --------------------------------------------------------  IN: 840 mL / OUT: 0 mL / NET: 840 mL    Appearance: Alert. NAD	  Cardiovascular: iregular rate and rhythm   Respiratory: respiration even unlabored 	  Psychiatry: A & O x 3, Mood & affect appropriate  Gastrointestinal:  Soft, NT. ND. +BS	  Skin: No rashes	  Neurologic: Non-focal  Extremities: No edema BLE    LABS:	 	    CBC Full  -  ( 09 Aug 2021 07:26 )  WBC Count : 6.62 K/uL  Hemoglobin : 8.8 g/dL  Hematocrit : 28.8 %  Platelet Count - Automated : 140 K/uL  Mean Cell Volume : 96.6 fl  Mean Cell Hemoglobin : 29.5 pg  Mean Cell Hemoglobin Concentration : 30.6 gm/dL  Auto Neutrophil # : x  Auto Lymphocyte # : x  Auto Monocyte # : x  Auto Eosinophil # : x  Auto Basophil # : x  Auto Neutrophil % : x  Auto Lymphocyte % : x  Auto Monocyte % : x  Auto Eosinophil % : x  Auto Basophil % : x    08    133<L>  |  98  |  20  ----------------------------<  115<H>  4.1   |  22  |  1.96<H>  08    133<L>  |  98  |  18  ----------------------------<  98  3.8   |  23  |  1.68<H>    Ca    8.8      09 Aug 2021 07:23  Ca    8.6      08 Aug 2021 06:27  Mg     1.8     08-07    Immunoglobulin Free Light Chains, Serum (21 @ 08:47)   GEOVANNY Kappa: 1.86 mg/dL   GEOVANNY Lambda: 2.79 mg/dL   Kappa/Lambda Free Light Chain Ratio, Serum: 0.67 Ratio   proBNP: Serum Pro-Brain Natriuretic Peptide (21 @ 15:54)   Serum Pro-Brain Natriuretic Peptide: 24560 pg/mL   TSH: Thyroid Stimulating Hormone, Serum: 2.62 uIU/mL (21 @ 20:44)     RADIOLOGY:       NM Amyloidosis SPECT/CT, Single Area Single Day (21 @ 13:29) >  IMPRESSION: Cardiac amyloid Imaging study is  not suggestive of transthyretin cardiac amyloidosis.    < from: TTE with Doppler (w/Cont) (21 @ 13:58) >  ------------------------------------------------------------------------  PROCEDURE: Transthoracic echocardiogram with 2-D, M-Mode  and complete spectral and color flow Doppler. Verbal< from: Cardiac Cath Lab (21 @ 16:10) >  PROCEDURE:  --  Sonosite - Interventional.  --  Interventional IVUS.  --  Intervention on distal circumflex: drug-eluting stent.  --  Intervention on proximal circumflex: .  TECHNIQUE: The risks and alternatives of the procedures and conscious  sedation were explained to the patient and informed consent was obtained.  Cardiac catheterization performed urgently. Coronary intervention  performed electively.  Local anesthetic given. Right femoral artery access. RADIATION EXPOSURE:  24.5 min. A drug-eluting stent was performed on the lesion in the distal  circumflex. Vessel setup was performed. A 6FR EBU 3.5 LAUNCHER guiding  catheter was used to intubate the vessel. Vessel setup was performed. A  RUNTHROUGH 180CM wire was used to cross the lesion. Balloon angioplasty  was performed, using a 2.5 X 12 EUPHORA balloon, with 1 inflations and a  maximum inflation pressure of 12 lucien. A 2.50 X 16 SYNERGY XD drug-eluting  stent was placed across the lesion and deployed at a maximum inflation  pressure of 14 lucien. A 3.00 X 12 SYNERGY XD drug-eluting stent was placed  across thelesion and deployed at a maximum inflation pressure of 8 lucien.  Intravascular ultrasound was performed using a(n) EAGLE EYE ST catheter  over a previously placed guidewire. A 3.00 X 8 SYNERGY XD drug-eluting  stent was placed across the lesion and deployed at a maximum inflation  pressure of 8 lucien. Balloon angioplasty was performed, using a 3.00 X 12 NC  APEX balloon, with 2 inflations and a maximum inflation pressure of 12  lucien. A was performed on the lesion in the proximal circumflex. A 3.00 X 12  SYNERGY XD drug-eluting stent was placed across the lesion and deployed.  The resulting stenosis was 1 %. Sonosite - Interventional. Interventional  IVUS.  CONTRAST GIVEN: Omnipaque 200 ml. An IABP was not used.  MEDICATIONS GIVEN: Fentanyl, 25 mcg, IV. Midazolam, 1 mg, IV. Aspirin, 243  mg, PO. Clopidogrel (Plavix), 600 mg, PO. Heparin, 5000 units, IV.  Heparin, 1000 units, IV. Heparin, 1000 units, IV.  CORONARY VESSELS:  CX:   --  Proximal circumflex: There was a diffuse 70 % stenosis.  --  Distal circumflex: There was a tubular 90 % stenosis.  --  Distal circumflex:  COMPLICATIONS: There were no complications.  DIAGNOSTIC IMPRESSIONS: Status post angioplasty, IVUS and stenting  (drug-eluting/Synergy) of the left circumflex artery with resultant DENNIS 3  DIAGNOSTIC RECOMMENDATIONS: Keep right leg straight for 4 hours following  removal of sheaths  Continue aggressive medical management of coronary artery disease and  associated risk factors  Continue aspirin 81mg daily  Continue clopidogrel 75mg daily  Continue aggressive medical management of coronary artery disease and  associated factors  Findings discussed with Dr. Noble  INTERVENTIONAL IMPRESSIONS: Status post angioplasty, IVUS and stenting  (drug-eluting/Synergy) of the left circumflex artery with resultant DENNIS 3  INTERVENTIONAL RECOMMENDATIONS: Keep right leg straight for 4 hours  following removal of sheaths  Continue aggressive medical management of coronary artery disease and  associated risk factors  Continue aspirin 81mg daily  Continue clopidogrel 75mg daily  Continue aggressive medical management of coronary artery disease and  associated factors  Findings discussed with Dr. Noble  Prepared and signed by  Joseph Loya MD  Signed 2021 18:07:57  HEMODYNAMIC TABLES  Pressures:  Baseline  Pressures:  - HR: 90  Pressures:  - Rhythm:  Pressures:  -- Aortic Pressure (S/D/M): 148/66/96  Outputs:  Baseline  Outputs:  -- CALCULATIONS[age]  Outputs:  -- CALCULATIONS: Body Surface Area: 1.62  Outputs:  -- CALCULATIONS: Height in cm: 160.00  Outputs:  -- CALCULATIONS: Sex: Female  Outputs:  -- CALCULATIONS: Weight in k.40    < end of copied text >    consent was obtained for injection of  Ultrasonic Enhancing  Agent following a discussion of risks and benefits.  Following intravenous injection of Ultrasonic Enhancing  Agent , harmonic imaging was performed.  INDICATION: Heart failure, unspecified (I50.9)  ------------------------------------------------------------------------  Dimensions:    Normal Values:  LA:     3.5    2.0 - 4.0 cm  Ao:     2.8    2.0 - 3.8 cm  SEPTUM: 0.9    0.6 - 1.2 cm  PWT:    1.0    0.6 - 1.1 cm  LVIDd:  4.4    3.0 - 5.6 cm  LVIDs:  3.8    1.8 - 4.0 cm  Derived variables:  LVMI: 87 g/m2  RWT: 0.45  Fractional short: 14 %  EF (Teicholtz): 29 %  Doppler Peak Velocity (m/sec): AoV=1.5  ------------------------------------------------------------------------  Observations:  Mitral Valve: Normal mitral valve. Mild-moderate mitral  regurgitation.  Aortic Valve/Aorta: Calcified aortic valve with  grossly  moderately decreased opening. Peak transaortic valve  gradient equals 15 mm Hg, mean transaortic valve gradient  equals 9 mm Hg.  Aortic Root: 2.8 cm.  Left Atrium: Moderately dilated left atrium.  LA volume  index = 46 cc/m2.  Left Ventricle: Endocardial visualization enhanced with  intravenous injection of Ultrasonic Enhancing Agent  (Definity). Severe global left ventricular systolic  dysfunction. Normal left ventricular internal dimensions  and wall thicknesses.  Right Heart: Normal right atrium. Normal right ventricular  size with decreased right ventricular systolic function.  Normal tricuspid valve.Mild tricuspid regurgitation.  Normal pulmonic valve.  Pericardium/Pleura: Normal pericardium with trace  pericardial effusion.  Bilateral pleural effusions.  Hemodynamic: Estimated right atrial pressure is 8 mm Hg.  Estimated right ventricular systolic pressure equals 31 mm  Hg, assuming right atrial pressure equals 8 mm Hg,  consistent with normal pulmonary pressures.  ------------------------------------------------------------------------  Conclusions:  1. Calcified aortic valve with  grosslymoderately  decreased opening. Peak transaortic valve gradient equals  15 mm Hg, mean transaortic valve gradient equals 9 mm Hg.  2. Moderately dilated left atrium.  LA volume index = 46  cc/m2.  3. Endocardial visualization enhanced with intravenous  injection of Ultrasonic Enhancing Agent (Definity). Severe  global left ventricular systolic dysfunction.  4. Normal right ventricular size with decreased right  ventricular systolic function.  5. Bilateral pleural effusions.  ------------------------------------------------------------------------  Confirmed on  2021 - 16:15:28 by Mikaela Pérez M.D.  ------------------------------------------------------------------------

## 2021-08-09 NOTE — PROGRESS NOTE ADULT - PROBLEM SELECTOR PLAN 1
- to decide on diuretic regimen based on repeat labs tomorrow  - continue Coreg 12.5 mg BID; unable to further escalate given HR in 50s without a device  - continue HDZN 100 mg TID and ISDN 30 mg TID  - deferring RASSi and MRA given DANE  - daily standing weights and strict I&Os

## 2021-08-09 NOTE — PROGRESS NOTE ADULT - ASSESSMENT
85yoF w/ PMHx significant for HFpEF, HTN, HLD, CAD, NIDDM, was tx to [facility] from [place] after being found to be in ADHF / new acute systolic heart failure, possibly tachycardia induced (new Aflutter) with new DANE, requiring CICU for inotropes / pressors / BiPAP, now off; course c/b acute hypovolemic shock secondary to blood loss anemia requiring PRBC transfusions transferred to floors now s/p L+RHC showing elevated filling pressures, 90% stenosis of mid circumflex now s/p staged PCI with LARISSA x 3 to LCx. S/p ALANNA/DCCV.

## 2021-08-09 NOTE — PROGRESS NOTE ADULT - ASSESSMENT
84 y/o female with h/o chronic HFpEF, HTN, DLP, CAD (h/o abnormal stress test), DM 2 and CKD 2 who presented to [facility] with nausea, vomiting and dizziness. She was found to be tachycardic, hypotensive with elevated lactate concerning for cardiogenic shock. She was given IVF, empiric antibiotics and underwent abd CT which ws unremarkable. Her ekg showed new onset aflutter and her TTE showed newly diagnosed LV systolic dysfunction with LVEF 25%, mod MR and mod TR. She was started on milrinone and levophed gtt. She was transferred to [facility] for further management. Her hospital course was complicated by tachypnea requiring bipap placement, acute anemia requiring PRBCs transfusion thought to be due bleeding from arterial line and heparin gtt. Her milrinone gtt was dc’ed 7/26 due to hypotension. CVP prior to transfer out of CICU had been low and received gentle IV fluid hydration.     She underwent L/RHC 8/4 which showed 90% stenosis of mLCx, mildly elevated filling pressures and preserved cardiac output. Underwent staged PCI to LCx on 8/6. Also underwent successful ALANNA/DCCV this AM and now in sinus bradycardia with HR in 50s. Her DANE had been improving but more recently following repeated contrast load is now uptrending though overall stable. Will continue to follow trend of renal function and decide on appropriate diuretic reigmen. We have been gradually escalating vasodilators and she remains hypertensive for her degree of systolic dysfunction though notably today, has yet to receive antihypertensive given timing of DCCV.       Pertinent Cardiac Studies  [facility] 8/4/21: RA 13 (v16), PA 59/29/34, PCWP 18 (v21), LVEDP 18, PA 59%, Ao 95%, Leelee CO/CI 5.1/3.1, /50/83  C 8/4/21: mCx diffuse 90% stenosis, pCx 40% stenosis, pLAD 40% stenosis, mLAD 30% stenosis, OM1 40% stenosis, OM2 40% stenosis, pRCA 30% stenosis, mRCA 50% stenosis, dRCA 20% stenosis, RPDA 30% stenosis, normal LM  TTE 7/25: LVIDd 4.4 cm, LVEF 29% (global), normal RV size with decreased systolic function, mod dilated LA, mild-mod MR, calcified AV with grossly mod decreased opening (peak/mean gradient 15/9 respectively), mild TR, est RVSP 31 mmHg    Please Call NS2 HF Spectra #[phone] for any questions or concerns

## 2021-08-09 NOTE — PROGRESS NOTE ADULT - PROBLEM SELECTOR PLAN 4
- in setting of CKD 2 as reported prior to this hospitalization  - etiology likely related to presenting cardiogenic shock and hypervolemic shock d/t blood loss  - Cr was improving to 1.48 range but increased after LHC likely SHANE related.   - continue to hold lasix/spironolactone  - continue to closely monitor along w/ UOP and electrolytes  - home ARB on hold  - Nephrology following, recs appreciated  - renally dose medications to GFR, avoid nephrotoxic agents

## 2021-08-09 NOTE — PROGRESS NOTE ADULT - PROBLEM SELECTOR PLAN 2
- s/p PCI to [facility] 8/6  - BB and Nitrate as above  - continue with ASA, Plavix, Statin, BB and Nitrate   - duration of triple therapy to be decided

## 2021-08-09 NOTE — PROGRESS NOTE ADULT - PROBLEM SELECTOR PLAN 4
- Per outpatient records, SCr was 1.14 on 5/21/21  - May be related to ATN given episodes of hypotension and now likely SHANE  - Cr had peaked to 2.7 and improved to 1.4, now 1.9

## 2021-08-10 LAB
ANION GAP SERPL CALC-SCNC: 13 MMOL/L — SIGNIFICANT CHANGE UP (ref 5–17)
APTT BLD: 58.9 SEC — HIGH (ref 27.5–35.5)
BLD GP AB SCN SERPL QL: NEGATIVE — SIGNIFICANT CHANGE UP
BUN SERPL-MCNC: 23 MG/DL — SIGNIFICANT CHANGE UP (ref 7–23)
CALCIUM SERPL-MCNC: 8.9 MG/DL — SIGNIFICANT CHANGE UP (ref 8.4–10.5)
CHLORIDE SERPL-SCNC: 97 MMOL/L — SIGNIFICANT CHANGE UP (ref 96–108)
CO2 SERPL-SCNC: 21 MMOL/L — LOW (ref 22–31)
CREAT SERPL-MCNC: 2.36 MG/DL — HIGH (ref 0.5–1.3)
GLUCOSE BLDC GLUCOMTR-MCNC: 114 MG/DL — HIGH (ref 70–99)
GLUCOSE BLDC GLUCOMTR-MCNC: 122 MG/DL — HIGH (ref 70–99)
GLUCOSE BLDC GLUCOMTR-MCNC: 123 MG/DL — HIGH (ref 70–99)
GLUCOSE BLDC GLUCOMTR-MCNC: 130 MG/DL — HIGH (ref 70–99)
GLUCOSE SERPL-MCNC: 107 MG/DL — HIGH (ref 70–99)
HCT VFR BLD CALC: 25.6 % — LOW (ref 34.5–45)
HCT VFR BLD CALC: 27.9 % — LOW (ref 34.5–45)
HGB BLD-MCNC: 7.8 G/DL — LOW (ref 11.5–15.5)
HGB BLD-MCNC: 8.5 G/DL — LOW (ref 11.5–15.5)
MAGNESIUM SERPL-MCNC: 2.2 MG/DL — SIGNIFICANT CHANGE UP (ref 1.6–2.6)
MCHC RBC-ENTMCNC: 29.7 PG — SIGNIFICANT CHANGE UP (ref 27–34)
MCHC RBC-ENTMCNC: 29.8 PG — SIGNIFICANT CHANGE UP (ref 27–34)
MCHC RBC-ENTMCNC: 30.5 GM/DL — LOW (ref 32–36)
MCHC RBC-ENTMCNC: 30.5 GM/DL — LOW (ref 32–36)
MCV RBC AUTO: 97.3 FL — SIGNIFICANT CHANGE UP (ref 80–100)
MCV RBC AUTO: 97.9 FL — SIGNIFICANT CHANGE UP (ref 80–100)
NRBC # BLD: 0 /100 WBCS — SIGNIFICANT CHANGE UP (ref 0–0)
NRBC # BLD: 0 /100 WBCS — SIGNIFICANT CHANGE UP (ref 0–0)
PLATELET # BLD AUTO: 123 K/UL — LOW (ref 150–400)
PLATELET # BLD AUTO: 125 K/UL — LOW (ref 150–400)
POTASSIUM SERPL-MCNC: 4.1 MMOL/L — SIGNIFICANT CHANGE UP (ref 3.5–5.3)
POTASSIUM SERPL-SCNC: 4.1 MMOL/L — SIGNIFICANT CHANGE UP (ref 3.5–5.3)
RBC # BLD: 2.63 M/UL — LOW (ref 3.8–5.2)
RBC # BLD: 2.85 M/UL — LOW (ref 3.8–5.2)
RBC # FLD: 19.5 % — HIGH (ref 10.3–14.5)
RBC # FLD: 19.7 % — HIGH (ref 10.3–14.5)
RH IG SCN BLD-IMP: POSITIVE — SIGNIFICANT CHANGE UP
SODIUM SERPL-SCNC: 131 MMOL/L — LOW (ref 135–145)
WBC # BLD: 5.49 K/UL — SIGNIFICANT CHANGE UP (ref 3.8–10.5)
WBC # BLD: 5.6 K/UL — SIGNIFICANT CHANGE UP (ref 3.8–10.5)
WBC # FLD AUTO: 5.49 K/UL — SIGNIFICANT CHANGE UP (ref 3.8–10.5)
WBC # FLD AUTO: 5.6 K/UL — SIGNIFICANT CHANGE UP (ref 3.8–10.5)

## 2021-08-10 PROCEDURE — 99233 SBSQ HOSP IP/OBS HIGH 50: CPT

## 2021-08-10 PROCEDURE — 99231 SBSQ HOSP IP/OBS SF/LOW 25: CPT

## 2021-08-10 PROCEDURE — 93010 ELECTROCARDIOGRAM REPORT: CPT

## 2021-08-10 RX ORDER — APIXABAN 2.5 MG/1
2.5 TABLET, FILM COATED ORAL EVERY 12 HOURS
Refills: 0 | Status: DISCONTINUED | OUTPATIENT
Start: 2021-08-10 | End: 2021-08-14

## 2021-08-10 RX ORDER — SODIUM CHLORIDE 9 MG/ML
1000 INJECTION INTRAMUSCULAR; INTRAVENOUS; SUBCUTANEOUS
Refills: 0 | Status: DISCONTINUED | OUTPATIENT
Start: 2021-08-10 | End: 2021-08-10

## 2021-08-10 RX ORDER — CARVEDILOL PHOSPHATE 80 MG/1
12.5 CAPSULE, EXTENDED RELEASE ORAL EVERY 12 HOURS
Refills: 0 | Status: DISCONTINUED | OUTPATIENT
Start: 2021-08-10 | End: 2021-08-12

## 2021-08-10 RX ORDER — CARVEDILOL PHOSPHATE 80 MG/1
12.5 CAPSULE, EXTENDED RELEASE ORAL ONCE
Refills: 0 | Status: COMPLETED | OUTPATIENT
Start: 2021-08-10 | End: 2021-08-10

## 2021-08-10 RX ORDER — CARVEDILOL PHOSPHATE 80 MG/1
25 CAPSULE, EXTENDED RELEASE ORAL
Refills: 0 | Status: DISCONTINUED | OUTPATIENT
Start: 2021-08-10 | End: 2021-08-10

## 2021-08-10 RX ORDER — LANOLIN ALCOHOL/MO/W.PET/CERES
5 CREAM (GRAM) TOPICAL ONCE
Refills: 0 | Status: COMPLETED | OUTPATIENT
Start: 2021-08-10 | End: 2021-08-10

## 2021-08-10 RX ADMIN — ISOSORBIDE DINITRATE 40 MILLIGRAM(S): 5 TABLET ORAL at 05:34

## 2021-08-10 RX ADMIN — SENNA PLUS 2 TABLET(S): 8.6 TABLET ORAL at 22:49

## 2021-08-10 RX ADMIN — ATORVASTATIN CALCIUM 40 MILLIGRAM(S): 80 TABLET, FILM COATED ORAL at 22:48

## 2021-08-10 RX ADMIN — ISOSORBIDE DINITRATE 40 MILLIGRAM(S): 5 TABLET ORAL at 12:29

## 2021-08-10 RX ADMIN — CHLORHEXIDINE GLUCONATE 1 APPLICATION(S): 213 SOLUTION TOPICAL at 09:50

## 2021-08-10 RX ADMIN — Medication 100 MILLIGRAM(S): at 14:49

## 2021-08-10 RX ADMIN — APIXABAN 2.5 MILLIGRAM(S): 2.5 TABLET, FILM COATED ORAL at 18:15

## 2021-08-10 RX ADMIN — Medication 81 MILLIGRAM(S): at 12:28

## 2021-08-10 RX ADMIN — CARVEDILOL PHOSPHATE 12.5 MILLIGRAM(S): 80 CAPSULE, EXTENDED RELEASE ORAL at 14:07

## 2021-08-10 RX ADMIN — Medication 1 DROP(S): at 05:34

## 2021-08-10 RX ADMIN — Medication 5 MILLIGRAM(S): at 22:48

## 2021-08-10 RX ADMIN — Medication 100 MILLIGRAM(S): at 10:07

## 2021-08-10 RX ADMIN — CARVEDILOL PHOSPHATE 12.5 MILLIGRAM(S): 80 CAPSULE, EXTENDED RELEASE ORAL at 12:33

## 2021-08-10 RX ADMIN — APIXABAN 2.5 MILLIGRAM(S): 2.5 TABLET, FILM COATED ORAL at 10:19

## 2021-08-10 RX ADMIN — Medication 1 DROP(S): at 18:15

## 2021-08-10 RX ADMIN — ISOSORBIDE DINITRATE 40 MILLIGRAM(S): 5 TABLET ORAL at 17:50

## 2021-08-10 RX ADMIN — Medication 100 MILLIGRAM(S): at 23:05

## 2021-08-10 RX ADMIN — CLOPIDOGREL BISULFATE 75 MILLIGRAM(S): 75 TABLET, FILM COATED ORAL at 12:28

## 2021-08-10 NOTE — PROGRESS NOTE ADULT - ASSESSMENT
85yoF w/ PMHx significant for HFpEF, HTN, HLD, CAD, NIDDM, was tx to [facility] from [place] after being found to be in ADHF / new acute systolic heart failure, possibly tachycardia induced (new Aflutter) with new DANE, requiring CICU for inotropes / pressors / BiPAP, now off; course c/b acute hypovolemic shock secondary to blood loss anemia requiring PRBC transfusions transferred to floors now s/p L+RHC showing elevated filling pressures, 90% stenosis of mid circumflex now s/p staged PCI with LARISSA x 3 to LCx. S/p ALANNA/DCCV.

## 2021-08-10 NOTE — PROGRESS NOTE ADULT - ASSESSMENT
85 year old female PMH T2DM, HTN, HLD  HTN, HLD, CAD, CKD, (per Dr Ross cardiology in [facility] CAD dx probable based on myocardial profusion scan, May 2021, patient refused cardiac cath) transferred to [facility] from [facility] where she presented with abdominal pain. Patient states she ate  a "bad turkey sandwich" and woke up 7/22 with bad abdominal pain, N/V, and dizziness. She went to ED where she was found to be tachycardic with soft B/P, elevated lactate at 5.9. She received  2 L IVF and sent for CT a/p but be came acutely SOB while laying flat in CT requiring BiPAP. Patient states she was never SOB and that it was anxiety. ECHO reveal new low EF, TTE 2/2019 with normal EF now 25 %. Amyloid work up negative, clear by vascular for Full AC, CBC have stable no further bleeding     1. New onset HFrEF 25% in the setting of tachycardia EF improved on ALANNA 8/9 since admission with rate control, CMP with tachy component, continue with Coreg (GDMT) if not able tolerate would consider Amiodarone for Afib    2. New onset Atrial Flutter/fib with RVR s/p ALANNA DCCV in SR 1st degree AV delay HR 50 -70's, ANAHY 240 ms today on EKG from 284 ms 8/9 Keep K+>4, MG++>2, continue uninterrupted full AC with heparin will need to be on NOAC going home,    3. CAD, 90 % stenosis midcx s/p LHC Status post angioplasty, IVUS and stenting  (drug-eluting/Synergy) of the left circumflex artery with resultant DENNIS 3,  on  triple therapy length of time per cardiology     clear from EP perspective to be discharged home to follow up as out patient with  general cardiologist Dr Ross  EP will sign off  [phone]

## 2021-08-10 NOTE — PROGRESS NOTE ADULT - SUBJECTIVE AND OBJECTIVE BOX
INTERNAL MEDICINE PROGRESS NOTE    Dr. Wilian Doran DO  Attending Physician  Division of Hospital Medicine  [facility]  Available via Microsoft Teams (preferred)  Also available via Pager [phone]    SUBJECTIVE:  No acute complaints.     REVIEW OF SYSTEMS   12 point review of systems negative except for above.     PAST MEDICAL & SURGICAL HISTORY:  HTN (hypertension)    HLD (hyperlipidemia)    DM (diabetes mellitus)    Hyperkalemia    CKD (chronic kidney disease)    Atherosclerosis    HTN (hypertension)    CAD (coronary artery disease)    Stage 4 chronic kidney disease    S/P hysterectomy        MEDICATIONS  (STANDING):  apixaban 2.5 milliGRAM(s) Oral every 12 hours  artificial  tears Solution 1 Drop(s) Both EYES two times a day  aspirin enteric coated 81 milliGRAM(s) Oral daily  atorvastatin 40 milliGRAM(s) Oral at bedtime  carvedilol 12.5 milliGRAM(s) Oral <User Schedule>  chlorhexidine 2% Cloths 1 Application(s) Topical <User Schedule>  clopidogrel Tablet 75 milliGRAM(s) Oral daily  dextrose 50% Injectable 25 Gram(s) IV Push once  dextrose 50% Injectable 25 Gram(s) IV Push once  hydrALAZINE 100 milliGRAM(s) Oral <User Schedule>  insulin lispro (ADMELOG) corrective regimen sliding scale   SubCutaneous three times a day before meals  insulin lispro (ADMELOG) corrective regimen sliding scale   SubCutaneous at bedtime  isosorbide   dinitrate Tablet (ISORDIL) 40 milliGRAM(s) Oral three times a day  polyethylene glycol 3350 17 Gram(s) Oral daily  senna 2 Tablet(s) Oral at bedtime    MEDICATIONS  (PRN):      Allergies    No Known Allergies    Intolerances        T(C): 36.6 (08-10-21 @ 04:30), Max: 36.7 (08-09-21 @ 13:43)  T(F): 97.9 (08-10-21 @ 04:30), Max: 98 (08-09-21 @ 13:43)  HR: 79 (08-10-21 @ 12:17) (58 - 79)  BP: 161/84 (08-10-21 @ 12:17) (135/81 - 161/84)  ABP: --  ABP(mean): --  RR: 18 (08-10-21 @ 04:30) (18 - 18)  SpO2: 99% (08-10-21 @ 04:30) (98% - 99%)    CONSTITUTIONAL: No acute distress.   HEENT:  Conjunctiva clear B/L.  Moist oral mucosa.   Cardiovascular: RRR with no murmurs. No JVD noted. No lower extremity edema B/L. Extremities are warm and well perfused. Radial pulses 2+ B/L. Dorsalis pedis pulses 2+ B/L.    Respiratory: Lungs CTAB. No wrr. No accessory muscle use.   Gastrointestinal:  Soft, nontender. Non-distended. Non-rigid. No CVA tenderness B/L.  Neurologic:  Alert and awake. Moving all extremities. Following commands. Making eye contact. No numbness or tingling.   MSK / Skin: RUE ecchymoses in dependent parts of arm. Improving. Compartments soft.   Psych:  Normal affect. Normal Mood.     LABS                        8.5    5.60  )-----------( 125      ( 10 Aug 2021 08:55 )             27.9     08-10    131<L>  |  97  |  23  ----------------------------<  107<H>  4.1   |  21<L>  |  2.36<H>    Ca    8.9      10 Aug 2021 07:23      PTT - ( 10 Aug 2021 07:23 )  PTT:58.9 sec      ALL RECENT STUDIES REVIEWED INCLUDING REPORTS AVAILABLE     CARE DISCUSSED WITH ALL CONSULTANTS

## 2021-08-10 NOTE — PROGRESS NOTE ADULT - SUBJECTIVE AND OBJECTIVE BOX
24H hour events: feeling better today wants to go home     MEDICATIONS:  apixaban 2.5 milliGRAM(s) Oral every 12 hours  aspirin enteric coated 81 milliGRAM(s) Oral daily  carvedilol 12.5 milliGRAM(s) Oral <User Schedule>  clopidogrel Tablet 75 milliGRAM(s) Oral daily  hydrALAZINE 100 milliGRAM(s) Oral <User Schedule>  isosorbide   dinitrate Tablet (ISORDIL) 40 milliGRAM(s) Oral three times a day  polyethylene glycol 3350 17 Gram(s) Oral daily  senna 2 Tablet(s) Oral at bedtime  atorvastatin 40 milliGRAM(s) Oral at bedtime  dextrose 50% Injectable 25 Gram(s) IV Push once  dextrose 50% Injectable 25 Gram(s) IV Push once  insulin lispro (ADMELOG) corrective regimen sliding scale   SubCutaneous three times a day before meals  insulin lispro (ADMELOG) corrective regimen sliding scale   SubCutaneous at bedtime    artificial  tears Solution 1 Drop(s) Both EYES two times a day  chlorhexidine 2% Cloths 1 Application(s) Topical <User Schedule>    REVIEW OF SYSTEMS:  See HPI, otherwise ROS negative.    PHYSICAL EXAM:  T(C): 36.6 (08-10-21 @ 04:30), Max: 36.7 (08-09-21 @ 13:43)  HR: 75 (08-10-21 @ 10:07) (58 - 79)  BP: 144/62 (08-10-21 @ 10:07) (135/81 - 159/72)  RR: 18 (08-10-21 @ 04:30) (18 - 18)  SpO2: 99% (08-10-21 @ 04:30) (98% - 99%)    I&O's Summary    09 Aug 2021 07:01  -  10 Aug 2021 07:00  --------------------------------------------------------  IN: 550 mL / OUT: 0 mL / NET: 550 mL    Appearance: Alert. NAD	  Cardiovascular: +S1S2 RRR no m/g/r  Respiratory: CTA B/L	  Psychiatry: A & O x 3, Mood & affect appropriate  Gastrointestinal:  Soft, NT. ND. +BS	  Skin: No rashes	  Neurologic: Non-focal  Extremities: No edema BLE  Vascular: Peripheral pulses palpable 2+ bilaterally    TELEMETRY:   SR 50 -70 , one episode of PAF lasing 7.69 seconds with rate to 129 bpm	    ECG:  	  SR 1st degree AV delay 240 ms    LABS:	 	    CBC Full  -  ( 10 Aug 2021 08:55 )  WBC Count : 5.60 K/uL  Hemoglobin : 8.5 g/dL  Hematocrit : 27.9 %  Platelet Count - Automated : 125 K/uL  Mean Cell Volume : 97.9 fl  Mean Cell Hemoglobin : 29.8 pg  Mean Cell Hemoglobin Concentration : 30.5 gm/dL  Auto Neutrophil # : x  Auto Lymphocyte # : x  Auto Monocyte # : x  Auto Eosinophil # : x  Auto Basophil # : x  Auto Neutrophil % : x  Auto Lymphocyte % : x  Auto Monocyte % : x  Auto Eosinophil % : x  Auto Basophil % : x    08-10    131<L>  |  97  |  23  ----------------------------<  107<H>  4.1   |  21<L>  |  2.36<H>  08-09    133<L>  |  98  |  20  ----------------------------<  115<H>  4.1   |  22  |  1.96<H>    Ca    8.9      10 Aug 2021 07:23  Ca    8.8      09 Aug 2021 07:23    Immunoglobulin Free Light Chains, Serum (07.28.21 @ 08:47)   GEOVANNY Kappa: 1.86 mg/dL   GEOVANNY Lambda: 2.79 mg/dL   Kappa/Lambda Free Light Chain Ratio, Serum: 0.67 Ratio   proBNP: Serum Pro-Brain Natriuretic Peptide (07.25.21 @ 15:54)   Serum Pro-Brain Natriuretic Peptide: 13481 pg/mL   TSH: Thyroid Stimulating Hormone, Serum: 2.62 uIU/mL (07.26.21 @ 20:44)     RADIOLOGY:       NM Amyloidosis SPECT/CT, Single Area Single Day (07.28.21 @ 13:29) >  IMPRESSION: Cardiac amyloid Imaging study is  not suggestive of transthyretin cardiac amyloidosis.

## 2021-08-10 NOTE — PROGRESS NOTE ADULT - PROBLEM SELECTOR PLAN 5
- requiring PRBC transfusion during hospitalization for hypovolemic shock  - A-line access bleeding now appears stable; RUE duplex without evidence of active bleeding or pseudoaneurysm, although there is a hematoma; no e/o compartment syndrome; CTA deferred given DANE and low suspicion for active blood loss.  - cleared by vascular surgery to resume hep gtt as above, which was started on 8/1. Hgb stable. S/p ALANNA/DCCV on 8/9. NSR now. Hep drip to Eliquis on 8/10.

## 2021-08-10 NOTE — PROGRESS NOTE ADULT - PROBLEM SELECTOR PLAN 4
- Per outpatient records, SCr was 1.14 on 5/21/21  - May be related to ATN given episodes of hypotension and now likely SHANE  - Cr had peaked to 2.7 and improved to 1.4, now 2.36

## 2021-08-10 NOTE — PROGRESS NOTE ADULT - SUBJECTIVE AND OBJECTIVE BOX
[facility]   [phone]  RENAL FOLLOW UP NOTE  --------------------------------------------------------------------------------  Chief Complaint:    24 hour events/subjective:    Patient seen and examined   madeleine george cp    PAST HISTORY  --------------------------------------------------------------------------------  No significant changes to PMH, PSH, FHx, SHx, unless otherwise noted    ALLERGIES & MEDICATIONS  --------------------------------------------------------------------------------  Allergies    No Known Allergies    Intolerances      Standing Inpatient Medications  apixaban 2.5 milliGRAM(s) Oral every 12 hours  artificial  tears Solution 1 Drop(s) Both EYES two times a day  aspirin enteric coated 81 milliGRAM(s) Oral daily  atorvastatin 40 milliGRAM(s) Oral at bedtime  carvedilol 12.5 milliGRAM(s) Oral every 12 hours  chlorhexidine 2% Cloths 1 Application(s) Topical <User Schedule>  clopidogrel Tablet 75 milliGRAM(s) Oral daily  dextrose 50% Injectable 25 Gram(s) IV Push once  dextrose 50% Injectable 25 Gram(s) IV Push once  hydrALAZINE 100 milliGRAM(s) Oral <User Schedule>  insulin lispro (ADMELOG) corrective regimen sliding scale   SubCutaneous three times a day before meals  insulin lispro (ADMELOG) corrective regimen sliding scale   SubCutaneous at bedtime  isosorbide   dinitrate Tablet (ISORDIL) 40 milliGRAM(s) Oral three times a day  polyethylene glycol 3350 17 Gram(s) Oral daily  senna 2 Tablet(s) Oral at bedtime    PRN Inpatient Medications      REVIEW OF SYSTEMS  --------------------------------------------------------------------------------    Gen: denies fevers/chills,  CVS: denies chest pain/palpitations  Resp: denies SOB/Cough  GI: Denies N/V/Abd pain  : Denies dysuria/oliguria/hematuria    All other systems were reviewed and are negative, except as noted.    VITALS/PHYSICAL EXAM  --------------------------------------------------------------------------------  T(C): 36.9 (08-10-21 @ 13:35), Max: 36.9 (08-10-21 @ 13:35)  HR: 62 (08-10-21 @ 13:35) (58 - 79)  BP: 153/67 (08-10-21 @ 13:35) (135/81 - 161/84)  RR: 18 (08-10-21 @ 13:35) (18 - 18)  SpO2: 97% (08-10-21 @ 13:35) (97% - 99%)  Wt(kg): --  Height (cm): 160 (08-10-21 @ 10:18)  Weight (kg): 59.9 (08-10-21 @ 10:18)  BMI (kg/m2): 23.4 (08-10-21 @ 10:18)  BSA (m2): 1.62 (08-10-21 @ 10:18)      08-09-21 @ 07:01  -  08-10-21 @ 07:00  --------------------------------------------------------  IN: 550 mL / OUT: 0 mL / NET: 550 mL    08-10-21 @ 07:01  -  08-10-21 @ 17:26  --------------------------------------------------------  IN: 360 mL / OUT: 0 mL / NET: 360 mL      Physical Exam:  	              Gen: Non toxic comfortable appearing   	Pulm: decrease breath sounds, no rales or ronchi or wheezing  	CV: +JVD. RRR, S1S2;  	Abd: +BS, soft, nontender/nondistended  	: No suprapubic tenderness  	UE: Warm, no cyanosis  no clubbing, no edema   	LE: Warm, no cyanosis  no clubbing, 1+ edema  	Neuro: alert and oriented. speech coherent    LABS/STUDIES  --------------------------------------------------------------------------------              8.5    5.60  >-----------<  125      [08-10-21 @ 08:55]              27.9     131  |  97  |  23  ----------------------------<  107      [08-10-21 @ 07:23]  4.1   |  21  |  2.36        Ca     8.9     [08-10-21 @ 07:23]      Mg     2.2     [08-10-21 @ 07:23]        PTT: 58.9       [08-10-21 @ 07:23]      Creatinine Trend:  SCr 2.36 [08-10 @ 07:23]  SCr 1.96 [08-09 @ 07:23]  SCr 1.68 [08-08 @ 06:27]  SCr 1.48 [08-07 @ 10:37]  SCr 1.47 [08-06 @ 07:07]              Urinalysis - [07-24-21 @ 05:01]      Color Yellow / Appearance Slightly Turbid / SG 1.025 / pH 5.0      Gluc Negative / Ketone Trace  / Bili Negative / Urobili 4       Blood Moderate / Protein 100 / Leuk Est Small / Nitrite Negative      RBC 25-50 / WBC 11-25 / Hyaline  / Gran 0-2 / Sq Epi  / Non Sq Epi Moderate / Bacteria Moderate      TSH 2.62      [07-26-21 @ 20:44]  Lipid: chol 68, TG 48, HDL 32, LDL --      [07-25-21 @ 20:34]    Free Light Chains: kappa 1.86, lambda 2.79, ratio = 0.67      [07-28 @ 08:47]

## 2021-08-10 NOTE — PROGRESS NOTE ADULT - PROBLEM SELECTOR PLAN 2
- s/p PCI to [facility] [place] 8/6  - BB and Nitrate as above  - continue with ASA, Plavix, Statin, BB and Nitrate   - recommend to continue with triple therapy for once month followed by NOAC and Plavix thereafter

## 2021-08-10 NOTE — PROGRESS NOTE ADULT - SUBJECTIVE AND OBJECTIVE BOX
Subjective:  - episode of pAF rate of 129 lasting 7.7 seconds yesterday evening, asymptomatic  - denies LH/dizziness, CP, palpitations and VEGA. Eager to return home     Medications:  apixaban 2.5 milliGRAM(s) Oral every 12 hours  artificial  tears Solution 1 Drop(s) Both EYES two times a day  aspirin enteric coated 81 milliGRAM(s) Oral daily  atorvastatin 40 milliGRAM(s) Oral at bedtime  carvedilol 25 milliGRAM(s) Oral <User Schedule>  chlorhexidine 2% Cloths 1 Application(s) Topical <User Schedule>  clopidogrel Tablet 75 milliGRAM(s) Oral daily  dextrose 50% Injectable 25 Gram(s) IV Push once  dextrose 50% Injectable 25 Gram(s) IV Push once  hydrALAZINE 100 milliGRAM(s) Oral <User Schedule>  insulin lispro (ADMELOG) corrective regimen sliding scale   SubCutaneous three times a day before meals  insulin lispro (ADMELOG) corrective regimen sliding scale   SubCutaneous at bedtime  isosorbide   dinitrate Tablet (ISORDIL) 40 milliGRAM(s) Oral three times a day  polyethylene glycol 3350 17 Gram(s) Oral daily  senna 2 Tablet(s) Oral at bedtime      Physical Exam:    Vitals:  Vital Signs Last 24 Hours  T(C): 36.9 (08-10-21 @ 13:35), Max: 36.9 (08-10-21 @ 13:35)  HR: 62 (08-10-21 @ 13:35) (58 - 79)  BP: 153/67 (08-10-21 @ 13:35) (135/81 - 161/84)  RR: 18 (08-10-21 @ 13:35) (18 - 18)  SpO2: 97% (08-10-21 @ 13:35) (97% - 99%)    Weight in k.4 (08-10 @ 04:30)    I&O's Summary    09 Aug 2021 07:01  -  10 Aug 2021 07:00  --------------------------------------------------------  IN: 550 mL / OUT: 0 mL / NET: 550 mL    Tele: SB/SR 50-70s    General: No distress. Comfortable.  HEENT: EOM intact.  Neck: JVP 12-14 cm H2O  Respiratory: Clear to auscultation bilaterally  CV: Irregularly irregular. Normal S1 and S2. No murmurs, rub, or gallops. Radial pulses normal.  Abdomen: Soft, non-distended, non-tender  Skin: No skin lesions  Extremities: Warm, +1 bilateral LE edema  Neurology: Non-focal, alert and oriented times three.   Psych: Affect normal    Labs:                        8.5    5.60  )-----------( 125      ( 10 Aug 2021 08:55 )             27.9     08-10    131<L>  |  97  |  23  ----------------------------<  107<H>  4.1   |  21<L>  |  2.36<H>    Ca    8.9      10 Aug 2021 07:23  Mg     2.2     08-10      PTT - ( 10 Aug 2021 07:23 )  PTT:58.9 sec

## 2021-08-10 NOTE — PROGRESS NOTE ADULT - PROBLEM SELECTOR PLAN 2
s/p L+RHC showing elevated filling pressures, 90% stenosis of mid circumflex   - s/p staged PCI/LARISSA of left circumflex    - c/w ASA 81mg daily + Plavix 75mg daily  - c/w high intensity statin  - c/w carvedilol as above  - home ARB on hold in setting of DANE on CKD  - Triple therapy mgmt with asa, plavix, and eliquis per cardiology.

## 2021-08-10 NOTE — PROGRESS NOTE ADULT - ATTENDING COMMENTS
Patient is an 85 year old female with history of HFpEF, HTN, CAD, DM and CKD who presented with cardiogenic shock and new onset Aflutter. Her EF on this admission was noted to drop to 25%.  She initially was on levophed and milrinone which have been weaned off now. She underwent LHC which showed 90% Lcx lesion which was stented in a staged fashion. Today she underwent ALANNA/cardioversion  #ICM  -c/w coreg 12.5mg BID  - c/w hydralazine 100mg TID and isordil 30mg TID  - c/w aspirin and plavix for recent stent    #Aflutter  - s/p ALANNA/cardioversion today  - transition to NOAC today, will likely do triple therapy for 1 month and then plavix with noac afterwards    #DANE on CKD  - noted to have worsening DANE likely in the setting of contrast load, will continue to monitor tomorrow. Patient is warm and euvolemic on exam, unlikely due to heart failure

## 2021-08-10 NOTE — PROGRESS NOTE ADULT - ASSESSMENT
85 F w/ PMH HFpEF, HTN, HLD, CAD, C2D, DM transferred to [facility] from OSH. pt was admitted. found to in chf/cardiogenic shoc.  with acute hypovolemic shock secondary to anemia requiring blood transfusions.   pt also had NEMESIO being considered for coronary angiogram in am. acute hypovolemic shock secondary to anemia requiring blood transfusions.    1- NEMESIO   2- CHF  3- proteinuria   4- DM     Nemesio in setting of cardiogenic shock likely   creatinine improving but s/p coronary angio with 200 cc iv contrast,   creatinine worsening today  diuretics on hold   restart diuretics once creatinine stabilizes.  continue hydralazine 100 mg tid   anemia, trend hgb  strict I/O  monitor creatinine and electrolytes daily  heart failure team following

## 2021-08-10 NOTE — PROGRESS NOTE ADULT - ASSESSMENT
84 y/o female with h/o chronic HFpEF, HTN, DLP, CAD (h/o abnormal stress test), DM 2 and CKD 2 who presented to [facility] with nausea, vomiting and dizziness. She was found to be tachycardic, hypotensive with elevated lactate concerning for cardiogenic shock. She was given IVF, empiric antibiotics and underwent abd CT which ws unremarkable. Her ekg showed new onset aflutter and her TTE showed newly diagnosed LV systolic dysfunction with LVEF 25%, mod MR and mod TR. She was started on milrinone and levophed gtt. She was transferred to [facility] for further management. Her hospital course was complicated by tachypnea requiring bipap placement, acute anemia requiring PRBCs transfusion thought to be due bleeding from arterial line/heparin gtt leading to RUE hematoma. Her milrinone gtt was dc’ed 7/26 due to hypotension. CVP prior to transfer out of CICU had been low and received gentle IV fluid hydration.     She underwent L/RHC 8/4 which showed 90% stenosis of mLCx, mildly elevated filling pressures and preserved cardiac output. Underwent staged PCI to [facility] on 8/6. Also underwent ALANNA/DCCV 8/9 and now in SR HR 50-70s but with an episode of pAF lasting nearly 8 seconds yesterday evening. Her DANE had been improving but more recently following repeated contrast load is now uptrending. Mildly elevated central filling pressures on exam. Will continue to follow Cr and decide on appropriate diuretic regimen tomorrow. She has been tolerating gradual escalation of afterload reduction though remains hypertensive for her degree of systolic dysfunction. Drop in Hgb today without evidence of bleed but repeat serologies stable. She remains on heparin infusion for AC.     Pertinent Cardiac Studies  [facility] 8/4/21: RA 13 (v16), PA 59/29/34, PCWP 18 (v21), LVEDP 18, PA 59%, Ao 95%, [place] CO/CI 5.1/3.1, /50/83  [facility] 8/4/21: mCx diffuse 90% stenosis, pCx 40% stenosis, pLAD 40% stenosis, mLAD 30% stenosis, OM1 40% stenosis, OM2 40% stenosis, pRCA 30% stenosis, mRCA 50% stenosis, dRCA 20% stenosis, RPDA 30% stenosis, normal LM  TTE 7/25: LVIDd 4.4 cm, LVEF 29% (global), normal RV size with decreased systolic function, mod dilated LA, mild-mod MR, calcified AV with grossly mod decreased opening (peak/mean gradient 15/9 respectively), mild TR, est RVSP 31 mmHg    Please Call [facility] HF Spectra #[phone] for any questions or concerns

## 2021-08-10 NOTE — PROGRESS NOTE ADULT - PROBLEM SELECTOR PLAN 1
- to decide on diuretic regimen based on repeat labs tomorrow  - continue Coreg 12.5 mg BID; SB/SR 50-70s without a device  - continue HDZN 100 mg TID and ISDN 40 mg TID  - deferring RASSi and MRA given DANE  - daily standing weights and strict I&Os

## 2021-08-10 NOTE — PROGRESS NOTE ADULT - PROBLEM SELECTOR PLAN 1
newly diagnosed HFrEF (had previous hx of HFpEF)  - HF following, recs greatly appreciated.   - continue to optimize GDMT as tolerated  - CW hydralazine 100mg TID and increased isordil to 40mg TID  - carvedilol at 12.5mg q12h -- case discussed with HF and agree with uptitration of coreg.   - hold diuretics till Cr stabilizes.   - NM amyloid scan not suggestive of cardiac amyloidosis  - s/p L+RHC showing elevated filling pressures, 90% stenosis of mid circumflex newly diagnosed HFrEF (had previous hx of HFpEF)  - HF following, recs greatly appreciated.   - continue to optimize GDMT as tolerated  - CW hydralazine 100mg TID and increased isordil to 40mg TID  - Carvedilol at 12.5mg q12h -- EP request to keep at current dose for now. We can continue our discussion with EP and HF. Shared decision making.   - hold diuretics till Cr stabilizes.   - NM amyloid scan not suggestive of cardiac amyloidosis  - s/p L+RHC showing elevated filling pressures, 90% stenosis of mid circumflex

## 2021-08-10 NOTE — PROGRESS NOTE ADULT - PROBLEM SELECTOR PLAN 5
- s/p 2U PRBC and Hgb currently stable  - RUE duplex without evidence of active bleeding or pseudoaneurysm  - RUE hematoma improving on exam, soft without pain/discomfort   - continue to follow trend

## 2021-08-10 NOTE — PROGRESS NOTE ADULT - ATTENDING COMMENTS
Seen, examined, and  agree with above as scribed by NP Odilon israel cont to worsen due to contrast nephropathy and she is fluid overloaded already   in am if fluid status cont to worsen may need to consider diuretics soon hold ivf today

## 2021-08-11 LAB
ANION GAP SERPL CALC-SCNC: 15 MMOL/L — SIGNIFICANT CHANGE UP (ref 5–17)
BUN SERPL-MCNC: 25 MG/DL — HIGH (ref 7–23)
CALCIUM SERPL-MCNC: 9.1 MG/DL — SIGNIFICANT CHANGE UP (ref 8.4–10.5)
CHLORIDE SERPL-SCNC: 98 MMOL/L — SIGNIFICANT CHANGE UP (ref 96–108)
CO2 SERPL-SCNC: 20 MMOL/L — LOW (ref 22–31)
CREAT SERPL-MCNC: 2.3 MG/DL — HIGH (ref 0.5–1.3)
GLUCOSE BLDC GLUCOMTR-MCNC: 110 MG/DL — HIGH (ref 70–99)
GLUCOSE BLDC GLUCOMTR-MCNC: 115 MG/DL — HIGH (ref 70–99)
GLUCOSE BLDC GLUCOMTR-MCNC: 146 MG/DL — HIGH (ref 70–99)
GLUCOSE BLDC GLUCOMTR-MCNC: 149 MG/DL — HIGH (ref 70–99)
GLUCOSE SERPL-MCNC: 90 MG/DL — SIGNIFICANT CHANGE UP (ref 70–99)
HCT VFR BLD CALC: 29.6 % — LOW (ref 34.5–45)
HGB BLD-MCNC: 9.1 G/DL — LOW (ref 11.5–15.5)
MCHC RBC-ENTMCNC: 29.4 PG — SIGNIFICANT CHANGE UP (ref 27–34)
MCHC RBC-ENTMCNC: 30.7 GM/DL — LOW (ref 32–36)
MCV RBC AUTO: 95.8 FL — SIGNIFICANT CHANGE UP (ref 80–100)
NRBC # BLD: 0 /100 WBCS — SIGNIFICANT CHANGE UP (ref 0–0)
PLATELET # BLD AUTO: 144 K/UL — LOW (ref 150–400)
POTASSIUM SERPL-MCNC: 3.8 MMOL/L — SIGNIFICANT CHANGE UP (ref 3.5–5.3)
POTASSIUM SERPL-SCNC: 3.8 MMOL/L — SIGNIFICANT CHANGE UP (ref 3.5–5.3)
RBC # BLD: 3.09 M/UL — LOW (ref 3.8–5.2)
RBC # FLD: 19.2 % — HIGH (ref 10.3–14.5)
SODIUM SERPL-SCNC: 133 MMOL/L — LOW (ref 135–145)
WBC # BLD: 4.83 K/UL — SIGNIFICANT CHANGE UP (ref 3.8–10.5)
WBC # FLD AUTO: 4.83 K/UL — SIGNIFICANT CHANGE UP (ref 3.8–10.5)

## 2021-08-11 PROCEDURE — 99233 SBSQ HOSP IP/OBS HIGH 50: CPT

## 2021-08-11 PROCEDURE — 99232 SBSQ HOSP IP/OBS MODERATE 35: CPT

## 2021-08-11 RX ORDER — POTASSIUM CHLORIDE 20 MEQ
20 PACKET (EA) ORAL ONCE
Refills: 0 | Status: COMPLETED | OUTPATIENT
Start: 2021-08-11 | End: 2021-08-11

## 2021-08-11 RX ORDER — AMLODIPINE BESYLATE 2.5 MG/1
5 TABLET ORAL DAILY
Refills: 0 | Status: DISCONTINUED | OUTPATIENT
Start: 2021-08-11 | End: 2021-08-14

## 2021-08-11 RX ADMIN — AMLODIPINE BESYLATE 5 MILLIGRAM(S): 2.5 TABLET ORAL at 17:24

## 2021-08-11 RX ADMIN — CLOPIDOGREL BISULFATE 75 MILLIGRAM(S): 75 TABLET, FILM COATED ORAL at 12:03

## 2021-08-11 RX ADMIN — Medication 100 MILLIGRAM(S): at 16:01

## 2021-08-11 RX ADMIN — ATORVASTATIN CALCIUM 40 MILLIGRAM(S): 80 TABLET, FILM COATED ORAL at 21:47

## 2021-08-11 RX ADMIN — Medication 100 MILLIGRAM(S): at 23:19

## 2021-08-11 RX ADMIN — ISOSORBIDE DINITRATE 40 MILLIGRAM(S): 5 TABLET ORAL at 12:02

## 2021-08-11 RX ADMIN — Medication 81 MILLIGRAM(S): at 12:02

## 2021-08-11 RX ADMIN — CARVEDILOL PHOSPHATE 12.5 MILLIGRAM(S): 80 CAPSULE, EXTENDED RELEASE ORAL at 17:25

## 2021-08-11 RX ADMIN — Medication 20 MILLIEQUIVALENT(S): at 09:05

## 2021-08-11 RX ADMIN — ISOSORBIDE DINITRATE 40 MILLIGRAM(S): 5 TABLET ORAL at 16:01

## 2021-08-11 RX ADMIN — Medication 100 MILLIGRAM(S): at 09:06

## 2021-08-11 RX ADMIN — Medication 1 DROP(S): at 05:53

## 2021-08-11 RX ADMIN — CARVEDILOL PHOSPHATE 12.5 MILLIGRAM(S): 80 CAPSULE, EXTENDED RELEASE ORAL at 05:54

## 2021-08-11 RX ADMIN — Medication 1 DROP(S): at 17:24

## 2021-08-11 RX ADMIN — APIXABAN 2.5 MILLIGRAM(S): 2.5 TABLET, FILM COATED ORAL at 05:54

## 2021-08-11 RX ADMIN — ISOSORBIDE DINITRATE 40 MILLIGRAM(S): 5 TABLET ORAL at 05:53

## 2021-08-11 RX ADMIN — APIXABAN 2.5 MILLIGRAM(S): 2.5 TABLET, FILM COATED ORAL at 17:24

## 2021-08-11 RX ADMIN — CHLORHEXIDINE GLUCONATE 1 APPLICATION(S): 213 SOLUTION TOPICAL at 06:17

## 2021-08-11 NOTE — PROGRESS NOTE ADULT - PROBLEM SELECTOR PLAN 2
- [place] [facility] [place]  - BB and Nitrate as above  - continue with ASA, Plavix, Statin, BB and Nitrate   - recommend to continue with triple therapy for one month followed by NOAC and Plavix thereafter

## 2021-08-11 NOTE — PROGRESS NOTE ADULT - ATTENDING COMMENTS
Patient is an 85 year old female with history of HFpEF, HTN, CAD, DM and CKD who presented with cardiogenic shock and new onset Aflutter. Her EF on this admission was noted to drop to 25%.  She initially was on levophed and milrinone which have been weaned off now. She underwent LHC which showed 90% Lcx lesion which was stented in a staged fashion. Today she underwent ALANNA/cardioversion  #ICM  -c/w coreg 12.5mg BID  - c/w hydralazine 100mg TID and isordil 30mg TID  - c/w aspirin and plavix for recent stent  #Aflutter  - s/p ALANNA/cardioversion today  -on eliquis now , will likely do triple therapy for 1 month and then plavix with eliquis afterwards  #DANE on CKD  - Creatinine is 2.3 which is same as yesterday, this is likely the plateau. DANE is likely from contrast load from the cath

## 2021-08-11 NOTE — PROGRESS NOTE ADULT - SUBJECTIVE AND OBJECTIVE BOX
Subjective:  - episode of brief PAT this AM, otherwise SB/SR 50-60s  - feeling well without cardiac/respiratory complaints    Medications:  amLODIPine   Tablet 5 milliGRAM(s) Oral daily  apixaban 2.5 milliGRAM(s) Oral every 12 hours  artificial  tears Solution 1 Drop(s) Both EYES two times a day  aspirin enteric coated 81 milliGRAM(s) Oral daily  atorvastatin 40 milliGRAM(s) Oral at bedtime  carvedilol 12.5 milliGRAM(s) Oral every 12 hours  chlorhexidine 2% Cloths 1 Application(s) Topical <User Schedule>  clopidogrel Tablet 75 milliGRAM(s) Oral daily  dextrose 50% Injectable 25 Gram(s) IV Push once  dextrose 50% Injectable 25 Gram(s) IV Push once  hydrALAZINE 100 milliGRAM(s) Oral <User Schedule>  insulin lispro (ADMELOG) corrective regimen sliding scale   SubCutaneous three times a day before meals  insulin lispro (ADMELOG) corrective regimen sliding scale   SubCutaneous at bedtime  isosorbide   dinitrate Tablet (ISORDIL) 40 milliGRAM(s) Oral three times a day  polyethylene glycol 3350 17 Gram(s) Oral daily  senna 2 Tablet(s) Oral at bedtime      Physical Exam:    Vitals:  Vital Signs Last 24 Hours  T(C): 36.4 (21 @ 11:20), Max: 36.6 (08-10-21 @ 20:43)  HR: 63 (21 @ 11:20) (58 - 68)  BP: 161/72 (21 @ 11:20) (147/73 - 170/68)  RR: 18 (21 @ 11:20) (18 - 18)  SpO2: 98% (21 @ 11:20) (98% - 99%)    Weight in k.4 ( @ 06:00)    I&O's Summary    10 Aug 2021 07:  -  11 Aug 2021 07:00  --------------------------------------------------------  IN: 540 mL / OUT: 0 mL / NET: 540 mL    11 Aug 2021 07:01  -  11 Aug 2021 14:52  --------------------------------------------------------  IN: 360 mL / OUT: 0 mL / NET: 360 mL    Tele: SB/SR 50-60s    General: No distress. Comfortable.  HEENT: EOM intact.  Neck: JVP does not appear elevated on exam  Respiratory: Clear to auscultation bilaterally  CV: Irregularly irregular. Normal S1 and S2. No murmurs, rub, or gallops. Radial pulses normal.  Abdomen: Soft, non-distended, non-tender  Skin: No skin lesions  Extremities: Warm, +1 bilateral LE edema  Neurology: Non-focal, alert and oriented times three.   Psych: Affect normal    Labs:                        9.1    4.83  )-----------( 144      ( 11 Aug 2021 06:52 )             29.6     08-11    133<L>  |  98  |  25<H>  ----------------------------<  90  3.8   |  20<L>  |  2.30<H>    Ca    9.1      11 Aug 2021 06:51  Mg     2.2     08-10      PTT - ( 10 Aug 2021 07:23 )  PTT:58.9 sec

## 2021-08-11 NOTE — PROGRESS NOTE ADULT - ASSESSMENT
85 F w/ PMH HFpEF, HTN, HLD, CAD, C2D, DM transferred to [facility] from OSH. pt was admitted. found to in chf/cardiogenic shoc.  with acute hypovolemic shock secondary to anemia requiring blood transfusions.   pt also had NEMESIO being considered for coronary angiogram in am. acute hypovolemic shock secondary to anemia requiring blood transfusions.    1- NEMESIO   2- CHF  3- proteinuria   4- DM     Nemesio in setting of cardiogenic shock likely   creatinine improving but s/p coronary angio with 200 cc iv contrast,   creatinine seems to have plateau today   diuretics on hold   will restart diuretics soon given pt is becoming fluid overloaded   continue hydralazine 100 mg tid   anemia, trend hgb  strict I/O  monitor creatinine and electrolytes daily  heart failure team following

## 2021-08-11 NOTE — PROGRESS NOTE ADULT - PROBLEM SELECTOR PLAN 1
- to defer on loop diuretics for now  - start Amlodipine 5 mg QD for further afterload reduction  - continue Coreg 12.5 mg BID; SB/SR 50-60s without a device  - continue HDZN 100 mg TID and ISDN 40 mg TID  - deferring RASSi given DANE  - daily standing weights and strict I&Os

## 2021-08-11 NOTE — PROGRESS NOTE ADULT - SUBJECTIVE AND OBJECTIVE BOX
[facility]   [phone]  RENAL FOLLOW UP NOTE  --------------------------------------------------------------------------------  Chief Complaint:    24 hour events/subjective:    seen earlier. states has no worsening sob     PAST HISTORY  --------------------------------------------------------------------------------  No significant changes to PMH, PSH, FHx, SHx, unless otherwise noted    ALLERGIES & MEDICATIONS  --------------------------------------------------------------------------------  Allergies    No Known Allergies    Intolerances      Standing Inpatient Medications  amLODIPine   Tablet 5 milliGRAM(s) Oral daily  apixaban 2.5 milliGRAM(s) Oral every 12 hours  artificial  tears Solution 1 Drop(s) Both EYES two times a day  aspirin enteric coated 81 milliGRAM(s) Oral daily  atorvastatin 40 milliGRAM(s) Oral at bedtime  carvedilol 12.5 milliGRAM(s) Oral every 12 hours  chlorhexidine 2% Cloths 1 Application(s) Topical <User Schedule>  clopidogrel Tablet 75 milliGRAM(s) Oral daily  dextrose 50% Injectable 25 Gram(s) IV Push once  dextrose 50% Injectable 25 Gram(s) IV Push once  hydrALAZINE 100 milliGRAM(s) Oral <User Schedule>  insulin lispro (ADMELOG) corrective regimen sliding scale   SubCutaneous three times a day before meals  insulin lispro (ADMELOG) corrective regimen sliding scale   SubCutaneous at bedtime  isosorbide   dinitrate Tablet (ISORDIL) 40 milliGRAM(s) Oral three times a day  polyethylene glycol 3350 17 Gram(s) Oral daily  senna 2 Tablet(s) Oral at bedtime    PRN Inpatient Medications      REVIEW OF SYSTEMS  --------------------------------------------------------------------------------    Gen: denies  fevers/chills,  CVS: denies chest pain/palpitations  Resp: denies SOB/Cough  GI: Denies N/V/Abd pain  : Denies dysuria      VITALS/PHYSICAL EXAM  --------------------------------------------------------------------------------  T(C): 36.6 (08-11-21 @ 20:52), Max: 36.6 (08-11-21 @ 04:40)  HR: 55 (08-11-21 @ 20:52) (55 - 68)  BP: 145/70 (08-11-21 @ 20:52) (128/63 - 170/68)  RR: 18 (08-11-21 @ 20:52) (18 - 18)  SpO2: 99% (08-11-21 @ 20:52) (98% - 100%)  Wt(kg): --  Height (cm): 160 (08-10-21 @ 10:18)  Weight (kg): 59.9 (08-10-21 @ 10:18)  BMI (kg/m2): 23.4 (08-10-21 @ 10:18)  BSA (m2): 1.62 (08-10-21 @ 10:18)      08-10-21 @ 07:01  -  08-11-21 @ 07:00  --------------------------------------------------------  IN: 540 mL / OUT: 0 mL / NET: 540 mL    08-11-21 @ 07:01  -  08-11-21 @ 21:15  --------------------------------------------------------  IN: 580 mL / OUT: 0 mL / NET: 580 mL      Physical Exam:  		              Gen: Non toxic comfortable appearing   	Pulm: decrease breath sounds, no rales or ronchi or wheezing  	CV: +JVD. RRR, S1S2;  	Abd: +BS, soft, nontender/nondistended  	: No suprapubic tenderness  	UE: Warm, no cyanosis  no clubbing, no edema   	LE: Warm, no cyanosis  no clubbing, 1-2-  edema  	Neuro: alert and oriented. speech coherent      LABS/STUDIES  --------------------------------------------------------------------------------              9.1    4.83  >-----------<  144      [08-11-21 @ 06:52]              29.6     133  |  98  |  25  ----------------------------<  90      [08-11-21 @ 06:51]  3.8   |  20  |  2.30        Ca     9.1     [08-11-21 @ 06:51]      Mg     2.2     [08-10-21 @ 07:23]        PTT: 58.9       [08-10-21 @ 07:23]      Creatinine Trend:  SCr 2.30 [08-11 @ 06:51]  SCr 2.36 [08-10 @ 07:23]  SCr 1.96 [08-09 @ 07:23]  SCr 1.68 [08-08 @ 06:27]  SCr 1.48 [08-07 @ 10:37]              Urinalysis - [07-24-21 @ 05:01]      Color Yellow / Appearance Slightly Turbid / SG 1.025 / pH 5.0      Gluc Negative / Ketone Trace  / Bili Negative / Urobili 4       Blood Moderate / Protein 100 / Leuk Est Small / Nitrite Negative      RBC 25-50 / WBC 11-25 / Hyaline  / Gran 0-2 / Sq Epi  / Non Sq Epi Moderate / Bacteria Moderate      TSH 2.62      [07-26-21 @ 20:44]  Lipid: chol 68, TG 48, HDL 32, LDL --      [07-25-21 @ 20:34]    Free Light Chains: kappa 1.86, lambda 2.79, ratio = 0.67      [07-28 @ 08:47]

## 2021-08-11 NOTE — PROGRESS NOTE ADULT - PROBLEM SELECTOR PLAN 5
- s/p 2U PRBC and Hgb currently stable  - RUE duplex without evidence of active bleeding or pseudoaneurysm  - RUE hematoma improving on exam, soft without pain/discomfort   - continue to follow trend on NOAC

## 2021-08-11 NOTE — PROGRESS NOTE ADULT - PROBLEM SELECTOR PLAN 4
- Per outpatient records, SCr was 1.14 on 5/21/21  - May be related to ATN given episodes of hypotension and now likely SHANE  - Cr had peaked to 2.7 then improved to 1.4, now 2.3 and unchanged from yesterday

## 2021-08-11 NOTE — PROGRESS NOTE ADULT - ASSESSMENT
85yoF w/ PMHx significant for HFpEF, HTN, HLD, CAD, NIDDM, was tx to [facility] from [place] after being found to be in ADHF / new acute systolic heart failure, possibly tachycardia induced (new Aflutter) with new DANE, requiring CICU for inotropes / pressors / BiPAP, now off; course c/b acute hypovolemic shock secondary to blood loss anemia requiring PRBC transfusions transferred to floors now s/p L+RHC showing elevated filling pressures, 90% stenosis of mid circumflex now s/p staged PCI with LARISSA x 3 to LCx. S/p ALANNA/DCCV.

## 2021-08-11 NOTE — PROGRESS NOTE ADULT - ASSESSMENT
84 y/o female with h/o chronic HFpEF, HTN, DLP, CAD (h/o abnormal stress test), DM 2 and CKD 2 who presented to [facility] with nausea, vomiting and dizziness. She was found to be tachycardic, hypotensive with elevated lactate concerning for cardiogenic shock. She was given IVF, empiric antibiotics and underwent abd CT which ws unremarkable. Her ekg showed new onset aflutter and her TTE showed newly diagnosed LV systolic dysfunction with LVEF 25%, mod MR and mod TR. She was started on milrinone and levophed gtt. She was transferred to [facility] for further management. Her hospital course was complicated by tachypnea requiring bipap placement, acute anemia requiring PRBCs transfusion thought to be due bleeding from arterial line/heparin gtt leading to RUE hematoma. Her milrinone gtt was dc’ed 7/26 due to hypotension. CVP prior to transfer out of CICU had been low and received gentle IV fluid hydration.     She underwent L/RHC 8/4 which showed 90% stenosis of mLCx, mildly elevated filling pressures and preserved cardiac output. Underwent staged PCI to LCx on 8/6. Also underwent ALANNA/DCCV 8/9 and now in SR HR 50-70s but with infrequent episodes of pAF/AT. Her DANE from admission had been improving but more recently following repeated contrast load, uptrending and now seems to have plataued. She appears euvolemic to slightly dry on exam. She has been tolerating gradual escalation of afterload reduction though remains hypertensive for her degree of systolic dysfunction. Tolerating transition to NOAC without evidence of bleeding. Plans for possible discharge later this week depending on trend of renal function.     Pertinent Cardiac Studies  [facility] 8/4/21: RA 13 (v16), PA 59/29/34, PCWP 18 (v21), LVEDP 18, PA 59%, Ao 95%, Leelee CO/CI 5.1/3.1, /50/83 (weight 135.3 lbs)  [facility] 8/4/21: mCx diffuse 90% stenosis, pCx 40% stenosis, pLAD 40% stenosis, mLAD 30% stenosis, OM1 40% stenosis, OM2 40% stenosis, pRCA 30% stenosis, mRCA 50% stenosis, dRCA 20% stenosis, RPDA 30% stenosis, normal LM  TTE 7/25: LVIDd 4.4 cm, LVEF 29% (global), normal RV size with decreased systolic function, mod dilated LA, mild-mod MR, calcified AV with grossly mod decreased opening (peak/mean gradient 15/9 respectively), mild TR, est RVSP 31 mmHg    Please Call [facility] Spectra #[phone] for any questions or concerns

## 2021-08-11 NOTE — PROGRESS NOTE ADULT - SUBJECTIVE AND OBJECTIVE BOX
INTERNAL MEDICINE PROGRESS NOTE    Dr. Wilian Doran DO  Attending Physician  Division of Hospital Medicine  [facility]  Available via Microsoft Teams (preferred)  Also available via Pager [phone]    SUBJECTIVE:  no acute complaints.    REVIEW OF SYSTEMS   12 point review of systems negative except for above.     PAST MEDICAL & SURGICAL HISTORY:  HTN (hypertension)    HLD (hyperlipidemia)    DM (diabetes mellitus)    Hyperkalemia    CKD (chronic kidney disease)    Atherosclerosis    HTN (hypertension)    CAD (coronary artery disease)    Stage 4 chronic kidney disease    S/P hysterectomy        MEDICATIONS  (STANDING):  amLODIPine   Tablet 5 milliGRAM(s) Oral daily  apixaban 2.5 milliGRAM(s) Oral every 12 hours  artificial  tears Solution 1 Drop(s) Both EYES two times a day  aspirin enteric coated 81 milliGRAM(s) Oral daily  atorvastatin 40 milliGRAM(s) Oral at bedtime  carvedilol 12.5 milliGRAM(s) Oral every 12 hours  chlorhexidine 2% Cloths 1 Application(s) Topical <User Schedule>  clopidogrel Tablet 75 milliGRAM(s) Oral daily  dextrose 50% Injectable 25 Gram(s) IV Push once  dextrose 50% Injectable 25 Gram(s) IV Push once  hydrALAZINE 100 milliGRAM(s) Oral <User Schedule>  insulin lispro (ADMELOG) corrective regimen sliding scale   SubCutaneous three times a day before meals  insulin lispro (ADMELOG) corrective regimen sliding scale   SubCutaneous at bedtime  isosorbide   dinitrate Tablet (ISORDIL) 40 milliGRAM(s) Oral three times a day  polyethylene glycol 3350 17 Gram(s) Oral daily  senna 2 Tablet(s) Oral at bedtime    MEDICATIONS  (PRN):      Allergies    No Known Allergies    Intolerances        T(C): 36.4 (08-11-21 @ 11:20), Max: 36.6 (08-10-21 @ 20:43)  T(F): 97.6 (08-11-21 @ 11:20), Max: 97.9 (08-11-21 @ 04:40)  HR: 63 (08-11-21 @ 11:20) (58 - 68)  BP: 161/72 (08-11-21 @ 11:20) (147/73 - 170/68)  ABP: --  ABP(mean): --  RR: 18 (08-11-21 @ 11:20) (18 - 18)  SpO2: 98% (08-11-21 @ 11:20) (98% - 99%)      CONSTITUTIONAL: No acute distress.   HEENT:  Conjunctiva clear B/L.  Moist oral mucosa.   Cardiovascular: RRR with no murmurs. No JVD noted. No lower extremity edema B/L. Extremities are warm and well perfused. Radial pulses 2+ B/L. Dorsalis pedis pulses 2+ B/L.    Respiratory: Lungs CTAB. No wrr. No accessory muscle use.   Gastrointestinal:  Soft, nontender. Non-distended. Non-rigid. No CVA tenderness B/L.  Neurologic:  Alert and awake. Moving all extremities. Following commands. Making eye contact. No numbness or tingling.   MSK / Skin: RUE ecchymoses in dependent parts of arm. Improving. Compartments soft.   Psych:  Normal affect. Normal Mood.       LABS                        9.1    4.83  )-----------( 144      ( 11 Aug 2021 06:52 )             29.6     08-11    133<L>  |  98  |  25<H>  ----------------------------<  90  3.8   |  20<L>  |  2.30<H>    Ca    9.1      11 Aug 2021 06:51  Mg     2.2     08-10      PTT - ( 10 Aug 2021 07:23 )  PTT:58.9 sec      ALL RECENT STUDIES REVIEWED INCLUDING REPORTS AVAILABLE     CARE DISCUSSED WITH ALL CONSULTANTS

## 2021-08-12 LAB
ANION GAP SERPL CALC-SCNC: 11 MMOL/L — SIGNIFICANT CHANGE UP (ref 5–17)
BUN SERPL-MCNC: 24 MG/DL — HIGH (ref 7–23)
CALCIUM SERPL-MCNC: 9 MG/DL — SIGNIFICANT CHANGE UP (ref 8.4–10.5)
CHLORIDE SERPL-SCNC: 98 MMOL/L — SIGNIFICANT CHANGE UP (ref 96–108)
CO2 SERPL-SCNC: 20 MMOL/L — LOW (ref 22–31)
CREAT SERPL-MCNC: 1.94 MG/DL — HIGH (ref 0.5–1.3)
GLUCOSE BLDC GLUCOMTR-MCNC: 108 MG/DL — HIGH (ref 70–99)
GLUCOSE BLDC GLUCOMTR-MCNC: 112 MG/DL — HIGH (ref 70–99)
GLUCOSE BLDC GLUCOMTR-MCNC: 135 MG/DL — HIGH (ref 70–99)
GLUCOSE BLDC GLUCOMTR-MCNC: 139 MG/DL — HIGH (ref 70–99)
GLUCOSE SERPL-MCNC: 96 MG/DL — SIGNIFICANT CHANGE UP (ref 70–99)
HCT VFR BLD CALC: 27.3 % — LOW (ref 34.5–45)
HGB BLD-MCNC: 8.4 G/DL — LOW (ref 11.5–15.5)
MCHC RBC-ENTMCNC: 29.7 PG — SIGNIFICANT CHANGE UP (ref 27–34)
MCHC RBC-ENTMCNC: 30.8 GM/DL — LOW (ref 32–36)
MCV RBC AUTO: 96.5 FL — SIGNIFICANT CHANGE UP (ref 80–100)
NRBC # BLD: 0 /100 WBCS — SIGNIFICANT CHANGE UP (ref 0–0)
PLATELET # BLD AUTO: 148 K/UL — LOW (ref 150–400)
POTASSIUM SERPL-MCNC: 3.9 MMOL/L — SIGNIFICANT CHANGE UP (ref 3.5–5.3)
POTASSIUM SERPL-SCNC: 3.9 MMOL/L — SIGNIFICANT CHANGE UP (ref 3.5–5.3)
RBC # BLD: 2.83 M/UL — LOW (ref 3.8–5.2)
RBC # FLD: 18.8 % — HIGH (ref 10.3–14.5)
SODIUM SERPL-SCNC: 129 MMOL/L — LOW (ref 135–145)
WBC # BLD: 5.05 K/UL — SIGNIFICANT CHANGE UP (ref 3.8–10.5)
WBC # FLD AUTO: 5.05 K/UL — SIGNIFICANT CHANGE UP (ref 3.8–10.5)

## 2021-08-12 PROCEDURE — 99233 SBSQ HOSP IP/OBS HIGH 50: CPT

## 2021-08-12 RX ORDER — POTASSIUM CHLORIDE 20 MEQ
20 PACKET (EA) ORAL ONCE
Refills: 0 | Status: COMPLETED | OUTPATIENT
Start: 2021-08-12 | End: 2021-08-12

## 2021-08-12 RX ORDER — FUROSEMIDE 40 MG
40 TABLET ORAL DAILY
Refills: 0 | Status: DISCONTINUED | OUTPATIENT
Start: 2021-08-12 | End: 2021-08-13

## 2021-08-12 RX ORDER — CARVEDILOL PHOSPHATE 80 MG/1
12.5 CAPSULE, EXTENDED RELEASE ORAL EVERY 12 HOURS
Refills: 0 | Status: DISCONTINUED | OUTPATIENT
Start: 2021-08-12 | End: 2021-08-14

## 2021-08-12 RX ORDER — LANOLIN ALCOHOL/MO/W.PET/CERES
3 CREAM (GRAM) TOPICAL ONCE
Refills: 0 | Status: COMPLETED | OUTPATIENT
Start: 2021-08-12 | End: 2021-08-13

## 2021-08-12 RX ADMIN — CLOPIDOGREL BISULFATE 75 MILLIGRAM(S): 75 TABLET, FILM COATED ORAL at 11:58

## 2021-08-12 RX ADMIN — Medication 1 DROP(S): at 05:48

## 2021-08-12 RX ADMIN — AMLODIPINE BESYLATE 5 MILLIGRAM(S): 2.5 TABLET ORAL at 05:49

## 2021-08-12 RX ADMIN — CARVEDILOL PHOSPHATE 12.5 MILLIGRAM(S): 80 CAPSULE, EXTENDED RELEASE ORAL at 16:22

## 2021-08-12 RX ADMIN — Medication 1 DROP(S): at 16:21

## 2021-08-12 RX ADMIN — Medication 40 MILLIGRAM(S): at 16:21

## 2021-08-12 RX ADMIN — Medication 20 MILLIEQUIVALENT(S): at 08:58

## 2021-08-12 RX ADMIN — CHLORHEXIDINE GLUCONATE 1 APPLICATION(S): 213 SOLUTION TOPICAL at 06:18

## 2021-08-12 RX ADMIN — Medication 100 MILLIGRAM(S): at 08:58

## 2021-08-12 RX ADMIN — APIXABAN 2.5 MILLIGRAM(S): 2.5 TABLET, FILM COATED ORAL at 16:21

## 2021-08-12 RX ADMIN — ISOSORBIDE DINITRATE 40 MILLIGRAM(S): 5 TABLET ORAL at 11:58

## 2021-08-12 RX ADMIN — ISOSORBIDE DINITRATE 40 MILLIGRAM(S): 5 TABLET ORAL at 05:49

## 2021-08-12 RX ADMIN — Medication 81 MILLIGRAM(S): at 11:58

## 2021-08-12 RX ADMIN — APIXABAN 2.5 MILLIGRAM(S): 2.5 TABLET, FILM COATED ORAL at 05:49

## 2021-08-12 RX ADMIN — ATORVASTATIN CALCIUM 40 MILLIGRAM(S): 80 TABLET, FILM COATED ORAL at 21:03

## 2021-08-12 RX ADMIN — ISOSORBIDE DINITRATE 40 MILLIGRAM(S): 5 TABLET ORAL at 16:21

## 2021-08-12 RX ADMIN — Medication 100 MILLIGRAM(S): at 16:21

## 2021-08-12 NOTE — PROGRESS NOTE ADULT - PROBLEM SELECTOR PLAN 1
newly diagnosed HFrEF (had previous hx of HFpEF)  - HF following, recs greatly appreciated.   - continue to optimize GDMT as tolerated  - CW hydralazine 100mg TID, isordil to 40mg TID, and Carvedilol at 12.5mg q12h     - CW norvasc to optimize bp.   - Plan to start diuretics today per HF. Will f/u recs.  - NM amyloid scan not suggestive of cardiac amyloidosis  - s/p L+RHC showing elevated filling pressures, 90% stenosis of mid circumflex

## 2021-08-12 NOTE — PROGRESS NOTE ADULT - ASSESSMENT
85yoF w/ PMHx significant for HFpEF, HTN, HLD, CAD, NIDDM, was tx to [facility] [place] after being found to be in ADHF / new acute systolic heart failure, possibly tachycardia induced (new Aflutter) with new DANE, requiring CICU for inotropes / pressors / BiPAP, now off; course c/b acute hypovolemic shock secondary to blood loss anemia requiring PRBC transfusions transferred to floors now s/p L+RHC showing elevated filling pressures, 90% stenosis of mid circumflex now s/p staged PCI with LARISSA x 3 to LCx. S/p ALANNA/DCCV.

## 2021-08-12 NOTE — PROGRESS NOTE ADULT - PROBLEM SELECTOR PLAN 2
- s/p PCI to [facility] 8[place]6  - BB and Nitrate as above  - continue with ASA, Plavix, Statin, BB and Nitrate   - recommend to continue with triple therapy for one month followed by NOAC and Plavix thereafter

## 2021-08-12 NOTE — PROGRESS NOTE ADULT - SUBJECTIVE AND OBJECTIVE BOX
[facility]   [phone]  RENAL FOLLOW UP NOTE  --------------------------------------------------------------------------------  Chief Complaint:    24 hour events/subjective:    Patient seen and examined.   Susan OLIVEIRA CP    PAST HISTORY  --------------------------------------------------------------------------------  No significant changes to PMH, PSH, FHx, SHx, unless otherwise noted    ALLERGIES & MEDICATIONS  --------------------------------------------------------------------------------  Allergies    No Known Allergies    Intolerances      Standing Inpatient Medications  amLODIPine   Tablet 5 milliGRAM(s) Oral daily  apixaban 2.5 milliGRAM(s) Oral every 12 hours  artificial  tears Solution 1 Drop(s) Both EYES two times a day  aspirin enteric coated 81 milliGRAM(s) Oral daily  atorvastatin 40 milliGRAM(s) Oral at bedtime  carvedilol 12.5 milliGRAM(s) Oral every 12 hours  chlorhexidine 2% Cloths 1 Application(s) Topical <User Schedule>  clopidogrel Tablet 75 milliGRAM(s) Oral daily  dextrose 50% Injectable 25 Gram(s) IV Push once  dextrose 50% Injectable 25 Gram(s) IV Push once  hydrALAZINE 100 milliGRAM(s) Oral <User Schedule>  insulin lispro (ADMELOG) corrective regimen sliding scale   SubCutaneous three times a day before meals  insulin lispro (ADMELOG) corrective regimen sliding scale   SubCutaneous at bedtime  isosorbide   dinitrate Tablet (ISORDIL) 40 milliGRAM(s) Oral three times a day  polyethylene glycol 3350 17 Gram(s) Oral daily  senna 2 Tablet(s) Oral at bedtime    PRN Inpatient Medications      REVIEW OF SYSTEMS  --------------------------------------------------------------------------------    Gen: denies fevers/chills,  CVS: denies chest pain/palpitations  Resp: denies SOB/Cough  GI: Denies N/V/Abd pain  : Denies dysuria/oliguria/hematuria    All other systems were reviewed and are negative, except as noted.    VITALS/PHYSICAL EXAM  --------------------------------------------------------------------------------  T(C): 36.6 (08-12-21 @ 11:27), Max: 36.7 (08-12-21 @ 04:47)  HR: 63 (08-12-21 @ 11:27) (55 - 66)  BP: 141/70 (08-12-21 @ 11:27) (128/63 - 150/69)  RR: 18 (08-12-21 @ 11:27) (18 - 18)  SpO2: 99% (08-12-21 @ 11:27) (97% - 100%)  Wt(kg): --        08-11-21 @ 07:01  -  08-12-21 @ 07:00  --------------------------------------------------------  IN: 580 mL / OUT: 0 mL / NET: 580 mL    08-12-21 @ 07:01 - 08-12-21 @ 12:37  --------------------------------------------------------  IN: 240 mL / OUT: 0 mL / NET: 240 mL      Physical Exam:  	              Gen: Non toxic comfortable appearing   	Pulm: decrease breath sounds, no rales or ronchi or wheezing  	CV: +JVD. RRR, S1S2;  	Abd: +BS, soft, nontender/nondistended  	: No suprapubic tenderness  	UE: Warm, no cyanosis  no clubbing, no edema   	LE: Warm, no cyanosis  no clubbing, 1+ edema  	Neuro: alert and oriented. speech coherent    LABS/STUDIES  --------------------------------------------------------------------------------              8.4    5.05  >-----------<  148      [08-12-21 @ 06:53]              27.3     129  |  98  |  24  ----------------------------<  96      [08-12-21 @ 06:52]  3.9   |  20  |  1.94        Ca     9.0     [08-12-21 @ 06:52]            Creatinine Trend:  SCr 1.94 [08-12 @ 06:52]  SCr 2.30 [08-11 @ 06:51]  SCr 2.36 [08-10 @ 07:23]  SCr 1.96 [08-09 @ 07:23]  SCr 1.68 [08-08 @ 06:27]              Urinalysis - [07-24-21 @ 05:01]      Color Yellow / Appearance Slightly Turbid / SG 1.025 / pH 5.0      Gluc Negative / Ketone Trace  / Bili Negative / Urobili 4       Blood Moderate / Protein 100 / Leuk Est Small / Nitrite Negative      RBC 25-50 / WBC 11-25 / Hyaline  / Gran 0-2 / Sq Epi  / Non Sq Epi Moderate / Bacteria Moderate      TSH 2.62      [07-26-21 @ 20:44]  Lipid: chol 68, TG 48, HDL 32, LDL --      [07-25-21 @ 20:34]    Free Light Chains: kappa 1.86, lambda 2.79, ratio = 0.67      [07-28 @ 08:47]

## 2021-08-12 NOTE — PROGRESS NOTE ADULT - PROBLEM SELECTOR PLAN 5
Access Code: [email or web address]  URL: [email or web address]  Date: 06/28/2021  Prepared by: Cary Gonsales    Exercises  Supine Lower Trunk Rotation - 1 x daily - 1 sets - 20 reps - 5 hold  Seated Rhomboid Stretch - 1 x daily - 1 sets - 4 reps - 10 hold  Thoracic Extension Mobilization with Noodle - 1 x daily - 1 sets - 10 reps - 5 hold  Handout on use of lumbar roll from [facility]   - s/p 2U PRBC and Hgb currently stable  - RUE duplex without evidence of active bleeding or pseudoaneurysm  - RUE hematoma improving on exam, soft without pain/discomfort   - continue to follow trend on NOAC

## 2021-08-12 NOTE — PROGRESS NOTE ADULT - ATTENDING COMMENTS
Patient is an 85 year old female with history of HFpEF, HTN, CAD, DM and CKD who presented with cardiogenic shock and new onset Aflutter. Her EF on this admission was noted to drop to 25%.  She initially was on levophed and milrinone which have been weaned off now. She underwent LHC which showed 90% Lcx lesion which was stented in a staged fashion. Today she underwent ALANNA/cardioversion  #ICM  -c/w coreg 12.5mg BID  - c/w hydralazine 100mg TID and isordil 30mg TID  - c/w aspirin and plavix for recent stent  - noted to have elevated JVP on exam, will resume lasix 40mg daily  #Aflutter  - s/p ALANNA/cardioversion today  -on eliquis now , will likely do triple therapy for 1 month and then plavix with eliquis afterwards  #DANE on CKD  - Creatinine downtrended to 1.9

## 2021-08-12 NOTE — PROGRESS NOTE ADULT - SUBJECTIVE AND OBJECTIVE BOX
INTERNAL MEDICINE PROGRESS NOTE    Dr. Wilian Doran DO  Attending Physician  Division of Hospital Medicine  [facility]  Available via Microsoft Teams (preferred)  Also available via Pager [phone]    SUBJECTIVE:  No acute complaints.     REVIEW OF SYSTEMS   12 point review of systems negative except for above.     PAST MEDICAL & SURGICAL HISTORY:  HTN (hypertension)    HLD (hyperlipidemia)    DM (diabetes mellitus)    Hyperkalemia    CKD (chronic kidney disease)    Atherosclerosis    HTN (hypertension)    CAD (coronary artery disease)    Stage 4 chronic kidney disease    S/P hysterectomy        MEDICATIONS  (STANDING):  amLODIPine   Tablet 5 milliGRAM(s) Oral daily  apixaban 2.5 milliGRAM(s) Oral every 12 hours  artificial  tears Solution 1 Drop(s) Both EYES two times a day  aspirin enteric coated 81 milliGRAM(s) Oral daily  atorvastatin 40 milliGRAM(s) Oral at bedtime  carvedilol 12.5 milliGRAM(s) Oral every 12 hours  chlorhexidine 2% Cloths 1 Application(s) Topical <User Schedule>  clopidogrel Tablet 75 milliGRAM(s) Oral daily  dextrose 50% Injectable 25 Gram(s) IV Push once  dextrose 50% Injectable 25 Gram(s) IV Push once  hydrALAZINE 100 milliGRAM(s) Oral <User Schedule>  insulin lispro (ADMELOG) corrective regimen sliding scale   SubCutaneous three times a day before meals  insulin lispro (ADMELOG) corrective regimen sliding scale   SubCutaneous at bedtime  isosorbide   dinitrate Tablet (ISORDIL) 40 milliGRAM(s) Oral three times a day  polyethylene glycol 3350 17 Gram(s) Oral daily  senna 2 Tablet(s) Oral at bedtime    MEDICATIONS  (PRN):      Allergies    No Known Allergies    Intolerances        T(C): 36.6 (08-12-21 @ 11:27), Max: 36.7 (08-12-21 @ 04:47)  T(F): 97.9 (08-12-21 @ 11:27), Max: 98.1 (08-12-21 @ 08:55)  HR: 63 (08-12-21 @ 11:27) (55 - 66)  BP: 141/70 (08-12-21 @ 11:27) (128/63 - 150/69)  ABP: --  ABP(mean): --  RR: 18 (08-12-21 @ 11:27) (18 - 18)  SpO2: 99% (08-12-21 @ 11:27) (97% - 100%)    CONSTITUTIONAL: No acute distress.   HEENT:  Conjunctiva clear B/L.  Moist oral mucosa.   Cardiovascular: RRR with no murmurs. +JVD noted. Trace lower extremity edema B/L. Extremities are warm and well perfused. Radial pulses 2+ B/L. Dorsalis pedis pulses 2+ B/L.    Respiratory: Lungs CTAB. No wrr. No accessory muscle use.   Gastrointestinal:  Soft, nontender. Non-distended. Non-rigid. No CVA tenderness B/L.  Neurologic:  Alert and awake. Moving all extremities. Following commands. Making eye contact. No numbness or tingling.   MSK / Skin: RUE ecchymoses in dependent parts of arm. Improving. Compartments soft.   Psych:  Normal affect. Normal Mood.     LABS                        8.4    5.05  )-----------( 148      ( 12 Aug 2021 06:53 )             27.3     08-12    129<L>  |  98  |  24<H>  ----------------------------<  96  3.9   |  20<L>  |  1.94<H>    Ca    9.0      12 Aug 2021 06:52            ALL RECENT STUDIES REVIEWED INCLUDING REPORTS AVAILABLE     CARE DISCUSSED WITH ALL CONSULTANTS

## 2021-08-12 NOTE — PROGRESS NOTE ADULT - ASSESSMENT
85 F w/ PMH HFpEF, HTN, HLD, CAD, C2D, DM transferred to [facility] from [facility]SH. pt was admitted. found to in chf/cardiogenic shoc.  with acute hypovolemic shock secondary to anemia requiring blood transfusions.   pt also had NEMESIO being considered for coronary angiogram in am. acute hypovolemic shock secondary to anemia requiring blood transfusions.    1- NEMESIO   2- CHF  3- proteinuria   4- DM     Nemesio in setting of cardiogenic shock likely   s/p coronary angio with 200 cc iv contrast,   creatinine improving.   diuretics on hold   would restart diuretics in am if creatinine continues to improve, pt is becoming fluid overloaded   continue hydralazine 100 mg tid   anemia, trend hgb  strict I/O  monitor creatinine and electrolytes daily  heart failure team following

## 2021-08-12 NOTE — PROGRESS NOTE ADULT - PROBLEM SELECTOR PLAN 1
- start lasix 40mg PO daily. Will further discuss TRANSFORM trial with patient tomorrow  - continue Amlodipine 5 mg QD for further afterload reduction  - continue Coreg 12.5 mg BID, change hold parameters to HR < 50; SB/SR 50-60s without a device  - continue HDZN 100 mg TID and ISDN 40 mg TID  - deferring RASSi given DANE  - daily standing weights and strict I&Os  - Anticipate discharge in next 1-2 days if renal function continues to improve

## 2021-08-12 NOTE — PROGRESS NOTE ADULT - PROBLEM SELECTOR PLAN 4
- Per outpatient records, SCr was 1.14 on 5/21/21  - May be related to ATN given episodes of hypotension and now likely SHANE  - Cr had peaked to 2.7 then improved to 1.4, now downtrending 1.9 from 2.3  - Continue to monitor

## 2021-08-12 NOTE — PROGRESS NOTE ADULT - SUBJECTIVE AND OBJECTIVE BOX
Subjective:  - Feeling better, able to walk short distances to bathroom without dyspnea  - Denies SOB at rest, lightheadedness, abdominal discomfort    Medications:  amLODIPine   Tablet 5 milliGRAM(s) Oral daily  apixaban 2.5 milliGRAM(s) Oral every 12 hours  artificial  tears Solution 1 Drop(s) Both EYES two times a day  aspirin enteric coated 81 milliGRAM(s) Oral daily  atorvastatin 40 milliGRAM(s) Oral at bedtime  carvedilol 12.5 milliGRAM(s) Oral every 12 hours  chlorhexidine 2% Cloths 1 Application(s) Topical <User Schedule>  clopidogrel Tablet 75 milliGRAM(s) Oral daily  dextrose 50% Injectable 25 Gram(s) IV Push once  dextrose 50% Injectable 25 Gram(s) IV Push once  furosemide    Tablet 40 milliGRAM(s) Oral daily  hydrALAZINE 100 milliGRAM(s) Oral <User Schedule>  insulin lispro (ADMELOG) corrective regimen sliding scale   SubCutaneous three times a day before meals  insulin lispro (ADMELOG) corrective regimen sliding scale   SubCutaneous at bedtime  isosorbide   dinitrate Tablet (ISORDIL) 40 milliGRAM(s) Oral three times a day  polyethylene glycol 3350 17 Gram(s) Oral daily  senna 2 Tablet(s) Oral at bedtime      Physical Exam:    Vitals:  Vital Signs Last 24 Hours  T(C): 36.7 (21 @ 15:35), Max: 36.7 (21 @ 04:47)  HR: 68 (21 @ 15:35) (55 - 68)  BP: 146/78 (21 @ 15:35) (128/63 - 150/69)  RR: 18 (21 @ 15:35) (18 - 18)  SpO2: 97% (21 @ 15:35) (97% - 100%)    Weight in k.7 ( @ 04:47)    I&O's Summary    11 Aug 2021 07:01  -  12 Aug 2021 07:00  --------------------------------------------------------  IN: 580 mL / OUT: 0 mL / NET: 580 mL    12 Aug 2021 07:01  -  12 Aug 2021 16:13  --------------------------------------------------------  IN: 320 mL / OUT: 0 mL / NET: 320 mL    Tele: NSR    General: No distress. Comfortable.  HEENT: EOM intact.  Neck: Neck supple. JVP 12-14 cm H2O. No masses  Chest: Diminished LLL otherwise clear to auscultation bilaterally  CV: Regular, Normal S1 and S2. No murmurs, rub, or gallops. Radial pulses normal. Trace LE edema  Abdomen: Soft, non-distended, non-tender  Skin: No rashes or skin breakdown. Warm peripherally  Neurology: Alert and oriented times three. Sensation intact  Psych: Affect normal    Labs:                        8.4    5.05  )-----------( 148      ( 12 Aug 2021 06:53 )             27.3     08-12    129<L>  |  98  |  24<H>  ----------------------------<  96  3.9   |  20<L>  |  1.94<H>    Ca    9.0      12 Aug 2021 06:52

## 2021-08-12 NOTE — PROGRESS NOTE ADULT - PROBLEM SELECTOR PLAN 4
- in setting of CKD 2 as reported prior to this hospitalization  - etiology likely related to presenting cardiogenic shock and hypervolemic shock d/t blood loss  - Cr was improving to 1.48 range but increased after LHC likely SHANE related.   - continue to closely monitor along w/ UOP and electrolytes  - home ARB on hold  - Nephrology following, recs appreciated  - renally dose medications to GFR, avoid nephrotoxic agents

## 2021-08-12 NOTE — PROGRESS NOTE ADULT - ATTENDING COMMENTS
Seen, examined, and  agree with above as scribed by NP Odilon    pt with stabilizing renal function and improving  has chf   resume lasix 40 mg daily

## 2021-08-12 NOTE — PROGRESS NOTE ADULT - ASSESSMENT
84 y/o female with h/o chronic HFpEF, HTN, DLP, CAD (h/o abnormal stress test), DM 2 and CKD 2 who presented to [facility] with nausea, vomiting and dizziness. She was found to be tachycardic, hypotensive with elevated lactate concerning for cardiogenic shock. She was given IVF, empiric antibiotics and underwent abd CT which ws unremarkable. Her ekg showed new onset aflutter and her TTE showed newly diagnosed LV systolic dysfunction with LVEF 25%, mod MR and mod TR. She was started on milrinone and levophed gtt. She was transferred to [facility] for further management. Her hospital course was complicated by tachypnea requiring bipap placement, acute anemia requiring PRBCs transfusion thought to be due bleeding from arterial line/heparin gtt leading to RUE hematoma. Her milrinone gtt was dc’ed 7/26 due to hypotension. CVP prior to transfer out of CICU had been low and received gentle IV fluid hydration.     She underwent L/RHC 8/4 which showed 90% stenosis of mLCx, mildly elevated filling pressures and preserved cardiac output. Underwent staged PCI to LCx on 8/6. Also underwent ALANNA/DCCV 8/9 and now in SR HR 50-70s but with infrequent episodes of pAF/AT.    Her renal function is now improving. She is volume overloaded with elevated JVP on exam off diuretics. She has been tolerating gradual escalation of afterload reduction though remains hypertensive for her degree of systolic dysfunction.     Pertinent Cardiac Studies  [facility] 8/4/21: RA 13 (v16), PA 59/29/34, PCWP 18 (v21), LVEDP 18, PA 59%, Ao 95%, Leelee CO/CI 5.1/3.1, /50/83 (weight 135.3 lbs)  [facility] 8/4/21: mCx diffuse 90% stenosis, pCx 40% stenosis, pLAD 40% stenosis, mLAD 30% stenosis, OM1 40% stenosis, OM2 40% stenosis, pRCA 30% stenosis, mRCA 50% stenosis, dRCA 20% stenosis, RPDA 30% stenosis, normal LM  TTE 7/25: LVIDd 4.4 cm, LVEF 29% (global), normal RV size with decreased systolic function, mod dilated LA, mild-mod MR, calcified AV with grossly mod decreased opening (peak/mean gradient 15/9 respectively), mild TR, est RVSP 31 mmHg    Please Call [facility] HF Spectra #[phone] for any questions or concerns

## 2021-08-13 LAB
ANION GAP SERPL CALC-SCNC: 13 MMOL/L — SIGNIFICANT CHANGE UP (ref 5–17)
BUN SERPL-MCNC: 22 MG/DL — SIGNIFICANT CHANGE UP (ref 7–23)
CALCIUM SERPL-MCNC: 9.4 MG/DL — SIGNIFICANT CHANGE UP (ref 8.4–10.5)
CHLORIDE SERPL-SCNC: 99 MMOL/L — SIGNIFICANT CHANGE UP (ref 96–108)
CO2 SERPL-SCNC: 22 MMOL/L — SIGNIFICANT CHANGE UP (ref 22–31)
CREAT SERPL-MCNC: 1.79 MG/DL — HIGH (ref 0.5–1.3)
GLUCOSE BLDC GLUCOMTR-MCNC: 119 MG/DL — HIGH (ref 70–99)
GLUCOSE BLDC GLUCOMTR-MCNC: 122 MG/DL — HIGH (ref 70–99)
GLUCOSE BLDC GLUCOMTR-MCNC: 132 MG/DL — HIGH (ref 70–99)
GLUCOSE BLDC GLUCOMTR-MCNC: 132 MG/DL — HIGH (ref 70–99)
GLUCOSE SERPL-MCNC: 96 MG/DL — SIGNIFICANT CHANGE UP (ref 70–99)
MAGNESIUM SERPL-MCNC: 2 MG/DL — SIGNIFICANT CHANGE UP (ref 1.6–2.6)
PHOSPHATE SERPL-MCNC: 3.3 MG/DL — SIGNIFICANT CHANGE UP (ref 2.5–4.5)
POTASSIUM SERPL-MCNC: 4.1 MMOL/L — SIGNIFICANT CHANGE UP (ref 3.5–5.3)
POTASSIUM SERPL-SCNC: 4.1 MMOL/L — SIGNIFICANT CHANGE UP (ref 3.5–5.3)
SODIUM SERPL-SCNC: 134 MMOL/L — LOW (ref 135–145)

## 2021-08-13 PROCEDURE — 99232 SBSQ HOSP IP/OBS MODERATE 35: CPT

## 2021-08-13 PROCEDURE — 99233 SBSQ HOSP IP/OBS HIGH 50: CPT

## 2021-08-13 RX ORDER — FUROSEMIDE 40 MG
40 TABLET ORAL
Refills: 0 | Status: DISCONTINUED | OUTPATIENT
Start: 2021-08-13 | End: 2021-08-14

## 2021-08-13 RX ORDER — SACUBITRIL AND VALSARTAN 24; 26 MG/1; MG/1
1 TABLET, FILM COATED ORAL
Refills: 0 | Status: DISCONTINUED | OUTPATIENT
Start: 2021-08-13 | End: 2021-08-14

## 2021-08-13 RX ADMIN — APIXABAN 2.5 MILLIGRAM(S): 2.5 TABLET, FILM COATED ORAL at 06:59

## 2021-08-13 RX ADMIN — CLOPIDOGREL BISULFATE 75 MILLIGRAM(S): 75 TABLET, FILM COATED ORAL at 13:05

## 2021-08-13 RX ADMIN — Medication 1 DROP(S): at 06:59

## 2021-08-13 RX ADMIN — Medication 81 MILLIGRAM(S): at 13:01

## 2021-08-13 RX ADMIN — CHLORHEXIDINE GLUCONATE 1 APPLICATION(S): 213 SOLUTION TOPICAL at 09:36

## 2021-08-13 RX ADMIN — CARVEDILOL PHOSPHATE 12.5 MILLIGRAM(S): 80 CAPSULE, EXTENDED RELEASE ORAL at 06:59

## 2021-08-13 RX ADMIN — Medication 1 DROP(S): at 18:52

## 2021-08-13 RX ADMIN — CARVEDILOL PHOSPHATE 12.5 MILLIGRAM(S): 80 CAPSULE, EXTENDED RELEASE ORAL at 18:04

## 2021-08-13 RX ADMIN — Medication 100 MILLIGRAM(S): at 08:54

## 2021-08-13 RX ADMIN — ATORVASTATIN CALCIUM 40 MILLIGRAM(S): 80 TABLET, FILM COATED ORAL at 22:46

## 2021-08-13 RX ADMIN — ISOSORBIDE DINITRATE 40 MILLIGRAM(S): 5 TABLET ORAL at 06:58

## 2021-08-13 RX ADMIN — ISOSORBIDE DINITRATE 40 MILLIGRAM(S): 5 TABLET ORAL at 13:05

## 2021-08-13 RX ADMIN — SACUBITRIL AND VALSARTAN 1 TABLET(S): 24; 26 TABLET, FILM COATED ORAL at 18:02

## 2021-08-13 RX ADMIN — Medication 40 MILLIGRAM(S): at 22:46

## 2021-08-13 RX ADMIN — ISOSORBIDE DINITRATE 40 MILLIGRAM(S): 5 TABLET ORAL at 22:48

## 2021-08-13 RX ADMIN — Medication 40 MILLIGRAM(S): at 06:58

## 2021-08-13 RX ADMIN — Medication 100 MILLIGRAM(S): at 01:06

## 2021-08-13 RX ADMIN — Medication 3 MILLIGRAM(S): at 01:06

## 2021-08-13 RX ADMIN — Medication 100 MILLIGRAM(S): at 17:23

## 2021-08-13 RX ADMIN — AMLODIPINE BESYLATE 5 MILLIGRAM(S): 2.5 TABLET ORAL at 06:58

## 2021-08-13 RX ADMIN — APIXABAN 2.5 MILLIGRAM(S): 2.5 TABLET, FILM COATED ORAL at 18:04

## 2021-08-13 NOTE — PROGRESS NOTE ADULT - PROBLEM SELECTOR PROBLEM 3
Atrial flutter
Coronary artery disease involving native coronary artery of native heart with other form of angina pectoris
Coronary artery disease involving native coronary artery of native heart with other form of angina pectoris
Atrial flutter
Coronary artery disease involving native coronary artery of native heart with other form of angina pectoris
Atrial flutter
Coronary artery disease involving native coronary artery of native heart with other form of angina pectoris
Atrial flutter
Atrial flutter
Coronary artery disease involving native coronary artery of native heart with other form of angina pectoris
Coronary artery disease involving native coronary artery of native heart with other form of angina pectoris
Atrial flutter
Coronary artery disease involving native coronary artery of native heart with other form of angina pectoris
Atrial flutter
Atrial flutter
Coronary artery disease involving native coronary artery of native heart with other form of angina pectoris
Atrial flutter
Atrial flutter
Coronary artery disease involving native coronary artery of native heart with other form of angina pectoris

## 2021-08-13 NOTE — PROGRESS NOTE ADULT - REASON FOR ADMISSION
Cardiogenic Shock
Transfer for Cardiogenic Shock
Transfer for Cardiogenic Shock  Aflutter RVR
Transfer for Cardiogenic Shock

## 2021-08-13 NOTE — PROVIDER CONTACT NOTE (OTHER) - SITUATION
Patient had 3.6 second pause on telemetry.
Notified by telemetry technician that patient had a PAF with a HR of 129 for 7.69 seconds.
Notified by telemetry technician that patient's HR went down to 43 for the first time on this admission.
 for 8.6 sec on tele, asymptomatic

## 2021-08-13 NOTE — PROGRESS NOTE ADULT - PROBLEM SELECTOR PROBLEM 6
Type 2 diabetes mellitus with other specified complication, without long-term current use of insulin
Anemia due to acute blood loss
Type 2 diabetes mellitus with other specified complication, without long-term current use of insulin

## 2021-08-13 NOTE — CHART NOTE - NSCHARTNOTEFT_GEN_A_CORE
Nutrition Follow Up Note  Patient seen for: follow up on 3 DSU    Chart reviewed, events noted.  DN. new HFrEF, hemorrhagic shock/RUE hematoma, LARISSA to LCX; ALANNA/DCCV done; HF optimization; home PT     Source: [x] Patient       [x] EMR        [] RN        [] Family at bedside       [] Other:    -If unable to interview patient: [] Trach/Vent/BiPAP  [] Disoriented/confused/inappropriate to interview    Diet Order:   Diet, DASH/TLC:   Sodium & Cholesterol Restricted  Supplement Feeding Modality:  Oral  Ensure Clear Cans or Servings Per Day:  1       Frequency:  Daily (21)    - Is current order appropriate/adequate? [x] Yes  []  No:     - PO intake meals :   [] >75%  Adequate    [] 50-75%  Fair       [x] <50%  Poor  - PO intake of supplements if pt receiving: []>75% []50% []25%   as per   []flow sheet  [x]patient  []family/aide  []PCA  []Nurse  []RD observation    - Nutrition-related concerns: pt reports poor intake-she reports poor appetite but expects to eat better at home    pt seen by SLP []Yes [x]No    GI:  Last BM ___.   Bowel Regimen? [x] Yes   [] No      Weights:   Daily Weight in k.7 (), Weight in k.7 (), Weight in k.4 (), Weight in k.1 (), Weight in k.4 (08-10), Weight in k.6 (), Weight in k.8 ()    Nutritionally Pertinent MEDICATIONS  (STANDING):  amLODIPine   Tablet  atorvastatin  carvedilol  dextrose 50% Injectable  dextrose 50% Injectable  furosemide    Tablet  hydrALAZINE  insulin lispro (ADMELOG) corrective regimen sliding scale  insulin lispro (ADMELOG) corrective regimen sliding scale  isosorbide   dinitrate Tablet (ISORDIL)  polyethylene glycol 3350  senna    Pertinent Labs:  @ 07:36: Na 134<L>, BUN 22, Cr 1.79<H>, BG 96, K+ 4.1, Phos 3.3, Mg 2.0, Alk Phos --, ALT/SGPT --, AST/SGOT --, HbA1c --    A1C with Estimated Average Glucose Result: 5.6 % (21 @ 20:15)  A1C with Estimated Average Glucose Result: 5.7 % (21 @ 20:34)  A1C with Estimated Average Glucose Result: 5.9 % (21 @ 10:22)    Finger Sticks:  POCT Blood Glucose.: 119 mg/dL ( @ 08:33)  POCT Blood Glucose.: 139 mg/dL ( @ 21:16)  POCT Blood Glucose.: 135 mg/dL ( @ 17:25)  POCT Blood Glucose.: 108 mg/dL ( @ 12:50)      Pressure Injuries as per nursing documentation: none  Edema: none    Estimated Needs:   [x] no change since previous assessment  [] recalculated:     Previous Nutrition Diagnosis: Decreased nutrient needs (sodium, phosphorous)  Nutrition Diagnosis is: [] ongoing  [x] resolved [] not applicable   pt currently on low sodium diet, phosphorous is now WNL    New Nutrition Diagnosis: [x] Not applicable    Nutrition Care Plan:  [] In Progress  [x] Achieved  [] Not applicable    Nutrition Interventions:     Education Provided:       [x] Yes:  [] No:   pt was educated on the importance of supplements to increase calorie and protein intake in light of RD's nutritional findings [x]Yes []N/A         Recommendations:         [x] Continue current diet order     [] Change diet to:      [x] continue oral nutrition supplement: ensure clear x 1 daily-pt refused need to increase        [] Add micronutrient supplementation:      [] Continue current micronutrient supplementation:        []Discussed recommendations with provider     [] Needed to escalate to provider     []Placed pending verification with NP/PA      []Placed pending verification with Team      []Placed sticker (malnutrition/BMI/underweight)     []Recommend swallow evaluation     [] monitor need for diet ed reinforcement     [] Other:     Monitoring and Evaluation:   Continue to monitor nutritional intake, tolerance to diet prescription, weights, labs, skin integrity      RD remains available upon request and will follow up per protocol  Carleen Jacques MA, RD, CDN #[phone]

## 2021-08-13 NOTE — PROGRESS NOTE ADULT - ATTENDING COMMENTS
85 year old female with history of HFpEF, HTN, CAD, DM and CKD who presented with cardiogenic shock and new onset Aflutter. Her EF on this admission was noted to drop to 25%.  She initially was on levophed and milrinone which have been weaned off now. She underwent LHC which showed 90% Lcx lesion which was stented in a staged fashion. She is now s/p ALANNA/cardioversion and is in sinus rhythm   #ICM  -c/w coreg 12.5mg BID  - c/w hydralazine 100mg TID and isordil 30mg TID  - increase lasix to 40mg PO  BID, noted to have edema   - stop amlodipine and start entresto 24/26mg BID  - c/w aspirin and plavix for recent stent  - will need to followup with CHF as an outpatient once discharged  #Aflutter  - s/p ALANNA/cardioversion today  -on eliquis now , will likely do triple therapy for 1 month and then plavix with eliquis afterwards  #DANE on CKD  - Creatinine continues to downtrend, now 1.7. Likely contrast related from stent placement 85 year old female with history of HFpEF, HTN, CAD, DM and CKD who presented with cardiogenic shock and new onset Aflutter. Her EF on this admission was noted to drop to 25%.  She initially was on levophed and milrinone which have been weaned off now. She underwent LHC which showed 90% Lcx lesion which was stented in a staged fashion. She is now s/p ALANNA/cardioversion and is in sinus rhythm   #ICM  -c/w coreg 12.5mg BID  - c/w hydralazine 100mg TID and isordil 30mg TID  - increase lasix to 40mg PO  BID, noted to have edema   -start entresto 24/26mg BID now that creatinine improving  - c/w aspirin and plavix for recent stent  - will need to followup with CHF as an outpatient once discharged  #Aflutter  - s/p ALANNA/cardioversion today  -on eliquis now , will likely do triple therapy for 1 month and then plavix with eliquis afterwards  #DANE on CKD  - Creatinine continues to downtrend, now 1.7. Likely contrast related from stent placement

## 2021-08-13 NOTE — PROVIDER CONTACT NOTE (OTHER) - BACKGROUND
86 yo F w/ pmh of HFpEF, HTN, HLD, CAD, T2DM, tx to [facility] after being found to be in AdHF possible tachycardia induced with new ADNE
Pt. admitted for new HF, AF with RVR, CAD, Anemia s/p 2u PRBC, RUE hematoma, DANE
Pt. admitted for new HF. PMH: HTN, CKD, CAD, DM. New onset AF RVR for admission- resolved s/p DCCV and now SB/SR.
Admitted for cardiogenic shock. History of HTN, CAD, CKD, DM. S/P left heart cath 8/6.

## 2021-08-13 NOTE — PROVIDER CONTACT NOTE (OTHER) - RECOMMENDATIONS
Continue to monitor telemetry/ VS. Review medications. Continue to monitor R groin cath site.
Continue to monitor.

## 2021-08-13 NOTE — PROGRESS NOTE ADULT - ASSESSMENT
85 F w/ PMH HFpEF, HTN, HLD, CAD, C2D, DM transferred to [facility] from OSH. pt was admitted. found to in chf/cardiogenic shoc.  with acute hypovolemic shock secondary to anemia requiring blood transfusions.   pt also had NEMESIO being considered for coronary angiogram in am. acute hypovolemic shock secondary to anemia requiring blood transfusions.    1- NEMESIO   2- CHF  3- proteinuria   4- DM   5- HTN   Nemesio in setting of cardiogenic shock likely initially then recently contrast induced nephropathy   creatinine improving.   lasix 40 mg daily   once cr has improved incoroprate ARB into regimen   continue hydralazine 100 mg tid   anemia, trend hgb  strict I/O

## 2021-08-13 NOTE — PROGRESS NOTE ADULT - PROBLEM SELECTOR PROBLEM 2
Coronary artery disease involving native coronary artery of native heart with other form of angina pectoris
Coronary artery disease involving native coronary artery of native heart with other form of angina pectoris
Atrial flutter
CAD (coronary artery disease)
Coronary artery disease involving native coronary artery of native heart with other form of angina pectoris
CAD (coronary artery disease)
Coronary artery disease involving native coronary artery of native heart with other form of angina pectoris
CAD (coronary artery disease)
CAD (coronary artery disease)
Atrial flutter
CAD (coronary artery disease)
CAD (coronary artery disease)
Atrial flutter
Coronary artery disease involving native coronary artery of native heart with other form of angina pectoris
Atrial flutter
Coronary artery disease involving native coronary artery of native heart with other form of angina pectoris
CAD (coronary artery disease)
CAD (coronary artery disease)
Atrial flutter
CAD (coronary artery disease)
Coronary artery disease involving native coronary artery of native heart with other form of angina pectoris
Atrial flutter
Atrial flutter

## 2021-08-13 NOTE — PROGRESS NOTE ADULT - PROBLEM SELECTOR PLAN 4
- Per outpatient records, SCr was 1.14 on 5/21/21  - May be related to ATN given episodes of hypotension and now likely SHANE  - Cr had peaked to 2.7 then improved to 1.4, now continues to downtrend  - Continue to monitor

## 2021-08-13 NOTE — PROGRESS NOTE ADULT - ASSESSMENT
85yoF w/ PMHx significant for HFpEF, HTN, HLD, CAD, NIDDM, was tx to [facility] from [facility] after being found to be in ADHF / new acute systolic heart failure, possibly tachycardia induced (new Aflutter) with new DANE, requiring CICU for inotropes / pressors / BiPAP, now off; course c/b acute hypovolemic shock secondary to blood loss anemia requiring PRBC transfusions transferred to floors now s/p L+RHC showing elevated filling pressures, 90% stenosis of mid circumflex now s/p staged PCI with LARISSA x 3 to LCx. S/p ALANNA/DCCV.

## 2021-08-13 NOTE — PROGRESS NOTE ADULT - SUBJECTIVE AND OBJECTIVE BOX
INTERNAL MEDICINE PROGRESS NOTE    Dr. Wilian Doran DO  Attending Physician  Division of Hospital Medicine  [facility]  Available via Microsoft Teams (preferred)  Also available via Pager [phone]    SUBJECTIVE:  No acute complaints     REVIEW OF SYSTEMS   12 point review of systems negative except for above.     PAST MEDICAL & SURGICAL HISTORY:  HTN (hypertension)    HLD (hyperlipidemia)    DM (diabetes mellitus)    Hyperkalemia    CKD (chronic kidney disease)    Atherosclerosis    HTN (hypertension)    CAD (coronary artery disease)    Stage 4 chronic kidney disease    S/P hysterectomy        MEDICATIONS  (STANDING):  amLODIPine   Tablet 5 milliGRAM(s) Oral daily  apixaban 2.5 milliGRAM(s) Oral every 12 hours  artificial  tears Solution 1 Drop(s) Both EYES two times a day  aspirin enteric coated 81 milliGRAM(s) Oral daily  atorvastatin 40 milliGRAM(s) Oral at bedtime  carvedilol 12.5 milliGRAM(s) Oral every 12 hours  chlorhexidine 2% Cloths 1 Application(s) Topical <User Schedule>  clopidogrel Tablet 75 milliGRAM(s) Oral daily  dextrose 50% Injectable 25 Gram(s) IV Push once  dextrose 50% Injectable 25 Gram(s) IV Push once  furosemide    Tablet 40 milliGRAM(s) Oral daily  hydrALAZINE 100 milliGRAM(s) Oral <User Schedule>  insulin lispro (ADMELOG) corrective regimen sliding scale   SubCutaneous three times a day before meals  insulin lispro (ADMELOG) corrective regimen sliding scale   SubCutaneous at bedtime  isosorbide   dinitrate Tablet (ISORDIL) 40 milliGRAM(s) Oral three times a day  polyethylene glycol 3350 17 Gram(s) Oral daily  senna 2 Tablet(s) Oral at bedtime    MEDICATIONS  (PRN):      Allergies    No Known Allergies    Intolerances        T(C): 36.3 (08-13-21 @ 05:15), Max: 36.7 (08-12-21 @ 15:35)  T(F): 97.4 (08-13-21 @ 05:15), Max: 98.1 (08-12-21 @ 15:35)  HR: 62 (08-13-21 @ 05:15) (56 - 76)  BP: 146/63 (08-13-21 @ 05:15) (141/64 - 154/69)  ABP: --  ABP(mean): --  RR: 18 (08-13-21 @ 05:15) (18 - 18)  SpO2: 98% (08-13-21 @ 05:15) (97% - 100%)      CONSTITUTIONAL: No acute distress.   HEENT:  Conjunctiva clear B/L.  Moist oral mucosa.   Cardiovascular: RRR with no murmurs. No JVD noted. No lower extremity edema B/L. Extremities are warm and well perfused. Radial pulses 2+ B/L. Dorsalis pedis pulses 2+ B/L.    Respiratory: Lungs CTAB. No wrr. No accessory muscle use.   Gastrointestinal:  Soft, nontender. Non-distended. Non-rigid. No CVA tenderness B/L.  MSK:  No joint swelling. No joint erythema B/L. No midline spinal tenderness.  Neurologic:  Alert and awake. Oriented x3. Moving all extremities. Following commands. Making eye contact.    Skin:  No rashes noted. No skin erythema noted.   Psych:  Normal affect. Normal Mood.     LABS                        8.4    5.05  )-----------( 148      ( 12 Aug 2021 06:53 )             27.3     08-13    134<L>  |  99  |  22  ----------------------------<  96  4.1   |  22  |  1.79<H>    Ca    9.4      13 Aug 2021 07:36  Phos  3.3     08-13  Mg     2.0     08-13            ALL RECENT STUDIES REVIEWED INCLUDING REPORTS AVAILABLE     CARE DISCUSSED WITH ALL CONSULTANTS    INTERNAL MEDICINE PROGRESS NOTE    Dr. Wilian Doran DO  Attending Physician  Division of Hospital Medicine  [facility]  Available via Microsoft Teams (preferred)  Also available via Pager [phone]    SUBJECTIVE:  No acute complaints     REVIEW OF SYSTEMS   12 point review of systems negative except for above.     PAST MEDICAL & SURGICAL HISTORY:  HTN (hypertension)    HLD (hyperlipidemia)    DM (diabetes mellitus)    Hyperkalemia    CKD (chronic kidney disease)    Atherosclerosis    HTN (hypertension)    CAD (coronary artery disease)    Stage 4 chronic kidney disease    S/P hysterectomy        MEDICATIONS  (STANDING):  amLODIPine   Tablet 5 milliGRAM(s) Oral daily  apixaban 2.5 milliGRAM(s) Oral every 12 hours  artificial  tears Solution 1 Drop(s) Both EYES two times a day  aspirin enteric coated 81 milliGRAM(s) Oral daily  atorvastatin 40 milliGRAM(s) Oral at bedtime  carvedilol 12.5 milliGRAM(s) Oral every 12 hours  chlorhexidine 2% Cloths 1 Application(s) Topical <User Schedule>  clopidogrel Tablet 75 milliGRAM(s) Oral daily  dextrose 50% Injectable 25 Gram(s) IV Push once  dextrose 50% Injectable 25 Gram(s) IV Push once  furosemide    Tablet 40 milliGRAM(s) Oral daily  hydrALAZINE 100 milliGRAM(s) Oral <User Schedule>  insulin lispro (ADMELOG) corrective regimen sliding scale   SubCutaneous three times a day before meals  insulin lispro (ADMELOG) corrective regimen sliding scale   SubCutaneous at bedtime  isosorbide   dinitrate Tablet (ISORDIL) 40 milliGRAM(s) Oral three times a day  polyethylene glycol 3350 17 Gram(s) Oral daily  senna 2 Tablet(s) Oral at bedtime    MEDICATIONS  (PRN):      Allergies    No Known Allergies    Intolerances        T(C): 36.3 (08-13-21 @ 05:15), Max: 36.7 (08-12-21 @ 15:35)  T(F): 97.4 (08-13-21 @ 05:15), Max: 98.1 (08-12-21 @ 15:35)  HR: 62 (08-13-21 @ 05:15) (56 - 76)  BP: 146/63 (08-13-21 @ 05:15) (141/64 - 154/69)  ABP: --  ABP(mean): --  RR: 18 (08-13-21 @ 05:15) (18 - 18)  SpO2: 98% (08-13-21 @ 05:15) (97% - 100%)    CONSTITUTIONAL: No acute distress.   HEENT:  Conjunctiva clear B/L.  Moist oral mucosa.   Cardiovascular: RRR with no murmurs. +JVD noted. Trace lower extremity edema B/L. Extremities are warm and well perfused. Radial pulses 2+ B/L. Dorsalis pedis pulses 2+ B/L.    Respiratory: Lungs CTAB. No wrr. No accessory muscle use.   Gastrointestinal:  Soft, nontender. Non-distended. Non-rigid. No CVA tenderness B/L.  Neurologic:  Alert and awake. Moving all extremities. Following commands. Making eye contact. No numbness or tingling.   MSK / Skin: RUE ecchymoses in dependent parts of arm. Resolving. Compartments soft.   Psych:  Normal affect. Normal Mood.       LABS                        8.4    5.05  )-----------( 148      ( 12 Aug 2021 06:53 )             27.3     08-13    134<L>  |  99  |  22  ----------------------------<  96  4.1   |  22  |  1.79<H>    Ca    9.4      13 Aug 2021 07:36  Phos  3.3     08-13  Mg     2.0     08-13            ALL RECENT STUDIES REVIEWED INCLUDING REPORTS AVAILABLE     CARE DISCUSSED WITH ALL CONSULTANTS

## 2021-08-13 NOTE — PROGRESS NOTE ADULT - ASSESSMENT
86 y/o female with h/o chronic HFpEF, HTN, DLP, CAD (h/o abnormal stress test), DM 2 and CKD 2 who presented to [facility] with nausea, vomiting and dizziness. She was found to be tachycardic, hypotensive with elevated lactate concerning for cardiogenic shock. She was given IVF, empiric antibiotics and underwent abd CT which ws unremarkable. Her ekg showed new onset aflutter and her TTE showed newly diagnosed LV systolic dysfunction with LVEF 25%, mod MR and mod TR. She was started on milrinone and levophed gtt. She was transferred to [facility] for further management. Her hospital course was complicated by tachypnea requiring bipap placement, acute anemia requiring PRBCs transfusion thought to be due bleeding from arterial line/heparin gtt leading to RUE hematoma. Her milrinone gtt was dc’ed 7/26 due to hypotension. CVP prior to transfer out of CICU had been low and received gentle IV fluid hydration.     She underwent L/RHC 8/4 which showed 90% stenosis of mLCx, mildly elevated filling pressures and preserved cardiac output. Underwent staged PCI to LCx on 8/6. Also underwent ALANNA/DCCV 8/9 and now in SR HR 50-70s but with infrequent episodes of pAF/AT.    Her renal function is now improving, likely contrast induced. She is volume overloaded with elevated JVP on exam although responding well to diuretics with 2.2kg weight loss over the past 24 hours. She has been tolerating gradual escalation of afterload reduction though remains hypertensive particularly for her degree of systolic dysfunction.     Pertinent Cardiac Studies  [facility] 8/4/21: RA 13 (v16), PA 59/29/34, PCWP 18 (v21), LVEDP 18, PA 59%, Ao 95%, Leelee CO/CI 5.1/3.1, /50/83 (weight 135.3 lbs)  [facility] 8/4/21: mCx diffuse 90% stenosis, pCx 40% stenosis, pLAD 40% stenosis, mLAD 30% stenosis, OM1 40% stenosis, OM2 40% stenosis, pRCA 30% stenosis, mRCA 50% stenosis, dRCA 20% stenosis, RPDA 30% stenosis, normal LM  TTE 7/25: LVIDd 4.4 cm, LVEF 29% (global), normal RV size with decreased systolic function, mod dilated LA, mild-mod MR, calcified AV with grossly mod decreased opening (peak/mean gradient 15/9 respectively), mild TR, est RVSP 31 mmHg    Please Call NS2 HF Spectra #[id number] for any questions or concerns

## 2021-08-13 NOTE — PROGRESS NOTE ADULT - SUBJECTIVE AND OBJECTIVE BOX
[facility]   [phone]  RENAL FOLLOW UP NOTE  --------------------------------------------------------------------------------  Chief Complaint:    24 hour events/subjective:    seen earlier   states breathing is better     PAST HISTORY  --------------------------------------------------------------------------------  No significant changes to PMH, PSH, FHx, SHx, unless otherwise noted    ALLERGIES & MEDICATIONS  --------------------------------------------------------------------------------  Allergies    No Known Allergies    Intolerances      Standing Inpatient Medications  amLODIPine   Tablet 5 milliGRAM(s) Oral daily  apixaban 2.5 milliGRAM(s) Oral every 12 hours  artificial  tears Solution 1 Drop(s) Both EYES two times a day  aspirin enteric coated 81 milliGRAM(s) Oral daily  atorvastatin 40 milliGRAM(s) Oral at bedtime  carvedilol 12.5 milliGRAM(s) Oral every 12 hours  chlorhexidine 2% Cloths 1 Application(s) Topical <User Schedule>  clopidogrel Tablet 75 milliGRAM(s) Oral daily  dextrose 50% Injectable 25 Gram(s) IV Push once  dextrose 50% Injectable 25 Gram(s) IV Push once  furosemide    Tablet 40 milliGRAM(s) Oral two times a day  hydrALAZINE 100 milliGRAM(s) Oral <User Schedule>  insulin lispro (ADMELOG) corrective regimen sliding scale   SubCutaneous three times a day before meals  insulin lispro (ADMELOG) corrective regimen sliding scale   SubCutaneous at bedtime  isosorbide   dinitrate Tablet (ISORDIL) 40 milliGRAM(s) Oral three times a day  polyethylene glycol 3350 17 Gram(s) Oral daily  sacubitril 24 mG/valsartan 26 mG 1 Tablet(s) Oral two times a day  senna 2 Tablet(s) Oral at bedtime    PRN Inpatient Medications      REVIEW OF SYSTEMS  --------------------------------------------------------------------------------    Gen: denies fevers/chills,  CVS: denies chest pain/palpitations  Resp: denies SOB/Cough  GI: Denies N/V/Abd pain  : Denies dysuria/oliguria/hematuria    All other systems were reviewed and are negative, except as noted.    VITALS/PHYSICAL EXAM  --------------------------------------------------------------------------------  T(C): 36.3 (08-13-21 @ 05:15), Max: 36.6 (08-12-21 @ 20:50)  HR: 62 (08-13-21 @ 05:15) (59 - 76)  BP: 146/63 (08-13-21 @ 05:15) (141/64 - 154/69)  RR: 18 (08-13-21 @ 05:15) (18 - 18)  SpO2: 98% (08-13-21 @ 05:15) (98% - 100%)  Wt(kg): --        08-12-21 @ 07:01  -  08-13-21 @ 07:00  --------------------------------------------------------  IN: 880 mL / OUT: 750 mL / NET: 130 mL    08-13-21 @ 07:01  -  08-13-21 @ 20:28  --------------------------------------------------------  IN: 120 mL / OUT: 0 mL / NET: 120 mL      Physical Exam:  	      Gen: Non toxic comfortable appearing   	Pulm: decrease breath sounds, no rales or ronchi or wheezing  	CV: +JVD. RRR, S1S2;  	Abd: +BS, soft, nontender/nondistended  	: No suprapubic tenderness  	UE: Warm, no cyanosis  no clubbing, no edema   	LE: Warm, no cyanosis  no clubbing, 1+ edema  	Neuro: alert and oriented. speech coherent      LABS/STUDIES  --------------------------------------------------------------------------------              8.4    5.05  >-----------<  148      [08-12-21 @ 06:53]              27.3     134  |  99  |  22  ----------------------------<  96      [08-13-21 @ 07:36]  4.1   |  22  |  1.79        Ca     9.4     [08-13-21 @ 07:36]      Mg     2.0     [08-13-21 @ 07:36]      Phos  3.3     [08-13-21 @ 07:36]            Creatinine Trend:  SCr 1.79 [08-13 @ 07:36]  SCr 1.94 [08-12 @ 06:52]  SCr 2.30 [08-11 @ 06:51]  SCr 2.36 [08-10 @ 07:23]  SCr 1.96 [08-09 @ 07:23]              Urinalysis - [07-24-21 @ 05:01]      Color Yellow / Appearance Slightly Turbid / SG 1.025 / pH 5.0      Gluc Negative / Ketone Trace  / Bili Negative / Urobili 4       Blood Moderate / Protein 100 / Leuk Est Small / Nitrite Negative      RBC 25-50 / WBC 11-25 / Hyaline  / Gran 0-2 / Sq Epi  / Non Sq Epi Moderate / Bacteria Moderate      TSH 2.62      [07-26-21 @ 20:44]  Lipid: chol 68, TG 48, HDL 32, LDL --      [07-25-21 @ 20:34]    Free Light Chains: kappa 1.86, lambda 2.79, ratio = 0.67      [07-28 @ 08:47]

## 2021-08-13 NOTE — PROVIDER CONTACT NOTE (OTHER) - ASSESSMENT
Pt. AxOx4. VSS, BP: 141/64 R leg, 100% RA, Temp 97.9. Pt. denies chest pain, palpitations, sob, nausea, dizziness.
Pt. resting comfortably in bed at the time, watching TV. A&Ox4, VSS. BP: 135/81. HR is in the 70s.
Patient was sleeping at time of episode. Patient awakens to voice. Patient is alert and oriented x4. VSS. Patient denies pain and discomfort.
Pt. A&Ox4, asymptomatic and asleep at the time. VSS. BP: 146/82, HR is back in the 70-80s after being woken up. Pt. denies chest pains, palpitations, dizziness, headache, nausea/vomiting.

## 2021-08-13 NOTE — PROGRESS NOTE ADULT - PROBLEM SELECTOR PLAN 1
newly diagnosed HFrEF (had previous hx of HFpEF)  - HF following, recs greatly appreciated.   - continue to optimize GDMT as tolerated  - CW hydralazine 100mg TID, isordil to 40mg TID, and Carvedilol at 12.5mg q12h     - CW norvasc to optimize bp.   - Lasix 40 mg po daily.   - NM amyloid scan not suggestive of cardiac amyloidosis  - s/p L+RHC showing elevated filling pressures, 90% stenosis of mid circumflex  - Will f/u final recs. Anticipate dc today vs tomorrow.

## 2021-08-13 NOTE — PROGRESS NOTE ADULT - PROBLEM SELECTOR PLAN 2
- s/p PCI to x 8/6  - BB and Nitrate as above  - continue with ASA, Plavix, Statin, BB and Nitrate, ARB in Entresto  - recommend to continue with triple therapy for one month followed by NOAC and Plavix thereafter

## 2021-08-13 NOTE — PROVIDER CONTACT NOTE (OTHER) - REASON
Bradycardia, HR of 43 for the first time
PAF with HR up to 129 for 7.69 secs
 for 8.6 sec on tele, asymptomatic
Ectopy on telemetry.

## 2021-08-13 NOTE — PROGRESS NOTE ADULT - PROBLEM SELECTOR PLAN 2
s/p L+RHC showing elevated filling pressures, 90% stenosis of mid circumflex   - s/p staged PCI/LARISSA of left circumflex    - c/w ASA 81mg daily + Plavix 75mg daily  - c/w high intensity statin  - continue to optimize GDMT as tolerated  - home ARB on hold in setting of DANE on CKD  - Triple therapy mgmt with asa, plavix, and eliquis per cardiology.

## 2021-08-13 NOTE — PROGRESS NOTE ADULT - PROBLEM SELECTOR PLAN 1
- continue lasix 40mg daily  - start Entresto 24-26mg BID, hold for SBP < 90  - continue Amlodipine 5 mg QD for further afterload reduction  - continue Coreg 12.5 mg BID, hold parameters to HR < 50; SB/SR 50-60s without a device  - continue HDZN 100 mg TID and ISDN 40 mg TID, can wean if needed outpatient in favor of uptitration of Entresto  - daily standing weights and strict I&Os  - Anticipate discharge in next 1-2 days if renal function continues to improve - continue lasix 40mg daily  - start Entresto 24-26mg BID, hold for SBP < 90  - continue Coreg 12.5 mg BID, hold parameters to HR < 50; SB/SR 50-60s without a device  - continue HDZN 100 mg TID and ISDN 40 mg TID, can wean if needed outpatient in favor of uptitration of Entresto  - daily standing weights and strict I&Os  - Anticipate discharge in next 1-2 days if renal function continues to improve

## 2021-08-13 NOTE — PROVIDER CONTACT NOTE (OTHER) - ACTION/TREATMENT ORDERED:
JOSE G Baires made aware. OK to give carvedilol 12.5mg tonight.
Provider notified. Continue to monitor. Check electrolytes in AM.
Give carvedilol 12.5mg dose early. JOSE G Caon made aware. Will continue to monitor the patient.
Continue to monitor patient.

## 2021-08-13 NOTE — PROGRESS NOTE ADULT - PROBLEM SELECTOR PROBLEM 1
Acute systolic heart failure

## 2021-08-13 NOTE — PROGRESS NOTE ADULT - PROBLEM SELECTOR PLAN 3
- appreciate EP recs  - s/p ALANNA/DCCV 8/9  - AC with heparin gtt at this time  - will follow CBC trend and if stable tomorrow, transition to NOAC
- continue telemetry monitoring  - appreciate EP evaluation and recommendations  - cleared by vascular surgery to resume hep gtt as above, which was started on 8/1  - c/w carvedilol as above  - plan for ALANNA/DCCV Monday
- continue telemetry monitoring  - appreciate EP evaluation and recommendations  - cleared by vascular surgery to resume hep gtt as above, which was started on 8/1  - c/w carvedilol as above  - plan for ALANNA/DCCV Monday
- s/p ALANNA/DCCV 8/9   - AC with Eliquis   - further guidance by EP
- continue home statin  - continue beta-blocker as above  - not on asa at home.
- continue telemetry monitoring  - appreciate EP evaluation and recommendations  - cleared by vascular surgery to resume hep gtt as above, which was started on 8/1  - c/w carvedilol as above  - plan for ALANNA/DCCV Monday
- s/p ALANNA/DCCV 8/9   - AC with Eliquis   - further guidance by EP
- appreciate EP recs  - rate control with BB as above and plan is for eventual ALANNA/DCCV once able to tolerate uninterrupted AC  - received 1 dose of digoxin 125 mcg on 7/26, but would use with caution in the setting of renal dysfunction
- continue telemetry monitoring  - appreciate EP evaluation and recommendations  - cleared by vascular surgery to resume hep gtt as above, which was started on 8/1  - c/w carvedilol as above  - plan for ALANNA/DCCV Monday
- continue telemetry monitoring  - appreciate EP evaluation and recommendations  - cleared by vascular surgery to resume hep gtt as above, which was started on 8/1. Hgb stable. S/p ALANNA/DCCV on 8/9. NSR now. Hep drip to Eliquis on 8/10.   - c/w carvedilol as above
- appreciate EP recs  - s/p ALANNA/DCCV 8/9  - AC with heparin gtt and eventual transition to NOAC
- continue telemetry monitoring  - appreciate EP evaluation and recommendations  - cleared by vascular surgery to resume hep gtt as above, which was started on 8/1  - c/w carvedilol as above  - s/p ALANNA/DCCV on 8/9  - plan to transition to Eliquis.
- continue telemetry monitoring  - appreciate EP evaluation and recommendations  - cleared by vascular surgery to resume hep gtt as above, which was started on 8/1. Hgb stable. S/p ALANNA/DCCV on 8/9. NSR now. Hep drip to Eliquis on 8/10.   - c/w carvedilol as above
- s/p ALANNA/DCCV 8/9   - AC with Eliquis   - further guidance by EP
not on ASA therapy at home.  - c/w statin  - c/w metoprolol as above  - home ARB on hold in setting of DANE on CKD
- appreciate EP recs  - rate control with BB as above and plan is for eventual ALANNA/DCCV once able to tolerate uninterrupted AC  - received 1 dose of digoxin 125 mcg on 7/26, can consider dig loading now that renal function is improving  - continue AC with heparin gtt; needs tighter control of aPTT given h/o bleeding
- continue telemetry monitoring  - appreciate EP evaluation and recommendations  - cleared by vascular surgery to resume hep gtt as above, which was started on 8/1. Hgb stable. S/p ALANNA/DCCV on 8/9. NSR now. Hep drip to Eliquis on 8/10.   - c/w carvedilol as above
- appreciate EP recs  - rate control with BB as above and plan is for eventual ALANNA/DCCV once able to tolerate uninterrupted AC  - received 1 dose of digoxin 125 mcg on 7/26, but would use with caution in the setting of renal dysfunction  - would resume AC with heparin infusion, targeting low PTT if vascular surgery in agreement
not on ASA therapy at home.  - c/w statin  - c/w metoprolol as above  - home ARB on hold in setting of DANE on CKD
- continue home statin  - continue beta-blocker as above
not on ASA therapy at home.  - c/w statin  - c/w carvedilol as above  - home ARB on hold in setting of DANE on CKD
- appreciate EP recs  - rate control with BB as above and plan is for eventual ALANNA/DCCV once able to tolerate uninterrupted AC  - received 1 dose of digoxin 125 mcg on 7/26, can consider dig loading now that renal function is improving  - continue AC with heparin gtt; needs tighter control of aPTT given h/o bleeding
- continue home statin  - continue beta-blocker as above  - not on asa at home.
- continue telemetry monitoring  - appreciate EP evaluation and recommendations  - cleared by vascular surgery to resume hep gtt as above, which was started on 8/1. Hgb stable. S/p ALANNA/DCCV on 8/9. NSR now. Hep drip to Eliquis on 8/10.   - c/w carvedilol as above
- appreciate EP recs  - rate control with BB as above and plan is for ALANNA/DCCV 8/9 assuming she is able to tolerate uninterrupted AC  - received 1 dose of digoxin 125 mcg on 7/26, can consider dig loading now that renal function is improving  - continue AC with heparin gtt; needs tighter control of aPTT given h/o bleeding
- appreciate EP recs  - rate control with BB as above and plan is for eventual ALANNA/DCCV once able to tolerate uninterrupted AC  - received 1 dose of digoxin 125 mcg on 7/26, but would use with caution in the setting of renal dysfunction
- continue home statin  - continue beta-blocker as above  - not on asa at home.
- continue home statin  - continue beta-blocker as above  - not on asa at home.
- continue home statin  - continue beta-blocker as above

## 2021-08-13 NOTE — PROGRESS NOTE ADULT - PROBLEM SELECTOR PLAN 6
- holding home antihyperglycemics  - continue ISS w/ serial FS monitoring    DVT ppx: hep gtt with PTT goal 55-65
- holding home antihyperglycemics  - continue ISS w/ serial FS monitoring    #PPX  HSQ for now.
- holding home antihyperglycemics  - continue ISS w/ serial FS monitoring    DVT ppx: full ac as above.
- holding home antihyperglycemics  - continue ISS w/ serial FS monitoring    DVT ppx: hep gtt with PTT goal 55-65
- holding home antihyperglycemics  - continue ISS w/ serial FS monitoring    DVT ppx: hep gtt
- holding home antihyperglycemics  - continue ISS w/ serial FS monitoring    DVT ppx: full ac as above.
- holding home antihyperglycemics  - continue ISS w/ serial FS monitoring    DVT ppx: hep gtt with PTT goal 55-65
- holding home antihyperglycemics  - continue ISS w/ serial FS monitoring    #PPX  HSQ for now.
- holding home antihyperglycemics  - continue ISS w/ serial FS monitoring    #PPX  HSQ for now.
- holding home antihyperglycemics  - continue ISS w/ serial FS monitoring

## 2021-08-14 ENCOUNTER — TRANSCRIPTION ENCOUNTER (OUTPATIENT)
Age: 86
End: 2021-08-14

## 2021-08-14 VITALS
HEART RATE: 57 BPM | OXYGEN SATURATION: 100 % | DIASTOLIC BLOOD PRESSURE: 62 MMHG | TEMPERATURE: 97 F | SYSTOLIC BLOOD PRESSURE: 151 MMHG | RESPIRATION RATE: 18 BRPM

## 2021-08-14 DIAGNOSIS — N17.9 ACUTE KIDNEY FAILURE, UNSPECIFIED: ICD-10-CM

## 2021-08-14 LAB
ANION GAP SERPL CALC-SCNC: 14 MMOL/L — SIGNIFICANT CHANGE UP (ref 5–17)
BUN SERPL-MCNC: 20 MG/DL — SIGNIFICANT CHANGE UP (ref 7–23)
CALCIUM SERPL-MCNC: 8.5 MG/DL — SIGNIFICANT CHANGE UP (ref 8.4–10.5)
CHLORIDE SERPL-SCNC: 97 MMOL/L — SIGNIFICANT CHANGE UP (ref 96–108)
CO2 SERPL-SCNC: 20 MMOL/L — LOW (ref 22–31)
CREAT SERPL-MCNC: 1.56 MG/DL — HIGH (ref 0.5–1.3)
GLUCOSE BLDC GLUCOMTR-MCNC: 119 MG/DL — HIGH (ref 70–99)
GLUCOSE BLDC GLUCOMTR-MCNC: 124 MG/DL — HIGH (ref 70–99)
GLUCOSE SERPL-MCNC: 162 MG/DL — HIGH (ref 70–99)
POTASSIUM SERPL-MCNC: 3.3 MMOL/L — LOW (ref 3.5–5.3)
POTASSIUM SERPL-SCNC: 3.3 MMOL/L — LOW (ref 3.5–5.3)
SODIUM SERPL-SCNC: 131 MMOL/L — LOW (ref 135–145)

## 2021-08-14 PROCEDURE — 84100 ASSAY OF PHOSPHORUS: CPT

## 2021-08-14 PROCEDURE — 85730 THROMBOPLASTIN TIME PARTIAL: CPT

## 2021-08-14 PROCEDURE — C1894: CPT

## 2021-08-14 PROCEDURE — P9016: CPT

## 2021-08-14 PROCEDURE — 82570 ASSAY OF URINE CREATININE: CPT

## 2021-08-14 PROCEDURE — 86769 SARS-COV-2 COVID-19 ANTIBODY: CPT

## 2021-08-14 PROCEDURE — 83036 HEMOGLOBIN GLYCOSYLATED A1C: CPT

## 2021-08-14 PROCEDURE — 76770 US EXAM ABDO BACK WALL COMP: CPT

## 2021-08-14 PROCEDURE — 82330 ASSAY OF CALCIUM: CPT

## 2021-08-14 PROCEDURE — C1769: CPT

## 2021-08-14 PROCEDURE — 82435 ASSAY OF BLOOD CHLORIDE: CPT

## 2021-08-14 PROCEDURE — 86900 BLOOD TYPING SEROLOGIC ABO: CPT

## 2021-08-14 PROCEDURE — 85027 COMPLETE CBC AUTOMATED: CPT

## 2021-08-14 PROCEDURE — C1753: CPT

## 2021-08-14 PROCEDURE — C1725: CPT

## 2021-08-14 PROCEDURE — 82550 ASSAY OF CK (CPK): CPT

## 2021-08-14 PROCEDURE — 97535 SELF CARE MNGMENT TRAINING: CPT

## 2021-08-14 PROCEDURE — 99152 MOD SED SAME PHYS/QHP 5/>YRS: CPT

## 2021-08-14 PROCEDURE — 83521 IG LIGHT CHAINS FREE EACH: CPT

## 2021-08-14 PROCEDURE — 84540 ASSAY OF URINE/UREA-N: CPT

## 2021-08-14 PROCEDURE — 78830 RP LOCLZJ TUM SPECT W/CT 1: CPT

## 2021-08-14 PROCEDURE — 84145 PROCALCITONIN (PCT): CPT

## 2021-08-14 PROCEDURE — 83605 ASSAY OF LACTIC ACID: CPT

## 2021-08-14 PROCEDURE — 82947 ASSAY GLUCOSE BLOOD QUANT: CPT

## 2021-08-14 PROCEDURE — 97110 THERAPEUTIC EXERCISES: CPT

## 2021-08-14 PROCEDURE — 93931 UPPER EXTREMITY STUDY: CPT

## 2021-08-14 PROCEDURE — 84484 ASSAY OF TROPONIN QUANT: CPT

## 2021-08-14 PROCEDURE — 80048 BASIC METABOLIC PNL TOTAL CA: CPT

## 2021-08-14 PROCEDURE — 82553 CREATINE MB FRACTION: CPT

## 2021-08-14 PROCEDURE — 36430 TRANSFUSION BLD/BLD COMPNT: CPT

## 2021-08-14 PROCEDURE — 71045 X-RAY EXAM CHEST 1 VIEW: CPT

## 2021-08-14 PROCEDURE — 85018 HEMOGLOBIN: CPT

## 2021-08-14 PROCEDURE — 84295 ASSAY OF SERUM SODIUM: CPT

## 2021-08-14 PROCEDURE — C1874: CPT

## 2021-08-14 PROCEDURE — C8929: CPT

## 2021-08-14 PROCEDURE — C9600: CPT | Mod: LC

## 2021-08-14 PROCEDURE — 84156 ASSAY OF PROTEIN URINE: CPT

## 2021-08-14 PROCEDURE — 84443 ASSAY THYROID STIM HORMONE: CPT

## 2021-08-14 PROCEDURE — 97116 GAIT TRAINING THERAPY: CPT

## 2021-08-14 PROCEDURE — C1887: CPT

## 2021-08-14 PROCEDURE — C1751: CPT

## 2021-08-14 PROCEDURE — 84132 ASSAY OF SERUM POTASSIUM: CPT

## 2021-08-14 PROCEDURE — 80053 COMPREHEN METABOLIC PANEL: CPT

## 2021-08-14 PROCEDURE — 82962 GLUCOSE BLOOD TEST: CPT

## 2021-08-14 PROCEDURE — 97162 PT EVAL MOD COMPLEX 30 MIN: CPT

## 2021-08-14 PROCEDURE — 85014 HEMATOCRIT: CPT

## 2021-08-14 PROCEDURE — 99153 MOD SED SAME PHYS/QHP EA: CPT

## 2021-08-14 PROCEDURE — 93460 R&L HRT ART/VENTRICLE ANGIO: CPT

## 2021-08-14 PROCEDURE — 86901 BLOOD TYPING SEROLOGIC RH(D): CPT

## 2021-08-14 PROCEDURE — 93005 ELECTROCARDIOGRAM TRACING: CPT

## 2021-08-14 PROCEDURE — 93325 DOPPLER ECHO COLOR FLOW MAPG: CPT

## 2021-08-14 PROCEDURE — 92978 ENDOLUMINL IVUS OCT C 1ST: CPT | Mod: LC

## 2021-08-14 PROCEDURE — 99239 HOSP IP/OBS DSCHRG MGMT >30: CPT

## 2021-08-14 PROCEDURE — 83880 ASSAY OF NATRIURETIC PEPTIDE: CPT

## 2021-08-14 PROCEDURE — 93320 DOPPLER ECHO COMPLETE: CPT

## 2021-08-14 PROCEDURE — 97165 OT EVAL LOW COMPLEX 30 MIN: CPT

## 2021-08-14 PROCEDURE — 80061 LIPID PANEL: CPT

## 2021-08-14 PROCEDURE — 83930 ASSAY OF BLOOD OSMOLALITY: CPT

## 2021-08-14 PROCEDURE — P9045: CPT

## 2021-08-14 PROCEDURE — 82565 ASSAY OF CREATININE: CPT

## 2021-08-14 PROCEDURE — 85610 PROTHROMBIN TIME: CPT

## 2021-08-14 PROCEDURE — 86923 COMPATIBILITY TEST ELECTRIC: CPT

## 2021-08-14 PROCEDURE — 92960 CARDIOVERSION ELECTRIC EXT: CPT

## 2021-08-14 PROCEDURE — 84300 ASSAY OF URINE SODIUM: CPT

## 2021-08-14 PROCEDURE — 83935 ASSAY OF URINE OSMOLALITY: CPT

## 2021-08-14 PROCEDURE — 83735 ASSAY OF MAGNESIUM: CPT

## 2021-08-14 PROCEDURE — 93312 ECHO TRANSESOPHAGEAL: CPT

## 2021-08-14 PROCEDURE — 86850 RBC ANTIBODY SCREEN: CPT

## 2021-08-14 PROCEDURE — 36569 INSJ PICC 5 YR+ W/O IMAGING: CPT

## 2021-08-14 PROCEDURE — 82803 BLOOD GASES ANY COMBINATION: CPT

## 2021-08-14 RX ORDER — ISOSORBIDE DINITRATE 5 MG/1
1 TABLET ORAL
Qty: 90 | Refills: 0
Start: 2021-08-14 | End: 2021-09-12

## 2021-08-14 RX ORDER — FUROSEMIDE 40 MG
1 TABLET ORAL
Qty: 60 | Refills: 0
Start: 2021-08-14 | End: 2021-09-12

## 2021-08-14 RX ORDER — CLOPIDOGREL BISULFATE 75 MG/1
1 TABLET, FILM COATED ORAL
Qty: 30 | Refills: 0
Start: 2021-08-14 | End: 2021-09-12

## 2021-08-14 RX ORDER — ASPIRIN/CALCIUM CARB/MAGNESIUM 324 MG
1 TABLET ORAL
Qty: 30 | Refills: 0
Start: 2021-08-14 | End: 2021-09-12

## 2021-08-14 RX ORDER — AMLODIPINE BESYLATE 2.5 MG/1
1 TABLET ORAL
Qty: 30 | Refills: 0
Start: 2021-08-14 | End: 2021-09-12

## 2021-08-14 RX ORDER — METFORMIN HYDROCHLORIDE 850 MG/1
1 TABLET ORAL
Qty: 0 | Refills: 0 | DISCHARGE

## 2021-08-14 RX ORDER — SENNA PLUS 8.6 MG/1
2 TABLET ORAL
Qty: 0 | Refills: 0 | DISCHARGE
Start: 2021-08-14

## 2021-08-14 RX ORDER — SACUBITRIL AND VALSARTAN 24; 26 MG/1; MG/1
1 TABLET, FILM COATED ORAL
Qty: 60 | Refills: 0
Start: 2021-08-14 | End: 2021-09-12

## 2021-08-14 RX ORDER — CARVEDILOL PHOSPHATE 80 MG/1
1 CAPSULE, EXTENDED RELEASE ORAL
Qty: 60 | Refills: 0
Start: 2021-08-14 | End: 2021-09-12

## 2021-08-14 RX ORDER — HYDRALAZINE HCL 50 MG
1 TABLET ORAL
Qty: 90 | Refills: 0
Start: 2021-08-14 | End: 2021-09-12

## 2021-08-14 RX ORDER — POTASSIUM CHLORIDE 20 MEQ
40 PACKET (EA) ORAL ONCE
Refills: 0 | Status: COMPLETED | OUTPATIENT
Start: 2021-08-14 | End: 2021-08-14

## 2021-08-14 RX ORDER — CLOPIDOGREL BISULFATE 75 MG/1
1 TABLET, FILM COATED ORAL
Qty: 30 | Refills: 0 | DISCHARGE
Start: 2021-08-14 | End: 2021-09-12

## 2021-08-14 RX ORDER — POTASSIUM CHLORIDE 20 MEQ
20 PACKET (EA) ORAL ONCE
Refills: 0 | Status: COMPLETED | OUTPATIENT
Start: 2021-08-14 | End: 2021-08-14

## 2021-08-14 RX ORDER — APIXABAN 2.5 MG/1
1 TABLET, FILM COATED ORAL
Qty: 60 | Refills: 0
Start: 2021-08-14 | End: 2021-09-12

## 2021-08-14 RX ORDER — POLYETHYLENE GLYCOL 3350 17 G/17G
17 POWDER, FOR SOLUTION ORAL
Qty: 0 | Refills: 0 | DISCHARGE
Start: 2021-08-14

## 2021-08-14 RX ORDER — OLMESARTAN MEDOXOMIL 5 MG/1
1 TABLET, FILM COATED ORAL
Qty: 0 | Refills: 0 | DISCHARGE

## 2021-08-14 RX ORDER — ATORVASTATIN CALCIUM 80 MG/1
1 TABLET, FILM COATED ORAL
Qty: 30 | Refills: 0
Start: 2021-08-14 | End: 2021-09-12

## 2021-08-14 RX ORDER — METOPROLOL TARTRATE 50 MG
1 TABLET ORAL
Qty: 0 | Refills: 0 | DISCHARGE

## 2021-08-14 RX ORDER — DOXAZOSIN MESYLATE 4 MG
1 TABLET ORAL
Qty: 0 | Refills: 0 | DISCHARGE

## 2021-08-14 RX ADMIN — Medication 40 MILLIGRAM(S): at 18:16

## 2021-08-14 RX ADMIN — ISOSORBIDE DINITRATE 40 MILLIGRAM(S): 5 TABLET ORAL at 18:15

## 2021-08-14 RX ADMIN — APIXABAN 2.5 MILLIGRAM(S): 2.5 TABLET, FILM COATED ORAL at 18:15

## 2021-08-14 RX ADMIN — Medication 40 MILLIEQUIVALENT(S): at 13:43

## 2021-08-14 RX ADMIN — CARVEDILOL PHOSPHATE 12.5 MILLIGRAM(S): 80 CAPSULE, EXTENDED RELEASE ORAL at 06:59

## 2021-08-14 RX ADMIN — Medication 1 DROP(S): at 06:59

## 2021-08-14 RX ADMIN — Medication 40 MILLIGRAM(S): at 06:58

## 2021-08-14 RX ADMIN — Medication 81 MILLIGRAM(S): at 13:11

## 2021-08-14 RX ADMIN — Medication 100 MILLIGRAM(S): at 16:45

## 2021-08-14 RX ADMIN — AMLODIPINE BESYLATE 5 MILLIGRAM(S): 2.5 TABLET ORAL at 06:59

## 2021-08-14 RX ADMIN — CHLORHEXIDINE GLUCONATE 1 APPLICATION(S): 213 SOLUTION TOPICAL at 13:44

## 2021-08-14 RX ADMIN — ISOSORBIDE DINITRATE 40 MILLIGRAM(S): 5 TABLET ORAL at 11:59

## 2021-08-14 RX ADMIN — Medication 20 MILLIEQUIVALENT(S): at 17:09

## 2021-08-14 RX ADMIN — CLOPIDOGREL BISULFATE 75 MILLIGRAM(S): 75 TABLET, FILM COATED ORAL at 13:11

## 2021-08-14 RX ADMIN — Medication 100 MILLIGRAM(S): at 11:55

## 2021-08-14 RX ADMIN — ISOSORBIDE DINITRATE 40 MILLIGRAM(S): 5 TABLET ORAL at 06:58

## 2021-08-14 RX ADMIN — SACUBITRIL AND VALSARTAN 1 TABLET(S): 24; 26 TABLET, FILM COATED ORAL at 06:58

## 2021-08-14 RX ADMIN — Medication 100 MILLIGRAM(S): at 00:49

## 2021-08-14 RX ADMIN — SACUBITRIL AND VALSARTAN 1 TABLET(S): 24; 26 TABLET, FILM COATED ORAL at 18:15

## 2021-08-14 RX ADMIN — APIXABAN 2.5 MILLIGRAM(S): 2.5 TABLET, FILM COATED ORAL at 06:58

## 2021-08-14 NOTE — CHART NOTE - NSCHARTNOTEFT_GEN_A_CORE
Dr. Wilian Doran, DO  Attending Physician  Division of Hospital Medicine  [facility]  Available via Microsoft Teams (preferred)  Also available via Pager [phone]    SUBJECTIVE:  No acute complaints     REVIEW OF SYSTEMS   12 point review of systems negative except for above.     PAST MEDICAL & SURGICAL HISTORY:  HTN (hypertension)    HLD (hyperlipidemia)    DM (diabetes mellitus)    Hyperkalemia    CKD (chronic kidney disease)    Atherosclerosis    HTN (hypertension)    CAD (coronary artery disease)    Stage 4 chronic kidney disease    S/P hysterectomy        MEDICATIONS  (STANDING):  amLODIPine   Tablet 5 milliGRAM(s) Oral daily  apixaban 2.5 milliGRAM(s) Oral every 12 hours  artificial  tears Solution 1 Drop(s) Both EYES two times a day  aspirin enteric coated 81 milliGRAM(s) Oral daily  atorvastatin 40 milliGRAM(s) Oral at bedtime  carvedilol 12.5 milliGRAM(s) Oral every 12 hours  chlorhexidine 2% Cloths 1 Application(s) Topical <User Schedule>  clopidogrel Tablet 75 milliGRAM(s) Oral daily  dextrose 50% Injectable 25 Gram(s) IV Push once  dextrose 50% Injectable 25 Gram(s) IV Push once  furosemide    Tablet 40 milliGRAM(s) Oral daily  hydrALAZINE 100 milliGRAM(s) Oral <User Schedule>  insulin lispro (ADMELOG) corrective regimen sliding scale   SubCutaneous three times a day before meals  insulin lispro (ADMELOG) corrective regimen sliding scale   SubCutaneous at bedtime  isosorbide   dinitrate Tablet (ISORDIL) 40 milliGRAM(s) Oral three times a day  polyethylene glycol 3350 17 Gram(s) Oral daily  senna 2 Tablet(s) Oral at bedtime    MEDICATIONS  (PRN):      Allergies    No Known Allergies    Intolerances        T(C): 36.3 (08-13-21 @ 05:15), Max: 36.7 (08-12-21 @ 15:35)  T(F): 97.4 (08-13-21 @ 05:15), Max: 98.1 (08-12-21 @ 15:35)  HR: 62 (08-13-21 @ 05:15) (56 - 76)  BP: 146/63 (08-13-21 @ 05:15) (141/64 - 154/69)  ABP: --  ABP(mean): --  RR: 18 (08-13-21 @ 05:15) (18 - 18)  SpO2: 98% (08-13-21 @ 05:15) (97% - 100%)    CONSTITUTIONAL: No acute distress.   HEENT:  Conjunctiva clear B/L.  Moist oral mucosa.   Cardiovascular: RRR with no murmurs. +JVD noted. Trace lower extremity edema B/L. Extremities are warm and well perfused. Radial pulses 2+ B/L. Dorsalis pedis pulses 2+ B/L.    Respiratory: Lungs CTAB. No wrr. No accessory muscle use.   Gastrointestinal:  Soft, nontender. Non-distended. Non-rigid. No CVA tenderness B/L.  Neurologic:  Alert and awake. Moving all extremities. Following commands. Making eye contact. No numbness or tingling.   MSK / Skin: RUE ecchymoses in dependent parts of arm. Resolving. Compartments soft.   Psych:  Normal affect. Normal Mood.       LABS                        8.4    5.05  )-----------( 148      ( 12 Aug 2021 06:53 )             27.3     08-13    134<L>  |  99  |  22  ----------------------------<  96  4.1   |  22  |  1.79<H>    Ca    9.4      13 Aug 2021 07:36  Phos  3.3     08-13  Mg     2.0     08-13            ALL RECENT STUDIES REVIEWED INCLUDING REPORTS AVAILABLE     CARE DISCUSSED WITH ALL CONSULTANTS       Assessment and Plan:   · Assessment	  85yoF w/ PMHx significant for HFpEF, HTN, HLD, CAD, NIDDM, was tx to [facility] from [place] after being found to be in ADHF / new acute systolic heart failure, possibly tachycardia induced (new Aflutter) with new DANE, requiring CICU for inotropes / pressors / BiPAP, now off; course c/b acute hypovolemic shock secondary to blood loss anemia requiring PRBC transfusions transferred to floors now s/p L+RHC showing elevated filling pressures, 90% stenosis of mid circumflex now s/p staged PCI with LARISSA x 3 to LCx. S/p ALANNA/DCCV.      Problem/Plan - 1:  ·  Problem: Acute systolic heart failure.  Plan: newly diagnosed HFrEF (had previous hx of HFpEF)  - HF following, recs greatly appreciated.   - continue to optimize GDMT as tolerated  - CW hydralazine 100mg TID, isordil to 40mg TID, and Carvedilol at 12.5mg q12h     - CW norvasc to optimize bp.   - Started Entresto. Cr stable.   - Lasix 40 mg po BID.   - NM amyloid scan not suggestive of cardiac amyloidosis  - s/p L+RHC showing elevated filling pressures, 90% stenosis of mid circumflex     Problem/Plan - 2:  ·  Problem: Coronary artery disease involving native coronary artery of native heart with other form of angina pectoris.  Plan: s/p L+RHC showing elevated filling pressures, 90% stenosis of mid circumflex   - s/p staged PCI/LARISSA of left circumflex    - c/w ASA 81mg daily + Plavix 75mg daily  - c/w high intensity statin  - continue to optimize GDMT as tolerated  - Triple therapy mgmt with asa, plavix, and eliquis per cardiology. Will likely do triple therapy for 1 month and then plavix with eliquis afterwards. Defer to outpt cardiologist. Patient knows she will need an appointment within 1 month.      Problem/Plan - 3:  ·  Problem: Atrial flutter.  Plan: - continue telemetry monitoring  - appreciate EP evaluation and recommendations  - cleared by vascular surgery to resume hep gtt as above, which was started on 8/1. Hgb stable. S/p ALANNA/DCCV on 8/9. NSR now. Hep drip to Eliquis on 8/10.   - c/w carvedilol as above.      Problem/Plan - 4:  ·  Problem: Acute kidney injury superimposed on chronic kidney disease.  Plan: - in setting of CKD 2 as reported prior to this hospitalization  - etiology likely related to presenting cardiogenic shock and hypervolemic shock d/t blood loss  - Cr was improving to 1.48 range but increased after LHC likely SHANE related.   - continue to closely monitor along w/ UOP and electrolytes  - Nephrology following, recs appreciated  - renally dose medications to GFR, avoid nephrotoxic agents.      Problem/Plan - 5:  ·  Problem: Anemia due to acute blood loss.  Plan: - requiring PRBC transfusion during hospitalization for hypovolemic shock  - A-line access bleeding now appears stable; RUE duplex without evidence of active bleeding or pseudoaneurysm, although there is a hematoma; no e/o compartment syndrome; CTA deferred given DANE and low suspicion for active blood loss.  - cleared by vascular surgery to resume hep gtt as above, which was started on 8/1. Hgb stable. S/p ALANNA/DCCV on 8/9. NSR now. Hep drip to [facility] on 8/10.      Problem/Plan - 6:  Problem: Type 2 diabetes mellitus with other specified complication, without long-term current use of insulin. Plan: - holding home antihyperglycemics  - continue ISS w/ serial FS monitoring    DVT ppx: full ac as above.    Patient cleared by all services for d/c.  Patient is medically optimized for d/c.   Outpt f/u with PCP (needs labs next week for bmp), cards, hf, and ep.   The above was discussed including med rec.  Teach back given.  All questions answered.

## 2021-08-14 NOTE — DISCHARGE NOTE PROVIDER - HOSPITAL COURSE
85 year old female with history of HFpEF, HTN, CAD, DM and CKD who presented with cardiogenic shock and new onset Aflutter. Her EF on this admission was noted to drop to 25%.  She initially was on levophed and milrinone which have been weaned off now. She underwent LHC which showed 90% Lcx lesion which was stented in a staged fashion. She is now s/p ALANNA/cardioversion and is in sinus rhythm. Pt. initiated on entresto and GDMT will need to follow up closely outpatient with heart failure team., 85 year old female with history of HFpEF, HTN, CAD, DM and CKD who presented with cardiogenic shock and new onset Aflutter. Course was also complicated with with hematoma of RUE and component of hemorrhagic shock requiring blood transfusions .Her EF on this admission was noted to drop to 25%.  She initially was on levophed and milrinone which have been weaned off now. She underwent LHC which showed 90% Lcx lesion which was stented in a staged fashion. She is now s/p ALANNA/cardioversion and is in sinus rhythm. Pt. initiated on Entresto and GDMT and will need to follow up closely outpatient with heart failure team . Pt. was also evaluated by nephrology due to DANE likely from cardiogenic shock and component of contrast induced nephropathy. Creatinine has been improving and pt. is cleared from a renal perspective for discharge. Pt. is deemed medically stable for discharge with close outpatient follow up. 85 year old female with history of HFpEF, HTN, CAD, DM and CKD who presented with cardiogenic shock and new onset Aflutter. Course was also complicated with with hematoma of RUE and component of hemorrhagic shock requiring blood transfusions .Her EF on this admission was noted to drop to 25%.  She initially was on levophed and milrinone which have been weaned off now. She underwent LHC which showed 90% Lcx lesion which was stented in a staged fashion. She is now s/p ALANNA/cardioversion and is in sinus rhythm. Pt. initiated on Entresto and GDMT and will need to follow up closely outpatient with heart failure team . Pt. was also evaluated by nephrology due to DANE likely from cardiogenic shock and component of contrast induced nephropathy. Creatinine has been improving and pt. is cleared from a renal perspective for discharge. Pt. is deemed medically stable for discharge with close outpatient follow up.     ATTENDING ATTESTATION  Patient cleared by all services for d/c.  Patient is medically optimized for d/c.   Outpt f/u with PCP (needs labs next week for bmp), cards, hf, and ep.   The above was discussed including med rec.  Teach back given.  All questions answered.    DC TIME SPENT: 60 minutes.

## 2021-08-14 NOTE — CHART NOTE - NSCHARTNOTESELECT_GEN_ALL_CORE
Electrophysiology chart note
Nutrition Services
R sheath removal/Event Note
CICU Transfer Note/Event Note
DC NOTE/Event Note
Event Note
Event Note
HF/Off Service Note
Interventional Cardiology_ CATH site note/Event Note
Interventional Radiology/Event Note
MAR ACCEPT NOTE/Transfer Note
Nutrition Services
subtherapeutic aPTT/Event Note

## 2021-08-14 NOTE — DISCHARGE NOTE PROVIDER - PROVIDER TOKENS
PROVIDER:[TOKEN:[[id number]]] PROVIDER:[TOKEN:[[id number]]],PROVIDER:[TOKEN:[[id number]:[id number]]] PROVIDER:[TOKEN:[[id number]]],PROVIDER:[TOKEN:[[id number]]],PROVIDER:[TOKEN:[[id number]]]

## 2021-08-14 NOTE — DISCHARGE NOTE PROVIDER - CARE PROVIDERS DIRECT ADDRESSES
,[email or web address],[email or web address] [email or web address],DirectAddress_Unknown

## 2021-08-14 NOTE — DISCHARGE NOTE PROVIDER - NSDCFUADDINST_GEN_ALL_CORE_FT
Plan for triple therapy for 1 month and then plavix with eliquis afterwards- PLEASE f/u with heart failure closely on discharge for further reccs  PLEASE BRING THIS DOCUMENT WITH YOU TO ALL FOLLOW UP APPOINTMENTS:  - Follow up with Dr. Patel within 5-7 days - you will need repeat bloodwork (BMP) to check your kidney function.    - Follow up with Dr. Castillo within 1-2 weeks, the hospital cardiologist recommends that your doctor stop the aspirin on 9/10.  - Follow up with Dr. Contreras within 1 month.

## 2021-08-14 NOTE — DISCHARGE NOTE PROVIDER - NSDCCPCAREPLAN_GEN_ALL_CORE_FT
PRINCIPAL DISCHARGE DIAGNOSIS  Diagnosis: Acute CHF  Assessment and Plan of Treatment: Weigh yourself daily.  If you gain 3lbs in 3 days, or 5lbs in a week call your Health Care Provider.  Do not eat or drink foods containing more than 2000mg of salt (sodium) in your diet every day.  Call your Health Care Provider if you have any swelling or increased swelling in your feet, ankles, and/or stomach.  Take all of your medication as directed.  If you become dizzy call your Health Care Provider.        SECONDARY DISCHARGE DIAGNOSES  Diagnosis: CAD (coronary artery disease)  Assessment and Plan of Treatment: Coronary artery disease is a condition where the arteries the supply the heart muscle get clogges with fatty deposits & puts you at risk for a heart attack  Call your doctor if you have any new pain, pressure, or discomfort in the center of your chest, pain, tingling or discomfort in arms, back, neck, jaw, or stomach, shortness of breath, nausea, vomiting, burping or heartburn, sweating, cold and clammy skin, racing or abnormal heartbeat for more than 10 minutes or if they keep coming & going.  Call 911 and do not tr to get to hospital by care  You can help yourself with lefestyle changes (quitting smoking if you smoke), eat lots of fruits & vegetables & low fat dairy products, not a lot of meat & fatty foods, walk or some form of physical activity most days of the week, lose weight if you are overweight  Take your cardiac medication as prescribed to lower cholesterol, to lower blood pressure, aspirin to prevent blood clots, and diabetes control  Make sure to keep appointments with doctor for cardiac follow up care      Diagnosis: Atrial fibrillation  Assessment and Plan of Treatment: Atrial fibrillation is the most common heart rhythm problem.  The condition puts you at risk for has stroke and heart attack  It helps if you control your blood pressure, not drink more than 1-2 alcohol drinks per day, cut down on caffeine, getting treatment for over active thyroid gland, and get regular exercise  Call your doctor if you feel your heart racing or beating unusually, chest tightness or pain, lightheaded, faint, shortness of breath especially with exercise  It is important to take your heart medication as prescribed  You may be on anticoagulation which is very important to take as directed - you may need blood work to monitor drug levels       PRINCIPAL DISCHARGE DIAGNOSIS  Diagnosis: Acute CHF  Assessment and Plan of Treatment: Take all medications as prescribed.  Stop smoking if you currently smoke, and avoid high altitudes.  Weigh yourself daily.  If you gain 3lbs in 3 days, or 5lbs in a week call your Health Care Provider.  Eat a low sodium diet.  Call your Health Care Provider if you have any swelling or increased swelling in your feet, ankles, and/or stomach.  If you experience dizziness, chest pain, or shortness of breath, seek immediate medical attention.      SECONDARY DISCHARGE DIAGNOSES  Diagnosis: Atrial fibrillation  Assessment and Plan of Treatment: Atrial fibrillation is a common heart rhythm problem which increases the risk of stroke and heat attack.  It helps if you control your blood pressure, avoid alcohol, cut down on caffeine, get treatment for your thyroid if it is overactive, and perform moderate exercise in consultation with your Primary Care Provider.  Call your doctor if you experience chest tightness/pain, lightheadedness, loss of consciousness, shortness of breath (especially with exercise), feel your heart racing or beating unusually, frequent or abnormal bleeding.  It is important to take all your heart medications as prescribed.    Diagnosis: CAD (coronary artery disease)  Assessment and Plan of Treatment: Call your doctor if you have any new pain, pressure, or discomfort in the center of your chest, pain, tingling or discomfort in arms, back, neck, jaw, or stomach, shortness of breath, nausea, vomiting, burping or heartburn, sweating, cold and clammy skin, racing or abnormal heartbeat.  Call 911 and do not try to get to hospital by car.  You can help yourself with lifestyle changes (quitting smoking if you smoke), eat fruits, vegetables, low fat dairy products, reduce meat and fatty food consumption, walk or some form of physical activity most days of the week, lose weight if you are overweight.  Take your cardiac medication as prescribed to lower cholesterol, to lower blood pressure, and control your blood sugar.    Diagnosis: Anemia due to acute blood loss  Assessment and Plan of Treatment: Hemoglobin stable.    Diagnosis: Hematoma  Assessment and Plan of Treatment: Significantly improved.    Diagnosis: DANE (acute kidney injury)  Assessment and Plan of Treatment: Avoid taking NSAIDs (ex: Ibuprofen, Advil, Celebrex, Naprosyn) and other agents that can harm the kidneys such as intravenous contrast for diagnostic testing, combination cold medications, etc. until you are instructed to do so by your Primary Care Physician.  Have all of your medications adjusted for your renal function by your Health Care Provider.  Blood pressure control is important.  Take all medication as prescribed.  Do not overconsume foods that are high in potassium, such as bananas, until you are instructed to do so by your primary care physician.    Diagnosis: Type 2 diabetes mellitus with other specified complication, without long-term current use of insulin  Assessment and Plan of Treatment: METFORMIN HAS BEEN STOPPED.  Make sure you get your HgA1c checked every three months.  Check your blood glucose at least two times a day.  Keep a log of your blood glucose results and always take it with you to your doctor appointments.  Keep a list of your current medications including over the counter medications and bring this medication list with you to all your doctor appointments.  If you have not seen your ophthalmologist this year, call for appointment.  Check your feet daily for redness, sores, or openings.  Do not self treat.  If there is no improvement in two days, call your primary care physician for an appointment.  HgA1c this admission was 5.6.     PRINCIPAL DISCHARGE DIAGNOSIS  Diagnosis: Acute CHF  Assessment and Plan of Treatment: PLEASE TAKE ALL MEDICATIONS AS PRESCRIBED.  YOU NEED REPEAT LABS NEXT WEEK TO MAKE SURE YOU ELECTROLYTES AND KIDNEY FUNCTION ARE STABLE.   PLEASE FOLLOW UP WITH ALL YOUR OUTPATIENT PROVIDERS.  FOLLOWING THE ABOVE WILL BETTER ENSURE YOUR STABILITY AND GIVE YOU THE BEST CHANCE TO REMAIN OUT OF THE HOSPITAL.  Take all medications as prescribed.  Stop smoking if you currently smoke, and avoid high altitudes.  Weigh yourself daily.  If you gain 3lbs in 3 days, or 5lbs in a week call your Health Care Provider.  Eat a low sodium diet.  Call your Health Care Provider if you have any swelling or increased swelling in your feet, ankles, and/or stomach.  If you experience dizziness, chest pain, or shortness of breath, seek immediate medical attention.      SECONDARY DISCHARGE DIAGNOSES  Diagnosis: Atrial fibrillation  Assessment and Plan of Treatment: Atrial fibrillation is a common heart rhythm problem which increases the risk of stroke and heat attack.  It helps if you control your blood pressure, avoid alcohol, cut down on caffeine, get treatment for your thyroid if it is overactive, and perform moderate exercise in consultation with your Primary Care Provider.  Call your doctor if you experience chest tightness/pain, lightheadedness, loss of consciousness, shortness of breath (especially with exercise), feel your heart racing or beating unusually, frequent or abnormal bleeding.  It is important to take all your heart medications as prescribed.    Diagnosis: CAD (coronary artery disease)  Assessment and Plan of Treatment: Call your doctor if you have any new pain, pressure, or discomfort in the center of your chest, pain, tingling or discomfort in arms, back, neck, jaw, or stomach, shortness of breath, nausea, vomiting, burping or heartburn, sweating, cold and clammy skin, racing or abnormal heartbeat.  Call 911 and do not try to get to hospital by car.  You can help yourself with lifestyle changes (quitting smoking if you smoke), eat fruits, vegetables, low fat dairy products, reduce meat and fatty food consumption, walk or some form of physical activity most days of the week, lose weight if you are overweight.  Take your cardiac medication as prescribed to lower cholesterol, to lower blood pressure, and control your blood sugar.    Diagnosis: Anemia due to acute blood loss  Assessment and Plan of Treatment: Hemoglobin stable.    Diagnosis: Hematoma  Assessment and Plan of Treatment: Significantly improved.    Diagnosis: DANE (acute kidney injury)  Assessment and Plan of Treatment: Avoid taking NSAIDs (ex: Ibuprofen, Advil, Celebrex, Naprosyn) and other agents that can harm the kidneys such as intravenous contrast for diagnostic testing, combination cold medications, etc. until you are instructed to do so by your Primary Care Physician.  Have all of your medications adjusted for your renal function by your Health Care Provider.  Blood pressure control is important.  Take all medication as prescribed.  Do not overconsume foods that are high in potassium, such as bananas, until you are instructed to do so by your primary care physician.    Diagnosis: Type 2 diabetes mellitus with other specified complication, without long-term current use of insulin  Assessment and Plan of Treatment: METFORMIN HAS BEEN STOPPED.  Make sure you get your HgA1c checked every three months.  Check your blood glucose at least two times a day.  Keep a log of your blood glucose results and always take it with you to your doctor appointments.  Keep a list of your current medications including over the counter medications and bring this medication list with you to all your doctor appointments.  If you have not seen your ophthalmologist this year, call for appointment.  Check your feet daily for redness, sores, or openings.  Do not self treat.  If there is no improvement in two days, call your primary care physician for an appointment.  HgA1c this admission was 5.6.

## 2021-08-14 NOTE — DISCHARGE NOTE PROVIDER - NSDCMRMEDTOKEN_GEN_ALL_CORE_FT
atorvastatin 10 mg oral tablet: 1 tab(s) orally once a day  doxazosin 2 mg oral tablet: 1 tab(s) orally once a day  metFORMIN 500 mg oral tablet: 1 tab(s) orally 2 times a day  metoprolol tartrate 50 mg oral tablet: 1 tab(s) orally 2 times a day  olmesartan 20 mg oral tablet: 1 tab(s) orally once a day   amLODIPine 5 mg oral tablet: 1 tab(s) orally once a day  apixaban 2.5 mg oral tablet: 1 tab(s) orally every 12 hours  aspirin 81 mg oral delayed release tablet: 1 tab(s) orally once a day  atorvastatin 40 mg oral tablet: 1 tab(s) orally once a day (at bedtime)  carvedilol 12.5 mg oral tablet: 1 tab(s) orally every 12 hours  clopidogrel 75 mg oral tablet: 1 tab(s) orally once a day  furosemide 40 mg oral tablet: 1 tab(s) orally 2 times a day  hydrALAZINE 100 mg oral tablet: 1 tab(s) orally every 8 hours   isosorbide dinitrate 40 mg oral tablet: 1 tab(s) orally 3 times a day  metFORMIN 500 mg oral tablet: 1 tab(s) orally 2 times a day  ocular lubricant ophthalmic solution: 1 drop(s) to each affected eye 2 times a day  polyethylene glycol 3350 oral powder for reconstitution: 17 gram(s) orally once a day  sacubitril-valsartan 24 mg-26 mg oral tablet: 1 tab(s) orally 2 times a day  senna oral tablet: 2 tab(s) orally once a day (at bedtime)   amLODIPine 5 mg oral tablet: 1 tab(s) orally once a day  apixaban 2.5 mg oral tablet: 1 tab(s) orally every 12 hours  aspirin 81 mg oral delayed release tablet: 1 tab(s) orally once a day  atorvastatin 40 mg oral tablet: 1 tab(s) orally once a day (at bedtime)  carvedilol 12.5 mg oral tablet: 1 tab(s) orally every 12 hours  clopidogrel 75 mg oral tablet: 1 tab(s) orally once a day  furosemide 40 mg oral tablet: 1 tab(s) orally 2 times a day  hydrALAZINE 100 mg oral tablet: 1 tab(s) orally every 8 hours   isosorbide dinitrate 40 mg oral tablet: 1 tab(s) orally 3 times a day  ocular lubricant ophthalmic solution: 1 drop(s) to each affected eye 2 times a day  polyethylene glycol 3350 oral powder for reconstitution: 17 gram(s) orally once a day  sacubitril-valsartan 24 mg-26 mg oral tablet: 1 tab(s) orally 2 times a day  senna oral tablet: 2 tab(s) orally once a day (at bedtime)

## 2021-08-14 NOTE — DISCHARGE NOTE PROVIDER - NSDCFUADDAPPT_GEN_ALL_CORE_FT
Please f/u with PCP in 4 to 7 days  Please f/u with PCP in 4 to 7 days    Please f/u with cardiologist as outpatient

## 2021-08-14 NOTE — DISCHARGE NOTE NURSING/CASE MANAGEMENT/SOCIAL WORK - PATIENT PORTAL LINK FT
You can access the FollowMyHealth Patient Portal offered by [facility] by registering at the following website: [email or web address]. By joining [facility]s FollowMyHealth portal, you will also be able to view your health information using other applications (apps) compatible with our system.

## 2021-08-14 NOTE — DISCHARGE NOTE PROVIDER - CARE PROVIDER_API CALL
Baldemar Allison)  Family Medicine; Geriatric Medicine  [address] [place] [address]  Phone: [phone]  Fax: [phone]  Follow Up Time:    Baldemar Allison)  Family Medicine; Geriatric Medicine  [address] [place] [address]  Phone: [phone]  Fax: [phone]  Follow Up Time:     LALI BEST  [id number]  [address] [place] [address]  Phone: ([phone]  Fax: [phone]  Follow Up Time:    Baldemar Allison)  Family Medicine; Geriatric Medicine  [address] [place] [address]  Phone: [phone]  Fax: [phone]  Follow Up Time:     Benja Daniel)  Cardiology; Internal Medicine  [address] [facility], [address]  [place] [address]  Phone: [phone]  Fax: [phone]  Follow Up Time:     Lokesh Contreras)  Cardiovascular Disease; Internal Medicine  [address] [place] [id number]  Phone: [phone]  Fax: [phone]  Follow Up Time:

## 2021-08-16 ENCOUNTER — NON-APPOINTMENT (OUTPATIENT)
Age: 86
End: 2021-08-16

## 2021-08-17 PROBLEM — N18.4 CHRONIC KIDNEY DISEASE, STAGE 4 (SEVERE): Chronic | Status: ACTIVE | Noted: 2021-07-25

## 2021-08-17 PROBLEM — I25.10 ATHEROSCLEROTIC HEART DISEASE OF NATIVE CORONARY ARTERY WITHOUT ANGINA PECTORIS: Chronic | Status: ACTIVE | Noted: 2021-07-25

## 2021-08-23 ENCOUNTER — APPOINTMENT (OUTPATIENT)
Dept: FAMILY MEDICINE | Facility: CLINIC | Age: 86
End: 2021-08-23
Payer: MEDICARE

## 2021-08-23 VITALS
OXYGEN SATURATION: 99 % | HEART RATE: 69 BPM | RESPIRATION RATE: 14 BRPM | TEMPERATURE: 98 F | HEIGHT: 60 IN | SYSTOLIC BLOOD PRESSURE: 102 MMHG | WEIGHT: 124.5 LBS | BODY MASS INDEX: 24.44 KG/M2 | DIASTOLIC BLOOD PRESSURE: 70 MMHG

## 2021-08-23 PROCEDURE — 99214 OFFICE O/P EST MOD 30 MIN: CPT

## 2021-08-23 RX ORDER — OLMESARTAN MEDOXOMIL 20 MG/1
20 TABLET, FILM COATED ORAL DAILY
Qty: 90 | Refills: 1 | Status: DISCONTINUED | COMMUNITY
Start: 2020-06-24 | End: 2021-08-23

## 2021-08-23 NOTE — PHYSICAL EXAM
[No Acute Distress] : no acute distress [Well Nourished] : well nourished [Well Developed] : well developed [Well-Appearing] : well-appearing [Normal Sclera/Conjunctiva] : normal sclera/conjunctiva [PERRL] : pupils equal round and reactive to light [Normal Oropharynx] : the oropharynx was normal [No JVD] : no jugular venous distention [No Lymphadenopathy] : no lymphadenopathy [Supple] : supple [Thyroid Normal, No Nodules] : the thyroid was normal and there were no nodules present [No Respiratory Distress] : no respiratory distress  [No Accessory Muscle Use] : no accessory muscle use [Clear to Auscultation] : lungs were clear to auscultation bilaterally [Normal Rate] : normal rate  [No Edema] : there was no peripheral edema [Soft] : abdomen soft [Non Tender] : non-tender [No HSM] : no HSM [No CVA Tenderness] : no CVA  tenderness [No Joint Swelling] : no joint swelling

## 2021-08-23 NOTE — REVIEW OF SYSTEMS
[Fever] : no fever [Chills] : no chills [Fatigue] : no fatigue [Hot Flashes] : no hot flashes [Vision Problems] : no vision problems [Sore Throat] : no sore throat [Chest Pain] : no chest pain [Palpitations] : no palpitations [Orthopnea] : no orthopnea [Paroxysmal Nocturnal Dyspnea] : no paroxysmal nocturnal dyspnea [Wheezing] : no wheezing [Shortness Of Breath] : no shortness of breath [Cough] : no cough [Dyspnea on Exertion] : no dyspnea on exertion [Abdominal Pain] : no abdominal pain [Nausea] : no nausea [Constipation] : no constipation [Vomiting] : no vomiting [Heartburn] : no heartburn [Melena] : no melena [Dysuria] : no dysuria [Hematuria] : no hematuria [Joint Stiffness] : joint stiffness [Muscle Weakness] : muscle weakness [Headache] : no headache [Dizziness] : no dizziness [Confusion] : no confusion [Memory Loss] : no memory loss [Unsteady Walking] : no ataxia

## 2021-08-23 NOTE — HISTORY OF PRESENT ILLNESS
[FreeTextEntry1] : Congestive heart failure [de-identified] : Patient is seen here in follow-up today from hospitalization we are as she had gone into atrial flutter with rapid ventricular response and cardiogenic shock was seen at [facility] in [facility] transferred to [facility] where she underwent a cardiac catheterization and 3 stents with rate and rhythm control and ultimately back to a cardiovascular stable state she currently feels well review of systems is currently unremarkable other than some stiffness and fatigue she does not have any chest pain shortness of breath palpitations etc. medications very well and Eliquis.

## 2021-08-23 NOTE — ASSESSMENT
[FreeTextEntry1] : Patiently currently doing well exam is unremarkable regular rhythm controlled rate.  Vital signs are stable blood pressure adequate patient be following up with Dr. Ross of cardiology this week also with the cardiologist from [facility] and aspirin follow-up here in 1 month continue current medications

## 2021-08-26 ENCOUNTER — APPOINTMENT (OUTPATIENT)
Dept: CARDIOLOGY | Facility: CLINIC | Age: 86
End: 2021-08-26
Payer: MEDICARE

## 2021-08-26 ENCOUNTER — NON-APPOINTMENT (OUTPATIENT)
Age: 86
End: 2021-08-26

## 2021-08-26 VITALS
WEIGHT: 126 LBS | TEMPERATURE: 97.6 F | SYSTOLIC BLOOD PRESSURE: 149 MMHG | BODY MASS INDEX: 24.74 KG/M2 | RESPIRATION RATE: 16 BRPM | OXYGEN SATURATION: 100 % | HEART RATE: 75 BPM | DIASTOLIC BLOOD PRESSURE: 67 MMHG | HEIGHT: 60 IN

## 2021-08-26 VITALS — SYSTOLIC BLOOD PRESSURE: 100 MMHG | DIASTOLIC BLOOD PRESSURE: 70 MMHG

## 2021-08-26 PROCEDURE — 99215 OFFICE O/P EST HI 40 MIN: CPT

## 2021-08-26 PROCEDURE — 93000 ELECTROCARDIOGRAM COMPLETE: CPT

## 2021-08-26 NOTE — PHYSICAL EXAM
[General Appearance - Well Developed] : well developed [Normal Appearance] : normal appearance [Well Groomed] : well groomed [General Appearance - Well Nourished] : well nourished [No Deformities] : no deformities [General Appearance - In No Acute Distress] : no acute distress [Normal Jugular Venous A Waves Present] : normal jugular venous A waves present [No Jugular Venous Giang A Waves] : no jugular venous giang A waves [Normal Jugular Venous V Waves Present] : normal jugular venous V waves present [Respiration, Rhythm And Depth] : normal respiratory rhythm and effort [Exaggerated Use Of Accessory Muscles For Inspiration] : no accessory muscle use [Auscultation Breath Sounds / Voice Sounds] : lungs were clear to auscultation bilaterally [Abdomen Soft] : soft [Abdomen Tenderness] : non-tender [Abdomen Mass (___ Cm)] : no abdominal mass palpated [FreeTextEntry1] : patient unable to walk on treadmill [Nail Clubbing] : no clubbing of the fingernails [Cyanosis, Localized] : no localized cyanosis [Petechial Hemorrhages (___cm)] : no petechial hemorrhages [Skin Color & Pigmentation] : normal skin color and pigmentation [] : no rash [No Venous Stasis] : no venous stasis [Skin Lesions] : no skin lesions [No Skin Ulcers] : no skin ulcer [No Xanthoma] : no  xanthoma was observed [Oriented To Time, Place, And Person] : oriented to person, place, and time [Affect] : the affect was normal [Mood] : the mood was normal [No Anxiety] : not feeling anxious [5th Left ICS - MCL] : palpated at the 5th LICS in the midclavicular line [Normal] : normal [Normal Rate] : normal [Rhythm Regular] : regular [I] : a grade 1 [Right Carotid Bruit] : no bruit heard over the right carotid [Left Carotid Bruit] : left carotid bruit heard [Right Femoral Bruit] : no bruit heard over the right femoral artery [No Abnormalities] : the abdominal aorta was not enlarged and no bruit was heard [Left Femoral Bruit] : no bruit heard over the left femoral artery [No Pitting Edema] : no pitting edema present [Rt] : no varicose veins of the right leg [Lt] : no varicose veins of the left leg

## 2021-08-26 NOTE — ASSESSMENT
[FreeTextEntry1] : Impression\par Coronary artery disease status post MI with cardiogenic shock in a patient with pre-existing hypertension and renal insufficiency. Patient currently without symptoms of angina at this time and without symptoms of heart. She is on multiple medications\par \par Plan:\par 1. For now we'll get renal profile to assess in light of her multiple medications\par 2. Will decrease hydralazine and isosorbide to b.i.d. dosing\par 3. Return in one month's time with echocardiogram his LV function has improved will consider tapering and possibly discontinuing some of her other medications including her diuretic and possibly her anti-ischemic medications such as amlodipine and maybe Isosorbide

## 2021-08-26 NOTE — REASON FOR VISIT
[Symptom and Test Evaluation] : symptom and test evaluation [Coronary Artery Disease] : coronary artery disease [FreeTextEntry1] : She returns for followup. She is doing fairly well now. She however was aligned with an acute MI and cardiogenic shock. At one point based on the hospital record her ejection fraction was less than 25% and she was on support. She ultimately underwent 3 stents at least one was to the circumflex but records are incomplete at this time. She has had multiple medications added to her regimen. She is on dual antiplatelet therapy as well as Eliquis. She did have atrial fibrillation current hospitalization. She ultimately underwent transesophageal echo which revealed no thrombus and mild to moderate LV systolic dysfunction. She underwent successful cardioversion.

## 2021-09-01 ENCOUNTER — APPOINTMENT (OUTPATIENT)
Dept: HEART FAILURE | Facility: CLINIC | Age: 86
End: 2021-09-01

## 2021-09-16 ENCOUNTER — APPOINTMENT (OUTPATIENT)
Dept: FAMILY MEDICINE | Facility: CLINIC | Age: 86
End: 2021-09-16

## 2021-09-20 ENCOUNTER — APPOINTMENT (OUTPATIENT)
Dept: FAMILY MEDICINE | Facility: CLINIC | Age: 86
End: 2021-09-20

## 2021-09-21 ENCOUNTER — NON-APPOINTMENT (OUTPATIENT)
Age: 86
End: 2021-09-21

## 2021-09-21 ENCOUNTER — APPOINTMENT (OUTPATIENT)
Dept: CARDIOLOGY | Facility: CLINIC | Age: 86
End: 2021-09-21
Payer: MEDICARE

## 2021-09-21 VITALS
BODY MASS INDEX: 23.95 KG/M2 | DIASTOLIC BLOOD PRESSURE: 67 MMHG | WEIGHT: 122 LBS | SYSTOLIC BLOOD PRESSURE: 85 MMHG | HEIGHT: 60 IN | RESPIRATION RATE: 16 BRPM | HEART RATE: 67 BPM | OXYGEN SATURATION: 96 % | TEMPERATURE: 97.3 F

## 2021-09-21 VITALS — DIASTOLIC BLOOD PRESSURE: 70 MMHG | SYSTOLIC BLOOD PRESSURE: 90 MMHG

## 2021-09-21 PROCEDURE — 93306 TTE W/DOPPLER COMPLETE: CPT

## 2021-09-21 PROCEDURE — 99214 OFFICE O/P EST MOD 30 MIN: CPT

## 2021-09-21 PROCEDURE — 93000 ELECTROCARDIOGRAM COMPLETE: CPT

## 2021-09-21 RX ORDER — FUROSEMIDE 40 MG/1
40 TABLET ORAL
Qty: 45 | Refills: 0 | Status: DISCONTINUED | COMMUNITY
Start: 2021-08-14 | End: 2021-09-21

## 2021-09-21 RX ORDER — ISOSORBIDE DINITRATE 40 MG/1
40 TABLET ORAL
Qty: 90 | Refills: 0 | Status: DISCONTINUED | COMMUNITY
Start: 2021-08-14 | End: 2021-09-21

## 2021-09-21 NOTE — REASON FOR VISIT
[Symptom and Test Evaluation] : symptom and test evaluation [FreeTextEntry1] : Patient returns for followup. She is feeling well and has actually no complaints. She underwent echocardiography today which reveals minimal if any left ventricular systolic dysfunction. She is concerned about all of her medications as she states she is "taking so many" it is also somewhat concerned about the price.

## 2021-09-21 NOTE — ASSESSMENT
[FreeTextEntry1] : Impression\par Coronary artery disease status post MI with cardiogenic shock in a patient with pre-existing hypertension and renal insufficiency. Patient currently without symptoms of angina at this time and without symptoms of heart. failure. LVEF is normal. She is on multiple medications\par \par Plan:\par 1. Given normal LVEF, renal impairment, and relative hypotension Will discontinue furosemide and isosorbide. Will reassess again in one month's time

## 2021-09-21 NOTE — PHYSICAL EXAM
[General Appearance - Well Developed] : well developed [Normal Appearance] : normal appearance [Well Groomed] : well groomed [No Deformities] : no deformities [General Appearance - Well Nourished] : well nourished [General Appearance - In No Acute Distress] : no acute distress [Normal Jugular Venous A Waves Present] : normal jugular venous A waves present [Normal Jugular Venous V Waves Present] : normal jugular venous V waves present [No Jugular Venous Giang A Waves] : no jugular venous giang A waves [Respiration, Rhythm And Depth] : normal respiratory rhythm and effort [Exaggerated Use Of Accessory Muscles For Inspiration] : no accessory muscle use [Auscultation Breath Sounds / Voice Sounds] : lungs were clear to auscultation bilaterally [Abdomen Soft] : soft [Abdomen Tenderness] : non-tender [Abdomen Mass (___ Cm)] : no abdominal mass palpated [Nail Clubbing] : no clubbing of the fingernails [Cyanosis, Localized] : no localized cyanosis [Petechial Hemorrhages (___cm)] : no petechial hemorrhages [Skin Color & Pigmentation] : normal skin color and pigmentation [] : no rash [No Venous Stasis] : no venous stasis [Skin Lesions] : no skin lesions [No Skin Ulcers] : no skin ulcer [No Xanthoma] : no  xanthoma was observed [Oriented To Time, Place, And Person] : oriented to person, place, and time [Affect] : the affect was normal [Mood] : the mood was normal [No Anxiety] : not feeling anxious [Normal] : normal [5th Left ICS - MCL] : palpated at the 5th LICS in the midclavicular line [Normal Rate] : normal [Rhythm Regular] : regular [I] : a grade 1 [Left Carotid Bruit] : left carotid bruit heard [No Abnormalities] : the abdominal aorta was not enlarged and no bruit was heard [No Pitting Edema] : no pitting edema present [FreeTextEntry1] : patient unable to walk on treadmill [Right Carotid Bruit] : no bruit heard over the right carotid [Right Femoral Bruit] : no bruit heard over the right femoral artery [Left Femoral Bruit] : no bruit heard over the left femoral artery [Rt] : no varicose veins of the right leg [Lt] : no varicose veins of the left leg

## 2021-10-02 ENCOUNTER — RX RENEWAL (OUTPATIENT)
Age: 86
End: 2021-10-02

## 2021-10-04 ENCOUNTER — RX RENEWAL (OUTPATIENT)
Age: 86
End: 2021-10-04

## 2021-10-06 ENCOUNTER — RX RENEWAL (OUTPATIENT)
Age: 86
End: 2021-10-06

## 2021-10-15 ENCOUNTER — APPOINTMENT (OUTPATIENT)
Dept: HEART FAILURE | Facility: CLINIC | Age: 86
End: 2021-10-15
Payer: MEDICARE

## 2021-10-15 VITALS
WEIGHT: 130 LBS | SYSTOLIC BLOOD PRESSURE: 114 MMHG | OXYGEN SATURATION: 99 % | DIASTOLIC BLOOD PRESSURE: 67 MMHG | TEMPERATURE: 98.2 F | RESPIRATION RATE: 16 BRPM | HEART RATE: 97 BPM | HEIGHT: 60 IN | BODY MASS INDEX: 25.52 KG/M2

## 2021-10-15 LAB
ANION GAP SERPL CALC-SCNC: 13 MMOL/L
BUN SERPL-MCNC: 20 MG/DL
CALCIUM SERPL-MCNC: 9.3 MG/DL
CHLORIDE SERPL-SCNC: 100 MMOL/L
CO2 SERPL-SCNC: 24 MMOL/L
CREAT SERPL-MCNC: 1.07 MG/DL
GLUCOSE SERPL-MCNC: 138 MG/DL
MAGNESIUM SERPL-MCNC: 1.8 MG/DL
NT-PROBNP SERPL-MCNC: 588 PG/ML
POTASSIUM SERPL-SCNC: 5.1 MMOL/L
SODIUM SERPL-SCNC: 137 MMOL/L

## 2021-10-15 PROCEDURE — 99215 OFFICE O/P EST HI 40 MIN: CPT

## 2021-10-15 RX ORDER — ASPIRIN ENTERIC COATED TABLETS 81 MG 81 MG/1
81 TABLET, DELAYED RELEASE ORAL
Refills: 0 | Status: DISCONTINUED | COMMUNITY
Start: 2021-08-31 | End: 2021-10-15

## 2021-10-15 NOTE — HISTORY OF PRESENT ILLNESS
[FreeTextEntry1] : 85 y/o female with h/o chronic HFpEF, recent decline in LVEF to 25%, now with recovered LVEF, CAD (s/p PCI to LCx 8/2021), recently diagnosed AFlutter, s/p DCCV 8/9/21, HTN, DLP, DM2 and CKD 2 who presents today for follow-up post hospital discharge.\par \par She presented to [facility] on 7/24/21 with nausea, vomiting and dizziness. She was found to be tachycardic, hypotensive with elevated lactate concerning for cardiogenic shock. She was given IVF, empiric antibiotics and underwent CT A/P which was unremarkable. Her EKG showed new onset aflutter and her TTE showed newly diagnosed LV systolic dysfunction with LVEF 25%, mod MR and mod TR. She was started on milrinone and levophed gtt. She was transferred to [facility] for further management. Her hospital course was complicated by tachypnea requiring BiPAP, acute anemia requiring PRBC transfusions thought to be due bleeding from arterial line/heparin gtt leading to RUE hematoma. Her milrinone gtt was dc’ed 7/26 due to hypotension. CVP prior to transfer out of CICU had been low and received gentle IV fluid hydration. \par \par She underwent L/RHC 8/4 which showed 90% stenosis of mLCx, mildly elevated filling pressures and preserved cardiac output. Underwent staged PCI to LCx on 8/6. Also underwent ALANNA/DCCV 8/9 and prior to discharge was in SR HR 50-70s but with infrequent episodes of pAF/AT. She had an DANE which may have been related to SHANE. Prior to discharge, was also noted to be mildly volume overloaded, but responding well to increased diuretics. She was discharged home on 8/14 with a discharge weight of 133.8 lbs.\par \par Since discharge she has been doing well. She saw her cardiologist, Dr. Daniel, on 9/21 with a repeat TTE which showed a recovered LVEF and had stopped her Lasix and isosorbide at that time. She states she can walk about 1 block with her cane without any SOB or fatigue. She is able to do all the cleaning in her home without any difficulty. She does not have any flights of stairs in her home or attempted to walk up any inclines. \par \par She denies any CP, palpitations, syncope, LH/dizziness, orthopnea, PND, cough, abdominal discomfort, or LE edema. Her weight has been stable between 127-128 lbs. She does not have a BP machine. Her appetite is improving and she limits fluid to <2L/day. She endorses occasional dietary indiscretion and has been taking her medications as prescribed.

## 2021-10-15 NOTE — PHYSICAL EXAM
[Soft] : abdomen soft [Non Tender] : non-tender [Normal] : alert and oriented, normal memory [de-identified] : JVP 6-8 cm H2O, no HJR [de-identified] : RRR [de-identified] : slow gait, walks with cane

## 2021-10-15 NOTE — CARDIOLOGY SUMMARY
[de-identified] : \par 9/21/21 ECG: SR with 1st degree AVB at 76 bpm.\par  [de-identified] : \par 9/21/21 TTE: LVIDd 4.4 cm, LVEF 60%, normal RV size and function, mild AS, mild TR, RVSP 29 mmHg.\par \par 7/25/21 TTE: LVIDd 4.4 cm, LVEF 29% (global), normal RV size with decreased systolic function, mod dilated LA, mild-mod MR, calcified AV with grossly mod decreased opening (peak/mean gradient 15/9 respectively), mild TR, est RVSP 31 mmHg\par  [de-identified] : \par 8/6/21 LHC: staged PCI to LCx.\par \par 8/4/21 RHC: RA 13 (v16), PA 59/29/34, PCWP 18 (v21), LVEDP 18, PA 59%, Ao 95%, Leelee CO/CI 5.1/3.1, /50/83 (weight 135.3 lbs)\par 8/4/21 LHC: mCx diffuse 90% stenosis, pCx 40% stenosis, pLAD 40% stenosis, mLAD 30% stenosis, OM1 40% stenosis, OM2 40% stenosis, pRCA 30% stenosis, mRCA 50% stenosis, dRCA 20% stenosis, RPDA 30% stenosis, normal LM\par

## 2021-10-15 NOTE — ASSESSMENT
[FreeTextEntry1] : 85 y/o female with h/o chronic HFpEF, recent decline in LVEF to 25%, now with recovered LVEF, CAD (s/p PCI to LCx 8/2021), recently diagnosed AFlutter, s/p DCCV 8/9/21, HTN, DLP, DM2 and CKD 2 who has been doing well since discharge. She is currently euvolemic on exam and normotensive. I have recommended the following:\par \par HF with recovered LVEF:\par - continue Entresto 24-26 mg BID; states she has a high co-pay; free 30 day trial coupon provided and information given for the [employer] assistance program (provider part completed and faxed). Asked that she call us if she is approved for the program and we will adjust her medications).\par - K 5.1, asked that she follow a low potassium diet\par - continue Coreg 12.5 mg BID\par - currently euvolemic off loop diuretics, but encouraged her to monitor for any weight gain or signs of fluid retention \par - her pro-BNP is now down to 588 from 18.8k on 7/25\par \par Hypertension:\par - continue HDZN 100 mg TID and amlodipine 5 mg daily; can consider weaning in favor of uptitration of Entresto\par \par CAD:\par - s/p PCI to LCx 8/6/21\par - c/w statin, BB, and ARB (in Entresto) as above\par - she is now off ASA, currently taking Plavix and a NOAC for her Aflutter\par \par Atrial flutter:\par - s/p ALANNA/DCCV 8/9/21\par - AC with Eliquis, but patient states it may be cost prohibitive due to high co-pay; asked that she call Dr. Daniel's office to see if there are any coupons/assistance programs she can apply for, if not can consider switching to warfarin\par \par DANE\par - May be related to ATN given episodes of hypotension and now likely SHANE\par - Cr had peaked to 2.7, now resolved\par \par She will continue to follow-up with Dr. Daniel and with Dr. Noble at his next available appointment (or sooner if needed).

## 2021-11-15 ENCOUNTER — APPOINTMENT (OUTPATIENT)
Dept: CARDIOLOGY | Facility: CLINIC | Age: 86
End: 2021-11-15
Payer: MEDICARE

## 2021-11-15 ENCOUNTER — NON-APPOINTMENT (OUTPATIENT)
Age: 86
End: 2021-11-15

## 2021-11-15 VITALS — SYSTOLIC BLOOD PRESSURE: 120 MMHG | HEART RATE: 72 BPM | DIASTOLIC BLOOD PRESSURE: 50 MMHG

## 2021-11-15 VITALS
TEMPERATURE: 97.5 F | HEIGHT: 60 IN | BODY MASS INDEX: 25.72 KG/M2 | OXYGEN SATURATION: 98 % | WEIGHT: 131 LBS | RESPIRATION RATE: 16 BRPM | HEART RATE: 79 BPM | DIASTOLIC BLOOD PRESSURE: 62 MMHG | SYSTOLIC BLOOD PRESSURE: 119 MMHG

## 2021-11-15 PROCEDURE — 99214 OFFICE O/P EST MOD 30 MIN: CPT

## 2021-11-15 PROCEDURE — 93000 ELECTROCARDIOGRAM COMPLETE: CPT

## 2021-11-15 NOTE — PHYSICAL EXAM
[Soft] : abdomen soft [Non Tender] : non-tender [de-identified] : JVP 6-8 cm H2O, no HJR [de-identified] : RRR [de-identified] : slow gait, walks with cane [General Appearance - Well Developed] : well developed [Normal Appearance] : normal appearance [Well Groomed] : well groomed [General Appearance - Well Nourished] : well nourished [No Deformities] : no deformities [General Appearance - In No Acute Distress] : no acute distress [Normal Jugular Venous A Waves Present] : normal jugular venous A waves present [Normal Jugular Venous V Waves Present] : normal jugular venous V waves present [No Jugular Venous Giang A Waves] : no jugular venous giang A waves [Respiration, Rhythm And Depth] : normal respiratory rhythm and effort [Exaggerated Use Of Accessory Muscles For Inspiration] : no accessory muscle use [Auscultation Breath Sounds / Voice Sounds] : lungs were clear to auscultation bilaterally [Abdomen Soft] : soft [Abdomen Tenderness] : non-tender [Abdomen Mass (___ Cm)] : no abdominal mass palpated [FreeTextEntry1] : patient unable to walk on treadmill [Nail Clubbing] : no clubbing of the fingernails [Cyanosis, Localized] : no localized cyanosis [Petechial Hemorrhages (___cm)] : no petechial hemorrhages [Skin Color & Pigmentation] : normal skin color and pigmentation [] : no rash [No Venous Stasis] : no venous stasis [Skin Lesions] : no skin lesions [No Skin Ulcers] : no skin ulcer [No Xanthoma] : no  xanthoma was observed [Oriented To Time, Place, And Person] : oriented to person, place, and time [Affect] : the affect was normal [Mood] : the mood was normal [No Anxiety] : not feeling anxious [5th Left ICS - MCL] : palpated at the 5th LICS in the midclavicular line [Normal] : normal [Normal Rate] : normal [Rhythm Regular] : regular [I] : a grade 1 [Right Carotid Bruit] : no bruit heard over the right carotid [Left Carotid Bruit] : left carotid bruit heard [Right Femoral Bruit] : no bruit heard over the right femoral artery [Left Femoral Bruit] : no bruit heard over the left femoral artery [No Abnormalities] : the abdominal aorta was not enlarged and no bruit was heard [No Pitting Edema] : no pitting edema present [Rt] : no varicose veins of the right leg [Lt] : no varicose veins of the left leg

## 2021-11-15 NOTE — ASSESSMENT
[FreeTextEntry1] : Impression\par Coronary artery disease status post MI with cardiogenic shock in a patient with pre-existing hypertension and renal insufficiency. Patient currently without symptoms of angina at this time and without symptoms of heart. failure. LVEF is normal. She is on multiple medications. Do not believe the current symptoms are cardiac in origin. It is possible that there could be some medication side effects\par \par Plan:\par 1. Will decrease hydralazine to 50 mg twice per day\par 2.Given normalization of ejection fraction following acute event will at some point weigh necessity for Entresto

## 2021-11-15 NOTE — REASON FOR VISIT
[Symptom and Test Evaluation] : symptom and test evaluation [Cardiac Failure] : cardiac failure [Coronary Artery Disease] : coronary artery disease [FreeTextEntry1] : She returns for followup she is complaining of some fatigue loss of appetite and weight loss. She denies chest discomfort shortness of breath palpitations dizziness or syncope.

## 2021-11-16 ENCOUNTER — APPOINTMENT (OUTPATIENT)
Dept: HEART FAILURE | Facility: CLINIC | Age: 86
End: 2021-11-16

## 2021-11-16 ENCOUNTER — APPOINTMENT (OUTPATIENT)
Dept: FAMILY MEDICINE | Facility: CLINIC | Age: 86
End: 2021-11-16
Payer: MEDICARE

## 2021-11-16 VITALS
RESPIRATION RATE: 14 BRPM | HEIGHT: 60 IN | TEMPERATURE: 97.6 F | HEART RATE: 78 BPM | DIASTOLIC BLOOD PRESSURE: 68 MMHG | SYSTOLIC BLOOD PRESSURE: 116 MMHG | OXYGEN SATURATION: 95 % | BODY MASS INDEX: 23.77 KG/M2 | WEIGHT: 121.06 LBS

## 2021-11-16 PROCEDURE — 99213 OFFICE O/P EST LOW 20 MIN: CPT | Mod: 25

## 2021-11-16 PROCEDURE — G0008: CPT

## 2021-11-16 PROCEDURE — 90662 IIV NO PRSV INCREASED AG IM: CPT

## 2021-11-16 NOTE — HISTORY OF PRESENT ILLNESS
[FreeTextEntry1] : Congestive heart failure coronary artery disease [de-identified] : Patient recently in hospital with a post MI cardiogenic shock which she eventually overcame recently saw cardiology with good report she feels well except for some loss of appetite there has been no significant weight loss her weight is 121 pounds was 122 pounds several months ago review of systems is unremarkable she is up-to-date on Covid shots and received a flu shot today she denies any chest pain shortness of breath palpitations or syncope or presyncope

## 2021-11-16 NOTE — ASSESSMENT
[FreeTextEntry1] : Patient is doing well will be followed up in a month regarding her poor appetite should this persist she is given an influenza vaccine

## 2021-11-16 NOTE — PHYSICAL EXAM
[No Acute Distress] : no acute distress [Well Nourished] : well nourished [Well Developed] : well developed [Normal Sclera/Conjunctiva] : normal sclera/conjunctiva [Normal Outer Ear/Nose] : the outer ears and nose were normal in appearance [Normal Oropharynx] : the oropharynx was normal [No JVD] : no jugular venous distention [No Lymphadenopathy] : no lymphadenopathy [Normal Rate] : normal rate  [Regular Rhythm] : with a regular rhythm [No Murmur] : no murmur heard [No Edema] : there was no peripheral edema [Soft] : abdomen soft [Non Tender] : non-tender [Non-distended] : non-distended [No HSM] : no HSM [Normal Bowel Sounds] : normal bowel sounds

## 2021-11-18 ENCOUNTER — APPOINTMENT (OUTPATIENT)
Dept: CARDIOLOGY | Facility: CLINIC | Age: 86
End: 2021-11-18

## 2021-11-19 ENCOUNTER — RX RENEWAL (OUTPATIENT)
Age: 86
End: 2021-11-19

## 2021-12-01 ENCOUNTER — RX RENEWAL (OUTPATIENT)
Age: 86
End: 2021-12-01

## 2021-12-06 ENCOUNTER — RX RENEWAL (OUTPATIENT)
Age: 86
End: 2021-12-06

## 2021-12-14 ENCOUNTER — APPOINTMENT (OUTPATIENT)
Dept: FAMILY MEDICINE | Facility: CLINIC | Age: 86
End: 2021-12-14

## 2022-01-10 ENCOUNTER — RX RENEWAL (OUTPATIENT)
Age: 87
End: 2022-01-10

## 2022-01-18 ENCOUNTER — NON-APPOINTMENT (OUTPATIENT)
Age: 87
End: 2022-01-18

## 2022-01-18 ENCOUNTER — APPOINTMENT (OUTPATIENT)
Dept: CARDIOLOGY | Facility: CLINIC | Age: 87
End: 2022-01-18
Payer: MEDICARE

## 2022-01-18 VITALS
BODY MASS INDEX: 21.03 KG/M2 | DIASTOLIC BLOOD PRESSURE: 50 MMHG | HEIGHT: 62 IN | HEART RATE: 76 BPM | SYSTOLIC BLOOD PRESSURE: 110 MMHG

## 2022-01-18 VITALS
HEIGHT: 60 IN | RESPIRATION RATE: 16 BRPM | WEIGHT: 115 LBS | OXYGEN SATURATION: 99 % | BODY MASS INDEX: 22.58 KG/M2 | TEMPERATURE: 97.8 F | HEART RATE: 76 BPM

## 2022-01-18 DIAGNOSIS — Z00.00 ENCOUNTER FOR GENERAL ADULT MEDICAL EXAMINATION W/OUT ABNORMAL FINDINGS: ICD-10-CM

## 2022-01-18 PROCEDURE — 99214 OFFICE O/P EST MOD 30 MIN: CPT

## 2022-01-18 PROCEDURE — 93000 ELECTROCARDIOGRAM COMPLETE: CPT

## 2022-01-18 RX ORDER — METFORMIN HYDROCHLORIDE 500 MG/1
500 TABLET, COATED ORAL
Qty: 180 | Refills: 1 | Status: DISCONTINUED | COMMUNITY
Start: 2019-05-06 | End: 2022-01-18

## 2022-01-18 RX ORDER — HYDRALAZINE HYDROCHLORIDE 100 MG/1
100 TABLET ORAL
Qty: 180 | Refills: 0 | Status: DISCONTINUED | COMMUNITY
Start: 2021-12-06 | End: 2022-01-18

## 2022-01-18 NOTE — ASSESSMENT
[FreeTextEntry1] : Impression\par Coronary artery disease status post MI with cardiogenic shock in a patient with pre-existing hypertension and renal insufficiency. Patient currently without symptoms of angina at this time and without symptoms of heart. failure. LVEF is normal. She is on multiple medications.\par \par Plan:\par 1.Decrease amlodipine 2.5 mg per day\par 2. Patient is discussed with pharmacy whether or not Xarelto may be cheaper than Eliquis. He does not wish to go on Coumadin due to frequent blood tests.

## 2022-01-18 NOTE — PHYSICAL EXAM
[Soft] : abdomen soft [Non Tender] : non-tender [de-identified] : JVP 6-8 cm H2O, no HJR [de-identified] : RRR [de-identified] : slow gait, walks with cane [General Appearance - Well Developed] : well developed [Normal Appearance] : normal appearance [Well Groomed] : well groomed [General Appearance - Well Nourished] : well nourished [No Deformities] : no deformities [General Appearance - In No Acute Distress] : no acute distress [Normal Jugular Venous A Waves Present] : normal jugular venous A waves present [Normal Jugular Venous V Waves Present] : normal jugular venous V waves present [No Jugular Venous Giang A Waves] : no jugular venous giang A waves [Respiration, Rhythm And Depth] : normal respiratory rhythm and effort [Exaggerated Use Of Accessory Muscles For Inspiration] : no accessory muscle use [Auscultation Breath Sounds / Voice Sounds] : lungs were clear to auscultation bilaterally [Abdomen Soft] : soft [Abdomen Tenderness] : non-tender [Abdomen Mass (___ Cm)] : no abdominal mass palpated [FreeTextEntry1] : patient unable to walk on treadmill [Nail Clubbing] : no clubbing of the fingernails [Cyanosis, Localized] : no localized cyanosis [Petechial Hemorrhages (___cm)] : no petechial hemorrhages [Skin Color & Pigmentation] : normal skin color and pigmentation [] : no rash [No Venous Stasis] : no venous stasis [Skin Lesions] : no skin lesions [No Skin Ulcers] : no skin ulcer [No Xanthoma] : no  xanthoma was observed [Oriented To Time, Place, And Person] : oriented to person, place, and time [Affect] : the affect was normal [Mood] : the mood was normal [No Anxiety] : not feeling anxious [5th Left ICS - MCL] : palpated at the 5th LICS in the midclavicular line [Normal] : normal [Normal Rate] : normal [Rhythm Regular] : regular [I] : a grade 1 [Right Carotid Bruit] : no bruit heard over the right carotid [Left Carotid Bruit] : left carotid bruit heard [Right Femoral Bruit] : no bruit heard over the right femoral artery [Left Femoral Bruit] : no bruit heard over the left femoral artery [No Abnormalities] : the abdominal aorta was not enlarged and no bruit was heard [No Pitting Edema] : no pitting edema present [Rt] : no varicose veins of the right leg [Lt] : no varicose veins of the left leg

## 2022-01-18 NOTE — REASON FOR VISIT
[Arrhythmia/ECG Abnorrmalities] : arrhythmia/ECG abnormalities [Hypertension] : hypertension [Coronary Artery Disease] : coronary artery disease [FreeTextEntry1] : Patient returns for followup. Feeling well. Offers no complaints of chest discomfort shortness of breath palpitations dizziness or syncope.She remains concerned about the cost of some of her medication,especially Entresto and Eliquis\par

## 2022-01-21 NOTE — PROGRESS NOTE ADULT - PROBLEM SELECTOR PLAN 5
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 No fever or chills.         PAST MEDICAL & SURGICAL HISTORY:  HTN (hypertension)    Hemophilia A    Gout    History of BPH    Chronic kidney disease (CKD)    DRESS syndrome    Uses cochlear implant          REVIEW OF SYSTEMS:    General:	 no weakness; no fevers, no chills  Skin/Breast: no rash  Respiratory and Thorax: no SOB, no cough  Cardiovascular:	No chest pain  Gastrointestinal:	 no nausea, vomiting , diarrhea  Genitourinary:	no dysuria, no difficulty urinating, no hematuria  Musculoskeletal:	no weakness, no joint swelling/pain  Neurological:	no focal weakness/numbness  Endocrine:	no polyuria, no polydipsia      ANTIBIOTICS:  MEDICATIONS  (STANDING):    MEDICATIONS  (PRN):      Allergies    allopurinol (Other)    Intolerances        SOCIAL HISTORY:    FAMILY HISTORY:  FH: HTN (hypertension)        Vital Signs Last 24 Hrs  T(C): 36.8 (21 Jan 2022 14:42), Max: 36.8 (21 Jan 2022 14:42)  T(F): 98.3 (21 Jan 2022 14:42), Max: 98.3 (21 Jan 2022 14:42)  HR: 93 (21 Jan 2022 14:42) (93 - 93)  BP: 137/64 (21 Jan 2022 14:42) (137/64 - 137/64)  BP(mean): --  RR: 18 (21 Jan 2022 14:42) (18 - 18)  SpO2: 100% (21 Jan 2022 14:42) (100% - 100%)    PHYSICAL EXAM:  Constitutional: NAD  Eyes: VANESA, EOMI  Ear/Nose/Throat: no oral lesion, no sinus tenderness on percussion	  Neck: no JVD, no lymphadenopathy, supple  Respiratory: CTA libertad  Cardiovascular: S1S2 RRR, no murmurs  Gastrointestinal: soft, (+) BS, no HSM  Extremities: able to flex R knee  Vascular: DP Pulse: right normal; left normal            LABS: pending                 MICROBIOLOGY: pending   RADIOLOGY & ADDITIONAL STUDIES: reviewed

## 2022-03-09 ENCOUNTER — RX RENEWAL (OUTPATIENT)
Age: 87
End: 2022-03-09

## 2022-03-10 ENCOUNTER — APPOINTMENT (OUTPATIENT)
Dept: FAMILY MEDICINE | Facility: CLINIC | Age: 87
End: 2022-03-10
Payer: MEDICARE

## 2022-03-10 VITALS
RESPIRATION RATE: 16 BRPM | BODY MASS INDEX: 20.61 KG/M2 | DIASTOLIC BLOOD PRESSURE: 60 MMHG | SYSTOLIC BLOOD PRESSURE: 110 MMHG | WEIGHT: 112 LBS | HEART RATE: 75 BPM | TEMPERATURE: 97.6 F | OXYGEN SATURATION: 75 % | HEIGHT: 62 IN

## 2022-03-10 PROCEDURE — 99213 OFFICE O/P EST LOW 20 MIN: CPT

## 2022-03-10 NOTE — PHYSICAL EXAM
[Well Nourished] : well nourished [Well Developed] : well developed [Well-Appearing] : well-appearing [Normal Sclera/Conjunctiva] : normal sclera/conjunctiva [PERRL] : pupils equal round and reactive to light [EOMI] : extraocular movements intact [Normal Outer Ear/Nose] : the outer ears and nose were normal in appearance [Normal Oropharynx] : the oropharynx was normal [No JVD] : no jugular venous distention [No Lymphadenopathy] : no lymphadenopathy [Supple] : supple [Thyroid Normal, No Nodules] : the thyroid was normal and there were no nodules present [No Respiratory Distress] : no respiratory distress  [Clear to Auscultation] : lungs were clear to auscultation bilaterally [Normal Rate] : normal rate  [Regular Rhythm] : with a regular rhythm [No Murmur] : no murmur heard [No Edema] : there was no peripheral edema [Non Tender] : non-tender [No Masses] : no abdominal mass palpated [No HSM] : no HSM [Normal Supraclavicular Nodes] : no supraclavicular lymphadenopathy [Normal Anterior Cervical Nodes] : no anterior cervical lymphadenopathy [Normal Posterior Cervical Nodes] : no posterior cervical lymphadenopathy [No CVA Tenderness] : no CVA  tenderness [No Spinal Tenderness] : no spinal tenderness [Coordination Grossly Intact] : coordination grossly intact [No Focal Deficits] : no focal deficits [Normal Gait] : normal gait [Normal Mood] : the mood was normal

## 2022-03-10 NOTE — HISTORY OF PRESENT ILLNESS
[FreeTextEntry1] : Coronary artery disease status post MI [de-identified] : Patient is here for follow-up coronary artery disease status post MI she saw her cardiologist back in January of this year with good report she is asymptomatic no lightheadedness no fainting spells no palpitations no chest pain or shortness of breath her appetite has been a little bit off she has lost several pounds since last visit and about 7 pounds since November she has chronic back pain review of systems is otherwise unremarkable she is up-to-date on all of her Covid shots

## 2022-03-10 NOTE — REVIEW OF SYSTEMS
[Fever] : no fever [Chills] : no chills [Fatigue] : no fatigue [Vision Problems] : no vision problems [Sore Throat] : no sore throat [Chest Pain] : no chest pain [Palpitations] : no palpitations [Lower Ext Edema] : no lower extremity edema [Orthopnea] : no orthopnea [Shortness Of Breath] : no shortness of breath [Cough] : no cough [Abdominal Pain] : no abdominal pain [Constipation] : no constipation [Diarrhea] : diarrhea [Vomiting] : no vomiting [Heartburn] : no heartburn [Melena] : no melena [Dysuria] : no dysuria [Incontinence] : no incontinence [Nocturia] : no nocturia [Hematuria] : no hematuria [Joint Pain] : no joint pain [Joint Stiffness] : no joint stiffness [Muscle Pain] : no muscle pain [Back Pain] : no back pain [Itching] : no itching [Skin Rash] : no skin rash [Headache] : no headache [Fainting] : no fainting [Confusion] : no confusion [Memory Loss] : no memory loss [Unsteady Walking] : no ataxia

## 2022-03-10 NOTE — ASSESSMENT
[FreeTextEntry1] : Patient seems to be doing well she will be continued on her current medications follow-up visit in 2months she has been advised that should should she have any chest pain shortness of breath lightheadedness palpitations that she report immediately to the emergency room and give us a call otherwise no changes she is up-to-date on her Covid shots and booster

## 2022-03-20 ENCOUNTER — RX RENEWAL (OUTPATIENT)
Age: 87
End: 2022-03-20

## 2022-04-11 ENCOUNTER — RX RENEWAL (OUTPATIENT)
Age: 87
End: 2022-04-11

## 2022-04-19 ENCOUNTER — NON-APPOINTMENT (OUTPATIENT)
Age: 87
End: 2022-04-19

## 2022-04-19 ENCOUNTER — APPOINTMENT (OUTPATIENT)
Dept: CARDIOLOGY | Facility: CLINIC | Age: 87
End: 2022-04-19
Payer: MEDICARE

## 2022-04-19 VITALS
WEIGHT: 120 LBS | BODY MASS INDEX: 22.08 KG/M2 | HEART RATE: 87 BPM | HEIGHT: 62 IN | RESPIRATION RATE: 17 BRPM | TEMPERATURE: 97.4 F | OXYGEN SATURATION: 98 %

## 2022-04-19 VITALS — HEART RATE: 70 BPM | DIASTOLIC BLOOD PRESSURE: 60 MMHG | SYSTOLIC BLOOD PRESSURE: 130 MMHG

## 2022-04-19 PROCEDURE — 93000 ELECTROCARDIOGRAM COMPLETE: CPT

## 2022-04-19 PROCEDURE — 99214 OFFICE O/P EST MOD 30 MIN: CPT

## 2022-04-19 RX ORDER — AMLODIPINE BESYLATE 5 MG/1
5 TABLET ORAL
Qty: 45 | Refills: 0 | Status: DISCONTINUED | COMMUNITY
Start: 2022-04-11 | End: 2022-04-19

## 2022-04-19 NOTE — ASSESSMENT
[FreeTextEntry1] : Impression\par Coronary artery disease status post MI with cardiogenic shock in a patient with pre-existing hypertension and renal insufficiency. Patient currently without symptoms of angina at this time and without symptoms of heart. failure. LVEF is normal. She is on appropriate  medications.\par \par Plan:\par 1.Continue current regimen

## 2022-04-19 NOTE — REASON FOR VISIT
[Cardiac Failure] : cardiac failure [Arrhythmia/ECG Abnorrmalities] : arrhythmia/ECG abnormalities [Coronary Artery Disease] : coronary artery disease [FreeTextEntry1] : Patient returns for followup. Feeling well. Offers no complaints of chest discomfort shortness of breath palpitations dizziness or syncope.\par

## 2022-05-09 ENCOUNTER — RX RENEWAL (OUTPATIENT)
Age: 87
End: 2022-05-09

## 2022-05-18 NOTE — PROGRESS NOTE ADULT - PROVIDER SPECIALTY LIST ADULT
Hospitalist
Please review pended script and sign if appropriate.
CCU
CY
Heart Failure
Hospitalist
Nephrology
Electrophysiology
Nephrology
CCU
Heart Failure
Heart Failure
Hospitalist
Hospitalist
Nephrology
Vascular Surgery
CY
Electrophysiology
Heart Failure
Nephrology
Heart Failure
Hospitalist
Electrophysiology
Heart Failure
Hospitalist
Heart Failure
Heart Failure
Hospitalist
Hospitalist
Heart Failure
Hospitalist

## 2022-05-31 ENCOUNTER — RX RENEWAL (OUTPATIENT)
Age: 87
End: 2022-05-31

## 2022-06-13 ENCOUNTER — APPOINTMENT (OUTPATIENT)
Dept: FAMILY MEDICINE | Facility: CLINIC | Age: 87
End: 2022-06-13
Payer: MEDICARE

## 2022-06-13 VITALS
SYSTOLIC BLOOD PRESSURE: 114 MMHG | HEART RATE: 85 BPM | BODY MASS INDEX: 22.45 KG/M2 | DIASTOLIC BLOOD PRESSURE: 70 MMHG | WEIGHT: 122 LBS | HEIGHT: 62 IN | OXYGEN SATURATION: 100 % | RESPIRATION RATE: 14 BRPM | TEMPERATURE: 98.9 F

## 2022-06-13 PROCEDURE — 99213 OFFICE O/P EST LOW 20 MIN: CPT

## 2022-06-13 NOTE — REVIEW OF SYSTEMS
[Fever] : no fever [Chills] : no chills [Fatigue] : no fatigue [Hot Flashes] : no hot flashes [Nasal Discharge] : no nasal discharge [Sore Throat] : no sore throat [Chest Pain] : no chest pain [Palpitations] : no palpitations [Lower Ext Edema] : no lower extremity edema [Orthopnea] : no orthopnea [Shortness Of Breath] : no shortness of breath [Wheezing] : no wheezing [Cough] : no cough [Dyspnea on Exertion] : no dyspnea on exertion [Abdominal Pain] : no abdominal pain [Nausea] : no nausea [Constipation] : no constipation [Diarrhea] : diarrhea [Vomiting] : no vomiting [Heartburn] : no heartburn [Melena] : no melena [Dysuria] : no dysuria [Incontinence] : no incontinence [Nocturia] : no nocturia [Hematuria] : no hematuria [Frequency] : no frequency [Joint Pain] : joint pain [Joint Stiffness] : joint stiffness [Muscle Pain] : no muscle pain [Headache] : no headache [Dizziness] : no dizziness [Fainting] : no fainting [Confusion] : no confusion [Memory Loss] : no memory loss [Unsteady Walking] : no ataxia

## 2022-06-13 NOTE — ASSESSMENT
[FreeTextEntry1] : Patient seems to doing quite well stable recent visit with cardiology good we will continue current plan will repeat the laboratory work follow-up visit in approximately 3 to 4 months or as needed

## 2022-06-13 NOTE — HISTORY OF PRESENT ILLNESS
[FreeTextEntry1] : Atrial flutter [de-identified] : Patient is seen here today in follow-up on multiple issues atrial flutter diabetes atherosclerotic cardiovascular disease she feels well she saw Dr. Ross of cardiology back in April with a good report review of systems is unremarkable she is up-to-date on her COVID shots she denies any lightheadedness fainting spells chest pain palpitations shortness of breath or other cardiovascular related symptoms her mental state is alert

## 2022-06-13 NOTE — PHYSICAL EXAM
[No Acute Distress] : no acute distress [Well Nourished] : well nourished [Well Developed] : well developed [Well-Appearing] : well-appearing [Normal Sclera/Conjunctiva] : normal sclera/conjunctiva [PERRL] : pupils equal round and reactive to light [EOMI] : extraocular movements intact [Normal Outer Ear/Nose] : the outer ears and nose were normal in appearance [Normal Oropharynx] : the oropharynx was normal [No JVD] : no jugular venous distention [No Lymphadenopathy] : no lymphadenopathy [Thyroid Normal, No Nodules] : the thyroid was normal and there were no nodules present [No Respiratory Distress] : no respiratory distress  [No Accessory Muscle Use] : no accessory muscle use [Clear to Auscultation] : lungs were clear to auscultation bilaterally [Normal Rate] : normal rate  [Regular Rhythm] : with a regular rhythm [No Murmur] : no murmur heard

## 2022-08-04 ENCOUNTER — RX RENEWAL (OUTPATIENT)
Age: 87
End: 2022-08-04

## 2022-08-10 ENCOUNTER — RX RENEWAL (OUTPATIENT)
Age: 87
End: 2022-08-10

## 2022-08-15 ENCOUNTER — RX RENEWAL (OUTPATIENT)
Age: 87
End: 2022-08-15

## 2022-08-16 ENCOUNTER — APPOINTMENT (OUTPATIENT)
Dept: CARDIOLOGY | Facility: CLINIC | Age: 87
End: 2022-08-16

## 2022-08-18 NOTE — ED ADULT NURSE NOTE - NSSUHOSCREENINGYN_ED_ALL_ED
Yes - the patient is able to be screened Metronidazole Pregnancy And Lactation Text: This medication is Pregnancy Category B and considered safe during pregnancy.  It is also excreted in breast milk.

## 2022-08-30 ENCOUNTER — NON-APPOINTMENT (OUTPATIENT)
Age: 87
End: 2022-08-30

## 2022-08-30 ENCOUNTER — APPOINTMENT (OUTPATIENT)
Dept: CARDIOLOGY | Facility: CLINIC | Age: 87
End: 2022-08-30

## 2022-08-30 VITALS — DIASTOLIC BLOOD PRESSURE: 80 MMHG | SYSTOLIC BLOOD PRESSURE: 140 MMHG | HEART RATE: 78 BPM

## 2022-08-30 VITALS
HEART RATE: 76 BPM | WEIGHT: 127 LBS | HEIGHT: 62 IN | BODY MASS INDEX: 23.37 KG/M2 | OXYGEN SATURATION: 100 % | RESPIRATION RATE: 16 BRPM

## 2022-08-30 PROCEDURE — 99214 OFFICE O/P EST MOD 30 MIN: CPT | Mod: 25

## 2022-08-30 PROCEDURE — 93000 ELECTROCARDIOGRAM COMPLETE: CPT

## 2022-08-30 NOTE — REASON FOR VISIT
[Arrhythmia/ECG Abnorrmalities] : arrhythmia/ECG abnormalities [Structural Heart and Valve Disease] : structural heart and valve disease [Coronary Artery Disease] : coronary artery disease [FreeTextEntry1] : Patient returns for followup. Feeling well. Offers no complaints of chest discomfort shortness of breath palpitations dizziness or syncope.Recent lipid profile reveals a total cholesterol 135 HDL 50 LDL 67\par

## 2022-09-06 ENCOUNTER — RX RENEWAL (OUTPATIENT)
Age: 87
End: 2022-09-06

## 2022-09-16 NOTE — OCCUPATIONAL THERAPY INITIAL EVALUATION ADULT - FINE MOTOR COORDINATION, LEFT HAND, FINGER TO NOSE, OT EVAL
Continuity of Care Form    Patient Name: Rachell Cesar   :  1991  MRN:  [id number]    Admit date:  2022  Discharge date:  22    Code Status Order: Full Code   Advance Directives:     Admitting Physician:  Shree Garces DO  PCP: Pamela Armstrong MD    Discharging Nurse: [facility] Unit/Room#: 4ZC-2565/4278-84  Discharging Unit Phone Number: [phone]    Emergency Contact:   Extended Emergency Contact Information  Primary Emergency Contact: Reilly Zarate Phone: [phone]  Relation: Legal Guardian    Past Surgical History:  Past Surgical History:   Procedure Laterality Date    GASTROSTOMY TUBE PLACEMENT      GASTROSTOMY TUBE PLACEMENT      2016    GASTROSTOMY TUBE PLACEMENT N/A 2019    EGD PEG TUBE PLACEMENT performed by Vargas York MD at [place] [address] 2019    EGD FOREIGN BODY REMOVAL performed by Vargas York MD at [address] [place] [address] History:   Immunization History   Administered Date(s) Administered    COVID-19, PFIZER PURPLE top, DILUTE for use, (age 15 y+), 30mcg/0.3mL 2021, 2021, 2021    Influenza Virus Vaccine 10/24/2011    Tdap (Boostrix, Adacel) 10/24/2011, 2015       Active Problems:  Patient Active Problem List   Diagnosis Code    Drug overdose T50.901A    Cardiac arrest due to respiratory disorder ([facility] J98.9, I46.8    Anoxic brain injury ([address] G93.1    Acute respiratory failure with hypoxia (HCC) J96.01    Aspiration pneumonia of both lungs (HCC) J69.0    Cor pulmonale (HCC) I27.81    Seizure ([address] R56.9    Status epilepticus ([address] G40.901    Non-traumatic rhabdomyolysis M62.82    Dystonia G24.9    Non compliance w medication regimen Z91.14    Failure to thrive in adult R62.7    Hypokalemia E87.6    Hypernatremia E87.0    Agitation R45.1    Anxiety F41.9    Chronic hepatitis C without hepatic coma (HCC) B18.2    Dysphagia, oropharyngeal R13.12 Choreoathetoid limb movements G25.5    Hepatic encephalopathy (HCC) K72.90    Abscess of forearm, left L02.414    Severe protein-calorie malnutrition Oneyda Rosenberg: less than 60% of standard weight) (HCC) E43    Colonic obstruction ([address] K56.609    Fecal impaction (HCC) K56.41    Dislodged gastrostomy tube T82.722C    Bladder outlet obstruction N32.0    Hydroureteronephrosis N13.30    Proctitis K62.89    Abdominal pain R10.9    UTI (urinary tract infection) N39.0       Isolation/Infection:   Isolation            Contact          Patient Infection Status       Infection Onset Added Last Indicated Last Indicated By Review Planned Expiration Resolved Resolved By    MDRO (multi-drug resistant organism) 06/26/22 06/28/22 06/28/22 Reshma Lau, HOLLY        urine    MRSA 08/25/17 08/28/17 08/28/17 Zenon Beckett RN        Arm             Nurse Assessment:  Last Vital Signs: /67   Pulse 83   Temp 98 °F (36.7 °C) (Oral)   Resp 17   Ht 5' 5\" (1.651 m)   Wt 109 lb 12.8 oz (49.8 kg)   SpO2 93%   BMI 18.27 kg/m²     Last documented pain score (0-10 scale): Pain Level: 0  Last Weight:   Wt Readings from Last 1 Encounters:   09/16/22 109 lb 12.8 oz (49.8 kg)     Mental Status:  alert    IV Access:  - None    Nursing Mobility/ADLs:  Walking   Dependent  Transfer  Dependent  Bathing  Dependent  Dressing  Dependent  Toileting  Dependent  Feeding  Dependent  Med Admin  Dependent  Med Delivery   crushed and feeding tube    Wound Care Documentation and Therapy:  Wound 09/14/22 Pretibial Proximal;Right (Active)   Number of days: 2        Elimination:  Continence: Bowel: Yes  Bladder: Yes  Urinary Catheter: None   Colostomy/Ileostomy/Ileal Conduit: No       Date of Last BM: 09/19/22    Intake/Output Summary (Last 24 hours) at 9/16/2022 1222  Last data filed at 9/16/2022 0018  Gross per 24 hour   Intake 966 ml   Output 900 ml   Net 66 ml     I/O last 3 completed shifts:   In: 966 [NG/GT:966]  Out: [place] [Urine:1275]    Safety Concerns: At Risk for Falls and Aspiration Risk    Impairments/Disabilities:      Speech, Language Barrier - nonverbal, Pt can not communicate verbally, and Contractures - Bilateral arms and legs    Nutrition Therapy:  Current Nutrition Therapy:   - Tube Feedings:  Standard with fiber    Routes of Feeding: Gastrostomy Tube  Liquids: No Liquids  Daily Fluid Restriction: no  Last Modified Barium Swallow with Video (Video Swallowing Test): not done    Treatments at the Time of Hospital Discharge:   Respiratory Treatments: None  Oxygen Therapy:  is not on home oxygen therapy. Ventilator:    - No ventilator support    Rehab Therapies: Physical Therapy and Occupational Therapy  Weight Bearing Status/Restrictions: No weight bearing restrictions, Pt unable to ambulate  Other Medical Equipment (for information only, NOT a DME order):  hospital bed  Other Treatments: None    Patient's personal belongings (please select all that are sent with patient):  None    RN SIGNATURE:  Electronically signed by Caleb Estrada RN on 9/19/22 at 3:16 PM EDT    CASE MANAGEMENT/SOCIAL WORK SECTION    Inpatient Status Date: 09/14/2022    Readmission Risk Assessment Score:  17  Readmission Risk              Risk of Unplanned Readmission:  27           Discharging to Facility/ [address] [place] [address], [place] [address]  Phone [phone]  Fax [phone]      / signature: Electronically signed by Rita Rangel RN on 9/16/22 at 12:31 PM EDT    PHYSICIAN SECTION    Prognosis: Guarded    Condition at Discharge: Stable    Rehab Potential (if transferring to Rehab): Poor    Recommended Labs or Other Treatments After Discharge:   Ensure daily good bowel movements with stool softeners and laxatives. Try to mobilize in bed as much as possible.     Physician Certification: I certify the above information and transfer of Georgi Donaldson  is necessary for the continuing treatment of the diagnosis listed and that she requires [place] for greater 30 days.      Update Admission H&P: No change in H&P    PHYSICIAN SIGNATURE:  Electronically signed by Janet Cosby MD on 9/19/22 at 12:36 PM EDT normal performance

## 2022-10-10 ENCOUNTER — RX RENEWAL (OUTPATIENT)
Age: 87
End: 2022-10-10

## 2022-10-25 NOTE — H&P ADULT - DOES THIS PATIENT HAVE A HISTORY OF OR HAS BEEN DX WITH HEART FAILURE?
The patient has been re-examined and I agree with the above assessment or I updated with my findings.
yes

## 2022-11-07 ENCOUNTER — RX RENEWAL (OUTPATIENT)
Age: 87
End: 2022-11-07

## 2022-11-09 ENCOUNTER — RX RENEWAL (OUTPATIENT)
Age: 87
End: 2022-11-09

## 2022-12-12 ENCOUNTER — APPOINTMENT (OUTPATIENT)
Dept: CARDIOLOGY | Facility: CLINIC | Age: 87
End: 2022-12-12

## 2022-12-12 ENCOUNTER — NON-APPOINTMENT (OUTPATIENT)
Age: 87
End: 2022-12-12

## 2022-12-12 VITALS — SYSTOLIC BLOOD PRESSURE: 130 MMHG | DIASTOLIC BLOOD PRESSURE: 62 MMHG | HEART RATE: 80 BPM

## 2022-12-12 VITALS
TEMPERATURE: 97.1 F | RESPIRATION RATE: 16 BRPM | BODY MASS INDEX: 24.29 KG/M2 | OXYGEN SATURATION: 100 % | HEIGHT: 62 IN | HEART RATE: 81 BPM | WEIGHT: 132 LBS

## 2022-12-12 PROCEDURE — 99214 OFFICE O/P EST MOD 30 MIN: CPT | Mod: 25

## 2022-12-12 PROCEDURE — 93000 ELECTROCARDIOGRAM COMPLETE: CPT

## 2022-12-12 NOTE — REASON FOR VISIT
[Structural Heart and Valve Disease] : structural heart and valve disease [Hypertension] : hypertension [Coronary Artery Disease] : coronary artery disease [FreeTextEntry1] : Patient returns for followup. Feeling well. Offers no complaints of chest discomfort shortness of breath palpitations dizziness or syncope.  Most recent lipid profile reveals total cholesterol 105 HDL 34 LDL 52\par

## 2022-12-30 LAB — HBA1C MFR BLD HPLC: 5.8

## 2023-01-04 ENCOUNTER — RX RENEWAL (OUTPATIENT)
Age: 88
End: 2023-01-04

## 2023-01-05 ENCOUNTER — APPOINTMENT (OUTPATIENT)
Dept: GERIATRICS | Facility: CLINIC | Age: 88
End: 2023-01-05
Payer: MEDICARE

## 2023-01-05 VITALS
HEART RATE: 89 BPM | SYSTOLIC BLOOD PRESSURE: 123 MMHG | OXYGEN SATURATION: 92 % | HEIGHT: 62 IN | RESPIRATION RATE: 16 BRPM | DIASTOLIC BLOOD PRESSURE: 50 MMHG | BODY MASS INDEX: 23 KG/M2 | WEIGHT: 125 LBS | TEMPERATURE: 96.6 F

## 2023-01-05 DIAGNOSIS — J06.9 ACUTE UPPER RESPIRATORY INFECTION, UNSPECIFIED: ICD-10-CM

## 2023-01-05 DIAGNOSIS — R05.9 COUGH, UNSPECIFIED: ICD-10-CM

## 2023-01-05 PROCEDURE — 99204 OFFICE O/P NEW MOD 45 MIN: CPT

## 2023-01-05 NOTE — HISTORY OF PRESENT ILLNESS
[FreeTextEntry1] : 87yoF with pmhx of CKD, HTN, CAD, seen as new patient with complaint of cough and congestion.\par \par reports she intially started over the holidays with sore throat, mucus, and lost her voice.\par now persistent cough worse at night.  \par denies sob, fever, chills.\par no sick contacts\par she lives alone\par \par labwork reivewed with patient from 12/7/22 in system\par CKD at baseline\par a1c 5.8%\par \par htn: denies any cardiac sytmpoms today and reports she is adherent with her medications.\par \par \par  [Completely Independent] : Completely independent.

## 2023-01-05 NOTE — PHYSICAL EXAM
[Sclera] : the sclera and conjunctiva were normal [EOMI] : extraocular movements were intact [No Oral Pallor] : no oral pallor [Normal Outer Ear/Nose] : the ears and nose were normal in appearance [Normal Appearance] : the appearance of the neck was normal [Supple] : the neck was supple [No Respiratory Distress] : no respiratory distress [No Acc Muscle Use] : no accessory muscle use [Respiration, Rhythm And Depth] : normal respiratory rhythm and effort [Auscultation Breath Sounds / Voice Sounds] : lungs were clear to auscultation bilaterally [Normal S1, S2] : normal S1 and S2 [Heart Rate And Rhythm] : heart rate was normal and rhythm regular [Abdomen Tenderness] : non-tender [Abdomen Soft] : soft [Normal Gait] : normal gait [No Clubbing, Cyanosis] : no clubbing or cyanosis of the fingernails [Involuntary Movements] : no involuntary movements were seen [Normal Color / Pigmentation] : normal skin color and pigmentation [No Focal Deficits] : no focal deficits [Normal] : normal affect and normal mood

## 2023-01-05 NOTE — ASSESSMENT
[FreeTextEntry1] : viral uri with cough:\par flu panel swab today\par lungs clear on exam\par discussed mucinex and tylenol PRN\par advised to contact us with any new changes in her condition\par \par htn: controlled c/w current medications\par \par CKD: stable\par \par She will follow up with Dr. Allison as scheduled.

## 2023-01-06 LAB
INFLUENZA A RESULT: NOT DETECTED
INFLUENZA B RESULT: NOT DETECTED
RESP SYN VIRUS RESULT: NOT DETECTED
SARS-COV-2 RESULT: NOT DETECTED

## 2023-01-23 ENCOUNTER — APPOINTMENT (OUTPATIENT)
Dept: FAMILY MEDICINE | Facility: CLINIC | Age: 88
End: 2023-01-23
Payer: MEDICARE

## 2023-01-23 VITALS
WEIGHT: 125 LBS | RESPIRATION RATE: 16 BRPM | BODY MASS INDEX: 23 KG/M2 | SYSTOLIC BLOOD PRESSURE: 160 MMHG | OXYGEN SATURATION: 96 % | TEMPERATURE: 97.1 F | DIASTOLIC BLOOD PRESSURE: 80 MMHG | HEART RATE: 79 BPM | HEIGHT: 62 IN

## 2023-01-23 DIAGNOSIS — Z23 ENCOUNTER FOR IMMUNIZATION: ICD-10-CM

## 2023-01-23 PROCEDURE — 99397 PER PM REEVAL EST PAT 65+ YR: CPT | Mod: 25

## 2023-01-23 PROCEDURE — 90662 IIV NO PRSV INCREASED AG IM: CPT

## 2023-01-23 PROCEDURE — G0008: CPT

## 2023-01-23 NOTE — ASSESSMENT
[FreeTextEntry1] : Patient is doing well her mental status and functional status is excellent recent laboratory work is all acceptable she will follow-up with cardiology with regard to her congestive heart failure and atrial fibrillation on a regular basis was recently seen in December of systems is unremarkable she is in need of flu shot which will be administered today

## 2023-01-23 NOTE — HISTORY OF PRESENT ILLNESS
[FreeTextEntry1] : Patient here for health maintenance examination [de-identified] : Patient here for health maintenance examination she feels well has recently seen cardiology for her checkup with a good report recently had laboratory work all of which is in acceptable range there have been no changes in her medications review of systems is unremarkable she had COVID shots but is in need of a flu shot

## 2023-01-23 NOTE — HEALTH RISK ASSESSMENT
[Good] : ~his/her~  mood as  good [FreeTextEntry1] : Heart disease [Never] : Never [No] : In the past 12 months have you used drugs other than those required for medical reasons? No [No falls in past year] : Patient reported no falls in the past year [0] : 2) Feeling down, depressed, or hopeless: Not at all (0) [de-identified] : None [de-identified] : Ophthalmology cardiology [de-identified] : Active for her age [de-identified] : Healthy diet [BWO0Oliai] : 0 [Patient declined mammogram] : Patient declined mammogram [Patient declined PAP Smear] : Patient declined PAP Smear [Patient declined bone density test] : Patient declined bone density test [Patient declined colonoscopy] : Patient declined colonoscopy [Change in mental status noted] : No change in mental status noted [None] : None [Alone] : lives alone [Retired] : retired [Fully functional (bathing, dressing, toileting, transferring, walking, feeding)] : Fully functional (bathing, dressing, toileting, transferring, walking, feeding) [Fully functional (using the telephone, shopping, preparing meals, housekeeping, doing laundry, using] : Fully functional and needs no help or supervision to perform IADLs (using the telephone, shopping, preparing meals, housekeeping, doing laundry, using transportation, managing medications and managing finances) [Reports changes in hearing] : Reports no changes in hearing [Reports changes in dental health] : Reports no changes in dental health [Seat Belt] :  uses seat belt [With Patient/Caregiver] : , with patient/caregiver [Designated Healthcare Proxy] : Designated healthcare proxy [Name: ___] : Health Care Proxy's Name: [unfilled]  [Relationship: ___] : Relationship: [unfilled] [AdvancecareDate] : 01/23

## 2023-02-06 ENCOUNTER — RX RENEWAL (OUTPATIENT)
Age: 88
End: 2023-02-06

## 2023-03-02 ENCOUNTER — RX RENEWAL (OUTPATIENT)
Age: 88
End: 2023-03-02

## 2023-03-06 ENCOUNTER — RX RENEWAL (OUTPATIENT)
Age: 88
End: 2023-03-06

## 2023-04-08 ENCOUNTER — RX RENEWAL (OUTPATIENT)
Age: 88
End: 2023-04-08

## 2023-04-10 ENCOUNTER — APPOINTMENT (OUTPATIENT)
Dept: CARDIOLOGY | Facility: CLINIC | Age: 88
End: 2023-04-10

## 2023-04-17 ENCOUNTER — APPOINTMENT (OUTPATIENT)
Dept: CARDIOLOGY | Facility: CLINIC | Age: 88
End: 2023-04-17
Payer: MEDICARE

## 2023-04-17 ENCOUNTER — NON-APPOINTMENT (OUTPATIENT)
Age: 88
End: 2023-04-17

## 2023-04-17 VITALS
WEIGHT: 125 LBS | HEART RATE: 82 BPM | RESPIRATION RATE: 16 BRPM | HEIGHT: 62 IN | BODY MASS INDEX: 23 KG/M2 | OXYGEN SATURATION: 99 %

## 2023-04-17 VITALS — DIASTOLIC BLOOD PRESSURE: 64 MMHG | SYSTOLIC BLOOD PRESSURE: 120 MMHG

## 2023-04-17 PROCEDURE — 99215 OFFICE O/P EST HI 40 MIN: CPT | Mod: 25

## 2023-04-17 PROCEDURE — 93000 ELECTROCARDIOGRAM COMPLETE: CPT

## 2023-04-17 NOTE — ASSESSMENT
[FreeTextEntry1] : Impression\par Coronary artery disease status post MI with cardiogenic shock in a patient with pre-existing hypertension and renal insufficiency. Patient currently without symptoms of angina at this time and without symptoms of heart. failure. \par \par Plan:\par 1.  We have had a long discussion regarding her medications.  First and foremost I had to reexplained to her the indications both for anticoagulation with Eliquis and the role of Entresto.\par \par I have told her to discuss with her pharmacy whether or not Xarelto would be covered at a more favorable price to her.  I have also discussed Coumadin with her.  She however states she would be at very unlikely to comply with the blood testing regimen\par \par With regards to Entresto I have explained to her the role of it and have explained her that since she is revascularized now it is possible that she does not have true "heart failure".  I have explained to her however we cannot be 100% sure that Entresto has not played a role in the improvement of her heart.  For now we have agreed upon a plan to obtain an echocardiogram and reassess her LV systolic function.  If it has deteriorated once will most probably continue Entresto.  If not we will consider switching to an ARB

## 2023-04-17 NOTE — REASON FOR VISIT
[Arrhythmia/ECG Abnorrmalities] : arrhythmia/ECG abnormalities [Hypertension] : hypertension [Coronary Artery Disease] : coronary artery disease [FreeTextEntry1] : Patient returns for follow-up.  She is feeling well.  She has no complaints of chest discomfort shortness of breath palpitations dizziness or syncope.  She states that she would like to find alternatives to Eliquis and Entresto because of the high cost.

## 2023-04-21 NOTE — PROVIDER CONTACT NOTE (EICU) - BACKGROUND
85F with a history of DM, HTN, HL, hyperkalemia, atherosclerosis, CKD, presents to the ED c/o abd pain, nausea and vomiting x 4 days.   CT done at [facility] was without IV contrast secondary to Cr > 2, and hyperkalemia  Lactate initially 5, repeat in 4-5 range which is not consistent of food poisoning  Cr 2.3, K 5.7  Anion gap 14 with correction of hypoalbuminemia, delta delta = 2/7, pt has a non anion gap metabolic acidosis Mixed anxiety depressive disorder   F41.8   Acute adjustment disorder with mixed anxiety and depressed mood   F43.23

## 2023-05-02 ENCOUNTER — RX RENEWAL (OUTPATIENT)
Age: 88
End: 2023-05-02

## 2023-05-08 ENCOUNTER — RX RENEWAL (OUTPATIENT)
Age: 88
End: 2023-05-08

## 2023-05-15 NOTE — PHYSICAL EXAM
Care Due:                  Date            Visit Type   Department     Provider  --------------------------------------------------------------------------------                                EP -                              [facility]  Last Visit: 12-      CARE ([facility])   MEDICINE       Oskar Puente                              [facility]  Next Visit: 06-      CARE ([facility])   MEDICINE       Oskar Puente                                                            Last  Test          Frequency    Reason                     Performed    Due Date  --------------------------------------------------------------------------------    HBA1C.......  6 months...  SITagliptin, metFORMIN...  11- 05-    [facility] Embedded Care Due Messages. Reference number: [id number].   5/15/2023 6:02:00 PM CDT   [No Acute Distress] : no acute distress [Well Developed] : well developed [Well Nourished] : well nourished [Normal Sclera/Conjunctiva] : normal sclera/conjunctiva [PERRL] : pupils equal round and reactive to light [Well-Appearing] : well-appearing [No JVD] : no jugular venous distention [Normal Oropharynx] : the oropharynx was normal [Normal Outer Ear/Nose] : the outer ears and nose were normal in appearance [No Lymphadenopathy] : no lymphadenopathy [Regular Rhythm] : with a regular rhythm [No Respiratory Distress] : no respiratory distress  [No Edema] : there was no peripheral edema [No Murmur] : no murmur heard [Non Tender] : non-tender [No HSM] : no HSM [Normal Supraclavicular Nodes] : no supraclavicular lymphadenopathy [Normal Anterior Cervical Nodes] : no anterior cervical lymphadenopathy [No Spinal Tenderness] : no spinal tenderness [Normal Gait] : normal gait [No Focal Deficits] : no focal deficits [Coordination Grossly Intact] : coordination grossly intact

## 2023-05-22 ENCOUNTER — APPOINTMENT (OUTPATIENT)
Dept: CARDIOLOGY | Facility: CLINIC | Age: 88
End: 2023-05-22
Payer: MEDICARE

## 2023-05-22 PROCEDURE — 93306 TTE W/DOPPLER COMPLETE: CPT

## 2023-06-05 ENCOUNTER — RX RENEWAL (OUTPATIENT)
Age: 88
End: 2023-06-05

## 2023-06-07 ENCOUNTER — RX RENEWAL (OUTPATIENT)
Age: 88
End: 2023-06-07

## 2023-06-25 ENCOUNTER — RX RENEWAL (OUTPATIENT)
Age: 88
End: 2023-06-25

## 2023-07-06 ENCOUNTER — RX RENEWAL (OUTPATIENT)
Age: 88
End: 2023-07-06

## 2023-08-17 ENCOUNTER — APPOINTMENT (OUTPATIENT)
Dept: CARDIOLOGY | Facility: CLINIC | Age: 88
End: 2023-08-17
Payer: MEDICARE

## 2023-08-17 VITALS
TEMPERATURE: 94.2 F | OXYGEN SATURATION: 100 % | HEIGHT: 59 IN | WEIGHT: 125 LBS | RESPIRATION RATE: 19 BRPM | BODY MASS INDEX: 25.2 KG/M2 | HEART RATE: 64 BPM

## 2023-08-17 VITALS — SYSTOLIC BLOOD PRESSURE: 120 MMHG | DIASTOLIC BLOOD PRESSURE: 50 MMHG

## 2023-08-17 PROCEDURE — 99214 OFFICE O/P EST MOD 30 MIN: CPT | Mod: 25

## 2023-08-17 PROCEDURE — 93000 ELECTROCARDIOGRAM COMPLETE: CPT

## 2023-08-17 NOTE — ASSESSMENT
[FreeTextEntry1] : Impression Coronary artery disease status post MI with cardiogenic shock in a patient with pre-existing hypertension and renal insufficiency. Patient currently without symptoms of angina at this time and without symptoms of heart. failure.  Left ventricular systolic performance is returned to normal  Plan: 1.  Continue current regimen

## 2023-08-17 NOTE — REASON FOR VISIT
[Structural Heart and Valve Disease] : structural heart and valve disease [Coronary Artery Disease] : coronary artery disease [FreeTextEntry1] : Patient returns for followup. Feeling well. Offers no complaints of chest discomfort shortness of breath palpitations dizziness or syncope.

## 2023-08-28 ENCOUNTER — APPOINTMENT (OUTPATIENT)
Dept: FAMILY MEDICINE | Facility: CLINIC | Age: 88
End: 2023-08-28
Payer: MEDICARE

## 2023-08-28 VITALS
TEMPERATURE: 96.7 F | HEART RATE: 71 BPM | BODY MASS INDEX: 21.97 KG/M2 | DIASTOLIC BLOOD PRESSURE: 62 MMHG | OXYGEN SATURATION: 98 % | WEIGHT: 109 LBS | HEIGHT: 59 IN | RESPIRATION RATE: 16 BRPM | SYSTOLIC BLOOD PRESSURE: 140 MMHG

## 2023-08-28 DIAGNOSIS — E78.5 HYPERLIPIDEMIA, UNSPECIFIED: ICD-10-CM

## 2023-08-28 PROCEDURE — 99213 OFFICE O/P EST LOW 20 MIN: CPT

## 2023-08-28 NOTE — REVIEW OF SYSTEMS
[Recent Change In Weight] : ~T recent weight change [Fever] : no fever [Chills] : no chills [Fatigue] : no fatigue [Vision Problems] : no vision problems [Sore Throat] : no sore throat [Chest Pain] : no chest pain [Palpitations] : no palpitations [Lower Ext Edema] : no lower extremity edema [Orthopnea] : no orthopnea [Shortness Of Breath] : no shortness of breath [Wheezing] : no wheezing [Cough] : no cough [Dyspnea on Exertion] : no dyspnea on exertion [Abdominal Pain] : no abdominal pain [Nausea] : no nausea [Constipation] : no constipation [Diarrhea] : diarrhea [Vomiting] : no vomiting [Melena] : no melena [Dysuria] : no dysuria [Incontinence] : no incontinence [Joint Pain] : no joint pain [Headache] : no headache [Dizziness] : no dizziness [Fainting] : no fainting [Confusion] : no confusion [Memory Loss] : no memory loss [Insomnia] : no insomnia [Anxiety] : no anxiety [Depression] : no depression [FreeTextEntry2] : 15 pounds over the past 4 months

## 2023-08-28 NOTE — PHYSICAL EXAM
[No Acute Distress] : no acute distress [Well Nourished] : well nourished [Well Developed] : well developed [PERRL] : pupils equal round and reactive to light [Normal Outer Ear/Nose] : the outer ears and nose were normal in appearance [No JVD] : no jugular venous distention [No Respiratory Distress] : no respiratory distress  [No Accessory Muscle Use] : no accessory muscle use [Normal Rate] : normal rate  [Regular Rhythm] : with a regular rhythm [No Murmur] : no murmur heard [No Edema] : there was no peripheral edema [Soft] : abdomen soft [Non Tender] : non-tender [Non-distended] : non-distended [No HSM] : no HSM [Normal Supraclavicular Nodes] : no supraclavicular lymphadenopathy [No CVA Tenderness] : no CVA  tenderness [No Spinal Tenderness] : no spinal tenderness [No Rash] : no rash [Coordination Grossly Intact] : coordination grossly intact [No Focal Deficits] : no focal deficits [Normal Affect] : the affect was normal [Alert and Oriented x3] : oriented to person, place, and time [Normal Mood] : the mood was normal

## 2023-08-28 NOTE — ASSESSMENT
[FreeTextEntry1] : Patient has unexplained weight loss of 16 pounds over several months no GI symptoms just poor appetite no fever no chills review of systems unremarkable she will be sent for laboratory work to try to clarify the situation she will be revisited in 3 weeks she has been told that she should eat anything she likes unless it contains salt and we will follow-up on this situation

## 2023-08-28 NOTE — HISTORY OF PRESENT ILLNESS
[FreeTextEntry1] : Gerard artery disease hypertension weight loss [de-identified] : Patient here for follow-up visit recently saw Dr. Ross of cardiology who feels that she is stable with her coronary artery disease congestive heart failure etc. cardiac medications remain unchanged review of systems however reveals that the patient does have a significant weight loss now weighing 109 pounds down from 125 since April of this year patient's appetite has not been good she needs to force herself to eat but does not have any nausea vomiting or abdominal pain or change in bowel habits

## 2023-08-30 NOTE — PHYSICAL THERAPY INITIAL EVALUATION ADULT - WEIGHT-BEARING RESTRICTIONS: STAND/SIT, REHAB EVAL
weight-bearing as tolerated Spiral Flap Text: The defect edges were debeveled with a #15 scalpel blade.  Given the location of the defect, shape of the defect and the proximity to free margins a spiral flap was deemed most appropriate.  Using a sterile surgical marker, an appropriate rotation flap was drawn incorporating the defect and placing the expected incisions within the relaxed skin tension lines where possible. The area thus outlined was incised deep to adipose tissue with a #15 scalpel blade.  The skin margins were undermined to an appropriate distance in all directions utilizing iris scissors.

## 2023-09-05 ENCOUNTER — RX RENEWAL (OUTPATIENT)
Age: 88
End: 2023-09-05

## 2023-09-05 RX ORDER — ATORVASTATIN CALCIUM 40 MG/1
40 TABLET, FILM COATED ORAL DAILY
Qty: 90 | Refills: 3 | Status: ACTIVE | COMMUNITY
Start: 2021-08-14 | End: 1900-01-01

## 2023-09-19 LAB
HBA1C MFR BLD HPLC: 5.4
LDLC SERPL DIRECT ASSAY-MCNC: 53

## 2023-09-28 NOTE — PROGRESS NOTE ADULT - PROBLEM SELECTOR PLAN 1
newly diagnosed HFrEF (had previous hx of HFpEF)  - HF following, recs greatly appreciated.   - continue to optimize GDMT as tolerated  - CW hydralazine 100mg TID, isordil to 40mg TID, and Carvedilol at 12.5mg q12h     - Start norvasc to optimize bp.   - hold diuretics for now. F/u HF and Nephro.   - NM amyloid scan not suggestive of cardiac amyloidosis  - s/p L+RHC showing elevated filling pressures, 90% stenosis of mid circumflex Lab: [id number] Lab: [id number]

## 2023-10-02 ENCOUNTER — APPOINTMENT (OUTPATIENT)
Dept: FAMILY MEDICINE | Facility: CLINIC | Age: 88
End: 2023-10-02
Payer: MEDICARE

## 2023-10-02 VITALS
HEART RATE: 78 BPM | HEIGHT: 59 IN | BODY MASS INDEX: 21.97 KG/M2 | WEIGHT: 109 LBS | DIASTOLIC BLOOD PRESSURE: 60 MMHG | SYSTOLIC BLOOD PRESSURE: 130 MMHG | OXYGEN SATURATION: 96 % | RESPIRATION RATE: 16 BRPM | TEMPERATURE: 96.6 F

## 2023-10-02 DIAGNOSIS — R21 RASH AND OTHER NONSPECIFIC SKIN ERUPTION: ICD-10-CM

## 2023-10-02 PROCEDURE — 99213 OFFICE O/P EST LOW 20 MIN: CPT

## 2023-10-02 RX ORDER — APIXABAN 2.5 MG/1
2.5 TABLET, FILM COATED ORAL
Qty: 180 | Refills: 2 | Status: ACTIVE | COMMUNITY
Start: 2021-08-23 | End: 1900-01-01

## 2023-10-20 ENCOUNTER — OUTPATIENT (OUTPATIENT)
Dept: OUTPATIENT SERVICES | Facility: HOSPITAL | Age: 88
LOS: 1 days | End: 2023-10-20
Payer: COMMERCIAL

## 2023-10-20 ENCOUNTER — APPOINTMENT (OUTPATIENT)
Dept: RADIOLOGY | Facility: HOSPITAL | Age: 88
End: 2023-10-20
Payer: MEDICARE

## 2023-10-20 DIAGNOSIS — R63.4 ABNORMAL WEIGHT LOSS: ICD-10-CM

## 2023-10-20 DIAGNOSIS — Z90.710 ACQUIRED ABSENCE OF BOTH CERVIX AND UTERUS: Chronic | ICD-10-CM

## 2023-10-20 PROCEDURE — 71046 X-RAY EXAM CHEST 2 VIEWS: CPT

## 2023-10-20 PROCEDURE — 71046 X-RAY EXAM CHEST 2 VIEWS: CPT | Mod: 26

## 2023-11-01 ENCOUNTER — RX RENEWAL (OUTPATIENT)
Age: 88
End: 2023-11-01

## 2023-11-06 ENCOUNTER — APPOINTMENT (OUTPATIENT)
Dept: FAMILY MEDICINE | Facility: CLINIC | Age: 88
End: 2023-11-06
Payer: MEDICARE

## 2023-11-06 VITALS
BODY MASS INDEX: 20.76 KG/M2 | HEIGHT: 59 IN | WEIGHT: 103 LBS | OXYGEN SATURATION: 94 % | TEMPERATURE: 98.4 F | HEART RATE: 76 BPM | SYSTOLIC BLOOD PRESSURE: 89 MMHG | DIASTOLIC BLOOD PRESSURE: 50 MMHG | RESPIRATION RATE: 16 BRPM

## 2023-11-06 DIAGNOSIS — E11.9 TYPE 2 DIABETES MELLITUS W/OUT COMPLICATIONS: ICD-10-CM

## 2023-11-06 DIAGNOSIS — N18.9 CHRONIC KIDNEY DISEASE, UNSPECIFIED: ICD-10-CM

## 2023-11-06 DIAGNOSIS — R63.4 ABNORMAL WEIGHT LOSS: ICD-10-CM

## 2023-11-06 PROCEDURE — 99214 OFFICE O/P EST MOD 30 MIN: CPT

## 2023-11-06 RX ORDER — ATORVASTATIN CALCIUM 40 MG/1
40 TABLET, FILM COATED ORAL DAILY
Qty: 90 | Refills: 3 | Status: DISCONTINUED | COMMUNITY
Start: 2022-01-18 | End: 2023-11-06

## 2023-11-18 NOTE — DISCHARGE NOTE PROVIDER - NSDCQMACEB_CARD_A_CORE
No, not prescribed...
Alert-The patient is alert, awake and responds to voice. The patient is oriented to time, place, and person. The triage nurse is able to obtain subjective information.

## 2023-11-20 ENCOUNTER — OUTPATIENT (OUTPATIENT)
Dept: OUTPATIENT SERVICES | Facility: HOSPITAL | Age: 88
LOS: 1 days | End: 2023-11-20
Payer: COMMERCIAL

## 2023-11-20 ENCOUNTER — APPOINTMENT (OUTPATIENT)
Dept: CT IMAGING | Facility: HOSPITAL | Age: 88
End: 2023-11-20
Payer: MEDICARE

## 2023-11-20 DIAGNOSIS — N18.9 CHRONIC KIDNEY DISEASE, UNSPECIFIED: ICD-10-CM

## 2023-11-20 DIAGNOSIS — Z90.710 ACQUIRED ABSENCE OF BOTH CERVIX AND UTERUS: Chronic | ICD-10-CM

## 2023-11-20 PROCEDURE — 74176 CT ABD & PELVIS W/O CONTRAST: CPT

## 2023-11-20 PROCEDURE — 74176 CT ABD & PELVIS W/O CONTRAST: CPT | Mod: 26

## 2023-11-30 ENCOUNTER — NON-APPOINTMENT (OUTPATIENT)
Age: 88
End: 2023-11-30

## 2023-12-11 ENCOUNTER — APPOINTMENT (OUTPATIENT)
Dept: GASTROENTEROLOGY | Facility: HOSPITAL | Age: 88
End: 2023-12-11

## 2023-12-14 ENCOUNTER — NON-APPOINTMENT (OUTPATIENT)
Age: 88
End: 2023-12-14

## 2023-12-18 ENCOUNTER — OUTPATIENT (OUTPATIENT)
Dept: OUTPATIENT SERVICES | Facility: HOSPITAL | Age: 88
LOS: 1 days | End: 2023-12-18

## 2023-12-18 VITALS
WEIGHT: 98.99 LBS | RESPIRATION RATE: 16 BRPM | HEIGHT: 57 IN | HEART RATE: 90 BPM | SYSTOLIC BLOOD PRESSURE: 164 MMHG | DIASTOLIC BLOOD PRESSURE: 78 MMHG | TEMPERATURE: 98 F | OXYGEN SATURATION: 98 %

## 2023-12-18 DIAGNOSIS — K86.2 CYST OF PANCREAS: ICD-10-CM

## 2023-12-18 DIAGNOSIS — I48.92 UNSPECIFIED ATRIAL FLUTTER: ICD-10-CM

## 2023-12-18 DIAGNOSIS — R03.0 ELEVATED BLOOD-PRESSURE READING, WITHOUT DIAGNOSIS OF HYPERTENSION: ICD-10-CM

## 2023-12-18 DIAGNOSIS — I25.10 ATHEROSCLEROTIC HEART DISEASE OF NATIVE CORONARY ARTERY WITHOUT ANGINA PECTORIS: ICD-10-CM

## 2023-12-18 DIAGNOSIS — I50.9 HEART FAILURE, UNSPECIFIED: ICD-10-CM

## 2023-12-18 DIAGNOSIS — Z90.710 ACQUIRED ABSENCE OF BOTH CERVIX AND UTERUS: Chronic | ICD-10-CM

## 2023-12-18 LAB
A1C WITH ESTIMATED AVERAGE GLUCOSE RESULT: 5 % — SIGNIFICANT CHANGE UP (ref 4–5.6)
A1C WITH ESTIMATED AVERAGE GLUCOSE RESULT: 5 % — SIGNIFICANT CHANGE UP (ref 4–5.6)
ALBUMIN SERPL ELPH-MCNC: 2.8 G/DL — LOW (ref 3.3–5)
ALBUMIN SERPL ELPH-MCNC: 2.8 G/DL — LOW (ref 3.3–5)
ALP SERPL-CCNC: 52 U/L — SIGNIFICANT CHANGE UP (ref 40–120)
ALP SERPL-CCNC: 52 U/L — SIGNIFICANT CHANGE UP (ref 40–120)
ALT FLD-CCNC: 11 U/L — SIGNIFICANT CHANGE UP (ref 4–33)
ALT FLD-CCNC: 11 U/L — SIGNIFICANT CHANGE UP (ref 4–33)
ANION GAP SERPL CALC-SCNC: 9 MMOL/L — SIGNIFICANT CHANGE UP (ref 7–14)
ANION GAP SERPL CALC-SCNC: 9 MMOL/L — SIGNIFICANT CHANGE UP (ref 7–14)
AST SERPL-CCNC: 19 U/L — SIGNIFICANT CHANGE UP (ref 4–32)
AST SERPL-CCNC: 19 U/L — SIGNIFICANT CHANGE UP (ref 4–32)
BILIRUB SERPL-MCNC: 0.3 MG/DL — SIGNIFICANT CHANGE UP (ref 0.2–1.2)
BILIRUB SERPL-MCNC: 0.3 MG/DL — SIGNIFICANT CHANGE UP (ref 0.2–1.2)
BUN SERPL-MCNC: 10 MG/DL — SIGNIFICANT CHANGE UP (ref 7–23)
BUN SERPL-MCNC: 10 MG/DL — SIGNIFICANT CHANGE UP (ref 7–23)
CALCIUM SERPL-MCNC: 8.7 MG/DL — SIGNIFICANT CHANGE UP (ref 8.4–10.5)
CALCIUM SERPL-MCNC: 8.7 MG/DL — SIGNIFICANT CHANGE UP (ref 8.4–10.5)
CHLORIDE SERPL-SCNC: 105 MMOL/L — SIGNIFICANT CHANGE UP (ref 98–107)
CHLORIDE SERPL-SCNC: 105 MMOL/L — SIGNIFICANT CHANGE UP (ref 98–107)
CO2 SERPL-SCNC: 27 MMOL/L — SIGNIFICANT CHANGE UP (ref 22–31)
CO2 SERPL-SCNC: 27 MMOL/L — SIGNIFICANT CHANGE UP (ref 22–31)
CREAT SERPL-MCNC: 0.85 MG/DL — SIGNIFICANT CHANGE UP (ref 0.5–1.3)
CREAT SERPL-MCNC: 0.85 MG/DL — SIGNIFICANT CHANGE UP (ref 0.5–1.3)
EGFR: 67 ML/MIN/1.73M2 — SIGNIFICANT CHANGE UP
EGFR: 67 ML/MIN/1.73M2 — SIGNIFICANT CHANGE UP
ESTIMATED AVERAGE GLUCOSE: 97 — SIGNIFICANT CHANGE UP
ESTIMATED AVERAGE GLUCOSE: 97 — SIGNIFICANT CHANGE UP
GLUCOSE SERPL-MCNC: 106 MG/DL — HIGH (ref 70–99)
GLUCOSE SERPL-MCNC: 106 MG/DL — HIGH (ref 70–99)
HCT VFR BLD CALC: 35 % — SIGNIFICANT CHANGE UP (ref 34.5–45)
HCT VFR BLD CALC: 35 % — SIGNIFICANT CHANGE UP (ref 34.5–45)
HGB BLD-MCNC: 10.3 G/DL — LOW (ref 11.5–15.5)
HGB BLD-MCNC: 10.3 G/DL — LOW (ref 11.5–15.5)
MCHC RBC-ENTMCNC: 24.8 PG — LOW (ref 27–34)
MCHC RBC-ENTMCNC: 24.8 PG — LOW (ref 27–34)
MCHC RBC-ENTMCNC: 29.4 GM/DL — LOW (ref 32–36)
MCHC RBC-ENTMCNC: 29.4 GM/DL — LOW (ref 32–36)
MCV RBC AUTO: 84.1 FL — SIGNIFICANT CHANGE UP (ref 80–100)
MCV RBC AUTO: 84.1 FL — SIGNIFICANT CHANGE UP (ref 80–100)
NRBC # BLD: 0 /100 WBCS — SIGNIFICANT CHANGE UP (ref 0–0)
NRBC # BLD: 0 /100 WBCS — SIGNIFICANT CHANGE UP (ref 0–0)
NRBC # FLD: 0 K/UL — SIGNIFICANT CHANGE UP (ref 0–0)
NRBC # FLD: 0 K/UL — SIGNIFICANT CHANGE UP (ref 0–0)
PLATELET # BLD AUTO: 269 K/UL — SIGNIFICANT CHANGE UP (ref 150–400)
PLATELET # BLD AUTO: 269 K/UL — SIGNIFICANT CHANGE UP (ref 150–400)
POTASSIUM SERPL-MCNC: 4.2 MMOL/L — SIGNIFICANT CHANGE UP (ref 3.5–5.3)
POTASSIUM SERPL-MCNC: 4.2 MMOL/L — SIGNIFICANT CHANGE UP (ref 3.5–5.3)
POTASSIUM SERPL-SCNC: 4.2 MMOL/L — SIGNIFICANT CHANGE UP (ref 3.5–5.3)
POTASSIUM SERPL-SCNC: 4.2 MMOL/L — SIGNIFICANT CHANGE UP (ref 3.5–5.3)
PROT SERPL-MCNC: 5.6 G/DL — LOW (ref 6–8.3)
PROT SERPL-MCNC: 5.6 G/DL — LOW (ref 6–8.3)
RBC # BLD: 4.16 M/UL — SIGNIFICANT CHANGE UP (ref 3.8–5.2)
RBC # BLD: 4.16 M/UL — SIGNIFICANT CHANGE UP (ref 3.8–5.2)
RBC # FLD: 15 % — HIGH (ref 10.3–14.5)
RBC # FLD: 15 % — HIGH (ref 10.3–14.5)
SODIUM SERPL-SCNC: 141 MMOL/L — SIGNIFICANT CHANGE UP (ref 135–145)
SODIUM SERPL-SCNC: 141 MMOL/L — SIGNIFICANT CHANGE UP (ref 135–145)
WBC # BLD: 4.33 K/UL — SIGNIFICANT CHANGE UP (ref 3.8–10.5)
WBC # BLD: 4.33 K/UL — SIGNIFICANT CHANGE UP (ref 3.8–10.5)
WBC # FLD AUTO: 4.33 K/UL — SIGNIFICANT CHANGE UP (ref 3.8–10.5)
WBC # FLD AUTO: 4.33 K/UL — SIGNIFICANT CHANGE UP (ref 3.8–10.5)

## 2023-12-18 NOTE — H&P PST ADULT - PROBLEM SELECTOR PLAN 3
Pt had elevated blood pressure reading at PST today.  Requesting cardiologist evaluation.  Pt has cardiology appointment on 12/19/2023.  Patient instructed to take amlodipine , hydralazine with a sip of water on the morning of procedure.

## 2023-12-18 NOTE — H&P PST ADULT - OTHER CARE PROVIDERS
Dr Benja Raymundo -Cardiologist [phone] Dr Benja Raymundo -Cardiologist [phone] Dr Benja Daniel -Cardiologist [phone] Dr Benja Daniel -Cardiologist [phone]

## 2023-12-18 NOTE — H&P PST ADULT - NSICDXPASTMEDICALHX_GEN_ALL_CORE_FT
PAST MEDICAL HISTORY:  Atherosclerosis     CAD (coronary artery disease)     CKD (chronic kidney disease)     Cyst of pancreas     DM (diabetes mellitus)     H/O CHF     HLD (hyperlipidemia)     HTN (hypertension)     HTN (hypertension)     Hyperkalemia     Stage 4 chronic kidney disease

## 2023-12-18 NOTE — H&P PST ADULT - PROBLEM SELECTOR PLAN 1
Patient tentatively scheduled for endoscopic ultrasound on 12/20/2023  Pre-op instructions provided. Pt given verbal and written instructions with teach back on pepcid. Pt verbalized understanding with return demonstration.  Labs done.

## 2023-12-18 NOTE — H&P PST ADULT - MUSCULOSKELETAL
details… no calf tenderness/strength 5/5 bilateral upper extremities/strength 5/5 bilateral lower extremities/extremities exam

## 2023-12-18 NOTE — H&P PST ADULT - HISTORY OF PRESENT ILLNESS
88 year old female with pre op dx of PMH CHF, HTN, HLD, CAD, CKD, Prediabetes presents to PST with pre op dx of cyst of pancreas is scheduled for endoscopic ultrasound .

## 2023-12-18 NOTE — H&P PST ADULT - FUNCTIONAL STATUS
Mets Dasi score 4.06,  Walks 1 to 2 blocks, climbs 1 flight of stairs, ADLs , carrying groceries/4-10 METS

## 2023-12-18 NOTE — H&P PST ADULT - PROBLEM SELECTOR PLAN 5
Patient advised to follow cardiologist instruction for Eliquis plan pre op procedure.  Pt says her last dose of Eliquis was 12/16/2023  CLAUDIO Horan notified.  Pt has cardiology appointment on 12/19/2023.  Requesting cardiologist evaluation.

## 2023-12-18 NOTE — H&P PST ADULT - PROBLEM SELECTOR PLAN 2
Patient advised to follow cardiologist instruction for Clopidogrel plan pre op procedure.  Pt says her last dose of Clopidogrel was 12/16/2023.  CLAUDIO Horan notified.  Pt has cardiology appointment on 12/19/2023.  Requesting cardiologist evaluation.

## 2023-12-18 NOTE — H&P PST ADULT - GASTROINTESTINAL COMMENTS
Pre op dx- Cyst of pancreas Pt c/o unintentional weight loss of more than 25 Lbs. CT scan showed 2.4 cm cystic lesion in the pancreatic tail suspicious for pancreatic cancer.

## 2023-12-19 ENCOUNTER — APPOINTMENT (OUTPATIENT)
Dept: CARDIOLOGY | Facility: CLINIC | Age: 88
End: 2023-12-19
Payer: MEDICARE

## 2023-12-19 ENCOUNTER — NON-APPOINTMENT (OUTPATIENT)
Age: 88
End: 2023-12-19

## 2023-12-19 VITALS
DIASTOLIC BLOOD PRESSURE: 70 MMHG | SYSTOLIC BLOOD PRESSURE: 134 MMHG | BODY MASS INDEX: 21.36 KG/M2 | HEART RATE: 78 BPM | WEIGHT: 99 LBS | HEIGHT: 57 IN

## 2023-12-19 VITALS — HEART RATE: 64 BPM

## 2023-12-19 PROCEDURE — 99215 OFFICE O/P EST HI 40 MIN: CPT | Mod: 25

## 2023-12-19 PROCEDURE — 93000 ELECTROCARDIOGRAM COMPLETE: CPT

## 2023-12-19 NOTE — ASSESSMENT
[FreeTextEntry1] : In summary, the patient is an 88-year-old woman in need of an EUS.  She has had no cardiac events for approximately the past 2-1/2 years.  She has no symptoms of same.  Her most recent echocardiogram done earlier this year revealed normal left ventricular systolic function.  There is no absolute cardiac contraindication to the proposed procedure.  Given however the presence of drug-eluting stents she should go back on either aspirin or Plavix as soon as possible.  Given no recent clinical  atrial fibrillation the Eliquis if needed could be deferred somewhat  longer than the aspirin and Plavix.,  Rationale behind antiplatelet drugs and anticoagulant drugs discussed with the patient and her niece

## 2023-12-19 NOTE — REASON FOR VISIT
[Other: ____] : [unfilled] [FreeTextEntry1] : Patient presents for cardiac clearance for EUS.  The procedure is to be done tomorrow December 20.  The patient has had no cardiac events since her event of 2021 when she was in cardiogenic shock due to an acute myocardial infarction.  She had 3 stents placed to his circumflex artery at that time.  She was also in atrial fibrillation at that time and was cardioverted.  She has had no clinical atrial fibrillation since that time.  The patient underwent echocardiography in May 2023 which revealed normal left ventricular systolic function and no evidence of significant valvular stenosis or insufficiency.  She denies chest discomfort shortness of breath palpitations dizziness or syncope.  She was advised by a physician other than myself to stop her Plavix and Eliquis as of 48 hours ago.

## 2023-12-19 NOTE — ASU PATIENT PROFILE, ADULT - FALL HARM RISK - UNIVERSAL INTERVENTIONS
Bed in lowest position, wheels locked, appropriate side rails in place/Call bell, personal items and telephone in reach/Instruct patient to call for assistance before getting out of bed or chair/Non-slip footwear when patient is out of bed/[place] to call system/Physically safe environment - no spills, clutter or unnecessary equipment/Purposeful Proactive Rounding/Room/bathroom lighting operational, light cord in reach Bed in lowest position, wheels locked, appropriate side rails in place/Call bell, personal items and telephone in reach/Instruct patient to call for assistance before getting out of bed or chair/Non-slip footwear when patient is out of bed/[place] to call system/Physically safe environment - no spills, clutter or unnecessary equipment/Purposeful Proactive Rounding/Room/bathroom lighting operational, light cord in reach

## 2023-12-20 ENCOUNTER — RESULT REVIEW (OUTPATIENT)
Age: 88
End: 2023-12-20

## 2023-12-20 ENCOUNTER — APPOINTMENT (OUTPATIENT)
Dept: GASTROENTEROLOGY | Facility: HOSPITAL | Age: 88
End: 2023-12-20

## 2023-12-20 ENCOUNTER — OUTPATIENT (OUTPATIENT)
Dept: OUTPATIENT SERVICES | Facility: HOSPITAL | Age: 88
LOS: 1 days | Discharge: HOME | End: 2023-12-20
Payer: MEDICARE

## 2023-12-20 VITALS
HEART RATE: 65 BPM | RESPIRATION RATE: 21 BRPM | OXYGEN SATURATION: 97 % | SYSTOLIC BLOOD PRESSURE: 120 MMHG | DIASTOLIC BLOOD PRESSURE: 35 MMHG

## 2023-12-20 VITALS
RESPIRATION RATE: 17 BRPM | WEIGHT: 99.43 LBS | OXYGEN SATURATION: 100 % | DIASTOLIC BLOOD PRESSURE: 45 MMHG | HEIGHT: 59 IN | SYSTOLIC BLOOD PRESSURE: 136 MMHG | HEART RATE: 67 BPM | TEMPERATURE: 97 F

## 2023-12-20 DIAGNOSIS — K86.2 CYST OF PANCREAS: ICD-10-CM

## 2023-12-20 DIAGNOSIS — Z90.710 ACQUIRED ABSENCE OF BOTH CERVIX AND UTERUS: Chronic | ICD-10-CM

## 2023-12-20 PROCEDURE — 88342 IMHCHEM/IMCYTCHM 1ST ANTB: CPT | Mod: 26

## 2023-12-20 PROCEDURE — 88305 TISSUE EXAM BY PATHOLOGIST: CPT | Mod: 26

## 2023-12-20 PROCEDURE — 88312 SPECIAL STAINS GROUP 1: CPT | Mod: 26

## 2023-12-20 PROCEDURE — 43239 EGD BIOPSY SINGLE/MULTIPLE: CPT

## 2023-12-20 PROCEDURE — 88341 IMHCHEM/IMCYTCHM EA ADD ANTB: CPT | Mod: 26

## 2023-12-20 RX ORDER — SODIUM CHLORIDE 9 MG/ML
500 INJECTION, SOLUTION INTRAVENOUS
Refills: 0 | Status: DISCONTINUED | OUTPATIENT
Start: 2023-12-20 | End: 2024-01-03

## 2023-12-20 NOTE — PRE PROCEDURE NOTE - PRE PROCEDURE EVALUATION
Attending Physician:  Dr. Escudero    Procedure: EUS    Indication for Procedure: Panc Cyst  ________________________________________________________  PAST MEDICAL & SURGICAL HISTORY:  HTN (hypertension)      HLD (hyperlipidemia)      DM (diabetes mellitus)      Hyperkalemia      CKD (chronic kidney disease)      Atherosclerosis      HTN (hypertension)      CAD (coronary artery disease)      Stage 4 chronic kidney disease      Cyst of pancreas      H/O CHF      S/P hysterectomy        ALLERGIES:  No Known Allergies    HOME MEDICATIONS:  amLODIPine 2.5 mg oral tablet: 1 tab(s) orally once a day  clopidogrel 75 mg oral tablet: 1 tab(s) orally once a day  Eliquis 2.5 mg oral tablet: 1 tab(s) orally 2 times a day  Entresto 24 mg-26 mg oral tablet: 1 tab(s) orally 2 times a day  hydrALAZINE 50 mg oral tablet: 1 tab(s) orally 2 times a day    AICD/PPM: [ ] yes   [ ] no    PERTINENT LAB DATA:                        10.3   4.33  )-----------( 269      ( 18 Dec 2023 14:41 )             35.0     12-18    141  |  105  |  10  ----------------------------<  106<H>  4.2   |  27  |  0.85    Ca    8.7      18 Dec 2023 14:41    TPro  5.6<L>  /  Alb  2.8<L>  /  TBili  0.3  /  DBili  x   /  AST  19  /  ALT  11  /  AlkPhos  52  12-18                PHYSICAL EXAMINATION:    Height (cm): 149.9  Weight (kg): 45.087  BMI (kg/m2): 20.1  BSA (m2): 1.37T(C): 36.2  HR: 67  BP: 136/45  RR: 17  SpO2: 100%    Constitutional: NAD  HEENT: PERRLA, EOMI,    Neck:  No JVD  Respiratory: CTAB/L  Cardiovascular: S1 and S2  Gastrointestinal: BS+, soft, NT/ND  Extremities: No peripheral edema  Neurological: A/O x 3, no focal deficits  Psychiatric: Normal mood, normal affect  Skin: No rashes    ASA Class: I [ ]  II [ ]  III [x ]  IV [ ]    COMMENTS:    The patient is a suitable candidate for the planned procedure unless box checked [ ]  No, explain:    Risks and alternatives to the procedure discussed in detail. All questions answered.

## 2023-12-26 PROBLEM — K86.2 CYST OF PANCREAS: Chronic | Status: ACTIVE | Noted: 2023-12-18

## 2023-12-26 PROBLEM — Z86.79 PERSONAL HISTORY OF OTHER DISEASES OF THE CIRCULATORY SYSTEM: Chronic | Status: ACTIVE | Noted: 2023-12-18

## 2024-01-01 ENCOUNTER — INPATIENT (INPATIENT)
Facility: HOSPITAL | Age: 89
LOS: 1 days | DRG: 948 | End: 2024-09-22
Attending: INTERNAL MEDICINE | Admitting: FAMILY MEDICINE
Payer: COMMERCIAL

## 2024-01-01 VITALS
WEIGHT: 95.02 LBS | TEMPERATURE: 99 F | SYSTOLIC BLOOD PRESSURE: 74 MMHG | DIASTOLIC BLOOD PRESSURE: 61 MMHG | HEIGHT: 57 IN | HEART RATE: 76 BPM | OXYGEN SATURATION: 93 % | RESPIRATION RATE: 18 BRPM

## 2024-01-01 DIAGNOSIS — R09.89 OTHER SPECIFIED SYMPTOMS AND SIGNS INVOLVING THE CIRCULATORY AND RESPIRATORY SYSTEMS: ICD-10-CM

## 2024-01-01 DIAGNOSIS — Z90.710 ACQUIRED ABSENCE OF BOTH CERVIX AND UTERUS: Chronic | ICD-10-CM

## 2024-01-01 DIAGNOSIS — I46.9 CARDIAC ARREST, CAUSE UNSPECIFIED: ICD-10-CM

## 2024-01-01 DIAGNOSIS — Z92.89 PERSONAL HISTORY OF OTHER MEDICAL TREATMENT: Chronic | ICD-10-CM

## 2024-01-01 DIAGNOSIS — Z98.890 OTHER SPECIFIED POSTPROCEDURAL STATES: Chronic | ICD-10-CM

## 2024-01-01 DIAGNOSIS — R53.1 WEAKNESS: ICD-10-CM

## 2024-01-01 DIAGNOSIS — K86.2 CYST OF PANCREAS: Chronic | ICD-10-CM

## 2024-01-01 LAB
A1C WITH ESTIMATED AVERAGE GLUCOSE RESULT: 6 % — HIGH (ref 4–5.6)
ALBUMIN FLD-MCNC: 1.7 G/DL — SIGNIFICANT CHANGE UP
ALBUMIN SERPL ELPH-MCNC: 1.4 G/DL — LOW (ref 3.3–5)
ALBUMIN SERPL ELPH-MCNC: 1.5 G/DL — LOW (ref 3.3–5)
ALBUMIN SERPL ELPH-MCNC: 1.5 G/DL — LOW (ref 3.3–5)
ALBUMIN SERPL ELPH-MCNC: 1.6 G/DL — LOW (ref 3.3–5)
ALBUMIN SERPL ELPH-MCNC: 1.8 G/DL — LOW (ref 3.3–5)
ALBUMIN SERPL ELPH-MCNC: 2 G/DL — LOW (ref 3.3–5)
ALBUMIN SERPL ELPH-MCNC: 2.4 G/DL — LOW (ref 3.3–5)
ALBUMIN SERPL ELPH-MCNC: 2.7 G/DL — LOW (ref 3.3–5)
ALBUMIN SERPL ELPH-MCNC: 2.8 G/DL — LOW (ref 3.3–5)
ALP SERPL-CCNC: 110 U/L — SIGNIFICANT CHANGE UP (ref 40–120)
ALP SERPL-CCNC: 111 U/L — SIGNIFICANT CHANGE UP (ref 40–120)
ALP SERPL-CCNC: 114 U/L — SIGNIFICANT CHANGE UP (ref 40–120)
ALP SERPL-CCNC: 120 U/L — SIGNIFICANT CHANGE UP (ref 40–120)
ALP SERPL-CCNC: 148 U/L — HIGH (ref 40–120)
ALP SERPL-CCNC: 148 U/L — HIGH (ref 40–120)
ALP SERPL-CCNC: 150 U/L — HIGH (ref 40–120)
ALP SERPL-CCNC: 169 U/L — HIGH (ref 40–120)
ALP SERPL-CCNC: 173 U/L — HIGH (ref 40–120)
ALT FLD-CCNC: 82 U/L — HIGH (ref 10–45)
ALT FLD-CCNC: 86 U/L — HIGH (ref 10–45)
ALT FLD-CCNC: 87 U/L — HIGH (ref 10–45)
ALT FLD-CCNC: 88 U/L — HIGH (ref 10–45)
ALT FLD-CCNC: 88 U/L — HIGH (ref 10–45)
ALT FLD-CCNC: 89 U/L — HIGH (ref 10–45)
ALT FLD-CCNC: 92 U/L — HIGH (ref 10–45)
ANION GAP SERPL CALC-SCNC: 11 MMOL/L — SIGNIFICANT CHANGE UP (ref 5–17)
ANION GAP SERPL CALC-SCNC: 14 MMOL/L — SIGNIFICANT CHANGE UP (ref 5–17)
ANION GAP SERPL CALC-SCNC: 16 MMOL/L — SIGNIFICANT CHANGE UP (ref 5–17)
ANION GAP SERPL CALC-SCNC: 16 MMOL/L — SIGNIFICANT CHANGE UP (ref 5–17)
ANION GAP SERPL CALC-SCNC: 18 MMOL/L — HIGH (ref 5–17)
ANION GAP SERPL CALC-SCNC: 20 MMOL/L — HIGH (ref 5–17)
ANION GAP SERPL CALC-SCNC: 24 MMOL/L — HIGH (ref 5–17)
ANION GAP SERPL CALC-SCNC: 27 MMOL/L — HIGH (ref 5–17)
ANION GAP SERPL CALC-SCNC: 27 MMOL/L — HIGH (ref 5–17)
APPEARANCE UR: ABNORMAL
APPEARANCE UR: CLEAR — SIGNIFICANT CHANGE UP
APTT BLD: 27.3 SEC — SIGNIFICANT CHANGE UP (ref 24.5–35.6)
APTT BLD: 36 SEC — HIGH (ref 24.5–35.6)
APTT BLD: 37.3 SEC — HIGH (ref 24.5–35.6)
AST SERPL-CCNC: 119 U/L — HIGH (ref 10–40)
AST SERPL-CCNC: 139 U/L — HIGH (ref 10–40)
AST SERPL-CCNC: 151 U/L — HIGH (ref 10–40)
AST SERPL-CCNC: 45 U/L — HIGH (ref 10–40)
AST SERPL-CCNC: 50 U/L — HIGH (ref 10–40)
AST SERPL-CCNC: 54 U/L — HIGH (ref 10–40)
AST SERPL-CCNC: 88 U/L — HIGH (ref 10–40)
AST SERPL-CCNC: 93 U/L — HIGH (ref 10–40)
AST SERPL-CCNC: 93 U/L — HIGH (ref 10–40)
B PERT IGG+IGM PNL SER: ABNORMAL
BACTERIA # UR AUTO: ABNORMAL /HPF
BACTERIA # UR AUTO: ABNORMAL /HPF
BASE EXCESS BLDA CALC-SCNC: -10.3 MMOL/L — LOW (ref -2–3)
BASE EXCESS BLDA CALC-SCNC: -11.7 MMOL/L — LOW (ref -2–3)
BASE EXCESS BLDA CALC-SCNC: -12.7 MMOL/L — LOW (ref -2–3)
BASE EXCESS BLDA CALC-SCNC: -17.5 MMOL/L — LOW (ref -2–3)
BASE EXCESS BLDA CALC-SCNC: -18.6 MMOL/L — LOW (ref -2–3)
BASE EXCESS BLDA CALC-SCNC: -6.9 MMOL/L — LOW (ref -2–3)
BASE EXCESS BLDV CALC-SCNC: -6.8 MMOL/L — LOW (ref -2–3)
BASE EXCESS BLDV CALC-SCNC: -6.9 MMOL/L — LOW (ref -2–3)
BASOPHILS # BLD AUTO: 0.02 K/UL — SIGNIFICANT CHANGE UP (ref 0–0.2)
BASOPHILS # BLD AUTO: 0.04 K/UL — SIGNIFICANT CHANGE UP (ref 0–0.2)
BASOPHILS NFR BLD AUTO: 0.1 % — SIGNIFICANT CHANGE UP (ref 0–2)
BASOPHILS NFR BLD AUTO: 0.2 % — SIGNIFICANT CHANGE UP (ref 0–2)
BILIRUB SERPL-MCNC: 0.7 MG/DL — SIGNIFICANT CHANGE UP (ref 0.2–1.2)
BILIRUB SERPL-MCNC: 0.8 MG/DL — SIGNIFICANT CHANGE UP (ref 0.2–1.2)
BILIRUB SERPL-MCNC: 0.8 MG/DL — SIGNIFICANT CHANGE UP (ref 0.2–1.2)
BILIRUB SERPL-MCNC: 0.9 MG/DL — SIGNIFICANT CHANGE UP (ref 0.2–1.2)
BILIRUB SERPL-MCNC: 0.9 MG/DL — SIGNIFICANT CHANGE UP (ref 0.2–1.2)
BILIRUB SERPL-MCNC: 1.1 MG/DL — SIGNIFICANT CHANGE UP (ref 0.2–1.2)
BILIRUB SERPL-MCNC: 1.2 MG/DL — SIGNIFICANT CHANGE UP (ref 0.2–1.2)
BILIRUB SERPL-MCNC: 1.6 MG/DL — HIGH (ref 0.2–1.2)
BILIRUB SERPL-MCNC: 1.9 MG/DL — HIGH (ref 0.2–1.2)
BILIRUB UR-MCNC: NEGATIVE — SIGNIFICANT CHANGE UP
BILIRUB UR-MCNC: NEGATIVE — SIGNIFICANT CHANGE UP
BLD GP AB SCN SERPL QL: SIGNIFICANT CHANGE UP
BLOOD GAS COMMENTS ARTERIAL: SIGNIFICANT CHANGE UP
BUN SERPL-MCNC: 55 MG/DL — HIGH (ref 7–23)
BUN SERPL-MCNC: 63 MG/DL — HIGH (ref 7–23)
BUN SERPL-MCNC: 64 MG/DL — HIGH (ref 7–23)
BUN SERPL-MCNC: 69 MG/DL — HIGH (ref 7–23)
BUN SERPL-MCNC: 71 MG/DL — HIGH (ref 7–23)
BUN SERPL-MCNC: 72 MG/DL — HIGH (ref 7–23)
BUN SERPL-MCNC: 73 MG/DL — HIGH (ref 7–23)
BUN SERPL-MCNC: 73 MG/DL — HIGH (ref 7–23)
BUN SERPL-MCNC: 81 MG/DL — HIGH (ref 7–23)
CALCIUM SERPL-MCNC: 7 MG/DL — LOW (ref 8.4–10.5)
CALCIUM SERPL-MCNC: 7.6 MG/DL — LOW (ref 8.4–10.5)
CALCIUM SERPL-MCNC: 7.8 MG/DL — LOW (ref 8.4–10.5)
CALCIUM SERPL-MCNC: 8.3 MG/DL — LOW (ref 8.4–10.5)
CALCIUM SERPL-MCNC: 8.5 MG/DL — SIGNIFICANT CHANGE UP (ref 8.4–10.5)
CALCIUM SERPL-MCNC: 8.6 MG/DL — SIGNIFICANT CHANGE UP (ref 8.4–10.5)
CALCIUM SERPL-MCNC: 8.8 MG/DL — SIGNIFICANT CHANGE UP (ref 8.4–10.5)
CALCIUM SERPL-MCNC: 8.9 MG/DL — SIGNIFICANT CHANGE UP (ref 8.4–10.5)
CALCIUM SERPL-MCNC: 9.3 MG/DL — SIGNIFICANT CHANGE UP (ref 8.4–10.5)
CHLORIDE SERPL-SCNC: 107 MMOL/L — SIGNIFICANT CHANGE UP (ref 96–108)
CHLORIDE SERPL-SCNC: 107 MMOL/L — SIGNIFICANT CHANGE UP (ref 96–108)
CHLORIDE SERPL-SCNC: 109 MMOL/L — HIGH (ref 96–108)
CHLORIDE SERPL-SCNC: 111 MMOL/L — HIGH (ref 96–108)
CHLORIDE SERPL-SCNC: 111 MMOL/L — HIGH (ref 96–108)
CHLORIDE SERPL-SCNC: 113 MMOL/L — HIGH (ref 96–108)
CHLORIDE SERPL-SCNC: 99 MMOL/L — SIGNIFICANT CHANGE UP (ref 96–108)
CHOLEST SERPL-MCNC: 86 MG/DL — SIGNIFICANT CHANGE UP
CO2 BLDA-SCNC: 10 MMOL/L — CRITICAL LOW (ref 19–24)
CO2 BLDA-SCNC: 14 MMOL/L — LOW (ref 19–24)
CO2 BLDA-SCNC: 16 MMOL/L — LOW (ref 19–24)
CO2 BLDA-SCNC: 16 MMOL/L — LOW (ref 19–24)
CO2 BLDA-SCNC: 18 MMOL/L — LOW (ref 19–24)
CO2 BLDA-SCNC: 22 MMOL/L — SIGNIFICANT CHANGE UP (ref 19–24)
CO2 BLDV-SCNC: 21 MMOL/L — LOW (ref 22–26)
CO2 BLDV-SCNC: 22 MMOL/L — SIGNIFICANT CHANGE UP (ref 22–26)
CO2 SERPL-SCNC: 17 MMOL/L — LOW (ref 22–31)
CO2 SERPL-SCNC: 19 MMOL/L — LOW (ref 22–31)
CO2 SERPL-SCNC: 22 MMOL/L — SIGNIFICANT CHANGE UP (ref 22–31)
CO2 SERPL-SCNC: 23 MMOL/L — SIGNIFICANT CHANGE UP (ref 22–31)
CO2 SERPL-SCNC: 25 MMOL/L — SIGNIFICANT CHANGE UP (ref 22–31)
COLOR FLD: YELLOW — SIGNIFICANT CHANGE UP
COLOR SPEC: YELLOW — SIGNIFICANT CHANGE UP
COLOR SPEC: YELLOW — SIGNIFICANT CHANGE UP
CREAT SERPL-MCNC: 1.95 MG/DL — HIGH (ref 0.5–1.3)
CREAT SERPL-MCNC: 1.95 MG/DL — HIGH (ref 0.5–1.3)
CREAT SERPL-MCNC: 2.06 MG/DL — HIGH (ref 0.5–1.3)
CREAT SERPL-MCNC: 2.1 MG/DL — HIGH (ref 0.5–1.3)
CREAT SERPL-MCNC: 2.11 MG/DL — HIGH (ref 0.5–1.3)
CREAT SERPL-MCNC: 2.19 MG/DL — HIGH (ref 0.5–1.3)
CREAT SERPL-MCNC: 2.21 MG/DL — HIGH (ref 0.5–1.3)
CREAT SERPL-MCNC: 2.23 MG/DL — HIGH (ref 0.5–1.3)
CREAT SERPL-MCNC: 2.24 MG/DL — HIGH (ref 0.5–1.3)
CULTURE RESULTS: NO GROWTH — SIGNIFICANT CHANGE UP
CULTURE RESULTS: SIGNIFICANT CHANGE UP
D DIMER BLD IA.RAPID-MCNC: 662 NG/ML DDU — HIGH
D DIMER BLD IA.RAPID-MCNC: 9034 NG/ML DDU — HIGH
DIFF PNL FLD: ABNORMAL
DIFF PNL FLD: NEGATIVE — SIGNIFICANT CHANGE UP
EGFR: 21 ML/MIN/1.73M2 — LOW
EGFR: 22 ML/MIN/1.73M2 — LOW
EGFR: 22 ML/MIN/1.73M2 — LOW
EGFR: 23 ML/MIN/1.73M2 — LOW
EGFR: 24 ML/MIN/1.73M2 — LOW
EGFR: 24 ML/MIN/1.73M2 — LOW
EOSINOPHIL # BLD AUTO: 0 K/UL — SIGNIFICANT CHANGE UP (ref 0–0.5)
EOSINOPHIL # BLD AUTO: 0 K/UL — SIGNIFICANT CHANGE UP (ref 0–0.5)
EOSINOPHIL NFR BLD AUTO: 0 % — SIGNIFICANT CHANGE UP (ref 0–6)
EOSINOPHIL NFR BLD AUTO: 0 % — SIGNIFICANT CHANGE UP (ref 0–6)
EPI CELLS # UR: 1 — SIGNIFICANT CHANGE UP
EPI CELLS # UR: 3 — SIGNIFICANT CHANGE UP
ESTIMATED AVERAGE GLUCOSE: 126 MG/DL — HIGH (ref 68–114)
FLUAV AG NPH QL: SIGNIFICANT CHANGE UP
FLUBV AG NPH QL: SIGNIFICANT CHANGE UP
FLUID INTAKE SUBSTANCE CLASS: SIGNIFICANT CHANGE UP
GAS PNL BLDA: SIGNIFICANT CHANGE UP
GAS PNL BLDV: SIGNIFICANT CHANGE UP
GAS PNL BLDV: SIGNIFICANT CHANGE UP
GLUCOSE BLDC GLUCOMTR-MCNC: 162 MG/DL — HIGH (ref 70–99)
GLUCOSE BLDC GLUCOMTR-MCNC: 162 MG/DL — HIGH (ref 70–99)
GLUCOSE BLDC GLUCOMTR-MCNC: 177 MG/DL — HIGH (ref 70–99)
GLUCOSE BLDC GLUCOMTR-MCNC: 255 MG/DL — HIGH (ref 70–99)
GLUCOSE BLDC GLUCOMTR-MCNC: 258 MG/DL — HIGH (ref 70–99)
GLUCOSE BLDC GLUCOMTR-MCNC: 279 MG/DL — HIGH (ref 70–99)
GLUCOSE FLD-MCNC: 127 MG/DL — SIGNIFICANT CHANGE UP
GLUCOSE SERPL-MCNC: 108 MG/DL — HIGH (ref 70–99)
GLUCOSE SERPL-MCNC: 129 MG/DL — HIGH (ref 70–99)
GLUCOSE SERPL-MCNC: 157 MG/DL — HIGH (ref 70–99)
GLUCOSE SERPL-MCNC: 209 MG/DL — HIGH (ref 70–99)
GLUCOSE SERPL-MCNC: 242 MG/DL — HIGH (ref 70–99)
GLUCOSE SERPL-MCNC: 266 MG/DL — HIGH (ref 70–99)
GLUCOSE SERPL-MCNC: 279 MG/DL — HIGH (ref 70–99)
GLUCOSE SERPL-MCNC: 285 MG/DL — HIGH (ref 70–99)
GLUCOSE SERPL-MCNC: 93 MG/DL — SIGNIFICANT CHANGE UP (ref 70–99)
GLUCOSE UR QL: NEGATIVE MG/DL — SIGNIFICANT CHANGE UP
GLUCOSE UR QL: NEGATIVE MG/DL — SIGNIFICANT CHANGE UP
GRAM STN FLD: ABNORMAL
GRAM STN FLD: SIGNIFICANT CHANGE UP
HCO3 BLDA-SCNC: 13 MMOL/L — LOW (ref 21–28)
HCO3 BLDA-SCNC: 14 MMOL/L — LOW (ref 21–28)
HCO3 BLDA-SCNC: 15 MMOL/L — LOW (ref 21–28)
HCO3 BLDA-SCNC: 16 MMOL/L — LOW (ref 21–28)
HCO3 BLDA-SCNC: 20 MMOL/L — LOW (ref 21–28)
HCO3 BLDA-SCNC: 9 MMOL/L — CRITICAL LOW (ref 21–28)
HCO3 BLDV-SCNC: 20 MMOL/L — LOW (ref 22–29)
HCO3 BLDV-SCNC: 21 MMOL/L — LOW (ref 22–29)
HCT VFR BLD CALC: 30.3 % — LOW (ref 34.5–45)
HCT VFR BLD CALC: 34.1 % — LOW (ref 34.5–45)
HCT VFR BLD CALC: 35 % — SIGNIFICANT CHANGE UP (ref 34.5–45)
HCT VFR BLD CALC: 35.4 % — SIGNIFICANT CHANGE UP (ref 34.5–45)
HCT VFR BLD CALC: 36.1 % — SIGNIFICANT CHANGE UP (ref 34.5–45)
HCT VFR BLD CALC: 37.5 % — SIGNIFICANT CHANGE UP (ref 34.5–45)
HCT VFR BLD CALC: 41.1 % — SIGNIFICANT CHANGE UP (ref 34.5–45)
HDLC SERPL-MCNC: 42 MG/DL — LOW
HGB BLD-MCNC: 10.1 G/DL — LOW (ref 11.5–15.5)
HGB BLD-MCNC: 10.3 G/DL — LOW (ref 11.5–15.5)
HGB BLD-MCNC: 11 G/DL — LOW (ref 11.5–15.5)
HGB BLD-MCNC: 11 G/DL — LOW (ref 11.5–15.5)
HGB BLD-MCNC: 11.6 G/DL — SIGNIFICANT CHANGE UP (ref 11.5–15.5)
HGB BLD-MCNC: 12.4 G/DL — SIGNIFICANT CHANGE UP (ref 11.5–15.5)
HGB BLD-MCNC: 9.1 G/DL — LOW (ref 11.5–15.5)
HOROWITZ INDEX BLDA+IHG-RTO: 100 — SIGNIFICANT CHANGE UP
HOROWITZ INDEX BLDA+IHG-RTO: 32 — SIGNIFICANT CHANGE UP
HOROWITZ INDEX BLDV+IHG-RTO: 100 — SIGNIFICANT CHANGE UP
IMM GRANULOCYTES NFR BLD AUTO: 0.8 % — SIGNIFICANT CHANGE UP (ref 0–0.9)
IMM GRANULOCYTES NFR BLD AUTO: 0.9 % — SIGNIFICANT CHANGE UP (ref 0–0.9)
INR BLD: 1.66 RATIO — HIGH (ref 0.85–1.18)
INR BLD: 2.75 RATIO — HIGH (ref 0.85–1.18)
INR BLD: 5.59 RATIO — CRITICAL HIGH (ref 0.85–1.18)
KETONES UR-MCNC: ABNORMAL MG/DL
KETONES UR-MCNC: NEGATIVE MG/DL — SIGNIFICANT CHANGE UP
LACTATE SERPL-SCNC: 11.6 MMOL/L — CRITICAL HIGH (ref 0.7–2)
LACTATE SERPL-SCNC: 2.1 MMOL/L — HIGH (ref 0.7–2)
LACTATE SERPL-SCNC: 2.5 MMOL/L — HIGH (ref 0.7–2)
LACTATE SERPL-SCNC: 21.5 MMOL/L — CRITICAL HIGH (ref 0.7–2)
LACTATE SERPL-SCNC: 4.4 MMOL/L — CRITICAL HIGH (ref 0.7–2)
LACTATE SERPL-SCNC: 8.6 MMOL/L — CRITICAL HIGH (ref 0.7–2)
LACTATE SERPL-SCNC: 9 MMOL/L — CRITICAL HIGH (ref 0.7–2)
LDH SERPL L TO P-CCNC: 196 U/L — SIGNIFICANT CHANGE UP
LDH SERPL L TO P-CCNC: 329 U/L — HIGH (ref 50–242)
LEGIONELLA AG UR QL: NEGATIVE — SIGNIFICANT CHANGE UP
LEUKOCYTE ESTERASE UR-ACNC: ABNORMAL
LEUKOCYTE ESTERASE UR-ACNC: NEGATIVE — SIGNIFICANT CHANGE UP
LIPID PNL WITH DIRECT LDL SERPL: 30 MG/DL — SIGNIFICANT CHANGE UP
LYMPHOCYTES # BLD AUTO: 0.38 K/UL — LOW (ref 1–3.3)
LYMPHOCYTES # BLD AUTO: 0.49 K/UL — LOW (ref 1–3.3)
LYMPHOCYTES # BLD AUTO: 2.1 % — LOW (ref 13–44)
LYMPHOCYTES # BLD AUTO: 2.2 % — LOW (ref 13–44)
LYMPHOCYTES # FLD: 11 % — SIGNIFICANT CHANGE UP
MAGNESIUM SERPL-MCNC: 1.7 MG/DL — SIGNIFICANT CHANGE UP (ref 1.6–2.6)
MAGNESIUM SERPL-MCNC: 1.9 MG/DL — SIGNIFICANT CHANGE UP (ref 1.6–2.6)
MAGNESIUM SERPL-MCNC: 1.9 MG/DL — SIGNIFICANT CHANGE UP (ref 1.6–2.6)
MAGNESIUM SERPL-MCNC: 2 MG/DL — SIGNIFICANT CHANGE UP (ref 1.6–2.6)
MAGNESIUM SERPL-MCNC: 2.1 MG/DL — SIGNIFICANT CHANGE UP (ref 1.6–2.6)
MAGNESIUM SERPL-MCNC: 2.4 MG/DL — SIGNIFICANT CHANGE UP (ref 1.6–2.6)
MAGNESIUM SERPL-MCNC: 2.4 MG/DL — SIGNIFICANT CHANGE UP (ref 1.6–2.6)
MAGNESIUM SERPL-MCNC: 2.8 MG/DL — HIGH (ref 1.6–2.6)
MCHC RBC-ENTMCNC: 26.6 PG — LOW (ref 27–34)
MCHC RBC-ENTMCNC: 26.6 PG — LOW (ref 27–34)
MCHC RBC-ENTMCNC: 26.9 PG — LOW (ref 27–34)
MCHC RBC-ENTMCNC: 27 PG — SIGNIFICANT CHANGE UP (ref 27–34)
MCHC RBC-ENTMCNC: 27.3 PG — SIGNIFICANT CHANGE UP (ref 27–34)
MCHC RBC-ENTMCNC: 27.6 PG — SIGNIFICANT CHANGE UP (ref 27–34)
MCHC RBC-ENTMCNC: 27.7 PG — SIGNIFICANT CHANGE UP (ref 27–34)
MCHC RBC-ENTMCNC: 28.5 GM/DL — LOW (ref 32–36)
MCHC RBC-ENTMCNC: 30 GM/DL — LOW (ref 32–36)
MCHC RBC-ENTMCNC: 30.2 GM/DL — LOW (ref 32–36)
MCHC RBC-ENTMCNC: 30.2 GM/DL — LOW (ref 32–36)
MCHC RBC-ENTMCNC: 30.5 GM/DL — LOW (ref 32–36)
MCHC RBC-ENTMCNC: 30.9 GM/DL — LOW (ref 32–36)
MCHC RBC-ENTMCNC: 31.4 GM/DL — LOW (ref 32–36)
MCV RBC AUTO: 87 FL — SIGNIFICANT CHANGE UP (ref 80–100)
MCV RBC AUTO: 87.9 FL — SIGNIFICANT CHANGE UP (ref 80–100)
MCV RBC AUTO: 88 FL — SIGNIFICANT CHANGE UP (ref 80–100)
MCV RBC AUTO: 89.5 FL — SIGNIFICANT CHANGE UP (ref 80–100)
MCV RBC AUTO: 90.9 FL — SIGNIFICANT CHANGE UP (ref 80–100)
MCV RBC AUTO: 91 FL — SIGNIFICANT CHANGE UP (ref 80–100)
MCV RBC AUTO: 93.4 FL — SIGNIFICANT CHANGE UP (ref 80–100)
MESOTHL CELL # FLD: 2 % — SIGNIFICANT CHANGE UP
METHOD TYPE: SIGNIFICANT CHANGE UP
MONOCYTES # BLD AUTO: 0.82 K/UL — SIGNIFICANT CHANGE UP (ref 0–0.9)
MONOCYTES # BLD AUTO: 1.02 K/UL — HIGH (ref 0–0.9)
MONOCYTES NFR BLD AUTO: 4.4 % — SIGNIFICANT CHANGE UP (ref 2–14)
MONOCYTES NFR BLD AUTO: 4.8 % — SIGNIFICANT CHANGE UP (ref 2–14)
MONOS+MACROS # FLD: 13 % — SIGNIFICANT CHANGE UP
NEUTROPHILS # BLD AUTO: 15.65 K/UL — HIGH (ref 1.8–7.4)
NEUTROPHILS # BLD AUTO: 21.22 K/UL — HIGH (ref 1.8–7.4)
NEUTROPHILS NFR BLD AUTO: 92.1 % — HIGH (ref 43–77)
NEUTROPHILS NFR BLD AUTO: 92.4 % — HIGH (ref 43–77)
NEUTROPHILS-BODY FLUID: 74 % — SIGNIFICANT CHANGE UP
NITRITE UR-MCNC: NEGATIVE — SIGNIFICANT CHANGE UP
NITRITE UR-MCNC: NEGATIVE — SIGNIFICANT CHANGE UP
NON HDL CHOLESTEROL: 43 MG/DL — SIGNIFICANT CHANGE UP
NRBC # BLD: 0 /100 WBCS — SIGNIFICANT CHANGE UP (ref 0–0)
NT-PROBNP SERPL-SCNC: HIGH PG/ML (ref 0–300)
OB PNL STL: NEGATIVE — SIGNIFICANT CHANGE UP
PCO2 BLDA: 24 MMHG — LOW (ref 32–35)
PCO2 BLDA: 26 MMHG — LOW (ref 32–35)
PCO2 BLDA: 33 MMHG — SIGNIFICANT CHANGE UP (ref 32–35)
PCO2 BLDA: 35 MMHG — SIGNIFICANT CHANGE UP (ref 32–35)
PCO2 BLDA: 45 MMHG — HIGH (ref 32–35)
PCO2 BLDA: 56 MMHG — HIGH (ref 32–35)
PCO2 BLDV: 41 MMHG — SIGNIFICANT CHANGE UP (ref 39–42)
PCO2 BLDV: 48 MMHG — HIGH (ref 39–42)
PH BLDA: 7.2 — CRITICAL LOW (ref 7.35–7.45)
PH BLDA: 7.26 — LOW (ref 7.35–7.45)
PH BLDA: 7.27 — LOW (ref 7.35–7.45)
PH BLDA: 7.28 — LOW (ref 7.35–7.45)
PH BLDA: 7.32 — LOW (ref 7.35–7.45)
PH BLDA: <7 — CRITICAL LOW (ref 7.35–7.45)
PH BLDV: 7.24 — LOW (ref 7.32–7.43)
PH BLDV: 7.29 — LOW (ref 7.32–7.43)
PH FLD: 7.2 — SIGNIFICANT CHANGE UP
PH UR: 5 — SIGNIFICANT CHANGE UP (ref 5–8)
PH UR: 5 — SIGNIFICANT CHANGE UP (ref 5–8)
PHOSPHATE SERPL-MCNC: 5.5 MG/DL — HIGH (ref 2.5–4.5)
PHOSPHATE SERPL-MCNC: 5.5 MG/DL — HIGH (ref 2.5–4.5)
PHOSPHATE SERPL-MCNC: 5.7 MG/DL — HIGH (ref 2.5–4.5)
PHOSPHATE SERPL-MCNC: 6.3 MG/DL — HIGH (ref 2.5–4.5)
PHOSPHATE SERPL-MCNC: 6.8 MG/DL — HIGH (ref 2.5–4.5)
PHOSPHATE SERPL-MCNC: 6.9 MG/DL — HIGH (ref 2.5–4.5)
PHOSPHATE SERPL-MCNC: 7.8 MG/DL — HIGH (ref 2.5–4.5)
PLATELET # BLD AUTO: 109 K/UL — LOW (ref 150–400)
PLATELET # BLD AUTO: 145 K/UL — LOW (ref 150–400)
PLATELET # BLD AUTO: 149 K/UL — LOW (ref 150–400)
PLATELET # BLD AUTO: 173 K/UL — SIGNIFICANT CHANGE UP (ref 150–400)
PLATELET # BLD AUTO: 174 K/UL — SIGNIFICANT CHANGE UP (ref 150–400)
PLATELET # BLD AUTO: 89 K/UL — LOW (ref 150–400)
PLATELET # BLD AUTO: 95 K/UL — LOW (ref 150–400)
PO2 BLDA: 165 MMHG — HIGH (ref 83–108)
PO2 BLDA: 55 MMHG — LOW (ref 83–108)
PO2 BLDA: 57 MMHG — LOW (ref 83–108)
PO2 BLDA: 62 MMHG — LOW (ref 83–108)
PO2 BLDA: 69 MMHG — LOW (ref 83–108)
PO2 BLDA: 79 MMHG — LOW (ref 83–108)
PO2 BLDV: <35 MMHG — SIGNIFICANT CHANGE UP (ref 25–45)
PO2 BLDV: <35 MMHG — SIGNIFICANT CHANGE UP (ref 25–45)
POTASSIUM SERPL-MCNC: 3.7 MMOL/L — SIGNIFICANT CHANGE UP (ref 3.5–5.3)
POTASSIUM SERPL-MCNC: 3.8 MMOL/L — SIGNIFICANT CHANGE UP (ref 3.5–5.3)
POTASSIUM SERPL-MCNC: 3.9 MMOL/L — SIGNIFICANT CHANGE UP (ref 3.5–5.3)
POTASSIUM SERPL-MCNC: 4.3 MMOL/L — SIGNIFICANT CHANGE UP (ref 3.5–5.3)
POTASSIUM SERPL-MCNC: 4.4 MMOL/L — SIGNIFICANT CHANGE UP (ref 3.5–5.3)
POTASSIUM SERPL-MCNC: 4.6 MMOL/L — SIGNIFICANT CHANGE UP (ref 3.5–5.3)
POTASSIUM SERPL-MCNC: 4.8 MMOL/L — SIGNIFICANT CHANGE UP (ref 3.5–5.3)
POTASSIUM SERPL-SCNC: 3.7 MMOL/L — SIGNIFICANT CHANGE UP (ref 3.5–5.3)
POTASSIUM SERPL-SCNC: 3.8 MMOL/L — SIGNIFICANT CHANGE UP (ref 3.5–5.3)
POTASSIUM SERPL-SCNC: 3.9 MMOL/L — SIGNIFICANT CHANGE UP (ref 3.5–5.3)
POTASSIUM SERPL-SCNC: 4.3 MMOL/L — SIGNIFICANT CHANGE UP (ref 3.5–5.3)
POTASSIUM SERPL-SCNC: 4.4 MMOL/L — SIGNIFICANT CHANGE UP (ref 3.5–5.3)
POTASSIUM SERPL-SCNC: 4.6 MMOL/L — SIGNIFICANT CHANGE UP (ref 3.5–5.3)
POTASSIUM SERPL-SCNC: 4.8 MMOL/L — SIGNIFICANT CHANGE UP (ref 3.5–5.3)
PROT FLD-MCNC: 2.6 G/DL — SIGNIFICANT CHANGE UP
PROT SERPL-MCNC: 3.6 G/DL — LOW (ref 6–8.3)
PROT SERPL-MCNC: 4.2 G/DL — LOW (ref 6–8.3)
PROT SERPL-MCNC: 4.2 G/DL — LOW (ref 6–8.3)
PROT SERPL-MCNC: 4.3 G/DL — LOW (ref 6–8.3)
PROT SERPL-MCNC: 4.8 G/DL — LOW (ref 6–8.3)
PROT SERPL-MCNC: 5 G/DL — LOW (ref 6–8.3)
PROT SERPL-MCNC: 5.8 G/DL — LOW (ref 6–8.3)
PROT SERPL-MCNC: 6.3 G/DL — SIGNIFICANT CHANGE UP (ref 6–8.3)
PROT SERPL-MCNC: 6.3 G/DL — SIGNIFICANT CHANGE UP (ref 6–8.3)
PROT UR-MCNC: 30 MG/DL
PROT UR-MCNC: 30 MG/DL
PROTHROM AB SERPL-ACNC: 18.6 SEC — HIGH (ref 9.5–13)
PROTHROM AB SERPL-ACNC: 31.2 SEC — HIGH (ref 9.5–13)
PROTHROM AB SERPL-ACNC: 60.6 SEC — HIGH (ref 9.5–13)
RBC # BLD: 3.33 M/UL — LOW (ref 3.8–5.2)
RBC # BLD: 3.79 M/UL — LOW (ref 3.8–5.2)
RBC # BLD: 3.81 M/UL — SIGNIFICANT CHANGE UP (ref 3.8–5.2)
RBC # BLD: 3.97 M/UL — SIGNIFICANT CHANGE UP (ref 3.8–5.2)
RBC # BLD: 3.98 M/UL — SIGNIFICANT CHANGE UP (ref 3.8–5.2)
RBC # BLD: 4.31 M/UL — SIGNIFICANT CHANGE UP (ref 3.8–5.2)
RBC # BLD: 4.67 M/UL — SIGNIFICANT CHANGE UP (ref 3.8–5.2)
RBC # FLD: 19.1 % — HIGH (ref 10.3–14.5)
RBC # FLD: 19.2 % — HIGH (ref 10.3–14.5)
RBC # FLD: 19.3 % — HIGH (ref 10.3–14.5)
RBC # FLD: 19.4 % — HIGH (ref 10.3–14.5)
RBC # FLD: 19.4 % — HIGH (ref 10.3–14.5)
RBC # FLD: 19.6 % — HIGH (ref 10.3–14.5)
RBC # FLD: 19.6 % — HIGH (ref 10.3–14.5)
RBC CASTS # UR COMP ASSIST: 0 /HPF — SIGNIFICANT CHANGE UP (ref 0–4)
RBC CASTS # UR COMP ASSIST: 4 /HPF — SIGNIFICANT CHANGE UP (ref 0–4)
RCV VOL RI: 6000 /UL — HIGH (ref 0–0)
RSV RNA NPH QL NAA+NON-PROBE: SIGNIFICANT CHANGE UP
S PNEUM AG UR QL: NEGATIVE — SIGNIFICANT CHANGE UP
S PNEUM DNA BLD POS QL NAA+NON-PROBE: SIGNIFICANT CHANGE UP
SAO2 % BLDA: 80.1 % — LOW (ref 94–98)
SAO2 % BLDA: 81.5 % — LOW (ref 94–98)
SAO2 % BLDA: 86.1 % — LOW (ref 94–98)
SAO2 % BLDA: 92.3 % — LOW (ref 94–98)
SAO2 % BLDA: 95.1 % — SIGNIFICANT CHANGE UP (ref 94–98)
SAO2 % BLDA: 99.9 % — HIGH (ref 94–98)
SAO2 % BLDV: 40.8 % — LOW (ref 67–88)
SAO2 % BLDV: 51.5 % — LOW (ref 67–88)
SARS-COV-2 RNA SPEC QL NAA+PROBE: SIGNIFICANT CHANGE UP
SODIUM SERPL-SCNC: 143 MMOL/L — SIGNIFICANT CHANGE UP (ref 135–145)
SODIUM SERPL-SCNC: 144 MMOL/L — SIGNIFICANT CHANGE UP (ref 135–145)
SODIUM SERPL-SCNC: 145 MMOL/L — SIGNIFICANT CHANGE UP (ref 135–145)
SODIUM SERPL-SCNC: 148 MMOL/L — HIGH (ref 135–145)
SODIUM SERPL-SCNC: 149 MMOL/L — HIGH (ref 135–145)
SODIUM SERPL-SCNC: 150 MMOL/L — HIGH (ref 135–145)
SODIUM SERPL-SCNC: 151 MMOL/L — HIGH (ref 135–145)
SP GR SPEC: 1.01 — SIGNIFICANT CHANGE UP (ref 1–1.03)
SP GR SPEC: 1.02 — SIGNIFICANT CHANGE UP (ref 1–1.03)
SPECIMEN SOURCE: SIGNIFICANT CHANGE UP
TOTAL NUCLEATED CELL COUNT, BODY FLUID: 643 /UL — SIGNIFICANT CHANGE UP
TRIGL SERPL-MCNC: 59 MG/DL — SIGNIFICANT CHANGE UP
TROPONIN I, HIGH SENSITIVITY RESULT: 141.2 NG/L — HIGH
TROPONIN I, HIGH SENSITIVITY RESULT: 150 NG/L — HIGH
TROPONIN I, HIGH SENSITIVITY RESULT: 157.6 NG/L — HIGH
TROPONIN I, HIGH SENSITIVITY RESULT: 166.4 NG/L — HIGH
TROPONIN I, HIGH SENSITIVITY RESULT: 262.3 NG/L — HIGH
TROPONIN I, HIGH SENSITIVITY RESULT: 306.8 NG/L — HIGH
TROPONIN I, HIGH SENSITIVITY RESULT: 336.6 NG/L — HIGH
TROPONIN I, HIGH SENSITIVITY RESULT: 352 NG/L — HIGH
TUBE TYPE: SIGNIFICANT CHANGE UP
UROBILINOGEN FLD QL: 0.2 MG/DL — SIGNIFICANT CHANGE UP (ref 0.2–1)
UROBILINOGEN FLD QL: 1 MG/DL — SIGNIFICANT CHANGE UP (ref 0.2–1)
WBC # BLD: 15.71 K/UL — HIGH (ref 3.8–10.5)
WBC # BLD: 17.01 K/UL — HIGH (ref 3.8–10.5)
WBC # BLD: 21.14 K/UL — HIGH (ref 3.8–10.5)
WBC # BLD: 22.34 K/UL — HIGH (ref 3.8–10.5)
WBC # BLD: 22.98 K/UL — HIGH (ref 3.8–10.5)
WBC # BLD: 26.45 K/UL — HIGH (ref 3.8–10.5)
WBC # BLD: 27.95 K/UL — HIGH (ref 3.8–10.5)
WBC # FLD AUTO: 15.71 K/UL — HIGH (ref 3.8–10.5)
WBC # FLD AUTO: 17.01 K/UL — HIGH (ref 3.8–10.5)
WBC # FLD AUTO: 21.14 K/UL — HIGH (ref 3.8–10.5)
WBC # FLD AUTO: 22.34 K/UL — HIGH (ref 3.8–10.5)
WBC # FLD AUTO: 22.98 K/UL — HIGH (ref 3.8–10.5)
WBC # FLD AUTO: 26.45 K/UL — HIGH (ref 3.8–10.5)
WBC # FLD AUTO: 27.95 K/UL — HIGH (ref 3.8–10.5)
WBC UR QL: 1 /HPF — SIGNIFICANT CHANGE UP (ref 0–5)
WBC UR QL: 4 /HPF — SIGNIFICANT CHANGE UP (ref 0–5)

## 2024-01-01 PROCEDURE — 99223 1ST HOSP IP/OBS HIGH 75: CPT | Mod: GC

## 2024-01-01 PROCEDURE — 71045 X-RAY EXAM CHEST 1 VIEW: CPT | Mod: 26

## 2024-01-01 PROCEDURE — 88305 TISSUE EXAM BY PATHOLOGIST: CPT | Mod: 26

## 2024-01-01 PROCEDURE — 93970 EXTREMITY STUDY: CPT | Mod: 26

## 2024-01-01 PROCEDURE — 99222 1ST HOSP IP/OBS MODERATE 55: CPT

## 2024-01-01 PROCEDURE — 93306 TTE W/DOPPLER COMPLETE: CPT | Mod: 26

## 2024-01-01 PROCEDURE — 88112 CYTOPATH CELL ENHANCE TECH: CPT | Mod: 26

## 2024-01-01 PROCEDURE — 71045 X-RAY EXAM CHEST 1 VIEW: CPT | Mod: 26,77

## 2024-01-01 PROCEDURE — 99291 CRITICAL CARE FIRST HOUR: CPT

## 2024-01-01 PROCEDURE — 99292 CRITICAL CARE ADDL 30 MIN: CPT

## 2024-01-01 PROCEDURE — 93010 ELECTROCARDIOGRAM REPORT: CPT | Mod: 76

## 2024-01-01 PROCEDURE — 99232 SBSQ HOSP IP/OBS MODERATE 35: CPT

## 2024-01-01 PROCEDURE — 71045 X-RAY EXAM CHEST 1 VIEW: CPT | Mod: 26,76

## 2024-01-01 PROCEDURE — 88312 SPECIAL STAINS GROUP 1: CPT | Mod: 26

## 2024-01-01 PROCEDURE — 71250 CT THORAX DX C-: CPT | Mod: 26,MC

## 2024-01-01 RX ORDER — SODIUM CHLORIDE 0.9 % (FLUSH) 0.9 %
250 SYRINGE (ML) INJECTION ONCE
Refills: 0 | Status: DISCONTINUED | OUTPATIENT
Start: 2024-01-01 | End: 2024-01-01

## 2024-01-01 RX ORDER — ENOXAPARIN SODIUM 150 MG/ML
10 INJECTION SUBCUTANEOUS ONCE
Refills: 0 | Status: COMPLETED | OUTPATIENT
Start: 2024-01-01 | End: 2024-01-01

## 2024-01-01 RX ORDER — METOPROLOL TARTRATE 50 MG
25 TABLET ORAL DAILY
Refills: 0 | Status: DISCONTINUED | OUTPATIENT
Start: 2024-01-01 | End: 2024-01-01

## 2024-01-01 RX ORDER — NOREPINEPHRINE BITARTRATE/D5W 16MG/250ML
0.05 PLASTIC BAG, INJECTION (ML) INTRAVENOUS
Qty: 8 | Refills: 0 | Status: DISCONTINUED | OUTPATIENT
Start: 2024-01-01 | End: 2024-01-01

## 2024-01-01 RX ORDER — SODIUM BICARBONATE 650 MG
0.52 TABLET ORAL
Qty: 150 | Refills: 0 | Status: DISCONTINUED | OUTPATIENT
Start: 2024-01-01 | End: 2024-01-01

## 2024-01-01 RX ORDER — HYDRALAZINE HYDROCHLORIDE 100 MG/1
1 TABLET ORAL
Refills: 0 | DISCHARGE

## 2024-01-01 RX ORDER — AZITHROMYCIN 250 MG/1
500 TABLET, FILM COATED ORAL EVERY 24 HOURS
Refills: 0 | Status: DISCONTINUED | OUTPATIENT
Start: 2024-01-01 | End: 2024-01-01

## 2024-01-01 RX ORDER — AZITHROMYCIN 250 MG/1
500 TABLET, FILM COATED ORAL ONCE
Refills: 0 | Status: COMPLETED | OUTPATIENT
Start: 2024-01-01 | End: 2024-01-01

## 2024-01-01 RX ORDER — FUROSEMIDE 10 MG/ML
40 INJECTION INTRAVENOUS ONCE
Refills: 0 | Status: COMPLETED | OUTPATIENT
Start: 2024-01-01 | End: 2024-01-01

## 2024-01-01 RX ORDER — DOBUTAMINE HCL 250MG/20ML
5 VIAL (ML) INTRAVENOUS
Qty: 1000 | Refills: 0 | Status: DISCONTINUED | OUTPATIENT
Start: 2024-01-01 | End: 2024-01-01

## 2024-01-01 RX ORDER — APIXABAN 5 MG/1
2.5 TABLET, FILM COATED ORAL
Refills: 0 | Status: DISCONTINUED | OUTPATIENT
Start: 2024-01-01 | End: 2024-01-01

## 2024-01-01 RX ORDER — ALCOHOL ANTISEPTIC PADS
25 PADS, MEDICATED (EA) TOPICAL ONCE
Refills: 0 | Status: DISCONTINUED | OUTPATIENT
Start: 2024-01-01 | End: 2024-01-01

## 2024-01-01 RX ORDER — CHLORHEXIDINE GLUCONATE ORAL RINSE 1.2 MG/ML
15 SOLUTION DENTAL EVERY 12 HOURS
Refills: 0 | Status: DISCONTINUED | OUTPATIENT
Start: 2024-01-01 | End: 2024-01-01

## 2024-01-01 RX ORDER — CEFTRIAXONE SODIUM 1 G
1000 VIAL (EA) INJECTION ONCE
Refills: 0 | Status: COMPLETED | OUTPATIENT
Start: 2024-01-01 | End: 2024-01-01

## 2024-01-01 RX ORDER — CEFTRIAXONE SODIUM 1 G
1000 VIAL (EA) INJECTION EVERY 24 HOURS
Refills: 0 | Status: DISCONTINUED | OUTPATIENT
Start: 2024-01-01 | End: 2024-01-01

## 2024-01-01 RX ORDER — SODIUM BICARBONATE 650 MG
50 TABLET ORAL ONCE
Refills: 0 | Status: COMPLETED | OUTPATIENT
Start: 2024-01-01 | End: 2024-01-01

## 2024-01-01 RX ORDER — INSULIN LISPRO 100/ML
VIAL (ML) SUBCUTANEOUS
Refills: 0 | Status: DISCONTINUED | OUTPATIENT
Start: 2024-01-01 | End: 2024-01-01

## 2024-01-01 RX ORDER — APIXABAN 5 MG/1
1 TABLET, FILM COATED ORAL
Refills: 0 | DISCHARGE

## 2024-01-01 RX ORDER — SODIUM ZIRCONIUM CYCLOSILICATE 10 G/10G
10 POWDER, FOR SUSPENSION ORAL EVERY 8 HOURS
Refills: 0 | Status: DISCONTINUED | OUTPATIENT
Start: 2024-01-01 | End: 2024-01-01

## 2024-01-01 RX ORDER — FERROUS SULFATE 325(65) MG
325 TABLET ORAL DAILY
Refills: 0 | Status: DISCONTINUED | OUTPATIENT
Start: 2024-01-01 | End: 2024-01-01

## 2024-01-01 RX ORDER — GLUCAGON INJECTION, SOLUTION 0.5 MG/.1ML
1 INJECTION, SOLUTION SUBCUTANEOUS ONCE
Refills: 0 | Status: DISCONTINUED | OUTPATIENT
Start: 2024-01-01 | End: 2024-01-01

## 2024-01-01 RX ORDER — FENTANYL CITRATE-0.9 % NACL/PF 300MCG/30
0.5 PATIENT CONTROLLED ANALGESIA VIAL INJECTION
Qty: 2500 | Refills: 0 | Status: DISCONTINUED | OUTPATIENT
Start: 2024-01-01 | End: 2024-01-01

## 2024-01-01 RX ORDER — FENTANYL CITRATE-0.9 % NACL/PF 300MCG/30
50 PATIENT CONTROLLED ANALGESIA VIAL INJECTION EVERY 4 HOURS
Refills: 0 | Status: DISCONTINUED | OUTPATIENT
Start: 2024-01-01 | End: 2024-01-01

## 2024-01-01 RX ORDER — FENTANYL CITRATE-0.9 % NACL/PF 300MCG/30
50 PATIENT CONTROLLED ANALGESIA VIAL INJECTION ONCE
Refills: 0 | Status: DISCONTINUED | OUTPATIENT
Start: 2024-01-01 | End: 2024-01-01

## 2024-01-01 RX ORDER — AMIODARONE HYDROCHLORIDE 50 MG/ML
150 INJECTION, SOLUTION INTRAVENOUS ONCE
Refills: 0 | Status: COMPLETED | OUTPATIENT
Start: 2024-01-01 | End: 2024-01-01

## 2024-01-01 RX ORDER — PANTOPRAZOLE SODIUM 40 MG/1
40 TABLET, DELAYED RELEASE ORAL
Refills: 0 | Status: DISCONTINUED | OUTPATIENT
Start: 2024-01-01 | End: 2024-01-01

## 2024-01-01 RX ORDER — DILTIAZEM HCL 300 MG
2.5 CAPSULE, EXTENDED RELEASE 24HR ORAL
Qty: 125 | Refills: 0 | Status: DISCONTINUED | OUTPATIENT
Start: 2024-01-01 | End: 2024-01-01

## 2024-01-01 RX ORDER — DILTIAZEM HCL 300 MG
10 CAPSULE, EXTENDED RELEASE 24HR ORAL ONCE
Refills: 0 | Status: COMPLETED | OUTPATIENT
Start: 2024-01-01 | End: 2024-01-01

## 2024-01-01 RX ORDER — DOBUTAMINE HCL 250MG/20ML
2.5 VIAL (ML) INTRAVENOUS
Qty: 500 | Refills: 0 | Status: DISCONTINUED | OUTPATIENT
Start: 2024-01-01 | End: 2024-01-01

## 2024-01-01 RX ORDER — NOREPINEPHRINE BITARTRATE/D5W 16MG/250ML
1 PLASTIC BAG, INJECTION (ML) INTRAVENOUS
Qty: 16 | Refills: 0 | Status: DISCONTINUED | OUTPATIENT
Start: 2024-01-01 | End: 2024-01-01

## 2024-01-01 RX ORDER — ENOXAPARIN SODIUM 150 MG/ML
30 INJECTION SUBCUTANEOUS ONCE
Refills: 0 | Status: COMPLETED | OUTPATIENT
Start: 2024-01-01 | End: 2024-01-01

## 2024-01-01 RX ORDER — LINEZOLID 600 MG/300ML
600 INJECTION, SOLUTION INTRAVENOUS ONCE
Refills: 0 | Status: COMPLETED | OUTPATIENT
Start: 2024-01-01 | End: 2024-01-01

## 2024-01-01 RX ORDER — AMIODARONE HYDROCHLORIDE 50 MG/ML
0.5 INJECTION, SOLUTION INTRAVENOUS
Qty: 450 | Refills: 0 | Status: DISCONTINUED | OUTPATIENT
Start: 2024-01-01 | End: 2024-01-01

## 2024-01-01 RX ORDER — PHENYLEPHRINE TANNATE 10 MG/5 ML
0.2 SUSPENSION, ORAL (FINAL DOSE FORM) ORAL
Qty: 40 | Refills: 0 | Status: DISCONTINUED | OUTPATIENT
Start: 2024-01-01 | End: 2024-01-01

## 2024-01-01 RX ORDER — INSULIN LISPRO 100/ML
VIAL (ML) SUBCUTANEOUS AT BEDTIME
Refills: 0 | Status: DISCONTINUED | OUTPATIENT
Start: 2024-01-01 | End: 2024-01-01

## 2024-01-01 RX ORDER — MIDAZOLAM HCL 1 MG/ML
2 VIAL (ML) INJECTION ONCE
Refills: 0 | Status: DISCONTINUED | OUTPATIENT
Start: 2024-01-01 | End: 2024-01-01

## 2024-01-01 RX ORDER — 5-HYDROXYTRYPTOPHAN (5-HTP) 100 MG
3 TABLET,DISINTEGRATING ORAL AT BEDTIME
Refills: 0 | Status: DISCONTINUED | OUTPATIENT
Start: 2024-01-01 | End: 2024-01-01

## 2024-01-01 RX ORDER — SODIUM CHLORIDE 0.9 % (FLUSH) 0.9 %
10 SYRINGE (ML) INJECTION
Refills: 0 | Status: DISCONTINUED | OUTPATIENT
Start: 2024-01-01 | End: 2024-01-01

## 2024-01-01 RX ORDER — ALCOHOL ANTISEPTIC PADS
15 PADS, MEDICATED (EA) TOPICAL ONCE
Refills: 0 | Status: DISCONTINUED | OUTPATIENT
Start: 2024-01-01 | End: 2024-01-01

## 2024-01-01 RX ORDER — DILTIAZEM HCL 300 MG
60 CAPSULE, EXTENDED RELEASE 24HR ORAL ONCE
Refills: 0 | Status: COMPLETED | OUTPATIENT
Start: 2024-01-01 | End: 2024-01-01

## 2024-01-01 RX ORDER — ALCOHOL ANTISEPTIC PADS
12.5 PADS, MEDICATED (EA) TOPICAL ONCE
Refills: 0 | Status: DISCONTINUED | OUTPATIENT
Start: 2024-01-01 | End: 2024-01-01

## 2024-01-01 RX ORDER — SODIUM CHLORIDE 0.9 % (FLUSH) 0.9 %
1000 SYRINGE (ML) INJECTION ONCE
Refills: 0 | Status: COMPLETED | OUTPATIENT
Start: 2024-01-01 | End: 2024-01-01

## 2024-01-01 RX ORDER — AMLODIPINE BESYLATE 5 MG
2.5 TABLET ORAL DAILY
Refills: 0 | Status: DISCONTINUED | OUTPATIENT
Start: 2024-01-01 | End: 2024-01-01

## 2024-01-01 RX ORDER — SODIUM CHLORIDE IRRIG SOLUTION 0.9 %
1000 SOLUTION, IRRIGATION IRRIGATION
Refills: 0 | Status: DISCONTINUED | OUTPATIENT
Start: 2024-01-01 | End: 2024-01-01

## 2024-01-01 RX ORDER — ATORVASTATIN CALCIUM 10 MG/1
40 TABLET, FILM COATED ORAL AT BEDTIME
Refills: 0 | Status: DISCONTINUED | OUTPATIENT
Start: 2024-01-01 | End: 2024-01-01

## 2024-01-01 RX ORDER — FUROSEMIDE 10 MG/ML
20 INJECTION INTRAVENOUS ONCE
Refills: 0 | Status: COMPLETED | OUTPATIENT
Start: 2024-01-01 | End: 2024-01-01

## 2024-01-01 RX ORDER — AMIODARONE HYDROCHLORIDE 50 MG/ML
1 INJECTION, SOLUTION INTRAVENOUS
Qty: 450 | Refills: 0 | Status: DISCONTINUED | OUTPATIENT
Start: 2024-01-01 | End: 2024-01-01

## 2024-01-01 RX ORDER — ONDANSETRON HCL/PF 4 MG/2 ML
4 VIAL (ML) INJECTION EVERY 8 HOURS
Refills: 0 | Status: DISCONTINUED | OUTPATIENT
Start: 2024-01-01 | End: 2024-01-01

## 2024-01-01 RX ORDER — MAG HYDROX/ALUMINUM HYD/SIMETH 200-200-20
30 SUSPENSION, ORAL (FINAL DOSE FORM) ORAL EVERY 4 HOURS
Refills: 0 | Status: DISCONTINUED | OUTPATIENT
Start: 2024-01-01 | End: 2024-01-01

## 2024-01-01 RX ORDER — CHLORHEXIDINE GLUCONATE ORAL RINSE 1.2 MG/ML
1 SOLUTION DENTAL
Refills: 0 | Status: DISCONTINUED | OUTPATIENT
Start: 2024-01-01 | End: 2024-01-01

## 2024-01-01 RX ORDER — ACETAMINOPHEN 325 MG
650 TABLET ORAL EVERY 6 HOURS
Refills: 0 | Status: DISCONTINUED | OUTPATIENT
Start: 2024-01-01 | End: 2024-01-01

## 2024-01-01 RX ORDER — IPRATROPIUM BROMIDE AND ALBUTEROL SULFATE .5; 3 MG/3ML; MG/3ML
3 SOLUTION RESPIRATORY (INHALATION) ONCE
Refills: 0 | Status: COMPLETED | OUTPATIENT
Start: 2024-01-01 | End: 2024-01-01

## 2024-01-01 RX ADMIN — AMIODARONE HYDROCHLORIDE 16.7 MG/MIN: 50 INJECTION, SOLUTION INTRAVENOUS at 14:08

## 2024-01-01 RX ADMIN — Medication 3.23 MICROGRAM(S)/KG/MIN: at 21:25

## 2024-01-01 RX ADMIN — Medication 3 UNIT(S)/MIN: at 01:40

## 2024-01-01 RX ADMIN — Medication 4.04 MICROGRAM(S)/KG/MIN: at 13:22

## 2024-01-01 RX ADMIN — Medication 4.04 MICROGRAM(S)/KG/MIN: at 09:02

## 2024-01-01 RX ADMIN — AZITHROMYCIN 255 MILLIGRAM(S): 250 TABLET, FILM COATED ORAL at 23:53

## 2024-01-01 RX ADMIN — Medication 50 MICROGRAM(S): at 09:17

## 2024-01-01 RX ADMIN — Medication 50 MICROGRAM(S): at 16:50

## 2024-01-01 RX ADMIN — Medication 60 MILLIGRAM(S): at 20:09

## 2024-01-01 RX ADMIN — ENOXAPARIN SODIUM 30 MILLIGRAM(S): 150 INJECTION SUBCUTANEOUS at 22:18

## 2024-01-01 RX ADMIN — Medication 40.4 MICROGRAM(S)/KG/MIN: at 11:48

## 2024-01-01 RX ADMIN — AZITHROMYCIN 255 MILLIGRAM(S): 250 TABLET, FILM COATED ORAL at 05:55

## 2024-01-01 RX ADMIN — Medication 1.62 MICROGRAM(S)/KG/MIN: at 15:49

## 2024-01-01 RX ADMIN — APIXABAN 2.5 MILLIGRAM(S): 5 TABLET, FILM COATED ORAL at 05:58

## 2024-01-01 RX ADMIN — AMIODARONE HYDROCHLORIDE 33.3 MG/MIN: 50 INJECTION, SOLUTION INTRAVENOUS at 15:44

## 2024-01-01 RX ADMIN — Medication 50 MICROGRAM(S): at 00:30

## 2024-01-01 RX ADMIN — Medication 40.4 MICROGRAM(S)/KG/MIN: at 14:36

## 2024-01-01 RX ADMIN — Medication 10 MILLIGRAM(S): at 13:32

## 2024-01-01 RX ADMIN — Medication 3 UNIT(S)/MIN: at 13:12

## 2024-01-01 RX ADMIN — Medication 40.4 MICROGRAM(S)/KG/MIN: at 08:40

## 2024-01-01 RX ADMIN — Medication 50 MICROGRAM(S): at 09:02

## 2024-01-01 RX ADMIN — AMIODARONE HYDROCHLORIDE 618 MILLIGRAM(S): 50 INJECTION, SOLUTION INTRAVENOUS at 15:29

## 2024-01-01 RX ADMIN — Medication 1000 MILLILITER(S): at 18:34

## 2024-01-01 RX ADMIN — CHLORHEXIDINE GLUCONATE ORAL RINSE 15 MILLILITER(S): 1.2 SOLUTION DENTAL at 06:14

## 2024-01-01 RX ADMIN — Medication 2 MILLIGRAM(S): at 16:36

## 2024-01-01 RX ADMIN — Medication 150 MEQ/KG/HR: at 06:53

## 2024-01-01 RX ADMIN — Medication 50 MICROGRAM(S): at 16:36

## 2024-01-01 RX ADMIN — LINEZOLID 300 MILLIGRAM(S): 600 INJECTION, SOLUTION INTRAVENOUS at 11:19

## 2024-01-01 RX ADMIN — Medication 4.04 MICROGRAM(S)/KG/MIN: at 03:34

## 2024-01-01 RX ADMIN — Medication 2 MILLIGRAM(S): at 14:48

## 2024-01-01 RX ADMIN — CHLORHEXIDINE GLUCONATE ORAL RINSE 1 APPLICATION(S): 1.2 SOLUTION DENTAL at 16:36

## 2024-01-01 RX ADMIN — Medication 100 MILLIGRAM(S): at 05:54

## 2024-01-01 RX ADMIN — Medication 2.5 MG/HR: at 17:23

## 2024-01-01 RX ADMIN — AMIODARONE HYDROCHLORIDE 16.7 MG/MIN: 50 INJECTION, SOLUTION INTRAVENOUS at 01:42

## 2024-01-01 RX ADMIN — Medication 150 MEQ/KG/HR: at 00:30

## 2024-01-01 RX ADMIN — FUROSEMIDE 20 MILLIGRAM(S): 10 INJECTION INTRAVENOUS at 04:02

## 2024-01-01 RX ADMIN — Medication 50 MICROGRAM(S): at 00:38

## 2024-01-01 RX ADMIN — Medication 2000 MILLILITER(S): at 13:15

## 2024-01-01 RX ADMIN — PANTOPRAZOLE SODIUM 40 MILLIGRAM(S): 40 TABLET, DELAYED RELEASE ORAL at 06:02

## 2024-01-01 RX ADMIN — AZITHROMYCIN 255 MILLIGRAM(S): 250 TABLET, FILM COATED ORAL at 05:08

## 2024-01-01 RX ADMIN — ENOXAPARIN SODIUM 10 MILLIGRAM(S): 150 INJECTION SUBCUTANEOUS at 22:18

## 2024-01-01 RX ADMIN — Medication 50 MILLIEQUIVALENT(S): at 10:46

## 2024-01-01 RX ADMIN — CHLORHEXIDINE GLUCONATE ORAL RINSE 15 MILLILITER(S): 1.2 SOLUTION DENTAL at 16:36

## 2024-01-01 RX ADMIN — Medication 100 MILLIGRAM(S): at 23:52

## 2024-01-01 RX ADMIN — IPRATROPIUM BROMIDE AND ALBUTEROL SULFATE 3 MILLILITER(S): .5; 3 SOLUTION RESPIRATORY (INHALATION) at 02:44

## 2024-01-01 RX ADMIN — CHLORHEXIDINE GLUCONATE ORAL RINSE 1 APPLICATION(S): 1.2 SOLUTION DENTAL at 06:14

## 2024-01-01 RX ADMIN — Medication 50 MICROGRAM(S): at 17:30

## 2024-01-01 RX ADMIN — Medication 25 MILLIGRAM(S): at 05:58

## 2024-01-01 RX ADMIN — Medication 3.23 MICROGRAM(S)/KG/MIN: at 15:49

## 2024-01-01 RX ADMIN — Medication 150 MEQ/KG/HR: at 09:42

## 2024-01-01 RX ADMIN — FUROSEMIDE 40 MILLIGRAM(S): 10 INJECTION INTRAVENOUS at 11:03

## 2024-01-01 RX ADMIN — Medication 2 MILLIGRAM(S): at 11:03

## 2024-01-01 RX ADMIN — Medication 50 MICROGRAM(S): at 17:45

## 2024-01-01 RX ADMIN — Medication 100 MILLIGRAM(S): at 05:09

## 2024-01-01 RX ADMIN — FUROSEMIDE 40 MILLIGRAM(S): 10 INJECTION INTRAVENOUS at 09:53

## 2024-01-11 LAB
SURGICAL PATHOLOGY STUDY: SIGNIFICANT CHANGE UP
SURGICAL PATHOLOGY STUDY: SIGNIFICANT CHANGE UP

## 2024-01-12 ENCOUNTER — NON-APPOINTMENT (OUTPATIENT)
Age: 89
End: 2024-01-12

## 2024-01-13 ENCOUNTER — APPOINTMENT (OUTPATIENT)
Dept: MRI IMAGING | Facility: HOSPITAL | Age: 89
End: 2024-01-13
Payer: MEDICARE

## 2024-01-13 ENCOUNTER — OUTPATIENT (OUTPATIENT)
Dept: OUTPATIENT SERVICES | Facility: HOSPITAL | Age: 89
LOS: 1 days | End: 2024-01-13
Payer: COMMERCIAL

## 2024-01-13 DIAGNOSIS — Z90.710 ACQUIRED ABSENCE OF BOTH CERVIX AND UTERUS: Chronic | ICD-10-CM

## 2024-01-13 DIAGNOSIS — K86.2 CYST OF PANCREAS: ICD-10-CM

## 2024-01-13 PROCEDURE — 74183 MRI ABD W/O CNTR FLWD CNTR: CPT

## 2024-01-13 PROCEDURE — 74183 MRI ABD W/O CNTR FLWD CNTR: CPT | Mod: 26

## 2024-01-13 PROCEDURE — A9579: CPT

## 2024-01-16 ENCOUNTER — NON-APPOINTMENT (OUTPATIENT)
Age: 89
End: 2024-01-16

## 2024-01-16 ENCOUNTER — RX RENEWAL (OUTPATIENT)
Age: 89
End: 2024-01-16

## 2024-01-19 ENCOUNTER — NON-APPOINTMENT (OUTPATIENT)
Age: 89
End: 2024-01-19

## 2024-01-25 ENCOUNTER — APPOINTMENT (OUTPATIENT)
Dept: GERIATRICS | Facility: CLINIC | Age: 89
End: 2024-01-25
Payer: MEDICARE

## 2024-01-25 VITALS
WEIGHT: 98 LBS | TEMPERATURE: 97.3 F | OXYGEN SATURATION: 100 % | HEIGHT: 57 IN | RESPIRATION RATE: 16 BRPM | BODY MASS INDEX: 21.14 KG/M2 | SYSTOLIC BLOOD PRESSURE: 110 MMHG | DIASTOLIC BLOOD PRESSURE: 60 MMHG | HEART RATE: 80 BPM

## 2024-01-25 DIAGNOSIS — I10 ESSENTIAL (PRIMARY) HYPERTENSION: ICD-10-CM

## 2024-01-25 DIAGNOSIS — I25.10 ATHEROSCLEROTIC HEART DISEASE OF NATIVE CORONARY ARTERY W/OUT ANGINA PECTORIS: ICD-10-CM

## 2024-01-25 DIAGNOSIS — K59.00 CONSTIPATION, UNSPECIFIED: ICD-10-CM

## 2024-01-25 DIAGNOSIS — H61.23 IMPACTED CERUMEN, BILATERAL: ICD-10-CM

## 2024-01-25 LAB
HBA1C MFR BLD HPLC: 5.4
LDLC SERPL DIRECT ASSAY-MCNC: 49

## 2024-01-25 PROCEDURE — 99214 OFFICE O/P EST MOD 30 MIN: CPT

## 2024-01-25 RX ORDER — SENNOSIDES 8.6 MG TABLETS 8.6 MG/1
8.6 TABLET ORAL
Qty: 60 | Refills: 3 | Status: ACTIVE | COMMUNITY
Start: 2024-01-25 | End: 1900-01-01

## 2024-01-25 RX ORDER — ASPIRIN 81 MG
6.5 TABLET, DELAYED RELEASE (ENTERIC COATED) ORAL
Qty: 1 | Refills: 0 | Status: ACTIVE | COMMUNITY
Start: 2024-01-25 | End: 1900-01-01

## 2024-01-29 ENCOUNTER — APPOINTMENT (OUTPATIENT)
Dept: FAMILY MEDICINE | Facility: CLINIC | Age: 89
End: 2024-01-29

## 2024-02-02 ENCOUNTER — NON-APPOINTMENT (OUTPATIENT)
Age: 89
End: 2024-02-02

## 2024-02-02 ENCOUNTER — OUTPATIENT (OUTPATIENT)
Dept: OUTPATIENT SERVICES | Facility: HOSPITAL | Age: 89
LOS: 1 days | End: 2024-02-02

## 2024-02-02 VITALS
WEIGHT: 98.55 LBS | OXYGEN SATURATION: 100 % | RESPIRATION RATE: 16 BRPM | DIASTOLIC BLOOD PRESSURE: 65 MMHG | TEMPERATURE: 98 F | HEART RATE: 69 BPM | SYSTOLIC BLOOD PRESSURE: 121 MMHG | HEIGHT: 57 IN

## 2024-02-02 DIAGNOSIS — K86.2 CYST OF PANCREAS: ICD-10-CM

## 2024-02-02 DIAGNOSIS — E11.9 TYPE 2 DIABETES MELLITUS WITHOUT COMPLICATIONS: ICD-10-CM

## 2024-02-02 DIAGNOSIS — Z90.710 ACQUIRED ABSENCE OF BOTH CERVIX AND UTERUS: Chronic | ICD-10-CM

## 2024-02-02 DIAGNOSIS — Z92.89 PERSONAL HISTORY OF OTHER MEDICAL TREATMENT: Chronic | ICD-10-CM

## 2024-02-02 DIAGNOSIS — Z95.5 PRESENCE OF CORONARY ANGIOPLASTY IMPLANT AND GRAFT: ICD-10-CM

## 2024-02-02 DIAGNOSIS — Z98.890 OTHER SPECIFIED POSTPROCEDURAL STATES: Chronic | ICD-10-CM

## 2024-02-02 DIAGNOSIS — I10 ESSENTIAL (PRIMARY) HYPERTENSION: ICD-10-CM

## 2024-02-02 DIAGNOSIS — I48.91 UNSPECIFIED ATRIAL FIBRILLATION: ICD-10-CM

## 2024-02-02 DIAGNOSIS — K86.2 CYST OF PANCREAS: Chronic | ICD-10-CM

## 2024-02-02 DIAGNOSIS — Z86.79 PERSONAL HISTORY OF OTHER DISEASES OF THE CIRCULATORY SYSTEM: ICD-10-CM

## 2024-02-02 LAB
ALBUMIN SERPL ELPH-MCNC: 2.9 G/DL — LOW (ref 3.3–5)
ALP SERPL-CCNC: 46 U/L — SIGNIFICANT CHANGE UP (ref 40–120)
ALT FLD-CCNC: 12 U/L — SIGNIFICANT CHANGE UP (ref 4–33)
ANION GAP SERPL CALC-SCNC: 8 MMOL/L — SIGNIFICANT CHANGE UP (ref 7–14)
AST SERPL-CCNC: 23 U/L — SIGNIFICANT CHANGE UP (ref 4–32)
BILIRUB SERPL-MCNC: <0.2 MG/DL — SIGNIFICANT CHANGE UP (ref 0.2–1.2)
BUN SERPL-MCNC: 13 MG/DL — SIGNIFICANT CHANGE UP (ref 7–23)
CALCIUM SERPL-MCNC: 8 MG/DL — LOW (ref 8.4–10.5)
CHLORIDE SERPL-SCNC: 106 MMOL/L — SIGNIFICANT CHANGE UP (ref 98–107)
CO2 SERPL-SCNC: 28 MMOL/L — SIGNIFICANT CHANGE UP (ref 22–31)
CREAT SERPL-MCNC: 0.88 MG/DL — SIGNIFICANT CHANGE UP (ref 0.5–1.3)
EGFR: 64 ML/MIN/1.73M2 — SIGNIFICANT CHANGE UP
GLUCOSE SERPL-MCNC: 90 MG/DL — SIGNIFICANT CHANGE UP (ref 70–99)
HCT VFR BLD CALC: 31.7 % — LOW (ref 34.5–45)
HGB BLD-MCNC: 9.2 G/DL — LOW (ref 11.5–15.5)
MCHC RBC-ENTMCNC: 24.1 PG — LOW (ref 27–34)
MCHC RBC-ENTMCNC: 29 GM/DL — LOW (ref 32–36)
MCV RBC AUTO: 83 FL — SIGNIFICANT CHANGE UP (ref 80–100)
NRBC # BLD: 0 /100 WBCS — SIGNIFICANT CHANGE UP (ref 0–0)
NRBC # FLD: 0 K/UL — SIGNIFICANT CHANGE UP (ref 0–0)
PLATELET # BLD AUTO: 307 K/UL — SIGNIFICANT CHANGE UP (ref 150–400)
POTASSIUM SERPL-MCNC: 3.9 MMOL/L — SIGNIFICANT CHANGE UP (ref 3.5–5.3)
POTASSIUM SERPL-SCNC: 3.9 MMOL/L — SIGNIFICANT CHANGE UP (ref 3.5–5.3)
PROT SERPL-MCNC: 5.7 G/DL — LOW (ref 6–8.3)
RBC # BLD: 3.82 M/UL — SIGNIFICANT CHANGE UP (ref 3.8–5.2)
RBC # FLD: 14.5 % — SIGNIFICANT CHANGE UP (ref 10.3–14.5)
SODIUM SERPL-SCNC: 142 MMOL/L — SIGNIFICANT CHANGE UP (ref 135–145)
WBC # BLD: 4.3 K/UL — SIGNIFICANT CHANGE UP (ref 3.8–10.5)
WBC # FLD AUTO: 4.3 K/UL — SIGNIFICANT CHANGE UP (ref 3.8–10.5)

## 2024-02-02 NOTE — H&P PST ADULT - PROBLEM SELECTOR PLAN 2
Patient instructed to take amlodipine hydralazine carvedilol with a sip of water on the morning of procedure.

## 2024-02-02 NOTE — H&P PST ADULT - PROBLEM SELECTOR PLAN 1
Patient tentatively scheduled for endoscopic ultrasound for 12/9/24. Pre-op instructions provided to patient and her niece. Pt given verbal and written instructions with teach back.  Pt verbalized understanding with return demonstration.     CBC CMP done.  Copy of ekg in chart from 12/19/23.  Copy of last cardiac note (12/2023) and echo (5/2023) in chart

## 2024-02-02 NOTE — H&P PST ADULT - NSICDXPASTSURGICALHX_GEN_ALL_CORE_FT
PAST SURGICAL HISTORY:  H/O endoscopy     History of cardioversion     Pancreatic cyst     S/P hysterectomy

## 2024-02-02 NOTE — H&P PST ADULT - NSICDXPASTMEDICALHX_GEN_ALL_CORE_FT
PAST MEDICAL HISTORY:  Atherosclerosis     CAD (coronary artery disease)     CKD (chronic kidney disease)     Cyst of pancreas     Diabetes mellitus     H/O CHF     HLD (hyperlipidemia)     HTN (hypertension)     Hyperkalemia     Stage 4 chronic kidney disease

## 2024-02-02 NOTE — H&P PST ADULT - HISTORY OF PRESENT ILLNESS
89 y/o female PMH CHF, HTN, HLD, CAD, Afib, CKD, diabetes (A1C 5.0 in 12/2023), h/o acute MI in 2021, cardiogenic shock, s/p PCI, LARISSA to circumflex artery x3, Afib s/p cardioversion (2021).  presents to presurgical testing with diagnosis of cyst of pancreas. Pt with unintentional weight loss of 25 lbs. Pt s/p EUS procedure in 12/2023 which showed severe esophagitis with stenosis, gastric erythema and duodenal ulcer. Pt is scheduled for an endoscopic ultrasound.

## 2024-02-02 NOTE — H&P PST ADULT - FUNCTIONAL STATUS
Mets Dasi score 4.06,  Walks 1 to 2 blocks, climbs 1 flight of stairs, ADLs, carrying groceries/4-10 METS

## 2024-02-02 NOTE — H&P PST ADULT - SOURCE OF INFORMATION, PROFILE
Medication list obtained from patient and confirmed. Pt is accompanied by arnaldo Caraballo/patient/chart(s)

## 2024-02-02 NOTE — H&P PST ADULT - NSANTHTIREDRD_ENT_A_CORE
LM again for pt to call back at the [facility] number to provide her preference for continuing her PT during the COVID-19 situation.   No

## 2024-02-02 NOTE — H&P PST ADULT - NEGATIVE LYMPHATIC SYMPTOMS
-- DO NOT REPLY / DO NOT REPLY ALL --  -- Message is from the [facility]--    COVID-19 Universal Screening: Positive    General Patient Message      Reason for Call: Patient exposed to covid + patient on 4/27/21, and requires 14 day quarantine.  No appt found.      Caller Information       Type Contact Phone    04/30/2021 07:56 AM CDT Phone (Incoming) JaniceHaylee (Self) [phone] (M)          Alternative phone number: [phone]    Turnaround time given to caller:   \"This message will be sent to [state Provider's name]. The clinical team will fulfill your request as soon as they review your message.\"    
no enlarged lymph nodes/no tender lymph nodes

## 2024-02-08 NOTE — ASU PATIENT PROFILE, ADULT - FALL HARM RISK - UNIVERSAL INTERVENTIONS
Bed in lowest position, wheels locked, appropriate side rails in place/Call bell, personal items and telephone in reach/Instruct patient to call for assistance before getting out of bed or chair/Non-slip footwear when patient is out of bed/[place] to call system/Physically safe environment - no spills, clutter or unnecessary equipment/Purposeful Proactive Rounding/Room/bathroom lighting operational, light cord in reach

## 2024-02-09 ENCOUNTER — TRANSCRIPTION ENCOUNTER (OUTPATIENT)
Age: 89
End: 2024-02-09

## 2024-02-09 ENCOUNTER — APPOINTMENT (OUTPATIENT)
Dept: GASTROENTEROLOGY | Facility: HOSPITAL | Age: 89
End: 2024-02-09

## 2024-02-09 ENCOUNTER — RESULT REVIEW (OUTPATIENT)
Age: 89
End: 2024-02-09

## 2024-02-09 ENCOUNTER — OUTPATIENT (OUTPATIENT)
Dept: OUTPATIENT SERVICES | Facility: HOSPITAL | Age: 89
LOS: 1 days | Discharge: HOME | End: 2024-02-09
Payer: MEDICARE

## 2024-02-09 VITALS
RESPIRATION RATE: 12 BRPM | WEIGHT: 100.97 LBS | OXYGEN SATURATION: 100 % | HEART RATE: 76 BPM | HEIGHT: 57 IN | SYSTOLIC BLOOD PRESSURE: 124 MMHG | TEMPERATURE: 98 F | DIASTOLIC BLOOD PRESSURE: 73 MMHG

## 2024-02-09 VITALS
OXYGEN SATURATION: 100 % | SYSTOLIC BLOOD PRESSURE: 131 MMHG | DIASTOLIC BLOOD PRESSURE: 93 MMHG | RESPIRATION RATE: 20 BRPM | HEART RATE: 73 BPM

## 2024-02-09 DIAGNOSIS — K86.2 CYST OF PANCREAS: Chronic | ICD-10-CM

## 2024-02-09 DIAGNOSIS — K86.2 CYST OF PANCREAS: ICD-10-CM

## 2024-02-09 DIAGNOSIS — Z98.890 OTHER SPECIFIED POSTPROCEDURAL STATES: Chronic | ICD-10-CM

## 2024-02-09 DIAGNOSIS — Z92.89 PERSONAL HISTORY OF OTHER MEDICAL TREATMENT: Chronic | ICD-10-CM

## 2024-02-09 DIAGNOSIS — Z90.710 ACQUIRED ABSENCE OF BOTH CERVIX AND UTERUS: Chronic | ICD-10-CM

## 2024-02-09 PROCEDURE — 43239 EGD BIOPSY SINGLE/MULTIPLE: CPT

## 2024-02-09 PROCEDURE — 88341 IMHCHEM/IMCYTCHM EA ADD ANTB: CPT | Mod: 26

## 2024-02-09 PROCEDURE — 88342 IMHCHEM/IMCYTCHM 1ST ANTB: CPT | Mod: 26

## 2024-02-09 PROCEDURE — 88305 TISSUE EXAM BY PATHOLOGIST: CPT | Mod: 26

## 2024-02-09 PROCEDURE — 88312 SPECIAL STAINS GROUP 1: CPT | Mod: 26

## 2024-02-09 DEVICE — GWIRE SAVARY 200CM: Type: IMPLANTABLE DEVICE | Status: FUNCTIONAL

## 2024-02-09 RX ORDER — SODIUM CHLORIDE 9 MG/ML
500 INJECTION, SOLUTION INTRAVENOUS
Refills: 0 | Status: DISCONTINUED | OUTPATIENT
Start: 2024-02-09 | End: 2024-02-23

## 2024-02-09 NOTE — ASU DISCHARGE PLAN (ADULT/PEDIATRIC) - ***IN THE EVENT THAT YOU DEVELOP A COMPLICATION AND YOU ARE UNABLE TO REACH YOUR OWN PHYSICIAN, YOU MAY CONTACT:
Statement Selected PAST MEDICAL HISTORY:  Bowel obstruction     CAD (coronary artery disease) cardaic stent x 1    H/O pleural effusion     High cholesterol     Lyme disease     Meningioma

## 2024-02-09 NOTE — ASU PREOP CHECKLIST - HEIGHT IN FEET
" Loss Discharge Instructions   We recommend you see your provider to check on your physical and emotional recovery, and to walk through your experience within 1-2 weeks.  Any further recommendations for follow up will be discussed with your provider at that time.       The Blues and Grief  After delivery you will experience hormone changes and strong emotions, often referred to as the \"baby blues\".  You may find yourself easily upset, tearful or angry.  In addition, you will be grieving for your own loss and may feel terribly tired and not want to face friends, family or new babies.    Your feelings will be hard to predict during this time.  You may have many ups and downs.  Be kind and patient with yourself.    No two people grieve the same way.  This is true of spouses and partners.  Try to have open communication, but don't depend solely on each other for support.  Reach out to friends, family, clergy and your health care providers.  You don't have to handle this alone.    Normal grief after a pregnancy or  loss often lasts for weeks or months.  Over time, you will have more good days than bad ones.  The first year is usually the hardest as you face significant dates and events for the first time.    At first, your sadness or anxiety may keep you from sleeping, eating, being with others or getting out of the house.  If, after a week, you aren't able to take care of basic daily tasks, please call your care provider right away.  It could be more serious than the blues or grief.  Going back to work:  Even though you did not bring home a living baby, you and your partner have a right to any family and bereavement leave benefits your employer offers.  When you do return to work, be prepared for some adjustment time.    Call your health care provider if you have any of these symptoms:  You soak a sanitary pad with blood within 1 hour, or you see blood clots larger than a golf ball.  Bleeding that lasts " more than 6 weeks.  You have vaginal discharge that smells bad.   A fever above 100.4 F (38 C), with or without chills  Severe, pain, cramping or tenderness in your lower belly area.  If you have pain that increases or does not go away from an episiotomy or perineal tear  Increased pain, swelling, redness or fluid around your stitches.  A need to urinate more frequently (use the toilet more often), more urgently (use the toilet very quickly), or it burns when you urinate.  Redness, swelling or pain around a vein in your leg.  Problems with coping with sadness, anxiety, or depression.  Your breasts are engorged (hard and swollen), red and very tender and you have a fever.   If you have nausea and vomiting.  If you have chest pain and cough or are gasping for air.  You have questions or concerns after you return home.    Keep your hands clean:  Always wash your hands before touching your perineal area and stitches.  This helps reduce your risk of infection.  If your hands aren't dirty, you may use an alcohol hand-rub to clean your hands. Keep your nails clean and short.      Then the  home will call them to set up a time to discuss arrangements.      Disposition: Private cremation with      [facility] - [address] [place] [address]  Tel: [phone]  Fax: [phone]     [facility] of the [facility] will help arrange and pay for costs. Family will receive an email from [facility] within the next day for next steps. SUNSHINE will follow-up on Monday with  home and [facility] to check in on progress.      PLAN:      SUNSHINE will check in with  home, [facility] and family early next week.         4

## 2024-02-09 NOTE — PRE PROCEDURE NOTE - PRE PROCEDURE EVALUATION
Attending Physician:  Dr. Escudero    Procedure: EUS    Indication for Procedure: Esophagitis / Pancreatic Cyst/Mass  ________________________________________________________  PAST MEDICAL & SURGICAL HISTORY:  HTN (hypertension)      HLD (hyperlipidemia)      Hyperkalemia      CKD (chronic kidney disease)      Atherosclerosis      CAD (coronary artery disease)      Stage 4 chronic kidney disease      Cyst of pancreas      H/O CHF      Diabetes mellitus      S/P hysterectomy      Pancreatic cyst      H/O endoscopy      History of cardioversion        ALLERGIES:  No Known Allergies    HOME MEDICATIONS:  amLODIPine 2.5 mg oral tablet: 1 tab(s) orally once a day  clopidogrel 75 mg oral tablet: 1 tab(s) orally once a day  Eliquis 2.5 mg oral tablet: 1 tab(s) orally 2 times a day  Entresto 24 mg-26 mg oral tablet: 1 tab(s) orally 2 times a day  hydrALAZINE 50 mg oral tablet: 1 tab(s) orally 2 times a day    AICD/PPM: [ ] yes   [ ] no    PERTINENT LAB DATA:                      PHYSICAL EXAMINATION:    Height (cm): 144.8  Weight (kg): 45.8  BMI (kg/m2): 21.8  BSA (m2): 1.35T(C): 36.5  HR: 76  BP: 105/40  RR: 12  SpO2: 100%    Constitutional: NAD  HEENT: PERRLA, EOMI,    Neck:  No JVD  Respiratory: CTAB/L  Cardiovascular: S1 and S2  Gastrointestinal: BS+, soft, NT/ND  Extremities: No peripheral edema  Neurological: A/O x 3, no focal deficits  Psychiatric: Normal mood, normal affect  Skin: No rashes    ASA Class: I [ ]  II [ ]  III [ x]  IV [ ]    COMMENTS:    The patient is a suitable candidate for the planned procedure unless box checked [ ]  No, explain:    Risks and alternatives to the procedure discussed in detail. All questions answered.

## 2024-02-23 LAB — SURGICAL PATHOLOGY STUDY: SIGNIFICANT CHANGE UP

## 2024-02-26 ENCOUNTER — NON-APPOINTMENT (OUTPATIENT)
Age: 89
End: 2024-02-26

## 2024-02-26 DIAGNOSIS — K26.9 DUODENAL ULCER, UNSPECIFIED AS ACUTE OR CHRONIC, W/OUT HEMORRHAGE OR PERFORATION: ICD-10-CM

## 2024-02-26 DIAGNOSIS — K20.90 ESOPHAGITIS, UNSPECIFIED WITHOUT BLEEDING: ICD-10-CM

## 2024-02-27 PROBLEM — E11.9 TYPE 2 DIABETES MELLITUS WITHOUT COMPLICATIONS: Chronic | Status: ACTIVE | Noted: 2024-02-02

## 2024-03-04 ENCOUNTER — NON-APPOINTMENT (OUTPATIENT)
Age: 89
End: 2024-03-04

## 2024-03-06 ENCOUNTER — RX RENEWAL (OUTPATIENT)
Age: 89
End: 2024-03-06

## 2024-04-03 ENCOUNTER — OUTPATIENT (OUTPATIENT)
Dept: OUTPATIENT SERVICES | Facility: HOSPITAL | Age: 89
LOS: 1 days | Discharge: HOME | End: 2024-04-03
Payer: MEDICARE

## 2024-04-03 ENCOUNTER — APPOINTMENT (OUTPATIENT)
Dept: GASTROENTEROLOGY | Facility: HOSPITAL | Age: 89
End: 2024-04-03

## 2024-04-03 ENCOUNTER — TRANSCRIPTION ENCOUNTER (OUTPATIENT)
Age: 89
End: 2024-04-03

## 2024-04-03 VITALS
HEART RATE: 76 BPM | HEIGHT: 57 IN | WEIGHT: 98.99 LBS | OXYGEN SATURATION: 100 % | SYSTOLIC BLOOD PRESSURE: 148 MMHG | DIASTOLIC BLOOD PRESSURE: 47 MMHG | TEMPERATURE: 97 F | RESPIRATION RATE: 20 BRPM

## 2024-04-03 VITALS
DIASTOLIC BLOOD PRESSURE: 62 MMHG | OXYGEN SATURATION: 100 % | RESPIRATION RATE: 16 BRPM | SYSTOLIC BLOOD PRESSURE: 119 MMHG | HEART RATE: 70 BPM

## 2024-04-03 DIAGNOSIS — Z98.890 OTHER SPECIFIED POSTPROCEDURAL STATES: Chronic | ICD-10-CM

## 2024-04-03 DIAGNOSIS — Z92.89 PERSONAL HISTORY OF OTHER MEDICAL TREATMENT: Chronic | ICD-10-CM

## 2024-04-03 DIAGNOSIS — K86.2 CYST OF PANCREAS: Chronic | ICD-10-CM

## 2024-04-03 DIAGNOSIS — K20.90 ESOPHAGITIS, UNSPECIFIED WITHOUT BLEEDING: ICD-10-CM

## 2024-04-03 DIAGNOSIS — Z90.710 ACQUIRED ABSENCE OF BOTH CERVIX AND UTERUS: Chronic | ICD-10-CM

## 2024-04-03 PROCEDURE — 43235 EGD DIAGNOSTIC BRUSH WASH: CPT | Mod: GC

## 2024-04-03 RX ORDER — SODIUM CHLORIDE 9 MG/ML
1000 INJECTION, SOLUTION INTRAVENOUS
Refills: 0 | Status: COMPLETED | OUTPATIENT
Start: 2024-04-03 | End: 2024-04-03

## 2024-04-03 RX ORDER — SUCRALFATE 1 G/1
1 TABLET ORAL 3 TIMES DAILY
Qty: 90 | Refills: 1 | Status: ACTIVE | COMMUNITY
Start: 2024-04-03 | End: 1900-01-01

## 2024-04-03 RX ADMIN — SODIUM CHLORIDE 30 MILLILITER(S): 9 INJECTION, SOLUTION INTRAVENOUS at 08:04

## 2024-04-03 NOTE — PRE PROCEDURE NOTE - PRE PROCEDURE EVALUATION
Attending Physician:   Dr. Escudero    Procedure: EGD/EUS    Indication for Procedure: Panc cyst and esophageal stricture    ________________________________________________________  PAST MEDICAL & SURGICAL HISTORY:  HTN (hypertension)      HLD (hyperlipidemia)      Hyperkalemia      CKD (chronic kidney disease)      Atherosclerosis      CAD (coronary artery disease)      Stage 4 chronic kidney disease      Cyst of pancreas      H/O CHF      Diabetes mellitus      S/P hysterectomy      Pancreatic cyst      H/O endoscopy      History of cardioversion        ALLERGIES:  No Known Allergies    HOME MEDICATIONS:  amLODIPine 2.5 mg oral tablet: 1 tab(s) orally once a day  clopidogrel 75 mg oral tablet: 1 tab(s) orally once a day  Eliquis 2.5 mg oral tablet: 1 tab(s) orally 2 times a day  Entresto 24 mg-26 mg oral tablet: 1 tab(s) orally 2 times a day  hydrALAZINE 50 mg oral tablet: 1 tab(s) orally 2 times a day    AICD/PPM: [ ] yes   [x ] no    PERTINENT LAB DATA:                      PHYSICAL EXAMINATION:    AVSS  Constitutional: NAD  HEENT: PERRLA, EOMI,    Neck:  No JVD  Respiratory: CTAB/L  Cardiovascular: S1 and S2  Gastrointestinal: BS+, soft, NT/ND  Extremities: No peripheral edema  Neurological: A/O x 3, no focal deficits  Psychiatric: Normal mood, normal affect  Skin: No rashes    ASA Class: I [ ]  II [ ]  III [ x]  IV [ ]    COMMENTS:    The patient is a suitable candidate for the planned procedure unless box checked [ ]  No, explain:

## 2024-04-03 NOTE — ASU DISCHARGE PLAN (ADULT/PEDIATRIC) - ACCOMPANIED BY
[facility] line: [phone]  [facility] line:  [phone]  [facility] line:  [phone] line:  [phone]  [facility] line:  [phone]  [place] line:  [phone]    IF you are not feeling well- cough, fever, shortness of breath or any flu like symptoms, contact your primary care physician or [facility] Iam Rodriguez    Any questions with these instructions please call Maternal Fetal Medicine nurse line today @ # [phone] Family

## 2024-04-30 ENCOUNTER — RX RENEWAL (OUTPATIENT)
Age: 89
End: 2024-04-30

## 2024-05-25 ENCOUNTER — INPATIENT (INPATIENT)
Facility: HOSPITAL | Age: 89
LOS: 2 days | Discharge: HOME | DRG: 312 | End: 2024-05-28
Attending: INTERNAL MEDICINE | Admitting: INTERNAL MEDICINE
Payer: COMMERCIAL

## 2024-05-25 ENCOUNTER — RX RENEWAL (OUTPATIENT)
Age: 89
End: 2024-05-25

## 2024-05-25 VITALS
WEIGHT: 106.92 LBS | OXYGEN SATURATION: 98 % | RESPIRATION RATE: 18 BRPM | TEMPERATURE: 98 F | HEART RATE: 110 BPM | DIASTOLIC BLOOD PRESSURE: 67 MMHG | SYSTOLIC BLOOD PRESSURE: 101 MMHG | HEIGHT: 57 IN

## 2024-05-25 DIAGNOSIS — Z92.89 PERSONAL HISTORY OF OTHER MEDICAL TREATMENT: Chronic | ICD-10-CM

## 2024-05-25 DIAGNOSIS — Z90.710 ACQUIRED ABSENCE OF BOTH CERVIX AND UTERUS: Chronic | ICD-10-CM

## 2024-05-25 DIAGNOSIS — Z98.890 OTHER SPECIFIED POSTPROCEDURAL STATES: Chronic | ICD-10-CM

## 2024-05-25 DIAGNOSIS — K86.2 CYST OF PANCREAS: Chronic | ICD-10-CM

## 2024-05-25 LAB
ALBUMIN SERPL ELPH-MCNC: 1.6 G/DL — LOW (ref 3.3–5)
ALP SERPL-CCNC: 44 U/L — SIGNIFICANT CHANGE UP (ref 40–120)
ALT FLD-CCNC: 8 U/L — LOW (ref 10–45)
ANION GAP SERPL CALC-SCNC: 8 MMOL/L — SIGNIFICANT CHANGE UP (ref 5–17)
AST SERPL-CCNC: 21 U/L — SIGNIFICANT CHANGE UP (ref 10–40)
BASOPHILS # BLD AUTO: 0.02 K/UL — SIGNIFICANT CHANGE UP (ref 0–0.2)
BASOPHILS NFR BLD AUTO: 0.4 % — SIGNIFICANT CHANGE UP (ref 0–2)
BILIRUB SERPL-MCNC: 0.2 MG/DL — SIGNIFICANT CHANGE UP (ref 0.2–1.2)
BUN SERPL-MCNC: 11 MG/DL — SIGNIFICANT CHANGE UP (ref 7–23)
CALCIUM SERPL-MCNC: 6.6 MG/DL — LOW (ref 8.4–10.5)
CHLORIDE SERPL-SCNC: 109 MMOL/L — HIGH (ref 96–108)
CO2 SERPL-SCNC: 26 MMOL/L — SIGNIFICANT CHANGE UP (ref 22–31)
CREAT SERPL-MCNC: 0.77 MG/DL — SIGNIFICANT CHANGE UP (ref 0.5–1.3)
EGFR: 75 ML/MIN/1.73M2 — SIGNIFICANT CHANGE UP
EOSINOPHIL # BLD AUTO: 0.04 K/UL — SIGNIFICANT CHANGE UP (ref 0–0.5)
EOSINOPHIL NFR BLD AUTO: 0.7 % — SIGNIFICANT CHANGE UP (ref 0–6)
GLUCOSE SERPL-MCNC: 100 MG/DL — HIGH (ref 70–99)
HCT VFR BLD CALC: 30.7 % — LOW (ref 34.5–45)
HGB BLD-MCNC: 9.2 G/DL — LOW (ref 11.5–15.5)
IMM GRANULOCYTES NFR BLD AUTO: 0.4 % — SIGNIFICANT CHANGE UP (ref 0–0.9)
LYMPHOCYTES # BLD AUTO: 0.79 K/UL — LOW (ref 1–3.3)
LYMPHOCYTES # BLD AUTO: 14.6 % — SIGNIFICANT CHANGE UP (ref 13–44)
MAGNESIUM SERPL-MCNC: 1.3 MG/DL — LOW (ref 1.6–2.6)
MCHC RBC-ENTMCNC: 23.1 PG — LOW (ref 27–34)
MCHC RBC-ENTMCNC: 30 GM/DL — LOW (ref 32–36)
MCV RBC AUTO: 76.9 FL — LOW (ref 80–100)
MONOCYTES # BLD AUTO: 0.51 K/UL — SIGNIFICANT CHANGE UP (ref 0–0.9)
MONOCYTES NFR BLD AUTO: 9.4 % — SIGNIFICANT CHANGE UP (ref 2–14)
NEUTROPHILS # BLD AUTO: 4.03 K/UL — SIGNIFICANT CHANGE UP (ref 1.8–7.4)
NEUTROPHILS NFR BLD AUTO: 74.5 % — SIGNIFICANT CHANGE UP (ref 43–77)
NRBC # BLD: 0 /100 WBCS — SIGNIFICANT CHANGE UP (ref 0–0)
PLATELET # BLD AUTO: 296 K/UL — SIGNIFICANT CHANGE UP (ref 150–400)
POTASSIUM SERPL-MCNC: 3.2 MMOL/L — LOW (ref 3.5–5.3)
POTASSIUM SERPL-SCNC: 3.2 MMOL/L — LOW (ref 3.5–5.3)
PROT SERPL-MCNC: 4.4 G/DL — LOW (ref 6–8.3)
RBC # BLD: 3.99 M/UL — SIGNIFICANT CHANGE UP (ref 3.8–5.2)
RBC # FLD: 16.6 % — HIGH (ref 10.3–14.5)
SODIUM SERPL-SCNC: 143 MMOL/L — SIGNIFICANT CHANGE UP (ref 135–145)
TROPONIN I, HIGH SENSITIVITY RESULT: 17.1 NG/L — SIGNIFICANT CHANGE UP
TROPONIN I, HIGH SENSITIVITY RESULT: 22.9 NG/L — SIGNIFICANT CHANGE UP
WBC # BLD: 5.41 K/UL — SIGNIFICANT CHANGE UP (ref 3.8–10.5)
WBC # FLD AUTO: 5.41 K/UL — SIGNIFICANT CHANGE UP (ref 3.8–10.5)

## 2024-05-25 PROCEDURE — 70450 CT HEAD/BRAIN W/O DYE: CPT | Mod: 26,MC

## 2024-05-25 PROCEDURE — 71045 X-RAY EXAM CHEST 1 VIEW: CPT | Mod: 26

## 2024-05-25 PROCEDURE — 99285 EMERGENCY DEPT VISIT HI MDM: CPT

## 2024-05-25 RX ORDER — APIXABAN 2.5 MG/1
2.5 TABLET, FILM COATED ORAL EVERY 12 HOURS
Refills: 0 | Status: DISCONTINUED | OUTPATIENT
Start: 2024-05-25 | End: 2024-05-28

## 2024-05-25 RX ORDER — MAGNESIUM SULFATE 500 MG/ML
1 VIAL (ML) INJECTION ONCE
Refills: 0 | Status: COMPLETED | OUTPATIENT
Start: 2024-05-25 | End: 2024-05-25

## 2024-05-25 RX ORDER — SACUBITRIL AND VALSARTAN 24; 26 MG/1; MG/1
1 TABLET, FILM COATED ORAL
Refills: 0 | Status: DISCONTINUED | OUTPATIENT
Start: 2024-05-26 | End: 2024-05-27

## 2024-05-25 RX ORDER — POTASSIUM CHLORIDE 20 MEQ
20 PACKET (EA) ORAL ONCE
Refills: 0 | Status: COMPLETED | OUTPATIENT
Start: 2024-05-25 | End: 2024-05-25

## 2024-05-25 RX ORDER — ATORVASTATIN CALCIUM 80 MG/1
40 TABLET, FILM COATED ORAL AT BEDTIME
Refills: 0 | Status: DISCONTINUED | OUTPATIENT
Start: 2024-05-25 | End: 2024-05-28

## 2024-05-25 RX ORDER — CLOPIDOGREL BISULFATE 75 MG/1
75 TABLET, FILM COATED ORAL
Qty: 90 | Refills: 3 | Status: ACTIVE | COMMUNITY
Start: 2021-08-14 | End: 1900-01-01

## 2024-05-25 RX ORDER — CLOPIDOGREL BISULFATE 75 MG/1
75 TABLET, FILM COATED ORAL DAILY
Refills: 0 | Status: DISCONTINUED | OUTPATIENT
Start: 2024-05-26 | End: 2024-05-28

## 2024-05-25 RX ORDER — SODIUM CHLORIDE 9 MG/ML
1000 INJECTION, SOLUTION INTRAVENOUS
Refills: 0 | Status: COMPLETED | OUTPATIENT
Start: 2024-05-25 | End: 2024-05-25

## 2024-05-25 RX ORDER — CARVEDILOL PHOSPHATE 80 MG/1
12.5 CAPSULE, EXTENDED RELEASE ORAL EVERY 12 HOURS
Refills: 0 | Status: DISCONTINUED | OUTPATIENT
Start: 2024-05-26 | End: 2024-05-27

## 2024-05-25 RX ADMIN — SODIUM CHLORIDE 60 MILLILITER(S): 9 INJECTION, SOLUTION INTRAVENOUS at 21:53

## 2024-05-25 RX ADMIN — APIXABAN 2.5 MILLIGRAM(S): 2.5 TABLET, FILM COATED ORAL at 22:14

## 2024-05-25 RX ADMIN — Medication 20 MILLIEQUIVALENT(S): at 22:14

## 2024-05-25 RX ADMIN — Medication 100 GRAM(S): at 22:15

## 2024-05-25 NOTE — H&P ADULT - HISTORY OF PRESENT ILLNESS
87 yo female w/ a hx of a fib, htn, CAD s/p PCI, CKD  Ms Herrera is an 89 yo female w/ hypertension, paroxysmal afib on Eliquis, cardiomyopathy, CAD, Hx of cardiac stents, presents to the ED via EMS, because of syncope.  Daughter  states she was shopping with her mom and while waiting at the , pt suddenly passed out, apparently just went down on the floor.  There was no head injury.  She tried to arouse her and pt did respond after a minute, did not complain of anything and does not know what happened.  When EMS arrived, pt was awake and states she was fine, but while EMS was checking her, pt passed out again, she woke up for a few minutes then she was out again for a few seconds enroute to the ED.  She was able to be aroused in a few seconds and by the time she got to the ED, she was more awake.  Pt denying any headache, dizziness, chest pain, dyspnea, nausea prior to the syncopal episode.  Pt states the last thing she remembered was her vison got blurred and black. Pt was afebrile. O2 sats were 97% on room air.   Initial BP in the ED was 106/60, pulse of 110.  Orthostatic vitals rechecked showed Lying BP: 87/71 HR: 95  Sitting BP: 66/53 HR: 104  Standing BP  Ms Herrera is an 87 yo female w/ hypertension, paroxysmal afib on Eliquis, cardiomyopathy, CAD, Hx of cardiac stents, presents to the ED via EMS, because of syncope.  Daughter  states she was shopping with her mom and while waiting at the , pt suddenly passed out, apparently just went down on the floor.  There was no head injury.  She tried to arouse her and pt did respond after a minute, did not complain of anything and does not know what happened.  When EMS arrived, pt was awake and states she was fine, but while EMS was checking her, pt passed out again, she woke up for a few minutes then she was out again for a few seconds enroute to the ED.  She was able to be aroused in a few seconds and by the time she got to the ED, she was more awake.  Pt denying any headache, dizziness, chest pain, dyspnea, nausea prior to the syncopal episode.  Pt states the last thing she remembered was her vison got blurred and black.   Pt admits she has not eaten anything all day, because she usually eats late in the day.  She did take all her daytime meds: Eliquis, Amlodipine, Carvedilol, Hydralazine, Plavix.  Initial BP in the ED was 106/60, pulse of 110.  Orthostatic vitals rechecked showed Lying BP: 87/71 HR: 95  Sitting BP: 66/53 HR: 104  Standing BP 73/60, pulse of 102.   Pt was afebrile. O2 sats were 97% on room air.   Initial EKG showed NSR 90/min.  1st trop was 17.  Labs showed K of 3.2, Mg of 1.5.  H/H is 9/30 ( at baseline).   Head CT was negative for acute CVA, no hemorrhage no fracture.  +ve volume loss.  Cxray showed cardiomegaly, no infiltrates, nor effusion.    While in the ED, pt also had a short run of rapid afib to 120's-130's.   Pt was asymptomatic.      Ms Herrera is an 89 yo female w/ hypertension, paroxysmal afib on Eliquis, cardiomyopathy, CAD, Hx of cardiac stents, presents to the ED via EMS, because of syncope.  Daughter  states she was shopping with her mom and while waiting at the , pt suddenly passed out, apparently just went down on the floor.  There was no head injury.  She tried to arouse her and pt did respond after a minute, did not complain of anything and does not know what happened.  When EMS arrived, pt was awake and states she was fine, but while EMS was checking her, pt passed out again, she woke up for a few minutes then she was out again for a few seconds enroute to the ED.  She was able to be aroused in a few seconds and by the time she got to the ED, she was more awake.  Pt denying any headache, dizziness, chest pain, dyspnea, nausea prior to the syncopal episode.  Pt states the last thing she remembered was her vison got blurred and black.   Pt admits she has not eaten anything all day, because she usually eats late in the day.  She did take all her daytime meds: Eliquis, Amlodipine, Carvedilol, Hydralazine, Entresto and Plavix.  Initial BP in the ED was 106/60, pulse of 110.  Orthostatic vitals rechecked showed Lying BP: 87/71 HR: 95  Sitting BP: 66/53 HR: 104  Standing BP 73/60, pulse of 102.   Pt was afebrile. O2 sats were 97% on room air.   Initial EKG showed NSR 90/min.  1st trop was 17.  Labs showed K of 3.2, Mg of 1.5.  H/H is 9/30 ( at baseline).   Head CT was negative for acute CVA, no hemorrhage no fracture.  +ve volume loss.  Cxray showed cardiomegaly, no infiltrates, nor effusion.    While in the ED, pt also had a short run of rapid afib to 120's-130's.   Pt was asymptomatic.

## 2024-05-25 NOTE — H&P ADULT - GASTROINTESTINAL
soft/nontender/nondistended/normal active bowel sounds/no guarding/no rigidity/no organomegaly/no palpable joseline

## 2024-05-25 NOTE — H&P ADULT - NSHPPHYSICALEXAM_GEN_ALL_CORE
Vital Signs (24 Hrs):  T(C): 36.9 (05-25-24 @ 17:13), Max: 36.9 (05-25-24 @ 17:13)  HR: 80 (05-25-24 @ 18:25) (80 - 110)  BP: 109/57 (05-25-24 @ 18:25) (101/67 - 109/57)  RR: 20 (05-25-24 @ 18:25) (18 - 20)  SpO2: 100% (05-25-24 @ 18:25) (98% - 100%)  Wt(kg): --  Daily Height in cm: 144.78 (25 May 2024 17:13)    Daily     I&O's Summary Vital Signs (24 Hrs):  T(C): 36.9 (05-25-24 @ 17:13), Max: 36.9 (05-25-24 @ 17:13)  HR: 80 (05-25-24 @ 18:25) (80 - 110)  BP: 109/57 (05-25-24 @ 18:25) (101/67 - 109/57)  RR: 20 (05-25-24 @ 18:25) (18 - 20)  SpO2: 100% (05-25-24 @ 18:25) (98% - 100%)  Wt(kg): --  Daily Height in cm: 144.78 (25 May 2024 17:13)    Vital Signs Last 24 Hrs  T(C): 36.9 (05-25-24 @ 23:58), Max: 36.9 (05-25-24 @ 17:13)  T(F): 98.5 (05-25-24 @ 23:58), Max: 98.5 (05-25-24 @ 23:58)  HR: 86 (05-25-24 @ 23:58) (80 - 110)  BP: 106/66 (05-25-24 @ 23:58) (101/67 - 109/57)  BP(mean): 79 (05-25-24 @ 23:58) (79 - 79)  RR: 17 (05-25-24 @ 23:58) (17 - 20)  SpO2: 96% (05-25-24 @ 23:58) (96% - 100%)    Orthostatic VS  05-25-24 @ 20:34  Lying BP: 87/71 HR: 95  Sitting BP: 66/53 HR: 104  Standing BP: 73/60 HR: 102

## 2024-05-25 NOTE — ED ADULT NURSE NOTE - OBJECTIVE STATEMENT
states she fell to ground when shopping cart she was pushing got a bit away from her, denies any injury, VSS-family at bedside

## 2024-05-25 NOTE — ED PROVIDER NOTE - OBJECTIVE STATEMENT
88-year-old female history of hypertension from home came to the emergency room chief complaint of passing out twice close to the shopping area witnessed by the daughter patient denied any chest pain or shortness of breath or abdominal pain vomiting

## 2024-05-25 NOTE — ED ADULT NURSE REASSESSMENT NOTE - NS ED NURSE REASSESS COMMENT FT1
pt  with  positive orthostatic b/p and hr  she converted to a fib  md aware pt  to bee admitted  dr moran notified

## 2024-05-25 NOTE — H&P ADULT - ASSESSMENT
Ms Herrera is an 89 yo female w/ hypertension, paroxysmal afib on Eliquis, cardiomyopathy, CAD, Hx of cardiac stents, presents to the with syncopal episodes.   Likely vasovagal, patient does appear dehydrated, and likely also due to meds.   Pt was hypotensive, orthostatic.    She is hypoalbuminemic, hypokalemic, hypomagnesemic.   Pt also with anemia.   Episode of brief rapid afib, likely due to dehydration.     Will place in observation in telemetry  Would hydrate gently  Replete K, Mg  Would continue Eliquis, Clopidrogel  Would hold PM dose of Carvedilol - resume in AM with parameters  Would d/c Amlodipine, Hydralazine at this time  FFup labs, check TSH, anemia, studies  Cardiac echo ordered  Cardiology consult  Recheck orthostatics in AM  DVT prophylaxis - pt is already on Eliquis       Ms Herrera is an 87 yo female w/ hypertension, paroxysmal afib on Eliquis, cardiomyopathy, CAD, Hx of cardiac stents, presents to the with syncopal episodes.   Likely vasovagal, patient does appear dehydrated, and likely also due to meds.   Pt was hypotensive, orthostatic.    She is hypoalbuminemic, hypokalemic, hypomagnesemic.   Pt also with anemia.   Episode of brief rapid afib, likely due to dehydration.     Will place in observation in telemetry  Would hydrate gently  Replete K, Mg  Would continue Eliquis, Clopidrogel  Would hold PM dose of Carvedilol and Entresto - resume in AM with parameters  Would d/c Amlodipine, Hydralazine at this time  FFup labs, check TSH, anemia, studies  Cardiac echo ordered  Cardiology consult  Recheck orthostatics in AM  DVT prophylaxis - pt is already on Eliquis

## 2024-05-25 NOTE — ED ADULT NURSE NOTE - ISAR MEMORY
Patient Specific Counseling (Will Not Stick From Patient To Patient): .\\nHas been previously moderately well controlled on topicals, however now failing super potent class of topical corticosteroids with worsening of psoriatic plaques to approx 10% BSA and extensively involving scalp and groin and detrimentally affected his ADL’s. Plan to start approval process for Mikie, paperwork completed [facility] received. Pt to obtain Quant gold, will continue super potent steroids in meantime. \\n. Patient Specific Counseling (Will Not Stick From Patient To Patient): .\\nHas been previously moderately well controlled on topicals, however now failing super potent class of topical corticosteroids with worsening of psoriatic plaques to approx 10% BSA and extensively involving scalp and groin and detrimentally affected his ADL’s. Plan to start approval process for Mikie, [facility]. Pt to obtain Quant gold, will continue super potent steroids in meantime. \\n. No

## 2024-05-25 NOTE — ED ADULT NURSE NOTE - NSFALLHARMRISKINTERV_ED_ALL_ED

## 2024-05-25 NOTE — ED PROVIDER NOTE - CLINICAL SUMMARY MEDICAL DECISION MAKING FREE TEXT BOX
88-year-old female history of hypertension from home came to the emergency room chief complaint of passing out twice close to the shopping area witnessed by the daughter patient denied any chest pain or shortness of breath or abdominal pain vomiting  EKG sinus at 80 nonspecific ST-T changes 2 sets of troponins negative CBC CMP within normal limits

## 2024-05-25 NOTE — PATIENT PROFILE ADULT - NSPROMEDSBROUGHTTOHOSP_GEN_A_NUR
Body Location Override (Optional - Billing Will Still Be Based On Selected Body Map Location If Applicable): LEFT GREAT TOENAIL Detail Level: Detailed Billing Type: Third-Party Bill Add [id number] To Bill?: No Lab: [id number] Lab Facility:  no

## 2024-05-25 NOTE — PATIENT PROFILE ADULT - FALL HARM RISK - HARM RISK INTERVENTIONS
Assistance with ambulation/Assistance OOB with selected safe patient handling equipment/Communicate Risk of Fall with Harm to all staff/Monitor gait and stability/Reinforce activity limits and safety measures with patient and family/Sit up slowly, dangle for a short time, stand at bedside before walking/Tailored Fall Risk Interventions/Visual Cue: Yellow wristband and red socks/Bed in lowest position, wheels locked, appropriate side rails in place/Call bell, personal items and telephone in reach/Instruct patient to call for assistance before getting out of bed or chair/Non-slip footwear when patient is out of bed/[place] to call system/Physically safe environment - no spills, clutter or unnecessary equipment/Purposeful Proactive Rounding/Room/bathroom lighting operational, light cord in reach Assistance with ambulation/Assistance OOB with selected safe patient handling equipment/Communicate Risk of Fall with Harm to all staff/Discuss with provider need for PT consult/Monitor gait and stability/Provide patient with walking aids - walker, cane, crutches/Reinforce activity limits and safety measures with patient and family/Sit up slowly, dangle for a short time, stand at bedside before walking/Tailored Fall Risk Interventions/Visual Cue: Yellow wristband and red socks/Bed in lowest position, wheels locked, appropriate side rails in place/Call bell, personal items and telephone in reach/Instruct patient to call for assistance before getting out of bed or chair/Non-slip footwear when patient is out of bed/[place] to call system/Physically safe environment - no spills, clutter or unnecessary equipment/Purposeful Proactive Rounding/Room/bathroom lighting operational, light cord in reach

## 2024-05-25 NOTE — H&P ADULT - TIME BILLING
Management of acute medical problem, thorough review of labs and imaging, discussion with consultant.   In my medical judgement, I deem it necessary for this patient to require at least 24-48 hours of acute treatment and reassessment, event monitoring that may require immediate intervention and further diagnostic and medical evaluation to establish a definitive treatment plan.

## 2024-05-26 ENCOUNTER — TRANSCRIPTION ENCOUNTER (OUTPATIENT)
Age: 89
End: 2024-05-26

## 2024-05-26 ENCOUNTER — RESULT REVIEW (OUTPATIENT)
Age: 89
End: 2024-05-26

## 2024-05-26 LAB
A1C WITH ESTIMATED AVERAGE GLUCOSE RESULT: 5.3 % — SIGNIFICANT CHANGE UP (ref 4–5.6)
ALBUMIN SERPL ELPH-MCNC: 1.5 G/DL — LOW (ref 3.3–5)
ALP SERPL-CCNC: 47 U/L — SIGNIFICANT CHANGE UP (ref 40–120)
ALT FLD-CCNC: 13 U/L — SIGNIFICANT CHANGE UP (ref 10–45)
ANION GAP SERPL CALC-SCNC: 6 MMOL/L — SIGNIFICANT CHANGE UP (ref 5–17)
AST SERPL-CCNC: 21 U/L — SIGNIFICANT CHANGE UP (ref 10–40)
BILIRUB SERPL-MCNC: 0.3 MG/DL — SIGNIFICANT CHANGE UP (ref 0.2–1.2)
BUN SERPL-MCNC: 12 MG/DL — SIGNIFICANT CHANGE UP (ref 7–23)
CALCIUM SERPL-MCNC: 7.7 MG/DL — LOW (ref 8.4–10.5)
CHLORIDE SERPL-SCNC: 105 MMOL/L — SIGNIFICANT CHANGE UP (ref 96–108)
CO2 SERPL-SCNC: 28 MMOL/L — SIGNIFICANT CHANGE UP (ref 22–31)
CREAT SERPL-MCNC: 0.72 MG/DL — SIGNIFICANT CHANGE UP (ref 0.5–1.3)
EGFR: 82 ML/MIN/1.73M2 — SIGNIFICANT CHANGE UP
ESTIMATED AVERAGE GLUCOSE: 105 MG/DL — SIGNIFICANT CHANGE UP (ref 68–114)
FERRITIN SERPL-MCNC: 12 NG/ML — LOW (ref 13–330)
FOLATE SERPL-MCNC: 9.5 NG/ML — SIGNIFICANT CHANGE UP
GLUCOSE SERPL-MCNC: 82 MG/DL — SIGNIFICANT CHANGE UP (ref 70–99)
HCT VFR BLD CALC: 30.8 % — LOW (ref 34.5–45)
HGB BLD-MCNC: 9.3 G/DL — LOW (ref 11.5–15.5)
IRON SATN MFR SERPL: 21 UG/DL — LOW (ref 30–160)
MAGNESIUM SERPL-MCNC: 1.6 MG/DL — SIGNIFICANT CHANGE UP (ref 1.6–2.6)
MCHC RBC-ENTMCNC: 22.9 PG — LOW (ref 27–34)
MCHC RBC-ENTMCNC: 30.2 GM/DL — LOW (ref 32–36)
MCV RBC AUTO: 75.9 FL — LOW (ref 80–100)
NRBC # BLD: 0 /100 WBCS — SIGNIFICANT CHANGE UP (ref 0–0)
PLATELET # BLD AUTO: 314 K/UL — SIGNIFICANT CHANGE UP (ref 150–400)
POTASSIUM SERPL-MCNC: 4.1 MMOL/L — SIGNIFICANT CHANGE UP (ref 3.5–5.3)
POTASSIUM SERPL-SCNC: 4.1 MMOL/L — SIGNIFICANT CHANGE UP (ref 3.5–5.3)
PROT SERPL-MCNC: 4.7 G/DL — LOW (ref 6–8.3)
RBC # BLD: 4.06 M/UL — SIGNIFICANT CHANGE UP (ref 3.8–5.2)
RBC # FLD: 16.6 % — HIGH (ref 10.3–14.5)
SODIUM SERPL-SCNC: 139 MMOL/L — SIGNIFICANT CHANGE UP (ref 135–145)
TROPONIN I, HIGH SENSITIVITY RESULT: 45.9 NG/L — SIGNIFICANT CHANGE UP
TSH SERPL-MCNC: 4.25 UIU/ML — HIGH (ref 0.36–3.74)
VIT B12 SERPL-MCNC: 1109 PG/ML — SIGNIFICANT CHANGE UP (ref 232–1245)
WBC # BLD: 3.77 K/UL — LOW (ref 3.8–10.5)
WBC # FLD AUTO: 3.77 K/UL — LOW (ref 3.8–10.5)

## 2024-05-26 PROCEDURE — 74175 CTA ABDOMEN W/CONTRAST: CPT | Mod: 26,MC

## 2024-05-26 PROCEDURE — 93306 TTE W/DOPPLER COMPLETE: CPT | Mod: 26

## 2024-05-26 PROCEDURE — 71275 CT ANGIOGRAPHY CHEST: CPT | Mod: 26,MC

## 2024-05-26 RX ORDER — MAGNESIUM OXIDE 400 MG ORAL TABLET 241.3 MG
400 TABLET ORAL
Refills: 0 | Status: DISCONTINUED | OUTPATIENT
Start: 2024-05-26 | End: 2024-05-28

## 2024-05-26 RX ORDER — FERROUS SULFATE 325(65) MG
325 TABLET ORAL DAILY
Refills: 0 | Status: DISCONTINUED | OUTPATIENT
Start: 2024-05-26 | End: 2024-05-28

## 2024-05-26 RX ADMIN — MAGNESIUM OXIDE 400 MG ORAL TABLET 400 MILLIGRAM(S): 241.3 TABLET ORAL at 14:15

## 2024-05-26 RX ADMIN — APIXABAN 2.5 MILLIGRAM(S): 2.5 TABLET, FILM COATED ORAL at 18:30

## 2024-05-26 RX ADMIN — ATORVASTATIN CALCIUM 40 MILLIGRAM(S): 80 TABLET, FILM COATED ORAL at 21:39

## 2024-05-26 RX ADMIN — MAGNESIUM OXIDE 400 MG ORAL TABLET 400 MILLIGRAM(S): 241.3 TABLET ORAL at 18:30

## 2024-05-26 RX ADMIN — CLOPIDOGREL BISULFATE 75 MILLIGRAM(S): 75 TABLET, FILM COATED ORAL at 14:16

## 2024-05-26 RX ADMIN — APIXABAN 2.5 MILLIGRAM(S): 2.5 TABLET, FILM COATED ORAL at 06:03

## 2024-05-26 RX ADMIN — MAGNESIUM OXIDE 400 MG ORAL TABLET 400 MILLIGRAM(S): 241.3 TABLET ORAL at 11:01

## 2024-05-26 NOTE — CONSULT NOTE ADULT - ASSESSMENT
88-year-old woman admitted with syncope.  She has cardiac history of paroxysmal atrial fibrillation on anticoagulation, history of cardiomyopathy with recovered LV function, coronary artery disease, status post coronary stents.  She had a paroxysm of atrial fibrillation in ED but has since converted to sinus rhythm.  Prior to syncope, patient reportedly had not been eating and was probably dehydrated.  Patient reports no premonitory symptoms prior to syncope.  On physical examination, currently blood pressure is low normal.  She appears to be euvolemic.  Telemetry demonstrating sinus rhythm.  Admission EKG demonstrating atrial fibrillation.  Suspect syncope related to dehydration, possible vagal.  Rule out arrhythmia.    Recommendations  -Continue with telemetry while in the hospital.  -Please obtain daily EKGs.  -Monitor labs and replete electrolytes as needed.  -Encourage p.o. and also IV hydration.  -Check orthostatics.  -Check echocardiogram.  -Continue with apixaban and clopidogrel.  -Resume carvedilol and Entresto with hold parameters.  -Agree with plans to discontinue home medications of amlodipine and hydralazine.  -Discussed with patient and with hospitalist.

## 2024-05-26 NOTE — PROGRESS NOTE ADULT - NS ATTEND AMEND GEN_ALL_CORE FT
Seen and examined. Chart reviewed.   patient is improving clinically.   Agree with above a/p  Edited where ever is necessary    no complaints/    ROS:  denied fever/chills/CP/SOB/cough/palpitation/dizziness/abdominal pian/nausea/vomiting/diarrhoea/constipation/dysuria/leg or calf pain/headaches.all other ROS neg    GENERAL: Not in distress. Alert    HEENT: AT/NC. clear conjuctiva, MMM.   no pallor or icterus  CARDIOVASCULAR: RRR S1, S2. SM+. no rubs/gallop  LUNGS: BLAE+, no rales, no wheezing, no rhonchi.    ABDOMEN: ND. Soft,  NT, no guarding / rebound / rigidity. BS normoactive. No CVA tenderness.    BACK: No spine tenderness.  EXTREMITIES: no edema. no leg or calf TP.  SKIN: no rash.   NEUROLOGIC: AAO*3.strength is symmetric, sensation intact, speech fluent.    PSYCHIATRIC: Calm.  No agitation.    Orthostatic VS noted. hypotensive in one normal in other arm. >20 mm difference. will repeat VS. needs to r/o dissection by aortic run off if significant diff in two arms. PA is informed. does not look like she in dissection clinically. no symptoms. syncope mpre looks like vasovagal as she was shopping. no orthostatic drop. cardio noted Seen and examined. Chart reviewed.   patient is improving clinically.   Agree with above a/p  Edited where ever is necessary    no complaints/    ROS:  denied fever/chills/CP/SOB/cough/palpitation/dizziness/abdominal pian/nausea/vomiting/diarrhoea/constipation/dysuria/leg or calf pain/headaches.all other ROS neg    GENERAL: Not in distress. Alert    HEENT: AT/NC. clear conjuctiva, MMM.   no pallor or icterus  CARDIOVASCULAR: RRR S1, S2. SM+. no rubs/gallop  LUNGS: BLAE+, no rales, no wheezing, no rhonchi.    ABDOMEN: ND. Soft,  NT, no guarding / rebound / rigidity. BS normoactive. No CVA tenderness.    BACK: No spine tenderness.  EXTREMITIES: no edema. no leg or calf TP.  SKIN: no rash.   NEUROLOGIC: AAO*3.strength is symmetric, sensation intact, speech fluent.    PSYCHIATRIC: Calm.  No agitation.    Orthostatic VS noted. hypotensive in one normal in other arm. >20 mm difference. will repeat VS. needs to r/o dissection by aortic run off if significant diff in two arms. PA is informed. does not look like she in dissection clinically. no symptoms. syncope mpre looks like vasovagal as she was shopping. no orthostatic drop. cardio noted. TTE reviewed

## 2024-05-26 NOTE — DISCHARGE NOTE PROVIDER - ATTENDING DISCHARGE PHYSICAL EXAMINATION:
GENERAL: Not in distress. Alert    HEENT: AT/NC. clear conjuctiva, MMM.   no pallor or icterus  CARDIOVASCULAR: RRR S1, S2. soft SM. no rubs/gallop  LUNGS: BLAE+, no rales, no wheezing, no rhonchi.    ABDOMEN: ND. Soft,  NT, no guarding / rebound / rigidity. BS normoactive. No CVA tenderness.    BACK: No spine tenderness.  EXTREMITIES: no edema. no leg or calf TP.  SKIN: no rash.  NEUROLOGIC: AAO*3.strength is symmetric, sensation intact, speech fluent.    PSYCHIATRIC: Calm.  No agitation.

## 2024-05-26 NOTE — DISCHARGE NOTE PROVIDER - PROVIDER TOKENS
PROVIDER:[TOKEN:[[id number]]] PROVIDER:[TOKEN:[[id number]]],PROVIDER:[TOKEN:[[id number]]]

## 2024-05-26 NOTE — DIETITIAN INITIAL EVALUATION ADULT - PERTINENT LABORATORY DATA
05-26    139  |  105  |  12  ----------------------------<  82  4.1   |  28  |  0.72    Ca    7.7<L>      26 May 2024 06:43  Mg     1.6     05-26    TPro  4.7<L>  /  Alb  1.5<L>  /  TBili  0.3  /  DBili  x   /  AST  21  /  ALT  13  /  AlkPhos  47  05-26  A1C with Estimated Average Glucose Result: 5.3 % (05-26-24 @ 06:43)  A1C with Estimated Average Glucose Result: 5.0 % (12-18-23 @ 14:41)

## 2024-05-26 NOTE — CONSULT NOTE ADULT - SUBJECTIVE AND OBJECTIVE BOX
[facility] Consultation    CHIEF COMPLAINT: Patient is a 88y old  Female who presents with a chief complaint of syncope x 3 (25 May 2024 20:41)  The patient is seen and examined.  The chart is reviewed.  Case discussed with patient and with hospitalist.  Patient is very source of the history.    HPI:  Ms Herrera is an 87 yo female w/ hypertension, paroxysmal afib on Eliquis, cardiomyopathy, CAD, Hx of cardiac stents, presents to the ED via EMS, because of syncope.  Daughter  states she was shopping with her mom and while waiting at the [employer], pt suddenly passed out, apparently just went down on the floor.  There was no head injury.  She tried to arouse her and pt did respond after a minute, did not complain of anything and does not know what happened.  When EMS arrived, pt was awake and states she was fine, but while EMS was checking her, pt passed out again, she woke up for a few minutes then she was out again for a few seconds enroute to the ED.  She was able to be aroused in a few seconds and by the time she got to the ED, she was more awake.  Pt denying any headache, dizziness, chest pain, dyspnea, nausea prior to the syncopal episode.  Pt states the last thing she remembered was her vison got blurred and black.   Pt admits she has not eaten anything all day, because she usually eats late in the day.  She did take all her daytime meds: Eliquis, Amlodipine, Carvedilol, Hydralazine, Entresto and Plavix.  Initial BP in the ED was 106/60, pulse of 110.  Orthostatic vitals rechecked showed Lying BP: 87/71 HR: 95  Sitting BP: 66/53 HR: 104  Standing BP 73/60, pulse of 102.   Pt was afebrile. O2 sats were 97% on room air.   Initial EKG showed NSR 90/min.  1st trop was 17.  Labs showed K of 3.2, Mg of 1.5.  H/H is 9/30 ( at baseline).   Head CT was negative for acute CVA, no hemorrhage no fracture.  +ve volume loss.  Cxray showed cardiomegaly, no infiltrates, nor effusion.    While in the ED, pt also had a short run of rapid afib to 120's-130's.   Pt was asymptomatic.      (25 May 2024 20:41)    As above.  Patient currently in bed on medical floor, awake alert, states that she is back to her baseline.  Prior to hospitalization, patient reported sedentary lifestyle, though she was able to take care of daily activities and go for regular shopping.  No reports of chest pain or chest pressure.  No shortness of breath or dyspnea on exertion.  No palpitations.  No edema.  No orthopnea.  No PND.    PAST MEDICAL & SURGICAL HISTORY:  HTN (hypertension)      HLD (hyperlipidemia)      Hyperkalemia      CKD (chronic kidney disease)      Atherosclerosis      CAD (coronary artery disease)      Stage 4 chronic kidney disease      Cyst of pancreas      H/O CHF      Diabetes mellitus      S/P hysterectomy      Pancreatic cyst      H/O endoscopy      History of cardioversion          SOCIAL HISTORY:Non-smoker.  Single.  No children.  She has a supportive niece.  Retired.    FAMILY HISTORY: FAMILY HISTORY:  FH: MI (myocardial infarction) (Father, Mother)    Home Medications:  amLODIPine 2.5 mg oral tablet: 1 tab(s) orally once a day (25 May 2024 17:15)  clopidogrel 75 mg oral tablet: 1 tab(s) orally once a day (25 May 2024 17:15)  Eliquis 2.5 mg oral tablet: 1 tab(s) orally 2 times a day (25 May 2024 17:15)  Entresto 24 mg-26 mg oral tablet: 1 tab(s) orally 2 times a day (03 Apr 2024 07:55)  hydrALAZINE 50 mg oral tablet: 1 tab(s) orally 2 times a day (03 Apr 2024 07:55)      MEDICATIONS  (STANDING):  apixaban 2.5 milliGRAM(s) Oral every 12 hours  atorvastatin 40 milliGRAM(s) Oral at bedtime  carvedilol 12.5 milliGRAM(s) Oral every 12 hours  clopidogrel Tablet 75 milliGRAM(s) Oral daily  magnesium oxide 400 milliGRAM(s) Oral three times a day with meals  sacubitril 24 mG/valsartan 26 mG 1 Tablet(s) Oral two times a day    MEDICATIONS  (PRN):      Allergies    No Known Allergies    Intolerances        REVIEW OF SYSTEMS:    CONSTITUTIONAL: No weakness, fevers or chills  EYES: No visual changes, No diplopia  ENMT: No throat pain , No exudate  NECK: No pain or stiffness  RESPIRATORY: No cough, wheezing, hemoptysis; No shortness of breath  CARDIOVASCULAR:  as per HPI   GASTROINTESTINAL: No abdominal pain. No nausea, vomiting, or hematemesis; No diarrhea or constipation. No melena or hematochezia.  GENITOURINARY: No dysuria, frequency or hematuria  NEUROLOGICAL: No numbness or weakness  SKIN: No itching or rash  All other review of systems is negative unless indicated above    VITAL SIGNS:   Vital Signs Last 24 Hrs  T(C): 36.4 (26 May 2024 04:55), Max: 36.9 (25 May 2024 17:13)  T(F): 97.5 (26 May 2024 04:55), Max: 98.5 (25 May 2024 23:58)  HR: 75 (26 May 2024 04:55) (75 - 110)  BP: 103/66 (26 May 2024 04:55) (101/67 - 109/57)  BP(mean): 78 (26 May 2024 04:55) (78 - 79)  RR: 18 (26 May 2024 04:55) (17 - 20)  SpO2: 100% (26 May 2024 04:55) (96% - 100%)    Parameters below as of 26 May 2024 04:55  Patient On (Oxygen Delivery Method): room air        I&O's Summary      PHYSICAL EXAM:    Constitutional: NAD, awake and alert, well-developed  Eyes:  EOMI,  Pupils round, no lesions  ENMT: no exudate or erythema  Pulmonary: Non-labored, breath sounds are clear bilaterally, No wheezing, rales or rhonchi  Cardiovascular: PMI not palpable non-displaced Regular S1 and S2 l/Vl systolic   Gastrointestinal: Bowel Sounds present, soft, nontender.   Lymph: No peripheral edema. No cervical lymphadenopathy.  Neurological: Alert, no focal deficits  Skin: No rashes. Changes of chronic venous stasis. No cyanosis.  Psych:  Mood & affect appropriate    LABS: All Labs Reviewed:                        9.3    3.77  )-----------( 314      ( 26 May 2024 06:43 )             30.8                         9.2    5.41  )-----------( 296      ( 25 May 2024 17:18 )             30.7     26 May 2024 06:43    139    |  105    |  12     ----------------------------<  82     4.1     |  28     |  0.72   25 May 2024 17:18    143    |  109    |  11     ----------------------------<  100    3.2     |  26     |  0.77     Ca    7.7        26 May 2024 06:43  Ca    6.6        25 May 2024 17:18  Mg     1.6       26 May 2024 06:43  Mg     1.3       25 May 2024 19:15    TPro  4.7    /  Alb  1.5    /  TBili  0.3    /  DBili  x      /  AST  21     /  ALT  13     /  AlkPhos  47     26 May 2024 06:43  TPro  4.4    /  Alb  1.6    /  TBili  0.2    /  DBili  x      /  AST  21     /  ALT  8      /  AlkPhos  44     25 May 2024 17:18      Tele SR     < from: 12 Lead ECG (05.25.24 @ 20:58) >  Diagnosis Line Atrial fibrillation  Low voltage QRS  Nonspecific ST abnormality  Abnormal ECG  No previous ECGs available    < end of copied text >  < from: Cardiac Cath Lab (08.06.21 @ 16:10) >  INTERVENTIONAL IMPRESSIONS: Status post angioplasty, IVUS and stenting  (drug-eluting/Synergy) of the left circumflex artery with resultant DENNIS 3    < end of copied text >

## 2024-05-26 NOTE — DISCHARGE NOTE PROVIDER - DETAILS OF MALNUTRITION DIAGNOSIS/DIAGNOSES
This patient has been assessed with a concern for Malnutrition and was treated during this hospitalization for the following Nutrition diagnosis/diagnoses:     -  05/26/2024: Severe protein-calorie malnutrition

## 2024-05-26 NOTE — DISCHARGE NOTE PROVIDER - CARE PROVIDERS DIRECT ADDRESSES
[email or web address]

## 2024-05-26 NOTE — DISCHARGE NOTE PROVIDER - HOSPITAL COURSE
Ms Herrera is an 87 yo female w/ hypertension, paroxysmal afib on Eliquis, cardiomyopathy, CAD, Hx of cardiac stents, admitted with a syncopal episode with hypotension. Troponin negative x 3. Likely vasovagal vs medication induced. Patient does appear dehydrated and admits to decreased PO intake prior to syncope. and likely also due to meds. Discontinued Amlodipine and Hydralazine. Electrolyte abnormalities noted and repleted. Mildly high TSH, recommend repeat in 4 weeks. Pt noted to have uneven BP's on both arms although asymptomatic with no chest pain or dizziness. CTA r/o dissection ordered.            # Microcytics hypochromic anemia  - check iron panel, b12, FA in am  - GI cx OP for EGD/colon  - PPI      Code Status: full     Discharging Provider:  Luis Daniel Mo NP  Contact Info: [phone]. Please call with any questions or concerns.    Outpatient Provider:     (notified)          Hospital Course  HPI:  Ms Herrera is an 87 yo female w/ hypertension, paroxysmal afib on Eliquis, cardiomyopathy, CAD, Hx of cardiac stents, presents to the ED via EMS, because of syncope.  Daughter  states she was shopping with her mom and while waiting at the , pt suddenly passed out, apparently just went down on the floor.  There was no head injury.  She tried to arouse her and pt did respond after a minute, did not complain of anything and does not know what happened.  When EMS arrived, pt was awake and states she was fine, but while EMS was checking her, pt passed out again, she woke up for a few minutes then she was out again for a few seconds enroute to the ED.  She was able to be aroused in a few seconds and by the time she got to the ED, she was more awake.  Pt denying any headache, dizziness, chest pain, dyspnea, nausea prior to the syncopal episode.  Pt states the last thing she remembered was her vison got blurred and black.   Pt admits she has not eaten anything all day, because she usually eats late in the day.  She did take all her daytime meds: Eliquis, Amlodipine, Carvedilol, Hydralazine, Entresto and Plavix.  Initial BP in the ED was 106/60, pulse of 110.  Orthostatic vitals rechecked showed Lying BP: 87/71 HR: 95  Sitting BP: 66/53 HR: 104  Standing BP 73/60, pulse of 102.   Pt was afebrile. O2 sats were 97% on room air.   Initial EKG showed NSR 90/min.  1st trop was 17.  Labs showed K of 3.2, Mg of 1.5.  H/H is 9/30 ( at baseline).   Head CT was negative for acute CVA, no hemorrhage no fracture.  +ve volume loss.  Cxray showed cardiomegaly, no infiltrates, nor effusion.    While in the ED, pt also had a short run of rapid afib to 120's-130's.   Pt was asymptomatic. (25 May 2024 20:41)    Patient was admitted to [facility] for syncope - suspect 2/2 dehydration, possible vagal event. Cardiology was consulted. Amlodipine and hydralazine were discontinued. Coreg was switched to toprol. Patient was started on farxiga.     While admitted it was noted that left side BP higher than right side BP. Imaging showed right subclavian artery with severe atherosclerosis. Vascular surgery consulted, advised syncope unlikely related to severe atherosclerosis findings or level of carotid stenosis noted on imaging, no acute vascular intervention required, continue eliquis and plavix and follow up outpatient.     PT was consulted, recommended home PT. Patient stable for DC home with close outpatient follow up     Discharging Provider:  Kimi Altman NP  Contact Info: Cell [phone] - Please call with any questions or concerns.    Outpatient Provider: Dr Patel -      Hospital Course  HPI:  Ms Herrera is an 89 yo female w/ hypertension, paroxysmal afib on Eliquis, cardiomyopathy, CAD, Hx of cardiac stents, presents to the ED via EMS, because of syncope.  Daughter  states she was shopping with her mom and while waiting at the , pt suddenly passed out, apparently just went down on the floor.  There was no head injury.  She tried to arouse her and pt did respond after a minute, did not complain of anything and does not know what happened.  When EMS arrived, pt was awake and states she was fine, but while EMS was checking her, pt passed out again, she woke up for a few minutes then she was out again for a few seconds enroute to the ED.  She was able to be aroused in a few seconds and by the time she got to the ED, she was more awake.  Pt denying any headache, dizziness, chest pain, dyspnea, nausea prior to the syncopal episode.  Pt states the last thing she remembered was her vison got blurred and black.   Pt admits she has not eaten anything all day, because she usually eats late in the day.  She did take all her daytime meds: Eliquis, Amlodipine, Carvedilol, Hydralazine, Entresto and Plavix.  Initial BP in the ED was 106/60, pulse of 110.  Orthostatic vitals rechecked showed Lying BP: 87/71 HR: 95  Sitting BP: 66/53 HR: 104  Standing BP 73/60, pulse of 102.   Pt was afebrile. O2 sats were 97% on room air.   Initial EKG showed NSR 90/min.  1st trop was 17.  Labs showed K of 3.2, Mg of 1.5.  H/H is 9/30 ( at baseline).   Head CT was negative for acute CVA, no hemorrhage no fracture.  +ve volume loss.  Cxray showed cardiomegaly, no infiltrates, nor effusion.    While in the ED, pt also had a short run of rapid afib to 120's-130's.   Pt was asymptomatic. (25 May 2024 20:41)    Patient was admitted to [facility] for syncope - suspect 2/2 dehydration, possible vagal event. Cardiology was consulted. Amlodipine and hydralazine were discontinued. Coreg was switched to toprol. Patient was started on farxiga.     While admitted it was noted that left side BP higher than right side BP. Imaging showed right subclavian artery with severe atherosclerosis. Vascular surgery consulted, advised syncope unlikely related to severe atherosclerosis findings or level of carotid stenosis noted on imaging, no acute vascular intervention required, continue eliquis and plavix and follow up outpatient.     PT was consulted, recommended home PT. Patient stable for DC home with close outpatient follow up     Discharging Provider:  Kimi Altman NP  Contact Info: Cell [phone] - Please call with any questions or concerns.    Outpatient Provider: Dr Patel - notified office

## 2024-05-26 NOTE — CHART NOTE - NSCHARTNOTEFT_GEN_A_CORE
Sign out from primary care team. Patient with persistent disparity in BP L&R. F/up CTA    Preliminary report:    Patient currently asymptomatic, on plavix, atorvastatin.  BP controlled.  VS: 99/64, HR 87, T97.8, SO2 97% RA

## 2024-05-26 NOTE — DIETITIAN INITIAL EVALUATION ADULT - OTHER INFO
Ms Herrera is an 89 yo female w/ hypertension, paroxysmal afib on Eliquis, cardiomyopathy, CAD, Hx of cardiac stents, presents to the with syncopal episodes.   Likely vasovagal, patient does appear dehydrated, and likely also due to meds. Pt was hypotensive, orthostatic.  She is hypoalbuminemic, hypokalemic, hypomagnesemic. Pt also with anemia. Episode of brief rapid afib, likely due to dehydration.   Pt tolerating diet with report of improving appetite now. Endorses recent weight loss in setting of decreased appetite. UBW 126lbs, CBW 112lbs (11% weight loss). NFPE conducted with findings of moderate muscle wasting/fat loss. Noted with stage II pressure ulcer on sacrum. Denies N/V/D, constipation. Reports last BM on 5/24. Recommend adding Ensure HP BID & MVI, Vitamin C to prevent further weight loss and meet increased nutritional needs.

## 2024-05-26 NOTE — DISCHARGE NOTE PROVIDER - NSDCMRMEDTOKEN_GEN_ALL_CORE_FT
amLODIPine 2.5 mg oral tablet: 1 tab(s) orally once a day  atorvastatin 40 mg oral tablet: 1 tab(s) orally once a day (at bedtime)  carvedilol 12.5 mg oral tablet: 1 tab(s) orally every 12 hours  clopidogrel 75 mg oral tablet: 1 tab(s) orally once a day  Eliquis 2.5 mg oral tablet: 1 tab(s) orally 2 times a day  Entresto 24 mg-26 mg oral tablet: 1 tab(s) orally 2 times a day  hydrALAZINE 50 mg oral tablet: 1 tab(s) orally 2 times a day   atorvastatin 40 mg oral tablet: 1 tab(s) orally once a day (at bedtime)  clopidogrel 75 mg oral tablet: 1 tab(s) orally once a day  dapagliflozin 10 mg oral tablet: 1 tab(s) orally every 24 hours  Eliquis 2.5 mg oral tablet: 1 tab(s) orally 2 times a day  metoprolol succinate 25 mg oral tablet, extended release: 1 tab(s) orally once a day (at bedtime)

## 2024-05-26 NOTE — PROGRESS NOTE ADULT - SUBJECTIVE AND OBJECTIVE BOX
Patient is a 88y old  Female who presents with a chief complaint of syncope x 3 (26 May 2024 12:14)      Patient seen and examined at bedside. states she feel better today. denies complaints.     ALLERGIES:  No Known Allergies    MEDICATIONS  (STANDING):  apixaban 2.5 milliGRAM(s) Oral every 12 hours  atorvastatin 40 milliGRAM(s) Oral at bedtime  carvedilol 12.5 milliGRAM(s) Oral every 12 hours  clopidogrel Tablet 75 milliGRAM(s) Oral daily  magnesium oxide 400 milliGRAM(s) Oral three times a day with meals  sacubitril 24 mG/valsartan 26 mG 1 Tablet(s) Oral two times a day    MEDICATIONS  (PRN):    Vital Signs Last 24 Hrs  T(F): 97.6 (26 May 2024 12:50), Max: 98.5 (25 May 2024 23:58)  HR: 73 (26 May 2024 12:50) (73 - 110)  BP: 76/54 (26 May 2024 12:50) (76/54 - 109/57)  RR: 18 (26 May 2024 12:50) (17 - 20)  SpO2: 100% (26 May 2024 12:50) (96% - 100%)  I&O's Summary    PHYSICAL EXAM:  General: NAD, A/O x 3  ENT: MMM, no oral thrush   Neck: Supple, No JVD  Lungs: Clear to auscultation bilaterally, non labored breathing  Cardio: RRR, S1/S2, + murmur  Abdomen: Soft, Nontender, Nondistended; Bowel sounds present  Extremities: No calf tenderness, No pitting edema    LABS:                        9.3    3.77  )-----------( 314      ( 26 May 2024 06:43 )             30.8     05-26    139  |  105  |  12  ----------------------------<  82  4.1   |  28  |  0.72    Ca    7.7      26 May 2024 06:43  Mg     1.6     05-26    TPro  4.7  /  Alb  1.5  /  TBili  0.3  /  DBili  x   /  AST  21  /  ALT  13  /  AlkPhos  47  05-26          CARDIAC MARKERS ( 26 May 2024 06:43 )  x     / 45.9 ng/L / x     / x     / x      CARDIAC MARKERS ( 25 May 2024 19:15 )  x     / 22.9 ng/L / x     / x     / x      CARDIAC MARKERS ( 25 May 2024 17:18 )  x     / 17.1 ng/L / x     / x     / x            TSH 4.254   TSH with FT4 reflex --  Total T3 --            Urinalysis Basic - ( 26 May 2024 06:43 )    Color: x / Appearance: x / SG: x / pH: x  Gluc: 82 mg/dL / Ketone: x  / Bili: x / Urobili: x   Blood: x / Protein: x / Nitrite: x   Leuk Esterase: x / RBC: x / WBC x   Sq Epi: x / Non Sq Epi: x / Bacteria: x            RADIOLOGY & ADDITIONAL TESTS:    < from: CT Head No Cont (05.25.24 @ 18:19) >  IMPRESSION:   Unremarkable head CT.    Ischemic white matter disease and   atrophy typical for age.    --- End of Report ---            DOUG LEE MD; Attending Radiologist  This document has been electronically signed. May 25 2024  6:28PM    < end of copied text >  Care Discussed with Consultants/Other Providers:   Cardiology Dr. Lange

## 2024-05-26 NOTE — DISCHARGE NOTE PROVIDER - NSDCCPCAREPLAN_GEN_ALL_CORE_FT
PRINCIPAL DISCHARGE DIAGNOSIS  Diagnosis: Syncope  Assessment and Plan of Treatment:      PRINCIPAL DISCHARGE DIAGNOSIS  Diagnosis: Syncope  Assessment and Plan of Treatment: - Drink plenty of fluids  - Take all medications as prescribed  - Follow up with your primary care doctor for repeat blood pressure check.  - Follow up with vascular surgeon for management of atherosclerosis.  - Return to hospital for any new or worsening symptoms

## 2024-05-26 NOTE — DISCHARGE NOTE PROVIDER - CARE PROVIDER_API CALL
Baldemar Allison  [address] [address]  Phone: [phone]  Fax: [phone]  Follow Up Time:    Baldemar Allison  [facility]  [address] [place] [address]  Phone: [phone]  Fax: [phone]  Follow Up Time:     Jem Cardozo  Vascular Surgery  [address] [place] [address]  Phone: [phone]  Fax: [phone]  Follow Up Time:

## 2024-05-26 NOTE — DIETITIAN INITIAL EVALUATION ADULT - ORAL INTAKE PTA/DIET HISTORY
Pt endorses fair appetite at home- lives alone/prepares her own meals. Typically eats 2 meals/day + snacks. Tries to high protein (sardines, tuna) snacks. Drinks vanilla Ensure daily. No chewing/swallowing issues. NKFA.

## 2024-05-26 NOTE — DIETITIAN INITIAL EVALUATION ADULT - PERTINENT MEDS FT
MEDICATIONS  (STANDING):  apixaban 2.5 milliGRAM(s) Oral every 12 hours  atorvastatin 40 milliGRAM(s) Oral at bedtime  carvedilol 12.5 milliGRAM(s) Oral every 12 hours  clopidogrel Tablet 75 milliGRAM(s) Oral daily  magnesium oxide 400 milliGRAM(s) Oral three times a day with meals  sacubitril 24 mG/valsartan 26 mG 1 Tablet(s) Oral two times a day    MEDICATIONS  (PRN):

## 2024-05-26 NOTE — DIETITIAN NUTRITION RISK NOTIFICATION - TREATMENT: THE FOLLOWING DIET HAS BEEN RECOMMENDED
Diet, DASH/TLC:   Sodium & Cholesterol Restricted  Supplement Feeding Modality:  Oral  Ensure Plus High Protein Cans or Servings Per Day:  1       Frequency:  Two Times a day (05-26-24 @ 12:12) [Active]

## 2024-05-26 NOTE — PROGRESS NOTE ADULT - ASSESSMENT
Ms Herrera is an 89 yo female w/ hypertension, paroxysmal afib on Eliquis, cardiomyopathy, CAD, Hx of cardiac stents, admitted with a syncopal episode    #Syncope  #Microcytic Anemia  #Hypotension   Likely vasovagal, patient does appear dehydrated, and likely also due to meds.   Episode of brief rapid afib, likely due to dehydration.   Continue tele   s/p IVF  TSH with mild elevation.   Iron total low. will start ferrous sulfate  Cardiology recs  orthostatics  FU TTE     #hypoalbuminemic, hypokalemic, hypomagnesemic  replete as needed    #HTN  #Afib  #CAD  Carvedilol and Entresto - resumed with parameters  Discontinued Amlodipine and Hydralazine  Eliquis  Clopidogrel     #DVT prophylaxis - pt is already on Eliquis       Ms Herrera is an 89 yo female w/ hypertension, paroxysmal afib on Eliquis, cardiomyopathy, CAD, Hx of cardiac stents, admitted with a syncopal episode    #Syncope  #Microcytic Anemia  #Hypotension   Likely vasovagal, patient does appear dehydrated, and likely also due to meds.   Episode of brief rapid afib, likely due to dehydration.   Continue tele   s/p IVF  TSH with mild elevation.   Iron total low. will start ferrous sulfate  Cardiology recs  orthostatics  FU TTE     #hypoalbuminemic, hypokalemic, hypomagnesemic  replete as needed    #HTN  #Afib  #CAD  Carvedilol and Entresto - resumed with parameters  Discontinued Amlodipine and Hydralazine  Eliquis  Clopidogrel     # abnormal TSH  - repeat in 4 weeks    #DVT prophylaxis - pt is already on Eliquis       Ms Herrera is an 87 yo female w/ hypertension, paroxysmal afib on Eliquis, cardiomyopathy, CAD, Hx of cardiac stents, admitted with a syncopal episode    #Syncope  #Microcytic Anemia  #Hypotension   Likely vasovagal, patient does appear dehydrated, and likely also due to meds.   Episode of brief rapid afib, likely due to dehydration.   Continue tele   s/p IVF  TSH with mild elevation.   Iron total low. will start ferrous sulfate  Cardiology recs  orthostatics  FU TTE     #hypoalbuminemic, hypokalemic, hypomagnesemic  replete as needed    #HTN  #Afib  #CAD  Carvedilol and Entresto - resumed with parameters  Discontinued Amlodipine and Hydralazine  Eliquis  Clopidogrel     # abnormal TSH  - repeat in 4 weeks    # Microcytics hypochromic anemia  - check iron panel, b12, FA in am  - GI cx OP for EGD/colon  - PPI    #DVT prophylaxis - pt is already on Eliquis

## 2024-05-27 DIAGNOSIS — R55 SYNCOPE AND COLLAPSE: ICD-10-CM

## 2024-05-27 LAB
ANION GAP SERPL CALC-SCNC: 3 MMOL/L — LOW (ref 5–17)
BUN SERPL-MCNC: 11 MG/DL — SIGNIFICANT CHANGE UP (ref 7–23)
CALCIUM SERPL-MCNC: 7.7 MG/DL — LOW (ref 8.4–10.5)
CHLORIDE SERPL-SCNC: 103 MMOL/L — SIGNIFICANT CHANGE UP (ref 96–108)
CO2 SERPL-SCNC: 31 MMOL/L — SIGNIFICANT CHANGE UP (ref 22–31)
CREAT SERPL-MCNC: 0.86 MG/DL — SIGNIFICANT CHANGE UP (ref 0.5–1.3)
D DIMER BLD IA.RAPID-MCNC: <150 NG/ML DDU — SIGNIFICANT CHANGE UP
EGFR: 65 ML/MIN/1.73M2 — SIGNIFICANT CHANGE UP
GLUCOSE SERPL-MCNC: 105 MG/DL — HIGH (ref 70–99)
HCT VFR BLD CALC: 28.2 % — LOW (ref 34.5–45)
HGB BLD-MCNC: 8.2 G/DL — LOW (ref 11.5–15.5)
MAGNESIUM SERPL-MCNC: 1.7 MG/DL — SIGNIFICANT CHANGE UP (ref 1.6–2.6)
MCHC RBC-ENTMCNC: 22.3 PG — LOW (ref 27–34)
MCHC RBC-ENTMCNC: 29.1 GM/DL — LOW (ref 32–36)
MCV RBC AUTO: 76.6 FL — LOW (ref 80–100)
NRBC # BLD: 0 /100 WBCS — SIGNIFICANT CHANGE UP (ref 0–0)
NT-PROBNP SERPL-SCNC: 4568 PG/ML — HIGH (ref 0–300)
PLATELET # BLD AUTO: 285 K/UL — SIGNIFICANT CHANGE UP (ref 150–400)
POTASSIUM SERPL-MCNC: 3.7 MMOL/L — SIGNIFICANT CHANGE UP (ref 3.5–5.3)
POTASSIUM SERPL-SCNC: 3.7 MMOL/L — SIGNIFICANT CHANGE UP (ref 3.5–5.3)
RBC # BLD: 3.68 M/UL — LOW (ref 3.8–5.2)
RBC # FLD: 16.8 % — HIGH (ref 10.3–14.5)
SODIUM SERPL-SCNC: 137 MMOL/L — SIGNIFICANT CHANGE UP (ref 135–145)
WBC # BLD: 4.28 K/UL — SIGNIFICANT CHANGE UP (ref 3.8–10.5)
WBC # FLD AUTO: 4.28 K/UL — SIGNIFICANT CHANGE UP (ref 3.8–10.5)

## 2024-05-27 PROCEDURE — 93880 EXTRACRANIAL BILAT STUDY: CPT | Mod: 26

## 2024-05-27 PROCEDURE — 93010 ELECTROCARDIOGRAM REPORT: CPT

## 2024-05-27 PROCEDURE — 93970 EXTREMITY STUDY: CPT | Mod: 26

## 2024-05-27 RX ORDER — METOPROLOL TARTRATE 50 MG
25 TABLET ORAL DAILY
Refills: 0 | Status: DISCONTINUED | OUTPATIENT
Start: 2024-05-27 | End: 2024-05-27

## 2024-05-27 RX ORDER — DAPAGLIFLOZIN 10 MG/1
5 TABLET, FILM COATED ORAL EVERY 24 HOURS
Refills: 0 | Status: DISCONTINUED | OUTPATIENT
Start: 2024-05-27 | End: 2024-05-27

## 2024-05-27 RX ORDER — SODIUM CHLORIDE 9 MG/ML
500 INJECTION INTRAMUSCULAR; INTRAVENOUS; SUBCUTANEOUS
Refills: 0 | Status: COMPLETED | OUTPATIENT
Start: 2024-05-27 | End: 2024-05-27

## 2024-05-27 RX ORDER — DIGOXIN 250 MCG
125 TABLET ORAL ONCE
Refills: 0 | Status: COMPLETED | OUTPATIENT
Start: 2024-05-27 | End: 2024-05-27

## 2024-05-27 RX ORDER — DAPAGLIFLOZIN 10 MG/1
10 TABLET, FILM COATED ORAL EVERY 24 HOURS
Refills: 0 | Status: DISCONTINUED | OUTPATIENT
Start: 2024-05-27 | End: 2024-05-27

## 2024-05-27 RX ORDER — METOPROLOL TARTRATE 50 MG
25 TABLET ORAL AT BEDTIME
Refills: 0 | Status: DISCONTINUED | OUTPATIENT
Start: 2024-05-27 | End: 2024-05-28

## 2024-05-27 RX ADMIN — ATORVASTATIN CALCIUM 40 MILLIGRAM(S): 80 TABLET, FILM COATED ORAL at 21:30

## 2024-05-27 RX ADMIN — CLOPIDOGREL BISULFATE 75 MILLIGRAM(S): 75 TABLET, FILM COATED ORAL at 11:37

## 2024-05-27 RX ADMIN — MAGNESIUM OXIDE 400 MG ORAL TABLET 400 MILLIGRAM(S): 241.3 TABLET ORAL at 08:42

## 2024-05-27 RX ADMIN — DAPAGLIFLOZIN 10 MILLIGRAM(S): 10 TABLET, FILM COATED ORAL at 11:37

## 2024-05-27 RX ADMIN — SODIUM CHLORIDE 250 MILLILITER(S): 9 INJECTION INTRAMUSCULAR; INTRAVENOUS; SUBCUTANEOUS at 13:36

## 2024-05-27 RX ADMIN — APIXABAN 2.5 MILLIGRAM(S): 2.5 TABLET, FILM COATED ORAL at 05:45

## 2024-05-27 RX ADMIN — MAGNESIUM OXIDE 400 MG ORAL TABLET 400 MILLIGRAM(S): 241.3 TABLET ORAL at 17:49

## 2024-05-27 RX ADMIN — Medication 25 MILLIGRAM(S): at 21:30

## 2024-05-27 RX ADMIN — APIXABAN 2.5 MILLIGRAM(S): 2.5 TABLET, FILM COATED ORAL at 17:49

## 2024-05-27 RX ADMIN — Medication 125 MICROGRAM(S): at 14:58

## 2024-05-27 RX ADMIN — MAGNESIUM OXIDE 400 MG ORAL TABLET 400 MILLIGRAM(S): 241.3 TABLET ORAL at 11:37

## 2024-05-27 RX ADMIN — Medication 325 MILLIGRAM(S): at 11:37

## 2024-05-27 NOTE — PROGRESS NOTE ADULT - NS ATTEND AMEND GEN_ALL_CORE FT
Seen and examined. Chart reviewed.   patient is improving clinically.   Agree with above a/p  Edited where ever is necessary    denied complaints.. ROS neg. exam irregular hR. tachy in am. ekg and tele afib with RVR. with hypotension. s/p one dose of bolus  mls and digoxin tab 0.125 mg. TTE reviewed, normal ef. DD BP discripensy b/l UE. CTA: mod to severe subclavian artery stenosis. caortid stenosis, abnormal flow in vertebral artery but no flow limitation in either. vascular consulted. no acute intervention. patient need to follow up with vascular oP for further work up and treatment. c/w asa, plavix. cardio noted. discusssed with . changed coreg to metoprolol ER, held entresto and farxiga as BP soft/hypotension and RVR. needs room for BB. with current EF, farxiga is class 1 rec. holding for hypotension/soft BP. will start low dose on DC of BP allows. check OH daily. anticipate dc in am if HR and BP controlled. get pT eval. patient walks with cane when she goes out. usually does not need anything at home. fall precautions Seen and examined. Chart reviewed.   patient is improving clinically.   Agree with above a/p  Edited where ever is necessary    denied complaints.. ROS neg. exam irregular hR. tachy in am. ekg and tele afib with RVR. with hypotension. s/p one dose of bolus  mls and digoxin tab 0.125 mg. TTE reviewed, normal ef. DD BP discripensy b/l UE. CTA: mod to severe subclavian artery stenosis. caortid stenosis, abnormal flow in vertebral artery but no flow limitation in either. vascular consulted. no acute intervention. patient need to follow up with vascular oP for further work up and treatment. c/w asa, plavix. cardio noted. discusssed with . changed coreg to metoprolol ER, held entresto and farxiga as BP soft/hypotension and RVR. needs room for BB. with current EF, farxiga is class 1 rec. holding for hypotension/soft BP. will start low dose on DC of BP allows. check OH daily. anticipate dc in am if HR and BP controlled. get pT eval. patient walks with cane when she goes out. usually does not need anything at home. fall precautions. RN was advised to avoid BP check and blood draw on RUE

## 2024-05-27 NOTE — PROGRESS NOTE ADULT - ASSESSMENT
88-year-old woman admitted with syncope.  Cardiac history of paroxysmal atrial fibrillation on anticoagulation.  History of cardiomyopathy with recovered LV function.  Coronary artery disease, status post prior coronary stents.  She had PAF in the ED but has since converted to sinus rhythm.  Patient reports that prior to syncope she had not been eating well and was in her opinion probably dehydrated.  On physical examination, blood pressure differential is noted in the upper extremities.  She appears to be euvolemic.  Telemetry demonstrates sinus rhythm.  CTA of the chest done yesterday showing aberrant right subclavian artery with severe atherosclerosis and proximal occlusion with distal reconstitution.  Syncope possibly related to dehydration, possible vagal event.  No bradycardia arrhythmias noted since admission.    Recommendations  -Continue statin.  -Continue guideline directed medical therapy of carvedilol, Entresto and Farxiga.  -Continue anticoagulation   -Continue to monitor orthostatics.  -Outpatient follow-up with primary cardiologist, Dr. Daniel .Consider ambulatory heart rhythm monitoring  -Vascular follow-up regarding subclavian stenosis.  -Discussed with patient and with primary team.

## 2024-05-27 NOTE — PROGRESS NOTE ADULT - SUBJECTIVE AND OBJECTIVE BOX
Patient is a 88y old  Female who presents with a chief complaint of syncope x 3       Patient seen and examined at bedside. Pt states they feel well, denies overnight events or current complaints including chest pain, shortness of breath, dizziness, nausea, vomiting, diarrhea, fever or chills.      ALLERGIES:  No Known Allergies    MEDICATIONS  (STANDING):  apixaban 2.5 milliGRAM(s) Oral every 12 hours  atorvastatin 40 milliGRAM(s) Oral at bedtime  carvedilol 12.5 milliGRAM(s) Oral every 12 hours  clopidogrel Tablet 75 milliGRAM(s) Oral daily  dapagliflozin 10 milliGRAM(s) Oral every 24 hours  ferrous    sulfate 325 milliGRAM(s) Oral daily  magnesium oxide 400 milliGRAM(s) Oral three times a day with meals  sacubitril 24 mG/valsartan 26 mG 1 Tablet(s) Oral two times a day    MEDICATIONS  (PRN):    Vital Signs Last 24 Hrs  T(F): 97.9 (27 May 2024 05:14), Max: 97.9 (27 May 2024 05:14)  HR: 126 (27 May 2024 08:09) (68 - 126)  BP: 120/75 (27 May 2024 08:09) (71/47 - 150/83)  RR: 18 (27 May 2024 08:09) (16 - 19)  SpO2: 98% (27 May 2024 08:09) (97% - 100%)  I&O's Summary    26 May 2024 07:01  -  27 May 2024 07:00  --------------------------------------------------------  IN: 240 mL / OUT: 300 mL / NET: -60 mL      PHYSICAL EXAM:  General: NAD, A/O x 3  ENT: MMM, no oral thrush   Neck: Supple, No JVD  Lungs: Clear to auscultation bilaterally, non labored breathing  Cardio: RRR, S1/S2, No murmurs  Abdomen: Soft, Nontender, Nondistended; Bowel sounds present  Extremities: No calf tenderness, No pitting edema    LABS:                        8.2    4.28  )-----------( 285      ( 27 May 2024 07:33 )             28.2     05-27    137  |  103  |  11  ----------------------------<  105  3.7   |  31  |  0.86    Ca    7.7      27 May 2024 07:33  Mg     1.7     05-27    TPro  4.7  /  Alb  1.5  /  TBili  0.3  /  DBili  x   /  AST  21  /  ALT  13  /  AlkPhos  47  05-26          CARDIAC MARKERS ( 26 May 2024 06:43 )  x     / 45.9 ng/L / x     / x     / x      CARDIAC MARKERS ( 25 May 2024 19:15 )  x     / 22.9 ng/L / x     / x     / x      CARDIAC MARKERS ( 25 May 2024 17:18 )  x     / 17.1 ng/L / x     / x     / x            TSH 4.254   TSH with FT4 reflex --  Total T3 --                  Urinalysis Basic - ( 27 May 2024 07:33 )    Color: x / Appearance: x / SG: x / pH: x  Gluc: 105 mg/dL / Ketone: x  / Bili: x / Urobili: x   Blood: x / Protein: x / Nitrite: x   Leuk Esterase: x / RBC: x / WBC x   Sq Epi: x / Non Sq Epi: x / Bacteria: x            RADIOLOGY & ADDITIONAL TESTS:  < from: CT Angio Chest Aorta w/wo IV Cont (05.26.24 @ 21:19) >    IMPRESSION:  No aortic aneurysm or dissection.    Aberrant right subclavian artery is noted with severe atherosclerosis and   proximal occlusion with distal reconstitution of caliber just after the   artery courses past the the esophagus.    Findings consistent with CHF as detailed above.    Stable 2.4 cm complex lesion in the pancreatic tail with imaging features   consistent with serous cystadenoma as reported on prior MRI.    This report was discussed with patient's nurseon [address] (unable to   achieve contact the physician at time of dictation despite multiple   attempts) at 5/26/2024 10:21 PM.    --- End of Report ---            FAWAD FLOWERS MD; Attending Radiologist  This document has been electronically signed. May 26 2024 10:30PM    < end of copied text >    < from: TTE Echo Complete w/o Contrast w/ Doppler (05.26.24 @ 10:15) >  Summary:   1. Left ventricular ejection fraction, by visual estimation, is 60 to   65%.   2. Normal global left ventricular systolic function.   3. Normal left ventricular internal cavity size.   4. The left ventricular diastolic function could not be assessed in this   study.   5. Mildly enlarged left atrium.   6. Normal right atrial size.   7. Small pericardial effusion.   8. Mild mitral valve regurgitation.   9. Thickening and calcification of the anterior and posterior mitral   valve leaflets.  10. Mild-moderate tricuspid regurgitation.  11. Mild aortic valve stenosis.    [id number] Olman Lange MD, FACC , MD, FACC Electronically   signed on 5/26/2024 at 12:51:23 PM            *** Final ***    < end of copied text >    Care Discussed with Consultants/Other Providers:     Cardiology Dr. Lange

## 2024-05-27 NOTE — CONSULT NOTE ADULT - SUBJECTIVE AND OBJECTIVE BOX
VASCULAR SURGERY CONSULT NOTE    Patient is a 88y old  Female who presents with a chief complaint of syncope x 3 (27 May 2024 11:04)      HPI:  Ms Herrera is an 87 yo female w/ hypertension, paroxysmal afib on Eliquis, cardiomyopathy, CAD, Hx of cardiac stents, presents to the ED via EMS, because of syncope.  Daughter  states she was shopping with her mom and while waiting at the , pt suddenly passed out, apparently just went down on the floor.  There was no head injury.  She tried to arouse her and pt did respond after a minute, did not complain of anything and does not know what happened.  When EMS arrived, pt was awake and states she was fine, but while EMS was checking her, pt passed out again, she woke up for a few minutes then she was out again for a few seconds enroute to the ED.  She was able to be aroused in a few seconds and by the time she got to the ED, she was more awake.  Pt denying any headache, dizziness, chest pain, dyspnea, nausea prior to the syncopal episode.  Pt states the last thing she remembered was her vison got blurred and black.   Pt admits she has not eaten anything all day, because she usually eats late in the day.  She did take all her daytime meds: Eliquis, Amlodipine, Carvedilol, Hydralazine, Entresto and Plavix.  Initial BP in the ED was 106/60, pulse of 110.  Orthostatic vitals rechecked showed Lying BP: 87/71 HR: 95  Sitting BP: 66/53 HR: 104  Standing BP 73/60, pulse of 102.   Pt was afebrile. O2 sats were 97% on room air.   Initial EKG showed NSR 90/min.  1st trop was 17.  Labs showed K of 3.2, Mg of 1.5.  H/H is 9/30 ( at baseline).   Head CT was negative for acute CVA, no hemorrhage no fracture.  +ve volume loss.  Cxray showed cardiomegaly, no infiltrates, nor effusion.    While in the ED, pt also had a short run of rapid afib to 120's-130's.   Pt was asymptomatic.      (25 May 2024 20:41)    On vascular evaluation, pt confirms above history. Pt states no vascular surgical hx, does not see vascular outpt. Denies any prior hx of syncope. Pt states that she feels well today, no acute complaints. Denies sensorimotor changes to bilateral UE.       PAST MEDICAL & SURGICAL HISTORY:  HTN (hypertension)    HLD (hyperlipidemia)    Hyperkalemia    CKD (chronic kidney disease)    Atherosclerosis    CAD (coronary artery disease)    Stage 4 chronic kidney disease    Cyst of pancreas    H/O CHF    Diabetes mellitus    S/P hysterectomy    Pancreatic cyst    H/O endoscopy    History of cardioversion      Review of Systems:  Contained within HPI.    MEDICATIONS  (STANDING):  apixaban 2.5 milliGRAM(s) Oral every 12 hours  atorvastatin 40 milliGRAM(s) Oral at bedtime  clopidogrel Tablet 75 milliGRAM(s) Oral daily  ferrous    sulfate 325 milliGRAM(s) Oral daily  magnesium oxide 400 milliGRAM(s) Oral three times a day with meals  metoprolol succinate ER 25 milliGRAM(s) Oral daily  sacubitril 24 mG/valsartan 26 mG 1 Tablet(s) Oral two times a day    MEDICATIONS  (PRN):      Allergies    No Known Allergies    Intolerances      SOCIAL HISTORY       FAMILY HISTORY:  FH: MI (myocardial infarction) (Father, Mother)      Vital Signs Last 24 Hrs  T(C): 36.7 (27 May 2024 12:50), Max: 36.7 (27 May 2024 12:50)  T(F): 98.1 (27 May 2024 12:50), Max: 98.1 (27 May 2024 12:50)  HR: 73 (27 May 2024 16:20) (68 - 137)  BP: 117/60 (27 May 2024 16:20) (71/47 - 150/83)  BP(mean): 61 (27 May 2024 05:15) (61 - 85)  RR: 18 (27 May 2024 12:50) (16 - 19)  SpO2: 100% (27 May 2024 12:50) (97% - 100%)    Parameters below as of 27 May 2024 12:50  Patient On (Oxygen Delivery Method): room air      Physical Exam:    General:  Appears stated age, well-groomed, well-nourished, no distress  Eyes : ANAND  HENT:  WNL, no JVD  Chest:  clear breath sounds  Cardiovascular: tachycardic, varying BP  Abdomen: soft, ND, NT  Extremities: BLE warm, no gross abnormalities noted, sensorimotor intact, radial pulse palpable bilaterally, cap refill good  Skin:  No rash  Musculoskeletal:  normal strength  Neuro/Psych:  Alert, oriented to time, place and person       LABS:                        8.2    4.28  )-----------( 285      ( 27 May 2024 07:33 )             28.2     05-27    137  |  103  |  11  ----------------------------<  105<H>  3.7   |  31  |  0.86    Ca    7.7<L>      27 May 2024 07:33  Mg     1.7     05-27    TPro  4.7<L>  /  Alb  1.5<L>  /  TBili  0.3  /  DBili  x   /  AST  21  /  ALT  13  /  AlkPhos  47  05-26      Urinalysis Basic - ( 27 May 2024 07:33 )    Color: x / Appearance: x / SG: x / pH: x  Gluc: 105 mg/dL / Ketone: x  / Bili: x / Urobili: x   Blood: x / Protein: x / Nitrite: x   Leuk Esterase: x / RBC: x / WBC x   Sq Epi: x / Non Sq Epi: x / Bacteria: x        RADIOLOGY & ADDITIONAL STUDIES:    < from: CT Angio Chest Aorta w/wo IV Cont (05.26.24 @ 21:19) >  ACC: [id number] EXAM:  CT ANGIO ABDOMEN ONLY (W)AW IC   ORDERED BY:    YESSENIA MACHADO     ACC: [id number] EXAM:  CT ANGIO CHEST AORTA WAWIC   ORDERED BY:  YESSENIA MACHADO     PROCEDURE DATE:  05/26/2024          INTERPRETATION:  INDICATION: Syncope evaluate for aortic dissection    TECHNIQUE: CT angiogram of the chest, abdomen and pelvis was obtained   after the intravenous administration of 90 cc administered mL of   Omnipaque 350, 10 cc discarded discarded. Three-dimensional maximum   intensity projection images were generated.    COMPARISON: CT chest performed 11/19/2010, abdominal MRI performed   1/13/24 and CT abdomen pelvis performed 1/20/23    FINDINGS:  CTA CHEST:  Aorta angiogram: No aortic aneurysm or dissection. Mild atherosclerosis.  Lymph nodes: No pathologic lymphadenopathy  Heart/vasculature: Aberrant right subclavian artery is noted with severe   atherosclerosis and proximal occlusion with distal reconstitution of   caliber just after the artery courses past the the esophagus. Respiratory   motion limits evaluation of the distal pulmonary vasculature. Within this   constraint: No evidence of large/proximal segmental pulmonary embolus.  Airways/lungs/pleura: Prominent cardiomegaly. Small bilateral pleural   effusions with adjacent atelectasis/consolidation. Mild interstitial   prominence throughout the lungs suggestive of CHF.  Chest wall: Diffuse anasarca.  BONES: Multilevel degenerative change.  --------    CTA ABDOMEN AND PELVIS:  Aorta angiogram: No aortic aneurysm or dissection. Prominent   atherosclerosis throughout the abdominal aorta as well as at the origins   of the celiac trunk, SMA and bilateral renal arteries with distal   reconstitution of caliber.    LIVER: Within normal limits.  BILE DUCTS: Mild periportal edema likely related to CHF.  GALLBLADDER: Within normal limits.  SPLEEN: Within normal limits.  PANCREAS: Stable 2.4 cm complex lesion in the pancreatic tail with   imaging features consistent with serous cystadenoma as reported on prior   MRI.  ADRENALS: Within normal limits.  KIDNEYS/URETERS: No hydronephrosis or nephrolithiasis. Multiple simple   appearing renal cysts bilaterally..    BLADDER: Within normal limits.  REPRODUCTIVE ORGANS: Hysterectomy.    BOWEL: Fecal stasis throughoutthe colon. No bowel obstruction. Appendix   is normal.  PERITONEUM: Small volume free fluid in the pelvis.  RETROPERITONEUM/LYMPH NODES: No lymphadenopathy.  ABDOMINAL WALL: Diffuse anasarca.  BONES: Lumbar dextroscoliosis and prominent multilevel degenerative change      IMPRESSION:  No aortic aneurysm or dissection.    Aberrant right subclavian artery is noted with severe atherosclerosis and   proximal occlusion with distal reconstitution of caliber just after the   artery courses past the the esophagus.    Findings consistent with CHF as detailed above.    Stable 2.4 cm complex lesion in the pancreatic tail with imaging features   consistent with serous cystadenoma as reported on prior MRI.    This report was discussed with patient's nurseon 3 East (unable to   achieve contact the physician at time of dictation despite multiple   attempts) at 5/26/2024 10:21 PM.    --- End of Report ---            FAWAD FLOWERS MD; Attending Radiologist  This document has been electronically signed. May 26 2024 10:30PM    < end of copied text >    < from: US Duplex Carotid Arteries Complete, Bilateral (05.27.24 @ 11:19) >  ACC: [id number] EXAM:  US DPLX CAROTIDS COMPL BI   ORDERED BY:  YESSENIA MACHADO     PROCEDURE DATE:  05/27/2024          INTERPRETATION:  CLINICAL INFORMATION: 88-year-old with syncope and   discrepant blood pressures. Assess for carotid stenosis.    COMPARISON: Report from study from 9/13/2005    TECHNIQUE: Grayscale, color and spectral Doppler examination of both   carotid arteries was performed.    FINDINGS:    There is bilateral intimal thickening. Soft and calcified plaque is seen   in bothcarotid bifurcations extending into the internal and external   carotid arteries.    Peak systolic velocities are as follows:    RIGHT:  PROX CCA = 59 cm/s  DIST CCA = 67 cm/s  PROX ICA = 205 cm/s  DIST ICA = 43 cm/s  ECA = 132 cm/s    LEFT:  PROX CCA = 56 cm/s  DIST CCA = 50 cm/s  PROX ICA = 224 cm/s  DIST ICA = 56 cm/s  ECA = 156 cm/s    Antegrade flow is noted within the left vertebral artery. Abnormal   waveforms are seen in the right vertebral artery.    IMPRESSION: Bilateral soft and calcified plaque.  Findings consistent with 50-69% stenosis at the right carotid bifurcation   and approximately 70% stenosis in the proximal left internal carotid   artery.  Suboptimal examination of the right vertebral artery. Abnormal waveforms   are noted in the right vertebral artery and repeat study is recommended.    Measurement of carotid stenosis is based on velocity parameters that   correlate the residual internal carotid diameter with that of the more   distal vessel in accordance with a method such as the [place] American   Symptomatic Carotid Endarterectomy Trial (NASCET).    --- End of Report ---            ANEL APODACA MD; Attending Radiologist    < end of copied text >

## 2024-05-27 NOTE — PROGRESS NOTE ADULT - ASSESSMENT
Ms Herrera is an 89 yo female w/ hypertension, paroxysmal afib on Eliquis, cardiomyopathy, CAD, Hx of cardiac stents, admitted with a syncopal episode    #Syncope  #Microcytic Anemia  #Hypotension   Likely vasovagal, patient does appear dehydrated, and likely also due to meds.   Episode of brief rapid afib, likely due to dehydration.   Continue tele   s/p IVF  TSH with mild elevation.   Iron total low. will start ferrous sulfate  Cardiology recs  orthostatics  TTE noted     #Right subclavian artery is noted with severe atherosclerosis   #Uneven BP's.   -Left side BP higher than right side BP  -Vascular and Cardiology consults  -As per vascular, syncope not related to severe atherosclerosis finding  -D dimer negative  -FU carotid doppler        #hypoalbuminemic, hypokalemic, hypomagnesemic  replete as needed    #HTN  #Afib  #CAD  Carvedilol and Entresto - resumed with parameters  Discontinued Amlodipine and Hydralazine  Eliquis  Clopidogrel     # abnormal TSH  - repeat in 4 weeks    # Microcytics hypochromic anemia  - Noted iron panel, b12, FA in am  -continue newly started ferrous sulfate  - GI cx OP for EGD/colon  - PPI    #DVT prophylaxis - pt is already on Eliquis    Called  arnaldo Rashmi [phone]. No answer

## 2024-05-27 NOTE — PROGRESS NOTE ADULT - SUBJECTIVE AND OBJECTIVE BOX
SUBJ:  Patient is a 88y old  Female who presents with a chief complaint of syncope x 3 (26 May 2024 18:44)  The patient is seen and examined.  The chart is reviewed.  Patient was sitting up in bed, awake alert, denies any complaints.  Anxious to go home.    PAST MEDICAL & SURGICAL HISTORY:  HTN (hypertension)      HLD (hyperlipidemia)      Hyperkalemia      CKD (chronic kidney disease)      Atherosclerosis      CAD (coronary artery disease)      Stage 4 chronic kidney disease      Cyst of pancreas      H/O CHF      Diabetes mellitus      S/P hysterectomy      Pancreatic cyst      H/O endoscopy      History of cardioversion      Home Medications:  amLODIPine 2.5 mg oral tablet: 1 tab(s) orally once a day (25 May 2024 17:15)  clopidogrel 75 mg oral tablet: 1 tab(s) orally once a day (25 May 2024 17:15)  Eliquis 2.5 mg oral tablet: 1 tab(s) orally 2 times a day (25 May 2024 17:15)  Entresto 24 mg-26 mg oral tablet: 1 tab(s) orally 2 times a day (03 Apr 2024 07:55)  hydrALAZINE 50 mg oral tablet: 1 tab(s) orally 2 times a day (03 Apr 2024 07:55)      MEDICATIONS  (STANDING):  apixaban 2.5 milliGRAM(s) Oral every 12 hours  atorvastatin 40 milliGRAM(s) Oral at bedtime  carvedilol 12.5 milliGRAM(s) Oral every 12 hours  clopidogrel Tablet 75 milliGRAM(s) Oral daily  dapagliflozin 10 milliGRAM(s) Oral every 24 hours  ferrous    sulfate 325 milliGRAM(s) Oral daily  magnesium oxide 400 milliGRAM(s) Oral three times a day with meals  sacubitril 24 mG/valsartan 26 mG 1 Tablet(s) Oral two times a day    MEDICATIONS  (PRN):          Vital Signs Last 24 Hrs  T(C): 36.6 (27 May 2024 05:14), Max: 36.6 (26 May 2024 22:10)  T(F): 97.9 (27 May 2024 05:14), Max: 97.9 (27 May 2024 05:14)  HR: 126 (27 May 2024 08:09) (68 - 126)  BP: 120/75 (27 May 2024 08:09) (71/47 - 150/83)  BP(mean): 61 (27 May 2024 05:15) (61 - 85)  RR: 18 (27 May 2024 08:09) (16 - 19)  SpO2: 98% (27 May 2024 08:09) (97% - 100%)    Parameters below as of 27 May 2024 08:09  Patient On (Oxygen Delivery Method): room air        REVIEW OF SYSTEMS:  CONSTITUTIONAL: No fever, weight loss, or fatigue  RESPIRATORY: No cough, wheezing, chills or hemoptysis; No shortness of breath  CARDIOVASCULAR: No chest pain or chest pressure.  No shortness of breath or dyspnea on exertion.  No palpitations, dizziness, light headedness, syncope or near syncope.  No edema, no orthopnea.   NEUROLOGICAL: No headaches, memory loss, loss of strength, numbness, or tremors      PHYSICAL EXAM  Constitutional:  WDWN. No acute distress  HEENT: normocephalic, atraumatic.  PERRLA. EOMI  Neck : No JVD. no carotid bruits  Lungs:  clear to auscultation bilaterally. no rhonchi. no wheezing  Heart:  S1 and S2. No S3, S4. I/VI systolic murmur.  Abdomen:  soft, non tender.  Extremities: No clubbing, cyanoisis or edema  Nuerologic:  A+O x 3. No focal deficits  Skin:  no rashes        LABS:                        8.2    4.28  )-----------( 285      ( 27 May 2024 07:33 )             28.2     05-27    137  |  103  |  11  ----------------------------<  105<H>  3.7   |  31  |  0.86    Ca    7.7<L>      27 May 2024 07:33  Mg     1.7     05-27    TPro  4.7<L>  /  Alb  1.5<L>  /  TBili  0.3  /  DBili  x   /  AST  21  /  ALT  13  /  AlkPhos  47  05-26  I&O's Summary    26 May 2024 07:01  -  27 May 2024 07:00  --------------------------------------------------------  IN: 240 mL / OUT: 300 mL / NET: -60 mL    < from: 12 Lead ECG (05.25.24 @ 20:58) >    Diagnosis Line Atrial fibrillation  Low voltage QRS  Nonspecific ST abnormality  Abnormal ECG  No previous ECGs available    < end of copied text >  < from: TTE Echo Complete w/o Contrast w/ Doppler (05.26.24 @ 10:15) >   1. Left ventricular ejection fraction, by visual estimation, is 60 to   65%.   2. Normal global left ventricular systolic function.   3. Normal left ventricular internal cavity size.   4. The left ventricular diastolic function could not be assessed in this   study.   5. Mildly enlarged left atrium.   6. Normal right atrial size.   7. Small pericardial effusion.   8. Mild mitral valve regurgitation.   9. Thickening and calcification of the anterior and posterior mitral   valve leaflets.  10. Mild-moderate tricuspid regurgitation.  11. Mild aortic valve stenosis.    < end of copied text >  < from: CT Angio Chest Aorta w/wo IV Cont (05.26.24 @ 21:19) >  No aortic aneurysm or dissection.    Aberrant right subclavian artery is noted with severe atherosclerosis and   proximal occlusion with distal reconstitution of caliber just after the   artery courses past the the esophagus.    Findings consistent with CHF as detailed above.    Stable 2.4 cm complex lesion in the pancreatic tail with imaging features   consistent with serous cystadenoma as reported on prior MRI.    This report was discussed with patient's nurseon 3 [facility] (unable to   achieve contact the physician at time of dictation despite multiple   attempts) at 5/26/2024 10:21 PM.    < end of copied text >

## 2024-05-27 NOTE — CONSULT NOTE ADULT - ASSESSMENT
Assessment: Ms Herrera is an 87 yo female w/ hypertension, paroxysmal afib on Eliquis, cardiomyopathy, CAD, Hx of cardiac stents, admitted with a syncopal episode. Uneven BPs noted L>R, obtained CTA to r/o dissection. Vascular consulted as severe right subclavian artery atherosclerosis noted on CTA, wanting to r/o as cause of syncope. CTA and carotid doppler reviewed by attending. D dimer negative. Syncope unlikely related to severe atherosclerosis findings or level of carotid stenosis noted on imaging.     Plan:  - No acute vascular intervention  - CTA and carotid doppler findings unlikely cause of syncope, reviewed w/ attending  - C/w eliquis and plavix  - F/u outpatient with vascular, can see Dr. Cardozo or arrange vascular referral close to home for outpatient monitoring of findings  - Medical management per primary team and cardiology  - Reconsult prn    Discussed w/ Dr. Cardozo  Vascular Surgery

## 2024-05-28 ENCOUNTER — RX RENEWAL (OUTPATIENT)
Age: 89
End: 2024-05-28

## 2024-05-28 ENCOUNTER — TRANSCRIPTION ENCOUNTER (OUTPATIENT)
Age: 89
End: 2024-05-28

## 2024-05-28 VITALS
HEART RATE: 65 BPM | TEMPERATURE: 98 F | DIASTOLIC BLOOD PRESSURE: 54 MMHG | SYSTOLIC BLOOD PRESSURE: 143 MMHG | OXYGEN SATURATION: 100 % | RESPIRATION RATE: 16 BRPM

## 2024-05-28 LAB
ANION GAP SERPL CALC-SCNC: 4 MMOL/L — LOW (ref 5–17)
BUN SERPL-MCNC: 12 MG/DL — SIGNIFICANT CHANGE UP (ref 7–23)
CALCIUM SERPL-MCNC: 7.9 MG/DL — LOW (ref 8.4–10.5)
CHLORIDE SERPL-SCNC: 104 MMOL/L — SIGNIFICANT CHANGE UP (ref 96–108)
CO2 SERPL-SCNC: 31 MMOL/L — SIGNIFICANT CHANGE UP (ref 22–31)
CREAT SERPL-MCNC: 0.88 MG/DL — SIGNIFICANT CHANGE UP (ref 0.5–1.3)
EGFR: 64 ML/MIN/1.73M2 — SIGNIFICANT CHANGE UP
GLUCOSE SERPL-MCNC: 91 MG/DL — SIGNIFICANT CHANGE UP (ref 70–99)
HCT VFR BLD CALC: 29.4 % — LOW (ref 34.5–45)
HGB BLD-MCNC: 8.8 G/DL — LOW (ref 11.5–15.5)
MAGNESIUM SERPL-MCNC: 1.8 MG/DL — SIGNIFICANT CHANGE UP (ref 1.6–2.6)
MCHC RBC-ENTMCNC: 23 PG — LOW (ref 27–34)
MCHC RBC-ENTMCNC: 29.9 GM/DL — LOW (ref 32–36)
MCV RBC AUTO: 77 FL — LOW (ref 80–100)
NRBC # BLD: 0 /100 WBCS — SIGNIFICANT CHANGE UP (ref 0–0)
PLATELET # BLD AUTO: 274 K/UL — SIGNIFICANT CHANGE UP (ref 150–400)
POTASSIUM SERPL-MCNC: 3.9 MMOL/L — SIGNIFICANT CHANGE UP (ref 3.5–5.3)
POTASSIUM SERPL-SCNC: 3.9 MMOL/L — SIGNIFICANT CHANGE UP (ref 3.5–5.3)
RBC # BLD: 3.82 M/UL — SIGNIFICANT CHANGE UP (ref 3.8–5.2)
RBC # FLD: 16.6 % — HIGH (ref 10.3–14.5)
SODIUM SERPL-SCNC: 139 MMOL/L — SIGNIFICANT CHANGE UP (ref 135–145)
WBC # BLD: 4.03 K/UL — SIGNIFICANT CHANGE UP (ref 3.8–10.5)
WBC # FLD AUTO: 4.03 K/UL — SIGNIFICANT CHANGE UP (ref 3.8–10.5)

## 2024-05-28 PROCEDURE — 74175 CTA ABDOMEN W/CONTRAST: CPT | Mod: MC

## 2024-05-28 PROCEDURE — 96374 THER/PROPH/DIAG INJ IV PUSH: CPT

## 2024-05-28 PROCEDURE — 71275 CT ANGIOGRAPHY CHEST: CPT | Mod: MC

## 2024-05-28 PROCEDURE — 84484 ASSAY OF TROPONIN QUANT: CPT

## 2024-05-28 PROCEDURE — 82728 ASSAY OF FERRITIN: CPT

## 2024-05-28 PROCEDURE — 84443 ASSAY THYROID STIM HORMONE: CPT

## 2024-05-28 PROCEDURE — 82746 ASSAY OF FOLIC ACID SERUM: CPT

## 2024-05-28 PROCEDURE — 93880 EXTRACRANIAL BILAT STUDY: CPT

## 2024-05-28 PROCEDURE — 85379 FIBRIN DEGRADATION QUANT: CPT

## 2024-05-28 PROCEDURE — 99285 EMERGENCY DEPT VISIT HI MDM: CPT | Mod: 25

## 2024-05-28 PROCEDURE — 80048 BASIC METABOLIC PNL TOTAL CA: CPT

## 2024-05-28 PROCEDURE — 93970 EXTREMITY STUDY: CPT

## 2024-05-28 PROCEDURE — 83880 ASSAY OF NATRIURETIC PEPTIDE: CPT

## 2024-05-28 PROCEDURE — 93005 ELECTROCARDIOGRAM TRACING: CPT

## 2024-05-28 PROCEDURE — 85025 COMPLETE CBC W/AUTO DIFF WBC: CPT

## 2024-05-28 PROCEDURE — 70450 CT HEAD/BRAIN W/O DYE: CPT | Mod: MC

## 2024-05-28 PROCEDURE — 99232 SBSQ HOSP IP/OBS MODERATE 35: CPT

## 2024-05-28 PROCEDURE — 83735 ASSAY OF MAGNESIUM: CPT

## 2024-05-28 PROCEDURE — 83540 ASSAY OF IRON: CPT

## 2024-05-28 PROCEDURE — 71045 X-RAY EXAM CHEST 1 VIEW: CPT

## 2024-05-28 PROCEDURE — 82607 VITAMIN B-12: CPT

## 2024-05-28 PROCEDURE — 36415 COLL VENOUS BLD VENIPUNCTURE: CPT

## 2024-05-28 PROCEDURE — 85027 COMPLETE CBC AUTOMATED: CPT

## 2024-05-28 PROCEDURE — G0378: CPT

## 2024-05-28 PROCEDURE — 93306 TTE W/DOPPLER COMPLETE: CPT

## 2024-05-28 PROCEDURE — 83036 HEMOGLOBIN GLYCOSYLATED A1C: CPT

## 2024-05-28 PROCEDURE — 80053 COMPREHEN METABOLIC PANEL: CPT

## 2024-05-28 RX ORDER — DAPAGLIFLOZIN 10 MG/1
10 TABLET, FILM COATED ORAL EVERY 24 HOURS
Refills: 0 | Status: DISCONTINUED | OUTPATIENT
Start: 2024-05-28 | End: 2024-05-28

## 2024-05-28 RX ORDER — HYDRALAZINE HCL 50 MG
1 TABLET ORAL
Refills: 0 | DISCHARGE

## 2024-05-28 RX ORDER — METOPROLOL TARTRATE 50 MG
1 TABLET ORAL
Qty: 30 | Refills: 0
Start: 2024-05-28 | End: 2024-06-26

## 2024-05-28 RX ORDER — DAPAGLIFLOZIN 10 MG/1
1 TABLET, FILM COATED ORAL
Qty: 30 | Refills: 0
Start: 2024-05-28 | End: 2024-06-26

## 2024-05-28 RX ORDER — SACUBITRIL AND VALSARTAN 24; 26 MG/1; MG/1
1 TABLET, FILM COATED ORAL
Refills: 0 | DISCHARGE

## 2024-05-28 RX ORDER — SENNOSIDES 8.6 MG/1
8.6 TABLET, COATED ORAL
Qty: 60 | Refills: 3 | Status: ACTIVE | COMMUNITY
Start: 2024-05-28 | End: 1900-01-01

## 2024-05-28 RX ADMIN — MAGNESIUM OXIDE 400 MG ORAL TABLET 400 MILLIGRAM(S): 241.3 TABLET ORAL at 18:04

## 2024-05-28 RX ADMIN — MAGNESIUM OXIDE 400 MG ORAL TABLET 400 MILLIGRAM(S): 241.3 TABLET ORAL at 10:30

## 2024-05-28 RX ADMIN — Medication 325 MILLIGRAM(S): at 12:50

## 2024-05-28 RX ADMIN — CLOPIDOGREL BISULFATE 75 MILLIGRAM(S): 75 TABLET, FILM COATED ORAL at 12:49

## 2024-05-28 RX ADMIN — APIXABAN 2.5 MILLIGRAM(S): 2.5 TABLET, FILM COATED ORAL at 18:04

## 2024-05-28 RX ADMIN — MAGNESIUM OXIDE 400 MG ORAL TABLET 400 MILLIGRAM(S): 241.3 TABLET ORAL at 12:49

## 2024-05-28 RX ADMIN — APIXABAN 2.5 MILLIGRAM(S): 2.5 TABLET, FILM COATED ORAL at 05:20

## 2024-05-28 RX ADMIN — DAPAGLIFLOZIN 10 MILLIGRAM(S): 10 TABLET, FILM COATED ORAL at 12:49

## 2024-05-28 NOTE — PHYSICAL THERAPY INITIAL EVALUATION ADULT - ADDITIONAL COMMENTS
Pt lives in a condo, 2 NAVEED no steps inside, ambulates with cane in commumity, niece assists with shopping

## 2024-05-28 NOTE — PROGRESS NOTE ADULT - SUBJECTIVE AND OBJECTIVE BOX
< from: CT Angio Chest Aorta w/wo IV Cont (05.26.24 @ 21:19) >  IMPRESSION:  No aortic aneurysm or dissection.    Aberrant right subclavian artery is noted with severe atherosclerosis and   proximal occlusion with distal reconstitution of caliber just after the   artery courses past the the esophagus.    Findings consistent with CHF as detailed above.    Stable 2.4 cm complex lesion in the pancreatic tail with imaging features   consistent with serous cystadenoma as reported on prior MRI.    This report was discussed with patient's nurseon 3 East (unable to   achieve contact the physician at time of dictation despite multiple   attempts) at 5/26/2024 10:21 PM.    < end of copied text >  < from: US Duplex Carotid Arteries Complete, Bilateral (05.27.24 @ 11:19) >  IMPRESSION: Bilateral soft and calcified plaque.  Findings consistent with 50-69% stenosis at the right carotid bifurcation   and approximately 70% stenosis in the proximal left internal carotid   artery.  Suboptimal examination of the right vertebral artery. Abnormal waveforms   are noted in the right vertebral artery and repeat study is recommended.    Measurement of carotid stenosis is based on velocity parameters that   correlate the residual internal carotid diameter with that of the more   distal vessel in accordance with a method such as the North American   Symptomatic Carotid Endarterectomy Trial (NASCET).    --- End of Report ---      < end of copied text >   Patient is a 88y old  Female who presents with a chief complaint of syncope x 3 (28 May 2024 08:00)  Patient seen and examined at bedside. No overnight events reported.     ALLERGIES:  No Known Allergies    MEDICATIONS  (STANDING):  apixaban 2.5 milliGRAM(s) Oral every 12 hours  atorvastatin 40 milliGRAM(s) Oral at bedtime  clopidogrel Tablet 75 milliGRAM(s) Oral daily  dapagliflozin 10 milliGRAM(s) Oral every 24 hours  ferrous    sulfate 325 milliGRAM(s) Oral daily  magnesium oxide 400 milliGRAM(s) Oral three times a day with meals  metoprolol succinate ER 25 milliGRAM(s) Oral at bedtime    MEDICATIONS  (PRN):    Vital Signs Last 24 Hrs  T(F): 97.5 (28 May 2024 05:19), Max: 98.1 (27 May 2024 12:50)  HR: 65 (28 May 2024 05:19) (60 - 137)  BP: 143/54 (28 May 2024 05:19) (75/46 - 143/54)  RR: 16 (28 May 2024 05:19) (16 - 18)  SpO2: 100% (28 May 2024 05:19) (99% - 100%)  I&O's Summary    27 May 2024 07:01  -  28 May 2024 07:00  --------------------------------------------------------  IN: 200 mL / OUT: 0 mL / NET: 200 mL    28 May 2024 07:01  -  28 May 2024 12:26  --------------------------------------------------------  IN: 240 mL / OUT: 0 mL / NET: 240 mL      PHYSICAL EXAM:  General: NAD, A/O x 3  ENT: No gross hearing impairment, Moist mucous membranes, no thrush  Neck: Supple, No JVD  Lungs: Clear to auscultation bilaterally, good air entry, non-labored breathing  Cardio: RRR, S1/S2, No murmur  Abdomen: Soft, Nontender, Nondistended; Bowel sounds present  Extremities: No calf tenderness, No cyanosis, No pitting edema  Psych: Appropriate mood and affect    LABS:                        8.8    4.03  )-----------( 274      ( 28 May 2024 07:47 )             29.4     05-28    139  |  104  |  12  ----------------------------<  91  3.9   |  31  |  0.88    Ca    7.9      28 May 2024 08:41  Mg     1.8     05-28    TPro  4.7  /  Alb  1.5  /  TBili  0.3  /  DBili  x   /  AST  21  /  ALT  13  /  AlkPhos  47  05-26                CARDIAC MARKERS ( 26 May 2024 06:43 )  x     / 45.9 ng/L / x     / x     / x      CARDIAC MARKERS ( 25 May 2024 19:15 )  x     / 22.9 ng/L / x     / x     / x      CARDIAC MARKERS ( 25 May 2024 17:18 )  x     / 17.1 ng/L / x     / x     / x            TSH 4.254   TSH with FT4 reflex --  Total T3 --                  Urinalysis Basic - ( 28 May 2024 08:41 )    Color: x / Appearance: x / SG: x / pH: x  Gluc: 91 mg/dL / Ketone: x  / Bili: x / Urobili: x   Blood: x / Protein: x / Nitrite: x   Leuk Esterase: x / RBC: x / WBC x   Sq Epi: x / Non Sq Epi: x / Bacteria: x          RADIOLOGY & ADDITIONAL TESTS:    < from: CT Angio Chest Aorta w/wo IV Cont (05.26.24 @ 21:19) >  IMPRESSION:  No aortic aneurysm or dissection.    Aberrant right subclavian artery is noted with severe atherosclerosis and   proximal occlusion with distal reconstitution of caliber just after the   artery courses past the the esophagus.    Findings consistent with CHF as detailed above.    Stable 2.4 cm complex lesion in the pancreatic tail with imaging features   consistent with serous cystadenoma as reported on prior MRI.    This report was discussed with patient's nurseon 3 East (unable to   achieve contact the physician at time of dictation despite multiple   attempts) at 5/26/2024 10:21 PM.    < end of copied text >  < from: US Duplex Carotid Arteries Complete, Bilateral (05.27.24 @ 11:19) >  IMPRESSION: Bilateral soft and calcified plaque.  Findings consistent with 50-69% stenosis at the right carotid bifurcation   and approximately 70% stenosis in the proximal left internal carotid   artery.  Suboptimal examination of the right vertebral artery. Abnormal waveforms   are noted in the right vertebral artery and repeat study is recommended.    Measurement of carotid stenosis is based on velocity parameters that   correlate the residual internal carotid diameter with that of the more   distal vessel in accordance with a method such as the North American   Symptomatic Carotid Endarterectomy Trial (NASCET).    --- End of Report ---      < end of copied text >   Patient is a 88y old  Female who presents with a chief complaint of syncope x 3 (28 May 2024 08:00)  Patient seen and examined at bedside. No overnight events reported.     ALLERGIES:  No Known Allergies    MEDICATIONS  (STANDING):  apixaban 2.5 milliGRAM(s) Oral every 12 hours  atorvastatin 40 milliGRAM(s) Oral at bedtime  clopidogrel Tablet 75 milliGRAM(s) Oral daily  dapagliflozin 10 milliGRAM(s) Oral every 24 hours  ferrous    sulfate 325 milliGRAM(s) Oral daily  magnesium oxide 400 milliGRAM(s) Oral three times a day with meals  metoprolol succinate ER 25 milliGRAM(s) Oral at bedtime    MEDICATIONS  (PRN):    Vital Signs Last 24 Hrs  T(F): 97.5 (28 May 2024 05:19), Max: 98.1 (27 May 2024 12:50)  HR: 65 (28 May 2024 05:19) (60 - 137)  BP: 143/54 (28 May 2024 05:19) (75/46 - 143/54)  RR: 16 (28 May 2024 05:19) (16 - 18)  SpO2: 100% (28 May 2024 05:19) (99% - 100%)  I&O's Summary    27 May 2024 07:01  -  28 May 2024 07:00  --------------------------------------------------------  IN: 200 mL / OUT: 0 mL / NET: 200 mL    28 May 2024 07:01  -  28 May 2024 12:26  --------------------------------------------------------  IN: 240 mL / OUT: 0 mL / NET: 240 mL      PHYSICAL EXAM:  General: NAD, A/O x 3  ENT: No gross hearing impairment, Moist mucous membranes, no thrush  Neck: Supple, No JVD  Lungs: Clear to auscultation bilaterally, good air entry, non-labored breathing  Cardio: RRR, S1/S2, No murmur  Abdomen: Soft, Nontender, Nondistended; Bowel sounds present  Extremities: No calf tenderness, No cyanosis, No pitting edema  Psych: Appropriate mood and affect    LABS:                        8.8    4.03  )-----------( 274      ( 28 May 2024 07:47 )             29.4     05-28    139  |  104  |  12  ----------------------------<  91  3.9   |  31  |  0.88    Ca    7.9      28 May 2024 08:41  Mg     1.8     05-28    TPro  4.7  /  Alb  1.5  /  TBili  0.3  /  DBili  x   /  AST  21  /  ALT  13  /  AlkPhos  47  05-26                CARDIAC MARKERS ( 26 May 2024 06:43 )  x     / 45.9 ng/L / x     / x     / x      CARDIAC MARKERS ( 25 May 2024 19:15 )  x     / 22.9 ng/L / x     / x     / x      CARDIAC MARKERS ( 25 May 2024 17:18 )  x     / 17.1 ng/L / x     / x     / x            TSH 4.254   TSH with FT4 reflex --  Total T3 --                  Urinalysis Basic - ( 28 May 2024 08:41 )    Color: x / Appearance: x / SG: x / pH: x  Gluc: 91 mg/dL / Ketone: x  / Bili: x / Urobili: x   Blood: x / Protein: x / Nitrite: x   Leuk Esterase: x / RBC: x / WBC x   Sq Epi: x / Non Sq Epi: x / Bacteria: x          RADIOLOGY & ADDITIONAL TESTS:    < from: CT Angio Chest Aorta w/wo IV Cont (05.26.24 @ 21:19) >  IMPRESSION:  No aortic aneurysm or dissection.    Aberrant right subclavian artery is noted with severe atherosclerosis and   proximal occlusion with distal reconstitution of caliber just after the   artery courses past the the esophagus.    Findings consistent with CHF as detailed above.    Stable 2.4 cm complex lesion in the pancreatic tail with imaging features   consistent with serous cystadenoma as reported on prior MRI.    This report was discussed with patient's nurseon 3 East (unable to   achieve contact the physician at time of dictation despite multiple   attempts) at 5/26/2024 10:21 PM.    < end of copied text >  < from: US Duplex Carotid Arteries Complete, Bilateral (05.27.24 @ 11:19) >  IMPRESSION: Bilateral soft and calcified plaque.  Findings consistent with 50-69% stenosis at the right carotid bifurcation   and approximately 70% stenosis in the proximal left internal carotid   artery.  Suboptimal examination of the right vertebral artery. Abnormal waveforms   are noted in the right vertebral artery and repeat study is recommended.    Measurement of carotid stenosis is based on velocity parameters that   correlate the residual internal carotid diameter with that of the more   distal vessel in accordance with a method such as the North American   Symptomatic Carotid Endarterectomy Trial (NASCET).    --- End of Report ---      < end of copied text >    < from: TTE Echo Complete w/o Contrast w/ Doppler (05.26.24 @ 10:15) >      Summary:   1. Left ventricular ejection fraction, by visual estimation, is 60 to   65%.   2. Normal global left ventricular systolic function.   3. Normal left ventricular internal cavity size.   4. The left ventricular diastolic function could not be assessed in this   study.   5. Mildly enlarged left atrium.   6. Normal right atrial size.   7. Small pericardial effusion.   8. Mild mitral valve regurgitation.   9. Thickening and calcification of the anterior and posterior mitral   valve leaflets.  10. Mild-moderate tricuspid regurgitation.  11. Mild aortic valve stenosis.    [id number] Olman Lange MD, FACC , MD, FACC Electronically   signed on 5/26/2024 at 12:51:23 PM        < end of copied text >

## 2024-05-28 NOTE — PROGRESS NOTE ADULT - ASSESSMENT
Ms Herrera is an 89 yo female w/ hypertension, paroxysmal afib on Eliquis, cardiomyopathy, CAD, Hx of cardiac stents, admitted with a syncopal episode    #Syncope - suspect 2/2 dehydration, possible vagal event  - Imaging reviewed   - Episode of brief rapid afib, likely due to dehydration  - Continue tele   - TSH with mild elevation  - Continue statin, eliquis   - Continue guideline directed medical therapy of carvedilol, Farxiga.  - Entresto?  - Cardiology consulted, appreciate recs     #Microcytic anemia   - Iron total low. Started ferrous sulfate    #Right subclavian artery with severe atherosclerosis   #Uneven BP's.   - Left side BP higher than right side BP  - CTA and carotid duplex reviewed  - Vascular surgery consulted, advised syncope unlikely related to severe atherosclerosis findings or level of carotid stenosis noted on imaging, no acute vascular intervention required, continue eliquis and plavix  - f/u outpatient with vascular surgery     #History of HTN, Afib on eliquis, CAD, cardiomyopathy   - Continue plavix, eliquis  - Continue carvedilol and farxiga  - Entresto?  - Discontinued Amlodipine and Hydralazine    #Hypoalbuminemia, hypokalemia, hypomagnesemia  - Replete as needed    #Abnormal TSH  - Repeat in 4 weeks    #DVT prophylaxis   - Eliquis    Dispo: PT rigoberto pending    niece Rashmi [phone]   Ms Herrera is an 89 yo female w/ hypertension, paroxysmal afib on Eliquis, cardiomyopathy, CAD, Hx of cardiac stents, admitted with a syncopal episode    #Syncope - suspect 2/2 dehydration, possible vagal event  - Imaging reviewed   - Episode of brief rapid afib, likely due to dehydration  - Continue tele   - TSH with mild elevation  - Continue statin, eliquis   - Continue guideline directed medical therapy of carvedilol, Farxiga  - Cardiology consulted, appreciate recs     #Microcytic anemia   - Iron total low. Started ferrous sulfate    #Right subclavian artery with severe atherosclerosis   #Uneven BP's.   - Left side BP higher than right side BP  - CTA and carotid duplex reviewed  - Vascular surgery consulted, advised syncope unlikely related to severe atherosclerosis findings or level of carotid stenosis noted on imaging, no acute vascular intervention required, continue eliquis and plavix  - f/u outpatient with vascular surgery     #History of HTN, Afib on eliquis, CAD, cardiomyopathy   - Continue plavix, eliquis  - Continue carvedilol and farxiga  - Discontinued Amlodipine and Hydralazine    #Hypoalbuminemia, hypokalemia, hypomagnesemia  - Replete as needed    #Abnormal TSH  - Repeat in 4 weeks    #DVT prophylaxis   - Eliquis    Dispo: PT rigoberto pending    niece Rashmi [phone]   Ms Herrera is an 89 yo female w/ hypertension, paroxysmal afib on Eliquis, cardiomyopathy, CAD, Hx of cardiac stents, admitted with a syncopal episode    #Syncope - suspect 2/2 dehydration, possible vagal event  - Imaging reviewed   - Episode of brief rapid afib, likely due to dehydration  - Continue tele   - TSH with mild elevation  - Continue statin, eliquis   - Continue guideline directed medical therapy of carvedilol, Farxiga  - Cardiology consulted, appreciate recs     #Microcytic anemia   - Iron total low. Started ferrous sulfate    #Right subclavian artery with severe atherosclerosis   #Uneven BP's.   - Left side BP higher than right side BP  - CTA and carotid duplex reviewed  - Vascular surgery consulted, advised syncope unlikely related to severe atherosclerosis findings or level of carotid stenosis noted on imaging, no acute vascular intervention required, continue eliquis and plavix  - f/u outpatient with vascular surgery     #History of HTN, Afib on eliquis, CAD, cardiomyopathy   - Continue plavix, eliquis  - Continue carvedilol and farxiga  - Discontinued Amlodipine and Hydralazine    #Hypoalbuminemia, hypokalemia, hypomagnesemia  - Replete as needed    #Abnormal TSH  - Repeat in 4 weeks    #DVT prophylaxis   - Eliquis    Dispo: PT rigoberto pending    5/28: Updated patient's niece Rashmi [phone]   Ms Herrera is an 87 yo female w/ hypertension, paroxysmal afib on Eliquis, cardiomyopathy, CAD, Hx of cardiac stents, admitted with a syncopal episode    #Syncope - suspect 2/2 dehydration, possible vagal event  - Imaging reviewed   - Episode of brief rapid afib, likely due to dehydration  - Continue tele   - TSH with mild elevation  - Continue statin, eliquis   - Continue guideline directed medical therapy of carvedilol, Farxiga  - No spironolactone due to soft BP, EF > 60  - Cardiology consulted, appreciate recs     #Microcytic anemia   - Iron total low. Started ferrous sulfate    #Right subclavian artery with severe atherosclerosis   #Uneven BP's.   - Left side BP higher than right side BP  - CTA and carotid duplex reviewed  - Vascular surgery consulted, advised syncope unlikely related to severe atherosclerosis findings or level of carotid stenosis noted on imaging, no acute vascular intervention required, continue eliquis and plavix  - f/u outpatient with vascular surgery     #History of HTN, Afib on eliquis, CAD, cardiomyopathy   - Continue plavix, eliquis  - Continue carvedilol and farxiga  - Discontinued Amlodipine and Hydralazine    #Hypoalbuminemia, hypokalemia, hypomagnesemia  - Replete as needed    #Abnormal TSH  - Repeat in 4 weeks    #DVT prophylaxis   - Eliquis    Dispo: PT rigoberto pending    5/28: Updated patient's niece Rashmi [phone]

## 2024-05-28 NOTE — PHYSICAL THERAPY INITIAL EVALUATION ADULT - WEIGHT-BEARING RESTRICTIONS: GAIT, REHAB EVAL
received pt in cardiac arrest from NH.  Pt BIBA by [facility] on thumper, ETT 7.5 LL25 IO to left humerus.  CPR continued
full weight-bearing

## 2024-05-28 NOTE — DISCHARGE NOTE NURSING/CASE MANAGEMENT/SOCIAL WORK - NSDCFUADDAPPT_GEN_ALL_CORE_FT
Dr. Allison's office will call you with a hospital followup appointment. We made you a hospital followup appointment with Dr. Talavera on 6/5/24 at 1:00pm, office #[phone].

## 2024-05-28 NOTE — GOALS OF CARE CONVERSATION - ADVANCED CARE PLANNING - CONVERSATION DETAILS
Met with pt. at bedside. She is A&Ox3. She is here from home s/p syncope. Pt's PCP is Dr. Allison. She states her HCP is her niece Rashmi. Pt. has hx. HTN, DM, CHF, CAD, CKD4, cardiomyopathy. Pt. found to have lesion in tail of pancreas. Also some moderate stenosis in carotid arteries. and also in subclavian artery . Cardiology consult indicates syncope probably not related to these areas of stenosis/occlusion.   I asked the patient about her GOC. She stated she did not want to make a decision now, and would prefer to discuss this with her niece and PCP.

## 2024-05-28 NOTE — PROGRESS NOTE ADULT - NUTRITIONAL ASSESSMENT
This patient has been assessed with a concern for Malnutrition and has been determined to have a diagnosis/diagnoses of Severe protein-calorie malnutrition.    This patient is being managed with:   Diet DASH/TLC-  Sodium & Cholesterol Restricted  Supplement Feeding Modality:  Oral  Ensure Plus High Protein Cans or Servings Per Day:  1       Frequency:  Two Times a day  Entered: May 26 2024 12:12PM  

## 2024-05-28 NOTE — DISCHARGE NOTE NURSING/CASE MANAGEMENT/SOCIAL WORK - NSDCPEFALRISK_GEN_ALL_CORE
For information on Fall & Injury Prevention, visit: [email or web address] OR  [email or web address] OR  https://www.cdc.gov/steadi/patient.html

## 2024-05-28 NOTE — PHYSICAL THERAPY INITIAL EVALUATION ADULT - PERTINENT HX OF CURRENT PROBLEM, REHAB EVAL
Ms Herrera is an 87 yo female w/ hypertension, paroxysmal afib on Eliquis, cardiomyopathy, CAD, Hx of cardiac stents, presents to the ED via EMS, because of syncope.  Daughter  states she was shopping with her mom and while waiting at the , pt suddenly passed out, apparently just went down on the floor.  There was no head injury.  She tried to arouse her and pt did respond after a minute, did not complain of anything and does not know what happened.  When EMS arrived, pt was awake and states she was fine, but while EMS was checking her, pt passed out again, she woke up for a few minutes then she was out again for a few seconds enroute to the ED.  She was able to be aroused in a few seconds and by the time she got to the ED, she was more awake.  Pt denying any headache, dizziness, chest pain, dyspnea, nausea prior to the syncopal episode.  Pt states the last thing she remembered was her vison got blurred and black.   Pt admits she has not eaten anything all day, because she usually eats late in the day.  She did take all her daytime meds: Eliquis, Amlodipine, Carvedilol, Hydralazine, Entresto and Plavix.  Initial BP in the ED was 106/60, pulse of 110.  Orthostatic vitals rechecked showed Lying BP: 87/71 HR: 95  Sitting BP: 66/53 HR: 104  Standing BP 73/60, pulse of 102.   Pt was afebrile. O2 sats were 97% on room air.   Initial EKG showed NSR 90/min.  1st trop was 17.  Labs showed K of 3.2, Mg of 1.5.  H/H is 9/30 ( at baseline).   Head CT was negative for acute CVA, no hemorrhage no fracture.  +ve volume loss.  Cxray showed cardiomegaly, no infiltrates, nor effusion.

## 2024-05-28 NOTE — DISCHARGE NOTE NURSING/CASE MANAGEMENT/SOCIAL WORK - PATIENT PORTAL LINK FT
You can access the FollowMyHealth Patient Portal offered by [facility] by registering at the following website: [email or web address]. By joining [facility]’s FollowMyHealth portal, you will also be able to view your health information using other applications (apps) compatible with our system.

## 2024-06-05 ENCOUNTER — APPOINTMENT (OUTPATIENT)
Dept: FAMILY MEDICINE | Facility: CLINIC | Age: 89
End: 2024-06-05
Payer: MEDICARE

## 2024-06-05 VITALS
SYSTOLIC BLOOD PRESSURE: 95 MMHG | TEMPERATURE: 97 F | HEIGHT: 57 IN | BODY MASS INDEX: 21.79 KG/M2 | DIASTOLIC BLOOD PRESSURE: 58 MMHG | OXYGEN SATURATION: 96 % | RESPIRATION RATE: 16 BRPM | WEIGHT: 101 LBS | HEART RATE: 81 BPM

## 2024-06-05 DIAGNOSIS — E83.51 HYPOCALCEMIA: ICD-10-CM

## 2024-06-05 DIAGNOSIS — I49.9 CARDIAC ARRHYTHMIA, UNSPECIFIED: ICD-10-CM

## 2024-06-05 DIAGNOSIS — Z92.89 PERSONAL HISTORY OF OTHER MEDICAL TREATMENT: ICD-10-CM

## 2024-06-05 DIAGNOSIS — I50.9 HEART FAILURE, UNSPECIFIED: ICD-10-CM

## 2024-06-05 DIAGNOSIS — I70.8 ATHEROSCLEROSIS OF OTHER ARTERIES: ICD-10-CM

## 2024-06-05 DIAGNOSIS — I95.1 ORTHOSTATIC HYPOTENSION: ICD-10-CM

## 2024-06-05 DIAGNOSIS — I48.92 UNSPECIFIED ATRIAL FLUTTER: ICD-10-CM

## 2024-06-05 PROCEDURE — 99214 OFFICE O/P EST MOD 30 MIN: CPT

## 2024-06-05 RX ORDER — AMLODIPINE BESYLATE 2.5 MG/1
2.5 TABLET ORAL
Qty: 60 | Refills: 3 | Status: COMPLETED | COMMUNITY
Start: 2021-08-14 | End: 2024-06-05

## 2024-06-05 RX ORDER — CARVEDILOL 12.5 MG/1
12.5 TABLET, FILM COATED ORAL
Qty: 180 | Refills: 3 | Status: DISCONTINUED | COMMUNITY
Start: 2021-08-14 | End: 2024-06-05

## 2024-06-05 RX ORDER — METOPROLOL SUCCINATE 25 MG/1
25 TABLET, EXTENDED RELEASE ORAL
Qty: 1 | Refills: 0 | Status: DISCONTINUED | COMMUNITY
Start: 2024-06-05

## 2024-06-05 RX ORDER — DAPAGLIFLOZIN 10 MG/1
10 TABLET, FILM COATED ORAL
Qty: 1 | Refills: 0 | Status: ACTIVE | COMMUNITY
Start: 2024-06-05

## 2024-06-05 RX ORDER — METOPROLOL SUCCINATE 25 MG/1
25 TABLET, EXTENDED RELEASE ORAL
Refills: 0 | Status: ACTIVE | COMMUNITY

## 2024-06-05 RX ORDER — SACUBITRIL AND VALSARTAN 24; 26 MG/1; MG/1
24-26 TABLET, FILM COATED ORAL
Qty: 180 | Refills: 2 | Status: COMPLETED | COMMUNITY
Start: 2021-08-14 | End: 2024-06-05

## 2024-06-05 NOTE — HISTORY OF PRESENT ILLNESS
[Post-hospitalization from ___ Hospital] : Post-hospitalization from [unfilled] Hospital [Admitted on: ___] : The patient was admitted on [unfilled] [Discharged on ___] : discharged on [unfilled] [Discharge Summary] : discharge summary [Pertinent Labs] : pertinent labs [Discharge Med List] : discharge medication list [Med Reconciliation] : medication reconciliation has been completed [FreeTextEntry2] : Pt feeling well since hospital discharge. No episodes of lightheadedness or syncope since discharge. She is taking the medications recommended in the DC paperwork (Dr. Lange recommended GDT with carvedilol, entresto, etc, but those are not on the DC paperwork and patient is not taking them).  89 yo female w/ hypertension, paroxysmal afib on Eliquis, cardiomyopathy, CAD, Hx of cardiac stents, presents to the ED via EMS, because of syncope.  Daughter  states she was shopping with her mom and while waiting at the [employer], pt suddenly passed out, apparently just went down on the floor.  There was no head injury.  She tried to arouse her and pt did respond after a minute, did not complain of anything and does not know what happened.  When EMS arrived, pt was awake and states she was fine, but while EMS was checking her, pt passed out again, she woke up for a few minutes then she was out again for a few seconds enroute to the ED.  She was able to be aroused in a few seconds and by the time she got to the ED, she was more awake.  Pt denying any headache, dizziness, chest pain, dyspnea, nausea prior to the syncopal episode.  Pt states the last thing she remembered was her vison got blurred and black.  Pt admits she has not eaten anything all day, because she usually eats late in the day.  She did take all her daytime meds: Eliquis, Amlodipine, Carvedilol, Hydralazine, Entresto and Plavix. Initial BP in the ED was 106/60, pulse of 110.  Orthostatic vitals rechecked showed Lying BP: 87/71 HR: 95  Sitting BP: 66/53 HR: 104  Standing BP 73/60, pulse of 102.  Pt was afebrile. O2 sats were 97% on room air.  Initial EKG showed NSR 90/min.  1st trop was 17.  Labs showed K of 3.2, Mg of 1.5.  H/H is 9/30 ( at baseline).  Head CT was negative for acute CVA, no hemorrhage no fracture.  +ve volume loss. Cxray showed cardiomegaly, no infiltrates, nor effusion.  While in the ED, pt also had a short run of rapid afib to 120's-130's.   Pt was asymptomatic. (25 May 2024 20:41)  Patient was admitted to [facility] for syncope - suspect 2/2 dehydration, possible vagal event. Cardiology was consulted. Amlodipine and hydralazine were discontinued. Coreg was switched to toprol. Patient was started on farxiga.   While admitted it was noted that left side BP higher than right side BP. Imaging showed right subclavian artery with severe atherosclerosis. Vascular surgery consulted, advised syncope unlikely related to severe atherosclerosis findings or level of carotid stenosis noted on imaging, no acute vascular intervention required, continue eliquis and plavix and follow up outpatient.   PT was consulted, recommended home PT. Patient stable for DC home with close outpatient follow up

## 2024-06-05 NOTE — ASSESSMENT
[FreeTextEntry1] : # Recent hospitalization, orthostatic hypotension w/ syncope Pt had 3 syncopal episodes and found to be hypotensive. DCed amlodipine and hydralazine Pt should f/u with cardio - last cardio note in hospital record recommend continue entresto and carvedilol and farxiga. Pt was started on farxiga, but DCed on carvedilol and entresto per DC paperwork. BP low again today Pt did notice mild leg swelling since she left hospital.  # Hypocalcemia Start Ca and vit D supplements f/u CMP, Mg, PTH, vit D levels Asymptomatic  # R subclavian atherosclerosis and stenosis Pt was meant to follow up with vascular surgery as outpatient, no appointment scheduled  f/u in 2-3 weeks for lab follow up with PCP or Dr. Oh

## 2024-06-05 NOTE — PHYSICAL EXAM
[Normal Outer Ear/Nose] : the outer ears and nose were normal in appearance [Normal] : no respiratory distress, lungs were clear to auscultation bilaterally and no accessory muscle use [Normal Rate] : normal rate  [Normal S1, S2] : normal S1 and S2 [No Murmur] : no murmur heard [Normal Affect] : the affect was normal [Alert and Oriented x3] : oriented to person, place, and time [Normal Mood] : the mood was normal [Normal Insight/Judgement] : insight and judgment were intact [de-identified] : irregularly irregular rate/rhythm [de-identified] : ambulates w/ cane

## 2024-06-13 ENCOUNTER — RX RENEWAL (OUTPATIENT)
Age: 89
End: 2024-06-13

## 2024-06-13 RX ORDER — PANTOPRAZOLE 40 MG/1
40 TABLET, DELAYED RELEASE ORAL TWICE DAILY
Qty: 180 | Refills: 0 | Status: ACTIVE | COMMUNITY
Start: 2024-02-26 | End: 1900-01-01

## 2024-06-17 ENCOUNTER — RX RENEWAL (OUTPATIENT)
Age: 89
End: 2024-06-17

## 2024-06-17 RX ORDER — HYDRALAZINE HYDROCHLORIDE 50 MG/1
50 TABLET ORAL
Qty: 140 | Refills: 3 | Status: ACTIVE | COMMUNITY
Start: 2021-08-14 | End: 1900-01-01

## 2024-06-18 ENCOUNTER — NON-APPOINTMENT (OUTPATIENT)
Age: 89
End: 2024-06-18

## 2024-06-18 ENCOUNTER — APPOINTMENT (OUTPATIENT)
Dept: CARDIOLOGY | Facility: CLINIC | Age: 89
End: 2024-06-18
Payer: MEDICARE

## 2024-06-18 VITALS — HEART RATE: 137 BPM | WEIGHT: 98 LBS | BODY MASS INDEX: 21.14 KG/M2 | HEIGHT: 57 IN | OXYGEN SATURATION: 100 %

## 2024-06-18 VITALS — DIASTOLIC BLOOD PRESSURE: 60 MMHG | SYSTOLIC BLOOD PRESSURE: 90 MMHG

## 2024-06-18 DIAGNOSIS — I70.90 UNSPECIFIED ATHEROSCLEROSIS: ICD-10-CM

## 2024-06-18 DIAGNOSIS — I48.19 OTHER PERSISTENT ATRIAL FIBRILLATION: ICD-10-CM

## 2024-06-18 PROCEDURE — G2211 COMPLEX E/M VISIT ADD ON: CPT

## 2024-06-18 PROCEDURE — 93000 ELECTROCARDIOGRAM COMPLETE: CPT

## 2024-06-18 PROCEDURE — 99215 OFFICE O/P EST HI 40 MIN: CPT

## 2024-06-19 PROBLEM — I48.19 PERSISTENT ATRIAL FIBRILLATION WITH RVR: Status: ACTIVE | Noted: 2024-06-19

## 2024-06-19 NOTE — PHYSICAL EXAM
[5th Left ICS - MCL] : palpated at the 5th LICS in the midclavicular line [Tachycardia] : tachycardic [Irregularly Irregular] : irregularly irregular [Normal] : clear lung fields, good air entry, no respiratory distress

## 2024-06-19 NOTE — REASON FOR VISIT
[Arrhythmia/ECG Abnorrmalities] : arrhythmia/ECG abnormalities [Coronary Artery Disease] : coronary artery disease [FreeTextEntry1] :   Patient returns for follow-up.  She was recently hospitalized  after falling.  She was found to be in atrial fibrillation.  She ultimately was discharged she was also found to be severely anemic. her echocardiogram revealed normal left ventricular systolic function  She offers no complaints at this time.  She needs to go over her medications

## 2024-06-19 NOTE — ASSESSMENT
[FreeTextEntry1] :  impression: 1.  Elderly woman with atrial fibrillation  with rapid ventricular response in the setting of coronary disease and severe anemia.  Plan: 1.  Given relative hypotension cannot increase beta-blocker or add diltiazem therefore will give digoxin 0.25 mg/day.  I have spoken to the patient and her niece about this and the risks and benefits and we will see her again in 1 week's time.  in addition we have discussed the fact that if the patient continues with uncontrolled atrial fibrillation she may need to be cardioverted 2.  The meantime we will need to contact patient's primary physician regarding anemia workup as hemoglobin of 8.8 in this elderly woman with coronary disease and atrial fibrillation will at some point need to be corrected

## 2024-06-20 DIAGNOSIS — D64.9 ANEMIA, UNSPECIFIED: ICD-10-CM

## 2024-06-22 ENCOUNTER — LABORATORY RESULT (OUTPATIENT)
Age: 89
End: 2024-06-22

## 2024-06-22 ENCOUNTER — RX RENEWAL (OUTPATIENT)
Age: 89
End: 2024-06-22

## 2024-06-22 RX ORDER — DIGOXIN 250 UG/1
250 TABLET ORAL
Qty: 30 | Refills: 0 | Status: ACTIVE | COMMUNITY
Start: 2024-06-18 | End: 1900-01-01

## 2024-06-24 ENCOUNTER — INPATIENT (INPATIENT)
Facility: HOSPITAL | Age: 89
LOS: 13 days | Discharge: HOME | DRG: 812 | End: 2024-07-08
Attending: STUDENT IN AN ORGANIZED HEALTH CARE EDUCATION/TRAINING PROGRAM | Admitting: STUDENT IN AN ORGANIZED HEALTH CARE EDUCATION/TRAINING PROGRAM
Payer: COMMERCIAL

## 2024-06-24 VITALS
TEMPERATURE: 97 F | DIASTOLIC BLOOD PRESSURE: 68 MMHG | HEIGHT: 57 IN | OXYGEN SATURATION: 93 % | RESPIRATION RATE: 16 BRPM | SYSTOLIC BLOOD PRESSURE: 132 MMHG | HEART RATE: 75 BPM | WEIGHT: 95.9 LBS

## 2024-06-24 DIAGNOSIS — Z92.89 PERSONAL HISTORY OF OTHER MEDICAL TREATMENT: Chronic | ICD-10-CM

## 2024-06-24 DIAGNOSIS — Z98.890 OTHER SPECIFIED POSTPROCEDURAL STATES: Chronic | ICD-10-CM

## 2024-06-24 DIAGNOSIS — K86.2 CYST OF PANCREAS: Chronic | ICD-10-CM

## 2024-06-24 DIAGNOSIS — Z90.710 ACQUIRED ABSENCE OF BOTH CERVIX AND UTERUS: Chronic | ICD-10-CM

## 2024-06-24 LAB
ABO RH CONFIRMATION: SIGNIFICANT CHANGE UP
ALBUMIN SERPL ELPH-MCNC: 2.6 G/DL — LOW (ref 3.3–5)
ALP SERPL-CCNC: 59 U/L — SIGNIFICANT CHANGE UP (ref 40–120)
ALT FLD-CCNC: 10 U/L — SIGNIFICANT CHANGE UP (ref 10–45)
ANION GAP SERPL CALC-SCNC: 7 MMOL/L — SIGNIFICANT CHANGE UP (ref 5–17)
APTT BLD: 31.6 SEC — SIGNIFICANT CHANGE UP (ref 24.5–35.6)
AST SERPL-CCNC: 25 U/L — SIGNIFICANT CHANGE UP (ref 10–40)
BASOPHILS # BLD AUTO: 0.03 K/UL — SIGNIFICANT CHANGE UP (ref 0–0.2)
BASOPHILS NFR BLD AUTO: 0.7 % — SIGNIFICANT CHANGE UP (ref 0–2)
BILIRUB SERPL-MCNC: 0.3 MG/DL — SIGNIFICANT CHANGE UP (ref 0.2–1.2)
BLD GP AB SCN SERPL QL: SIGNIFICANT CHANGE UP
BUN SERPL-MCNC: 14 MG/DL — SIGNIFICANT CHANGE UP (ref 7–23)
CALCIUM SERPL-MCNC: 8.5 MG/DL — SIGNIFICANT CHANGE UP (ref 8.4–10.5)
CHLORIDE SERPL-SCNC: 100 MMOL/L — SIGNIFICANT CHANGE UP (ref 96–108)
CO2 SERPL-SCNC: 29 MMOL/L — SIGNIFICANT CHANGE UP (ref 22–31)
CREAT SERPL-MCNC: 1.41 MG/DL — HIGH (ref 0.5–1.3)
EGFR: 36 ML/MIN/1.73M2 — LOW
EOSINOPHIL # BLD AUTO: 0.02 K/UL — SIGNIFICANT CHANGE UP (ref 0–0.5)
EOSINOPHIL NFR BLD AUTO: 0.5 % — SIGNIFICANT CHANGE UP (ref 0–6)
GLUCOSE SERPL-MCNC: 82 MG/DL — SIGNIFICANT CHANGE UP (ref 70–99)
HCT VFR BLD CALC: 32.6 % — LOW (ref 34.5–45)
HGB BLD-MCNC: 9.4 G/DL — LOW (ref 11.5–15.5)
IMM GRANULOCYTES NFR BLD AUTO: 0.2 % — SIGNIFICANT CHANGE UP (ref 0–0.9)
INR BLD: 1.43 RATIO — HIGH (ref 0.85–1.18)
LYMPHOCYTES # BLD AUTO: 0.66 K/UL — LOW (ref 1–3.3)
LYMPHOCYTES # BLD AUTO: 15.9 % — SIGNIFICANT CHANGE UP (ref 13–44)
MCHC RBC-ENTMCNC: 22 PG — LOW (ref 27–34)
MCHC RBC-ENTMCNC: 28.8 GM/DL — LOW (ref 32–36)
MCV RBC AUTO: 76.3 FL — LOW (ref 80–100)
MONOCYTES # BLD AUTO: 0.44 K/UL — SIGNIFICANT CHANGE UP (ref 0–0.9)
MONOCYTES NFR BLD AUTO: 10.6 % — SIGNIFICANT CHANGE UP (ref 2–14)
NEUTROPHILS # BLD AUTO: 2.98 K/UL — SIGNIFICANT CHANGE UP (ref 1.8–7.4)
NEUTROPHILS NFR BLD AUTO: 72.1 % — SIGNIFICANT CHANGE UP (ref 43–77)
NRBC # BLD: 0 /100 WBCS — SIGNIFICANT CHANGE UP (ref 0–0)
PLATELET # BLD AUTO: 273 K/UL — SIGNIFICANT CHANGE UP (ref 150–400)
POTASSIUM SERPL-MCNC: 4.3 MMOL/L — SIGNIFICANT CHANGE UP (ref 3.5–5.3)
POTASSIUM SERPL-SCNC: 4.3 MMOL/L — SIGNIFICANT CHANGE UP (ref 3.5–5.3)
PROT SERPL-MCNC: 6.5 G/DL — SIGNIFICANT CHANGE UP (ref 6–8.3)
PROTHROM AB SERPL-ACNC: 16.6 SEC — HIGH (ref 9.5–13)
RBC # BLD: 4.27 M/UL — SIGNIFICANT CHANGE UP (ref 3.8–5.2)
RBC # FLD: 16.2 % — HIGH (ref 10.3–14.5)
SODIUM SERPL-SCNC: 136 MMOL/L — SIGNIFICANT CHANGE UP (ref 135–145)
WBC # BLD: 4.14 K/UL — SIGNIFICANT CHANGE UP (ref 3.8–10.5)
WBC # FLD AUTO: 4.14 K/UL — SIGNIFICANT CHANGE UP (ref 3.8–10.5)

## 2024-06-24 PROCEDURE — 99285 EMERGENCY DEPT VISIT HI MDM: CPT

## 2024-06-24 RX ORDER — PANTOPRAZOLE SODIUM 40 MG/10ML
40 INJECTION, POWDER, FOR SOLUTION INTRAVENOUS
Refills: 0 | Status: DISCONTINUED | OUTPATIENT
Start: 2024-06-24 | End: 2024-06-25

## 2024-06-24 RX ORDER — AMLODIPINE BESYLATE 2.5 MG/1
2.5 TABLET ORAL DAILY
Refills: 0 | Status: DISCONTINUED | OUTPATIENT
Start: 2024-06-24 | End: 2024-06-26

## 2024-06-24 RX ORDER — DIGOXIN 125 MCG
250 TABLET ORAL DAILY
Refills: 0 | Status: DISCONTINUED | OUTPATIENT
Start: 2024-06-24 | End: 2024-06-25

## 2024-06-24 RX ORDER — ONDANSETRON HYDROCHLORIDE 2 MG/ML
4 INJECTION INTRAMUSCULAR; INTRAVENOUS EVERY 8 HOURS
Refills: 0 | Status: DISCONTINUED | OUTPATIENT
Start: 2024-06-24 | End: 2024-07-08

## 2024-06-24 RX ORDER — ATORVASTATIN CALCIUM 20 MG/1
40 TABLET, FILM COATED ORAL AT BEDTIME
Refills: 0 | Status: DISCONTINUED | OUTPATIENT
Start: 2024-06-24 | End: 2024-07-08

## 2024-06-24 RX ORDER — METOPROLOL TARTRATE 50 MG
25 TABLET ORAL DAILY
Refills: 0 | Status: DISCONTINUED | OUTPATIENT
Start: 2024-06-24 | End: 2024-06-25

## 2024-06-24 RX ORDER — DAPAGLIFLOZIN 10 MG/1
10 TABLET, FILM COATED ORAL EVERY 24 HOURS
Refills: 0 | Status: DISCONTINUED | OUTPATIENT
Start: 2024-06-24 | End: 2024-07-08

## 2024-06-24 RX ORDER — MAGNESIUM, ALUMINUM HYDROXIDE 400-400
30 TABLET,CHEWABLE ORAL EVERY 4 HOURS
Refills: 0 | Status: DISCONTINUED | OUTPATIENT
Start: 2024-06-24 | End: 2024-07-08

## 2024-06-24 RX ORDER — ACETAMINOPHEN 325 MG
650 TABLET ORAL EVERY 6 HOURS
Refills: 0 | Status: DISCONTINUED | OUTPATIENT
Start: 2024-06-24 | End: 2024-07-08

## 2024-06-24 RX ADMIN — AMLODIPINE BESYLATE 2.5 MILLIGRAM(S): 2.5 TABLET ORAL at 19:12

## 2024-06-24 RX ADMIN — Medication 25 MILLIGRAM(S): at 19:12

## 2024-06-24 RX ADMIN — ATORVASTATIN CALCIUM 40 MILLIGRAM(S): 20 TABLET, FILM COATED ORAL at 21:03

## 2024-06-24 NOTE — H&P ADULT - NSHPLABSRESULTS_GEN_ALL_CORE
9.4                  136  | 29   | 14           4.14  >-----------< 273     ------------------------< 82                    32.6                 4.3  | 100  | 1.41                                         Ca 8.5   Mg x     Ph x          INR: 1.43: (06.24.24 @ 15:47)      Antibody Screen: NEG (06.24.24 @ 15:47)    CXR personally reviewed: CARDIOMEGALY    ECG reviewed and interpreted: afib 83 9.4                  136  | 29   | 14           4.14  >-----------< 273     ------------------------< 82                    32.6                 4.3  | 100  | 1.41                                         Ca 8.5   Mg x     Ph x          INR: 1.43: (06.24.24 @ 15:47)      Antibody Screen: NEG (06.24.24 @ 15:47)    CXR personally reviewed: CARDIOMEGALY    ECG reviewed and interpreted: afib 83    < from: CT Angio Chest Aorta w/wo IV Cont (05.26.24 @ 21:19) >    2.4 cm complex lesion in the pancreatic tail with imaging features   consistent with serous cystadenoma

## 2024-06-24 NOTE — ED PROVIDER NOTE - OBJECTIVE STATEMENT
88 female w/ hypertension, paroxysmal afib on plavix cardiomyopathy, CAD, Hx of cardiac stents, Presents to the ER for abnormal labs.  Patient was noted to have low hemoglobin on outside labs.  Patient also has been having frequent episodes of A-fib with rapid heart rate with hypotension.  Recommended to come to the ER for further evaluation.  Patient with dyspnea on exertion otherwise no chest pain, shortness of breath, headache, dizziness  no dark stools, no hematuria.

## 2024-06-24 NOTE — ED ADULT NURSE NOTE - NSFALLHARMRISKINTERV_ED_ALL_ED

## 2024-06-24 NOTE — H&P ADULT - NSHPPHYSICALEXAM_GEN_ALL_CORE
Vital Signs Last 24 Hrs  T(C): 36 (24 Jun 2024 14:16), Max: 36 (24 Jun 2024 14:16)  T(F): 96.8 (24 Jun 2024 14:16), Max: 96.8 (24 Jun 2024 14:16)  HR: 75 (24 Jun 2024 14:16) (75 - 75)  BP: 132/68 (24 Jun 2024 14:16) (132/68 - 132/68)  BP(mean): --  RR: 16 (24 Jun 2024 14:16) (16 - 16)  SpO2: 93% (24 Jun 2024 14:16) (93% - 93%)    Parameters below as of 24 Jun 2024 14:16  Patient On (Oxygen Delivery Method): room air Vital Signs Last 24 Hrs  T(C): 36 (24 Jun 2024 14:16), Max: 36 (24 Jun 2024 14:16)  T(F): 96.8 (24 Jun 2024 14:16), Max: 96.8 (24 Jun 2024 14:16)  HR: 75 (24 Jun 2024 14:16) (75 - 75)  BP: 132/68 (24 Jun 2024 14:16) (132/68 - 132/68)  BP(mean): --  RR: 16 (24 Jun 2024 14:16) (16 - 16)  SpO2: 93% (24 Jun 2024 14:16) (93% - 93%)    Parameters below as of 24 Jun 2024 14:16  Patient On (Oxygen Delivery Method): room air    GENERAL- NAD  EAR/NOSE/MOUTH/THROAT -  MMM  EYES- VARGAS, conjunctiva and Sclera clear  NECK- supple  RESPIRATORY-  clear to auscultation bilaterally, non laboured breathing  CARDIOVASCULAR - SIS2, RRR  GI - soft NT BS present  EXTREMITIES- no pedal edema  NEUROLOGY- no gross focal deficits  PSYCHIATRY- AAO X 3

## 2024-06-24 NOTE — ED ADULT NURSE NOTE - CHIEF COMPLAINT QUOTE
Patient presents to ED complaining of abnormal labs. Dr Allison called patient to come to ED due to abnormal labs. Patient thinks it is her hemoglobin but unsure. Patient also states dizziness. Labs taken in office on Friday

## 2024-06-24 NOTE — ED ADULT NURSE NOTE - OBJECTIVE STATEMENT
Pt. to ED stating she was sent in by Dr. Allison for abnormal labs.  Pt. thinks that she is anemic.  Pt. states that at times she has been feeling dizzy over this week.  Pt. denies any pain or discomfort.  Pt. has no complaints at this time.

## 2024-06-24 NOTE — H&P ADULT - HISTORY OF PRESENT ILLNESS
87 yo female w/ hypertension, paroxysmal afib on Eliquis, cardiomyopathy, CAD, Hx of cardiac stents, Presents to the ER for abnormal labs.  Patient was noted to have low hemoglobin on outside labs.  Patient also has been having frequent episodes of A-fib with rapid heart rate with hypotension.  Recommended to come to the ER for further evaluation by PMD.  Patient with dyspnea on exertion otherwise no chest pain, shortness of breath, headache, dizziness  no dark stools, no hematuria  recent admission for syncopy 1 month back.     ed course- Hemoglobin: 9.4 g/dL (06.24.24 @ 15:47), no active bleeding, EKG- AFIB- 83

## 2024-06-24 NOTE — ED PROVIDER NOTE - PHYSICAL EXAMINATION
Vital signs reviewed  GENERAL: Patient nontoxic appearing, NAD  HEAD: NCAT  EYES: pale conjunctiva   ENT: MMM  NECK: Supple, non tender  RESPIRATORY: Normal respiratory effort. CTA B/L. No wheezing, rales, rhonchi  CARDIOVASCULAR: Regular rate and rhythm  ABDOMEN: Soft. Nondistended. Nontender. No guarding or rebound. No CVA tenderness.  MUSCULOSKELETAL/EXTREMITIES: Brisk cap refill. 2+ radial pulses. No leg edema.  SKIN:  Warm and dry  NEURO: AAOx3. No gross FND.  PSYCHIATRIC: Cooperative. Affect appropriate.

## 2024-06-24 NOTE — H&P ADULT - ASSESSMENT
87 yo female w/ hypertension, paroxysmal afib on Eliquis, cardiomyopathy, CAD, Hx of cardiac stents, Presents to the ER for abnormal labs.  Patient was noted to have low hemoglobin on outside labs.  Patient also has been having frequent episodes of A-fib with rapid heart rate with hypotension.  Recommended to come to the ER for further evaluation by PMD.  Patient with dyspnea on exertion otherwise no chest pain, shortness of breath, headache, dizziness  no dark stools, no hematuria, per PMD- dr awan who spoke with dr. sean sprague- reported that pt s/p upper endoscopy for cystic pancreatic mask, but nothing ws diagnosed, also pt has been in and out of rapid afib has been seen by [facility] and was placed on digoxin and reccs for possible cardioversion   recent admission for syncopy 1 month back.       #anemia , DANE,   - admit to obs  -GI consult  -monitor hb  -am labs  - ed course- Hemoglobin: 9.4 g/dL (06.24.24 @ 15:47), no active bleeding,     #-chronic Afib  EKG- AFIB- 83 currently rate controlled  cardio consult for am   on Eliquis, bb, digoxin    #History of HTN, CAD, cardiomyopathy   - Continue Plavix   - Continnue Farxiga  - Discontinued Amlodipine and Hydralazine      #Right subclavian artery with severe atherosclerosis   #Uneven BP's.   - per prior chart-  Left side BP higher than right side BP  - CTA and carotid duplex -   - Vascular surgery consulted, no acute vascular intervention required, continue eliquis and plavix  - f/u outpatient with vascular surgery       #DVT prophylaxis   - Eliquis    Dispo: PT eval pending    5/28: Updated patient's niece Rashmi [phone]   89 yo female w/ hypertension, paroxysmal afib on Eliquis, cardiomyopathy, CAD, Hx of cardiac stents, Presents to the ER for abnormal labs.  Patient was noted to have low hemoglobin on outside labs.  Patient also has been having frequent episodes of A-fib with rapid heart rate with hypotension.  Recommended to come to the ER for further evaluation by PMD.  Patient with dyspnea on exertion otherwise no chest pain, shortness of breath, headache, dizziness  no dark stools, no hematuria, per PMD- dr awan who spoke with dr. sean sprague- reported that pt s/p upper endoscopy for cystic pancreatic mass,  but nothing ws diagnosed, also pt has been in and out of rapid afib has been seen by [facility] and was placed on digoxin and reccs for possible cardioversion   recent admission for syncopy 1 month back.     #failure to thrive   #anemia , DANE, poor appetite weight loss,  -per PMD- dr awan who spoke with dr. sean sprague- reported that pt s/p upper endoscopy for cystic pancreatic mass, but nothing ws diagnosed, also pt has been in and out of rapid afib has been seen by [facility] and was placed on digoxin and reccs for possible cardioversion   - admit to obs  -GI consult- CT-(05.26.24 @ 21:19) >2.4 cm complex lesion in the pancreatic tail with imaging features consistent with serous cystadenoma   -monitor hb  - hold Eliquis and Plavix until seen by GI  -am labs  - ed course- Hemoglobin: 9.4 g/dL (06.24.24 @ 15:47), no active bleeding,     #chronic Afib  EKG- AFIB- 83 currently rate controlled  cardio consult for am  hold  Eliquis,  bb, digoxin    #History of HTN, CAD, cardiomyopathy   - hold  Plavix   - Continue Farxiga  - Amlodipine     #Right subclavian artery with severe atherosclerosis   #Uneven BP's.   - per prior chart-  Left side BP higher than right side BP  - CTA and carotid duplex noted  - Vascular surgery consulted, no acute vascular intervention required, continue Eliquis and Plavix  - f/u outpatient with vascular surgery       #DVT prophylaxis   - Eliquis    spoke to patient's niece Rashmi - who says pt has had endoscopy 3 times between apr- may/2024- which was non diagnostic due to inflammation was seen by GI dr. tavarez at [place]  pt has been loosing weight inspite of eating normally, lost 20 lbs in last 6 months.   lives alone is independent at baseline    87 yo female w/ hypertension, paroxysmal afib on Eliquis, cardiomyopathy, CAD, Hx of cardiac stents, Presents to the ER for abnormal labs.  Patient was noted to have low hemoglobin on outside labs.  Patient also has been having frequent episodes of A-fib with rapid heart rate with hypotension.  Recommended to come to the ER for further evaluation by PMD.  Patient with dyspnea on exertion otherwise no chest pain, shortness of breath, headache, dizziness  no dark stools, no hematuria, per PMD- dr awan who spoke with dr. sean sprague- reported that pt s/p upper endoscopy for cystic pancreatic mass,  but nothing ws diagnosed, also pt has been in and out of rapid afib has been seen by [facility] and was placed on digoxin and reccs for possible cardioversion   recent admission for syncopy 1 month back.     #failure to thrive   #anemia , DANE, poor appetite weight loss,  -per PMD- dr awan who spoke with dr. sean sprague- reported that pt s/p upper endoscopy for cystic pancreatic mass, but nothing ws diagnosed, also pt has been in and out of rapid afib has been seen by [facility] and was placed on digoxin and reccs for possible cardioversion   - admit to obs  -GI consult- CT-(05.26.24 @ 21:19) >2.4 cm complex lesion in the pancreatic tail with imaging features consistent with serous cystadenoma   -monitor hb, renal function   - iv fluids  -avoid nephrotoxins  - hold Eliquis and Plavix until seen by GI  -am labs  - ed course- Hemoglobin: 9.4 g/dL (06.24.24 @ 15:47), no active bleeding,     #chronic Afib  EKG- AFIB- 83 currently rate controlled  cardio consult for am  hold  Eliquis,  bb, digoxin  dig level in am     #History of HTN, CAD, cardiomyopathy   - hold  Plavix   - Continue Farxiga  - Amlodipine     #Right subclavian artery with severe atherosclerosis   #Uneven BP's.   - per prior chart-  Left side BP higher than right side BP  - CTA and carotid duplex noted  - Vascular surgery consulted, no acute vascular intervention required, continue Eliquis and Plavix  - f/u outpatient with vascular surgery       #DVT prophylaxis   - Eliquis    spoke to patient's niece Rashmi - who says pt has had endoscopy 3 times between apr- may/2024- which was non diagnostic due to inflammation was seen by GI dr. tavarez at [place]  pt has been loosing weight inspite of eating normally, lost 20 lbs in last 6 months.   lives alone is independent at baseline

## 2024-06-24 NOTE — ED PROVIDER NOTE - CLINICAL SUMMARY MEDICAL DECISION MAKING FREE TEXT BOX
88-year-old female on AC presents to the ER for anemia.  Vital stable on exam patient is pale appearing otherwise no other findings on physical exam.  Plan to evaluate for iron deficiency anemia, occult GI bleed, anemia chronic disease.  Plan for labs, type and screen, coags.  Likely will need admission.    Has not seen GI in the past no previous colonoscopy.

## 2024-06-25 DIAGNOSIS — D64.9 ANEMIA, UNSPECIFIED: ICD-10-CM

## 2024-06-25 DIAGNOSIS — R13.10 DYSPHAGIA, UNSPECIFIED: ICD-10-CM

## 2024-06-25 LAB
ALBUMIN SERPL ELPH-MCNC: 2.5 G/DL — LOW (ref 3.3–5)
ALP SERPL-CCNC: 61 U/L — SIGNIFICANT CHANGE UP (ref 40–120)
ALT FLD-CCNC: 14 U/L — SIGNIFICANT CHANGE UP (ref 10–45)
ANION GAP SERPL CALC-SCNC: 11 MMOL/L — SIGNIFICANT CHANGE UP (ref 5–17)
AST SERPL-CCNC: 23 U/L — SIGNIFICANT CHANGE UP (ref 10–40)
BASOPHILS # BLD AUTO: 0.04 K/UL — SIGNIFICANT CHANGE UP (ref 0–0.2)
BASOPHILS NFR BLD AUTO: 1 % — SIGNIFICANT CHANGE UP (ref 0–2)
BILIRUB SERPL-MCNC: 0.5 MG/DL — SIGNIFICANT CHANGE UP (ref 0.2–1.2)
BUN SERPL-MCNC: 14 MG/DL — SIGNIFICANT CHANGE UP (ref 7–23)
CALCIUM SERPL-MCNC: 9.1 MG/DL — SIGNIFICANT CHANGE UP (ref 8.4–10.5)
CHLORIDE SERPL-SCNC: 100 MMOL/L — SIGNIFICANT CHANGE UP (ref 96–108)
CO2 SERPL-SCNC: 28 MMOL/L — SIGNIFICANT CHANGE UP (ref 22–31)
CREAT SERPL-MCNC: 1.07 MG/DL — SIGNIFICANT CHANGE UP (ref 0.5–1.3)
DIGOXIN SERPL-MCNC: 4.1 NG/ML — CRITICAL HIGH (ref 0.8–2)
DIGOXIN SERPL-MCNC: 4.4 NG/ML — CRITICAL HIGH (ref 0.8–2)
DIGOXIN SERPL-MCNC: >5 NG/ML — CRITICAL HIGH (ref 0.8–2)
EGFR: 51 ML/MIN/1.73M2 — LOW
EOSINOPHIL # BLD AUTO: 0.03 K/UL — SIGNIFICANT CHANGE UP (ref 0–0.5)
EOSINOPHIL NFR BLD AUTO: 0.8 % — SIGNIFICANT CHANGE UP (ref 0–6)
GLUCOSE SERPL-MCNC: 64 MG/DL — LOW (ref 70–99)
HCT VFR BLD CALC: 35.4 % — SIGNIFICANT CHANGE UP (ref 34.5–45)
HGB BLD-MCNC: 10.3 G/DL — LOW (ref 11.5–15.5)
IMM GRANULOCYTES NFR BLD AUTO: 0.3 % — SIGNIFICANT CHANGE UP (ref 0–0.9)
LYMPHOCYTES # BLD AUTO: 0.77 K/UL — LOW (ref 1–3.3)
LYMPHOCYTES # BLD AUTO: 19.6 % — SIGNIFICANT CHANGE UP (ref 13–44)
MCHC RBC-ENTMCNC: 21.9 PG — LOW (ref 27–34)
MCHC RBC-ENTMCNC: 29.1 GM/DL — LOW (ref 32–36)
MCV RBC AUTO: 75.2 FL — LOW (ref 80–100)
MONOCYTES # BLD AUTO: 0.38 K/UL — SIGNIFICANT CHANGE UP (ref 0–0.9)
MONOCYTES NFR BLD AUTO: 9.7 % — SIGNIFICANT CHANGE UP (ref 2–14)
NEUTROPHILS # BLD AUTO: 2.7 K/UL — SIGNIFICANT CHANGE UP (ref 1.8–7.4)
NEUTROPHILS NFR BLD AUTO: 68.6 % — SIGNIFICANT CHANGE UP (ref 43–77)
NRBC # BLD: 0 /100 WBCS — SIGNIFICANT CHANGE UP (ref 0–0)
PLATELET # BLD AUTO: 324 K/UL — SIGNIFICANT CHANGE UP (ref 150–400)
POTASSIUM SERPL-MCNC: 3.9 MMOL/L — SIGNIFICANT CHANGE UP (ref 3.5–5.3)
POTASSIUM SERPL-SCNC: 3.9 MMOL/L — SIGNIFICANT CHANGE UP (ref 3.5–5.3)
PROT SERPL-MCNC: 6.5 G/DL — SIGNIFICANT CHANGE UP (ref 6–8.3)
RBC # BLD: 4.71 M/UL — SIGNIFICANT CHANGE UP (ref 3.8–5.2)
RBC # FLD: 16 % — HIGH (ref 10.3–14.5)
SODIUM SERPL-SCNC: 139 MMOL/L — SIGNIFICANT CHANGE UP (ref 135–145)
WBC # BLD: 3.93 K/UL — SIGNIFICANT CHANGE UP (ref 3.8–10.5)
WBC # FLD AUTO: 3.93 K/UL — SIGNIFICANT CHANGE UP (ref 3.8–10.5)

## 2024-06-25 RX ORDER — APIXABAN 5 MG/1
2.5 TABLET, FILM COATED ORAL EVERY 12 HOURS
Refills: 0 | Status: DISCONTINUED | OUTPATIENT
Start: 2024-06-25 | End: 2024-06-30

## 2024-06-25 RX ORDER — FERROUS SULFATE 325(65) MG
325 TABLET ORAL DAILY
Refills: 0 | Status: DISCONTINUED | OUTPATIENT
Start: 2024-06-25 | End: 2024-06-29

## 2024-06-25 RX ORDER — METOPROLOL TARTRATE 50 MG
12.5 TABLET ORAL
Refills: 0 | Status: DISCONTINUED | OUTPATIENT
Start: 2024-06-25 | End: 2024-06-26

## 2024-06-25 RX ORDER — SODIUM CHLORIDE 0.9 % (FLUSH) 0.9 %
500 SYRINGE (ML) INJECTION ONCE
Refills: 0 | Status: COMPLETED | OUTPATIENT
Start: 2024-06-25 | End: 2024-06-25

## 2024-06-25 RX ORDER — PANTOPRAZOLE SODIUM 40 MG/10ML
40 INJECTION, POWDER, FOR SOLUTION INTRAVENOUS DAILY
Refills: 0 | Status: DISCONTINUED | OUTPATIENT
Start: 2024-06-26 | End: 2024-07-08

## 2024-06-25 RX ORDER — CLOPIDOGREL BISULFATE 75 MG/1
75 TABLET, FILM COATED ORAL DAILY
Refills: 0 | Status: DISCONTINUED | OUTPATIENT
Start: 2024-06-25 | End: 2024-06-25

## 2024-06-25 RX ORDER — METOPROLOL TARTRATE 50 MG
12.5 TABLET ORAL EVERY 12 HOURS
Refills: 0 | Status: DISCONTINUED | OUTPATIENT
Start: 2024-06-25 | End: 2024-06-25

## 2024-06-25 RX ADMIN — ATORVASTATIN CALCIUM 40 MILLIGRAM(S): 20 TABLET, FILM COATED ORAL at 21:10

## 2024-06-25 RX ADMIN — Medication 250 MICROGRAM(S): at 05:42

## 2024-06-25 RX ADMIN — APIXABAN 2.5 MILLIGRAM(S): 5 TABLET, FILM COATED ORAL at 19:41

## 2024-06-25 RX ADMIN — Medication 12.5 MILLIGRAM(S): at 18:09

## 2024-06-25 RX ADMIN — DAPAGLIFLOZIN 10 MILLIGRAM(S): 10 TABLET, FILM COATED ORAL at 05:42

## 2024-06-25 RX ADMIN — CLOPIDOGREL BISULFATE 75 MILLIGRAM(S): 75 TABLET, FILM COATED ORAL at 12:47

## 2024-06-25 RX ADMIN — AMLODIPINE BESYLATE 2.5 MILLIGRAM(S): 2.5 TABLET ORAL at 05:42

## 2024-06-25 RX ADMIN — Medication 500 MILLILITER(S): at 12:44

## 2024-06-25 RX ADMIN — PANTOPRAZOLE SODIUM 40 MILLIGRAM(S): 40 INJECTION, POWDER, FOR SOLUTION INTRAVENOUS at 05:42

## 2024-06-25 RX ADMIN — Medication 25 MILLIGRAM(S): at 05:42

## 2024-06-25 NOTE — CONSULT NOTE ADULT - PROBLEM SELECTOR RECOMMENDATION 9
Anemia unlikely of upper GI source (erosive gastritis, PUD, AVM/ angiectasia, malignancy).  Last EUS and Esophageal dilation done on 4/3/24, at [facility] by Dr Escudero   Keep active T&S  Monitor CBC   Transfuse if Hb Less than 7  Monitor Bowel movement

## 2024-06-25 NOTE — PROGRESS NOTE ADULT - SUBJECTIVE AND OBJECTIVE BOX
Patient is a 88y old  Female who presents with a chief complaint of anemia, afib (25 Jun 2024 11:22)      INTERVAL HPI/OVERNIGHT EVENTS: Patient seen and examined at bedside. No overnight events.    MEDICATIONS  (STANDING):  amLODIPine   Tablet 2.5 milliGRAM(s) Oral daily  apixaban 2.5 milliGRAM(s) Oral every 12 hours  atorvastatin 40 milliGRAM(s) Oral at bedtime  clopidogrel Tablet 75 milliGRAM(s) Oral daily  dapagliflozin 10 milliGRAM(s) Oral every 24 hours  ferrous    sulfate 325 milliGRAM(s) Oral daily  metoprolol tartrate 12.5 milliGRAM(s) Oral two times a day  pantoprazole   Suspension 40 milliGRAM(s) Oral daily    MEDICATIONS  (PRN):  acetaminophen     Tablet .. 650 milliGRAM(s) Oral every 6 hours PRN Temp greater or equal to 38C (100.4F), Mild Pain (1 - 3)  aluminum hydroxide/magnesium hydroxide/simethicone Suspension 30 milliLiter(s) Oral every 4 hours PRN Dyspepsia  melatonin 3 milliGRAM(s) Oral at bedtime PRN Insomnia  ondansetron Injectable 4 milliGRAM(s) IV Push every 8 hours PRN Nausea and/or Vomiting      Allergies    No Known Allergies    Intolerances        REVIEW OF SYSTEMS:  CONSTITUTIONAL: No fever or chills  HEENT:  No headache, no sore throat  RESPIRATORY: No cough, wheezing, or shortness of breath  CARDIOVASCULAR: No chest pain, palpitations  GASTROINTESTINAL: No abd pain, nausea, vomiting, or diarrhea  GENITOURINARY: No dysuria, frequency, or hematuria  NEUROLOGICAL: no focal weakness or dizziness  MUSCULOSKELETAL: no myalgias     Vital Signs Last 24 Hrs  T(C): 36.3 (25 Jun 2024 14:17), Max: 36.3 (24 Jun 2024 20:30)  T(F): 97.3 (25 Jun 2024 14:17), Max: 97.4 (24 Jun 2024 20:30)  HR: 65 (25 Jun 2024 14:17) (64 - 78)  BP: 108/54 (25 Jun 2024 14:37) (97/54 - 184/65)  BP(mean): 105 (24 Jun 2024 20:30) (105 - 105)  RR: 17 (25 Jun 2024 14:17) (15 - 17)  SpO2: 95% (25 Jun 2024 14:17) (95% - 100%)    Parameters below as of 25 Jun 2024 14:17  Patient On (Oxygen Delivery Method): room air      I&O's Summary    24 Jun 2024 07:01  -  25 Jun 2024 07:00  --------------------------------------------------------  IN: 0 mL / OUT: 600 mL / NET: -600 mL      BMI (kg/m2): 20.7 (06-24-24 @ 20:30)    PHYSICAL EXAM:  GENERAL: NAD  HEENT:  AT/NC, anicteric, moist mucous membranes, EOMI, PERRL, no lid-lag, conjunctiva and sclera clear  CHEST/LUNG:  CTA b/l, no rales, wheezes, or rhonchi,  normal respiratory effort, no intercostal retractions  HEART:  irregularly irregular, S1, S2, no murmurs; no pitting edema  ABDOMEN:  BS+, soft, nontender, nondistended; No HSM  MSK/EXTREMITIES: 2+ peripheral pulses, no clubbing or cyanosis  NERVOUS SYSTEM: answers questions and follows commands appropriately, A&Ox3 grossly moves all extremities   PSYCH: Appropriate affect, Alert & Awake; Good judgement      LABS: Personally reviewed  CBC                        10.3   3.93  )-----------( 324      ( 25 Jun 2024 06:55 )             35.4     CMP  06-25    139  |  100  |  14  ----------------------------<  64  3.9   |  28  |  1.07    Ca    9.1      25 Jun 2024 06:55    TPro  6.5  /  Alb  2.5  /  TBili  0.5  /  DBili  x   /  AST  23  /  ALT  14  /  AlkPhos  61  06-25          PT/INR - ( 24 Jun 2024 15:47 )   PT: 16.6 sec;   INR: 1.43 ratio         PTT - ( 24 Jun 2024 15:47 )  PTT:31.6 sec                            Urinalysis Basic - ( 25 Jun 2024 06:55 )    Color: x / Appearance: x / SG: x / pH: x  Gluc: 64 mg/dL / Ketone: x  / Bili: x / Urobili: x   Blood: x / Protein: x / Nitrite: x   Leuk Esterase: x / RBC: x / WBC x   Sq Epi: x / Non Sq Epi: x / Bacteria: x                RADIOLOGY & ADDITIONAL TESTS: Personally reviewed.     Consultant(s) Notes Reviewed:  [x] YES  [ ] NO   Discussed with JANAY/MARION, RN

## 2024-06-25 NOTE — GOALS OF CARE CONVERSATION - ADVANCED CARE PLANNING - CONVERSATION DETAILS
Met with pt. at bedside to discuss GOC. She is here from home with low H/H. PCP is Dr. Allison. Pt. is A&Ox3. She has hx. CAD with stents, HTN, afib, cardiomegaly. Also recently found to have cystic pancreatic mass. Also found to have high Digoxin level. I re-confirmed that her niece Rashmi is still her HCP.   I asked the patient about GOC. She stated she would "want a try" of CPR, attempt resuscitation and intubation in the event of cardiac arrest. Full Code.

## 2024-06-25 NOTE — CONSULT NOTE ADULT - SUBJECTIVE AND OBJECTIVE BOX
INTERVAL HPI/OVERNIGHT EVENTS:  HPI:  87 yo female w/ hypertension, paroxysmal afib on Eliquis, cardiomyopathy, CAD, Hx of cardiac stents, Presents to the ER for abnormal labs.  Patient was noted to have low hemoglobin on outside labs.  Patient also has been having frequent episodes of A-fib with rapid heart rate with hypotension.  Recommended to come to the ER for further evaluation by PMD.  Patient with dyspnea on exertion otherwise no chest pain, shortness of breath, headache, dizziness  no dark stools, no hematuria  recent admission for syncopy 1 month back.     ed course- Hemoglobin: 9.4 g/dL (24 @ 15:47), no active bleeding, EKG- AFIB- 83     (2024 17:38)    GI consultation for anemia. Patient seen examined at bed side. She had multiple EGD  , & EUS done from 2023 result in sunrise . Last EUS and Esophageal dilation done on 4/3/24, at [facility] by Dr Escudero She is able to swallow soft food. She had normal bowel movement in home the day of admission.  Denies abdominal pain, nausea, vomiting diarrhea or constipation.      MEDICATIONS  (STANDING):  amLODIPine   Tablet 2.5 milliGRAM(s) Oral daily  atorvastatin 40 milliGRAM(s) Oral at bedtime  clopidogrel Tablet 75 milliGRAM(s) Oral daily  dapagliflozin 10 milliGRAM(s) Oral every 24 hours  metoprolol tartrate 12.5 milliGRAM(s) Oral two times a day  pantoprazole   Suspension 40 milliGRAM(s) Oral daily  sodium chloride 0.9% Bolus 500 milliLiter(s) IV Bolus once    MEDICATIONS  (PRN):  acetaminophen     Tablet .. 650 milliGRAM(s) Oral every 6 hours PRN Temp greater or equal to 38C (100.4F), Mild Pain (1 - 3)  aluminum hydroxide/magnesium hydroxide/simethicone Suspension 30 milliLiter(s) Oral every 4 hours PRN Dyspepsia  melatonin 3 milliGRAM(s) Oral at bedtime PRN Insomnia  ondansetron Injectable 4 milliGRAM(s) IV Push every 8 hours PRN Nausea and/or Vomiting      Allergies    No Known Allergies    Intolerances        PAST MEDICAL & SURGICAL HISTORY:  HTN (hypertension)      HLD (hyperlipidemia)      Hyperkalemia      CKD (chronic kidney disease)      Atherosclerosis      CAD (coronary artery disease)      Stage 4 chronic kidney disease      Cyst of pancreas      H/O CHF      Diabetes mellitus      S/P hysterectomy      Pancreatic cyst      H/O endoscopy      History of cardioversion          REVIEW OF SYSTEMS	  See HPI     PHYSICAL EXAM:   Vital Signs:  Vital Signs Last 24 Hrs  T(C): 36.3 (2024 05:10), Max: 36.3 (2024 20:30)  T(F): 97.4 (2024 05:10), Max: 97.4 (2024 20:30)  HR: 64 (2024 05:10) (64 - 78)  BP: 154/64 (2024 05:10) (132/68 - 184/65)  BP(mean): 105 (2024 20:30) (105 - 105)  RR: 16 (2024 05:10) (15 - 16)  SpO2: 95% (2024 05:10) (93% - 100%)    Parameters below as of 2024 05:10  Patient On (Oxygen Delivery Method): room air      Daily Height in cm: 144.78 (2024 20:30)    Daily Weight in k.6 (2024 05:10)I&O's Summary    2024 07:01  -  2024 07:00  --------------------------------------------------------  IN: 0 mL / OUT: 600 mL / NET: -600 mL        GENERAL:  Appears stated age  HEENT:  NC/AT,  conjunctivae clear and pink  CHEST:  Full & symmetric excursion  HEART:  Regular rhythm, S1, S2  ABDOMEN:  Soft, non-tender, non-distended, normoactive bowel sounds  EXTREMITIES:  no cyanosis, clubbing or edema  SKIN:  No rash/warm/dry  NEURO:  Alert, oriented      LABS:                        10.3   3.93  )-----------( 324      ( 2024 06:55 )             35.4     06-25    139  |  100  |  14  ----------------------------<  64<L>  3.9   |  28  |  1.07    Ca    9.1      2024 06:55    TPro  6.5  /  Alb  2.5<L>  /  TBili  0.5  /  DBili  x   /  AST  23  /  ALT  14  /  AlkPhos  61  06-25    PT/INR - ( 2024 15:47 )   PT: 16.6 sec;   INR: 1.43 ratio         PTT - ( 2024 15:47 )  PTT:31.6 sec  Urinalysis Basic - ( 2024 06:55 )    Color: x / Appearance: x / SG: x / pH: x  Gluc: 64 mg/dL / Ketone: x  / Bili: x / Urobili: x   Blood: x / Protein: x / Nitrite: x   Leuk Esterase: x / RBC: x / WBC x   Sq Epi: x / Non Sq Epi: x / Bacteria: x      amylase   lipase  RADIOLOGY & ADDITIONAL TESTS:  
PAYAM PAPPAS  [id number]      HPI:  87 yo female w/ hypertension, paroxysmal afib on Eliquis, cardiomyopathy, CAD withcardiac stents, Presents to the ER for abnormal labs.  Patient was noted to have low hemoglobin on outside labs and recommended to come to the ER for further evaluation by PMD.  pt reports recent EGD due to unexplained weight loss, states strictures were found and known pancreatic mass. Patient currently denies chest pain, shortness of breath, headache, dizziness, n/v, dark stools or bloody emesis and stools      ALLERGIES:  No Known Allergies      PAST MEDICAL & SURGICAL HISTORY:  HTN (hypertension)      HLD (hyperlipidemia)      Hyperkalemia      CKD (chronic kidney disease)      Atherosclerosis      CAD (coronary artery disease)      Stage 4 chronic kidney disease      Cyst of pancreas      H/O CHF      Diabetes mellitus      S/P hysterectomy      Pancreatic cyst      H/O endoscopy      History of cardioversion            CURRENT MEDICATIONS:  MEDICATIONS  (STANDING):  amLODIPine   Tablet 2.5 milliGRAM(s) Oral daily  atorvastatin 40 milliGRAM(s) Oral at bedtime  clopidogrel Tablet 75 milliGRAM(s) Oral daily  dapagliflozin 10 milliGRAM(s) Oral every 24 hours  metoprolol tartrate 12.5 milliGRAM(s) Oral two times a day  pantoprazole   Suspension 40 milliGRAM(s) Oral daily  sodium chloride 0.9% Bolus 500 milliLiter(s) IV Bolus once    MEDICATIONS  (PRN):  acetaminophen     Tablet .. 650 milliGRAM(s) Oral every 6 hours PRN Temp greater or equal to 38C (100.4F), Mild Pain (1 - 3)  aluminum hydroxide/magnesium hydroxide/simethicone Suspension 30 milliLiter(s) Oral every 4 hours PRN Dyspepsia  melatonin 3 milliGRAM(s) Oral at bedtime PRN Insomnia  ondansetron Injectable 4 milliGRAM(s) IV Push every 8 hours PRN Nausea and/or Vomiting      SOCIAL HISTORY:  denies    FAMILY HISTORY:  FH: MI (myocardial infarction) (Father, Mother)        ROS:  All 10 systems reviewed and positives noted in HPI    OBJECTIVE:    VITAL SIGNS:  Vital Signs Last 24 Hrs  T(C): 36.3 (25 Jun 2024 05:10), Max: 36.3 (24 Jun 2024 20:30)  T(F): 97.4 (25 Jun 2024 05:10), Max: 97.4 (24 Jun 2024 20:30)  HR: 64 (25 Jun 2024 05:10) (64 - 78)  BP: 154/64 (25 Jun 2024 05:10) (132/68 - 184/65)  BP(mean): 105 (24 Jun 2024 20:30) (105 - 105)  RR: 16 (25 Jun 2024 05:10) (15 - 16)  SpO2: 95% (25 Jun 2024 05:10) (93% - 100%)    Parameters below as of 25 Jun 2024 05:10  Patient On (Oxygen Delivery Method): room air        PHYSICAL EXAM:  General:  no distress  HEENT: sclera anicteric  Neck: supple, no carotid bruits b/l  CVS: JVP ~ 7 cm H20, irregular, no murmurs/rubs/gallops  Chest: unlabored respirations, clear to auscultation b/l  Abdomen: non-distended  Extremities: no lower extremity edema b/l  Neuro: awake, alert & oriented x 3        LABS:                        10.3   3.93  )-----------( 324      ( 25 Jun 2024 06:55 )             35.4     06-25    139  |  100  |  14  ----------------------------<  64<L>  3.9   |  28  |  1.07    Ca    9.1      25 Jun 2024 06:55    TPro  6.5  /  Alb  2.5<L>  /  TBili  0.5  /  DBili  x   /  AST  23  /  ALT  14  /  AlkPhos  61  06-25        PT/INR - ( 24 Jun 2024 15:47 )   PT: 16.6 sec;   INR: 1.43 ratio         PTT - ( 24 Jun 2024 15:47 )  PTT:31.6 sec      ECG: Afib with st and t wave abnormality      TTE: < from: TTE Echo Complete w/o Contrast w/ Doppler (05.26.24 @ 10:15) >   1. Left ventricular ejection fraction, by visual estimation, is 60 to 65%.   2. Normal global left ventricular systolic function.   3. Normal left ventricular internal cavity size.   4. The left ventricular diastolic function could not be assessed in this   study.   5. Mildly enlarged left atrium.   6. Normal right atrial size.   7. Small pericardial effusion.   8. Mild mitral valve regurgitation.   9. Thickening and calcification of the anterior and posterior mitral   valve leaflets.  10. Mild-moderate tricuspid regurgitation.  11. Mild aortic valve stenosis.    < from: Cardiac Cath Lab (08.06.21 @ 16:10) >  CORONARY VESSELS:  CX:   --  Proximal circumflex: There was a diffuse 70 % stenosis.  --  Distal circumflex: There was a tubular 90 % stenosis.  COMPLICATIONS: There were no complications.  DIAGNOSTIC IMPRESSIONS: Status post angioplasty, IVUS and stenting  (drug-eluting/Synergy) of the left circumflex artery with resultant DENNIS 3

## 2024-06-25 NOTE — PROGRESS NOTE ADULT - ATTENDING COMMENTS
Patient is a 89 yo female w/ hypertension, paroxysmal afib on Eliquis, cardiomyopathy, CAD, Hx of cardiac stents.  No c/o chest pain/ sob/ palpitations. No c/o pedal edema. No melena/ brbpr. patient stated that she has been having difficulty swallowing solid food for almost two weeks, at times she vomits it out due to dysphagia. no difficulty with liquids.     T(C): 36.3 (06-25-24 @ 05:10), Max: 36.3 (06-24-24 @ 20:30)  T(F): 97.4 (06-25-24 @ 05:10), Max: 97.4 (06-24-24 @ 20:30)  HR: 64 (06-25-24 @ 05:10) (64 - 78)  BP: 154/64 (06-25-24 @ 05:10) (132/68 - 184/65)  ABP: --  ABP(mean): --  RR: 16 (06-25-24 @ 05:10) (15 - 16)  SpO2: 95% (06-25-24 @ 05:10) (93% - 100%)    on exam chacectic  no apparent distress  able to talk in full sentences  both lungs clear  irregular rhythm  abdomen- soft  no pedal edema    LABS:                        10.3   3.93  )-----------( 324      ( 25 Jun 2024 06:55 )             35.4     06-25    139  |  100  |  14  ----------------------------<  64<L>  3.9   |  28  |  1.07    Ca    9.1      25 Jun 2024 06:55    TPro  6.5  /  Alb  2.5<L>  /  TBili  0.5  /  DBili  x   /  AST  23  /  ALT  14  /  AlkPhos  61  06-25    PT/INR - ( 24 Jun 2024 15:47 )   PT: 16.6 sec;   INR: 1.43 ratio         PTT - ( 24 Jun 2024 15:47 )  PTT:31.6 sec  Urinalysis Basic - ( 25 Jun 2024 06:55 )    Color: x / Appearance: x / SG: x / pH: x  Gluc: 64 mg/dL / Ketone: x  / Bili: x / Urobili: x   Blood: x / Protein: x / Nitrite: x   Leuk Esterase: x / RBC: x / WBC x   Sq Epi: x / Non Sq Epi: x / Bacteria: x    a/p:  # dysphagia- seen by GI, recent esophageal dilatation done in april. no plan for repeat endoscopy.  # atrial fibrillation- rate controlled. continue metoprolol. dig on hold due to elevated levels which was drawn after patient received her dig.  # Anemia, microcytic, chronic- no change in baseline. recent EUS, could be secondary to slow blood loss from upper GI source vs nutritional sec to restricted oral intake from difficulty swallowing. will start on iron supplements.  # CAD s/p stent- continue beta blocker, statin, plavix.   - rest as per resident note. Patient is a 87 yo female w/ hypertension, paroxysmal afib on Eliquis, cardiomyopathy, CAD, Hx of cardiac stents.  No c/o chest pain/ sob/ palpitations. No c/o pedal edema. No melena/ brbpr. patient stated that she has been having difficulty swallowing solid food for almost two weeks, at times she vomits it out due to dysphagia. no difficulty with liquids.     T(C): 36.3 (06-25-24 @ 05:10), Max: 36.3 (06-24-24 @ 20:30)  T(F): 97.4 (06-25-24 @ 05:10), Max: 97.4 (06-24-24 @ 20:30)  HR: 64 (06-25-24 @ 05:10) (64 - 78)  BP: 154/64 (06-25-24 @ 05:10) (132/68 - 184/65)  ABP: --  ABP(mean): --  RR: 16 (06-25-24 @ 05:10) (15 - 16)  SpO2: 95% (06-25-24 @ 05:10) (93% - 100%)    on exam chacectic  no apparent distress  able to talk in full sentences  both lungs clear  irregular rhythm  abdomen- soft  no pedal edema    LABS:                        10.3   3.93  )-----------( 324      ( 25 Jun 2024 06:55 )             35.4     06-25    139  |  100  |  14  ----------------------------<  64<L>  3.9   |  28  |  1.07    Ca    9.1      25 Jun 2024 06:55    TPro  6.5  /  Alb  2.5<L>  /  TBili  0.5  /  DBili  x   /  AST  23  /  ALT  14  /  AlkPhos  61  06-25    PT/INR - ( 24 Jun 2024 15:47 )   PT: 16.6 sec;   INR: 1.43 ratio         PTT - ( 24 Jun 2024 15:47 )  PTT:31.6 sec  Urinalysis Basic - ( 25 Jun 2024 06:55 )    Color: x / Appearance: x / SG: x / pH: x  Gluc: 64 mg/dL / Ketone: x  / Bili: x / Urobili: x   Blood: x / Protein: x / Nitrite: x   Leuk Esterase: x / RBC: x / WBC x   Sq Epi: x / Non Sq Epi: x / Bacteria: x    a/p:  # dysphagia- seen by GI, recent esophageal dilatation done in april. no plan for repeat endoscopy.  # atrial fibrillation- rate controlled. continue metoprolol. dig on hold due to elevated levels which was drawn after patient received her dig.  # Anemia, microcytic, chronic- no change in baseline. recent EUS, could be secondary to slow blood loss from upper GI source vs nutritional sec to restricted oral intake from difficulty swallowing. will start on iron supplements.  # CAD s/p stent- continue beta blocker, statin, plavix.   # Pancreatic cyst- outpatient follow up  - rest as per resident note.

## 2024-06-25 NOTE — PROGRESS NOTE ADULT - ASSESSMENT
89 yo female w/ hypertension, paroxysmal afib on Eliquis, cardiomyopathy, CAD, Hx of cardiac stents, Presents to the ER for abnormal labs.  Patient was noted to have low hemoglobin on outside labs.  Patient also has been having frequent episodes of A-fib with rapid heart rate with hypotension.  Recommended to come to the ER for further evaluation by PMD.  Patient with dyspnea on exertion otherwise no chest pain, shortness of breath, headache, dizziness  no dark stools, no hematuria, per PMD- dr awan who spoke with dr. sean sprague- reported that pt s/p upper endoscopy for cystic pancreatic mass,  but nothing ws diagnosed, also pt has been in and out of rapid afib has been seen by [facility] RODRIGUE[facility] and was placed on digoxin and reccs for possible cardioversion   recent admission for syncopy 1 month back.     #failure to thrive   #anemia , DANE, poor appetite weight loss,  -per PMD- dr awan who spoke with dr. sean sprague- reported that pt s/p upper endoscopy for cystic pancreatic mass, but nothing ws diagnosed, also pt has been in and out of rapid afib has been seen by [facility] RODRIGUEZ and was placed on digoxin and reccs for possible cardioversion   - admit to obs  -GI consult- CT-(05.26.24 @ 21:19) >2.4 cm complex lesion in the pancreatic tail with imaging features consistent with serous cystadenoma   -monitor hb, renal function   - iv fluids  -avoid nephrotoxins  - hold Eliquis and Plavix until seen by GI  -am labs  - ed course- Hemoglobin: 9.4 g/dL (06.24.24 @ 15:47), no active bleeding,     #chronic Afib  EKG- AFIB- 83 currently rate controlled  cardio consult for am  hold  Eliquis,  bb, digoxin  dig level in am     #History of HTN, CAD, cardiomyopathy   - hold  Plavix   - Continue Farxiga  - Amlodipine     #Right subclavian artery with severe atherosclerosis   #Uneven BP's.   - per prior chart-  Left side BP higher than right side BP  - CTA and carotid duplex noted  - Vascular surgery consulted, no acute vascular intervention required, continue Eliquis and Plavix  - f/u outpatient with vascular surgery       #DVT prophylaxis   - Eliquis    spoke to patient's niece Rashmi - who says pt has had endoscopy 3 times between apr- may/2024- which was non diagnostic due to inflammation was seen by GI dr. tavarez at [place]  pt has been loosing weight inspite of eating normally, lost 20 lbs in last 6 months.   lives alone is independent at baseline       Case discussed with Dr. Adair 89 yo female w/ hypertension, paroxysmal afib on Eliquis, cardiomyopathy, CAD, Hx of cardiac stents, Presents to the ER for abnormal labs.  Patient was noted to have low hemoglobin on outside labs.  Patient also has been having frequent episodes of A-fib with rapid heart rate with hypotension.  Recommended to come to the ER for further evaluation by PMD.  Patient with dyspnea on exertion otherwise no chest pain, shortness of breath, headache, dizziness  no dark stools, no hematuria, per PMD- dr awan who spoke with dr. sean sprague- reported that pt s/p upper endoscopy for cystic pancreatic mass,  but nothing ws diagnosed, also pt has been in and out of rapid afib has been seen by [facility] and was placed on digoxin and reccs for possible cardioversion   recent admission for syncopy 1 month back.     #failure to thrive   #anemia , DANE, poor appetite weight loss,  -per PMD- dr awan who spoke with dr. sean sprague- reported that pt s/p upper endoscopy for cystic pancreatic mass, but nothing was dx  -pt has been loosing weight inspite of eating normally, lost 20 lbs in last 6 months.   -GI consult- CT-(05.26.24 @ 21:19) >2.4 cm complex lesion in the pancreatic tail with imaging features consistent with serous cystadenoma   -monitor hb, renal function   - iv fluids  -avoid nephrotoxins    #microcytic anemia  -- hgb 9.4 on admission  - hgb 10.3, MCV 73 today  - no change in baseline.  - recent EUS ? secondary to slow blood loss from upper GI source vs nutritional sec to restricted oral intake from difficulty swallowing  -  will start on iron supplementation    #dysphagia  - speech and swallow evaluation  - recent esophageal dilatation done in april   -follows withGI Dr. Escudero at [place]  - GI recommendations appreciated- no plan for repeat endoscopy    #chronic Afib  - EKG- AFIB- 83 currently rate controlled  - cardio recommendations appreciated  - continue eliquis, lopressor  - digoxin level 4.4  - hold digoxin  - repeat digoxin level at 10pm    #History of HTN, CAD s/p stents  -c/w beta blocker, statin, farxiga, amlodipine  - discontinued aspirin and plavix    #Right subclavian artery with severe atherosclerosis   #Uneven BP's.   - per prior chart-  Left side BP higher than right side BP  - CTA and carotid duplex noted  - Vascular surgery consulted, no acute vascular intervention required, continue Eliquis and Plavix  - f/u outpatient with vascular surgery     #DVT prophylaxis   - Eliquis      Case discussed with Dr. Adair 89 yo female w/ hypertension, paroxysmal afib on Eliquis, cardiomyopathy, CAD, Hx of cardiac stents, Presents to the ER for abnormal labs.  Patient was noted to have low hemoglobin on outside labs.  Patient also has been having frequent episodes of A-fib with rapid heart rate with hypotension.  Recommended to come to the ER for further evaluation by PMD.  Patient with dyspnea on exertion otherwise no chest pain, shortness of breath, headache, dizziness  no dark stools, no hematuria, per PMD- dr awan who spoke with dr. sean sprague- reported that pt s/p upper endoscopy for cystic pancreatic mass,  but nothing ws diagnosed, also pt has been in and out of rapid afib has been seen by [facility] and was placed on digoxin and reccs for possible cardioversion   recent admission for syncopy 1 month back.     #failure to thrive   #anemia , DANE, poor appetite weight loss,  -per PMD- dr awan who spoke with dr. sean sprague- reported that pt s/p upper endoscopy for cystic pancreatic mass, but nothing was dx  -pt has been loosing weight inspite of eating normally, lost 20 lbs in last 6 months.   -monitor hb, renal function   - iv fluids  -avoid nephrotoxins    #microcytic anemia  -- hgb 9.4 on admission  - hgb 10.3, MCV 73 today  - no change in baseline.  - recent EUS ? secondary to slow blood loss from upper GI source vs nutritional sec to restricted oral intake from difficulty swallowing  -  will start on iron supplementation    #dysphagia  - speech and swallow evaluation  - recent esophageal dilatation done in april   -follows withGI Dr. Escudero at [place]  - GI recommendations appreciated- no plan for repeat endoscopy    #chronic Afib  - EKG- AFIB- 83 currently rate controlled  - cardio recommendations appreciated  - continue eliquis, lopressor  - digoxin level 4.4  - hold digoxin  - repeat digoxin level at 10pm    #History of HTN, CAD s/p stents  -c/w beta blocker, statin, farxiga, amlodipine  - discontinued aspirin and plavix    #pancreatic cyst  -CT-(05.26.24 @ 21:19) >2.4 cm complex lesion in the pancreatic tail with imaging features consistent with serous cystadenoma   - ordered CT abd/pelvis    #Right subclavian artery with severe atherosclerosis   #Uneven BP's.   - per prior chart-  Left side BP higher than right side BP  - CTA and carotid duplex noted  - Vascular surgery consulted, no acute vascular intervention required, continue Eliquis and Plavix  - f/u outpatient with vascular surgery     #DVT prophylaxis   - Eliquis      Case discussed with Dr. Adair 89 yo female w/ hypertension, paroxysmal afib on Eliquis, cardiomyopathy, CAD, Hx of cardiac stents, Presents to the ER for abnormal labs.  Patient was noted to have low hemoglobin on outside labs.  Patient also has been having frequent episodes of A-fib with rapid heart rate with hypotension.  Recommended to come to the ER for further evaluation by PMD.   #failure to thrive   #anemia , DANE, poor appetite weight loss,  -per PMD- dr awan who spoke with dr. sean sprague- reported that pt s/p upper endoscopy for cystic pancreatic mass, but nothing was dx  -pt has been loosing weight inspite of eating normally, lost 20 lbs in last 6 months.   -monitor hb, renal function   - iv fluids  -avoid nephrotoxins    #microcytic anemia  -- hgb 9.4 on admission  - hgb 10.3, MCV 73 today  - no change in baseline.  - recent EUS ? secondary to slow blood loss from upper GI source vs nutritional sec to restricted oral intake from difficulty swallowing  -  will start on iron supplementation    #dysphagia  - speech and swallow evaluation  - recent esophageal dilatation done in april   -follows withGI Dr. Escudero at [place]  - GI recommendations appreciated- no plan for repeat endoscopy    #chronic Afib  - EKG- AFIB- 83 currently rate controlled  - cardio recommendations appreciated  - continue eliquis, lopressor  - digoxin level 4.4  - hold digoxin  - repeat digoxin level at 10pm    #History of HTN, CAD s/p stents  -c/w beta blocker, statin, farxiga, amlodipine  - discontinued aspirin and plavix    #pancreatic cyst  -CT-(05.26.24 @ 21:19) >2.4 cm complex lesion in the pancreatic tail with imaging features consistent with serous cystadenoma   - ordered CT abd/pelvis    #Right subclavian artery with severe atherosclerosis   #Uneven BP's.   - per prior chart-  Left side BP higher than right side BP  - CTA and carotid duplex noted  - Vascular surgery consulted, no acute vascular intervention required, continue Eliquis and Plavix  - f/u outpatient with vascular surgery     #DVT prophylaxis   - Eliquis      Case discussed with Dr. Adair

## 2024-06-25 NOTE — CONSULT NOTE ADULT - ASSESSMENT
Assessment 88-year-old female history of HTN, A-fib on Eliquis cardiomyopathy, CAD with stents and known pancreatic mass, admitted for anemia on outpatient labs, concerning for GI bleed.  Cardiology consulted for A-fib    Recommendations  Telemetry monitor consistent with A-fib, rate controlled 50 to 60s.  Continue telemetry monitoring and metoprolol for rate control. digoxin on hold due to elevated levels  Antiplatelet and AC on hold pending GI clearance  Hemoglobin currently stable.  Maintain hemoglobin >=8  Recent TTE with EF 60-65%, normal LV function, mildly enlarged LA and mild valvular disease
89 yo female w/ hypertension, paroxysmal afib on Eliquis, cardiomyopathy, CAD, Hx of cardiac stents, Presents to the ER for abnormal labs.  Patient was noted to have low hemoglobin on outside labs.  Patient also has been having frequent episodes of A-fib with rapid heart rate with hypotension.    GI consultation for anemia. She is on Plavix for A Fib   She had multiple EGD  , & EUS done from December 2023 result in sunrise . Last EUS and Esophageal dilation done on 4/3/24, at [facility] by Dr Escudero

## 2024-06-25 NOTE — CONSULT NOTE ADULT - PROBLEM SELECTOR RECOMMENDATION 2
Last EUS and Esophageal dilation done on 4/3/24, at [facility] by Dr Escudero   Swallow eval   Continue soft diet & add clear boost supplement   No plan for EGD at this time unless condition changes  out patient F/U with Dr Escudero advanced endoscopist

## 2024-06-25 NOTE — CONSULT NOTE ADULT - NS ATTEND AMEND GEN_ALL_CORE FT
Followed in our office by Dr. Daniel . HX of HBP and low bp, aflutter, CAD/MI, prior cardiogenic shock, with EF 20s, HF , coronary stenting about 3 years ago.  AF on dig and b blocker.  Admitted for anemia. Known pancreatic abnormality.  No symptoms of chf, no sob, c/p.  Dig level elevated, has elevated Creat.      Suggest  no  aspirin or plavix. Would use single agent/eliquis- on hold  pending GI evaluation.  Continue metoprolol, should continue Entresto for HF with recovered LVEF  Continue statin.  Repeat dig level, keep on telemetry. Hold dig for now
Agree with above,   Total time > 75 min

## 2024-06-26 DIAGNOSIS — D64.9 ANEMIA, UNSPECIFIED: ICD-10-CM

## 2024-06-26 LAB
ALBUMIN SERPL ELPH-MCNC: 2.6 G/DL — LOW (ref 3.3–5)
ALP SERPL-CCNC: 60 U/L — SIGNIFICANT CHANGE UP (ref 40–120)
ALT FLD-CCNC: 12 U/L — SIGNIFICANT CHANGE UP (ref 10–45)
ANION GAP SERPL CALC-SCNC: 7 MMOL/L — SIGNIFICANT CHANGE UP (ref 5–17)
AST SERPL-CCNC: 20 U/L — SIGNIFICANT CHANGE UP (ref 10–40)
BILIRUB SERPL-MCNC: 0.4 MG/DL — SIGNIFICANT CHANGE UP (ref 0.2–1.2)
BUN SERPL-MCNC: 18 MG/DL — SIGNIFICANT CHANGE UP (ref 7–23)
CALCIUM SERPL-MCNC: 8.8 MG/DL — SIGNIFICANT CHANGE UP (ref 8.4–10.5)
CHLORIDE SERPL-SCNC: 101 MMOL/L — SIGNIFICANT CHANGE UP (ref 96–108)
CO2 SERPL-SCNC: 29 MMOL/L — SIGNIFICANT CHANGE UP (ref 22–31)
CREAT SERPL-MCNC: 1.01 MG/DL — SIGNIFICANT CHANGE UP (ref 0.5–1.3)
DIGOXIN SERPL-MCNC: 3.5 NG/ML — CRITICAL HIGH (ref 0.8–2)
EGFR: 54 ML/MIN/1.73M2 — LOW
GLUCOSE SERPL-MCNC: 67 MG/DL — LOW (ref 70–99)
HCT VFR BLD CALC: 34.8 % — SIGNIFICANT CHANGE UP (ref 34.5–45)
HGB BLD-MCNC: 9.8 G/DL — LOW (ref 11.5–15.5)
MAGNESIUM SERPL-MCNC: 1.7 MG/DL — SIGNIFICANT CHANGE UP (ref 1.6–2.6)
MCHC RBC-ENTMCNC: 21.6 PG — LOW (ref 27–34)
MCHC RBC-ENTMCNC: 28.2 GM/DL — LOW (ref 32–36)
MCV RBC AUTO: 76.8 FL — LOW (ref 80–100)
NRBC # BLD: 0 /100 WBCS — SIGNIFICANT CHANGE UP (ref 0–0)
NT-PROBNP SERPL-SCNC: 5463 PG/ML — HIGH (ref 0–300)
PLATELET # BLD AUTO: 333 K/UL — SIGNIFICANT CHANGE UP (ref 150–400)
POTASSIUM SERPL-MCNC: 4.9 MMOL/L — SIGNIFICANT CHANGE UP (ref 3.5–5.3)
POTASSIUM SERPL-SCNC: 4.9 MMOL/L — SIGNIFICANT CHANGE UP (ref 3.5–5.3)
PROT SERPL-MCNC: 6.4 G/DL — SIGNIFICANT CHANGE UP (ref 6–8.3)
RBC # BLD: 4.53 M/UL — SIGNIFICANT CHANGE UP (ref 3.8–5.2)
RBC # FLD: 16 % — HIGH (ref 10.3–14.5)
SODIUM SERPL-SCNC: 137 MMOL/L — SIGNIFICANT CHANGE UP (ref 135–145)
WBC # BLD: 4.61 K/UL — SIGNIFICANT CHANGE UP (ref 3.8–10.5)
WBC # FLD AUTO: 4.61 K/UL — SIGNIFICANT CHANGE UP (ref 3.8–10.5)

## 2024-06-26 PROCEDURE — 99232 SBSQ HOSP IP/OBS MODERATE 35: CPT

## 2024-06-26 PROCEDURE — 74177 CT ABD & PELVIS W/CONTRAST: CPT | Mod: 26

## 2024-06-26 PROCEDURE — 99233 SBSQ HOSP IP/OBS HIGH 50: CPT | Mod: GC

## 2024-06-26 RX ORDER — AMLODIPINE BESYLATE 2.5 MG/1
2.5 TABLET ORAL
Refills: 0 | DISCHARGE

## 2024-06-26 RX ORDER — AMLODIPINE BESYLATE 2.5 MG/1
2.5 TABLET ORAL DAILY
Refills: 0 | Status: DISCONTINUED | OUTPATIENT
Start: 2024-06-27 | End: 2024-07-08

## 2024-06-26 RX ADMIN — ATORVASTATIN CALCIUM 40 MILLIGRAM(S): 20 TABLET, FILM COATED ORAL at 21:14

## 2024-06-26 RX ADMIN — APIXABAN 2.5 MILLIGRAM(S): 5 TABLET, FILM COATED ORAL at 05:17

## 2024-06-26 RX ADMIN — APIXABAN 2.5 MILLIGRAM(S): 5 TABLET, FILM COATED ORAL at 18:06

## 2024-06-26 RX ADMIN — AMLODIPINE BESYLATE 2.5 MILLIGRAM(S): 2.5 TABLET ORAL at 05:17

## 2024-06-26 RX ADMIN — DAPAGLIFLOZIN 10 MILLIGRAM(S): 10 TABLET, FILM COATED ORAL at 05:17

## 2024-06-26 RX ADMIN — Medication 325 MILLIGRAM(S): at 13:12

## 2024-06-26 RX ADMIN — PANTOPRAZOLE SODIUM 40 MILLIGRAM(S): 40 INJECTION, POWDER, FOR SOLUTION INTRAVENOUS at 13:12

## 2024-06-26 NOTE — SWALLOW BEDSIDE ASSESSMENT ADULT - SWALLOW EVAL: DIAGNOSIS
Pt presenting with clinical signs of oropharyngeal dysphagia suspect exacerbated by missing dentition and hx of esophageal dysphagia. Adequate extraction of liquids and functional oral containment. Pharyngeal motor response appreciated. No overt signs of aspiration following trials of thin liquids via straw or c/o globus. With soft and bite sized and regular textures patient demonstrates prolonged and perseverative mastication with residue in the left sulci which she is inconsistently sensate to. Liquid washes do not functionally clear residue and pt with intermittent expectoration. Improved and functional mastication, bolus formation and oral clearance with minced and moist diet texture. Recommend minced and moist diet texture due to oral phase deficits and thin liquids at this time.

## 2024-06-26 NOTE — PROGRESS NOTE ADULT - SUBJECTIVE AND OBJECTIVE BOX
PAYAM PAPPAS  [id number]    Chief Complaint: anemia  Interval events: pt seen and examined, labs and chart reviewed. no cardiopulmonary complaints. AF with PACs 40-50s bpm    ALLERGIES:  No Known Allergies      PAST MEDICAL & SURGICAL HISTORY:  HTN (hypertension)      HLD (hyperlipidemia)      Hyperkalemia      CKD (chronic kidney disease)      Atherosclerosis      CAD (coronary artery disease)      Stage 4 chronic kidney disease      Cyst of pancreas      H/O CHF      Diabetes mellitus      S/P hysterectomy      Pancreatic cyst      H/O endoscopy      History of cardioversion            CURRENT MEDICATIONS:  MEDICATIONS  (STANDING):  amLODIPine   Tablet 2.5 milliGRAM(s) Oral daily  atorvastatin 40 milliGRAM(s) Oral at bedtime  clopidogrel Tablet 75 milliGRAM(s) Oral daily  dapagliflozin 10 milliGRAM(s) Oral every 24 hours  metoprolol tartrate 12.5 milliGRAM(s) Oral two times a day  pantoprazole   Suspension 40 milliGRAM(s) Oral daily  sodium chloride 0.9% Bolus 500 milliLiter(s) IV Bolus once    MEDICATIONS  (PRN):  acetaminophen     Tablet .. 650 milliGRAM(s) Oral every 6 hours PRN Temp greater or equal to 38C (100.4F), Mild Pain (1 - 3)  aluminum hydroxide/magnesium hydroxide/simethicone Suspension 30 milliLiter(s) Oral every 4 hours PRN Dyspepsia  melatonin 3 milliGRAM(s) Oral at bedtime PRN Insomnia  ondansetron Injectable 4 milliGRAM(s) IV Push every 8 hours PRN Nausea and/or Vomiting      SOCIAL HISTORY:  denies    FAMILY HISTORY:  FH: MI (myocardial infarction) (Father, Mother)        ROS:  All 10 systems reviewed and positives noted in HPI    OBJECTIVE:    VITAL SIGNS:  Vital Signs Last 24 Hrs  T(C): 36.3 (25 Jun 2024 05:10), Max: 36.3 (24 Jun 2024 20:30)  T(F): 97.4 (25 Jun 2024 05:10), Max: 97.4 (24 Jun 2024 20:30)  HR: 64 (25 Jun 2024 05:10) (64 - 78)  BP: 154/64 (25 Jun 2024 05:10) (132/68 - 184/65)  BP(mean): 105 (24 Jun 2024 20:30) (105 - 105)  RR: 16 (25 Jun 2024 05:10) (15 - 16)  SpO2: 95% (25 Jun 2024 05:10) (93% - 100%)    Parameters below as of 25 Jun 2024 05:10  Patient On (Oxygen Delivery Method): room air        PHYSICAL EXAM:  General:  no distress  HEENT: sclera anicteric  Neck: supple, no carotid bruits b/l  CVS: JVP ~ 7 cm H20, irregular, no murmurs/rubs/gallops  Chest: unlabored respirations, clear to auscultation b/l  Abdomen: non-distended  Extremities: no lower extremity edema b/l  Neuro: awake, alert & oriented x 3        LABS:                        10.3   3.93  )-----------( 324      ( 25 Jun 2024 06:55 )             35.4     06-25    139  |  100  |  14  ----------------------------<  64<L>  3.9   |  28  |  1.07    Ca    9.1      25 Jun 2024 06:55    TPro  6.5  /  Alb  2.5<L>  /  TBili  0.5  /  DBili  x   /  AST  23  /  ALT  14  /  AlkPhos  61  06-25        PT/INR - ( 24 Jun 2024 15:47 )   PT: 16.6 sec;   INR: 1.43 ratio         PTT - ( 24 Jun 2024 15:47 )  PTT:31.6 sec      ECG: Afib with st and t wave abnormality      TTE: < from: TTE Echo Complete w/o Contrast w/ Doppler (05.26.24 @ 10:15) >   1. Left ventricular ejection fraction, by visual estimation, is 60 to 65%.   2. Normal global left ventricular systolic function.   3. Normal left ventricular internal cavity size.   4. The left ventricular diastolic function could not be assessed in this   study.   5. Mildly enlarged left atrium.   6. Normal right atrial size.   7. Small pericardial effusion.   8. Mild mitral valve regurgitation.   9. Thickening and calcification of the anterior and posterior mitral   valve leaflets.  10. Mild-moderate tricuspid regurgitation.  11. Mild aortic valve stenosis.    < from: Cardiac Cath Lab (08.06.21 @ 16:10) >  CORONARY VESSELS:  CX:   --  Proximal circumflex: There was a diffuse 70 % stenosis.  --  Distal circumflex: There was a tubular 90 % stenosis.  COMPLICATIONS: There were no complications.  DIAGNOSTIC IMPRESSIONS: Status post angioplasty, IVUS and stenting  (drug-eluting/Synergy) of the left circumflex artery with resultant DENNIS 3

## 2024-06-26 NOTE — PROGRESS NOTE ADULT - ASSESSMENT
89 yo female w/ hypertension, paroxysmal afib on Eliquis, cardiomyopathy, CAD, Hx of cardiac stents, Presents to the ER for abnormal labs.  Patient was noted to have low hemoglobin on outside labs.  Patient also has been having frequent episodes of A-fib with rapid heart rate with hypotension. Recommended to come to the ER for further evaluation by PMD.  Patient with dyspnea on exertion otherwise no chest pain, shortness of breath, headache, dizziness  no dark stools, no hematuria, per PMD- dr awan who spoke with dr. sean sprague- reported that pt s/p upper endoscopy for cystic pancreatic mass,  but nothing ws diagnosed, also pt has been in and out of rapid afib has been seen by [facility] and was placed on digoxin and reccs for possible cardioversion. Recent admission for syncope 1 month back.     #failure to thrive   - poor appetite weight loss  -per PMD- dr awan who spoke with dr. sean sprague- reported that pt s/p upper endoscopy for cystic pancreatic mass, but nothing was dx  -pt has been loosing weight inspite of eating normally, lost 20 lbs in last 6 months.   -monitor hb, renal function   - iv fluids  -avoid nephrotoxins    #microcytic anemia  - hgb 9.4 on admission  - hgb 9.8 today  - no change in baseline.  - recent EUS ? secondary to slow blood loss from upper GI source vs nutritional sec to restricted oral intake from difficulty swallowing  -  will start on iron supplementation    #dysphagia  - speech and swallow evaluation  - recent esophageal dilatation done in april   -follows with GI Dr. Escudero at [facility]  - GI recommendations appreciated- no plan for repeat endoscopy    #chronic Afib  - EKG- AFIB- 83 currently rate controlled  - cardio recommendations appreciated  - continue eliquis  - hold lopressor since pt has been bradycardic  - digoxin level 4.1  - hold digoxin  - repeat digoxin level at 12pm    #History of HTN, CAD s/p stents  -c/w statin, farxiga, amlodipine  - hold beta blocker  - discontinued aspirin and plavix    #pancreatic cyst  -CT-(05.26.24 @ 21:19) >2.4 cm complex lesion in the pancreatic tail with imaging features consistent with serous cystadenoma   - follow up CT abd/pelvis    #Right subclavian artery with severe atherosclerosis   #Uneven BP's.   - per prior chart-  Left side BP higher than right side BP  - CTA and carotid duplex noted  - Vascular surgery consulted, no acute vascular intervention required, continue Eliquis and Plavix  - f/u outpatient with vascular surgery     #DVT prophylaxis   - Eliquis    Dispo: 24hrs after CT scan      Case discussed with Dr. Adair

## 2024-06-26 NOTE — PROGRESS NOTE ADULT - ASSESSMENT
Assessment 88-year-old female history of HTN, A-fib on Eliquis cardiomyopathy, CAD with stents and known pancreatic mass, admitted for anemia on outpatient labs, concerning for GI bleed.  Cardiology consulted for A-fib    Recommendations  Telemetry monitor consistent with A-fib, rate controlled.  Continue telemetry monitoring and metoprolol for rate control. digoxin on hold due to elevated levels. monitor for dig toxicity.  eliquis resumed as per GI/primary  Hemoglobin currently stable.  Maintain hemoglobin >=8  Recent TTE with EF 60-65%, normal LV function, mildly enlarged LA and mild valvular disease

## 2024-06-26 NOTE — DIETITIAN INITIAL EVALUATION ADULT - SIGNS/SYMPTOMS
as evidence by stage II  as evidence by loss of body fat , muscle wasting observed & 20% weight change x12 months

## 2024-06-26 NOTE — SWALLOW BEDSIDE ASSESSMENT ADULT - ORAL PHASE
Within functional limits expectorates what she is unable to chew, reduced awareness to residue in left sulci/Decreased anterior-posterior movement of the bolus/Delayed oral transit time/Stasis in lateral sulci

## 2024-06-26 NOTE — DIETITIAN INITIAL EVALUATION ADULT - PERTINENT MEDS FT
MEDICATIONS  (STANDING):  amLODIPine   Tablet 2.5 milliGRAM(s) Oral daily  apixaban 2.5 milliGRAM(s) Oral every 12 hours  atorvastatin 40 milliGRAM(s) Oral at bedtime  dapagliflozin 10 milliGRAM(s) Oral every 24 hours  ferrous    sulfate 325 milliGRAM(s) Oral daily  pantoprazole   Suspension 40 milliGRAM(s) Oral daily    MEDICATIONS  (PRN):  acetaminophen     Tablet .. 650 milliGRAM(s) Oral every 6 hours PRN Temp greater or equal to 38C (100.4F), Mild Pain (1 - 3)  aluminum hydroxide/magnesium hydroxide/simethicone Suspension 30 milliLiter(s) Oral every 4 hours PRN Dyspepsia  melatonin 3 milliGRAM(s) Oral at bedtime PRN Insomnia  ondansetron Injectable 4 milliGRAM(s) IV Push every 8 hours PRN Nausea and/or Vomiting

## 2024-06-26 NOTE — PROGRESS NOTE ADULT - PROBLEM SELECTOR PLAN 2
Last EUS and Esophageal dilation done on 4/3/24, at [facility] by Dr Escudero   Swallow eval   Continue soft diet & add clear boost supplement   No plan for EGD at this time unless condition changes  out patient F/U with Dr Escudero advanced endoscopist.

## 2024-06-26 NOTE — PROGRESS NOTE ADULT - NS ATTEND AMEND GEN_ALL_CORE FT
Followed in our office by Dr. Daniel . HX of HBP and low bp, aflutter, CAD/MI, prior cardiogenic shock, with EF 20s, HF , coronary stenting about 3 years ago.  AF on dig and b blocker.  Admitted for anemia. Known pancreatic abnormality.  No symptoms of chf, no sob, c/p.  Dig level elevated, has elevated Creat.      Suggest  no  aspirin or plavix. Would use single agent/eliquis- on hold  pending GI evaluation.  Continue metoprolol, should continue Entresto for HF with recovered LVEF  Continue statin.  Repeat dig level, keep on telemetry. Hold dig for now Patient seen and evaluated independently. she feels well    her heart rate is well controlled on just  low dose bblocker. this is most probably because her digoxin level is still elevated      she was started on digoxin 6/18  and admitted on 6/24. it would be unusula to have such as high level with just 7 doses over 7 days so it is possible that she may have been taking her miedication incorrectly. in any event as the digoxin clears I would suspect that her heart rate will head back up to the 140 level where it was one week ago    she has a pancreatic mass that is being worked up and I would strongly suggest that that work up be resumed while she is here as her hgb has been dropping into the 8"s which  is unsustainable in an 88 yr old with cad and af

## 2024-06-26 NOTE — DIETITIAN NUTRITION RISK NOTIFICATION - TREATMENT: THE FOLLOWING DIET HAS BEEN RECOMMENDED
Diet, DASH/TLC:   Sodium & Cholesterol Restricted  Minced and Moist (MINCEDMOIST) (06-26-24 @ 09:43) [Active]

## 2024-06-26 NOTE — DIETITIAN INITIAL EVALUATION ADULT - OTHER INFO
89 yo female w/ hypertension, paroxysmal afib on Eliquis, cardiomyopathy, CAD, Hx of cardiac stents, Presents to the ER for abnormal labs., Patient noted with FE+ deficiency anemia , Ferrous sulfate provided , (+) BM (6/24) Stage II sacrum , request MVI , Vit C to aid with wound healing . no edema, Po intake noted 25-50% , SLP eval noted , recommends minced moist consistency . UBW was reported 120lbs x 1 year ago ,(25lb s =20% weight change ) NFPE conducted patient noted with severe protein calorie malnutrition (+) muscle wasting/ loss of body fat . Patient is receptive to Ensure HP BID (700kcals/40gms protein )

## 2024-06-26 NOTE — DIETITIAN INITIAL EVALUATION ADULT - PERTINENT LABORATORY DATA
06-26    137  |  101  |  18  ----------------------------<  67<L>  4.9   |  29  |  1.01    Ca    8.8      26 Jun 2024 06:29  Mg     1.7     06-26    TPro  6.4  /  Alb  2.6<L>  /  TBili  0.4  /  DBili  x   /  AST  20  /  ALT  12  /  AlkPhos  60  06-26  A1C with Estimated Average Glucose Result: 5.3 % (05-26-24 @ 06:43)  A1C with Estimated Average Glucose Result: 5.0 % (12-18-23 @ 14:41)

## 2024-06-26 NOTE — DIETITIAN NUTRITION RISK NOTIFICATION - PHYSICAL ASSESSMENT TEMPLES
Price $ (Use Numbers Only, No Text Please.): 0.00
stretcher
Sticky Other (Free Text): No charge staple removal visit
Detail Level: Zone
severe

## 2024-06-26 NOTE — PROGRESS NOTE ADULT - PROBLEM SELECTOR PLAN 1
Anemia unlikely of upper GI source (erosive gastritis, PUD, AVM/ angiectasia, malignancy).  Last EUS and Esophageal dilation done on 4/3/24, at [facility] by Dr Escudero     Keep active T&S  Monitor CBC   Transfuse if Hb Less than 7  Monitor Bms Anemia unlikely of upper GI source (erosive gastritis, PUD, AVM/ angiectasia, malignancy).  Last EUS and Esophageal dilation done on 4/3/24, at [facility] by Dr Escudero     Keep active T&S  Monitor CBC   Transfuse if Hb Less than 7  Monitor Bms  No GI intervention at this time unless patient conditions changes

## 2024-06-26 NOTE — PROGRESS NOTE ADULT - ATTENDING COMMENTS
Patient is a 87 yo female w/ hypertension, paroxysmal afib on Eliquis, cardiomyopathy, CAD, Hx of cardiac stents.  No c/o chest pain/ sob/ palpitations. No c/o pedal edema. No melena/ brbpr. patient stated that she has been having difficulty swallowing solid food for almost two weeks, at times she vomits it out due to dysphagia. no difficulty with liquids.     No new complaints. overnight events noted.    T(C): 36.3 (06-26-24 @ 05:10), Max: 36.7 (06-25-24 @ 19:00)  T(F): 97.4 (06-26-24 @ 05:10), Max: 98 (06-25-24 @ 19:00)  HR: 75 (06-26-24 @ 05:10) (56 - 92)  BP: 164/56 (06-26-24 @ 05:10) (97/54 - 168/55)  ABP: --  ABP(mean): --  RR: 16 (06-26-24 @ 05:10) (16 - 18)  SpO2: 94% (06-26-24 @ 05:10) (94% - 100%)    on exam chacectic  no apparent distress  able to talk in full sentences  both lungs clear  irregular rhythm  abdomen- soft  no pedal edema    LABS:                        9.8    4.61  )-----------( 333      ( 26 Jun 2024 06:29 )             34.8     06-26    137  |  101  |  18  ----------------------------<  67<L>  4.9   |  29  |  1.01    Ca    8.8      26 Jun 2024 06:29  Mg     1.7     06-26    TPro  6.4  /  Alb  2.6<L>  /  TBili  0.4  /  DBili  x   /  AST  20  /  ALT  12  /  AlkPhos  60  06-26    PT/INR - ( 24 Jun 2024 15:47 )   PT: 16.6 sec;   INR: 1.43 ratio         PTT - ( 24 Jun 2024 15:47 )  PTT:31.6 sec  Urinalysis Basic - ( 26 Jun 2024 06:29 )    Color: x / Appearance: x / SG: x / pH: x  Gluc: 67 mg/dL / Ketone: x  / Bili: x / Urobili: x   Blood: x / Protein: x / Nitrite: x   Leuk Esterase: x / RBC: x / WBC x   Sq Epi: x / Non Sq Epi: x / Bacteria: x    a/p:  # dysphagia- seen by GI, recent esophageal dilatation done in april. no plan for repeat endoscopy. continue nutritional supplements.  # atrial fibrillation- rate controlled. continue to monitor. will resume eliquis as no plan for endoscopy by GI.  # Dig levels still high. continue to monitor on telemtry. will hold metoprolol for now due to episodes of bradycardia overnight.  # Anemia, microcytic, chronic- no change in baseline. recent EUS, could be secondary to slow blood loss from upper GI source vs nutritional sec to restricted oral intake from difficulty swallowing. will start on iron supplements.  # CAD s/p stent- statin. beta blocker on hold.  # Pancreatic cyst- CT pending.  - case discussed with patient's primary physician.  - rest as per resident note.

## 2024-06-26 NOTE — PROGRESS NOTE ADULT - ASSESSMENT
87 yo female w/ hypertension, paroxysmal afib on Eliquis, cardiomyopathy, CAD, Hx of cardiac stents, Presents to the ER for abnormal labs.  Patient was noted to have low hemoglobin on outside labs.  Patient also has been having frequent episodes of A-fib with rapid heart rate with hypotension.    GI consultation for anemia. She is on Plavix for A Fib   She had multiple EGD  , & EUS done from December 2023 result in sunrise . Last EUS and Esophageal dilation done on 4/3/24, at [facility] by Dr Escudero        89 yo female w/ hypertension, paroxysmal afib on Eliquis, cardiomyopathy, CAD, Hx of cardiac stents.    Patient denies any SOB, or diziness  No events overnight  Hbg stable   GI consultation for anemia. S  Plavix for A Fib   Multiple EGD , & EUS done from December 2023 result in sunrise.  Last EUS and Esophageal dilation done on 4/3/24, at [facility] by Dr Escudero      Hemoglobin: 9.8 g/dL (06.26.24 @ 06:29)   Hemoglobin: 10.3 g/dL (06.25.24 @ 06:55)   Hemoglobin: 9.4 g/dL (06.24.24 @ 15:47)

## 2024-06-26 NOTE — SWALLOW BEDSIDE ASSESSMENT ADULT - COMMENTS
WBC: 4.61  CXR 6/24:   IMPRESSION: Cardiomegaly despite AP projection, unchanged. No   consolidation pneumothorax or effusion. Elevation left hemidiaphragm   similar to prior  HEad CT 5/25: IMPRESSION:   Unremarkable head CT.    Ischemic white matter disease and   atrophy typical for age.    GI 6/26 : " ·  Problem: Dysphagia.   ·  Plan: Last EUS and Esophageal dilation done on 4/3/24, at [facility] by Dr Escudero   Swallow eval   Continue soft diet & add clear boost supplement   No plan for EGD at this time unless condition changes  out patient F/U with Dr Escudero advanced endoscopist."

## 2024-06-26 NOTE — SWALLOW BEDSIDE ASSESSMENT ADULT - SWALLOW EVAL: RECOMMENDED FEEDING/EATING TECHNIQUES
allow for swallow between intakes/check mouth frequently for oral residue/pocketing/maintain upright posture during/after eating for 30 mins/oral hygiene/position upright (90 degrees)

## 2024-06-26 NOTE — PROGRESS NOTE ADULT - SUBJECTIVE AND OBJECTIVE BOX
89 yo female w/ hypertension, paroxysmal afib on Eliquis, cardiomyopathy, CAD, Hx of cardiac stents, Presents to the ER for abnormal labs.  Patient was noted to have low hemoglobin on outside labs.  Patient also has been having frequent episodes of A-fib with rapid heart rate with hypotension.  Recommended to come to the ER for further evaluation by PMD.  Patient with dyspnea on exertion otherwise no chest pain, shortness of breath, headache, dizziness no dark stools, no hematuria, recent admission for syncope 1 month back.     ed course- Hemoglobin: 9.4 g/dL (06.24.24 @ 15:47), no active bleeding, EKG- AFIB- 83      GI consultation for anemia. Patient seen examined at bed side. She had multiple EGD  , & EUS done from December 2023 result in sunrise . Last EUS and Esophageal dilation done on 4/3/24, at [facility] by Dr Escudero She is able to swallow soft food. She had normal bowel movement in home the day of admission.  Denies abdominal pain, nausea, vomiting diarrhea or constipation.   89 yo female w/ hypertension, paroxysmal afib on Eliquis, cardiomyopathy, CAD, Hx of cardiac stents, Presents to the ER for abnormal labs.  Patient was noted to have low hemoglobin on outside labs.  Patient also has been having frequent episodes of A-fib with rapid heart rate with hypotension.      Patient seen examined at bed side. She had multiple EGD  , & EUS done from December 2023 result in sunrise . Last EUS and Esophageal dilation done on 4/3/24, at [facility] by Dr Escudero She is able to swallow soft food. She had normal bowel movement in home the day of admission.  Denies abdominal pain, nausea, vomiting diarrhea or constipation.    GI consultation: anemia.

## 2024-06-26 NOTE — PROGRESS NOTE ADULT - SUBJECTIVE AND OBJECTIVE BOX
Patient is a 88y old  Female who presents with a chief complaint of anemia, Afib (26 Jun 2024 10:09)      INTERVAL HPI/OVERNIGHT EVENTS: Patient seen and examined at bedside. No overnight events.    MEDICATIONS  (STANDING):  amLODIPine   Tablet 2.5 milliGRAM(s) Oral daily  apixaban 2.5 milliGRAM(s) Oral every 12 hours  atorvastatin 40 milliGRAM(s) Oral at bedtime  dapagliflozin 10 milliGRAM(s) Oral every 24 hours  ferrous    sulfate 325 milliGRAM(s) Oral daily  pantoprazole   Suspension 40 milliGRAM(s) Oral daily    MEDICATIONS  (PRN):  acetaminophen     Tablet .. 650 milliGRAM(s) Oral every 6 hours PRN Temp greater or equal to 38C (100.4F), Mild Pain (1 - 3)  aluminum hydroxide/magnesium hydroxide/simethicone Suspension 30 milliLiter(s) Oral every 4 hours PRN Dyspepsia  melatonin 3 milliGRAM(s) Oral at bedtime PRN Insomnia  ondansetron Injectable 4 milliGRAM(s) IV Push every 8 hours PRN Nausea and/or Vomiting      Allergies    No Known Allergies    Intolerances        REVIEW OF SYSTEMS:  CONSTITUTIONAL: No fever or chills  HEENT:  No headache, no sore throat  RESPIRATORY: No cough, wheezing, or shortness of breath  CARDIOVASCULAR: No chest pain, palpitations  GASTROINTESTINAL: No abd pain, nausea, vomiting, or diarrhea  GENITOURINARY: No dysuria, frequency, or hematuria  NEUROLOGICAL: no focal weakness or dizziness  MUSCULOSKELETAL: no myalgias     Vital Signs Last 24 Hrs  T(C): 36.3 (26 Jun 2024 05:10), Max: 36.7 (25 Jun 2024 19:00)  T(F): 97.4 (26 Jun 2024 05:10), Max: 98 (25 Jun 2024 19:00)  HR: 75 (26 Jun 2024 05:10) (56 - 92)  BP: 164/56 (26 Jun 2024 05:10) (97/54 - 168/55)  BP(mean): --  RR: 16 (26 Jun 2024 05:10) (16 - 18)  SpO2: 94% (26 Jun 2024 05:10) (94% - 100%)    Parameters below as of 26 Jun 2024 05:10  Patient On (Oxygen Delivery Method): room air      I&O's Summary    25 Jun 2024 07:01  -  26 Jun 2024 07:00  --------------------------------------------------------  IN: 0 mL / OUT: 600 mL / NET: -600 mL      BMI (kg/m2): 20.7 (06-24-24 @ 20:30)    PHYSICAL EXAM:  GENERAL: NAD  HEENT:  AT/NC, anicteric, moist mucous membranes, EOMI, PERRL, no lid-lag, conjunctiva and sclera clear  CHEST/LUNG:  CTA b/l, no rales, wheezes, or rhonchi,  normal respiratory effort, no intercostal retractions  HEART:  irregularly irregular, S1, S2, no murmurs; no pitting edema  ABDOMEN:  BS+, soft, nontender, nondistended; No HSM  MSK/EXTREMITIES: 2+ peripheral pulses, no clubbing or cyanosis  NERVOUS SYSTEM: answers questions and follows commands appropriately, A&Ox3 grossly moves all extremities   PSYCH: Appropriate affect, Alert & Awake; Good judgement    LABS: Personally reviewed  CBC                        9.8    4.61  )-----------( 333      ( 26 Jun 2024 06:29 )             34.8     CMP  06-26    137  |  101  |  18  ----------------------------<  67  4.9   |  29  |  1.01    Ca    8.8      26 Jun 2024 06:29  Mg     1.7     06-26    TPro  6.4  /  Alb  2.6  /  TBili  0.4  /  DBili  x   /  AST  20  /  ALT  12  /  AlkPhos  60  06-26          PT/INR - ( 24 Jun 2024 15:47 )   PT: 16.6 sec;   INR: 1.43 ratio         PTT - ( 24 Jun 2024 15:47 )  PTT:31.6 sec                            Urinalysis Basic - ( 26 Jun 2024 06:29 )    Color: x / Appearance: x / SG: x / pH: x  Gluc: 67 mg/dL / Ketone: x  / Bili: x / Urobili: x   Blood: x / Protein: x / Nitrite: x   Leuk Esterase: x / RBC: x / WBC x   Sq Epi: x / Non Sq Epi: x / Bacteria: x                RADIOLOGY & ADDITIONAL TESTS: Personally reviewed.     Consultant(s) Notes Reviewed:  [x] YES  [ ] NO   Discussed with JANAY/MARION, RN

## 2024-06-26 NOTE — SWALLOW BEDSIDE ASSESSMENT ADULT - SLP PERTINENT HISTORY OF CURRENT PROBLEM
89 yo female w/ hypertension, paroxysmal afib on Eliquis, cardiomyopathy, CAD, Hx of cardiac stents, Presents to the ER for abnormal labs.  Patient was noted to have low hemoglobin on outside labs.  Patient also has been having frequent episodes of A-fib with rapid heart rate with hypotension.  Recommended to come to the ER for further evaluation by PMD.  Patient with dyspnea on exertion otherwise no chest pain, shortness of breath, headache, dizziness. recent admission for syncopy 1 month back.

## 2024-06-26 NOTE — DIETITIAN INITIAL EVALUATION ADULT - NS FNS DIET ORDER
Diet, DASH/TLC:   Sodium & Cholesterol Restricted  Minced and Moist (MINCEDMOIST) (06-26-24 @ 09:43)

## 2024-06-27 DIAGNOSIS — K86.2 CYST OF PANCREAS: ICD-10-CM

## 2024-06-27 LAB
ALBUMIN SERPL ELPH-MCNC: 2.4 G/DL — LOW (ref 3.3–5)
ALP SERPL-CCNC: 53 U/L — SIGNIFICANT CHANGE UP (ref 40–120)
ALT FLD-CCNC: 14 U/L — SIGNIFICANT CHANGE UP (ref 10–45)
ANION GAP SERPL CALC-SCNC: 6 MMOL/L — SIGNIFICANT CHANGE UP (ref 5–17)
AST SERPL-CCNC: 19 U/L — SIGNIFICANT CHANGE UP (ref 10–40)
BILIRUB SERPL-MCNC: 0.2 MG/DL — SIGNIFICANT CHANGE UP (ref 0.2–1.2)
BUN SERPL-MCNC: 14 MG/DL — SIGNIFICANT CHANGE UP (ref 7–23)
CALCIUM SERPL-MCNC: 8.5 MG/DL — SIGNIFICANT CHANGE UP (ref 8.4–10.5)
CHLORIDE SERPL-SCNC: 101 MMOL/L — SIGNIFICANT CHANGE UP (ref 96–108)
CO2 SERPL-SCNC: 29 MMOL/L — SIGNIFICANT CHANGE UP (ref 22–31)
CREAT SERPL-MCNC: 1 MG/DL — SIGNIFICANT CHANGE UP (ref 0.5–1.3)
DIGOXIN SERPL-MCNC: 2.8 NG/ML — CRITICAL HIGH (ref 0.8–2)
EGFR: 55 ML/MIN/1.73M2 — LOW
GLUCOSE SERPL-MCNC: 76 MG/DL — SIGNIFICANT CHANGE UP (ref 70–99)
HCT VFR BLD CALC: 33.3 % — LOW (ref 34.5–45)
HGB BLD-MCNC: 9.6 G/DL — LOW (ref 11.5–15.5)
MCHC RBC-ENTMCNC: 21.7 PG — LOW (ref 27–34)
MCHC RBC-ENTMCNC: 28.8 GM/DL — LOW (ref 32–36)
MCV RBC AUTO: 75.2 FL — LOW (ref 80–100)
NRBC # BLD: 0 /100 WBCS — SIGNIFICANT CHANGE UP (ref 0–0)
PLATELET # BLD AUTO: 285 K/UL — SIGNIFICANT CHANGE UP (ref 150–400)
POTASSIUM SERPL-MCNC: 4.6 MMOL/L — SIGNIFICANT CHANGE UP (ref 3.5–5.3)
POTASSIUM SERPL-SCNC: 4.6 MMOL/L — SIGNIFICANT CHANGE UP (ref 3.5–5.3)
PROT SERPL-MCNC: 6 G/DL — SIGNIFICANT CHANGE UP (ref 6–8.3)
RBC # BLD: 4.43 M/UL — SIGNIFICANT CHANGE UP (ref 3.8–5.2)
RBC # FLD: 16 % — HIGH (ref 10.3–14.5)
SODIUM SERPL-SCNC: 136 MMOL/L — SIGNIFICANT CHANGE UP (ref 135–145)
WBC # BLD: 3.27 K/UL — LOW (ref 3.8–10.5)
WBC # FLD AUTO: 3.27 K/UL — LOW (ref 3.8–10.5)

## 2024-06-27 PROCEDURE — 99233 SBSQ HOSP IP/OBS HIGH 50: CPT

## 2024-06-27 RX ORDER — METOPROLOL TARTRATE 50 MG
25 TABLET ORAL DAILY
Refills: 0 | Status: DISCONTINUED | OUTPATIENT
Start: 2024-06-27 | End: 2024-07-08

## 2024-06-27 RX ORDER — POLYETHYLENE GLYCOL 3350 1 G/G
17 POWDER ORAL DAILY
Refills: 0 | Status: DISCONTINUED | OUTPATIENT
Start: 2024-06-27 | End: 2024-07-08

## 2024-06-27 RX ORDER — SENNOSIDES 8.6 MG
2 TABLET ORAL AT BEDTIME
Refills: 0 | Status: DISCONTINUED | OUTPATIENT
Start: 2024-06-27 | End: 2024-07-08

## 2024-06-27 RX ADMIN — Medication 2 TABLET(S): at 21:26

## 2024-06-27 RX ADMIN — POLYETHYLENE GLYCOL 3350 17 GRAM(S): 1 POWDER ORAL at 11:53

## 2024-06-27 RX ADMIN — ATORVASTATIN CALCIUM 40 MILLIGRAM(S): 20 TABLET, FILM COATED ORAL at 21:26

## 2024-06-27 RX ADMIN — Medication 1 TABLET(S): at 11:53

## 2024-06-27 RX ADMIN — APIXABAN 2.5 MILLIGRAM(S): 5 TABLET, FILM COATED ORAL at 05:36

## 2024-06-27 RX ADMIN — Medication 500 MILLIGRAM(S): at 11:53

## 2024-06-27 RX ADMIN — PANTOPRAZOLE SODIUM 40 MILLIGRAM(S): 40 INJECTION, POWDER, FOR SOLUTION INTRAVENOUS at 11:53

## 2024-06-27 RX ADMIN — APIXABAN 2.5 MILLIGRAM(S): 5 TABLET, FILM COATED ORAL at 17:16

## 2024-06-27 RX ADMIN — DAPAGLIFLOZIN 10 MILLIGRAM(S): 10 TABLET, FILM COATED ORAL at 05:36

## 2024-06-27 RX ADMIN — Medication 325 MILLIGRAM(S): at 11:53

## 2024-06-27 NOTE — PROGRESS NOTE ADULT - PROBLEM SELECTOR PLAN 2
Last EUS and Esophageal dilation done on 4/3/24, at [facility] by Dr Escudero   Swallow eval appreciated   Continue minced moist diet & add clear boost supplement   No plan for EGD at this time unless condition changes  out patient F/U with Dr Escudero advanced endoscopist. Last EUS and Esophageal dilation 4/3/2024 at [facility] by Dr Escudero   Swallow eval appreciated   Continue minced moist diet & add clear boost supplement   No plan for EGD at this time unless condition changes  Advise outpatient F/U after DC home with her gastroenterologist/advanced endoscopist Dr Escudero

## 2024-06-27 NOTE — PROGRESS NOTE ADULT - SUBJECTIVE AND OBJECTIVE BOX
Patient is a 88y old  Female who presents with a chief complaint of anemia, afib (27 Jun 2024 10:29)      INTERVAL HPI/OVERNIGHT EVENTS: Patient seen and examined at bedside. No overnight events.    MEDICATIONS  (STANDING):  amLODIPine   Tablet 2.5 milliGRAM(s) Oral daily  apixaban 2.5 milliGRAM(s) Oral every 12 hours  ascorbic acid 500 milliGRAM(s) Oral daily  atorvastatin 40 milliGRAM(s) Oral at bedtime  dapagliflozin 10 milliGRAM(s) Oral every 24 hours  ferrous    sulfate 325 milliGRAM(s) Oral daily  metoprolol succinate ER 25 milliGRAM(s) Oral daily  multivitamin 1 Tablet(s) Oral daily  pantoprazole   Suspension 40 milliGRAM(s) Oral daily  polyethylene glycol 3350 17 Gram(s) Oral daily  senna 2 Tablet(s) Oral at bedtime    MEDICATIONS  (PRN):  acetaminophen     Tablet .. 650 milliGRAM(s) Oral every 6 hours PRN Temp greater or equal to 38C (100.4F), Mild Pain (1 - 3)  aluminum hydroxide/magnesium hydroxide/simethicone Suspension 30 milliLiter(s) Oral every 4 hours PRN Dyspepsia  melatonin 3 milliGRAM(s) Oral at bedtime PRN Insomnia  ondansetron Injectable 4 milliGRAM(s) IV Push every 8 hours PRN Nausea and/or Vomiting      Allergies    No Known Allergies    Intolerances        REVIEW OF SYSTEMS:  CONSTITUTIONAL: No fever or chills  HEENT:  No headache, no sore throat  RESPIRATORY: No cough, wheezing, or shortness of breath  CARDIOVASCULAR: No chest pain, palpitations  GASTROINTESTINAL: No abd pain, nausea, vomiting, or diarrhea  GENITOURINARY: No dysuria, frequency, or hematuria  NEUROLOGICAL: no focal weakness or dizziness  MUSCULOSKELETAL: no myalgias     Vital Signs Last 24 Hrs  T(C): 36.4 (27 Jun 2024 05:40), Max: 36.4 (26 Jun 2024 20:00)  T(F): 97.5 (27 Jun 2024 05:40), Max: 97.5 (26 Jun 2024 20:00)  HR: 75 (27 Jun 2024 05:40) (65 - 75)  BP: 105/66 (27 Jun 2024 05:40) (105/66 - 168/53)  BP(mean): --  RR: 17 (27 Jun 2024 05:40) (16 - 17)  SpO2: 97% (27 Jun 2024 05:40) (97% - 97%)    Parameters below as of 27 Jun 2024 05:40  Patient On (Oxygen Delivery Method): room air      I&O's Summary    26 Jun 2024 07:01  -  27 Jun 2024 07:00  --------------------------------------------------------  IN: 0 mL / OUT: 400 mL / NET: -400 mL      BMI (kg/m2): 20.7 (06-24-24 @ 20:30)    PHYSICAL EXAM:  GENERAL: NAD, frail/thin  HEENT:  AT/NC, anicteric, moist mucous membranes, EOMI, PERRL, no lid-lag, conjunctiva and sclera clear  CHEST/LUNG:  CTA b/l, no rales, wheezes, or rhonchi,  normal respiratory effort, no intercostal retractions  HEART:  RRR, S1, S2, no murmurs; no pitting edema  ABDOMEN:  BS+, soft, nontender, nondistended; No HSM  MSK/EXTREMITIES: 2+ peripheral pulses, no clubbing or cyanosis  NERVOUS SYSTEM: answers questions and follows commands appropriately, A&Ox3 grossly moves all extremities   PSYCH: Appropriate affect, Alert & Awake; Good judgement      LABS: Personally reviewed  CBC                        9.6    3.27  )-----------( 285      ( 27 Jun 2024 07:52 )             33.3     CMP  06-27    136  |  101  |  14  ----------------------------<  76  4.6   |  29  |  1.00    Ca    8.5      27 Jun 2024 07:52  Mg     1.7     06-26    TPro  6.0  /  Alb  2.4  /  TBili  0.2  /  DBili  x   /  AST  19  /  ALT  14  /  AlkPhos  53  06-27          PT/INR - ( 24 Jun 2024 15:47 )   PT: 16.6 sec;   INR: 1.43 ratio         PTT - ( 24 Jun 2024 15:47 )  PTT:31.6 sec                            Urinalysis Basic - ( 27 Jun 2024 07:52 )    Color: x / Appearance: x / SG: x / pH: x  Gluc: 76 mg/dL / Ketone: x  / Bili: x / Urobili: x   Blood: x / Protein: x / Nitrite: x   Leuk Esterase: x / RBC: x / WBC x   Sq Epi: x / Non Sq Epi: x / Bacteria: x                RADIOLOGY & ADDITIONAL TESTS: Personally reviewed.     Consultant(s) Notes Reviewed:  [x] YES  [ ] NO   Discussed with JANAY/MARION, RN     Patient is a 88y old  Female who presents with a chief complaint of anemia, afib (27 Jun 2024 10:29)      INTERVAL HPI/ OVERNIGHT EVENTS: Patient seen and examined at bedside. No overnight events.      MEDICATIONS  (STANDING):  amLODIPine   Tablet 2.5 milliGRAM(s) Oral daily  apixaban 2.5 milliGRAM(s) Oral every 12 hours  ascorbic acid 500 milliGRAM(s) Oral daily  atorvastatin 40 milliGRAM(s) Oral at bedtime  dapagliflozin 10 milliGRAM(s) Oral every 24 hours  ferrous    sulfate 325 milliGRAM(s) Oral daily  metoprolol succinate ER 25 milliGRAM(s) Oral daily  multivitamin 1 Tablet(s) Oral daily  pantoprazole   Suspension 40 milliGRAM(s) Oral daily  polyethylene glycol 3350 17 Gram(s) Oral daily  senna 2 Tablet(s) Oral at bedtime    MEDICATIONS  (PRN):  acetaminophen     Tablet .. 650 milliGRAM(s) Oral every 6 hours PRN Temp greater or equal to 38C (100.4F), Mild Pain (1 - 3)  aluminum hydroxide/magnesium hydroxide/simethicone Suspension 30 milliLiter(s) Oral every 4 hours PRN Dyspepsia  melatonin 3 milliGRAM(s) Oral at bedtime PRN Insomnia  ondansetron Injectable 4 milliGRAM(s) IV Push every 8 hours PRN Nausea and/or Vomiting      Allergies    No Known Allergies    Intolerances        REVIEW OF SYSTEMS:  CONSTITUTIONAL: No fever or chills  HEENT:  No headache, no sore throat  RESPIRATORY: No cough, wheezing, or shortness of breath  CARDIOVASCULAR: No chest pain, palpitations  GASTROINTESTINAL: No abd pain, nausea, vomiting, or diarrhea  GENITOURINARY: No dysuria, frequency, or hematuria  NEUROLOGICAL: no focal weakness or dizziness  MUSCULOSKELETAL: no myalgias     Vital Signs Last 24 Hrs  T(C): 36.4 (27 Jun 2024 05:40), Max: 36.4 (26 Jun 2024 20:00)  T(F): 97.5 (27 Jun 2024 05:40), Max: 97.5 (26 Jun 2024 20:00)  HR: 75 (27 Jun 2024 05:40) (65 - 75)  BP: 105/66 (27 Jun 2024 05:40) (105/66 - 168/53)  BP(mean): --  RR: 17 (27 Jun 2024 05:40) (16 - 17)  SpO2: 97% (27 Jun 2024 05:40) (97% - 97%)    Parameters below as of 27 Jun 2024 05:40  Patient On (Oxygen Delivery Method): room air      I&O's Summary    26 Jun 2024 07:01  -  27 Jun 2024 07:00  --------------------------------------------------------  IN: 0 mL / OUT: 400 mL / NET: -400 mL      BMI (kg/m2): 20.7 (06-24-24 @ 20:30)    PHYSICAL EXAM:  GENERAL: NAD, frail/thin  HEENT:  AT/NC, anicteric, moist mucous membranes, EOMI, PERRL, no lid-lag, conjunctiva and sclera clear  CHEST/LUNG:  CTA b/l, no rales, wheezes, or rhonchi,  normal respiratory effort, no intercostal retractions  HEART:  RRR, S1, S2, no murmurs; no pitting edema  ABDOMEN:  BS+, soft, nontender, nondistended; No HSM  MSK/EXTREMITIES: 2+ peripheral pulses, no clubbing or cyanosis  NERVOUS SYSTEM: answers questions and follows commands appropriately, A&Ox3 grossly moves all extremities   PSYCH: Appropriate affect, Alert & Awake; Good judgement      LABS: Personally reviewed  CBC                        9.6    3.27  )-----------( 285      ( 27 Jun 2024 07:52 )             33.3     CMP  06-27    136  |  101  |  14  ----------------------------<  76  4.6   |  29  |  1.00    Ca    8.5      27 Jun 2024 07:52  Mg     1.7     06-26    TPro  6.0  /  Alb  2.4  /  TBili  0.2  /  DBili  x   /  AST  19  /  ALT  14  /  AlkPhos  53  06-27          PT/INR - ( 24 Jun 2024 15:47 )   PT: 16.6 sec;   INR: 1.43 ratio         PTT - ( 24 Jun 2024 15:47 )  PTT:31.6 sec                            Urinalysis Basic - ( 27 Jun 2024 07:52 )    Color: x / Appearance: x / SG: x / pH: x  Gluc: 76 mg/dL / Ketone: x  / Bili: x / Urobili: x   Blood: x / Protein: x / Nitrite: x   Leuk Esterase: x / RBC: x / WBC x   Sq Epi: x / Non Sq Epi: x / Bacteria: x                RADIOLOGY & ADDITIONAL TESTS: Personally reviewed.     Consultant(s) Notes Reviewed:  [x] YES  [ ] NO   Discussed with JANAY/MARION, RN

## 2024-06-27 NOTE — PROGRESS NOTE ADULT - ASSESSMENT
89 yo female w/ hypertension, paroxysmal afib on Eliquis, cardiomyopathy, CAD, Hx of cardiac stents, Presents to the ER for abnormal labs.  Patient was noted to have low hemoglobin on outside labs.  Patient also has been having frequent episodes of A-fib with rapid heart rate with hypotension. Recommended to come to the ER for further evaluation by PMD.  Patient with dyspnea on exertion otherwise no chest pain, shortness of breath, headache, dizziness  no dark stools, no hematuria, per PMD- dr awan who spoke with dr. sean sprague- reported that pt s/p upper endoscopy for cystic pancreatic mass,  but nothing ws diagnosed, also pt has been in and out of rapid afib has been seen by [facility] and was placed on digoxin and reccs for possible cardioversion. Recent admission for syncope 1 month back.     #failure to thrive   - poor appetite weight loss  -per PMD- dr awan who spoke with dr. sean sprague- reported that pt s/p upper endoscopy for cystic pancreatic mass, but nothing was dx  -pt has been loosing weight inspite of eating normally, lost 20 lbs in last 6 months.   -monitor hb, renal function   - iv fluids  -avoid nephrotoxins    #microcytic anemia  - hgb 9.4 on admission  - hgb 9.8 today  - no change in baseline.  - recent EUS ? secondary to slow blood loss from upper GI source vs nutritional sec to restricted oral intake from difficulty swallowing  -  will start on iron supplementation    #dysphagia  - speech and swallow evaluation  - recent esophageal dilatation done in april   -follows with GI Dr. Escudero at [place]  - GI recommendations appreciated- no plan for repeat endoscopy    #chronic Afib  - EKG- AFIB- 83 currently rate controlled  - cardio recommendations appreciated  - continue eliquis  - hold lopressor since pt has been bradycardic  - digoxin level 4.1  - hold digoxin  - repeat digoxin level at 12pm    #History of HTN, CAD s/p stents  -c/w statin, farxiga, amlodipine  - hold beta blocker  - discontinued aspirin and plavix    #pancreatic cyst  -CT-(05.26.24 @ 21:19) >2.4 cm complex lesion in the pancreatic tail with imaging features consistent with serous cystadenoma   - follow up CT abd/pelvis    #Right subclavian artery with severe atherosclerosis   #Uneven BP's.   - per prior chart-  Left side BP higher than right side BP  - CTA and carotid duplex noted  - Vascular surgery consulted, no acute vascular intervention required, continue Eliquis and Plavix  - f/u outpatient with vascular surgery     #DVT prophylaxis   - Eliquis    Dispo: 24hrs after CT scan      Case discussed with Dr. Adair 87 yo female w/ hypertension, paroxysmal afib on Eliquis, cardiomyopathy, CAD, Hx of cardiac stents, Presents to the ER for abnormal labs.  Patient was noted to have low hemoglobin on outside labs.  Patient also has been having frequent episodes of A-fib with rapid heart rate with hypotension. Recommended to come to the ER for further evaluation by PMD.  Patient with dyspnea on exertion otherwise no chest pain, shortness of breath, headache, dizziness  no dark stools, no hematuria, per PMD- dr awan who spoke with dr. sean sprague- reported that pt s/p upper endoscopy for cystic pancreatic mass,  but nothing ws diagnosed, also pt has been in and out of rapid afib has been seen by [facility] and was placed on digoxin and reccs for possible cardioversion. Recent admission for syncope 1 month back.     #failure to thrive   - poor appetite weight loss  -per PMD- dr awan who spoke with dr. sean sprague- reported that pt s/p upper endoscopy for cystic pancreatic mass, but nothing was dx  -pt has been loosing weight inspite of eating normally, lost 20 lbs in last 6 months.   -monitor hb, renal function   - iv fluids  -avoid nephrotoxins    #microcytic anemia  - hgb 9.4 on admission  - hgb 9.6 today  - no change in baseline.  - recent EUS ? secondary to slow blood loss from upper GI source vs nutritional sec to restricted oral intake from difficulty swallowing  -  c/w iron supplementation    #dysphagia  - speech and swallow - rec minced adn moist  - recent esophageal dilatation done in april   -follows with GI Dr. Escudero at [place]  - GI recommendations appreciated- no plan for repeat endoscopy    #chronic Afib  - EKG- AFIB- 83 currently rate controlled  - cardio recommendations appreciated  - continue eliquis  - hold lopressor since pt has been bradycardic  - digoxin level   - hold digoxin 2.8  - repeat digoxin level at in AM    #History of HTN, CAD s/p stents  -c/w statin, farxiga, amlodipine  - hold beta blocker  - discontinued aspirin and plavix    #pancreatic cyst  -CT-(05.26.24 @ 21:19) >2.4 cm complex lesion in the pancreatic tail with imaging features consistent with serous cystadenoma   - CT abd/pelvis 6/26: Stable well-circumscribed cystic pancreatic tail mass. Cardiomegaly with small pericardial effusion.      #Right subclavian artery with severe atherosclerosis   #Uneven BP's.   - per prior chart-  Left side BP higher than right side BP  - CTA and carotid duplex noted  - Vascular surgery consulted, no acute vascular intervention required, continue Eliquis and Plavix  - f/u outpatient with vascular surgery     #DVT prophylaxis   - Eliquis    Dispo: possible tomorrow after digoxin level repeat, follow up cardio recommendations      Case discussed with Dr. Nguyen 89 yo female w/ hypertension, paroxysmal afib on Eliquis, cardiomyopathy, CAD, Hx of cardiac stents, Presents to the ER for abnormal labs.  Patient was noted to have low hemoglobin on outside labs.  Patient also has been having frequent episodes of A-fib with rapid heart rate with hypotension. Recommended to come to the ER for further evaluation by PMD.  Patient with dyspnea on exertion otherwise no chest pain, shortness of breath, headache, dizziness  no dark stools, no hematuria, per PMD- dr awan who spoke with dr. sean sprague- reported that pt s/p upper endoscopy for cystic pancreatic mass,  but nothing ws diagnosed, also pt has been in and out of rapid afib has been seen by [facility] and was placed on digoxin and reccs for possible cardioversion. Recent admission for syncope 1 month back.     #failure to thrive   - poor appetite weight loss  -per PMD- dr awan who spoke with dr. sean sprague- reported that pt s/p upper endoscopy for cystic pancreatic mass, but nothing was dx  -pt has been loosing weight inspite of eating normally, lost 20 lbs in last 6 months.   -monitor hb, renal function   - iv fluids  -avoid nephrotoxins    #microcytic anemia  - hgb 9.4 on admission  - hgb 9.6 today  - no change in baseline.  - recent EUS ? secondary to slow blood loss from upper GI source vs nutritional sec to restricted oral intake from difficulty swallowing  -  c/w iron supplementation    #dysphagia  - speech and swallow - rec minced adn moist  - recent esophageal dilatation done in april   -follows with GI Dr. Escudero at [place]  - GI recommendations appreciated- no plan for repeat endoscopy    #chronic Afib  - EKG- AFIB- 83 currently rate controlled  - cardio recommendations appreciated  - continue eliquis  - hold lopressor since pt has been bradycardic  - digoxin level 2.8  - hold digoxin  - repeat digoxin level at in AM    #History of HTN, CAD s/p stents  -c/w statin, farxiga, amlodipine  - hold beta blocker  - discontinued aspirin and plavix    #pancreatic cyst  -CT-(05.26.24 @ 21:19) >2.4 cm complex lesion in the pancreatic tail with imaging features consistent with serous cystadenoma   - CT abd/pelvis 6/26: Stable well-circumscribed cystic pancreatic tail mass. Cardiomegaly with small pericardial effusion.      #Right subclavian artery with severe atherosclerosis   #Uneven BP's.   - per prior chart-  Left side BP higher than right side BP  - CTA and carotid duplex noted  - Vascular surgery consulted, no acute vascular intervention required, continue Eliquis and Plavix  - f/u outpatient with vascular surgery     #DVT prophylaxis   - Eliquis    Dispo: possible tomorrow after digoxin level repeat, follow up cardio recommendations      Case discussed with Dr. Nguyen

## 2024-06-27 NOTE — PROGRESS NOTE ADULT - SUBJECTIVE AND OBJECTIVE BOX
Chief Complaint:  Patient is a 88y old  Female who presents with a chief complaint of Anemia   Patient seen and examined at bedside. no event over night. She had Bowel movement yesterday, tolerating soft diet.   MEDICATIONS:   MEDICATIONS  (STANDING):  amLODIPine   Tablet 2.5 milliGRAM(s) Oral daily  apixaban 2.5 milliGRAM(s) Oral every 12 hours  ascorbic acid 500 milliGRAM(s) Oral daily  atorvastatin 40 milliGRAM(s) Oral at bedtime  dapagliflozin 10 milliGRAM(s) Oral every 24 hours  ferrous    sulfate 325 milliGRAM(s) Oral daily  multivitamin 1 Tablet(s) Oral daily  pantoprazole   Suspension 40 milliGRAM(s) Oral daily  polyethylene glycol 3350 17 Gram(s) Oral daily  senna 2 Tablet(s) Oral at bedtime    MEDICATIONS  (PRN):  acetaminophen     Tablet .. 650 milliGRAM(s) Oral every 6 hours PRN Temp greater or equal to 38C (100.4F), Mild Pain (1 - 3)  aluminum hydroxide/magnesium hydroxide/simethicone Suspension 30 milliLiter(s) Oral every 4 hours PRN Dyspepsia  melatonin 3 milliGRAM(s) Oral at bedtime PRN Insomnia  ondansetron Injectable 4 milliGRAM(s) IV Push every 8 hours PRN Nausea and/or Vomiting            DIET:  Diet, Minced and Moist:   DASH/TLC Sodium & Cholesterol Restricted  Supplement Feeding Modality:  Oral  Ensure Plus High Protein Cans or Servings Per Day:  1       Frequency:  Two Times a day (06-26-24 @ 14:08) [Active]          ALLERGIES:   Allergies    No Known Allergies    Intolerances        VITAL SIGNS:   Vital Signs Last 24 Hrs  T(C): 36.4 (27 Jun 2024 05:40), Max: 36.4 (26 Jun 2024 20:00)  T(F): 97.5 (27 Jun 2024 05:40), Max: 97.5 (26 Jun 2024 20:00)  HR: 75 (27 Jun 2024 05:40) (65 - 75)  BP: 105/66 (27 Jun 2024 05:40) (105/66 - 168/53)  BP(mean): --  RR: 17 (27 Jun 2024 05:40) (16 - 17)  SpO2: 97% (27 Jun 2024 05:40) (97% - 97%)    Parameters below as of 27 Jun 2024 05:40  Patient On (Oxygen Delivery Method): room air      I&O's Summary    26 Jun 2024 07:01  -  27 Jun 2024 07:00  --------------------------------------------------------  IN: 0 mL / OUT: 400 mL / NET: -400 mL        PHYSICAL EXAM:   GENERAL:  No acute distress  HEENT:  NC/AT, conjunctiva clear, sclera anicteric  CHEST:  No increased effort  HEART:  Regular rate  ABDOMEN:  Soft, non-tender, non-distended, positive bowel sounds  EXTREMITIES: No edema  SKIN:  Warm, dry  NEURO:  Calm, cooperative    LABS:                        9.6    3.27  )-----------( 285      ( 27 Jun 2024 07:52 )             33.3     Hemoglobin: 9.6 g/dL (06-27-24 @ 07:52)  Hemoglobin: 9.8 g/dL (06-26-24 @ 06:29)  Hemoglobin: 10.3 g/dL (06-25-24 @ 06:55)  Hemoglobin: 9.4 g/dL (06-24-24 @ 15:47)    06-27    136  |  101  |  14  ----------------------------<  76  4.6   |  29  |  1.00    Ca    8.5      27 Jun 2024 07:52  Mg     1.7     06-26    TPro  6.0  /  Alb  2.4<L>  /  TBili  0.2  /  DBili  x   /  AST  19  /  ALT  14  /  AlkPhos  53  06-27    LIVER FUNCTIONS - ( 27 Jun 2024 07:52 )  Alb: 2.4 g/dL / Pro: 6.0 g/dL / ALK PHOS: 53 U/L / ALT: 14 U/L / AST: 19 U/L / GGT: x               RADIOLOGY & ADDITIONAL STUDIES:      ACC: [id number] EXAM:  CT ABDOMEN AND PELVIS IC   ORDERED BY: RADHA BETANCOURT     PROCEDURE DATE:  06/26/2024          INTERPRETATION:  CLINICAL INFORMATION: Evaluate pancreatic cyst.    COMPARISON: CT 5/26/2024, MRI 1/13/2024    CONTRAST/COMPLICATIONS:  IV Contrast: Omnipaque 350  90 cc administered   10 cc discarded  Oral Contrast: NONE  Complications: None reported at time of study completion    PROCEDURE:  CT of the Abdomen and Pelvis was performed.  Sagittal and coronal reformats were performed.    FINDINGS:  LOWER CHEST: Partially visualized cardiomegaly. Small pericardial   effusion.    LIVER: Within normal limits.  BILE DUCTS: Normal caliber.  GALLBLADDER: Distended. No radiopaque gallstones.  SPLEEN: Within normal limits.  PANCREAS: Well circumscribed hypoenhancing and hypoattenuating cystic   mass in the pancreatic tail measures 2.2 x 1.8 cm, stable in size   compared with prior studies and better characterized on prior MRI   1/13/2024. No pancreatic ductal dilatation.  ADRENALS: Within normal limits.  KIDNEYS/URETERS: Normal renal cortical scarring, left greater than right,   with left greater than right renal cortical atrophy. Scattered cysts. No   hydronephrosis.    BLADDER: Within normal limits.  REPRODUCTIVE ORGANS: Hysterectomy.    BOWEL: No bowel obstruction. Large stool burden throughout the colon.  PERITONEUM/RETROPERITONEUM: Within normal limits.  VESSELS: Normal caliber abdominal aorta with severe atherosclerotic   changes throughout the aorta and its major branches.  LYMPH NODES: No lymphadenopathy.  ABDOMINAL WALL: Within normal limits.  BONES: Degenerative changes.    IMPRESSION:  Stable well-circumscribed cystic pancreatic tail mass, better   characterized on prior MRI.    Cardiomegaly with small pericardial effusion.        --- End of Report ---            IVA CRUZ DO; Attending Radiologist  This document has been electronically signed. Jun 26 2024  4:20PM  06-26-24 @ 15:10       GI follow up    Patient seen and examined at bedside. No events over night. She had a bowel movement yesterday and is tolerating minced moist diet.  Denies complaints. Denies blood per rectum, melena.      MEDICATIONS:   MEDICATIONS  (STANDING):  amLODIPine   Tablet 2.5 milliGRAM(s) Oral daily  apixaban 2.5 milliGRAM(s) Oral every 12 hours  ascorbic acid 500 milliGRAM(s) Oral daily  atorvastatin 40 milliGRAM(s) Oral at bedtime  dapagliflozin 10 milliGRAM(s) Oral every 24 hours  ferrous    sulfate 325 milliGRAM(s) Oral daily  multivitamin 1 Tablet(s) Oral daily  pantoprazole   Suspension 40 milliGRAM(s) Oral daily  polyethylene glycol 3350 17 Gram(s) Oral daily  senna 2 Tablet(s) Oral at bedtime    MEDICATIONS  (PRN):  acetaminophen     Tablet .. 650 milliGRAM(s) Oral every 6 hours PRN Temp greater or equal to 38C (100.4F), Mild Pain (1 - 3)  aluminum hydroxide/magnesium hydroxide/simethicone Suspension 30 milliLiter(s) Oral every 4 hours PRN Dyspepsia  melatonin 3 milliGRAM(s) Oral at bedtime PRN Insomnia  ondansetron Injectable 4 milliGRAM(s) IV Push every 8 hours PRN Nausea and/or Vomiting            DIET:  Diet, Minced and Moist:   DASH/TLC Sodium & Cholesterol Restricted  Supplement Feeding Modality:  Oral  Ensure Plus High Protein Cans or Servings Per Day:  1       Frequency:  Two Times a day (06-26-24 @ 14:08) [Active]          VITAL SIGNS:   Vital Signs Last 24 Hrs  T(C): 36.4 (27 Jun 2024 05:40), Max: 36.4 (26 Jun 2024 20:00)  T(F): 97.5 (27 Jun 2024 05:40), Max: 97.5 (26 Jun 2024 20:00)  HR: 75 (27 Jun 2024 05:40) (65 - 75)  BP: 105/66 (27 Jun 2024 05:40) (105/66 - 168/53)  BP(mean): --  RR: 17 (27 Jun 2024 05:40) (16 - 17)  SpO2: 97% (27 Jun 2024 05:40) (97% - 97%)    Parameters below as of 27 Jun 2024 05:40  Patient On (Oxygen Delivery Method): room air      I&O's Summary    26 Jun 2024 07:01  -  27 Jun 2024 07:00  --------------------------------------------------------  IN: 0 mL / OUT: 400 mL / NET: -400 mL        PHYSICAL EXAM:   GENERAL:  Elderly woman in no acute distress  HEENT:  NC/AT, conjunctiva clear, sclerae anicteric  CHEST:  No increased effort  HEART:  Regular rate  ABDOMEN:  Soft, non-tender, non-distended, +bowel sounds  EXTREMITIES: No edema  SKIN:  Warm, dry. No jaundice or rash  NEURO:  Calm, cooperative          LABS:                        9.6    3.27  )-----------( 285      ( 27 Jun 2024 07:52 )             33.3     Hemoglobin: 9.6 g/dL (06-27-24 @ 07:52)  Hemoglobin: 9.8 g/dL (06-26-24 @ 06:29)  Hemoglobin: 10.3 g/dL (06-25-24 @ 06:55)  Hemoglobin: 9.4 g/dL (06-24-24 @ 15:47)      06-27    136  |  101  |  14  ----------------------------<  76  4.6   |  29  |  1.00    Ca    8.5      27 Jun 2024 07:52  Mg     1.7     06-26    TPro  6.0  /  Alb  2.4<L>  /  TBili  0.2  /  DBili  x   /  AST  19  /  ALT  14  /  AlkPhos  53  06-27    LIVER FUNCTIONS - ( 27 Jun 2024 07:52 )  Alb: 2.4 g/dL / Pro: 6.0 g/dL / ALK PHOS: 53 U/L / ALT: 14 U/L / AST: 19 U/L / GGT: x               RADIOLOGY & ADDITIONAL STUDIES:      ACC: [id number] EXAM:  CT ABDOMEN AND PELVIS IC   ORDERED BY: RADHA BETANCOURT     PROCEDURE DATE:  06/26/2024          INTERPRETATION:  CLINICAL INFORMATION: Evaluate pancreatic cyst.    COMPARISON: CT 5/26/2024, MRI 1/13/2024    CONTRAST/COMPLICATIONS:  IV Contrast: Omnipaque 350  90 cc administered   10 cc discarded  Oral Contrast: NONE  Complications: None reported at time of study completion    PROCEDURE:  CT of the Abdomen and Pelvis was performed.  Sagittal and coronal reformats were performed.    FINDINGS:  LOWER CHEST: Partially visualized cardiomegaly. Small pericardial   effusion.    LIVER: Within normal limits.  BILE DUCTS: Normal caliber.  GALLBLADDER: Distended. No radiopaque gallstones.  SPLEEN: Within normal limits.  PANCREAS: Well circumscribed hypoenhancing and hypoattenuating cystic   mass in the pancreatic tail measures 2.2 x 1.8 cm, stable in size   compared with prior studies and better characterized on prior MRI   1/13/2024. No pancreatic ductal dilatation.  ADRENALS: Within normal limits.  KIDNEYS/URETERS: Normal renal cortical scarring, left greater than right,   with left greater than right renal cortical atrophy. Scattered cysts. No   hydronephrosis.    BLADDER: Within normal limits.  REPRODUCTIVE ORGANS: Hysterectomy.    BOWEL: No bowel obstruction. Large stool burden throughout the colon.  PERITONEUM/RETROPERITONEUM: Within normal limits.  VESSELS: Normal caliber abdominal aorta with severe atherosclerotic   changes throughout the aorta and its major branches.  LYMPH NODES: No lymphadenopathy.  ABDOMINAL WALL: Within normal limits.  BONES: Degenerative changes.    IMPRESSION:  Stable well-circumscribed cystic pancreatic tail mass, better   characterized on prior MRI.    Cardiomegaly with small pericardial effusion.        --- End of Report ---            IVA CRUZ DO; Attending Radiologist  This document has been electronically signed. Jun 26 2024  4:20PM  06-26-24 @ 15:10

## 2024-06-27 NOTE — PROGRESS NOTE ADULT - PROBLEM SELECTOR PLAN 1
Anemia unlikely of upper GI source (erosive gastritis, PUD, AVM/ angiectasia, malignancy).  Last EUS and Esophageal dilation done on 4/3/24, at [facility] by Dr Escudero     Keep active T&S  Monitor CBC   Transfuse if Hb Less than 7  Monitor Bms  No GI intervention at this time unless patient conditions changes Anemia unlikely from upper GI source.  EUS and esophageal dilation done on 4/3/2024, at [facility] by Dr Paris Escudero   Hb currently stable   On Plavix for A fib   Multiple EGD& EUS done from December 2023, results in [place]. Last EUS and Esophageal dilation done on 4/3/24, at [facility] by Dr Escudero    Keep active T&S  Monitor CBC   Transfuse if Hb Less than 7  Monitor BMs  No GI intervention at this time unless patient condition changes

## 2024-06-27 NOTE — PROGRESS NOTE ADULT - ATTENDING COMMENTS
agree with above  dig level still elevated  monitor HR on tele  still holding metoprolol  f/u am labs

## 2024-06-27 NOTE — PROGRESS NOTE ADULT - ASSESSMENT
89 yo female w/ hypertension, paroxysmal afib on Eliquis, cardiomyopathy, CAD, Hx of cardiac stents.    Patient denies any SOB, or diziness  No events overnight  Hbg stable   GI consultation for anemia. S  Plavix for A Fib   Multiple EGD , & EUS done from December 2023 result in [place].  Last EUS and Esophageal dilation done on 4/3/24, at [facility] by Dr Escudero         87 yo female w/ hypertension, paroxysmal afib on Eliquis, cardiomyopathy, CAD, Hx of cardiac stents, with chronic anemia, pancreatic cystic lesion.

## 2024-06-27 NOTE — PROGRESS NOTE ADULT - NS ATTEND AMEND GEN_ALL_CORE FT
Patient seen and examined at the bedside. Agree with the assessment and plan of ARASELI Pichardo.  No active GI issues. Outpatient follow up after discharge, as advised above.  GI will sign off. Please reconsult as needed.

## 2024-06-28 LAB
ALBUMIN SERPL ELPH-MCNC: 2.3 G/DL — LOW (ref 3.3–5)
ALP SERPL-CCNC: 53 U/L — SIGNIFICANT CHANGE UP (ref 40–120)
ALT FLD-CCNC: 13 U/L — SIGNIFICANT CHANGE UP (ref 10–45)
ANION GAP SERPL CALC-SCNC: 3 MMOL/L — LOW (ref 5–17)
AST SERPL-CCNC: 16 U/L — SIGNIFICANT CHANGE UP (ref 10–40)
BILIRUB SERPL-MCNC: 0.2 MG/DL — SIGNIFICANT CHANGE UP (ref 0.2–1.2)
BUN SERPL-MCNC: 21 MG/DL — SIGNIFICANT CHANGE UP (ref 7–23)
CALCIUM SERPL-MCNC: 8.5 MG/DL — SIGNIFICANT CHANGE UP (ref 8.4–10.5)
CHLORIDE SERPL-SCNC: 103 MMOL/L — SIGNIFICANT CHANGE UP (ref 96–108)
CO2 SERPL-SCNC: 33 MMOL/L — HIGH (ref 22–31)
CREAT SERPL-MCNC: 1.3 MG/DL — SIGNIFICANT CHANGE UP (ref 0.5–1.3)
DIGOXIN SERPL-MCNC: 2 NG/ML — SIGNIFICANT CHANGE UP (ref 0.8–2)
EGFR: 40 ML/MIN/1.73M2 — LOW
GLUCOSE SERPL-MCNC: 102 MG/DL — HIGH (ref 70–99)
HCT VFR BLD CALC: 30 % — LOW (ref 34.5–45)
HGB BLD-MCNC: 8.7 G/DL — LOW (ref 11.5–15.5)
MAGNESIUM SERPL-MCNC: 1.7 MG/DL — SIGNIFICANT CHANGE UP (ref 1.6–2.6)
MCHC RBC-ENTMCNC: 22 PG — LOW (ref 27–34)
MCHC RBC-ENTMCNC: 29 GM/DL — LOW (ref 32–36)
MCV RBC AUTO: 75.9 FL — LOW (ref 80–100)
NRBC # BLD: 0 /100 WBCS — SIGNIFICANT CHANGE UP (ref 0–0)
PLATELET # BLD AUTO: 284 K/UL — SIGNIFICANT CHANGE UP (ref 150–400)
POTASSIUM SERPL-MCNC: 5 MMOL/L — SIGNIFICANT CHANGE UP (ref 3.5–5.3)
POTASSIUM SERPL-SCNC: 5 MMOL/L — SIGNIFICANT CHANGE UP (ref 3.5–5.3)
PROT SERPL-MCNC: 5.6 G/DL — LOW (ref 6–8.3)
RBC # BLD: 3.95 M/UL — SIGNIFICANT CHANGE UP (ref 3.8–5.2)
RBC # FLD: 16 % — HIGH (ref 10.3–14.5)
SODIUM SERPL-SCNC: 139 MMOL/L — SIGNIFICANT CHANGE UP (ref 135–145)
WBC # BLD: 4.54 K/UL — SIGNIFICANT CHANGE UP (ref 3.8–10.5)
WBC # FLD AUTO: 4.54 K/UL — SIGNIFICANT CHANGE UP (ref 3.8–10.5)

## 2024-06-28 PROCEDURE — 99232 SBSQ HOSP IP/OBS MODERATE 35: CPT

## 2024-06-28 RX ORDER — METOPROLOL TARTRATE 50 MG
25 TABLET ORAL ONCE
Refills: 0 | Status: COMPLETED | OUTPATIENT
Start: 2024-06-28 | End: 2024-06-28

## 2024-06-28 RX ADMIN — AMLODIPINE BESYLATE 2.5 MILLIGRAM(S): 2.5 TABLET ORAL at 06:16

## 2024-06-28 RX ADMIN — APIXABAN 2.5 MILLIGRAM(S): 5 TABLET, FILM COATED ORAL at 17:57

## 2024-06-28 RX ADMIN — Medication 1 TABLET(S): at 12:26

## 2024-06-28 RX ADMIN — POLYETHYLENE GLYCOL 3350 17 GRAM(S): 1 POWDER ORAL at 12:26

## 2024-06-28 RX ADMIN — PANTOPRAZOLE SODIUM 40 MILLIGRAM(S): 40 INJECTION, POWDER, FOR SOLUTION INTRAVENOUS at 12:26

## 2024-06-28 RX ADMIN — APIXABAN 2.5 MILLIGRAM(S): 5 TABLET, FILM COATED ORAL at 06:16

## 2024-06-28 RX ADMIN — Medication 325 MILLIGRAM(S): at 12:26

## 2024-06-28 RX ADMIN — Medication 2 TABLET(S): at 21:24

## 2024-06-28 RX ADMIN — Medication 500 MILLIGRAM(S): at 12:26

## 2024-06-28 RX ADMIN — Medication 25 MILLIGRAM(S): at 09:51

## 2024-06-28 RX ADMIN — DAPAGLIFLOZIN 10 MILLIGRAM(S): 10 TABLET, FILM COATED ORAL at 06:16

## 2024-06-28 RX ADMIN — ATORVASTATIN CALCIUM 40 MILLIGRAM(S): 20 TABLET, FILM COATED ORAL at 21:23

## 2024-06-28 NOTE — PROGRESS NOTE ADULT - ASSESSMENT
87 yo female w/ hypertension, paroxysmal afib on Eliquis, cardiomyopathy, CAD, Hx of cardiac stents, Presents to the ER for abnormal labs.  Patient was noted to have low hemoglobin on outside labs.  Patient also has been having frequent episodes of A-fib with rapid heart rate with hypotension. Recommended to come to the ER for further evaluation by PMD.  Patient with dyspnea on exertion otherwise no chest pain, shortness of breath, headache, dizziness  no dark stools, no hematuria, per PMD- dr awan who spoke with dr. sean sprague- reported that pt s/p upper endoscopy for cystic pancreatic mass,  but nothing ws diagnosed, also pt has been in and out of rapid afib has been seen by [facility] and was placed on digoxin and reccs for possible cardioversion. Recent admission for syncope 1 month back.     #failure to thrive   - poor appetite weight loss  -per PMD- dr awan who spoke with dr. sean sprague- reported that pt s/p upper endoscopy for cystic pancreatic mass, but nothing was dx  -pt has been loosing weight inspite of eating normally, lost 20 lbs in last 6 months.   - iv fluids  -avoid nephrotoxins    #microcytic anemia  - hgb 9.4 on admission  - hgb 8.7 today  - no change in baseline.  - recent EUS ? secondary to slow blood loss from upper GI source vs nutritional sec to restricted oral intake from difficulty swallowing  -  c/w iron supplementation    #dysphagia  - speech and swallow - rec minced and moist  - recent esophageal dilatation done in april   -follows with GI Dr. Escudero at [place]  - GI recommendations appreciated- no plan for repeat endoscopy    #chronic Afib  - EKG- AFIB- 83 currently rate controlled  - cardio recommendations appreciated  - continue eliquis  - resume metoprolol succinate ER 25mg qd  - digoxin level 2.8> 2.0  - hold digoxin  - repeat digoxin level at in AM    #History of CHF, HTN, CAD s/p stents  -c/w statin, farxiga, amlodipine, beta blocker  - discontinued aspirin and plavix  - TTE 5/24: LVEF 60-65%, mild mod TR    #pancreatic cyst  -CT-(05.26.24 @ 21:19) >2.4 cm complex lesion in the pancreatic tail with imaging features consistent with serous cystadenoma   - CT abd/pelvis 6/26: Stable well-circumscribed cystic pancreatic tail mass. Cardiomegaly with small pericardial effusion.      #Right subclavian artery with severe atherosclerosis   #Uneven BP's.   - per prior chart-  Left side BP higher than right side BP  - CTA and carotid duplex noted  - Vascular surgery consulted, no acute vascular intervention required, continue Eliquis and Plavix  - f/u outpatient with vascular surgery     #DVT prophylaxis   - Eliquis        Case discussed with Dr. Nguyen 87 yo female w/ hypertension, paroxysmal afib on Eliquis, cardiomyopathy, CAD, Hx of cardiac stents, Presents to the ER for abnormal labs.  Patient was noted to have low hemoglobin on outside labs.  Patient also has been having frequent episodes of A-fib with rapid heart rate with hypotension. Recommended to come to the ER for further evaluation by PMD.  Patient with dyspnea on exertion otherwise no chest pain, shortness of breath, headache, dizziness  no dark stools, no hematuria, per PMD- dr awan who spoke with dr. sean sprague- reported that pt s/p upper endoscopy for cystic pancreatic mass,  but nothing ws diagnosed, also pt has been in and out of rapid afib has been seen by [facility] and was placed on digoxin and reccs for possible cardioversion. Recent admission for syncope 1 month back.     #failure to thrive   - poor appetite weight loss  -per PMD- dr awan who spoke with dr. sean sprague- reported that pt s/p upper endoscopy for cystic pancreatic mass, but nothing was dx  -pt has been loosing weight inspite of eating normally, lost 20 lbs in last 6 months.   - iv fluids  -avoid nephrotoxins    #microcytic anemia  - hgb 9.4 on admission  - hgb 8.7 today  - no change in baseline.  - recent EUS ? secondary to slow blood loss from upper GI source vs nutritional sec to restricted oral intake from difficulty swallowing  -  c/w iron supplementation    #dysphagia  - speech and swallow - rec minced and moist  - recent esophageal dilatation done in april   -follows with GI Dr. Escudero at [place]  - GI recommendations appreciated- no plan for repeat endoscopy    #chronic Afib  - EKG- AFIB- 83 currently rate controlled  - cardio recommendations appreciated  - continue eliquis  - resume metoprolol succinate ER 25mg qd  - digoxin level 2.8> 2.0  - hold digoxin  - repeat digoxin level at in AM    #History of CHF, HTN, CAD s/p stents  -c/w statin, farxiga, amlodipine, beta blocker  - discontinued aspirin and plavix  - TTE 5/24: LVEF 60-65%, mild mod TR    #pancreatic cyst  -CT-(05.26.24 @ 21:19) >2.4 cm complex lesion in the pancreatic tail with imaging features consistent with serous cystadenoma   - CT abd/pelvis 6/26: Stable well-circumscribed cystic pancreatic tail mass. Cardiomegaly with small pericardial effusion.      #Right subclavian artery with severe atherosclerosis   #Uneven BP's.   - per prior chart-  Left side BP higher than right side BP  - CTA and carotid duplex noted  - Vascular surgery consulted, no acute vascular intervention required, continue Eliquis and Plavix  - f/u outpatient with vascular surgery     #DVT prophylaxis   - Eliquis    Dispo: pending PT    Case discussed with Dr. Nguyen

## 2024-06-28 NOTE — PROGRESS NOTE ADULT - SUBJECTIVE AND OBJECTIVE BOX
Patient is a 88y old  Female who presents with a chief complaint of anemia, afib (27 Jun 2024 12:30)      INTERVAL HPI/OVERNIGHT EVENTS: Patient seen and examined at bedside. No overnight events.    MEDICATIONS  (STANDING):  amLODIPine   Tablet 2.5 milliGRAM(s) Oral daily  apixaban 2.5 milliGRAM(s) Oral every 12 hours  ascorbic acid 500 milliGRAM(s) Oral daily  atorvastatin 40 milliGRAM(s) Oral at bedtime  dapagliflozin 10 milliGRAM(s) Oral every 24 hours  ferrous    sulfate 325 milliGRAM(s) Oral daily  metoprolol succinate ER 25 milliGRAM(s) Oral daily  multivitamin 1 Tablet(s) Oral daily  pantoprazole   Suspension 40 milliGRAM(s) Oral daily  polyethylene glycol 3350 17 Gram(s) Oral daily  senna 2 Tablet(s) Oral at bedtime    MEDICATIONS  (PRN):  acetaminophen     Tablet .. 650 milliGRAM(s) Oral every 6 hours PRN Temp greater or equal to 38C (100.4F), Mild Pain (1 - 3)  aluminum hydroxide/magnesium hydroxide/simethicone Suspension 30 milliLiter(s) Oral every 4 hours PRN Dyspepsia  melatonin 3 milliGRAM(s) Oral at bedtime PRN Insomnia  ondansetron Injectable 4 milliGRAM(s) IV Push every 8 hours PRN Nausea and/or Vomiting      Allergies    No Known Allergies    Intolerances        REVIEW OF SYSTEMS:  CONSTITUTIONAL: No fever or chills  HEENT:  No headache, no sore throat  RESPIRATORY: No cough, wheezing, or shortness of breath  CARDIOVASCULAR: No chest pain, palpitations  GASTROINTESTINAL: No abd pain, nausea, vomiting, or diarrhea  GENITOURINARY: No dysuria, frequency, or hematuria  NEUROLOGICAL: no focal weakness or dizziness  MUSCULOSKELETAL: no myalgias     Vital Signs Last 24 Hrs  T(C): 36.6 (28 Jun 2024 05:53), Max: 36.6 (28 Jun 2024 05:53)  T(F): 97.8 (28 Jun 2024 05:53), Max: 97.8 (28 Jun 2024 05:53)  HR: 62 (28 Jun 2024 05:53) (62 - 71)  BP: 159/65 (28 Jun 2024 05:53) (159/65 - 165/55)  BP(mean): --  RR: 19 (28 Jun 2024 05:53) (17 - 19)  SpO2: 96% (28 Jun 2024 05:53) (95% - 96%)    Parameters below as of 28 Jun 2024 05:53  Patient On (Oxygen Delivery Method): room air      I&O's Summary    27 Jun 2024 07:01  -  28 Jun 2024 07:00  --------------------------------------------------------  IN: 0 mL / OUT: 300 mL / NET: -300 mL      BMI (kg/m2): 20.7 (06-24-24 @ 20:30)    PHYSICAL EXAM:  GENERAL: NAD, frail/thin  HEENT:  AT/NC, anicteric, moist mucous membranes, EOMI, PERRL, no lid-lag, conjunctiva and sclera clear  CHEST/LUNG:  CTA b/l, no rales, wheezes, or rhonchi,  normal respiratory effort, no intercostal retractions  HEART:  RRR, S1, S2, no murmurs; no pitting edema  ABDOMEN:  BS+, soft, nontender, nondistended; No HSM  MSK/EXTREMITIES: 2+ peripheral pulses, no clubbing or cyanosis  NERVOUS SYSTEM: answers questions and follows commands appropriately, A&Ox3 grossly moves all extremities   PSYCH: Appropriate affect, Alert & Awake; Good judgement        LABS: Personally reviewed  CBC                        8.7    4.54  )-----------( 284      ( 28 Jun 2024 07:00 )             30.0     CMP  06-28    139  |  103  |  21  ----------------------------<  102  5.0   |  33  |  1.30    Ca    8.5      28 Jun 2024 07:00  Mg     1.7     06-28    TPro  5.6  /  Alb  2.3  /  TBili  0.2  /  DBili  x   /  AST  16  /  ALT  13  /  AlkPhos  53  06-28                                      Urinalysis Basic - ( 28 Jun 2024 07:00 )    Color: x / Appearance: x / SG: x / pH: x  Gluc: 102 mg/dL / Ketone: x  / Bili: x / Urobili: x   Blood: x / Protein: x / Nitrite: x   Leuk Esterase: x / RBC: x / WBC x   Sq Epi: x / Non Sq Epi: x / Bacteria: x                RADIOLOGY & ADDITIONAL TESTS: Personally reviewed.     Consultant(s) Notes Reviewed:  [x] YES  [ ] NO   Discussed with JANAY/MARION, RN

## 2024-06-28 NOTE — PROGRESS NOTE ADULT - ATTENDING COMMENTS
agree with above  dig back to normal range  will no resume digoxin  metoprolol resumed  f/u PT eval for discharge planning, pt frail. may need MIKE.  d/w CM on IDR

## 2024-06-29 LAB
ALBUMIN SERPL ELPH-MCNC: 2.2 G/DL — LOW (ref 3.3–5)
ALP SERPL-CCNC: 50 U/L — SIGNIFICANT CHANGE UP (ref 40–120)
ALT FLD-CCNC: 13 U/L — SIGNIFICANT CHANGE UP (ref 10–45)
ANION GAP SERPL CALC-SCNC: 3 MMOL/L — LOW (ref 5–17)
AST SERPL-CCNC: 18 U/L — SIGNIFICANT CHANGE UP (ref 10–40)
BILIRUB SERPL-MCNC: 0.2 MG/DL — SIGNIFICANT CHANGE UP (ref 0.2–1.2)
BUN SERPL-MCNC: 25 MG/DL — HIGH (ref 7–23)
CALCIUM SERPL-MCNC: 8.5 MG/DL — SIGNIFICANT CHANGE UP (ref 8.4–10.5)
CHLORIDE SERPL-SCNC: 105 MMOL/L — SIGNIFICANT CHANGE UP (ref 96–108)
CO2 SERPL-SCNC: 35 MMOL/L — HIGH (ref 22–31)
CREAT SERPL-MCNC: 1.08 MG/DL — SIGNIFICANT CHANGE UP (ref 0.5–1.3)
DIGOXIN SERPL-MCNC: 1.5 NG/ML — SIGNIFICANT CHANGE UP (ref 0.8–2)
EGFR: 50 ML/MIN/1.73M2 — LOW
GLUCOSE SERPL-MCNC: 77 MG/DL — SIGNIFICANT CHANGE UP (ref 70–99)
HCT VFR BLD CALC: 27.7 % — LOW (ref 34.5–45)
HGB BLD-MCNC: 7.9 G/DL — LOW (ref 11.5–15.5)
MCHC RBC-ENTMCNC: 21.8 PG — LOW (ref 27–34)
MCHC RBC-ENTMCNC: 28.5 GM/DL — LOW (ref 32–36)
MCV RBC AUTO: 76.5 FL — LOW (ref 80–100)
NRBC # BLD: 0 /100 WBCS — SIGNIFICANT CHANGE UP (ref 0–0)
PLATELET # BLD AUTO: 250 K/UL — SIGNIFICANT CHANGE UP (ref 150–400)
POTASSIUM SERPL-MCNC: 4.3 MMOL/L — SIGNIFICANT CHANGE UP (ref 3.5–5.3)
POTASSIUM SERPL-SCNC: 4.3 MMOL/L — SIGNIFICANT CHANGE UP (ref 3.5–5.3)
PROT SERPL-MCNC: 5.6 G/DL — LOW (ref 6–8.3)
RBC # BLD: 3.62 M/UL — LOW (ref 3.8–5.2)
RBC # FLD: 16.3 % — HIGH (ref 10.3–14.5)
SODIUM SERPL-SCNC: 143 MMOL/L — SIGNIFICANT CHANGE UP (ref 135–145)
WBC # BLD: 4.73 K/UL — SIGNIFICANT CHANGE UP (ref 3.8–10.5)
WBC # FLD AUTO: 4.73 K/UL — SIGNIFICANT CHANGE UP (ref 3.8–10.5)

## 2024-06-29 PROCEDURE — 99232 SBSQ HOSP IP/OBS MODERATE 35: CPT

## 2024-06-29 PROCEDURE — 99231 SBSQ HOSP IP/OBS SF/LOW 25: CPT

## 2024-06-29 RX ORDER — CLOPIDOGREL BISULFATE 75 MG/1
75 TABLET, FILM COATED ORAL DAILY
Refills: 0 | Status: DISCONTINUED | OUTPATIENT
Start: 2024-06-29 | End: 2024-06-30

## 2024-06-29 RX ORDER — FERROUS SULFATE 325(65) MG
325 TABLET ORAL
Refills: 0 | Status: DISCONTINUED | OUTPATIENT
Start: 2024-06-29 | End: 2024-06-30

## 2024-06-29 RX ADMIN — AMLODIPINE BESYLATE 2.5 MILLIGRAM(S): 2.5 TABLET ORAL at 05:17

## 2024-06-29 RX ADMIN — ATORVASTATIN CALCIUM 40 MILLIGRAM(S): 20 TABLET, FILM COATED ORAL at 21:04

## 2024-06-29 RX ADMIN — APIXABAN 2.5 MILLIGRAM(S): 5 TABLET, FILM COATED ORAL at 05:17

## 2024-06-29 RX ADMIN — Medication 2 TABLET(S): at 21:04

## 2024-06-29 RX ADMIN — POLYETHYLENE GLYCOL 3350 17 GRAM(S): 1 POWDER ORAL at 12:41

## 2024-06-29 RX ADMIN — Medication 500 MILLIGRAM(S): at 12:40

## 2024-06-29 RX ADMIN — Medication 1 TABLET(S): at 12:40

## 2024-06-29 RX ADMIN — APIXABAN 2.5 MILLIGRAM(S): 5 TABLET, FILM COATED ORAL at 17:23

## 2024-06-29 RX ADMIN — DAPAGLIFLOZIN 10 MILLIGRAM(S): 10 TABLET, FILM COATED ORAL at 05:17

## 2024-06-29 RX ADMIN — Medication 325 MILLIGRAM(S): at 17:23

## 2024-06-29 RX ADMIN — Medication 325 MILLIGRAM(S): at 10:03

## 2024-06-29 RX ADMIN — Medication 25 MILLIGRAM(S): at 05:17

## 2024-06-29 RX ADMIN — PANTOPRAZOLE SODIUM 40 MILLIGRAM(S): 40 INJECTION, POWDER, FOR SOLUTION INTRAVENOUS at 12:41

## 2024-06-29 NOTE — PHYSICAL THERAPY INITIAL EVALUATION ADULT - NSACTIVITYREC_GEN_A_PT
Discussed trying an increase in gabapentin to 300 mg TID  Also referred to a new rheumatologist since she was unhappy with previous provider  Encouraged to continue with regular activity and exercise as tolerated  Follow-up in 3 months as scheduled with Dr Tiff Ness, but she should call if concerns in the interim  supervision with NITHIN

## 2024-06-29 NOTE — PHYSICAL THERAPY INITIAL EVALUATION ADULT - PERTINENT HX OF CURRENT PROBLEM, REHAB EVAL
89 yo female w/ hypertension, paroxysmal afib on Eliquis, cardiomyopathy, CAD, Hx of cardiac stents, Presents to the ER for abnormal labs.  Patient was noted to have low hemoglobin on outside labs.  Patient also has been having frequent episodes of A-fib with rapid heart rate with hypotension.  Recommended to come to the ER for further evaluation by PMD.  Patient with dyspnea on exertion otherwise no chest pain, shortness of breath, headache, dizziness  no dark stools, no hematuria  recent admission for syncopy 1 month back.

## 2024-06-29 NOTE — PROGRESS NOTE ADULT - SUBJECTIVE AND OBJECTIVE BOX
Patient is a 88y old  Female who presents with a chief complaint of anemia, afib (28 Jun 2024 13:30)      INTERVAL HPI/OVERNIGHT EVENTS: Patient seen and examined at bedside. No overnight events. Says that her back hurt from being kept in the chair all day     MEDICATIONS  (STANDING):  amLODIPine   Tablet 2.5 milliGRAM(s) Oral daily  apixaban 2.5 milliGRAM(s) Oral every 12 hours  ascorbic acid 500 milliGRAM(s) Oral daily  atorvastatin 40 milliGRAM(s) Oral at bedtime  dapagliflozin 10 milliGRAM(s) Oral every 24 hours  ferrous    sulfate 325 milliGRAM(s) Oral two times a day  metoprolol succinate ER 25 milliGRAM(s) Oral daily  multivitamin 1 Tablet(s) Oral daily  pantoprazole   Suspension 40 milliGRAM(s) Oral daily  polyethylene glycol 3350 17 Gram(s) Oral daily  senna 2 Tablet(s) Oral at bedtime    MEDICATIONS  (PRN):  acetaminophen     Tablet .. 650 milliGRAM(s) Oral every 6 hours PRN Temp greater or equal to 38C (100.4F), Mild Pain (1 - 3)  aluminum hydroxide/magnesium hydroxide/simethicone Suspension 30 milliLiter(s) Oral every 4 hours PRN Dyspepsia  melatonin 3 milliGRAM(s) Oral at bedtime PRN Insomnia  ondansetron Injectable 4 milliGRAM(s) IV Push every 8 hours PRN Nausea and/or Vomiting      Allergies    No Known Allergies    Intolerances      Vital Signs Last 24 Hrs  T(C): 36.5 (29 Jun 2024 05:01), Max: 36.5 (29 Jun 2024 05:01)  T(F): 97.7 (29 Jun 2024 05:01), Max: 97.7 (29 Jun 2024 05:01)  HR: 75 (29 Jun 2024 10:35) (60 - 78)  BP: 135/71 (29 Jun 2024 10:35) (127/49 - 143/56)  BP(mean): 85 (29 Jun 2024 05:01) (85 - 85)  RR: 19 (29 Jun 2024 05:01) (19 - 19)  SpO2: 99% (29 Jun 2024 05:01) (97% - 99%)    Parameters below as of 29 Jun 2024 05:01  Patient On (Oxygen Delivery Method): room air      I&O's Summary    28 Jun 2024 07:01  -  29 Jun 2024 07:00  --------------------------------------------------------  IN: 0 mL / OUT: 500 mL / NET: -500 mL      BMI (kg/m2): 20.7 (06-24-24 @ 20:30)    PHYSICAL EXAM:  GENERAL: NAD, frail/thin  HEENT:  AT/NC, anicteric, moist mucous membranes, EOMI, PERRL, no lid-lag, conjunctiva and sclera clear  CHEST/LUNG:  CTA b/l, no rales, wheezes, or rhonchi,  normal respiratory effort, no intercostal retractions  HEART:  RRR, S1, S2, no murmurs; no pitting edema  ABDOMEN:  BS+, soft, nontender, nondistended; No HSM  MSK/EXTREMITIES: 2+ peripheral pulses, no clubbing or cyanosis  NERVOUS SYSTEM: answers questions and follows commands appropriately, A&Ox3 grossly moves all extremities   PSYCH: Appropriate affect, Alert & Awake; Good judgement      LABS: Personally reviewed  CBC                        7.9    4.73  )-----------( 250      ( 29 Jun 2024 06:47 )             27.7     CMP  06-29    143  |  105  |  25  ----------------------------<  77  4.3   |  35  |  1.08    Ca    8.5      29 Jun 2024 06:47  Mg     1.7     06-28    TPro  5.6  /  Alb  2.2  /  TBili  0.2  /  DBili  x   /  AST  18  /  ALT  13  /  AlkPhos  50  06-29                Urinalysis Basic - ( 29 Jun 2024 06:47 )    Color: x / Appearance: x / SG: x / pH: x  Gluc: 77 mg/dL / Ketone: x  / Bili: x / Urobili: x   Blood: x / Protein: x / Nitrite: x   Leuk Esterase: x / RBC: x / WBC x   Sq Epi: x / Non Sq Epi: x / Bacteria: x                RADIOLOGY & ADDITIONAL TESTS: Personally reviewed.     Consultant(s) Notes Reviewed:  [x] YES  [ ] NO   Discussed with SW/MARION, RN

## 2024-06-29 NOTE — PROGRESS NOTE ADULT - ASSESSMENT
anemia  cad  af controlled vr  hf with recovered function    would continue her current cardiac meds   outpatient f/u with dr nunes cardio

## 2024-06-29 NOTE — PROGRESS NOTE ADULT - SUBJECTIVE AND OBJECTIVE BOX
Follow up for :  anemia, cardiomyopathy, CAD    SUBJ:  no c/p sob, ambulating in [place]    PMH  HTN (hypertension)    HLD (hyperlipidemia)    DM (diabetes mellitus)    Hyperkalemia    CKD (chronic kidney disease)    Atherosclerosis    HTN (hypertension)    CAD (coronary artery disease)    Stage 4 chronic kidney disease    Cyst of pancreas    H/O CHF    Diabetes mellitus        MEDICATIONS  (STANDING):  amLODIPine   Tablet 2.5 milliGRAM(s) Oral daily  apixaban 2.5 milliGRAM(s) Oral every 12 hours  ascorbic acid 500 milliGRAM(s) Oral daily  atorvastatin 40 milliGRAM(s) Oral at bedtime  dapagliflozin 10 milliGRAM(s) Oral every 24 hours  ferrous    sulfate 325 milliGRAM(s) Oral two times a day  metoprolol succinate ER 25 milliGRAM(s) Oral daily  multivitamin 1 Tablet(s) Oral daily  pantoprazole   Suspension 40 milliGRAM(s) Oral daily  polyethylene glycol 3350 17 Gram(s) Oral daily  senna 2 Tablet(s) Oral at bedtime    MEDICATIONS  (PRN):  acetaminophen     Tablet .. 650 milliGRAM(s) Oral every 6 hours PRN Temp greater or equal to 38C (100.4F), Mild Pain (1 - 3)  aluminum hydroxide/magnesium hydroxide/simethicone Suspension 30 milliLiter(s) Oral every 4 hours PRN Dyspepsia  melatonin 3 milliGRAM(s) Oral at bedtime PRN Insomnia  ondansetron Injectable 4 milliGRAM(s) IV Push every 8 hours PRN Nausea and/or Vomiting        PHYSICAL EXAM:  Vital Signs Last 24 Hrs  T(C): 36.5 (2024 05:01), Max: 36.5 (2024 05:01)  T(F): 97.7 (2024 05:01), Max: 97.7 (2024 05:01)  HR: 75 (2024 10:35) (60 - 78)  BP: 135/71 (2024 10:35) (127/49 - 143/56)  BP(mean): 85 (2024 05:01) (85 - 85)  RR: 19 (2024 05:01) (19 - 19)  SpO2: 99% (2024 05:01) (97% - 99%)    Parameters below as of 2024 05:01  Patient On (Oxygen Delivery Method): room air        GENERAL: NAD, well-groomed, well-developed  HEAD:  Atraumatic, Normocephalic  EYES:  conjunctiva and sclera clear  ENT: Moist mucous membranes,  NECK: Supple, No JVD, no bruits  CHEST/LUNG: Clear to auscultation bilaterally; No rales, rhonchi, wheezing, or rubs  HEART: Regular rate and rhythm; No murmurs, rubs, or gallops PMI non displaced.  ABDOMEN: Soft, Nontender, Nondistended; Bowel sounds present  EXTREMITIES: , No clubbing, cyanosis, or edema  SKIN: No rashes or lesions  NERVOUS SYSTEM:  Alert       TELEMETRY:  af 50-60    ECG:  < from: 12 Lead ECG (24 @ 05:24) >    Ventricular Rate 62 BPM    Atrial Rate 111 BPM    QRS Duration 86 ms    Q-T Interval 350 ms    QTC Calculation(Bazett) 355 ms    R Axis 21 degrees    T Axis -60 degrees    Diagnosis Line Baseline artifiact  Atrial fibrillation with moderate ventricualr response      Confirmed by SILVIO CARCAMO MD () on 2024 9:12:16 AM    < end of copied text >  < from: CT Abdomen and Pelvis w/ IV Cont (24 @ 15:10) >    ACC: [id number] EXAM:  CT ABDOMEN AND PELVIS IC   ORDERED BY: RADHA BETANCOURT     PROCEDURE DATE:  2024          INTERPRETATION:  CLINICAL INFORMATION: Evaluate pancreatic cyst.    COMPARISON: CT 2024, MRI 2024    CONTRAST/COMPLICATIONS:  IV Contrast: Omnipaque 350  90 cc administered   10 cc discarded  Oral Contrast: NONE  Complications: None reported at time of study completion    PROCEDURE:  CT of the Abdomen and Pelvis was performed.  Sagittal and coronal reformats were performed.    FINDINGS:  LOWER CHEST: Partially visualized cardiomegaly. Small pericardial   effusion.    LIVER: Within normal limits.  BILE DUCTS: Normal caliber.  GALLBLADDER: Distended. No radiopaque gallstones.  SPLEEN: Within normal limits.  PANCREAS: Well circumscribed hypoenhancing and hypoattenuating cystic   mass in the pancreatic tail measures 2.2 x 1.8 cm, stable in size   compared with prior studies and better characterized on prior MRI   2024. No pancreatic ductal dilatation.  ADRENALS: Within normal limits.  KIDNEYS/URETERS: Normal renal cortical scarring, left greater than right,   with left greater than right renal cortical atrophy. Scattered cysts. No   hydronephrosis.    BLADDER: Within normal limits.  REPRODUCTIVE ORGANS: Hysterectomy.    BOWEL: No bowel obstruction. Large stool burden throughout the colon.  PERITONEUM/RETROPERITONEUM: Within normal limits.  VESSELS: Normal caliber abdominal aorta with severe atherosclerotic   changes throughout the aorta and its major branches.  LYMPH NODES: No lymphadenopathy.  ABDOMINAL WALL: Within normal limits.  BONES: Degenerative changes.    IMPRESSION:  Stable well-circumscribed cystic pancreatic tail mass, better   characterized on prior MRI.    Cardiomegaly with small pericardial effusion.    < end of copied text >  < from: TTE Echo Complete w/o Contrast w/ Doppler (24 @ 10:15) >    ACC: [id number] EXAM:  ECHO TTE WO CON COMP W DOPP                          PROCEDURE DATE:  2024          INTERPRETATION:  TRANSTHORACIC ECHOCARDIOGRAM REPORT        Patient Name:   PAYAM PAPPAS Patient Location: [address] [place] [address] Rec #:  [id number]              Accession #:      [id number]  Account #:      [id number]                Height:           56.7 in 144.0 cm  YOB: 1935             Weight:           105.8 lb 48.00 kg  Patient Age:    88 years              BSA:         1.37 m²  Patient Gender: F                     BP:               120/70 mmHg      Date of Exam:        2024 10:15:07 AM  Sonographer:         AIMEE  Referring Physician: KAREN    Procedure:     2D Echo/Doppler/Color Doppler Complete.  Indications:   R55 - Syncope and collapse  Diagnosis:     R55 - Syncope and collapse  Study Details: Technically fair study.        2D AND M-MODE MEASUREMENTS (normal ranges within parentheses):  Left                 Normal   Aorta/Left            Normal  Ventricle:                    Atrium:  IVSd (2D):    1.02  (0.7-1.1) Aortic Root   2.80  (2.4-3.7)                 cm             (2D):          cm  LVPWd (2D):   0.93  (0.7-1.1) Aortic Root   3.00  (2.4-3.7)                 cm             (Mmode):      cm  LVIDd (2D):   4.33  (3.4-5.7) AoV Cusp      1.50  (1.5-2.6)                 cm             Separation:    cm  LVIDs (2D):   2.80            Left Atrium   3.05  (1.9-4.0)                 cm             (2D):          cm  LV FS (2D):   35.3(>25%)   Left Atrium   3.65  (1.9-4.0)                  %             (Mmode):       cm  LV EF (2D):   65 %   (>55%)   LA Volume     56.5  Relative Wall 0.43   (<0.42)  Index        ml/m²  Thickness                     Right Ventricle:                  TAPSE:           1.80 cm    LV DIASTOLIC FUNCTION:  MV Peak E: 1.00 m/s Decel Time: 202 msec  MV Peak A: 0.96 m/s  E/A Ratio: 1.04    SPECTRAL DOPPLER ANALYSIS (where applicable):  Mitral Valve:  MV P1/2 Time: 58.58 msec  MV Area, PHT: 3.76 cm²    Aortic Valve: AoV Max Anand: 1.60 m/s AoV Peak PG: 10.2 mmHg AoV Mean P.9 mmHg    LVOT Vmax: 0.67 m/s LVOT VTI: 0.200 m LVOT Diameter: 2.00 cm    AoV Area, Vmax: 1.32 cm² AoV Area, VTI: 1.31 cm² AoV Area, Vmn: 0.97 cm²  Ao VTI: 0.480  Tricuspid Valve and PA/RV Systolic Pressure: TR Max Velocity: 3.21 m/s RA   Pressure: 3 mmHg RVSP/PASP: 44.2 mmHg      PHYSICIAN INTERPRETATION:  Left Ventricle: The left ventricular internal cavity size is normal. Left   ventricular wall thickness is normal.  Global LV systolic function was normal. Left ventricular ejection   fraction, by visual estimation, is 60 to 65%. The left ventricular   diastolic function could not be assessed in this study.  Right Ventricle: Normal right ventricular size and function. The right   ventricular size is normal.  Left Atrium: Mildly enlarged left atrium.  Right Atrium: Normal right atrial size.  Pericardium: A small pericardial effusion is present. The pericardial   effusion is globally located around the entire heart.  Mitral Valve: Thickening and calcification of the anterior and posterior   mitral valve leaflets. Mild mitral valve regurgitation is seen.  Tricuspid Valve: The tricuspid valve is normal in structure.   Mild-moderate tricuspid regurgitation is visualized.  Aortic Valve: Mild aortic stenosis is present. The Dimesionless Index   value is .41.  Pulmonic Valve: Structurally normal pulmonic valve, with normal leaflet   excursion. The pulmonic valve is normal.  Aorta: The aortic root isnormal in size and structure.  Venous: The inferior vena cava is normal. The inferior vena cava was   normal sized, with respiratory size variation greater than 50%.      Summary:   1. Left ventricular ejection fraction, by visual estimation, is 60 to   65%.   2. Normal global left ventricular systolic function.   3. Normal left ventricular internal cavity size.   4. The left ventricular diastolic function could not be assessed in this   study.   5. Mildly enlarged left atrium.   6. Normal right atrial size.   7. Small pericardial effusion.   8. Mild mitral valve regurgitation.   9. Thickening and calcification of the anterior and posterior mitral   valve leaflets.  10. Mild-moderate tricuspid regurgitation.  11. Mild aortic valve stenosis.    [id number] Olman Lange MD, FACC , MD, FACC Electronically   signed on 2024 at 12:51:23 PM            < end of copied text >      LABS:                        7.9    4.73  )-----------( 250      ( 2024 06:47 )             27.7         143  |  105  |  25<H>  ----------------------------<  77  4.3   |  35<H>  |  1.08    Ca    8.5      2024 06:47  Mg     1.7         TPro  5.6<L>  /  Alb  2.2<L>  /  TBili  0.2  /  DBili  x   /  AST  18  /  ALT  13  /  AlkPhos  50                I&O's Summary    2024 07:01  -  2024 07:00  --------------------------------------------------------  IN: 0 mL / OUT: 500 mL / NET: -500 mL          RADIOLOGY & ADDITIONAL STUDIES:    ECHO:

## 2024-06-29 NOTE — PHYSICAL THERAPY INITIAL EVALUATION ADULT - ADDITIONAL COMMENTS
lives alone with 2 NAVEED with rails, 1st floor setup and uses cane. Owns a RW. Niece comes everyday and lives 5 minutes away and works from home

## 2024-06-29 NOTE — PROGRESS NOTE ADULT - ASSESSMENT
87 yo female w/ hypertension, paroxysmal afib on Eliquis, cardiomyopathy, CAD, Hx of cardiac stents, Admitted for anemia , FTT, digoxin toxicity,       #failure to thrive   - poor appetite weight loss  -per PMD- dr awan who spoke with dr. sean sprague- reported that pt s/p upper endoscopy for cystic pancreatic mass, but nothing was dx  -pt has been loosing weight inspite of eating normally, lost 20 lbs in last 6 months.   -avoid nephrotoxins    #microcytic anemia  - hgb 9.4 on admission  - no change in baseline.  - recent EUS ? secondary to slow blood loss from upper GI source vs nutritional sec to restricted oral intake from difficulty swallowing  -  c/w iron supplementation    #dysphagia  - speech and swallow - rec minced and moist  - recent esophageal dilatation done in April   -follows with GI Dr. Escudero at [place]  - GI recommendations appreciated- no plan for repeat endoscopy    #chronic Afib  - EKG- AFIB- 83 currently rate controlled  - cardio recommendations appreciated  - continue eliquis  - resume metoprolol succinate ER 25mg qd  - digoxin level 2.8> 2.0 >1.5  - dc digoxin will need to follow up outpatient   - continue metorprolol controlled.   - repeat digoxin level at in AM    #History of CHF, HTN, CAD s/p stents  -c/w statin, farxiga, amlodipine, beta blocker  - discontinued aspirin and plavix  - TTE 5/24: LVEF 60-65%, mild mod TR    #pancreatic cyst  -CT-(05.26.24 @ 21:19) >2.4 cm complex lesion in the pancreatic tail with imaging features consistent with serous cystadenoma   - CT abd/pelvis 6/26: Stable well-circumscribed cystic pancreatic tail mass. Cardiomegaly with small pericardial effusion.      #Right subclavian artery with severe atherosclerosis   #Uneven BP's.   - per prior chart-  Left side BP higher than right side BP  - CTA and carotid duplex noted  - Vascular surgery consulted, no acute vascular intervention required, continue Eliquis and Plavix  - f/u outpatient with vascular surgery     #DVT prophylaxis   - Eliquis    Dispo: PT recommends home PT, however family wanted [facility]     Case discussed with Dr. Nguyen

## 2024-06-29 NOTE — PHYSICAL THERAPY INITIAL EVALUATION ADULT - GAIT TRAINING, PT EVAL
in 1-3 treatments patient will ambulate with RW 100feet with independence and climb 2 steps with rail with superviison

## 2024-06-29 NOTE — PROGRESS NOTE ADULT - ATTENDING COMMENTS
agree with above  digoxin level better, as discussed with cardiology, will not resume digoxin  PT eval completed  family would prefer [facility] given pt's deconditioning and weakness.

## 2024-06-30 LAB
ALBUMIN SERPL ELPH-MCNC: 2.1 G/DL — LOW (ref 3.3–5)
ALP SERPL-CCNC: 45 U/L — SIGNIFICANT CHANGE UP (ref 40–120)
ALT FLD-CCNC: 15 U/L — SIGNIFICANT CHANGE UP (ref 10–45)
ANION GAP SERPL CALC-SCNC: 1 MMOL/L — LOW (ref 5–17)
AST SERPL-CCNC: 15 U/L — SIGNIFICANT CHANGE UP (ref 10–40)
BILIRUB SERPL-MCNC: 0.2 MG/DL — SIGNIFICANT CHANGE UP (ref 0.2–1.2)
BLD GP AB SCN SERPL QL: SIGNIFICANT CHANGE UP
BUN SERPL-MCNC: 30 MG/DL — HIGH (ref 7–23)
CALCIUM SERPL-MCNC: 8.6 MG/DL — SIGNIFICANT CHANGE UP (ref 8.4–10.5)
CHLORIDE SERPL-SCNC: 103 MMOL/L — SIGNIFICANT CHANGE UP (ref 96–108)
CO2 SERPL-SCNC: 34 MMOL/L — HIGH (ref 22–31)
CREAT SERPL-MCNC: 1.06 MG/DL — SIGNIFICANT CHANGE UP (ref 0.5–1.3)
DIGOXIN SERPL-MCNC: 1.3 NG/ML — SIGNIFICANT CHANGE UP (ref 0.8–2)
EGFR: 51 ML/MIN/1.73M2 — LOW
GLUCOSE SERPL-MCNC: 88 MG/DL — SIGNIFICANT CHANGE UP (ref 70–99)
HCT VFR BLD CALC: 25.8 % — LOW (ref 34.5–45)
HCT VFR BLD CALC: 30.8 % — LOW (ref 34.5–45)
HGB BLD-MCNC: 7.3 G/DL — LOW (ref 11.5–15.5)
HGB BLD-MCNC: 9.3 G/DL — LOW (ref 11.5–15.5)
MCHC RBC-ENTMCNC: 21.8 PG — LOW (ref 27–34)
MCHC RBC-ENTMCNC: 23.7 PG — LOW (ref 27–34)
MCHC RBC-ENTMCNC: 28.3 GM/DL — LOW (ref 32–36)
MCHC RBC-ENTMCNC: 30.2 GM/DL — LOW (ref 32–36)
MCV RBC AUTO: 77 FL — LOW (ref 80–100)
MCV RBC AUTO: 78.4 FL — LOW (ref 80–100)
NRBC # BLD: 0 /100 WBCS — SIGNIFICANT CHANGE UP (ref 0–0)
NRBC # BLD: 0 /100 WBCS — SIGNIFICANT CHANGE UP (ref 0–0)
PLATELET # BLD AUTO: 246 K/UL — SIGNIFICANT CHANGE UP (ref 150–400)
PLATELET # BLD AUTO: 250 K/UL — SIGNIFICANT CHANGE UP (ref 150–400)
POTASSIUM SERPL-MCNC: 4.8 MMOL/L — SIGNIFICANT CHANGE UP (ref 3.5–5.3)
POTASSIUM SERPL-SCNC: 4.8 MMOL/L — SIGNIFICANT CHANGE UP (ref 3.5–5.3)
PROT SERPL-MCNC: 5.4 G/DL — LOW (ref 6–8.3)
RBC # BLD: 3.35 M/UL — LOW (ref 3.8–5.2)
RBC # BLD: 3.93 M/UL — SIGNIFICANT CHANGE UP (ref 3.8–5.2)
RBC # FLD: 16.3 % — HIGH (ref 10.3–14.5)
RBC # FLD: 16.6 % — HIGH (ref 10.3–14.5)
SODIUM SERPL-SCNC: 138 MMOL/L — SIGNIFICANT CHANGE UP (ref 135–145)
WBC # BLD: 6.06 K/UL — SIGNIFICANT CHANGE UP (ref 3.8–10.5)
WBC # BLD: 7.61 K/UL — SIGNIFICANT CHANGE UP (ref 3.8–10.5)
WBC # FLD AUTO: 6.06 K/UL — SIGNIFICANT CHANGE UP (ref 3.8–10.5)
WBC # FLD AUTO: 7.61 K/UL — SIGNIFICANT CHANGE UP (ref 3.8–10.5)

## 2024-06-30 PROCEDURE — 99233 SBSQ HOSP IP/OBS HIGH 50: CPT

## 2024-06-30 PROCEDURE — 99231 SBSQ HOSP IP/OBS SF/LOW 25: CPT

## 2024-06-30 RX ORDER — IRON SUCROSE 20 MG/ML
100 INJECTION, SOLUTION INTRAVENOUS EVERY 24 HOURS
Refills: 0 | Status: COMPLETED | OUTPATIENT
Start: 2024-06-30 | End: 2024-07-02

## 2024-06-30 RX ADMIN — AMLODIPINE BESYLATE 2.5 MILLIGRAM(S): 2.5 TABLET ORAL at 05:30

## 2024-06-30 RX ADMIN — Medication 2 TABLET(S): at 21:33

## 2024-06-30 RX ADMIN — PANTOPRAZOLE SODIUM 40 MILLIGRAM(S): 40 INJECTION, POWDER, FOR SOLUTION INTRAVENOUS at 11:35

## 2024-06-30 RX ADMIN — Medication 325 MILLIGRAM(S): at 05:30

## 2024-06-30 RX ADMIN — ATORVASTATIN CALCIUM 40 MILLIGRAM(S): 20 TABLET, FILM COATED ORAL at 21:33

## 2024-06-30 RX ADMIN — Medication 25 MILLIGRAM(S): at 05:31

## 2024-06-30 RX ADMIN — IRON SUCROSE 210 MILLIGRAM(S): 20 INJECTION, SOLUTION INTRAVENOUS at 08:58

## 2024-06-30 RX ADMIN — Medication 1 TABLET(S): at 11:35

## 2024-06-30 RX ADMIN — Medication 500 MILLIGRAM(S): at 11:35

## 2024-06-30 RX ADMIN — APIXABAN 2.5 MILLIGRAM(S): 5 TABLET, FILM COATED ORAL at 05:30

## 2024-06-30 RX ADMIN — POLYETHYLENE GLYCOL 3350 17 GRAM(S): 1 POWDER ORAL at 11:35

## 2024-06-30 RX ADMIN — DAPAGLIFLOZIN 10 MILLIGRAM(S): 10 TABLET, FILM COATED ORAL at 05:30

## 2024-06-30 NOTE — PROGRESS NOTE ADULT - ASSESSMENT
Assessment and Plan:     anemia  cad  af controlled vr  now in sinus/PAF  hf with recovered function    would continue her current cardiac meds   outpatient f/u with dr nunes cardio

## 2024-06-30 NOTE — PROGRESS NOTE ADULT - ATTENDING COMMENTS
agree with above  h/h downtrending  transfuse 1 unit PRBC  started daily venofer  f/u h/h post transfusion and in AM  f/u am labs

## 2024-06-30 NOTE — PROGRESS NOTE ADULT - ASSESSMENT
87 yo female w/ hypertension, paroxysmal afib on Eliquis, cardiomyopathy, CAD, Hx of cardiac stents, Admitted for anemia , FTT, digoxin toxicity,       #failure to thrive   - poor appetite weight loss  -per PMD- dr awan who spoke with dr. sean sprague- reported that pt s/p upper endoscopy for cystic pancreatic mass, but nothing was dx  -pt has been loosing weight inspite of eating normally, lost 20 lbs in last 6 months.   -avoid nephrotoxins     #microcytic anemia  - hgb 9.4 on admission > 7.4on 6/30   - recent EUS ? secondary to slow blood loss from upper GI source vs nutritional sec to restricted oral intake from difficulty swallowing  -  c/w iron supplementation  - keep T&S active  - check occult blood feces    - hold antiplatelet and anticoagulant       #dysphagia  - speech and swallow - rec minced and moist  - recent esophageal dilatation done in April   -follows with GI Dr. Escudero at [place]  - GI recommendations appreciated- no plan for repeat endoscopy    #chronic Afib  - EKG- AFIB- 83 currently rate controlled  - cardio recommendations appreciated  - hold eliquis for hb 7.3  - resume metoprolol succinate ER 25mg qd  - digoxin level 2.8> 2.0 >1.5  - dc digoxin will need to follow up outpatient   - continue metorprolol controlled.       #History of CHF, HTN, CAD s/p stents  -c/w statin, farxiga, amlodipine, beta blocker  - discontinued aspirin and plavix  - TTE 5/24: LVEF 60-65%, mild mod TR    #pancreatic cyst  -CT-(05.26.24 @ 21:19) >2.4 cm complex lesion in the pancreatic tail with imaging features consistent with serous cystadenoma   - CT abd/pelvis 6/26: Stable well-circumscribed cystic pancreatic tail mass. Cardiomegaly with small pericardial effusion.      #Right subclavian artery with severe atherosclerosis   #Uneven BP's.   - per prior chart-  Left side BP higher than right side BP  - CTA and carotid duplex noted  - Vascular surgery consulted, no acute vascular intervention required, continue Eliquis and Plavix  - f/u outpatient with vascular surgery     #DVT prophylaxis   - Eliquis, hold for anemia     Dispo: PT recommends home PT, however family wanted [facility]     Case discussed with Dr. Nguyen 87 yo female w/ hypertension, paroxysmal afib on Eliquis, cardiomyopathy, CAD, Hx of cardiac stents, Admitted for anemia , FTT, digoxin toxicity,       #failure to thrive   - poor appetite weight loss  -per PMD- dr awan who spoke with dr. sean sprague- reported that pt s/p upper endoscopy for cystic pancreatic mass, but nothing was dx  -pt has been loosing weight inspite of eating normally, lost 20 lbs in last 6 months.   -avoid nephrotoxins     #microcytic anemia  - hgb 9.4 on admission > 7.4on 6/30   - recent EUS ? secondary to slow blood loss from upper GI source vs nutritional sec to restricted oral intake from difficulty swallowing  -  c/w iron supplementation  - keep T&S active  - check occult blood feces    - hold antiplatelet and anticoagulant   - ordered 1 uRBC   - monitor CBC       #dysphagia  - speech and swallow - rec minced and moist  - recent esophageal dilatation done in April   -follows with GI Dr. Escudero at [facility]  - GI recommendations appreciated- no plan for repeat endoscopy    #chronic Afib  - EKG- AFIB- 83 currently rate controlled  - cardio recommendations appreciated  - hold eliquis for hb 7.3  - resume metoprolol succinate ER 25mg qd  - digoxin level 2.8> 2.0 >1.5  - dc digoxin will need to follow up outpatient   - continue metorprolol controlled.       #History of CHF, HTN, CAD s/p stents  -c/w statin, farxiga, amlodipine, beta blocker  - discontinued aspirin and plavix  - TTE 5/24: LVEF 60-65%, mild mod TR    #pancreatic cyst  -CT-(05.26.24 @ 21:19) >2.4 cm complex lesion in the pancreatic tail with imaging features consistent with serous cystadenoma   - CT abd/pelvis 6/26: Stable well-circumscribed cystic pancreatic tail mass. Cardiomegaly with small pericardial effusion.      #Right subclavian artery with severe atherosclerosis   #Uneven BP's.   - per prior chart-  Left side BP higher than right side BP  - CTA and carotid duplex noted  - Vascular surgery consulted, no acute vascular intervention required, continue Eliquis and Plavix  - f/u outpatient with vascular surgery     #DVT prophylaxis   - Eliquis, hold for anemia     Dispo: PT recommends home PT, however family wanted [facility]     Case discussed with Dr. Nguyen 89 yo female w/ hypertension, paroxysmal afib on Eliquis, cardiomyopathy, CAD, Hx of cardiac stents, Admitted for anemia , FTT, digoxin toxicity,       #failure to thrive   - poor appetite weight loss  -per PMD- dr awan who spoke with dr. sean sprague- reported that pt s/p upper endoscopy for cystic pancreatic mass, but nothing was dx  -pt has been loosing weight inspite of eating normally, lost 20 lbs in last 6 months.   -avoid nephrotoxins     #microcytic anemia  - hgb 9.4 on admission > 7.4on 6/30   - recent EUS ? secondary to slow blood loss from upper GI source vs nutritional sec to restricted oral intake from difficulty swallowing  -  c/w iron supplementation  - keep T&S active  - check occult blood feces    - hold antiplatelet and anticoagulant   - ordered 1 uRBC - 6/30  - monitor CBC       #dysphagia  - speech and swallow - rec minced and moist  - recent esophageal dilatation done in April   -follows with GI Dr. Escudero at [place]  - GI recommendations appreciated- no plan for repeat endoscopy    #chronic Afib  - EKG- AFIB- 83 currently rate controlled  - cardio recommendations appreciated  - hold eliquis for hb 7.3  - resume metoprolol succinate ER 25mg qd  - digoxin level 2.8> 2.0 >1.5  - dc digoxin will need to follow up outpatient   - continue metorprolol controlled.       #History of CHF, HTN, CAD s/p stents  -c/w statin, farxiga, amlodipine, beta blocker  - discontinued aspirin and plavix  - TTE 5/24: LVEF 60-65%, mild mod TR    #pancreatic cyst  -CT-(05.26.24 @ 21:19) >2.4 cm complex lesion in the pancreatic tail with imaging features consistent with serous cystadenoma   - CT abd/pelvis 6/26: Stable well-circumscribed cystic pancreatic tail mass. Cardiomegaly with small pericardial effusion.      #Right subclavian artery with severe atherosclerosis   #Uneven BP's.   - per prior chart-  Left side BP higher than right side BP  - CTA and carotid duplex noted  - Vascular surgery consulted, no acute vascular intervention required, continue Eliquis and Plavix  - f/u outpatient with vascular surgery     #DVT prophylaxis   - Eliquis, hold for anemia     Dispo: PT recommends home PT, however family wanted [facility]     Case discussed with Dr. Nguyen

## 2024-06-30 NOTE — PROGRESS NOTE ADULT - SUBJECTIVE AND OBJECTIVE BOX
Patient is a 88y old  Female who presents with a chief complaint of anemia, afib (28 Jun 2024 13:30)      INTERVAL HPI/OVERNIGHT EVENTS: Patient seen and examined at bedside. No overnight events. Denies any active bleeding, says last BM yesterday, brown in color. Feeling well, no concerns     MEDICATIONS  (STANDING):  amLODIPine   Tablet 2.5 milliGRAM(s) Oral daily  ascorbic acid 500 milliGRAM(s) Oral daily  atorvastatin 40 milliGRAM(s) Oral at bedtime  dapagliflozin 10 milliGRAM(s) Oral every 24 hours  iron sucrose IVPB 100 milliGRAM(s) IV Intermittent every 24 hours  metoprolol succinate ER 25 milliGRAM(s) Oral daily  multivitamin 1 Tablet(s) Oral daily  pantoprazole   Suspension 40 milliGRAM(s) Oral daily  polyethylene glycol 3350 17 Gram(s) Oral daily  senna 2 Tablet(s) Oral at bedtime    MEDICATIONS  (PRN):  acetaminophen     Tablet .. 650 milliGRAM(s) Oral every 6 hours PRN Temp greater or equal to 38C (100.4F), Mild Pain (1 - 3)  aluminum hydroxide/magnesium hydroxide/simethicone Suspension 30 milliLiter(s) Oral every 4 hours PRN Dyspepsia  melatonin 3 milliGRAM(s) Oral at bedtime PRN Insomnia  ondansetron Injectable 4 milliGRAM(s) IV Push every 8 hours PRN Nausea and/or Vomiting        Allergies    No Known Allergies    Intolerances    Vital Signs Last 24 Hrs  T(C): 36.6 (30 Jun 2024 12:19), Max: 37 (29 Jun 2024 21:05)  T(F): 97.9 (30 Jun 2024 12:19), Max: 98.6 (29 Jun 2024 21:05)  HR: 80 (30 Jun 2024 12:19) (78 - 80)  BP: 154/54 (30 Jun 2024 12:19) (113/64 - 154/54)  BP(mean): --  RR: 18 (30 Jun 2024 12:19) (16 - 18)  SpO2: 91% (30 Jun 2024 12:19) (91% - 98%)    Parameters below as of 30 Jun 2024 12:19  Patient On (Oxygen Delivery Method): room air      I&O's Summary    28 Jun 2024 07:01  -  29 Jun 2024 07:00  --------------------------------------------------------  IN: 0 mL / OUT: 500 mL / NET: -500 mL      BMI (kg/m2): 20.7 (06-24-24 @ 20:30)    PHYSICAL EXAM:  GENERAL: NAD, frail/thin  HEENT:  AT/NC,  EOMI  CHEST/LUNG:  normal respiratory effort, no intercostal retractions  HEART:  regular rate  ABDOMEN:  BS+, soft, nontender, nondistended; No HSM  NERVOUS SYSTEM: answers questions and follows commands appropriately, A&Ox3 grossly moves all extremities   PSYCH: Appropriate affect, Alert & Awake; Good judgement      LABS: Personally reviewed  CBC               CBC Full  -  ( 30 Jun 2024 06:35 )  WBC Count : 6.06 K/uL  RBC Count : 3.35 M/uL  Hemoglobin : 7.3 g/dL  Hematocrit : 25.8 %  Platelet Count - Automated : 246 K/uL  Mean Cell Volume : 77.0 fl  Mean Cell Hemoglobin : 21.8 pg  Mean Cell Hemoglobin Concentration : 28.3 gm/dL  Auto Neutrophil # : x  Auto Lymphocyte # : x  Auto Monocyte # : x  Auto Eosinophil # : x  Auto Basophil # : x  Auto Neutrophil % : x  Auto Lymphocyte % : x  Auto Monocyte % : x  Auto Eosinophil % : x  Auto Basophil % : x  06-30    138  |  103  |  30<H>  ----------------------------<  88  4.8   |  34<H>  |  1.06    Ca    8.6      30 Jun 2024 06:35    TPro  5.4<L>  /  Alb  2.1<L>  /  TBili  0.2  /  DBili  x   /  AST  15  /  ALT  15  /  AlkPhos  45  06-30      Urinalysis Basic - ( 29 Jun 2024 06:47 )    Color: x / Appearance: x / SG: x / pH: x  Gluc: 77 mg/dL / Ketone: x  / Bili: x / Urobili: x   Blood: x / Protein: x / Nitrite: x   Leuk Esterase: x / RBC: x / WBC x   Sq Epi: x / Non Sq Epi: x / Bacteria: x                RADIOLOGY & ADDITIONAL TESTS: Personally reviewed.     Consultant(s) Notes Reviewed:  [x] YES  [ ] NO   Discussed with SW/MARION, RN

## 2024-06-30 NOTE — PROGRESS NOTE ADULT - SUBJECTIVE AND OBJECTIVE BOX
Follow up for : Follow up for :  anemia, cardiomyopathy, CAD    SUBJ:  feels ok, no c/p sob    PMH  HTN (hypertension)    HLD (hyperlipidemia)    DM (diabetes mellitus)    Hyperkalemia    CKD (chronic kidney disease)    Atherosclerosis    HTN (hypertension)    CAD (coronary artery disease)    Stage 4 chronic kidney disease    Cyst of pancreas    H/O CHF    Diabetes mellitus        MEDICATIONS  (STANDING):  amLODIPine   Tablet 2.5 milliGRAM(s) Oral daily  ascorbic acid 500 milliGRAM(s) Oral daily  atorvastatin 40 milliGRAM(s) Oral at bedtime  dapagliflozin 10 milliGRAM(s) Oral every 24 hours  iron sucrose IVPB 100 milliGRAM(s) IV Intermittent every 24 hours  metoprolol succinate ER 25 milliGRAM(s) Oral daily  multivitamin 1 Tablet(s) Oral daily  pantoprazole   Suspension 40 milliGRAM(s) Oral daily  polyethylene glycol 3350 17 Gram(s) Oral daily  senna 2 Tablet(s) Oral at bedtime    MEDICATIONS  (PRN):  acetaminophen     Tablet .. 650 milliGRAM(s) Oral every 6 hours PRN Temp greater or equal to 38C (100.4F), Mild Pain (1 - 3)  aluminum hydroxide/magnesium hydroxide/simethicone Suspension 30 milliLiter(s) Oral every 4 hours PRN Dyspepsia  melatonin 3 milliGRAM(s) Oral at bedtime PRN Insomnia  ondansetron Injectable 4 milliGRAM(s) IV Push every 8 hours PRN Nausea and/or Vomiting        PHYSICAL EXAM:  Vital Signs Last 24 Hrs  T(C): 36.4 (2024 05:03), Max: 37 (2024 21:05)  T(F): 97.6 (2024 05:03), Max: 98.6 (2024 21:05)  HR: 79 (2024 05:03) (78 - 80)  BP: 131/50 (2024 05:28) (113/64 - 153/55)  BP(mean): --  RR: 16 (2024 05:03) (16 - 18)  SpO2: 91% (2024 05:03) (91% - 98%)    Parameters below as of 2024 05:03  Patient On (Oxygen Delivery Method): room air        GENERAL: NAD, well-groomed, well-developed  HEAD:  Atraumatic, Normocephalic  EYES:  conjunctiva and sclera clear  ENT: Moist mucous membranes,  NECK: Supple, No JVD, no bruits  CHEST/LUNG: Clear to auscultation bilaterally; No rales, rhonchi, wheezing, or rubs  HEART: Regular rate and rhythm; No murmurs, rubs, or gallops PMI non displaced.  ABDOMEN: Soft, Nontender, Nondistended; Bowel sounds present  EXTREMITIES:  2+ Peripheral Pulses, No clubbing, cyanosis, or edema  SKIN: No rashes or lesions  NERVOUS SYSTEM:  Alert       TELEMETRY:  sinus 50-60    ECG:  < from: 12 Lead ECG (24 @ 05:24) >    Ventricular Rate 62 BPM    Atrial Rate 111 BPM    QRS Duration 86 ms    Q-T Interval 350 ms    QTC Calculation(Bazett) 355 ms    R Axis 21 degrees    T Axis -60 degrees    Diagnosis Line Baseline artifiact  Atrial fibrillation with moderate ventricualr response      Confirmed by SILVIO CARCAMO MD () on 2024 9:12:16 AM    < end of copied text >      LABS:                        7.3    6.06  )-----------( 246      ( 2024 06:35 )             25.8     06-30    138  |  103  |  30<H>  ----------------------------<  88  4.8   |  34<H>  |  1.06    Ca    8.6      2024 06:35    TPro  5.4<L>  /  Alb  2.1<L>  /  TBili  0.2  /  DBili  x   /  AST  15  /  ALT  15  /  AlkPhos  45  30              I&O's Summary    2024 07:01  -  2024 07:00  --------------------------------------------------------  IN: 0 mL / OUT: 400 mL / NET: -400 mL          RADIOLOGY & ADDITIONAL STUDIES:    ECHO:2D AND M-MODE MEASUREMENTS (normal ranges within parentheses):  Left                 Normal   Aorta/Left            Normal  Ventricle:                    Atrium:  IVSd (2D):    1.02  (0.7-1.1) Aortic Root   2.80  (2.4-3.7)                 cm             (2D):          cm  LVPWd (2D):   0.93  (0.7-1.1) Aortic Root   3.00  (2.4-3.7)                 cm             (Mmode):      cm  LVIDd (2D):   4.33  (3.4-5.7) AoV Cusp      1.50  (1.5-2.6)                 cm             Separation:    cm  LVIDs (2D):   2.80            Left Atrium   3.05  (1.9-4.0)                 cm             (2D):          cm  LV FS (2D):   35.3(>25%)   Left Atrium   3.65  (1.9-4.0)                  %             (Mmode):       cm  LV EF (2D):   65 %   (>55%)   LA Volume     56.5  Relative Wall 0.43   (<0.42)  Index        ml/m²  Thickness                     Right Ventricle:                  TAPSE:           1.80 cm    LV DIASTOLIC FUNCTION:  MV Peak E: 1.00 m/s Decel Time: 202 msec  MV Peak A: 0.96 m/s  E/A Ratio: 1.04    SPECTRAL DOPPLER ANALYSIS (where applicable):  Mitral Valve:  MV P1/2 Time: 58.58 msec  MV Area, PHT: 3.76 cm²    Aortic Valve: AoV Max Anand: 1.60 m/s AoV Peak PG: 10.2 mmHg AoV Mean P.9 mmHg    LVOT Vmax: 0.67 m/s LVOT VTI: 0.200 m LVOT Diameter: 2.00 cm    AoV Area, Vmax: 1.32 cm² AoV Area, VTI: 1.31 cm² AoV Area, Vmn: 0.97 cm²  Ao VTI: 0.480  Tricuspid Valve and PA/RV Systolic Pressure: TR Max Velocity: 3.21 m/s RA   Pressure: 3 mmHg RVSP/PASP: 44.2 mmHg      PHYSICIAN INTERPRETATION:  Left Ventricle: The left ventricular internal cavity size is normal. Left   ventricular wall thickness is normal.  Global LV systolic function was normal. Left ventricular ejection   fraction, by visual estimation, is 60 to 65%. The left ventricular   diastolic function could not be assessed in this study.  Right Ventricle: Normal right ventricular size and function. The right   ventricular size is normal.  Left Atrium: Mildly enlarged left atrium.  Right Atrium: Normal right atrial size.  Pericardium: A small pericardial effusion is present. The pericardial   effusion is globally located around the entire heart.  Mitral Valve: Thickening and calcification of the anterior and posterior   mitral valve leaflets. Mild mitral valve regurgitation is seen.  Tricuspid Valve: The tricuspid valve is normal in structure.   Mild-moderate tricuspid regurgitation is visualized.  Aortic Valve: Mild aortic stenosis is present. The Dimesionless Index   value is .41.  Pulmonic Valve: Structurally normal pulmonic valve, with normal leaflet   excursion. The pulmonic valve is normal.  Aorta: The aortic root isnormal in size and structure.  Venous: The inferior vena cava is normal. The inferior vena cava was   normal sized, with respiratory size variation greater than 50%.      Summary:   1. Left ventricular ejection fraction, by visual estimation, is 60 to   65%.   2. Normal global left ventricular systolic function.   3. Normal left ventricular internal cavity size.   4. The left ventricular diastolic function could not be assessed in this   study.   5. Mildly enlarged left atrium.   6. Normal right atrial size.   7. Small pericardial effusion.   8. Mild mitral valve regurgitation.   9. Thickening and calcification of the anterior and posterior mitral   valve leaflets.  10. Mild-moderate tricuspid regurgitation.  11. Mild aortic valve stenosis.

## 2024-07-01 LAB
ALBUMIN SERPL ELPH-MCNC: 2.1 G/DL — LOW (ref 3.3–5)
ALP SERPL-CCNC: 43 U/L — SIGNIFICANT CHANGE UP (ref 40–120)
ALT FLD-CCNC: 14 U/L — SIGNIFICANT CHANGE UP (ref 10–45)
ANION GAP SERPL CALC-SCNC: 3 MMOL/L — LOW (ref 5–17)
AST SERPL-CCNC: 17 U/L — SIGNIFICANT CHANGE UP (ref 10–40)
BASOPHILS # BLD AUTO: 0.03 K/UL — SIGNIFICANT CHANGE UP (ref 0–0.2)
BASOPHILS NFR BLD AUTO: 0.4 % — SIGNIFICANT CHANGE UP (ref 0–2)
BILIRUB SERPL-MCNC: 0.5 MG/DL — SIGNIFICANT CHANGE UP (ref 0.2–1.2)
BUN SERPL-MCNC: 25 MG/DL — HIGH (ref 7–23)
CALCIUM SERPL-MCNC: 8.6 MG/DL — SIGNIFICANT CHANGE UP (ref 8.4–10.5)
CHLORIDE SERPL-SCNC: 103 MMOL/L — SIGNIFICANT CHANGE UP (ref 96–108)
CO2 SERPL-SCNC: 32 MMOL/L — HIGH (ref 22–31)
CREAT SERPL-MCNC: 0.96 MG/DL — SIGNIFICANT CHANGE UP (ref 0.5–1.3)
EGFR: 57 ML/MIN/1.73M2 — LOW
EOSINOPHIL # BLD AUTO: 0.08 K/UL — SIGNIFICANT CHANGE UP (ref 0–0.5)
EOSINOPHIL NFR BLD AUTO: 1 % — SIGNIFICANT CHANGE UP (ref 0–6)
FERRITIN SERPL-MCNC: 369 NG/ML — HIGH (ref 13–330)
GLUCOSE SERPL-MCNC: 79 MG/DL — SIGNIFICANT CHANGE UP (ref 70–99)
HCT VFR BLD CALC: 29.7 % — LOW (ref 34.5–45)
HGB BLD-MCNC: 8.7 G/DL — LOW (ref 11.5–15.5)
IMM GRANULOCYTES NFR BLD AUTO: 1 % — HIGH (ref 0–0.9)
IRON SATN MFR SERPL: 43 % — SIGNIFICANT CHANGE UP (ref 14–50)
IRON SATN MFR SERPL: 93 UG/DL — SIGNIFICANT CHANGE UP (ref 30–160)
LYMPHOCYTES # BLD AUTO: 0.99 K/UL — LOW (ref 1–3.3)
LYMPHOCYTES # BLD AUTO: 12.3 % — LOW (ref 13–44)
MCHC RBC-ENTMCNC: 23.3 PG — LOW (ref 27–34)
MCHC RBC-ENTMCNC: 29.3 GM/DL — LOW (ref 32–36)
MCV RBC AUTO: 79.4 FL — LOW (ref 80–100)
MONOCYTES # BLD AUTO: 0.86 K/UL — SIGNIFICANT CHANGE UP (ref 0–0.9)
MONOCYTES NFR BLD AUTO: 10.6 % — SIGNIFICANT CHANGE UP (ref 2–14)
NEUTROPHILS # BLD AUTO: 6.04 K/UL — SIGNIFICANT CHANGE UP (ref 1.8–7.4)
NEUTROPHILS NFR BLD AUTO: 74.7 % — SIGNIFICANT CHANGE UP (ref 43–77)
NRBC # BLD: 0 /100 WBCS — SIGNIFICANT CHANGE UP (ref 0–0)
PLATELET # BLD AUTO: 230 K/UL — SIGNIFICANT CHANGE UP (ref 150–400)
POTASSIUM SERPL-MCNC: 4.6 MMOL/L — SIGNIFICANT CHANGE UP (ref 3.5–5.3)
POTASSIUM SERPL-SCNC: 4.6 MMOL/L — SIGNIFICANT CHANGE UP (ref 3.5–5.3)
PROT SERPL-MCNC: 5.5 G/DL — LOW (ref 6–8.3)
RBC # BLD: 3.74 M/UL — LOW (ref 3.8–5.2)
RBC # FLD: 16.7 % — HIGH (ref 10.3–14.5)
SODIUM SERPL-SCNC: 138 MMOL/L — SIGNIFICANT CHANGE UP (ref 135–145)
TIBC SERPL-MCNC: 217 UG/DL — LOW (ref 220–430)
TRANSFERRIN SERPL-MCNC: 178 MG/DL — LOW (ref 200–360)
UIBC SERPL-MCNC: 124 UG/DL — SIGNIFICANT CHANGE UP (ref 110–370)
WBC # BLD: 8.08 K/UL — SIGNIFICANT CHANGE UP (ref 3.8–10.5)
WBC # FLD AUTO: 8.08 K/UL — SIGNIFICANT CHANGE UP (ref 3.8–10.5)

## 2024-07-01 PROCEDURE — 99232 SBSQ HOSP IP/OBS MODERATE 35: CPT

## 2024-07-01 RX ADMIN — ATORVASTATIN CALCIUM 40 MILLIGRAM(S): 20 TABLET, FILM COATED ORAL at 21:49

## 2024-07-01 RX ADMIN — IRON SUCROSE 210 MILLIGRAM(S): 20 INJECTION, SOLUTION INTRAVENOUS at 08:48

## 2024-07-01 RX ADMIN — Medication 500 MILLIGRAM(S): at 12:07

## 2024-07-01 RX ADMIN — Medication 2 TABLET(S): at 21:49

## 2024-07-01 RX ADMIN — PANTOPRAZOLE SODIUM 40 MILLIGRAM(S): 40 INJECTION, POWDER, FOR SOLUTION INTRAVENOUS at 12:07

## 2024-07-01 RX ADMIN — DAPAGLIFLOZIN 10 MILLIGRAM(S): 10 TABLET, FILM COATED ORAL at 05:27

## 2024-07-01 RX ADMIN — AMLODIPINE BESYLATE 2.5 MILLIGRAM(S): 2.5 TABLET ORAL at 05:27

## 2024-07-01 RX ADMIN — Medication 1 TABLET(S): at 12:07

## 2024-07-01 NOTE — PROGRESS NOTE ADULT - SUBJECTIVE AND OBJECTIVE BOX
Patient is a 88y old  Female who presents with a chief complaint of anemia, afib (28 Jun 2024 13:30)    INTERVAL HPI/OVERNIGHT EVENTS: Patient seen and examined at bedside. No overnight events. Feeling well, no concerns. Aware the plan is for possible rehab, would like to discuss with her daughter later today.     MEDICATIONS  (STANDING):  amLODIPine   Tablet 2.5 milliGRAM(s) Oral daily  ascorbic acid 500 milliGRAM(s) Oral daily  atorvastatin 40 milliGRAM(s) Oral at bedtime  dapagliflozin 10 milliGRAM(s) Oral every 24 hours  iron sucrose IVPB 100 milliGRAM(s) IV Intermittent every 24 hours  metoprolol succinate ER 25 milliGRAM(s) Oral daily  multivitamin 1 Tablet(s) Oral daily  pantoprazole   Suspension 40 milliGRAM(s) Oral daily  polyethylene glycol 3350 17 Gram(s) Oral daily  senna 2 Tablet(s) Oral at bedtime    MEDICATIONS  (PRN):  acetaminophen     Tablet .. 650 milliGRAM(s) Oral every 6 hours PRN Temp greater or equal to 38C (100.4F), Mild Pain (1 - 3)  aluminum hydroxide/magnesium hydroxide/simethicone Suspension 30 milliLiter(s) Oral every 4 hours PRN Dyspepsia  melatonin 3 milliGRAM(s) Oral at bedtime PRN Insomnia  ondansetron Injectable 4 milliGRAM(s) IV Push every 8 hours PRN Nausea and/or Vomiting    Allergies  No Known Allergies  Intolerances    Vital Signs Last 24 Hrs  T(C): 36.8 (01 Jul 2024 12:44), Max: 37.1 (30 Jun 2024 20:23)  T(F): 98.3 (01 Jul 2024 12:44), Max: 98.8 (30 Jun 2024 20:23)  HR: 59 (01 Jul 2024 12:05) (59 - 65)  BP: 151/61 (01 Jul 2024 12:05) (151/61 - 163/56)  BP(mean): --  RR: 17 (01 Jul 2024 12:44) (16 - 17)  SpO2: 96% (01 Jul 2024 12:44) (96% - 98%)    Parameters below as of 01 Jul 2024 12:44  Patient On (Oxygen Delivery Method): room air      PHYSICAL EXAM:  GENERAL: NAD, frail/thin  HEENT:  AT/NC,  EOMI  CHEST/LUNG:  normal respiratory effort, no intercostal retractions  HEART:  regular rate  ABDOMEN:  BS+, soft, nontender, nondistended; No HSM  NERVOUS SYSTEM: answers questions and follows commands appropriately, A&Ox3 grossly moves all extremities   PSYCH: Appropriate affect, Alert & Awake; Good judgement                              8.7    8.08  )-----------( 230      ( 01 Jul 2024 07:10 )             29.7     01 Jul 2024 07:10    138    |  103    |  25     ----------------------------<  79     4.6     |  32     |  0.96     Ca    8.6        01 Jul 2024 07:10    TPro  5.5    /  Alb  2.1    /  TBili  0.5    /  DBili  x      /  AST  17     /  ALT  14     /  AlkPhos  43     01 Jul 2024 07:10      CAPILLARY BLOOD GLUCOSE        LIVER FUNCTIONS - ( 01 Jul 2024 07:10 )  Alb: 2.1 g/dL / Pro: 5.5 g/dL / ALK PHOS: 43 U/L / ALT: 14 U/L / AST: 17 U/L / GGT: x           Urinalysis Basic - ( 01 Jul 2024 07:10 )    Color: x / Appearance: x / SG: x / pH: x  Gluc: 79 mg/dL / Ketone: x  / Bili: x / Urobili: x   Blood: x / Protein: x / Nitrite: x   Leuk Esterase: x / RBC: x / WBC x   Sq Epi: x / Non Sq Epi: x / Bacteria: x        RADIOLOGY & ADDITIONAL TESTS: Personally reviewed.     No new imaging to review at this time

## 2024-07-01 NOTE — PROGRESS NOTE ADULT - ASSESSMENT
89 yo female w/ hypertension, paroxysmal afib on Eliquis, cardiomyopathy, CAD, Hx of cardiac stents, admitted for anemia , FTT, digoxin toxicity.    #Microcytic anemia  - Hgb 9.4 on admission > 8.7 today  - Recent EUS ? secondary to slow blood loss from upper GI source vs nutritional sec to restricted oral intake from difficulty swallowing  - FOBT negative  - S/p 1 unit 6/30  - C/w venofer, today dose 2  - Keep T&S active  - Hold antiplatelet and anticoagulant, consider restart when more stable   - Monitor CBC     #Failure to thrive   - Poor appetite weight loss  - Per PMD - Dr Allison who spoke with dr. sean sprague- reported that pt s/p upper endoscopy for cystic pancreatic mass, but nothing was dx  - Pt has been loosing weight inspite of eating normally, lost 20 lbs in last 6 months.   - Avoid nephrotoxins     #Dysphagia  - Speech and swallow - rec minced and moist  - Recent esophageal dilatation done in April   - Follows with GI Dr. Escudero at [place]  - GI recommendations appreciated- no plan for repeat endoscopy    #Chronic Afib  - EKG- AFIB- 83 currently rate controlled  - Cardio recommendations appreciated  - Digoxin level 2.8> 2.0 >1.5 > 1.3  - Digoxin d/c'd, will need to follow up outpatient   - C/w metoprolol succinate ER 25mg qd    #History of CHF, HTN, CAD s/p stents  - C/w statin, farxiga, amlodipine, beta blocker  - Discontinued aspirin and plavix, possibly consider restarting pending stable hgb  - TTE 5/24: LVEF 60-65%, mild mod TR    #Pancreatic cyst  - CT-(05.26.24 @ 21:19) >2.4 cm complex lesion in the pancreatic tail with imaging features consistent with serous cystadenoma   - CT abd/pelvis 6/26: Stable well-circumscribed cystic pancreatic tail mass. Cardiomegaly with small pericardial effusion.    #Right subclavian artery with severe atherosclerosis   #Uneven BP's.   - Per prior chart-  Left side BP higher than right side BP  - CTA and carotid duplex noted  - Vascular surgery consulted, no acute vascular intervention required, continue Eliquis and Plavix  - F/u outpatient with vascular surgery     #DVT prophylaxis   - Eliquis, hold for anemia     Dispo: PT recommends home PT, however family wanted [facility] - to have family discussion about this today.     Case discussed with Dr. Nguyen

## 2024-07-02 ENCOUNTER — APPOINTMENT (OUTPATIENT)
Dept: CARDIOLOGY | Facility: CLINIC | Age: 89
End: 2024-07-02

## 2024-07-02 LAB
ANION GAP SERPL CALC-SCNC: 5 MMOL/L — SIGNIFICANT CHANGE UP (ref 5–17)
BUN SERPL-MCNC: 27 MG/DL — HIGH (ref 7–23)
CALCIUM SERPL-MCNC: 9.1 MG/DL — SIGNIFICANT CHANGE UP (ref 8.4–10.5)
CHLORIDE SERPL-SCNC: 103 MMOL/L — SIGNIFICANT CHANGE UP (ref 96–108)
CO2 SERPL-SCNC: 32 MMOL/L — HIGH (ref 22–31)
CREAT SERPL-MCNC: 0.95 MG/DL — SIGNIFICANT CHANGE UP (ref 0.5–1.3)
EGFR: 58 ML/MIN/1.73M2 — LOW
GLUCOSE SERPL-MCNC: 85 MG/DL — SIGNIFICANT CHANGE UP (ref 70–99)
HAPTOGLOB SERPL-MCNC: 238 MG/DL — HIGH (ref 34–200)
HCT VFR BLD CALC: 31.5 % — LOW (ref 34.5–45)
HGB BLD-MCNC: 9.5 G/DL — LOW (ref 11.5–15.5)
LDH SERPL L TO P-CCNC: 281 U/L — HIGH (ref 50–242)
MCHC RBC-ENTMCNC: 23.9 PG — LOW (ref 27–34)
MCHC RBC-ENTMCNC: 30.2 GM/DL — LOW (ref 32–36)
MCV RBC AUTO: 79.1 FL — LOW (ref 80–100)
NRBC # BLD: 0 /100 WBCS — SIGNIFICANT CHANGE UP (ref 0–0)
OB PNL STL: NEGATIVE — SIGNIFICANT CHANGE UP
PLATELET # BLD AUTO: 256 K/UL — SIGNIFICANT CHANGE UP (ref 150–400)
POTASSIUM SERPL-MCNC: 4.8 MMOL/L — SIGNIFICANT CHANGE UP (ref 3.5–5.3)
POTASSIUM SERPL-SCNC: 4.8 MMOL/L — SIGNIFICANT CHANGE UP (ref 3.5–5.3)
RBC # BLD: 3.98 M/UL — SIGNIFICANT CHANGE UP (ref 3.8–5.2)
RBC # BLD: 3.98 M/UL — SIGNIFICANT CHANGE UP (ref 3.8–5.2)
RBC # FLD: 17.9 % — HIGH (ref 10.3–14.5)
RETICS #: 80.6 K/UL — SIGNIFICANT CHANGE UP (ref 25–125)
RETICS/RBC NFR: 2 % — SIGNIFICANT CHANGE UP (ref 0.5–2.5)
SODIUM SERPL-SCNC: 140 MMOL/L — SIGNIFICANT CHANGE UP (ref 135–145)
WBC # BLD: 8.34 K/UL — SIGNIFICANT CHANGE UP (ref 3.8–10.5)
WBC # FLD AUTO: 8.34 K/UL — SIGNIFICANT CHANGE UP (ref 3.8–10.5)

## 2024-07-02 PROCEDURE — 99233 SBSQ HOSP IP/OBS HIGH 50: CPT

## 2024-07-02 RX ORDER — SENNOSIDES 8.6 MG
1 TABLET ORAL
Refills: 0 | DISCHARGE

## 2024-07-02 RX ORDER — SACUBITRIL AND VALSARTAN 97; 103 MG/1; MG/1
1 TABLET, FILM COATED ORAL
Refills: 0 | DISCHARGE

## 2024-07-02 RX ORDER — SACUBITRIL AND VALSARTAN 97; 103 MG/1; MG/1
1 TABLET, FILM COATED ORAL
Refills: 0 | Status: DISCONTINUED | OUTPATIENT
Start: 2024-07-02 | End: 2024-07-08

## 2024-07-02 RX ORDER — CLOPIDOGREL BISULFATE 75 MG/1
75 TABLET, FILM COATED ORAL DAILY
Refills: 0 | Status: DISCONTINUED | OUTPATIENT
Start: 2024-07-02 | End: 2024-07-08

## 2024-07-02 RX ORDER — PANTOPRAZOLE SODIUM 40 MG/10ML
1 INJECTION, POWDER, FOR SOLUTION INTRAVENOUS
Refills: 0 | DISCHARGE

## 2024-07-02 RX ORDER — AMLODIPINE BESYLATE 2.5 MG/1
1 TABLET ORAL
Refills: 0 | DISCHARGE

## 2024-07-02 RX ORDER — ATORVASTATIN CALCIUM 80 MG/1
1 TABLET, FILM COATED ORAL
Qty: 0 | Refills: 0 | DISCHARGE

## 2024-07-02 RX ORDER — APIXABAN 5 MG/1
2.5 TABLET, FILM COATED ORAL EVERY 12 HOURS
Refills: 0 | Status: DISCONTINUED | OUTPATIENT
Start: 2024-07-02 | End: 2024-07-08

## 2024-07-02 RX ORDER — APIXABAN 2.5 MG/1
1 TABLET, FILM COATED ORAL
Refills: 0 | DISCHARGE

## 2024-07-02 RX ADMIN — Medication 1 TABLET(S): at 11:06

## 2024-07-02 RX ADMIN — Medication 25 MILLIGRAM(S): at 05:50

## 2024-07-02 RX ADMIN — POLYETHYLENE GLYCOL 3350 17 GRAM(S): 1 POWDER ORAL at 11:06

## 2024-07-02 RX ADMIN — SACUBITRIL AND VALSARTAN 1 TABLET(S): 97; 103 TABLET, FILM COATED ORAL at 17:09

## 2024-07-02 RX ADMIN — Medication 2 TABLET(S): at 21:41

## 2024-07-02 RX ADMIN — Medication 500 MILLIGRAM(S): at 11:05

## 2024-07-02 RX ADMIN — AMLODIPINE BESYLATE 2.5 MILLIGRAM(S): 2.5 TABLET ORAL at 05:50

## 2024-07-02 RX ADMIN — CLOPIDOGREL BISULFATE 75 MILLIGRAM(S): 75 TABLET, FILM COATED ORAL at 11:06

## 2024-07-02 RX ADMIN — PANTOPRAZOLE SODIUM 40 MILLIGRAM(S): 40 INJECTION, POWDER, FOR SOLUTION INTRAVENOUS at 11:05

## 2024-07-02 RX ADMIN — APIXABAN 2.5 MILLIGRAM(S): 5 TABLET, FILM COATED ORAL at 17:09

## 2024-07-02 RX ADMIN — IRON SUCROSE 210 MILLIGRAM(S): 20 INJECTION, SOLUTION INTRAVENOUS at 09:20

## 2024-07-02 RX ADMIN — DAPAGLIFLOZIN 10 MILLIGRAM(S): 10 TABLET, FILM COATED ORAL at 05:50

## 2024-07-02 RX ADMIN — ATORVASTATIN CALCIUM 40 MILLIGRAM(S): 20 TABLET, FILM COATED ORAL at 21:41

## 2024-07-02 NOTE — PROGRESS NOTE ADULT - SUBJECTIVE AND OBJECTIVE BOX
Patient is a 88y old  Female who presents with a chief complaint of anemia, afib (28 Jun 2024 13:30)    INTERVAL HPI/OVERNIGHT EVENTS: Patient seen and examined at bedside. No overnight events.     MEDICATIONS  (STANDING):  amLODIPine   Tablet 2.5 milliGRAM(s) Oral daily  ascorbic acid 500 milliGRAM(s) Oral daily  atorvastatin 40 milliGRAM(s) Oral at bedtime  dapagliflozin 10 milliGRAM(s) Oral every 24 hours  iron sucrose IVPB 100 milliGRAM(s) IV Intermittent every 24 hours  metoprolol succinate ER 25 milliGRAM(s) Oral daily  multivitamin 1 Tablet(s) Oral daily  pantoprazole   Suspension 40 milliGRAM(s) Oral daily  polyethylene glycol 3350 17 Gram(s) Oral daily  senna 2 Tablet(s) Oral at bedtime    MEDICATIONS  (PRN):  acetaminophen     Tablet .. 650 milliGRAM(s) Oral every 6 hours PRN Temp greater or equal to 38C (100.4F), Mild Pain (1 - 3)  aluminum hydroxide/magnesium hydroxide/simethicone Suspension 30 milliLiter(s) Oral every 4 hours PRN Dyspepsia  melatonin 3 milliGRAM(s) Oral at bedtime PRN Insomnia  ondansetron Injectable 4 milliGRAM(s) IV Push every 8 hours PRN Nausea and/or Vomiting    Allergies  No Known Allergies  Intolerances    Vital Signs Last 24 Hrs  T(C): 36.5 (02 Jul 2024 05:21), Max: 36.8 (01 Jul 2024 12:44)  T(F): 97.7 (02 Jul 2024 05:21), Max: 98.3 (01 Jul 2024 12:44)  HR: 69 (02 Jul 2024 05:21) (59 - 69)  BP: 165/61 (02 Jul 2024 05:21) (150/56 - 165/61)  BP(mean): --  RR: 16 (02 Jul 2024 05:21) (16 - 17)  SpO2: 94% (02 Jul 2024 05:21) (94% - 99%)    Parameters below as of 02 Jul 2024 05:21  Patient On (Oxygen Delivery Method): room air      PHYSICAL EXAM:  GENERAL: NAD, frail/thin  HEENT:  AT/NC,  EOMI  CHEST/LUNG:  normal respiratory effort, no intercostal retractions  HEART:  regular rate  ABDOMEN:  BS+, soft, nontender, nondistended; No HSM  NERVOUS SYSTEM: answers questions and follows commands appropriately, A&Ox3 grossly moves all extremities   PSYCH: Appropriate affect, Alert & Awake; Good judgement    Labs:   Personally reviewed.                              8.7    8.08  )-----------( 230      ( 01 Jul 2024 07:10 )             29.7     01 Jul 2024 07:10    138    |  103    |  25     ----------------------------<  79     4.6     |  32     |  0.96     Ca    8.6        01 Jul 2024 07:10    TPro  5.5    /  Alb  2.1    /  TBili  0.5    /  DBili  x      /  AST  17     /  ALT  14     /  AlkPhos  43     01 Jul 2024 07:10      CAPILLARY BLOOD GLUCOSE        LIVER FUNCTIONS - ( 01 Jul 2024 07:10 )  Alb: 2.1 g/dL / Pro: 5.5 g/dL / ALK PHOS: 43 U/L / ALT: 14 U/L / AST: 17 U/L / GGT: x           Urinalysis Basic - ( 01 Jul 2024 07:10 )    Color: x / Appearance: x / SG: x / pH: x  Gluc: 79 mg/dL / Ketone: x  / Bili: x / Urobili: x   Blood: x / Protein: x / Nitrite: x   Leuk Esterase: x / RBC: x / WBC x   Sq Epi: x / Non Sq Epi: x / Bacteria: x            RADIOLOGY & ADDITIONAL TESTS: Personally reviewed.     No new imaging to review at this time          Patient is a 88y old  Female who presents with a chief complaint of anemia, afib (28 Jun 2024 13:30)    INTERVAL HPI/OVERNIGHT EVENTS: Patient seen and examined at bedside. No overnight events. Feeling well, wishes to go home but is open to possible [facility], will discuss with Rashmi Post, today. Unsure of what medications she takes at home. NieceRashmi, to bring with her today.     MEDICATIONS  (STANDING):  amLODIPine   Tablet 2.5 milliGRAM(s) Oral daily  ascorbic acid 500 milliGRAM(s) Oral daily  atorvastatin 40 milliGRAM(s) Oral at bedtime  dapagliflozin 10 milliGRAM(s) Oral every 24 hours  iron sucrose IVPB 100 milliGRAM(s) IV Intermittent every 24 hours  metoprolol succinate ER 25 milliGRAM(s) Oral daily  multivitamin 1 Tablet(s) Oral daily  pantoprazole   Suspension 40 milliGRAM(s) Oral daily  polyethylene glycol 3350 17 Gram(s) Oral daily  senna 2 Tablet(s) Oral at bedtime    MEDICATIONS  (PRN):  acetaminophen     Tablet .. 650 milliGRAM(s) Oral every 6 hours PRN Temp greater or equal to 38C (100.4F), Mild Pain (1 - 3)  aluminum hydroxide/magnesium hydroxide/simethicone Suspension 30 milliLiter(s) Oral every 4 hours PRN Dyspepsia  melatonin 3 milliGRAM(s) Oral at bedtime PRN Insomnia  ondansetron Injectable 4 milliGRAM(s) IV Push every 8 hours PRN Nausea and/or Vomiting    Allergies  No Known Allergies  Intolerances    Vital Signs Last 24 Hrs  T(C): 36.5 (02 Jul 2024 05:21), Max: 36.8 (01 Jul 2024 12:44)  T(F): 97.7 (02 Jul 2024 05:21), Max: 98.3 (01 Jul 2024 12:44)  HR: 69 (02 Jul 2024 05:21) (59 - 69)  BP: 165/61 (02 Jul 2024 05:21) (150/56 - 165/61)  BP(mean): --  RR: 16 (02 Jul 2024 05:21) (16 - 17)  SpO2: 94% (02 Jul 2024 05:21) (94% - 99%)    Parameters below as of 02 Jul 2024 05:21  Patient On (Oxygen Delivery Method): room air      PHYSICAL EXAM:  GENERAL: NAD, frail/thin  HEENT:  AT/NC,  EOMI  CHEST/LUNG:  normal respiratory effort, no intercostal retractions  HEART:  regular rate  ABDOMEN:  BS+, soft, nontender, nondistended; No HSM  NERVOUS SYSTEM: answers questions and follows commands appropriately, A&Ox3 grossly moves all extremities   PSYCH: Appropriate affect, Alert & Awake; Good judgement    Labs:   Personally reviewed.                     9.5    8.34  )-----------( 256      ( 02 Jul 2024 06:18 )             31.5     02 Jul 2024 06:18    140    |  103    |  27     ----------------------------<  85     4.8     |  32     |  0.95     Ca    9.1        02 Jul 2024 06:18    TPro  5.5    /  Alb  2.1    /  TBili  0.5    /  DBili  x      /  AST  17     /  ALT  14     /  AlkPhos  43     01 Jul 2024 07:10      CAPILLARY BLOOD GLUCOSE        LIVER FUNCTIONS - ( 01 Jul 2024 07:10 )  Alb: 2.1 g/dL / Pro: 5.5 g/dL / ALK PHOS: 43 U/L / ALT: 14 U/L / AST: 17 U/L / GGT: x           Urinalysis Basic - ( 02 Jul 2024 06:18 )    Color: x / Appearance: x / SG: x / pH: x  Gluc: 85 mg/dL / Ketone: x  / Bili: x / Urobili: x   Blood: x / Protein: x / Nitrite: x   Leuk Esterase: x / RBC: x / WBC x   Sq Epi: x / Non Sq Epi: x / Bacteria: x        RADIOLOGY & ADDITIONAL TESTS: Personally reviewed.     No new imaging to review at this time

## 2024-07-02 NOTE — PROGRESS NOTE ADULT - ATTENDING COMMENTS
agree with above  resume eliquis and plavix  continue with venofer (day #3)  family meeting this afternoon to discuss [facility]

## 2024-07-02 NOTE — PROGRESS NOTE ADULT - ASSESSMENT
89 yo female w/ hypertension, paroxysmal afib on Eliquis, cardiomyopathy, CAD, Hx of cardiac stents, admitted for anemia , FTT, digoxin toxicity.    #Microcytic anemia  - Hgb 9.4 on admission > ? today  - Recent EUS ? secondary to slow blood loss from upper GI source vs nutritional sec to restricted oral intake from difficulty swallowing  - FOBT negative  - S/p 1 unit 6/30  - C/w venofer, s/p 2 doses   - Keep T&S active  - Hold antiplatelet and anticoagulant, consider restart when more stable   - Monitor CBC     #Failure to thrive   - Poor appetite weight loss  - Per PMD - Dr Allison who spoke with dr. sean sprague- reported that pt s/p upper endoscopy for cystic pancreatic mass, but nothing was dx  - Pt has been loosing weight inspite of eating normally, lost 20 lbs in last 6 months.   - Avoid nephrotoxins   - Appreciate GI recs: f/u w/ outpatient GI     #Dysphagia  - Speech and swallow - c/w minced and moist  - Recent esophageal dilatation done in April   - Follows with GI Dr. Escudero at [place]  - GI recommendations appreciated: no plan for repeat endoscopy    #Chronic Afib  - EKG- AFIB- 83 currently rate controlled  - Cardio recommendations appreciated  - Digoxin level 2.8> 2.0 >1.5 > 1.3  - Digoxin d/c'd, will need to follow up outpatient   - C/w metoprolol succinate ER 25mg qd    #History of CHF, HTN, CAD s/p stents  - C/w statin, farxiga, amlodipine, beta blocker  - Discontinued aspirin and plavix, possibly consider restarting pending stable hgb  - TTE 5/24: LVEF 60-65%, mild mod TR    #Pancreatic cyst  - CT-(05.26.24 @ 21:19) >2.4 cm complex lesion in the pancreatic tail with imaging features consistent with serous cystadenoma   - CT abd/pelvis 6/26: Stable well-circumscribed cystic pancreatic tail mass. Cardiomegaly with small pericardial effusion.  - Appreciate GI recs: f/u w/ outpatient GI     #Right subclavian artery with severe atherosclerosis   #Uneven BP's.   - Per prior chart-  Left side BP higher than right side BP  - CTA and carotid duplex noted  - Vascular surgery consulted, no acute vascular intervention required, continue Eliquis and Plavix  - F/u outpatient with vascular surgery     #DVT prophylaxis   - Eliquis, hold for anemia     Dispo: PT recommends home PT, however family wants [facility], do not think it is a safe home discharge - family unable to come yesterday, [facility] list at bedside, family discussion planned for today.     Case discussed with Dr. Nguyen 89 yo female w/ hypertension, paroxysmal afib on Eliquis, cardiomyopathy, CAD, Hx of cardiac stents, admitted for anemia , FTT, digoxin toxicity.    #Microcytic anemia, anemia of chronic disease   - Hgb 9.4 on admission > 9.5 today  - Recent EUS ? secondary to slow blood loss from upper GI source vs nutritional sec to restricted oral intake from difficulty swallowing  - FOBT negative  - S/p 1 unit 6/30  - C/w venofer, s/p 2 doses   - Labs indicative of anemia of chronic disease  - Keep T&S active  - Restart eliquis 2.5 mg BID and plavix 75mg QD today   - Monitor CBC     #Failure to thrive   - Poor appetite weight loss  - Per PMD - Dr Allison who spoke with dr. sean sprague- reported that pt s/p upper endoscopy for cystic pancreatic mass, but nothing was dx  - Pt has been loosing weight inspite of eating normally, lost 20 lbs in last 6 months.   - Avoid nephrotoxins   - Appreciate GI recs: f/u w/ outpatient GI     #Dysphagia  - Speech and swallow - c/w minced and moist  - Recent esophageal dilatation done in April   - Follows with GI Dr. Escudero at [place]  - GI recommendations appreciated: no plan for repeat endoscopy    #Chronic Afib  - EKG- AFIB- 83 currently rate controlled  - Cardio recommendations appreciated  - Digoxin level 2.8> 2.0 >1.5 > 1.3  - Digoxin d/c'd, will need to follow up outpatient   - C/w metoprolol succinate ER 25mg qd    #History of CHF, HTN, CAD s/p stents  - TTE 5/24: LVEF 60-65%, mild mod TR  - C/w statin, farxiga, amlodipine, beta blocker  - Restart eliquis and plavix  - Per [facility], patient was also prescribed entresto 25-26 BID and carvedilol 12.5mg BID  - Niece to bring in medication list, will update med rec     #Pancreatic cyst  - CT-(05.26.24 @ 21:19) >2.4 cm complex lesion in the pancreatic tail with imaging features consistent with serous cystadenoma   - CT abd/pelvis 6/26: Stable well-circumscribed cystic pancreatic tail mass. Cardiomegaly with small pericardial effusion.  - Appreciate GI recs: f/u w/ outpatient GI     #Right subclavian artery with severe atherosclerosis   #Uneven BP's.   - Per prior chart-  Left side BP higher than right side BP  - CTA and carotid duplex noted  - Vascular surgery consulted, no acute vascular intervention required, continue Eliquis and Plavix  - F/u outpatient with vascular surgery     #DVT prophylaxis   - Eliquis    Dispo: PT recommends home PT, however family wants [facility], do not think it is a safe home discharge - family unable to come yesterday, [facility] list at bedside, family discussion planned for today.     Case discussed with attending, Dr. Nguyen

## 2024-07-02 NOTE — PROGRESS NOTE ADULT - PARTICIPANTS
Patient would like to be FULL code, all steps taken; surrogate decision maker designated as Rashmi mcintosh/Patient

## 2024-07-03 ENCOUNTER — TRANSCRIPTION ENCOUNTER (OUTPATIENT)
Age: 89
End: 2024-07-03

## 2024-07-03 LAB
ANION GAP SERPL CALC-SCNC: 3 MMOL/L — LOW (ref 5–17)
BUN SERPL-MCNC: 27 MG/DL — HIGH (ref 7–23)
CALCIUM SERPL-MCNC: 8.6 MG/DL — SIGNIFICANT CHANGE UP (ref 8.4–10.5)
CHLORIDE SERPL-SCNC: 100 MMOL/L — SIGNIFICANT CHANGE UP (ref 96–108)
CO2 SERPL-SCNC: 32 MMOL/L — HIGH (ref 22–31)
CREAT SERPL-MCNC: 0.94 MG/DL — SIGNIFICANT CHANGE UP (ref 0.5–1.3)
EGFR: 59 ML/MIN/1.73M2 — LOW
GLUCOSE SERPL-MCNC: 85 MG/DL — SIGNIFICANT CHANGE UP (ref 70–99)
HCT VFR BLD CALC: 35 % — SIGNIFICANT CHANGE UP (ref 34.5–45)
HGB BLD-MCNC: 10.2 G/DL — LOW (ref 11.5–15.5)
MCHC RBC-ENTMCNC: 23.4 PG — LOW (ref 27–34)
MCHC RBC-ENTMCNC: 29.1 GM/DL — LOW (ref 32–36)
MCV RBC AUTO: 80.5 FL — SIGNIFICANT CHANGE UP (ref 80–100)
NRBC # BLD: 0 /100 WBCS — SIGNIFICANT CHANGE UP (ref 0–0)
PLATELET # BLD AUTO: 254 K/UL — SIGNIFICANT CHANGE UP (ref 150–400)
POTASSIUM SERPL-MCNC: 4.6 MMOL/L — SIGNIFICANT CHANGE UP (ref 3.5–5.3)
POTASSIUM SERPL-SCNC: 4.6 MMOL/L — SIGNIFICANT CHANGE UP (ref 3.5–5.3)
RBC # BLD: 4.35 M/UL — SIGNIFICANT CHANGE UP (ref 3.8–5.2)
RBC # FLD: 19.4 % — HIGH (ref 10.3–14.5)
SODIUM SERPL-SCNC: 135 MMOL/L — SIGNIFICANT CHANGE UP (ref 135–145)
WBC # BLD: 5.57 K/UL — SIGNIFICANT CHANGE UP (ref 3.8–10.5)
WBC # FLD AUTO: 5.57 K/UL — SIGNIFICANT CHANGE UP (ref 3.8–10.5)

## 2024-07-03 PROCEDURE — 99232 SBSQ HOSP IP/OBS MODERATE 35: CPT

## 2024-07-03 RX ORDER — IRON SUCROSE 20 MG/ML
100 INJECTION, SOLUTION INTRAVENOUS ONCE
Refills: 0 | Status: COMPLETED | OUTPATIENT
Start: 2024-07-03 | End: 2024-07-03

## 2024-07-03 RX ADMIN — APIXABAN 2.5 MILLIGRAM(S): 5 TABLET, FILM COATED ORAL at 05:18

## 2024-07-03 RX ADMIN — AMLODIPINE BESYLATE 2.5 MILLIGRAM(S): 2.5 TABLET ORAL at 05:18

## 2024-07-03 RX ADMIN — Medication 2 TABLET(S): at 21:06

## 2024-07-03 RX ADMIN — APIXABAN 2.5 MILLIGRAM(S): 5 TABLET, FILM COATED ORAL at 17:06

## 2024-07-03 RX ADMIN — SACUBITRIL AND VALSARTAN 1 TABLET(S): 97; 103 TABLET, FILM COATED ORAL at 17:06

## 2024-07-03 RX ADMIN — Medication 500 MILLIGRAM(S): at 11:35

## 2024-07-03 RX ADMIN — Medication 1 TABLET(S): at 11:35

## 2024-07-03 RX ADMIN — IRON SUCROSE 210 MILLIGRAM(S): 20 INJECTION, SOLUTION INTRAVENOUS at 11:34

## 2024-07-03 RX ADMIN — ATORVASTATIN CALCIUM 40 MILLIGRAM(S): 20 TABLET, FILM COATED ORAL at 21:06

## 2024-07-03 RX ADMIN — PANTOPRAZOLE SODIUM 40 MILLIGRAM(S): 40 INJECTION, POWDER, FOR SOLUTION INTRAVENOUS at 11:35

## 2024-07-03 RX ADMIN — SACUBITRIL AND VALSARTAN 1 TABLET(S): 97; 103 TABLET, FILM COATED ORAL at 05:17

## 2024-07-03 RX ADMIN — DAPAGLIFLOZIN 10 MILLIGRAM(S): 10 TABLET, FILM COATED ORAL at 05:19

## 2024-07-03 RX ADMIN — POLYETHYLENE GLYCOL 3350 17 GRAM(S): 1 POWDER ORAL at 11:35

## 2024-07-03 RX ADMIN — CLOPIDOGREL BISULFATE 75 MILLIGRAM(S): 75 TABLET, FILM COATED ORAL at 11:35

## 2024-07-03 RX ADMIN — Medication 25 MILLIGRAM(S): at 05:18

## 2024-07-03 NOTE — DISCHARGE NOTE PROVIDER - NSDCCAREPROVSEEN_GEN_ALL_CORE_FT
Rhona Cuba, Siobhan Braga, Angel Thomas, Alejandro Lema, Mimi Bojorquez, David Weaver, Jessie Pichardo, Radha Hooks, Barrington Adair, Areli Frederick, Maureen Ng

## 2024-07-03 NOTE — PROGRESS NOTE ADULT - ASSESSMENT
89 yo female w/ hypertension, paroxysmal afib on Eliquis, cardiomyopathy, CAD, Hx of cardiac stents, admitted for anemia , FTT, digoxin toxicity.    #Microcytic anemia, anemia of chronic disease   - Hgb 9.4 on admission > 9.5 yesterday   - Recent EUS ? 2/2 to slow blood loss from upper GI source vs nutritional sec to restricted oral intake from difficulty swallowing  - FOBT negative  - S/p 1 unit 6/30  - C/w venofer, s/p 3 doses   - Labs indicative of anemia of chronic disease  - Keep T&S active  - Eliquis and plavix restarted   - Monitor CBC     #Failure to thrive   - Poor appetite weight loss  - Per PMD - Dr Allison who spoke with dr. sean sprague- reported that pt s/p upper endoscopy for cystic pancreatic mass, but nothing was dx  - Pt has been loosing weight inspite of eating normally, lost 20 lbs in last 6 months.   - Avoid nephrotoxins   - Appreciate GI recs: f/u w/ outpatient GI     #Dysphagia  - Speech and swallow - c/w minced and moist  - Recent esophageal dilatation done in April   - Follows with GI Dr. Esucdero at [facility]  - GI recommendations appreciated: no plan for repeat endoscopy    #Chronic Afib  - EKG- AFIB- 83 currently rate controlled  - Cardio recommendations appreciated  - Digoxin level 2.8> 2.0 >1.5 > 1.3  - Digoxin d/c'd, will need to follow up outpatient   - C/w metoprolol succinate ER 25mg qd    #History of CHF, HTN, CAD s/p stents  - TTE 5/24: LVEF 60-65%, mild mod TR  - C/w statin, farxiga, amlodipine, beta blocker  - Restarted eliquis and plavix  - Per med rec, patient was also on entresto 25-26 BID, restarted     #Pancreatic cyst  - CT-(05.26.24 @ 21:19) >2.4 cm complex lesion in the pancreatic tail with imaging features consistent with serous cystadenoma   - CT abd/pelvis 6/26: Stable well-circumscribed cystic pancreatic tail mass. Cardiomegaly with small pericardial effusion.  - Appreciate GI recs: f/u w/ outpatient GI     #Right subclavian artery with severe atherosclerosis   #Uneven BP's.   - Per prior chart-  Left side BP higher than right side BP  - CTA and carotid duplex noted  - Vascular surgery consulted, no acute vascular intervention required, continue Eliquis and Plavix  - F/u outpatient with vascular surgery     #DVT prophylaxis   - Eliquis    Dispo: Patient amenable to [facility], elects for niece, Rashmi, to choose - provided with list yesterday, aware CW to f/u - pending elections    Case discussed with attending, Dr. Nguyen 87 yo female w/ hypertension, paroxysmal afib on Eliquis, cardiomyopathy, CAD, Hx of cardiac stents, admitted for anemia , FTT, digoxin toxicity - pending [facility] elections.    #Microcytic anemia, anemia of chronic disease   - Hgb 9.4 on admission > 9.5 yesterday > 10.2 today   - Recent EUS ? 2/2 to slow blood loss from upper GI source vs nutritional sec to restricted oral intake from difficulty swallowing  - FOBT negative  - S/p 1 unit 6/30  - C/w venofer, s/p 3 doses   - Labs indicative of anemia of chronic disease  - Keep T&S active  - Eliquis and plavix restarted   - Monitor CBC     #Failure to thrive   - Poor appetite weight loss  - Per PMD - Dr Allison who spoke with dr. sean sprague- reported that pt s/p upper endoscopy for cystic pancreatic mass, but nothing was dx  - Pt has been loosing weight inspite of eating normally, lost 20 lbs in last 6 months.   - Avoid nephrotoxins   - Appreciate GI recs: f/u w/ outpatient GI     #Dysphagia  - Speech and swallow - c/w minced and moist  - Recent esophageal dilatation done in April   - Follows with GI Dr. Escudero at [place]  - GI recommendations appreciated: no plan for repeat endoscopy    #Chronic Afib  - EKG- AFIB- 83 currently rate controlled  - Cardio recommendations appreciated  - Digoxin level 2.8> 2.0 >1.5 > 1.3  - Digoxin d/c'd, will need to follow up outpatient   - C/w metoprolol succinate ER 25mg qd    #HFimpEF, HTN, CAD s/p stents  - TTE 5/24: LVEF 60-65%, mild mod TR  - C/w statin, farxiga, amlodipine, beta blocker  - C/w eliquis and plavix  - Per med rec, patient was also on entresto 25-26 BID, restarted today  - Per GDMT guidelines, HFimpEF, can start MRA, given entresto just started today, will assess response and consider adding MRA tomorrow given response  - Monitor vitals    #Pancreatic cyst  - CT-(05.26.24 @ 21:19) >2.4 cm complex lesion in the pancreatic tail with imaging features consistent with serous cystadenoma   - CT abd/pelvis 6/26: Stable well-circumscribed cystic pancreatic tail mass. Cardiomegaly with small pericardial effusion.  - Appreciate GI recs: f/u w/ outpatient GI     #Right subclavian artery with severe atherosclerosis   #Uneven BP's.   - Per prior chart-  Left side BP higher than right side BP  - CTA and carotid duplex noted  - Vascular surgery consulted, no acute vascular intervention required, continue Eliquis and Plavix  - F/u outpatient with vascular surgery     #DVT prophylaxis   - Eliquis    Dispo: Patient amenable to [facility], elects for niece, Rashmi, to choose - provided with list yesterday, aware CW to f/u - pending elections    Case discussed with attending, Dr. Nguyen

## 2024-07-03 NOTE — DISCHARGE NOTE PROVIDER - NSDCMRMEDTOKEN_GEN_ALL_CORE_FT
AmLODIPine: 2.5 once a day  atorvastatin 40 mg oral tablet: 1 tab(s) orally once a day (at bedtime)  carvedilol 12.5 mg oral tablet: 1 tab(s) orally 2 times a day  clopidogrel 75 mg oral tablet: 1 tab(s) orally once a day  dapagliflozin 10 mg oral tablet: 1 tab(s) orally every 24 hours  Digox 250 mcg (0.25 mg) oral tablet: 1 tab(s) orally once a day  Eliquis 2.5 mg oral tablet: 1 tab(s) orally 2 times a day  Entresto 24 mg-26 mg oral tablet: 1 tab(s) orally 2 times a day  hydrALAZINE 50 mg oral tablet: 1 tab(s) orally 2 times a day  metoprolol succinate 25 mg oral tablet, extended release: 1 tab(s) orally once a day (at bedtime)  pantoprazole 40 mg oral delayed release tablet: 1 tab(s) orally 2 times a day  Senna 8.6 mg oral tablet: 1 tab(s) orally once a day as needed for  constipation  sucralfate 1 g oral tablet: 1 tab(s) orally 3 times a day   AmLODIPine: 2.5 once a day  ascorbic acid 500 mg oral tablet: 1 tab(s) orally once a day  atorvastatin 40 mg oral tablet: 1 tab(s) orally once a day (at bedtime)  clopidogrel 75 mg oral tablet: 1 tab(s) orally once a day  dapagliflozin 10 mg oral tablet: 1 tab(s) orally every 24 hours  Eliquis 2.5 mg oral tablet: 1 tab(s) orally 2 times a day  Entresto 24 mg-26 mg oral tablet: 1 tab(s) orally 2 times a day  ferrous sulfate 325 mg (65 mg elemental iron) oral tablet: 1 tab(s) orally once a day  metoprolol succinate 25 mg oral tablet, extended release: 1 tab(s) orally once a day (at bedtime)  Multiple Vitamins oral tablet: 1 tab(s) orally once a day  pantoprazole 40 mg oral delayed release tablet: 1 tab(s) orally 2 times a day  polyethylene glycol 3350 oral powder for reconstitution: 17 gram(s) orally once a day  Senna 8.6 mg oral tablet: 1 tab(s) orally once a day as needed for  constipation

## 2024-07-03 NOTE — DISCHARGE NOTE PROVIDER - HOSPITAL COURSE
Patient is a 88-year-old female with PMHx of hypertension, paroxysmal afib on Eliquis, cardiomyopathy, CAD, Hx of cardiac stents, admitted for anemia , FTT, digoxin toxicity. Patient's digoxin was continued during her stay with levels downtrended. Patient seen by Cardiology with recommendation for outpatient Cardiology follow-up regarding digoxin. She was continued on metoprolol succinate ER 25 QD with continued rate control. Patient was seen by GI during this hospitalization for failure to thrive w/ CT showing stable, well-circumscribed pancreatic tail mass, she was recommended to follow-up with GI outpatient. Patient was noted to have anemia during her stay for which her eliquis and plavix were discontinued, she was treated with venofer with good improvement in her hemoglobin which remained stable with restart of her anticoagulation medications. Patient noted to have R subclavian artery w/ severe atherosclerosis, vascular was consulted, no intervention at this time, to follow-up outpatient with vascular surgery. PCP hand-off provided to Dr. Allison. Patient is medically optimized for discharge to [facility].     Patient is a 88-year-old female with PMHx of hypertension, paroxysmal afib on Eliquis, cardiomyopathy, CAD, Hx of cardiac stents, admitted for anemia , FTT, digoxin toxicity. Patient's digoxin was discontinued during her stay with levels downtrended. Patient seen by Cardiology with recommendation for outpatient Cardiology follow-up regarding digoxin. She was continued on metoprolol succinate ER 25 QD with continued rate control. Patient was seen by GI during this hospitalization for failure to thrive w/ CT showing stable, well-circumscribed pancreatic tail mass, she was recommended to follow-up with GI outpatient. Patient was noted to have anemia during her stay for which her eliquis and plavix were discontinued, she was treated with venofer with good improvement in her hemoglobin which remained stable with restart of her anticoagulation medications. Patient noted to have R subclavian artery w/ severe atherosclerosis, vascular was consulted, no intervention at this time, to follow-up outpatient with vascular surgery. PCP hand-off provided to Dr. Allison. Patient is medically optimized for discharge to [facility].    CONSULTS: Gastroenterology, Cardiology, Physical Therapy     Vital Signs Last 24 Hrs  T(C): 36.9 (08 Jul 2024 05:02), Max: 36.9 (08 Jul 2024 05:02)  T(F): 98.4 (08 Jul 2024 05:02), Max: 98.4 (08 Jul 2024 05:02)  HR: 65 (08 Jul 2024 05:02) (62 - 68)  BP: 154/53 (08 Jul 2024 05:02) (101/54 - 154/53)  BP(mean): --  RR: 17 (08 Jul 2024 05:02) (16 - 17)  SpO2: 99% (08 Jul 2024 05:02) (95% - 100%)    Parameters below as of 08 Jul 2024 05:02  Patient On (Oxygen Delivery Method): room air    PHYSICAL EXAM:  GENERAL: NAD, frail/thin  HEENT:  AT/NC,  EOMI  CHEST/LUNG:  normal respiratory effort, no intercostal retractions  HEART:  regular rate  ABDOMEN:  BS+, soft, nontender, nondistended; No HSM  NERVOUS SYSTEM: answers questions and follows commands appropriately, A&Ox3 grossly moves all extremities   PSYCH: Appropriate affect, Alert & Awake; Good judgement    IMAGING:  Xray Chest 1 View- PORTABLE-Urgent (Xray Chest 1 View- PORTABLE-Urgent .) (06.24.24 @ 15:10) >  IMPRESSION: Cardiomegaly despite AP projection, unchanged. No   consolidation pneumothorax or effusion. Elevation left hemidiaphragm   similar to prior    CT Abdomen and Pelvis w/ IV Cont (06.26.24 @ 15:10)   IMPRESSION:  Stable well-circumscribed cystic pancreatic tail mass, better   characterized on prior MRI.    Cardiomegaly with small pericardial effusion.

## 2024-07-03 NOTE — PROGRESS NOTE ADULT - ATTENDING COMMENTS
agree with above  h/h continues to improve. previously resumed pt's eliquis and plavix  on 4th dose of venofer today  CM working on auth for [facility] placement

## 2024-07-03 NOTE — DISCHARGE NOTE PROVIDER - NSDCCPCAREPLAN_GEN_ALL_CORE_FT
PRINCIPAL DISCHARGE DIAGNOSIS  Diagnosis: Anemia  Assessment and Plan of Treatment: Anemia is a low number of red blood cells or a low amount of hemoglobin in your red blood cells. Hemoglobin is a protein that helps carry oxygen throughout your body. Red blood cells use iron to create hemoglobin. Anemia may develop if your body does not have enough iron. It may also develop if your body does not make enough red blood cells or they die faster than your body can make them. You were treated with IV iron treatment during this hospitalization.  Please follow-up with you primary care provider for continued management of this condition.         SECONDARY DISCHARGE DIAGNOSES  Diagnosis: Digoxin toxicity  Assessment and Plan of Treatment: You were noted to have high levels of the medication called digoxin in your blood during this hospitalization so this medication was discontinued.   Please STOP this medication.  Please follow-up with Cardiology outpatient for further managemenet of your atrial fibrillation.    Diagnosis: Pancreatic cyst  Assessment and Plan of Treatment: You were noted to have a pancreatic cyst on your imaging.   Please follow-up with Gastroenterology outpatient for further management of this condition.    Diagnosis: Atherosclerosis of subclavian artery  Assessment and Plan of Treatment: You were noted to have different blood pressures in your arms and found to have build-up in your artery.   You should follow-up outpatient with Vascular Surgery for further management of this condition.

## 2024-07-03 NOTE — PROGRESS NOTE ADULT - SUBJECTIVE AND OBJECTIVE BOX
Patient is a 88y old  Female who presents with a chief complaint of anemia, afib (28 Jun 2024 13:30)    INTERVAL HPI/OVERNIGHT EVENTS: Patient seen and examined at bedside. No overnight events. Feeling well. Aware plan is to wait for [facility] placement. No further questions at this time.     Vital Signs Last 24 Hrs  T(C): 36.7 (03 Jul 2024 05:07), Max: 36.8 (02 Jul 2024 11:43)  T(F): 98 (03 Jul 2024 05:07), Max: 98.2 (02 Jul 2024 11:43)  HR: 61 (03 Jul 2024 05:07) (61 - 77)  BP: 172/56 (03 Jul 2024 05:07) (144/58 - 172/56)  BP(mean): --  RR: 16 (03 Jul 2024 05:07) (16 - 17)  SpO2: 100% (03 Jul 2024 05:07) (96% - 100%)    Parameters below as of 03 Jul 2024 05:07  Patient On (Oxygen Delivery Method): room air    PHYSICAL EXAM:  GENERAL: NAD, frail/thin  HEENT:  AT/NC,  EOMI  CHEST/LUNG:  normal respiratory effort, no intercostal retractions  HEART:  regular rate  ABDOMEN:  BS+, soft, nontender, nondistended; No HSM  NERVOUS SYSTEM: answers questions and follows commands appropriately, A&Ox3 grossly moves all extremities   PSYCH: Appropriate affect, Alert & Awake; Good judgement    Labs:   Personally reviewed                          9.5    8.34  )-----------( 256      ( 02 Jul 2024 06:18 )             31.5     02 Jul 2024 06:18    140    |  103    |  27     ----------------------------<  85     4.8     |  32     |  0.95     Ca    9.1        02 Jul 2024 06:18        CAPILLARY BLOOD GLUCOSE          Urinalysis Basic - ( 02 Jul 2024 06:18 )    Color: x / Appearance: x / SG: x / pH: x  Gluc: 85 mg/dL / Ketone: x  / Bili: x / Urobili: x   Blood: x / Protein: x / Nitrite: x   Leuk Esterase: x / RBC: x / WBC x   Sq Epi: x / Non Sq Epi: x / Bacteria: x        RADIOLOGY & ADDITIONAL TESTS: Personally reviewed.     No new imaging to review at this time         MEDICATIONS  (STANDING):  amLODIPine   Tablet 2.5 milliGRAM(s) Oral daily  apixaban 2.5 milliGRAM(s) Oral every 12 hours  ascorbic acid 500 milliGRAM(s) Oral daily  atorvastatin 40 milliGRAM(s) Oral at bedtime  clopidogrel Tablet 75 milliGRAM(s) Oral daily  dapagliflozin 10 milliGRAM(s) Oral every 24 hours  metoprolol succinate ER 25 milliGRAM(s) Oral daily  multivitamin 1 Tablet(s) Oral daily  pantoprazole   Suspension 40 milliGRAM(s) Oral daily  polyethylene glycol 3350 17 Gram(s) Oral daily  sacubitril 24 mG/valsartan 26 mG 1 Tablet(s) Oral two times a day  senna 2 Tablet(s) Oral at bedtime    MEDICATIONS  (PRN):  acetaminophen     Tablet .. 650 milliGRAM(s) Oral every 6 hours PRN Temp greater or equal to 38C (100.4F), Mild Pain (1 - 3)  aluminum hydroxide/magnesium hydroxide/simethicone Suspension 30 milliLiter(s) Oral every 4 hours PRN Dyspepsia  melatonin 3 milliGRAM(s) Oral at bedtime PRN Insomnia  ondansetron Injectable 4 milliGRAM(s) IV Push every 8 hours PRN Nausea and/or Vomiting

## 2024-07-03 NOTE — DISCHARGE NOTE PROVIDER - DETAILS OF MALNUTRITION DIAGNOSIS/DIAGNOSES
This patient has been assessed with a concern for Malnutrition and was treated during this hospitalization for the following Nutrition diagnosis/diagnoses:     -  06/26/2024: Severe protein-calorie malnutrition

## 2024-07-03 NOTE — DISCHARGE NOTE PROVIDER - CARE PROVIDER_API CALL
Baldemar Allison  [address] [address]  Phone: [phone]  Fax: [phone]  Established Patient  Follow Up Time: 1 week

## 2024-07-04 LAB
ANION GAP SERPL CALC-SCNC: 3 MMOL/L — LOW (ref 5–17)
BUN SERPL-MCNC: 35 MG/DL — HIGH (ref 7–23)
CALCIUM SERPL-MCNC: 8.5 MG/DL — SIGNIFICANT CHANGE UP (ref 8.4–10.5)
CHLORIDE SERPL-SCNC: 102 MMOL/L — SIGNIFICANT CHANGE UP (ref 96–108)
CO2 SERPL-SCNC: 32 MMOL/L — HIGH (ref 22–31)
CREAT SERPL-MCNC: 1.14 MG/DL — SIGNIFICANT CHANGE UP (ref 0.5–1.3)
EGFR: 47 ML/MIN/1.73M2 — LOW
GLUCOSE SERPL-MCNC: 99 MG/DL — SIGNIFICANT CHANGE UP (ref 70–99)
HCT VFR BLD CALC: 35.3 % — SIGNIFICANT CHANGE UP (ref 34.5–45)
HGB BLD-MCNC: 10.5 G/DL — LOW (ref 11.5–15.5)
MCHC RBC-ENTMCNC: 24.1 PG — LOW (ref 27–34)
MCHC RBC-ENTMCNC: 29.7 GM/DL — LOW (ref 32–36)
MCV RBC AUTO: 81.1 FL — SIGNIFICANT CHANGE UP (ref 80–100)
NRBC # BLD: 0 /100 WBCS — SIGNIFICANT CHANGE UP (ref 0–0)
PLATELET # BLD AUTO: 268 K/UL — SIGNIFICANT CHANGE UP (ref 150–400)
POTASSIUM SERPL-MCNC: 5.3 MMOL/L — SIGNIFICANT CHANGE UP (ref 3.5–5.3)
POTASSIUM SERPL-SCNC: 5.3 MMOL/L — SIGNIFICANT CHANGE UP (ref 3.5–5.3)
RBC # BLD: 4.35 M/UL — SIGNIFICANT CHANGE UP (ref 3.8–5.2)
RBC # FLD: 20.2 % — HIGH (ref 10.3–14.5)
SODIUM SERPL-SCNC: 137 MMOL/L — SIGNIFICANT CHANGE UP (ref 135–145)
WBC # BLD: 5.32 K/UL — SIGNIFICANT CHANGE UP (ref 3.8–10.5)
WBC # FLD AUTO: 5.32 K/UL — SIGNIFICANT CHANGE UP (ref 3.8–10.5)

## 2024-07-04 PROCEDURE — 99233 SBSQ HOSP IP/OBS HIGH 50: CPT | Mod: GC

## 2024-07-04 RX ADMIN — Medication 1 TABLET(S): at 11:22

## 2024-07-04 RX ADMIN — AMLODIPINE BESYLATE 2.5 MILLIGRAM(S): 2.5 TABLET ORAL at 05:40

## 2024-07-04 RX ADMIN — ATORVASTATIN CALCIUM 40 MILLIGRAM(S): 20 TABLET, FILM COATED ORAL at 21:44

## 2024-07-04 RX ADMIN — Medication 2 TABLET(S): at 21:44

## 2024-07-04 RX ADMIN — SACUBITRIL AND VALSARTAN 1 TABLET(S): 97; 103 TABLET, FILM COATED ORAL at 05:38

## 2024-07-04 RX ADMIN — DAPAGLIFLOZIN 10 MILLIGRAM(S): 10 TABLET, FILM COATED ORAL at 05:40

## 2024-07-04 RX ADMIN — SACUBITRIL AND VALSARTAN 1 TABLET(S): 97; 103 TABLET, FILM COATED ORAL at 18:39

## 2024-07-04 RX ADMIN — POLYETHYLENE GLYCOL 3350 17 GRAM(S): 1 POWDER ORAL at 11:22

## 2024-07-04 RX ADMIN — APIXABAN 2.5 MILLIGRAM(S): 5 TABLET, FILM COATED ORAL at 18:39

## 2024-07-04 RX ADMIN — PANTOPRAZOLE SODIUM 40 MILLIGRAM(S): 40 INJECTION, POWDER, FOR SOLUTION INTRAVENOUS at 11:22

## 2024-07-04 RX ADMIN — Medication 25 MILLIGRAM(S): at 05:39

## 2024-07-04 RX ADMIN — Medication 500 MILLIGRAM(S): at 11:22

## 2024-07-04 RX ADMIN — APIXABAN 2.5 MILLIGRAM(S): 5 TABLET, FILM COATED ORAL at 05:39

## 2024-07-04 RX ADMIN — CLOPIDOGREL BISULFATE 75 MILLIGRAM(S): 75 TABLET, FILM COATED ORAL at 11:22

## 2024-07-04 NOTE — PROGRESS NOTE ADULT - ATTENDING COMMENTS
Patient is a 87 yo female w/ hypertension, paroxysmal afib on Eliquis, cardiomyopathy, CAD, Hx of cardiac stents.  waiting for discharge. No acute evnts overnight.    T(C): 36.9 (07-04-24 @ 13:25), Max: 36.9 (07-03-24 @ 20:04)  T(F): 98.4 (07-04-24 @ 13:25), Max: 98.5 (07-03-24 @ 20:04)  HR: 67 (07-04-24 @ 13:25) (62 - 94)  BP: 112/69 (07-04-24 @ 13:25) (112/69 - 150/55)  ABP: --  ABP(mean): --  RR: 18 (07-04-24 @ 13:25) (17 - 18)  SpO2: 98% (07-04-24 @ 13:25) (98% - 100%)      on exam chacectic  no apparent distress  able to talk in full sentences  both lungs clear  irregular rhythm  abdomen- soft  no pedal edema    LABS:                        10.5   5.32  )-----------( 268      ( 04 Jul 2024 06:07 )             35.3     07-04    137  |  102  |  35<H>  ----------------------------<  99  5.3   |  32<H>  |  1.14    Ca    8.5      04 Jul 2024 06:07        Urinalysis Basic - ( 04 Jul 2024 06:07 )    Color: x / Appearance: x / SG: x / pH: x  Gluc: 99 mg/dL / Ketone: x  / Bili: x / Urobili: x   Blood: x / Protein: x / Nitrite: x   Leuk Esterase: x / RBC: x / WBC x   Sq Epi: x / Non Sq Epi: x / Bacteria: x    a/p:  # dysphagia- seen by GI, recent esophageal dilatation done in april. no plan for repeat endoscopy.  # atrial fibrillation- rate controlled. continue metoprolol. dig discontinued due to toxic levels recently.  # Anemia, microcytic, chronic- no change in baseline. recent EUS, could be secondary to slow blood loss from upper GI source vs nutritional sec to restricted oral intake from difficulty swallowing. on venofer.  # CAD s/p stent- continue beta blocker, statin, plavix.   # Pancreatic cyst- outpatient follow up  - rest as per resident note.

## 2024-07-04 NOTE — PROGRESS NOTE ADULT - ASSESSMENT
89 yo female w/ hypertension, paroxysmal afib on Eliquis, cardiomyopathy, CAD, Hx of cardiac stents, admitted for anemia , FTT, digoxin toxicity - digoxin level WNL, Hgb stable, pending [facility] elections.    #Microcytic anemia, anemia of chronic disease   - Hgb 9.4 on admission > 10.5 today  - Recent EUS ? 2/2 to slow blood loss from upper GI source vs nutritional sec to restricted oral intake from difficulty swallowing  - FOBT negative  - S/p 1 unit 6/30  - S/p Venofer x 3 doses   - Labs indicative of anemia of chronic disease  - Keep T&S active  - Eliquis and plavix restarted   - Monitor CBC     #Chronic Afib  - EKG- AFIB- 83, currently rate controlled  - Cardio recommendations appreciated  - Digoxin level 2.8> 2.0 >1.5 > 1.3  - Digoxin d/c'd, will need to follow up outpatient   - C/w metoprolol succinate ER 25mg qd    #Failure to thrive   - Poor appetite weight loss  - Per PMD - Dr Allison who spoke with dr. sean sprague- reported that pt s/p upper endoscopy for cystic pancreatic mass, but nothing was dx  - Pt has been loosing weight inspite of eating normally, lost 20 lbs in last 6 months.   - Avoid nephrotoxins   - Appreciate GI recs: f/u w/ outpatient GI     #Dysphagia  - Speech and swallow - c/w minced and moist  - Recent esophageal dilatation done in April   - Follows with GI Dr. Escudero at [place]  - GI recommendations appreciated: no plan for repeat endoscopy      #HFimpEF, HTN, CAD s/p stents  - TTE 5/24: LVEF 60-65%, mild mod TR  - C/w statin, farxiga, amlodipine, beta blocker, entresto  - C/w eliquis and plavix  - Monitor vitals    #Pancreatic cyst  - CT-(05.26.24 @ 21:19) >2.4 cm complex lesion in the pancreatic tail with imaging features consistent with serous cystadenoma   - CT abd/pelvis 6/26: Stable well-circumscribed cystic pancreatic tail mass. Cardiomegaly with small pericardial effusion.  - Appreciate GI recs: f/u w/ outpatient GI     #Right subclavian artery with severe atherosclerosis   #Uneven BP's.   - Per prior chart-  Left side BP higher than right side BP  - CTA and carotid duplex noted  - Vascular surgery consulted, no acute vascular intervention required, continue Eliquis and Plavix  - F/u outpatient with vascular surgery     #DVT prophylaxis   - Eliquis    Dispo: Patient amenable to [facility] - pending elections    Case discussed with attending, Dr. Adair

## 2024-07-04 NOTE — PROGRESS NOTE ADULT - SUBJECTIVE AND OBJECTIVE BOX
Patient is a 88y old  Female who presents with a chief complaint of anemia, afib (28 Jun 2024 13:30)    INTERVAL HPI/OVERNIGHT EVENTS: Patient seen and examined at bedside. No overnight events. Feeling well. Aware plan is to wait for [facility] placement. No further questions at this time.     Vital Signs Last 24 Hrs  T(C): 36.8 (04 Jul 2024 05:07), Max: 36.9 (03 Jul 2024 20:04)  T(F): 98.2 (04 Jul 2024 05:07), Max: 98.5 (03 Jul 2024 20:04)  HR: 62 (04 Jul 2024 05:07) (62 - 94)  BP: 150/55 (04 Jul 2024 05:07) (129/67 - 150/55)  BP(mean): --  RR: 17 (04 Jul 2024 05:07) (16 - 17)  SpO2: 100% (04 Jul 2024 05:07) (95% - 100%)    Parameters below as of 04 Jul 2024 05:07  Patient On (Oxygen Delivery Method): room air      PHYSICAL EXAM:  GENERAL: NAD, frail/thin  HEENT:  AT/NC,  EOMI  CHEST/LUNG:  normal respiratory effort, no intercostal retractions  HEART:  regular rate  ABDOMEN:  BS+, soft, nontender, nondistended; No HSM  NERVOUS SYSTEM: answers questions and follows commands appropriately, A&Ox3 grossly moves all extremities   PSYCH: Appropriate affect, Alert & Awake; Good judgement    Labs:   Personally reviewed                        10.5   5.32  )-----------( 268      ( 04 Jul 2024 06:07 )             35.3     04 Jul 2024 06:07    137    |  102    |  35     ----------------------------<  99     5.3     |  32     |  1.14     Ca    8.5        04 Jul 2024 06:07        CAPILLARY BLOOD GLUCOSE        Urinalysis Basic - ( 04 Jul 2024 06:07 )    Color: x / Appearance: x / SG: x / pH: x  Gluc: 99 mg/dL / Ketone: x  / Bili: x / Urobili: x   Blood: x / Protein: x / Nitrite: x   Leuk Esterase: x / RBC: x / WBC x   Sq Epi: x / Non Sq Epi: x / Bacteria: x          RADIOLOGY & ADDITIONAL TESTS: Personally reviewed.     No new imaging to review at this time         MEDICATIONS  (STANDING):  amLODIPine   Tablet 2.5 milliGRAM(s) Oral daily  apixaban 2.5 milliGRAM(s) Oral every 12 hours  ascorbic acid 500 milliGRAM(s) Oral daily  atorvastatin 40 milliGRAM(s) Oral at bedtime  clopidogrel Tablet 75 milliGRAM(s) Oral daily  dapagliflozin 10 milliGRAM(s) Oral every 24 hours  metoprolol succinate ER 25 milliGRAM(s) Oral daily  multivitamin 1 Tablet(s) Oral daily  pantoprazole   Suspension 40 milliGRAM(s) Oral daily  polyethylene glycol 3350 17 Gram(s) Oral daily  sacubitril 24 mG/valsartan 26 mG 1 Tablet(s) Oral two times a day  senna 2 Tablet(s) Oral at bedtime    MEDICATIONS  (PRN):  acetaminophen     Tablet .. 650 milliGRAM(s) Oral every 6 hours PRN Temp greater or equal to 38C (100.4F), Mild Pain (1 - 3)  aluminum hydroxide/magnesium hydroxide/simethicone Suspension 30 milliLiter(s) Oral every 4 hours PRN Dyspepsia  melatonin 3 milliGRAM(s) Oral at bedtime PRN Insomnia  ondansetron Injectable 4 milliGRAM(s) IV Push every 8 hours PRN Nausea and/or Vomiting

## 2024-07-05 LAB
ANION GAP SERPL CALC-SCNC: 3 MMOL/L — LOW (ref 5–17)
BUN SERPL-MCNC: 41 MG/DL — HIGH (ref 7–23)
CALCIUM SERPL-MCNC: 8.4 MG/DL — SIGNIFICANT CHANGE UP (ref 8.4–10.5)
CHLORIDE SERPL-SCNC: 103 MMOL/L — SIGNIFICANT CHANGE UP (ref 96–108)
CO2 SERPL-SCNC: 32 MMOL/L — HIGH (ref 22–31)
CREAT SERPL-MCNC: 1.06 MG/DL — SIGNIFICANT CHANGE UP (ref 0.5–1.3)
EGFR: 51 ML/MIN/1.73M2 — LOW
GLUCOSE SERPL-MCNC: 88 MG/DL — SIGNIFICANT CHANGE UP (ref 70–99)
HCT VFR BLD CALC: 32.9 % — LOW (ref 34.5–45)
HGB BLD-MCNC: 9.8 G/DL — LOW (ref 11.5–15.5)
MCHC RBC-ENTMCNC: 24 PG — LOW (ref 27–34)
MCHC RBC-ENTMCNC: 29.8 GM/DL — LOW (ref 32–36)
MCV RBC AUTO: 80.4 FL — SIGNIFICANT CHANGE UP (ref 80–100)
NRBC # BLD: 0 /100 WBCS — SIGNIFICANT CHANGE UP (ref 0–0)
PLATELET # BLD AUTO: 254 K/UL — SIGNIFICANT CHANGE UP (ref 150–400)
POTASSIUM SERPL-MCNC: 4.9 MMOL/L — SIGNIFICANT CHANGE UP (ref 3.5–5.3)
POTASSIUM SERPL-SCNC: 4.9 MMOL/L — SIGNIFICANT CHANGE UP (ref 3.5–5.3)
RBC # BLD: 4.09 M/UL — SIGNIFICANT CHANGE UP (ref 3.8–5.2)
RBC # FLD: 21 % — HIGH (ref 10.3–14.5)
SODIUM SERPL-SCNC: 138 MMOL/L — SIGNIFICANT CHANGE UP (ref 135–145)
WBC # BLD: 6.05 K/UL — SIGNIFICANT CHANGE UP (ref 3.8–10.5)
WBC # FLD AUTO: 6.05 K/UL — SIGNIFICANT CHANGE UP (ref 3.8–10.5)

## 2024-07-05 PROCEDURE — 99233 SBSQ HOSP IP/OBS HIGH 50: CPT | Mod: GC

## 2024-07-05 RX ORDER — FERROUS SULFATE 325(65) MG
325 TABLET ORAL DAILY
Refills: 0 | Status: DISCONTINUED | OUTPATIENT
Start: 2024-07-05 | End: 2024-07-08

## 2024-07-05 RX ADMIN — DAPAGLIFLOZIN 10 MILLIGRAM(S): 10 TABLET, FILM COATED ORAL at 06:13

## 2024-07-05 RX ADMIN — Medication 2 TABLET(S): at 22:32

## 2024-07-05 RX ADMIN — SACUBITRIL AND VALSARTAN 1 TABLET(S): 97; 103 TABLET, FILM COATED ORAL at 06:13

## 2024-07-05 RX ADMIN — PANTOPRAZOLE SODIUM 40 MILLIGRAM(S): 40 INJECTION, POWDER, FOR SOLUTION INTRAVENOUS at 11:16

## 2024-07-05 RX ADMIN — Medication 500 MILLIGRAM(S): at 11:16

## 2024-07-05 RX ADMIN — APIXABAN 2.5 MILLIGRAM(S): 5 TABLET, FILM COATED ORAL at 17:14

## 2024-07-05 RX ADMIN — APIXABAN 2.5 MILLIGRAM(S): 5 TABLET, FILM COATED ORAL at 06:14

## 2024-07-05 RX ADMIN — ATORVASTATIN CALCIUM 40 MILLIGRAM(S): 20 TABLET, FILM COATED ORAL at 22:32

## 2024-07-05 RX ADMIN — Medication 1 TABLET(S): at 11:16

## 2024-07-05 RX ADMIN — Medication 25 MILLIGRAM(S): at 06:13

## 2024-07-05 RX ADMIN — Medication 325 MILLIGRAM(S): at 11:16

## 2024-07-05 RX ADMIN — POLYETHYLENE GLYCOL 3350 17 GRAM(S): 1 POWDER ORAL at 11:15

## 2024-07-05 RX ADMIN — AMLODIPINE BESYLATE 2.5 MILLIGRAM(S): 2.5 TABLET ORAL at 06:13

## 2024-07-05 RX ADMIN — CLOPIDOGREL BISULFATE 75 MILLIGRAM(S): 75 TABLET, FILM COATED ORAL at 11:16

## 2024-07-05 NOTE — PROGRESS NOTE ADULT - ASSESSMENT
87 yo female w/ hypertension, paroxysmal afib on Eliquis, cardiomyopathy, CAD, Hx of cardiac stents, admitted for anemia , FTT, digoxin toxicity - digoxin level WNL, Hgb stable, pending [facility] elections.    #Microcytic anemia, anemia of chronic disease   - Hgb 9.4 on admission > 10.5, stable   - Recent EUS ? 2/2 to slow blood loss from upper GI source vs nutritional sec to restricted oral intake from difficulty swallowing  - FOBT negative  - S/p 1 unit 6/30  - S/p Venofer x 3 doses   - Labs indicative of anemia of chronic disease  - Keep T&S active  - Eliquis and plavix restarted   - Monitor CBC     #Chronic Afib  - EKG- AFIB- 83, currently rate controlled  - Cardio recommendations appreciated  - Digoxin level 2.8> 2.0 >1.5 > 1.3  - Digoxin d/c'd, will need to follow up outpatient   - C/w metoprolol succinate ER 25mg qd    #HFimpEF, HTN, CAD s/p stents  - TTE 5/24: LVEF 60-65%, mild mod TR  - C/w statin, farxiga, amlodipine, beta blocker, entresto  - C/w eliquis and plavix  - Monitor vitals    #Failure to thrive   - Poor appetite weight loss  - Per PMD - Dr Allison who spoke with dr. sean sprague- reported that pt s/p upper endoscopy for cystic pancreatic mass, but nothing was dx  - Pt has been loosing weight inspite of eating normally, lost 20 lbs in last 6 months.   - Avoid nephrotoxins   - Appreciate GI recs: f/u w/ outpatient GI     #Dysphagia  - Speech and swallow - c/w minced and moist  - Recent esophageal dilatation done in April   - Follows with GI Dr. Escudero at [place]  - GI recommendations appreciated: no plan for repeat endoscopy    #Pancreatic cyst  - CT-(05.26.24 @ 21:19) >2.4 cm complex lesion in the pancreatic tail with imaging features consistent with serous cystadenoma   - CT abd/pelvis 6/26: Stable well-circumscribed cystic pancreatic tail mass. Cardiomegaly with small pericardial effusion.  - Appreciate GI recs: f/u w/ outpatient GI     #Right subclavian artery with severe atherosclerosis   #Uneven BP's.   - Per prior chart-  Left side BP higher than right side BP  - CTA and carotid duplex noted  - Vascular surgery consulted, no acute vascular intervention required, continue Eliquis and Plavix  - F/u outpatient with vascular surgery     #DVT prophylaxis   - Eliquis    Dispo: Patient amenable to [facility] - pending elections    Case discussed with attending, Dr. Adair 89 yo female w/ hypertension, paroxysmal afib on Eliquis, cardiomyopathy, CAD, Hx of cardiac stents, admitted for anemia , FTT, digoxin toxicity - digoxin level WNL, Hgb stable, pending [facility] auth.     #Microcytic anemia, anemia of chronic disease   - Hgb 9.4 on admission, now stable   - Recent EUS ? 2/2 to slow blood loss from upper GI source vs nutritional sec to restricted oral intake from difficulty swallowing  - FOBT negative  - S/p 1 unit 6/30  - S/p Venofer x 3 doses   - Keep T&S active  - Eliquis and plavix restarted   - Monitor CBC   - C/w iron and vitamin C supplementation    #Chronic Afib  - EKG- AFIB- 83, currently rate controlled  - Cardio recommendations appreciated  - Digoxin level 2.8> 2.0 >1.5 > 1.3  - Digoxin d/c'd, will need to follow up outpatient   - C/w metoprolol succinate ER 25mg qd    #HFimpEF  #HTN  #CAD s/p stents  - Hx of EF < 30 in 2021   - TTE 5/24: LVEF 60-65%, mild mod TR,  - C/w statin, farxiga, amlodipine, beta blocker, entresto  - C/w eliquis and plavix  - Monitor vitals    #Failure to thrive   - Poor appetite weight loss  - Per PMD - Dr Allison who spoke with dr. sean sprague- reported that pt s/p upper endoscopy for cystic pancreatic mass, but nothing was dx  - Pt has been loosing weight inspite of eating normally, lost 20 lbs in last 6 months.   - Avoid nephrotoxins   - Appreciate GI recs: f/u w/ outpatient GI     #Dysphagia  - Speech and swallow - c/w minced and moist  - Recent esophageal dilatation done in April   - Follows with GI Dr. Escudero at [place]  - GI recommendations appreciated: no plan for repeat endoscopy    #Pancreatic cyst  - CT-(05.26.24 @ 21:19) >2.4 cm complex lesion in the pancreatic tail with imaging features consistent with serous cystadenoma   - CT abd/pelvis 6/26: Stable well-circumscribed cystic pancreatic tail mass. Cardiomegaly with small pericardial effusion.  - Appreciate GI recs: f/u w/ outpatient GI     #Right subclavian artery with severe atherosclerosis   #Uneven BP's.   - Per prior chart-  Left side BP higher than right side BP  - CTA and carotid duplex noted  - Vascular surgery consulted, no acute vascular intervention required, continue Eliquis and Plavix  - F/u outpatient with vascular surgery     #DVT prophylaxis   - Eliquis    Dispo: [facility] pending auth     Case discussed with attending, Dr. Adair

## 2024-07-05 NOTE — PROGRESS NOTE ADULT - SUBJECTIVE AND OBJECTIVE BOX
Patient is a 88y old  Female who presents with a chief complaint of anemia, afib (28 Jun 2024 13:30)    INTERVAL HPI/OVERNIGHT EVENTS: Patient seen and examined at bedside. No overnight events. Feeling well. Aware plan is to wait for [facility] placement. No further questions at this time.     Vital Signs Last 24 Hrs  T(C): 37 (05 Jul 2024 05:05), Max: 37.6 (04 Jul 2024 20:36)  T(F): 98.6 (05 Jul 2024 05:05), Max: 99.6 (04 Jul 2024 20:36)  HR: 72 (05 Jul 2024 05:05) (67 - 75)  BP: 129/65 (05 Jul 2024 05:05) (112/69 - 129/65)  BP(mean): --  RR: 17 (05 Jul 2024 05:05) (17 - 18)  SpO2: 98% (05 Jul 2024 05:05) (98% - 99%)    Parameters below as of 05 Jul 2024 05:05  Patient On (Oxygen Delivery Method): room air      PHYSICAL EXAM:  GENERAL: NAD, frail/thin  HEENT:  AT/NC,  EOMI  CHEST/LUNG:  normal respiratory effort, no intercostal retractions  HEART:  regular rate  ABDOMEN:  BS+, soft, nontender, nondistended; No HSM  NERVOUS SYSTEM: answers questions and follows commands appropriately, A&Ox3 grossly moves all extremities   PSYCH: Appropriate affect, Alert & Awake; Good judgement    Labs:   Personally reviewed                        10.5   5.32  )-----------( 268      ( 04 Jul 2024 06:07 )             35.3     04 Jul 2024 06:07    137    |  102    |  35     ----------------------------<  99     5.3     |  32     |  1.14     Ca    8.5        04 Jul 2024 06:07        CAPILLARY BLOOD GLUCOSE      Urinalysis Basic - ( 04 Jul 2024 06:07 )    Color: x / Appearance: x / SG: x / pH: x  Gluc: 99 mg/dL / Ketone: x  / Bili: x / Urobili: x   Blood: x / Protein: x / Nitrite: x   Leuk Esterase: x / RBC: x / WBC x   Sq Epi: x / Non Sq Epi: x / Bacteria: x      RADIOLOGY & ADDITIONAL TESTS: Personally reviewed.     No new imaging to review at this time         MEDICATIONS  (STANDING):  amLODIPine   Tablet 2.5 milliGRAM(s) Oral daily  apixaban 2.5 milliGRAM(s) Oral every 12 hours  ascorbic acid 500 milliGRAM(s) Oral daily  atorvastatin 40 milliGRAM(s) Oral at bedtime  clopidogrel Tablet 75 milliGRAM(s) Oral daily  dapagliflozin 10 milliGRAM(s) Oral every 24 hours  metoprolol succinate ER 25 milliGRAM(s) Oral daily  multivitamin 1 Tablet(s) Oral daily  pantoprazole   Suspension 40 milliGRAM(s) Oral daily  polyethylene glycol 3350 17 Gram(s) Oral daily  sacubitril 24 mG/valsartan 26 mG 1 Tablet(s) Oral two times a day  senna 2 Tablet(s) Oral at bedtime    MEDICATIONS  (PRN):  acetaminophen     Tablet .. 650 milliGRAM(s) Oral every 6 hours PRN Temp greater or equal to 38C (100.4F), Mild Pain (1 - 3)  aluminum hydroxide/magnesium hydroxide/simethicone Suspension 30 milliLiter(s) Oral every 4 hours PRN Dyspepsia  melatonin 3 milliGRAM(s) Oral at bedtime PRN Insomnia  ondansetron Injectable 4 milliGRAM(s) IV Push every 8 hours PRN Nausea and/or Vomiting

## 2024-07-05 NOTE — PROGRESS NOTE ADULT - ATTENDING COMMENTS
Patient is a 87 yo female w/ hypertension, paroxysmal afib on Eliquis, cardiomyopathy, CAD, Hx of cardiac stents.  waiting for discharge. No acute evnts overnight.    T(C): 36.9 (07-05-24 @ 12:13), Max: 37.6 (07-04-24 @ 20:36)  T(F): 98.4 (07-05-24 @ 12:13), Max: 99.6 (07-04-24 @ 20:36)  HR: 66 (07-05-24 @ 12:13) (66 - 75)  BP: 119/63 (07-05-24 @ 12:13) (119/63 - 129/65)  ABP: --  ABP(mean): --  RR: 16 (07-05-24 @ 12:13) (16 - 18)  SpO2: 96% (07-05-24 @ 12:13) (96% - 99%)    on exam chacectic  no apparent distress  able to talk in full sentences  both lungs clear  irregular rhythm  abdomen- soft  no pedal edema    LABS:                        9.8    6.05  )-----------( 254      ( 05 Jul 2024 06:44 )             32.9     07-05    138  |  103  |  41<H>  ----------------------------<  88  4.9   |  32<H>  |  1.06    Ca    8.4      05 Jul 2024 06:44        Urinalysis Basic - ( 05 Jul 2024 06:44 )    Color: x / Appearance: x / SG: x / pH: x  Gluc: 88 mg/dL / Ketone: x  / Bili: x / Urobili: x   Blood: x / Protein: x / Nitrite: x   Leuk Esterase: x / RBC: x / WBC x   Sq Epi: x / Non Sq Epi: x / Bacteria: x    a/p:  # dysphagia- seen by GI, recent esophageal dilatation done in april. no plan for repeat endoscopy.  # atrial fibrillation- rate controlled. continue metoprolol. dig discontinued due to toxic levels recently.  # Anemia, microcytic, chronic- no change in baseline. recent EUS, could be secondary to slow blood loss from upper GI source vs nutritional sec to restricted oral intake from difficulty swallowing. on venofer.  # CAD s/p stent- continue beta blocker, statin, plavix.   # Pancreatic cyst- outpatient follow up  - rest as per resident note.    - discharge planning

## 2024-07-06 LAB
HCT VFR BLD CALC: 32.2 % — LOW (ref 34.5–45)
HGB BLD-MCNC: 9.6 G/DL — LOW (ref 11.5–15.5)
MCHC RBC-ENTMCNC: 24.2 PG — LOW (ref 27–34)
MCHC RBC-ENTMCNC: 29.8 GM/DL — LOW (ref 32–36)
MCV RBC AUTO: 81.3 FL — SIGNIFICANT CHANGE UP (ref 80–100)
NRBC # BLD: 0 /100 WBCS — SIGNIFICANT CHANGE UP (ref 0–0)
PLATELET # BLD AUTO: 264 K/UL — SIGNIFICANT CHANGE UP (ref 150–400)
RBC # BLD: 3.96 M/UL — SIGNIFICANT CHANGE UP (ref 3.8–5.2)
RBC # FLD: 21.2 % — HIGH (ref 10.3–14.5)
WBC # BLD: 5.27 K/UL — SIGNIFICANT CHANGE UP (ref 3.8–10.5)
WBC # FLD AUTO: 5.27 K/UL — SIGNIFICANT CHANGE UP (ref 3.8–10.5)

## 2024-07-06 PROCEDURE — 99233 SBSQ HOSP IP/OBS HIGH 50: CPT | Mod: GC

## 2024-07-06 RX ADMIN — APIXABAN 2.5 MILLIGRAM(S): 5 TABLET, FILM COATED ORAL at 05:51

## 2024-07-06 RX ADMIN — Medication 500 MILLIGRAM(S): at 11:27

## 2024-07-06 RX ADMIN — SACUBITRIL AND VALSARTAN 1 TABLET(S): 97; 103 TABLET, FILM COATED ORAL at 17:47

## 2024-07-06 RX ADMIN — DAPAGLIFLOZIN 10 MILLIGRAM(S): 10 TABLET, FILM COATED ORAL at 07:45

## 2024-07-06 RX ADMIN — Medication 2 TABLET(S): at 21:10

## 2024-07-06 RX ADMIN — POLYETHYLENE GLYCOL 3350 17 GRAM(S): 1 POWDER ORAL at 11:28

## 2024-07-06 RX ADMIN — AMLODIPINE BESYLATE 2.5 MILLIGRAM(S): 2.5 TABLET ORAL at 05:52

## 2024-07-06 RX ADMIN — Medication 325 MILLIGRAM(S): at 11:27

## 2024-07-06 RX ADMIN — CLOPIDOGREL BISULFATE 75 MILLIGRAM(S): 75 TABLET, FILM COATED ORAL at 11:28

## 2024-07-06 RX ADMIN — APIXABAN 2.5 MILLIGRAM(S): 5 TABLET, FILM COATED ORAL at 17:47

## 2024-07-06 RX ADMIN — ATORVASTATIN CALCIUM 40 MILLIGRAM(S): 20 TABLET, FILM COATED ORAL at 21:10

## 2024-07-06 RX ADMIN — Medication 1 TABLET(S): at 11:27

## 2024-07-06 RX ADMIN — PANTOPRAZOLE SODIUM 40 MILLIGRAM(S): 40 INJECTION, POWDER, FOR SOLUTION INTRAVENOUS at 11:28

## 2024-07-06 RX ADMIN — Medication 25 MILLIGRAM(S): at 05:51

## 2024-07-06 NOTE — PROGRESS NOTE ADULT - ATTENDING COMMENTS
Patient is a 87 yo female w/ hypertension, paroxysmal afib on Eliquis, cardiomyopathy, CAD, Hx of cardiac stents.  waiting for discharge. No acute evnts overnight.    T(C): 36.9 (07-06-24 @ 05:00), Max: 36.9 (07-06-24 @ 05:00)  T(F): 98.5 (07-06-24 @ 05:00), Max: 98.5 (07-06-24 @ 05:00)  HR: 62 (07-06-24 @ 05:00) (62 - 78)  BP: 131/55 (07-06-24 @ 05:00) (116/58 - 131/55)  ABP: --  ABP(mean): --  RR: 16 (07-06-24 @ 05:00) (16 - 18)  SpO2: 100% (07-06-24 @ 05:00) (99% - 100%)    on exam chacectic  no apparent distress  able to talk in full sentences  both lungs clear  irregular rhythm  abdomen- soft  no pedal edema    LABS:                        9.6    5.27  )-----------( 264      ( 06 Jul 2024 06:00 )             32.2     07-05    138  |  103  |  41<H>  ----------------------------<  88  4.9   |  32<H>  |  1.06    Ca    8.4      05 Jul 2024 06:44        Urinalysis Basic - ( 05 Jul 2024 06:44 )    Color: x / Appearance: x / SG: x / pH: x  Gluc: 88 mg/dL / Ketone: x  / Bili: x / Urobili: x   Blood: x / Protein: x / Nitrite: x   Leuk Esterase: x / RBC: x / WBC x   Sq Epi: x / Non Sq Epi: x / Bacteria: x    a/p:  # dysphagia- seen by GI, recent esophageal dilatation done in april. no plan for repeat endoscopy.  # atrial fibrillation- rate controlled. continue metoprolol. dig discontinued due to toxic levels recently.  # Anemia, microcytic, chronic- no change in baseline. recent EUS, could be secondary to slow blood loss from upper GI source vs nutritional sec to restricted oral intake from difficulty swallowing. on venofer.  # CAD s/p stent- continue beta blocker, statin, plavix.   # Pancreatic cyst- outpatient follow up  - rest as per resident note.    - discharge planning .

## 2024-07-06 NOTE — PROGRESS NOTE ADULT - SUBJECTIVE AND OBJECTIVE BOX
Patient is a 88y old  Female who presents with a chief complaint of anemia, afib (28 Jun 2024 13:30)    INTERVAL HPI/OVERNIGHT EVENTS: Patient seen and examined at bedside. No overnight events. Feeling well. Aware plan is to wait for [facility] placement. No further questions at this time.     Vital Signs Last 24 Hrs  T(C): 36.9 (06 Jul 2024 05:00), Max: 36.9 (05 Jul 2024 12:13)  T(F): 98.5 (06 Jul 2024 05:00), Max: 98.5 (06 Jul 2024 05:00)  HR: 62 (06 Jul 2024 05:00) (62 - 78)  BP: 131/55 (06 Jul 2024 05:00) (116/58 - 131/55)  BP(mean): --  RR: 16 (06 Jul 2024 05:00) (16 - 18)  SpO2: 100% (06 Jul 2024 05:00) (96% - 100%)    Parameters below as of 06 Jul 2024 05:00  Patient On (Oxygen Delivery Method): room air      PHYSICAL EXAM:  GENERAL: NAD, frail/thin  HEENT:  AT/NC,  EOMI  CHEST/LUNG:  normal respiratory effort, no intercostal retractions  HEART:  regular rate  ABDOMEN:  BS+, soft, nontender, nondistended; No HSM  NERVOUS SYSTEM: answers questions and follows commands appropriately, A&Ox3 grossly moves all extremities   PSYCH: Appropriate affect, Alert & Awake; Good judgement    Labs:   Personally reviewed                        9.8    6.05  )-----------( 254      ( 05 Jul 2024 06:44 )             32.9     05 Jul 2024 06:44    138    |  103    |  41     ----------------------------<  88     4.9     |  32     |  1.06     Ca    8.4        05 Jul 2024 06:44      CAPILLARY BLOOD GLUCOSE      Urinalysis Basic - ( 05 Jul 2024 06:44 )    Color: x / Appearance: x / SG: x / pH: x  Gluc: 88 mg/dL / Ketone: x  / Bili: x / Urobili: x   Blood: x / Protein: x / Nitrite: x   Leuk Esterase: x / RBC: x / WBC x   Sq Epi: x / Non Sq Epi: x / Bacteria: x        RADIOLOGY & ADDITIONAL TESTS: Reports personally reviewed.     No new imaging to review at this time         MEDICATIONS  (STANDING):  amLODIPine   Tablet 2.5 milliGRAM(s) Oral daily  apixaban 2.5 milliGRAM(s) Oral every 12 hours  ascorbic acid 500 milliGRAM(s) Oral daily  atorvastatin 40 milliGRAM(s) Oral at bedtime  clopidogrel Tablet 75 milliGRAM(s) Oral daily  dapagliflozin 10 milliGRAM(s) Oral every 24 hours  metoprolol succinate ER 25 milliGRAM(s) Oral daily  multivitamin 1 Tablet(s) Oral daily  pantoprazole   Suspension 40 milliGRAM(s) Oral daily  polyethylene glycol 3350 17 Gram(s) Oral daily  sacubitril 24 mG/valsartan 26 mG 1 Tablet(s) Oral two times a day  senna 2 Tablet(s) Oral at bedtime    MEDICATIONS  (PRN):  acetaminophen     Tablet .. 650 milliGRAM(s) Oral every 6 hours PRN Temp greater or equal to 38C (100.4F), Mild Pain (1 - 3)  aluminum hydroxide/magnesium hydroxide/simethicone Suspension 30 milliLiter(s) Oral every 4 hours PRN Dyspepsia  melatonin 3 milliGRAM(s) Oral at bedtime PRN Insomnia  ondansetron Injectable 4 milliGRAM(s) IV Push every 8 hours PRN Nausea and/or Vomiting         Patient is a 88y old  Female who presents with a chief complaint of anemia, afib (28 Jun 2024 13:30)    INTERVAL HPI/OVERNIGHT EVENTS: Patient seen and examined at bedside. No overnight events. Feeling well. Aware plan is to wait for [facility] placement. No further questions at this time.     Vital Signs Last 24 Hrs  T(C): 36.9 (06 Jul 2024 05:00), Max: 36.9 (05 Jul 2024 12:13)  T(F): 98.5 (06 Jul 2024 05:00), Max: 98.5 (06 Jul 2024 05:00)  HR: 62 (06 Jul 2024 05:00) (62 - 78)  BP: 131/55 (06 Jul 2024 05:00) (116/58 - 131/55)  BP(mean): --  RR: 16 (06 Jul 2024 05:00) (16 - 18)  SpO2: 100% (06 Jul 2024 05:00) (96% - 100%)    Parameters below as of 06 Jul 2024 05:00  Patient On (Oxygen Delivery Method): room air      PHYSICAL EXAM:  GENERAL: NAD, frail/thin  HEENT:  AT/NC,  EOMI  CHEST/LUNG:  normal respiratory effort, no intercostal retractions  HEART:  regular rate  ABDOMEN:  BS+, soft, nontender, nondistended; No HSM  NERVOUS SYSTEM: answers questions and follows commands appropriately, A&Ox3 grossly moves all extremities   PSYCH: Appropriate affect, Alert & Awake; Good judgement    Labs:   Personally reviewed                        9.6    5.27  )-----------( 264      ( 06 Jul 2024 06:00 )             32.2     05 Jul 2024 06:44    138    |  103    |  41     ----------------------------<  88     4.9     |  32     |  1.06     Ca    8.4        05 Jul 2024 06:44        CAPILLARY BLOOD GLUCOSE      Urinalysis Basic - ( 05 Jul 2024 06:44 )    Color: x / Appearance: x / SG: x / pH: x  Gluc: 88 mg/dL / Ketone: x  / Bili: x / Urobili: x   Blood: x / Protein: x / Nitrite: x   Leuk Esterase: x / RBC: x / WBC x   Sq Epi: x / Non Sq Epi: x / Bacteria: x      RADIOLOGY & ADDITIONAL TESTS: Reports personally reviewed.     No new imaging to review at this time         MEDICATIONS  (STANDING):  amLODIPine   Tablet 2.5 milliGRAM(s) Oral daily  apixaban 2.5 milliGRAM(s) Oral every 12 hours  ascorbic acid 500 milliGRAM(s) Oral daily  atorvastatin 40 milliGRAM(s) Oral at bedtime  clopidogrel Tablet 75 milliGRAM(s) Oral daily  dapagliflozin 10 milliGRAM(s) Oral every 24 hours  metoprolol succinate ER 25 milliGRAM(s) Oral daily  multivitamin 1 Tablet(s) Oral daily  pantoprazole   Suspension 40 milliGRAM(s) Oral daily  polyethylene glycol 3350 17 Gram(s) Oral daily  sacubitril 24 mG/valsartan 26 mG 1 Tablet(s) Oral two times a day  senna 2 Tablet(s) Oral at bedtime    MEDICATIONS  (PRN):  acetaminophen     Tablet .. 650 milliGRAM(s) Oral every 6 hours PRN Temp greater or equal to 38C (100.4F), Mild Pain (1 - 3)  aluminum hydroxide/magnesium hydroxide/simethicone Suspension 30 milliLiter(s) Oral every 4 hours PRN Dyspepsia  melatonin 3 milliGRAM(s) Oral at bedtime PRN Insomnia  ondansetron Injectable 4 milliGRAM(s) IV Push every 8 hours PRN Nausea and/or Vomiting

## 2024-07-06 NOTE — PROGRESS NOTE ADULT - ASSESSMENT
89 yo female w/ hypertension, paroxysmal afib on Eliquis, cardiomyopathy, CAD, Hx of cardiac stents, admitted for anemia , FTT, digoxin toxicity - digoxin level WNL, Hgb stable, pending [facility] auth.     #Microcytic anemia, anemia of chronic disease   - Hgb 9.4 on admission, now stable   - Recent EUS ? 2/2 to slow blood loss from upper GI source vs nutritional sec to restricted oral intake from difficulty swallowing  - FOBT negative  - S/p 1 unit 6/30  - S/p Venofer x 3 doses   - Keep T&S active  - Eliquis and plavix restarted   - Monitor CBC   - C/w iron and vitamin C supplementation    #Chronic Afib  - EKG- AFIB- 83, currently rate controlled  - Cardio recommendations appreciated  - Digoxin level 2.8> 2.0 >1.5 > 1.3  - Digoxin d/c'd, will need to follow up outpatient   - C/w metoprolol succinate ER 25mg qd    #HFimpEF  #HTN  #CAD s/p stents  - Hx of EF < 30 in 2021   - TTE 5/24: LVEF 60-65%, mild mod TR,  - C/w statin, farxiga, amlodipine, beta blocker, entresto  - C/w eliquis and plavix  - Monitor vitals    #Failure to thrive   - Poor appetite weight loss  - Per PMD - Dr Allison who spoke with dr. sean sprague- reported that pt s/p upper endoscopy for cystic pancreatic mass, but nothing was dx  - Pt has been loosing weight inspite of eating normally, lost 20 lbs in last 6 months.   - Avoid nephrotoxins   - Appreciate GI recs: f/u w/ outpatient GI     #Dysphagia  - Speech and swallow - c/w minced and moist  - Recent esophageal dilatation done in April   - Follows with GI Dr. Escudero at [place]  - GI recommendations appreciated: no plan for repeat endoscopy    #Pancreatic cyst  - CT-(05.26.24 @ 21:19) >2.4 cm complex lesion in the pancreatic tail with imaging features consistent with serous cystadenoma   - CT abd/pelvis 6/26: Stable well-circumscribed cystic pancreatic tail mass. Cardiomegaly with small pericardial effusion.  - Appreciate GI recs: f/u w/ outpatient GI     #Right subclavian artery with severe atherosclerosis   #Uneven BP's.   - Per prior chart-  Left side BP higher than right side BP  - CTA and carotid duplex noted  - Vascular surgery consulted, no acute vascular intervention required, continue Eliquis and Plavix  - F/u outpatient with vascular surgery     #DVT prophylaxis   - Eliquis    Dispo: [facility] pending auth     Case discussed with attending, Dr. Adair 89 yo female w/ hypertension, paroxysmal afib on Eliquis, cardiomyopathy, CAD, Hx of cardiac stents, admitted for anemia , FTT, digoxin toxicity - digoxin level WNL, Hgb stable, pending [facility] auth.     #Microcytic anemia, anemia of chronic disease   - Hgb 9.4 on admission, stable   - Recent EUS ? 2/2 to slow blood loss from upper GI source vs nutritional sec to restricted oral intake from difficulty swallowing  - FOBT negative  - S/p 1 unit 6/30  - S/p Venofer x 3 doses   - Keep T&S active  - Eliquis and plavix restarted   - Monitor CBC   - C/w iron and vitamin C supplementation    #Chronic Afib  - EKG- AFIB- 83, currently rate controlled  - Cardio recommendations appreciated  - Digoxin level 2.8> 2.0 >1.5 > 1.3  - Digoxin d/c'd, will need to follow up outpatient   - C/w metoprolol succinate ER 25mg qd    #HFimpEF  #HTN  #CAD s/p stents  - Hx of EF < 30 in 2021   - TTE 5/24: LVEF 60-65%  - C/w statin, farxiga, amlodipine, beta blocker, entresto  - C/w eliquis and plavix  - Monitor vitals    #Failure to thrive   - Poor appetite weight loss  - Per PMD - Dr Allison who spoke with dr. sean sprague- reported that pt s/p upper endoscopy for cystic pancreatic mass, but nothing was dx  - Pt has been loosing weight despite eating normally, lost 20 lbs in last 6 months.   - Avoid nephrotoxins   - Appreciate GI recs: f/u w/ outpatient GI     #Dysphagia  - Speech and swallow - c/w minced and moist  - Recent esophageal dilatation done in April   - Follows with GI Dr. Escudero at [place]  - GI recommendations appreciated: no plan for repeat endoscopy    #Pancreatic cyst  - CT-(05.26.24 @ 21:19) >2.4 cm complex lesion in the pancreatic tail with imaging features consistent with serous cystadenoma   - CT abd/pelvis 6/26: Stable well-circumscribed cystic pancreatic tail mass. Cardiomegaly with small pericardial effusion.  - Appreciate GI recs: f/u w/ outpatient GI     #Right subclavian artery with severe atherosclerosis   #Uneven BP's.   - Per prior chart-  Left side BP higher than right side BP  - CTA and carotid duplex noted  - Vascular surgery consulted, no acute vascular intervention required, continue Eliquis and Plavix  - F/u outpatient with vascular surgery     #DVT prophylaxis   - Eliquis    Dispo: [facility] pending [facility]     Case discussed with attending, Dr. Adair

## 2024-07-07 LAB
HCT VFR BLD CALC: 32.1 % — LOW (ref 34.5–45)
HGB BLD-MCNC: 9.3 G/DL — LOW (ref 11.5–15.5)
MCHC RBC-ENTMCNC: 23.8 PG — LOW (ref 27–34)
MCHC RBC-ENTMCNC: 29 GM/DL — LOW (ref 32–36)
MCV RBC AUTO: 82.1 FL — SIGNIFICANT CHANGE UP (ref 80–100)
NRBC # BLD: 0 /100 WBCS — SIGNIFICANT CHANGE UP (ref 0–0)
PLATELET # BLD AUTO: 258 K/UL — SIGNIFICANT CHANGE UP (ref 150–400)
RBC # BLD: 3.91 M/UL — SIGNIFICANT CHANGE UP (ref 3.8–5.2)
RBC # FLD: 21.9 % — HIGH (ref 10.3–14.5)
WBC # BLD: 5.02 K/UL — SIGNIFICANT CHANGE UP (ref 3.8–10.5)
WBC # FLD AUTO: 5.02 K/UL — SIGNIFICANT CHANGE UP (ref 3.8–10.5)

## 2024-07-07 PROCEDURE — 99233 SBSQ HOSP IP/OBS HIGH 50: CPT | Mod: GC

## 2024-07-07 RX ADMIN — DAPAGLIFLOZIN 10 MILLIGRAM(S): 10 TABLET, FILM COATED ORAL at 05:06

## 2024-07-07 RX ADMIN — Medication 2 TABLET(S): at 21:34

## 2024-07-07 RX ADMIN — PANTOPRAZOLE SODIUM 40 MILLIGRAM(S): 40 INJECTION, POWDER, FOR SOLUTION INTRAVENOUS at 11:12

## 2024-07-07 RX ADMIN — Medication 325 MILLIGRAM(S): at 11:12

## 2024-07-07 RX ADMIN — Medication 1 TABLET(S): at 11:12

## 2024-07-07 RX ADMIN — CLOPIDOGREL BISULFATE 75 MILLIGRAM(S): 75 TABLET, FILM COATED ORAL at 11:12

## 2024-07-07 RX ADMIN — AMLODIPINE BESYLATE 2.5 MILLIGRAM(S): 2.5 TABLET ORAL at 05:07

## 2024-07-07 RX ADMIN — Medication 500 MILLIGRAM(S): at 11:13

## 2024-07-07 RX ADMIN — ATORVASTATIN CALCIUM 40 MILLIGRAM(S): 20 TABLET, FILM COATED ORAL at 21:34

## 2024-07-07 RX ADMIN — APIXABAN 2.5 MILLIGRAM(S): 5 TABLET, FILM COATED ORAL at 18:09

## 2024-07-07 RX ADMIN — APIXABAN 2.5 MILLIGRAM(S): 5 TABLET, FILM COATED ORAL at 05:07

## 2024-07-07 RX ADMIN — SACUBITRIL AND VALSARTAN 1 TABLET(S): 97; 103 TABLET, FILM COATED ORAL at 05:08

## 2024-07-07 NOTE — PROGRESS NOTE ADULT - SUBJECTIVE AND OBJECTIVE BOX
Patient is a 88y old  Female who presents with a chief complaint of anemia, afib (28 Jun 2024 13:30)    INTERVAL HPI/OVERNIGHT EVENTS: Patient seen and examined at bedside. No overnight events. Feeling well. Aware plan is to wait for [facility] placement. No further questions at this time.     Vital Signs Last 24 Hrs  T(C): 36.9 (06 Jul 2024 05:00), Max: 36.9 (05 Jul 2024 12:13)  T(F): 98.5 (06 Jul 2024 05:00), Max: 98.5 (06 Jul 2024 05:00)  HR: 62 (06 Jul 2024 05:00) (62 - 78)  BP: 131/55 (06 Jul 2024 05:00) (116/58 - 131/55)  BP(mean): --  RR: 16 (06 Jul 2024 05:00) (16 - 18)  SpO2: 100% (06 Jul 2024 05:00) (96% - 100%)    Parameters below as of 06 Jul 2024 05:00  Patient On (Oxygen Delivery Method): room air      PHYSICAL EXAM:  GENERAL: NAD, frail/thin  HEENT:  AT/NC,  EOMI  CHEST/LUNG:  normal respiratory effort, no intercostal retractions  HEART:  regular rate  ABDOMEN:  BS+, soft, nontender, nondistended; No HSM  NERVOUS SYSTEM: answers questions and follows commands appropriately, A&Ox3 grossly moves all extremities   PSYCH: Appropriate affect, Alert & Awake; Good judgement    Labs:   Personally reviewed                        9.6    5.27  )-----------( 264      ( 06 Jul 2024 06:00 )             32.2     05 Jul 2024 06:44    138    |  103    |  41     ----------------------------<  88     4.9     |  32     |  1.06     Ca    8.4        05 Jul 2024 06:44        CAPILLARY BLOOD GLUCOSE      Urinalysis Basic - ( 05 Jul 2024 06:44 )    Color: x / Appearance: x / SG: x / pH: x  Gluc: 88 mg/dL / Ketone: x  / Bili: x / Urobili: x   Blood: x / Protein: x / Nitrite: x   Leuk Esterase: x / RBC: x / WBC x   Sq Epi: x / Non Sq Epi: x / Bacteria: x      RADIOLOGY & ADDITIONAL TESTS: Reports personally reviewed.     No new imaging to review at this time         MEDICATIONS  (STANDING):  amLODIPine   Tablet 2.5 milliGRAM(s) Oral daily  apixaban 2.5 milliGRAM(s) Oral every 12 hours  ascorbic acid 500 milliGRAM(s) Oral daily  atorvastatin 40 milliGRAM(s) Oral at bedtime  clopidogrel Tablet 75 milliGRAM(s) Oral daily  dapagliflozin 10 milliGRAM(s) Oral every 24 hours  metoprolol succinate ER 25 milliGRAM(s) Oral daily  multivitamin 1 Tablet(s) Oral daily  pantoprazole   Suspension 40 milliGRAM(s) Oral daily  polyethylene glycol 3350 17 Gram(s) Oral daily  sacubitril 24 mG/valsartan 26 mG 1 Tablet(s) Oral two times a day  senna 2 Tablet(s) Oral at bedtime    MEDICATIONS  (PRN):  acetaminophen     Tablet .. 650 milliGRAM(s) Oral every 6 hours PRN Temp greater or equal to 38C (100.4F), Mild Pain (1 - 3)  aluminum hydroxide/magnesium hydroxide/simethicone Suspension 30 milliLiter(s) Oral every 4 hours PRN Dyspepsia  melatonin 3 milliGRAM(s) Oral at bedtime PRN Insomnia  ondansetron Injectable 4 milliGRAM(s) IV Push every 8 hours PRN Nausea and/or Vomiting         Patient is a 88y old  Female who presents with a chief complaint of anemia, afib (28 Jun 2024 13:30)    INTERVAL HPI/OVERNIGHT EVENTS: Patient seen and examined at bedside. No overnight events. Feeling well. Aware plan is to wait for [facility] placement. No further questions at this time.     Vital Signs Last 24 Hrs  Vital Signs Last 24 Hrs  T(C): 36.7 (07 Jul 2024 05:00), Max: 36.9 (06 Jul 2024 13:16)  T(F): 98 (07 Jul 2024 05:00), Max: 98.5 (06 Jul 2024 13:16)  HR: 60 (07 Jul 2024 05:00) (60 - 63)  BP: 155/56 (07 Jul 2024 05:00) (145/57 - 159/65)  BP(mean): --  RR: 16 (07 Jul 2024 05:00) (16 - 18)  SpO2: 97% (07 Jul 2024 05:00) (97% - 100%)    Parameters below as of 07 Jul 2024 05:00  Patient On (Oxygen Delivery Method): room air      PHYSICAL EXAM:  GENERAL: NAD, frail/thin  HEENT:  AT/NC,  EOMI  CHEST/LUNG:  normal respiratory effort, no intercostal retractions  HEART:  regular rate  ABDOMEN:  BS+, soft, nontender, nondistended; No HSM  NERVOUS SYSTEM: answers questions and follows commands appropriately, A&Ox3 grossly moves all extremities   PSYCH: Appropriate affect, Alert & Awake; Good judgement    Labs:   Personally reviewed                        9.3    5.02  )-----------( 258      ( 07 Jul 2024 06:19 )             32.1         CAPILLARY BLOOD GLUCOSE      Urinalysis Basic - ( 05 Jul 2024 06:44 )    Color: x / Appearance: x / SG: x / pH: x  Gluc: 88 mg/dL / Ketone: x  / Bili: x / Urobili: x   Blood: x / Protein: x / Nitrite: x   Leuk Esterase: x / RBC: x / WBC x   Sq Epi: x / Non Sq Epi: x / Bacteria: x      RADIOLOGY & ADDITIONAL TESTS: Reports personally reviewed.     No new imaging to review at this time         MEDICATIONS  (STANDING):  amLODIPine   Tablet 2.5 milliGRAM(s) Oral daily  apixaban 2.5 milliGRAM(s) Oral every 12 hours  ascorbic acid 500 milliGRAM(s) Oral daily  atorvastatin 40 milliGRAM(s) Oral at bedtime  clopidogrel Tablet 75 milliGRAM(s) Oral daily  dapagliflozin 10 milliGRAM(s) Oral every 24 hours  metoprolol succinate ER 25 milliGRAM(s) Oral daily  multivitamin 1 Tablet(s) Oral daily  pantoprazole   Suspension 40 milliGRAM(s) Oral daily  polyethylene glycol 3350 17 Gram(s) Oral daily  sacubitril 24 mG/valsartan 26 mG 1 Tablet(s) Oral two times a day  senna 2 Tablet(s) Oral at bedtime    MEDICATIONS  (PRN):  acetaminophen     Tablet .. 650 milliGRAM(s) Oral every 6 hours PRN Temp greater or equal to 38C (100.4F), Mild Pain (1 - 3)  aluminum hydroxide/magnesium hydroxide/simethicone Suspension 30 milliLiter(s) Oral every 4 hours PRN Dyspepsia  melatonin 3 milliGRAM(s) Oral at bedtime PRN Insomnia  ondansetron Injectable 4 milliGRAM(s) IV Push every 8 hours PRN Nausea and/or Vomiting

## 2024-07-07 NOTE — PROGRESS NOTE ADULT - ATTENDING COMMENTS
Patient is a 87 yo female w/ hypertension, paroxysmal afib on Eliquis, cardiomyopathy, CAD, Hx of cardiac stents.  waiting for discharge. No acute evnts overnight.    T(C): 36.8 (07-07-24 @ 12:38), Max: 36.9 (07-06-24 @ 13:16)  T(F): 98.3 (07-07-24 @ 12:38), Max: 98.5 (07-06-24 @ 13:16)  HR: 62 (07-07-24 @ 12:38) (60 - 63)  BP: 127/48 (07-07-24 @ 12:38) (127/48 - 159/65)  ABP: --  ABP(mean): --  RR: 16 (07-07-24 @ 12:38) (16 - 18)  SpO2: 100% (07-07-24 @ 12:38) (97% - 100%)    on exam chacectic  no apparent distress  able to talk in full sentences  both lungs clear  irregular rhythm  abdomen- soft  no pedal edema    LABS:                        9.3    5.02  )-----------( 258      ( 07 Jul 2024 06:19 )             32.1       a/p:  # dysphagia- seen by GI, recent esophageal dilatation done in april. no plan for repeat endoscopy.  # atrial fibrillation- rate controlled. continue metoprolol. dig discontinued due to toxic levels recently.  # Anemia, microcytic, chronic- no change in baseline. recent EUS, could be secondary to slow blood loss from upper GI source vs nutritional sec to restricted oral intake from difficulty swallowing. on venofer.  # CAD s/p stent- continue beta blocker, statin, plavix.   # Pancreatic cyst- outpatient follow up  - rest as per resident note.    - discharge planning .

## 2024-07-07 NOTE — PROGRESS NOTE ADULT - ASSESSMENT
89 yo female w/ hypertension, paroxysmal afib on Eliquis, cardiomyopathy, CAD, Hx of cardiac stents, admitted for anemia , FTT, digoxin toxicity - digoxin level WNL, Hgb stable, pending [facility] auth.     #Microcytic anemia, anemia of chronic disease   - Hgb 9.4 on admission, stable   - Recent EUS ? 2/2 to slow blood loss from upper GI source vs nutritional sec to restricted oral intake from difficulty swallowing  - FOBT negative  - S/p 1 unit 6/30  - S/p Venofer x 3 doses   - Keep T&S active  - Eliquis and plavix restarted   - Monitor CBC   - C/w iron and vitamin C supplementation    #Chronic Afib  - EKG- AFIB- 83, currently rate controlled  - Cardio recommendations appreciated  - Digoxin level 2.8> 2.0 >1.5 > 1.3  - Digoxin d/c'd, will need to follow up outpatient   - C/w metoprolol succinate ER 25mg qd    #HFimpEF  #HTN  #CAD s/p stents  - Hx of EF < 30 in 2021   - TTE 5/24: LVEF 60-65%  - C/w statin, farxiga, amlodipine, beta blocker, entresto  - C/w eliquis and plavix  - Monitor vitals    #Failure to thrive   - Poor appetite weight loss  - Per PMD - Dr Allison who spoke with dr. sean sprague- reported that pt s/p upper endoscopy for cystic pancreatic mass, but nothing was dx  - Pt has been loosing weight despite eating normally, lost 20 lbs in last 6 months.   - Avoid nephrotoxins   - Appreciate GI recs: f/u w/ outpatient GI     #Dysphagia  - Speech and swallow - c/w minced and moist  - Recent esophageal dilatation done in April   - Follows with GI Dr. Escudero at [place]  - GI recommendations appreciated: no plan for repeat endoscopy    #Pancreatic cyst  - CT-(05.26.24 @ 21:19) >2.4 cm complex lesion in the pancreatic tail with imaging features consistent with serous cystadenoma   - CT abd/pelvis 6/26: Stable well-circumscribed cystic pancreatic tail mass. Cardiomegaly with small pericardial effusion.  - Appreciate GI recs: f/u w/ outpatient GI     #Right subclavian artery with severe atherosclerosis   #Uneven BP's.   - Per prior chart-  Left side BP higher than right side BP  - CTA and carotid duplex noted  - Vascular surgery consulted, no acute vascular intervention required, continue Eliquis and Plavix  - F/u outpatient with vascular surgery     #DVT prophylaxis   - Eliquis    Dispo: [facility] pending [facility]     Case discussed with attending, Dr. Adair

## 2024-07-08 ENCOUNTER — TRANSCRIPTION ENCOUNTER (OUTPATIENT)
Age: 89
End: 2024-07-08

## 2024-07-08 VITALS
TEMPERATURE: 98 F | RESPIRATION RATE: 17 BRPM | SYSTOLIC BLOOD PRESSURE: 122 MMHG | OXYGEN SATURATION: 96 % | HEART RATE: 54 BPM | DIASTOLIC BLOOD PRESSURE: 58 MMHG

## 2024-07-08 LAB
HCT VFR BLD CALC: 34 % — LOW (ref 34.5–45)
HGB BLD-MCNC: 10 G/DL — LOW (ref 11.5–15.5)
MCHC RBC-ENTMCNC: 24.4 PG — LOW (ref 27–34)
MCHC RBC-ENTMCNC: 29.4 GM/DL — LOW (ref 32–36)
MCV RBC AUTO: 82.9 FL — SIGNIFICANT CHANGE UP (ref 80–100)
NRBC # BLD: 0 /100 WBCS — SIGNIFICANT CHANGE UP (ref 0–0)
PLATELET # BLD AUTO: 276 K/UL — SIGNIFICANT CHANGE UP (ref 150–400)
RBC # BLD: 4.1 M/UL — SIGNIFICANT CHANGE UP (ref 3.8–5.2)
RBC # FLD: 22 % — HIGH (ref 10.3–14.5)
WBC # BLD: 5.7 K/UL — SIGNIFICANT CHANGE UP (ref 3.8–10.5)
WBC # FLD AUTO: 5.7 K/UL — SIGNIFICANT CHANGE UP (ref 3.8–10.5)

## 2024-07-08 PROCEDURE — 86923 COMPATIBILITY TEST ELECTRIC: CPT

## 2024-07-08 PROCEDURE — P9016: CPT

## 2024-07-08 PROCEDURE — 92610 EVALUATE SWALLOWING FUNCTION: CPT

## 2024-07-08 PROCEDURE — 80053 COMPREHEN METABOLIC PANEL: CPT

## 2024-07-08 PROCEDURE — 83615 LACTATE (LD) (LDH) ENZYME: CPT

## 2024-07-08 PROCEDURE — 86850 RBC ANTIBODY SCREEN: CPT

## 2024-07-08 PROCEDURE — 80162 ASSAY OF DIGOXIN TOTAL: CPT

## 2024-07-08 PROCEDURE — 93005 ELECTROCARDIOGRAM TRACING: CPT

## 2024-07-08 PROCEDURE — 74177 CT ABD & PELVIS W/CONTRAST: CPT | Mod: MC

## 2024-07-08 PROCEDURE — 85045 AUTOMATED RETICULOCYTE COUNT: CPT

## 2024-07-08 PROCEDURE — 84466 ASSAY OF TRANSFERRIN: CPT

## 2024-07-08 PROCEDURE — 86901 BLOOD TYPING SEROLOGIC RH(D): CPT

## 2024-07-08 PROCEDURE — 36415 COLL VENOUS BLD VENIPUNCTURE: CPT

## 2024-07-08 PROCEDURE — 83735 ASSAY OF MAGNESIUM: CPT

## 2024-07-08 PROCEDURE — 71045 X-RAY EXAM CHEST 1 VIEW: CPT

## 2024-07-08 PROCEDURE — 85027 COMPLETE CBC AUTOMATED: CPT

## 2024-07-08 PROCEDURE — 85025 COMPLETE CBC W/AUTO DIFF WBC: CPT

## 2024-07-08 PROCEDURE — 83880 ASSAY OF NATRIURETIC PEPTIDE: CPT

## 2024-07-08 PROCEDURE — 82272 OCCULT BLD FECES 1-3 TESTS: CPT

## 2024-07-08 PROCEDURE — 99285 EMERGENCY DEPT VISIT HI MDM: CPT

## 2024-07-08 PROCEDURE — 85610 PROTHROMBIN TIME: CPT

## 2024-07-08 PROCEDURE — 83550 IRON BINDING TEST: CPT

## 2024-07-08 PROCEDURE — 83540 ASSAY OF IRON: CPT

## 2024-07-08 PROCEDURE — 82728 ASSAY OF FERRITIN: CPT

## 2024-07-08 PROCEDURE — 92526 ORAL FUNCTION THERAPY: CPT

## 2024-07-08 PROCEDURE — 85730 THROMBOPLASTIN TIME PARTIAL: CPT

## 2024-07-08 PROCEDURE — 83010 ASSAY OF HAPTOGLOBIN QUANT: CPT

## 2024-07-08 PROCEDURE — 86900 BLOOD TYPING SEROLOGIC ABO: CPT

## 2024-07-08 PROCEDURE — 36430 TRANSFUSION BLD/BLD COMPNT: CPT

## 2024-07-08 PROCEDURE — 99233 SBSQ HOSP IP/OBS HIGH 50: CPT | Mod: GC

## 2024-07-08 PROCEDURE — 80048 BASIC METABOLIC PNL TOTAL CA: CPT

## 2024-07-08 RX ORDER — DIGOXIN 125 MCG
1 TABLET ORAL
Refills: 0 | DISCHARGE

## 2024-07-08 RX ORDER — FERROUS SULFATE 325(65) MG
1 TABLET ORAL
Qty: 0 | Refills: 0 | DISCHARGE
Start: 2024-07-08

## 2024-07-08 RX ORDER — HYDRALAZINE HYDROCHLORIDE 50 MG/1
1 TABLET ORAL
Refills: 0 | DISCHARGE

## 2024-07-08 RX ORDER — POLYETHYLENE GLYCOL 3350 1 G/G
17 POWDER ORAL
Qty: 0 | Refills: 0 | DISCHARGE
Start: 2024-07-08

## 2024-07-08 RX ORDER — CARVEDILOL PHOSPHATE 80 MG/1
1 CAPSULE, EXTENDED RELEASE ORAL
Refills: 0 | DISCHARGE

## 2024-07-08 RX ORDER — SUCRALFATE 1 G
1 TABLET ORAL
Refills: 0 | DISCHARGE

## 2024-07-08 RX ADMIN — APIXABAN 2.5 MILLIGRAM(S): 5 TABLET, FILM COATED ORAL at 05:11

## 2024-07-08 RX ADMIN — Medication 1 TABLET(S): at 11:02

## 2024-07-08 RX ADMIN — AMLODIPINE BESYLATE 2.5 MILLIGRAM(S): 2.5 TABLET ORAL at 05:11

## 2024-07-08 RX ADMIN — PANTOPRAZOLE SODIUM 40 MILLIGRAM(S): 40 INJECTION, POWDER, FOR SOLUTION INTRAVENOUS at 11:01

## 2024-07-08 RX ADMIN — Medication 25 MILLIGRAM(S): at 05:11

## 2024-07-08 RX ADMIN — DAPAGLIFLOZIN 10 MILLIGRAM(S): 10 TABLET, FILM COATED ORAL at 05:11

## 2024-07-08 RX ADMIN — SACUBITRIL AND VALSARTAN 1 TABLET(S): 97; 103 TABLET, FILM COATED ORAL at 05:11

## 2024-07-08 RX ADMIN — Medication 500 MILLIGRAM(S): at 11:02

## 2024-07-08 RX ADMIN — CLOPIDOGREL BISULFATE 75 MILLIGRAM(S): 75 TABLET, FILM COATED ORAL at 11:02

## 2024-07-08 RX ADMIN — Medication 325 MILLIGRAM(S): at 11:02

## 2024-07-08 NOTE — PROGRESS NOTE ADULT - REASON FOR ADMISSION
anemia, afib

## 2024-07-08 NOTE — CHART NOTE - NSCHARTNOTEFT_GEN_A_CORE
Nutrition Follow Up Note  Hospital Course   (Per Electronic Medical Record)    Source:  Patient [X]  Medical Record [X]      Diet:   DASH-TLC Minced & Moist (IDDSI Level 5/Mechanical Soft/Dysphagia 2) Diet w/ Thin Liquids (IDDSI Level 0)  Tolerates Diet Consistency Well  No Chewing/Swallowing Difficulties - Will Discuss w/ Speech Therapy   No Recent Nausea, Vomiting, Diarrhea or Constipation (as Per Patient)  Consumes % of Meals Usually (as Per Documentation) - States Fair Intake (Per Patient)   on Ensure Plus High Protein 8oz PO BID (Provides 700kcal & 40grams of Protein)  Patient Takes Nutrition Supplement Well   Education Provided on Proper Nutrition     Enteral/Parenteral Nutrition: Not Applicable    Current Weight: 94.7lb on 7/8  Obtain New Weight to Confirm  Obtain Weights Daily     Pertinent Medications: MEDICATIONS  (STANDING):  amLODIPine   Tablet 2.5 milliGRAM(s) Oral daily  apixaban 2.5 milliGRAM(s) Oral every 12 hours  ascorbic acid 500 milliGRAM(s) Oral daily  atorvastatin 40 milliGRAM(s) Oral at bedtime  clopidogrel Tablet 75 milliGRAM(s) Oral daily  dapagliflozin 10 milliGRAM(s) Oral every 24 hours  ferrous    sulfate 325 milliGRAM(s) Oral daily  metoprolol succinate ER 25 milliGRAM(s) Oral daily  multivitamin 1 Tablet(s) Oral daily  pantoprazole   Suspension 40 milliGRAM(s) Oral daily  polyethylene glycol 3350 17 Gram(s) Oral daily  sacubitril 24 mG/valsartan 26 mG 1 Tablet(s) Oral two times a day  senna 2 Tablet(s) Oral at bedtime    MEDICATIONS  (PRN):  acetaminophen     Tablet .. 650 milliGRAM(s) Oral every 6 hours PRN Temp greater or equal to 38C (100.4F), Mild Pain (1 - 3)  aluminum hydroxide/magnesium hydroxide/simethicone Suspension 30 milliLiter(s) Oral every 4 hours PRN Dyspepsia  melatonin 3 milliGRAM(s) Oral at bedtime PRN Insomnia  ondansetron Injectable 4 milliGRAM(s) IV Push every 8 hours PRN Nausea and/or Vomiting    Pertinent Labs:      Skin: Stage 2 Pressure Ulcer on Sacrum    Edema: None Noted (as Per Documentation)     Last Bowel Movement: on 7/7    Estimated Needs:   [X] No Change Since Previous Assessment    Previous Nutrition Diagnosis:   Severe Malnutrition  Increased Nutrient Needs - Calories & Protein     Nutrition Diagnosis is [X] Ongoing - Continues on Nutrition Supplement & Patient Takes Nutrition Supplement     New Nutrition Diagnosis: [X] Not Applicable    Interventions:   1. Education Provided on Proper Nutrition   2. Recommend Continue Nutrition Plan of Care     Monitoring & Evaluation:   [X] Weights   [X] PO Intake   [X] Skin Integrity   [X] Follow Up (Per Protocol)  [X] Tolerance to Diet Prescription   [X] Other: Labs    Registered Dietitian/Nutritionist Remains Available.  Christopher Hawkins, GABRIELEN, CDN    Phone# [phone]
Digoxin level 4.1. WIll hold off on giving digoxin tonight and monitor HR
Nutrition Follow Up Note  Hospital Course (Per Electronic Medical Record):   Source: Medical Record [X] Patient [X]  Nursing Staff [X]     Diet: DASH/TLC , Minced Moist, Ensure HP BID    Patient reports tolerating current diet , noted consuming 50-75% of meals , Dx of severe protein calorie malnutrition noted , receiving Ensure HP BID which is providing an additional 700kcals , 40gms protein to current diet , Patient admitted for  anemia , patient supplemented with Ferrous Sulfate , patient received 1 unit PRBC's (6/30) due to Hgb / Hct trended downward . , Labs reviewed , Venofer supplemented today .     Current Weight: (7/1) 83.7/38kg                          (6/30) 83.7/38kg                         (6/28) 77.1/35kg     Pertinent Medications: MEDICATIONS  (STANDING):  amLODIPine   Tablet 2.5 milliGRAM(s) Oral daily  ascorbic acid 500 milliGRAM(s) Oral daily  atorvastatin 40 milliGRAM(s) Oral at bedtime  dapagliflozin 10 milliGRAM(s) Oral every 24 hours  iron sucrose IVPB 100 milliGRAM(s) IV Intermittent every 24 hours  metoprolol succinate ER 25 milliGRAM(s) Oral daily  multivitamin 1 Tablet(s) Oral daily  pantoprazole   Suspension 40 milliGRAM(s) Oral daily  polyethylene glycol 3350 17 Gram(s) Oral daily  senna 2 Tablet(s) Oral at bedtime    MEDICATIONS  (PRN):  acetaminophen     Tablet .. 650 milliGRAM(s) Oral every 6 hours PRN Temp greater or equal to 38C (100.4F), Mild Pain (1 - 3)  aluminum hydroxide/magnesium hydroxide/simethicone Suspension 30 milliLiter(s) Oral every 4 hours PRN Dyspepsia  melatonin 3 milliGRAM(s) Oral at bedtime PRN Insomnia  ondansetron Injectable 4 milliGRAM(s) IV Push every 8 hours PRN Nausea and/or Vomiting      Pertinent Labs:  07-01 Na138 mmol/L Glu 79 mg/dL K+ 4.6 mmol/L Cr  0.96 mg/dL BUN 25 mg/dL<H> 07-01 Alb 2.1 g/dL<L>  Hgb 8.7g/dl<L> , Hct29.7%<L>       Skin: Stage II sacrum , MVI , Vit C added     Edema: none    Last BM: (6/30)     Estimated Needs:   [X] No Change since Previous Assessment      Previous Nutrition Diagnosis: Severe protein calorie malnutrition & increased nutrition needs     Nutrition Diagnosis is [X] Ongoing & addressed        New Nutrition Diagnosis: [X] Not Applicable      Interventions:   1. continue current nutrition regimen     Monitoring & Evaluation: will monitor:  [X] Weights   [X] PO Intake   [X] Follow Up (Per Protocol)  [X] Tolerance to Diet Prescription       RD to follow as per Nutrition protocol  Edith Martinez RDN
Patient intermittenly bradycardic to the 30s while sleeping and then improves back to 60s  Patient resting comfortably    Vital Signs Last 24 Hrs  T(C): 36.3 (26 Jun 2024 03:42), Max: 36.7 (25 Jun 2024 19:00)  T(F): 97.4 (26 Jun 2024 03:42), Max: 98 (25 Jun 2024 19:00)  HR: 56 (26 Jun 2024 03:42) (56 - 92)  BP: 158/54 (26 Jun 2024 03:42) (97/54 - 168/55)  BP(mean): --  RR: 18 (26 Jun 2024 03:42) (16 - 18)  SpO2: 100% (26 Jun 2024 03:42) (94% - 100%)    Parameters below as of 26 Jun 2024 03:42  Patient On (Oxygen Delivery Method): room air      -EKG ordered
Tried contacting niece, Rashmi, regarding family discussion regarding [facility]. Patient not willing to make decision on her own regarding going or picking out [facility]. Left message to call back.

## 2024-07-08 NOTE — PROGRESS NOTE ADULT - SUBJECTIVE AND OBJECTIVE BOX
Patient is a 88y old  Female who presents with a chief complaint of anemia, afib (28 Jun 2024 13:30)    INTERVAL HPI/OVERNIGHT EVENTS: Patient seen and examined at bedside. No overnight events. Feeling well. Aware plan is to wait for MIKE placement. No further questions at this time.     Vital Signs Last 24 Hrs  T(C): 36.9 (08 Jul 2024 05:02), Max: 36.9 (08 Jul 2024 05:02)  T(F): 98.4 (08 Jul 2024 05:02), Max: 98.4 (08 Jul 2024 05:02)  HR: 65 (08 Jul 2024 05:02) (62 - 68)  BP: 154/53 (08 Jul 2024 05:02) (101/54 - 154/53)  BP(mean): --  RR: 17 (08 Jul 2024 05:02) (16 - 17)  SpO2: 99% (08 Jul 2024 05:02) (95% - 100%)    Parameters below as of 08 Jul 2024 05:02  Patient On (Oxygen Delivery Method): room air      PHYSICAL EXAM:  GENERAL: NAD, frail/thin  HEENT:  AT/NC,  EOMI  CHEST/LUNG:  normal respiratory effort, no intercostal retractions  HEART:  regular rate  ABDOMEN:  BS+, soft, nontender, nondistended; No HSM  NERVOUS SYSTEM: answers questions and follows commands appropriately, A&Ox3 grossly moves all extremities   PSYCH: Appropriate affect, Alert & Awake; Good judgement    Labs:   Personally reviewed                        9.3    5.02  )-----------( 258      ( 07 Jul 2024 06:19 )             32.1     CAPILLARY BLOOD GLUCOSE    MEDICATIONS  (STANDING):  amLODIPine   Tablet 2.5 milliGRAM(s) Oral daily  apixaban 2.5 milliGRAM(s) Oral every 12 hours  ascorbic acid 500 milliGRAM(s) Oral daily  atorvastatin 40 milliGRAM(s) Oral at bedtime  clopidogrel Tablet 75 milliGRAM(s) Oral daily  dapagliflozin 10 milliGRAM(s) Oral every 24 hours  ferrous    sulfate 325 milliGRAM(s) Oral daily  metoprolol succinate ER 25 milliGRAM(s) Oral daily  multivitamin 1 Tablet(s) Oral daily  pantoprazole   Suspension 40 milliGRAM(s) Oral daily  polyethylene glycol 3350 17 Gram(s) Oral daily  sacubitril 24 mG/valsartan 26 mG 1 Tablet(s) Oral two times a day  senna 2 Tablet(s) Oral at bedtime    MEDICATIONS  (PRN):  acetaminophen     Tablet .. 650 milliGRAM(s) Oral every 6 hours PRN Temp greater or equal to 38C (100.4F), Mild Pain (1 - 3)  aluminum hydroxide/magnesium hydroxide/simethicone Suspension 30 milliLiter(s) Oral every 4 hours PRN Dyspepsia  melatonin 3 milliGRAM(s) Oral at bedtime PRN Insomnia  ondansetron Injectable 4 milliGRAM(s) IV Push every 8 hours PRN Nausea and/or Vomiting           Patient is a 88y old  Female who presents with a chief complaint of anemia, afib (28 Jun 2024 13:30)    INTERVAL HPI/OVERNIGHT EVENTS: Patient seen and examined at bedside. No overnight events. Feeling well. Aware plan is to wait for MIKE placement. No further questions at this time.     Vital Signs Last 24 Hrs  T(C): 36.9 (08 Jul 2024 05:02), Max: 36.9 (08 Jul 2024 05:02)  T(F): 98.4 (08 Jul 2024 05:02), Max: 98.4 (08 Jul 2024 05:02)  HR: 65 (08 Jul 2024 05:02) (62 - 68)  BP: 154/53 (08 Jul 2024 05:02) (101/54 - 154/53)  BP(mean): --  RR: 17 (08 Jul 2024 05:02) (16 - 17)  SpO2: 99% (08 Jul 2024 05:02) (95% - 100%)    Parameters below as of 08 Jul 2024 05:02  Patient On (Oxygen Delivery Method): room air      PHYSICAL EXAM:  GENERAL: NAD, frail/thin  HEENT:  AT/NC,  EOMI  CHEST/LUNG:  normal respiratory effort, no intercostal retractions  HEART:  regular rate  ABDOMEN:  BS+, soft, nontender, nondistended; No HSM  NERVOUS SYSTEM: answers questions and follows commands appropriately, A&Ox3 grossly moves all extremities   PSYCH: Appropriate affect, Alert & Awake; Good judgement    Labs:   Personally reviewed                                   10.0   5.70  )-----------( 276      ( 08 Jul 2024 08:08 )             34.0       MEDICATIONS  (STANDING):  amLODIPine   Tablet 2.5 milliGRAM(s) Oral daily  apixaban 2.5 milliGRAM(s) Oral every 12 hours  ascorbic acid 500 milliGRAM(s) Oral daily  atorvastatin 40 milliGRAM(s) Oral at bedtime  clopidogrel Tablet 75 milliGRAM(s) Oral daily  dapagliflozin 10 milliGRAM(s) Oral every 24 hours  ferrous    sulfate 325 milliGRAM(s) Oral daily  metoprolol succinate ER 25 milliGRAM(s) Oral daily  multivitamin 1 Tablet(s) Oral daily  pantoprazole   Suspension 40 milliGRAM(s) Oral daily  polyethylene glycol 3350 17 Gram(s) Oral daily  sacubitril 24 mG/valsartan 26 mG 1 Tablet(s) Oral two times a day  senna 2 Tablet(s) Oral at bedtime    MEDICATIONS  (PRN):  acetaminophen     Tablet .. 650 milliGRAM(s) Oral every 6 hours PRN Temp greater or equal to 38C (100.4F), Mild Pain (1 - 3)  aluminum hydroxide/magnesium hydroxide/simethicone Suspension 30 milliLiter(s) Oral every 4 hours PRN Dyspepsia  melatonin 3 milliGRAM(s) Oral at bedtime PRN Insomnia  ondansetron Injectable 4 milliGRAM(s) IV Push every 8 hours PRN Nausea and/or Vomiting

## 2024-07-08 NOTE — PROGRESS NOTE ADULT - ASSESSMENT
89 yo female w/ hypertension, paroxysmal afib on Eliquis, cardiomyopathy, CAD, Hx of cardiac stents, admitted for anemia , FTT, digoxin toxicity - digoxin level WNL, Hgb stable, pending MIKE auth.     #Microcytic anemia, anemia of chronic disease   - Hgb 9.4 on admission, stable   - Recent EUS ? 2/2 to slow blood loss from upper GI source vs nutritional sec to restricted oral intake from difficulty swallowing  - FOBT negative  - S/p 1 unit 6/30  - S/p Venofer x 3 doses   - Keep T&S active  - Eliquis and plavix restarted   - Monitor CBC   - C/w iron and vitamin C supplementation    #Chronic Afib  - EKG- AFIB- 83, currently rate controlled  - Cardio recommendations appreciated  - Digoxin level 2.8> 2.0 >1.5 > 1.3  - Digoxin d/c'd, will need to follow up outpatient   - C/w metoprolol succinate ER 25mg qd    #HFimpEF  #HTN  #CAD s/p stents  - Hx of EF < 30 in 2021   - TTE 5/24: LVEF 60-65%  - C/w statin, farxiga, amlodipine, beta blocker, entresto  - C/w eliquis and plavix  - Monitor vitals    #Failure to thrive   - Poor appetite weight loss  - Per PMD - Dr Allison who spoke with dr. sean sprague- reported that pt s/p upper endoscopy for cystic pancreatic mass, but nothing was dx  - Pt has been loosing weight despite eating normally, lost 20 lbs in last 6 months.   - Avoid nephrotoxins   - Appreciate GI recs: f/u w/ outpatient GI     #Dysphagia  - Speech and swallow - c/w minced and moist  - Recent esophageal dilatation done in April   - Follows with GI Dr. Escudero at [place]  - GI recommendations appreciated: no plan for repeat endoscopy    #Pancreatic cyst  - CT-(05.26.24 @ 21:19) >2.4 cm complex lesion in the pancreatic tail with imaging features consistent with serous cystadenoma   - CT abd/pelvis 6/26: Stable well-circumscribed cystic pancreatic tail mass. Cardiomegaly with small pericardial effusion.  - Appreciate GI recs: f/u w/ outpatient GI     #Right subclavian artery with severe atherosclerosis   #Uneven BP's.   - Per prior chart-  Left side BP higher than right side BP  - CTA and carotid duplex noted  - Vascular surgery consulted, no acute vascular intervention required, continue Eliquis and Plavix  - F/u outpatient with vascular surgery     #DVT prophylaxis   - Eliquis    Dispo: [facility]KE pending [facility]     Case discussed with attending, Dr. Adair

## 2024-07-08 NOTE — PROGRESS NOTE ADULT - NUTRITIONAL ASSESSMENT
This patient has been assessed with a concern for Malnutrition and has been determined to have a diagnosis/diagnoses of Severe protein-calorie malnutrition.    This patient is being managed with:   Diet Minced and Moist-  DASH/TLC {Sodium & Cholesterol Restricted}  Supplement Feeding Modality:  Oral  Ensure Plus High Protein Cans or Servings Per Day:  1       Frequency:  Two Times a day  Entered: Jun 26 2024  2:08PM  

## 2024-07-08 NOTE — DISCHARGE NOTE NURSING/CASE MANAGEMENT/SOCIAL WORK - PATIENT PORTAL LINK FT
You can access the FollowMyHealth Patient Portal offered by [facility] by registering at the following website: [email or web address]. By joining [employer]’s FollowMyHealth portal, you will also be able to view your health information using other applications (apps) compatible with our system.

## 2024-07-08 NOTE — PROGRESS NOTE ADULT - ATTENDING COMMENTS
Patient is a 89 yo female w/ hypertension, paroxysmal afib on Eliquis, cardiomyopathy, CAD, Hx of cardiac stents.  waiting for discharge. No acute evnts overnight.    T(C): 36.7 (07-08-24 @ 12:33), Max: 36.9 (07-08-24 @ 05:02)  T(F): 98.1 (07-08-24 @ 12:33), Max: 98.4 (07-08-24 @ 05:02)  HR: 54 (07-08-24 @ 12:33) (54 - 68)  BP: 122/58 (07-08-24 @ 12:33) (101/54 - 154/53)  ABP: --  ABP(mean): --  RR: 17 (07-08-24 @ 12:33) (16 - 17)  SpO2: 96% (07-08-24 @ 12:33) (95% - 100%)      on exam chacectic  no apparent distress  able to talk in full sentences  both lungs clear  irregular rhythm  abdomen- soft  no pedal edema    LABS:                        10.0   5.70  )-----------( 276      ( 08 Jul 2024 08:08 )             34.0       a/p:  # dysphagia- seen by GI, recent esophageal dilatation done in april. no plan for repeat endoscopy.  # atrial fibrillation- rate controlled. continue metoprolol. dig discontinued due to toxic levels recently.  # Anemia, microcytic, chronic- no change in baseline. recent EUS, could be secondary to slow blood loss from upper GI source vs nutritional sec to restricted oral intake from difficulty swallowing. on venofer.  # CAD s/p stent- continue beta blocker, statin, plavix.   # Pancreatic cyst- outpatient follow up  - rest as per resident note.    - discharge planning .

## 2024-07-08 NOTE — PROGRESS NOTE ADULT - PROVIDER SPECIALTY LIST ADULT
Cardiology
Family Medicine
Family Medicine
Cardiology
Family Medicine
Cardiology
Family Medicine
Gastroenterology
Gastroenterology

## 2024-08-05 ENCOUNTER — APPOINTMENT (OUTPATIENT)
Dept: FAMILY MEDICINE | Facility: CLINIC | Age: 89
End: 2024-08-05

## 2024-08-05 PROCEDURE — 99214 OFFICE O/P EST MOD 30 MIN: CPT

## 2024-08-05 NOTE — ASSESSMENT
[FreeTextEntry1] : For now we will order repeat lab work particularly with regard to general metabolic status and the status of her anemia and iron levels we will continue to observe the patient we have told her with regard to medications which we have reconciled that she is in need of them with the exception of the bowel meds as she has no constipation will be followed up here in 1 month laboratory work ordered home draw will be performed Hospital records have been reviewed time of visit 30 minutes

## 2024-08-05 NOTE — HISTORY OF PRESENT ILLNESS
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

## 2024-08-05 NOTE — REVIEW OF SYSTEMS
[Fever] : no fever [Chills] : no chills [Recent Change In Weight] : ~T recent weight change [Chest Pain] : no chest pain [Palpitations] : palpitations [Wheezing] : no wheezing [Dyspnea on Exertion] : no dyspnea on exertion [Abdominal Pain] : no abdominal pain [Nausea] : no nausea [Constipation] : no constipation [Joint Pain] : no joint pain [Unsteady Walking] : ataxia [Negative] : ENT

## 2024-08-05 NOTE — PHYSICAL EXAM
[PERRL] : pupils equal round and reactive to light [Normal Outer Ear/Nose] : the outer ears and nose were normal in appearance [No Lymphadenopathy] : no lymphadenopathy [No Respiratory Distress] : no respiratory distress  [Soft] : abdomen soft [Non Tender] : non-tender [No Masses] : no abdominal mass palpated [No HSM] : no HSM [Normal Bowel Sounds] : normal bowel sounds [Normal Supraclavicular Nodes] : no supraclavicular lymphadenopathy [No CVA Tenderness] : no CVA  tenderness [No Spinal Tenderness] : no spinal tenderness [No Rash] : no rash [Coordination Grossly Intact] : coordination grossly intact [Speech Grossly Normal] : speech grossly normal [Alert and Oriented x3] : oriented to person, place, and time [de-identified] : As above patient thin body B body habitus and chronically ill in appearance [de-identified] : Atrial fibrillation rate 90

## 2024-08-07 ENCOUNTER — LABORATORY RESULT (OUTPATIENT)
Age: 89
End: 2024-08-07

## 2024-08-28 ENCOUNTER — APPOINTMENT (OUTPATIENT)
Dept: CARDIOLOGY | Facility: CLINIC | Age: 89
End: 2024-08-28
Payer: MEDICARE

## 2024-08-28 ENCOUNTER — NON-APPOINTMENT (OUTPATIENT)
Age: 89
End: 2024-08-28

## 2024-08-28 VITALS — HEIGHT: 57 IN | HEART RATE: 95 BPM | BODY MASS INDEX: 21.14 KG/M2 | OXYGEN SATURATION: 99 % | WEIGHT: 98 LBS

## 2024-08-28 VITALS — HEART RATE: 96 BPM | SYSTOLIC BLOOD PRESSURE: 120 MMHG | DIASTOLIC BLOOD PRESSURE: 69 MMHG

## 2024-08-28 DIAGNOSIS — I50.9 HEART FAILURE, UNSPECIFIED: ICD-10-CM

## 2024-08-28 DIAGNOSIS — I70.90 UNSPECIFIED ATHEROSCLEROSIS: ICD-10-CM

## 2024-08-28 DIAGNOSIS — I48.92 UNSPECIFIED ATRIAL FLUTTER: ICD-10-CM

## 2024-08-28 PROCEDURE — 99215 OFFICE O/P EST HI 40 MIN: CPT

## 2024-08-28 PROCEDURE — G2211 COMPLEX E/M VISIT ADD ON: CPT

## 2024-08-28 NOTE — ASSESSMENT
[FreeTextEntry1] : In summary, the patient is an elderly woman who is doing well at this point from a cardiac perspective.  It would seem to me that her LV dysfunction seen on the last hospitalization was in large part due to uncontrolled atrial fibrillation and at some point we will reassess this.  For now however I have advised her to continue on her antihypertensive regimen as well as Entresto.  She is on a beta-blocker as well.  She is on clopidogrel as her antiplatelet drug it was elected not to have her be on full anticoagulation due to her severe iron deficiency anemia.  I have told her niece to speak with Dr. Allison regarding the indications for iron and pantoprazole at this point

## 2024-08-28 NOTE — REASON FOR VISIT
[Cardiac Failure] : cardiac failure [Arrhythmia/ECG Abnorrmalities] : arrhythmia/ECG abnormalities [Coronary Artery Disease] : coronary artery disease [FreeTextEntry1] : Patient returns for followup. Feeling well. Offers no complaints of chest discomfort shortness of breath palpitations dizziness or syncope.  She was hospitalized several months ago for multiple issues including atrial fibrillation with rapid ventricular response decreasing LVEF severe anemia pancreatic issues.  It appears as if her pancreatic issue has turned out to be benign her major GI issue at this point is a stricture.  The patient is managing this conservatively.  Her anemia has improved on iron.  She and her niece wish to go over all of her medication as they are concerned as to whether or not the meds are appropriate.

## 2024-09-09 ENCOUNTER — APPOINTMENT (OUTPATIENT)
Dept: FAMILY MEDICINE | Facility: CLINIC | Age: 89
End: 2024-09-09

## 2024-09-20 ENCOUNTER — APPOINTMENT (OUTPATIENT)
Dept: FAMILY MEDICINE | Facility: CLINIC | Age: 89
End: 2024-09-20

## 2024-09-20 NOTE — ED ADULT NURSE NOTE - NSFALLHARMRISKINTERV_ED_ALL_ED
Assistance OOB with selected safe patient handling equipment if applicable/Communicate risk of Fall with Harm to all staff, patient, and family/Monitor gait and stability/Provide patient with walking aids/Provide visual cue: red socks, yellow wristband, yellow gown, etc/Reinforce activity limits and safety measures with patient and family/Bed in lowest position, wheels locked, appropriate side rails in place/Call bell, personal items and telephone in reach/Instruct patient to call for assistance before getting out of bed/chair/stretcher/Non-slip footwear applied when patient is off stretcher/[place] to call system/Physically safe environment - no spills, clutter or unnecessary equipment/Purposeful Proactive Rounding/Room/bathroom lighting operational, light cord in reach

## 2024-09-20 NOTE — H&P ADULT - HISTORY OF PRESENT ILLNESS
89-year-old female with a history of hypertension, hyperlipidemia, CHF, diabetes, CKD not on dialysis, CAD, paroxysmal A-fib on Eliquis, anemia presents to  ED from home with complaint of weakness over the past week that worsened today. Pt states she usually is abl to walk with her cane and/or walker but today she could barely move. Pt reports she developed a cough earlier today that is productive of bloody sputum. Pt states she is unsure if she had a fever.     In the ED BP was 74/6, , Temp 98.3, RR 16 and O2 93 on 4L NC. Lactate was 4.4 > 2.1, CT chest: Right upper lobe and right middle lobe pneumonia. Patient received 1x dose of Rocephin and Zithromax. S/p 2L NS bolus.  89-year-old female with a history of hypertension, hyperlipidemia, CHF, CKD, CAD, paroxysmal A-fib on Eliquis, anemia presents to  ED from home with complaint of weakness over the past week that worsened today. Pt states she usually is abl to walk with her cane and/or walker but today she could barely move. Pt reports she developed a cough earlier today that is productive of bloody sputum. Pt states she is unsure if she had a fever.     In the ED BP was 74/6, , Temp 98.3, RR 16 and O2 93 on 4L NC. Lactate was 4.4 > 2.1, CT chest: Right upper lobe and right middle lobe pneumonia. Patient received 1x dose of Rocephin and Zithromax. S/p 2L NS bolus.

## 2024-09-20 NOTE — ED ADULT TRIAGE NOTE - CHIEF COMPLAINT QUOTE
Patient brought in by EMS from home with complaint of weakness for the past couple of days. Patient denies any falls or hitting her head. Patient noted to be hypotensive in triage. PMH of HTN and Anemia

## 2024-09-20 NOTE — ED PROVIDER NOTE - CLINICAL SUMMARY MEDICAL DECISION MAKING FREE TEXT BOX
89-year-old female with a history of hypertension, hyperlipidemia, CHF, diabetes, CKD not on dialysis, CAD, paroxysmal A-fib, anemia presents with weakness x 1 week.  As per patient and niece at bedside patient has been having generalized fatigue over the last week denies any associated chest pain shortness of breath difficulty breathing nausea vomiting diarrhea.  Admits has not been eating as normal.  Denies any change in her stool  Exam as stated   Patient found to be hypoxic to 93% and hypoxic. Plan to f/o CT  EKG initially with SVT, underlying afib? Given cardizem 5mg > BP, sxs and HR improved to 70s. Within the hour sxs returned and given another 5mg >HR 70s again. Started on cardizem gtt.   DANE noted > consulted nephrology to proceed with CT but pending callback  BUn/CR elevated >pre-renal. Given 1 LF IVF and will repeat , if improvement will proceed with CT 89-year-old female with a history of hypertension, hyperlipidemia, CHF, diabetes, CKD not on dialysis, CAD, paroxysmal A-fib, anemia presents with weakness x 1 week.  As per patient and niece at bedside patient has been having generalized fatigue over the last week denies any associated chest pain shortness of breath difficulty breathing nausea vomiting diarrhea.  Admits has not been eating as normal.  Denies any change in her stool  Exam as stated   Patient found to be hypoxic to 93% and hypoxic. Plan to f/o CT  EKG initially with SVT, underlying afib? Given cardizem 5mg > BP, sxs and HR improved to 70s. Within the hour sxs returned and given another 5mg >HR 70s again. Started on cardizem gtt.   DANE noted > consulted nephrology to proceed with CT but pending callback  BUn/CR elevated >pre-renal. Given 1 LF IVF and will repeat , if improvement will proceed with CT    @7pm spoke with Nephrologist Dr Dodge - recommending refrain from CT PE considering DANE, txt for PE and monitor > if improvement CT tomorrow or VQ scan tomorrow 89-year-old female with a history of hypertension, hyperlipidemia, CHF, diabetes, CKD not on dialysis, CAD, paroxysmal A-fib, anemia presents with weakness x 1 week.  As per patient and niece at bedside patient has been having generalized fatigue over the last week denies any associated chest pain shortness of breath difficulty breathing nausea vomiting diarrhea.  Admits has not been eating as normal.  Denies any change in her stool  Exam as stated   rectal afebrile  Patient found to be hypoxic to 93% and hypoxic. Plan to f/o CT  EKG initially with SVT, underlying afib? Given cardizem 5mg > BP, sxs and HR improved to 70s. Within the hour sxs returned and given another 5mg >HR 70s again. Started on cardizem gtt.   DANE noted > consulted nephrology to proceed with CT but pending callback  BUn/CR elevated >pre-renal. Given 1 LF IVF and will repeat , if improvement will proceed with CT    @7pm spoke with Nephrologist Dr Dodge - recommending refrain from CT PE considering DANE, txt for PE and monitor > if improvement CT tomorrow or VQ scan tomorrow    Spoke to ICU PA Edward > rec transition to PO and dc gtt. Patient has been HR stable for >1.5 hours since. Will admit to tele  Found to have pna, elevated lactate and wbc > improved with ivf. Txting    Patient to be admitted to an inpatient floor. Pt notified and agreeable to plan. Case discussed with and care endorsed to medical admitting resident.

## 2024-09-20 NOTE — ED ADULT TRIAGE NOTE - NSWEIGHTCALCTOOLDRUG_GEN_A_CORE
used
[Murmur] : no murmurs
[de-identified] : Minimal goiter
[de-identified] : scant left sided gynecomastia.
[de-identified] : warm/well perfused
[id number] Comprehensive

## 2024-09-20 NOTE — ED PROVIDER NOTE - OBJECTIVE STATEMENT
89-year-old female with a history of hypertension, hyperlipidemia, CHF, diabetes, CKD not on dialysis, CAD, paroxysmal A-fib, anemia presents with weakness x 1 week.  As per patient and niece at bedside patient has been having generalized fatigue over the last week denies any associated chest pain shortness of breath difficulty breathing nausea vomiting diarrhea.  Admits has not been eating as normal.  Denies any change in her stool

## 2024-09-20 NOTE — H&P ADULT - ASSESSMENT
89-year-old female with a history of hypertension, hyperlipidemia, CHF, diabetes, CKD not on dialysis, CAD, paroxysmal A-fib on Eliquis, anemia presents to  ED from home with complaint of weakness over the past week that worsened today.  Pt being admitted for:      [] Severe Sepsis secondary to Pneumonia  - ADMIT:  Tele   - WBC 17,  Lactate 4.4>2.1  - s/p 2L bolus in ED  - 250 L bolus ordered   - Pulse ox q 8 hrs  - CT chest: as above  - RVP negative   - Legionella/strep urine antigen   - Nebs prn sob/wheezing  - Incentive spirometry  - NC O2 prn sob, hypoxia with goal >92%  - Cbc, cmp, coags, mg, phos   - f/u Sputum culture  - s/p Ceftriaxone in ED, C/w Ceftriaxone 1g  - f/u repeat lactate   - f/u BCx x2    [] r/o ACS  - Trop I elevated to 141, f/u repeat  - EKG as above  - c/w atorvastatin 40mg qd and Plavix 75mg qd  - F/U AM lipid panel  - DASH diet  - Cardiology consult       89-year-old female with a history of hypertension, hyperlipidemia, CHF, CKD not on dialysis, CAD, paroxysmal A-fib on Eliquis, anemia presents to  ED from home with complaint of weakness over the past week that worsened today.  Pt being admitted for:      [] Severe Sepsis secondary to Pneumonia  - ADMIT:  Tele   - WBC 17,  Lactate 4.4>2.1  - s/p 2L bolus in ED  - 250 L bolus ordered   - Pulse ox q 8 hrs  - CT chest: as above  - RVP negative   - Legionella/strep urine antigen   - Nebs prn sob/wheezing  - Incentive spirometry  - NC O2 prn sob, hypoxia with goal >92%  - Cbc, cmp, coags, mg, phos   - f/u Sputum culture  - s/p Ceftriaxone in ED, C/w Ceftriaxone 1g  - f/u repeat lactate   - f/u BCx x2 and UCx    [] r/o ACS  - Trop I elevated to 141, f/u repeat  - EKG as above  - c/w atorvastatin 40mg qd and Plavix 75mg qd  - F/U AM lipid panel  - DASH diet  - f/u TTE  - Cardiology consult    [] Elevated d-dimer  - Ddimer 662, corrected to be 435  - f/u b/l LE doppler    [] DANE on CKD  - Cr is 1.9 ( Baseline ~ 1.0)  - hold nephrotoxic meds  - gentle fluids  - f/u AM labs    [] CHF  - f/u repeat TTE  - Last TTE: 05/2024: Recent TTE with EF 60-65%, normal LV function, mildly enlarged LA and mild valvular disease  - will hold Entresto and Farxiga for now    [] HTN  - pt on metoprolol 25mg and hydralazine 50 mg BID  - hold hydralazine for now given low BP     [] paroxysmal Afib  - EKG: twave inversions anterior leads, PVCs  - c/w metoprolol 25  - c/w Eliquis    - dvt ppx: Eliquis 2.5 mg BID    - full code    - d/w Dr. Swanson              89-year-old female with a history of hypertension, hyperlipidemia, CHF, CKD not on dialysis, CAD, paroxysmal A-fib on Eliquis, anemia presents to  ED from home with complaint of weakness over the past week that worsened today.  Pt being admitted for:      [] Severe Sepsis secondary to Pneumonia  - ADMIT:  Tele   - WBC 17,  Lactate 4.4>2.1  - s/p 2L bolus in ED  - 250 L bolus ordered   - Pulse ox q 8 hrs  - CT chest: as above  - RVP negative   - Legionella/strep urine antigen   - Nebs prn sob/wheezing  - Incentive spirometry  - NC O2 prn sob, hypoxia with goal >92%  - Cbc, cmp, coags, mg, phos   - f/u Sputum culture  - s/p Ceftriaxone in ED, C/w Ceftriaxone 1g and Azithromycin 500mg   - f/u repeat lactate   - f/u BCx x2 and UCx    [] r/o ACS  - Trop I elevated to 141, f/u repeat  - EKG as above  - c/w atorvastatin 40mg qd and Plavix 75mg qd  - F/U AM lipid panel  - DASH diet  - f/u TTE  - Cardiology consult    [] Elevated d-dimer  - Ddimer 662, corrected to be 435  - f/u b/l LE doppler    [] DANE on CKD  - Cr is 1.9 ( Baseline ~ 1.0)  - hold nephrotoxic meds  - gentle fluids  - f/u AM labs    [] CHF  - f/u repeat TTE  - Last TTE: 05/2024: Recent TTE with EF 60-65%, normal LV function, mildly enlarged LA and mild valvular disease  - will hold Entresto and Farxiga for now    [] HTN  - pt on metoprolol 25mg and hydralazine 50 mg BID  - hold hydralazine for now given low BP     [] paroxysmal Afib  - EKG: twave inversions anterior leads, PVCs  - c/w metoprolol 25  - c/w Eliquis    - dvt ppx: Eliquis 2.5 mg BID    - full code    - d/w Dr. Swanson              89-year-old female with a history of hypertension, hyperlipidemia, CHF, CKD not on dialysis, CAD, paroxysmal A-fib on Eliquis, anemia presents to  ED from home with complaint of weakness over the past week that worsened today.  Pt being admitted for:      [] Severe Sepsis, acute hypoxic resp failure, lactic acidosis, likely secondary to Pneumonia  - ADMIT:  Tele   - WBC 17,  Lactate 4.4>2.1  - s/p 2L bolus in ED  - 250 L bolus ordered   - Pulse ox q 8 hrs  - CT chest: as above  - RVP negative   - Legionella/strep urine antigen   - Nebs prn sob/wheezing  - Incentive spirometry  - NC O2 prn sob, hypoxia with goal >92%  - Cbc, cmp, coags, mg, phos   - f/u Sputum culture  - s/p Ceftriaxone in ED, C/w Ceftriaxone 1g and Azithromycin 500mg   - f/u repeat lactate   - f/u BCx x2 and UCx    [] r/o ACS  - Trop I elevated to 141, f/u repeat  - EKG as above  - c/w atorvastatin 40mg qd and Plavix 75mg qd  - F/U AM lipid panel  - DASH diet  - f/u TTE  - Cardiology consult    [] Elevated d-dimer  - Ddimer 662, corrected to be 435  - f/u b/l LE doppler    [] DANE on CKD  - Cr is 1.9 ( Baseline ~ 1.0)  - hold nephrotoxic meds  - gentle fluids  - f/u AM labs    [] HFrEF  - f/u repeat TTE  - Last TTE: 05/2024: Recent TTE with EF 60-65%, normal LV function, mildly enlarged LA and mild valvular disease  - will hold Entresto and Farxiga for now du to soft BP  - TTE with Doppler (w/Cont) (07.25.21 @ 13:58) >EF (Teicholtz): 29 %      [] HTN  - pt on metoprolol 25mg and hydralazine 50 mg BID  - hold hydralazine for now given low BP   - c/w BB with hold parameters since pt was noted to be in SVT initially was briefly placed on Cardizem drip later stopped, HR improved     [] paroxysmal Afib  - EKG: t wave inversions anterior leads, PVCs  - c/w Eliquis    - dvt ppx: Eliquis 2.5 mg BID    - full code    - d/w Dr. Swanson              89-year-old female with a history of hypertension, hyperlipidemia, CHF, CKD not on dialysis, CAD, paroxysmal A-fib on Eliquis, anemia presents to  ED from home with complaint of weakness over the past week that worsened today.  Pt being admitted for:      [] Severe Sepsis, acute hypoxic resp failure, lactic acidosis, likely secondary to Pneumonia  - ADMIT:  Tele   - WBC 17,  Lactate 4.4>2.1  - s/p 2L bolus in ED  - 250 L bolus ordered   - Pulse ox q 8 hrs  - CT chest: as above  - RVP negative   - Legionella/strep urine antigen   - Nebs prn sob/wheezing  - Incentive spirometry  - NC O2 prn sob, hypoxia with goal >92%  - Cbc, cmp, coags, mg, phos   - f/u Sputum culture  - s/p Ceftriaxone in ED, C/w Ceftriaxone 1g and Azithromycin 500mg   - f/u repeat lactate   - f/u BCx x2 and UCx    [] r/o ACS  - Trop I elevated to 141, f/u repeat  - EKG as above  - c/w atorvastatin 40mg qd and Plavix 75mg qd  - F/U AM lipid panel  - DASH diet  - f/u TTE  - Cardiology consult    [] Elevated d-dimer  - Ddimer 662, corrected to be 435  - f/u b/l LE doppler    [] DANE on CKD  - Cr is 1.9 ( Baseline ~ 1.0)  - hold nephrotoxic meds  - gentle fluids  - f/u AM labs    [] HFrEF, improved   - f/u repeat TTE  - Last TTE: 05/2024: Recent TTE with EF 60-65%, normal LV function, mildly enlarged LA and mild valvular disease  - will hold Entresto and Farxiga for now du to soft BP  - TTE with Doppler (w/Cont) (07.25.21 @ 13:58) >EF (Teicholtz): 29 %      [] HTN  - pt on metoprolol 25mg and hydralazine 50 mg BID  - hold hydralazine for now given low BP   - c/w BB with hold parameters since pt was noted to be in SVT initially was briefly placed on Cardizem drip later stopped, HR improved     [] paroxysmal Afib  - EKG: t wave inversions anterior leads, PVCs  - c/w Eliquis    - dvt ppx: Eliquis 2.5 mg BID    - full code    - d/w Dr. Swanson              89-year-old female with a history of hypertension, hyperlipidemia, CHF, CKD not on dialysis, CAD, paroxysmal A-fib on Eliquis, anemia presents to  ED from home with complaint of weakness over the past week that worsened today.  Pt being admitted for:      [] Severe Sepsis, acute hypoxic resp failure, lactic acidosis, likely secondary to Pneumonia  - ADMIT:  Tele   - WBC 17,  Lactate 4.4>2.1  - s/p 2L bolus in ED  - Pulse ox q 8 hrs  - CT chest: as above: Right pneumonia and pleural effusions   - RVP negative   - Legionella/strep urine antigen   - Nebs prn sob/wheezing  - Incentive spirometry  - NC O2 prn sob, hypoxia with goal >92%  - Cbc, cmp, coags, mg, phos   - f/u Sputum culture  - s/p Ceftriaxone in ED, C/w Ceftriaxone 1g and Azithromycin 500mg   - f/u repeat lactate   - f/u BCx x2 and UCx    [] r/o ACS  - Trop I elevated to 141, f/u repeat  - EKG as above  - c/w Plavix 75mg qd  - F/U AM lipid panel  - DASH diet  - f/u TTE  - Cardiology consult    [] Elevated d-dimer  - Ddimer 662, corrected to be 435  - f/u b/l LE doppler  - F/u VQ scan    [] DANE on CKD  - Cr is 1.9 ( Baseline ~ 1.0)  - hold nephrotoxic meds  - f/u AM labs    [] HFrEF, improved   - f/u repeat TTE  - Last TTE: 05/2024: Recent TTE with EF 60-65%, normal LV function, mildly enlarged LA and mild valvular disease  - will hold Entresto and Farxiga for now du to soft BP  - TTE with Doppler (w/Cont) (07.25.21 @ 13:58) >EF (Teicholtz): 29 %      [] HTN  - pt on metoprolol 25mg and hydralazine 50 mg BID  - hold hydralazine for now given low BP   - c/w BB with hold parameters since pt was noted to be in SVT initially was briefly placed on Cardizem drip later stopped, HR improved     [] paroxysmal Afib  - EKG: t wave inversions anterior leads, PVCs  - c/w Eliquis    - dvt ppx: Eliquis 2.5 mg BID    - full code    - d/w Dr. Swanson

## 2024-09-20 NOTE — ED PROVIDER NOTE - CARE PLAN
1 Principal Discharge DX:	Acute weakness  Secondary Diagnosis:	SVT (supraventricular tachycardia)  Secondary Diagnosis:	Acute-on-chronic kidney injury   Principal Discharge DX:	Acute weakness  Secondary Diagnosis:	SVT (supraventricular tachycardia)  Secondary Diagnosis:	Acute-on-chronic kidney injury  Secondary Diagnosis:	Pneumonia  Secondary Diagnosis:	Acute sepsis

## 2024-09-20 NOTE — ED PROVIDER NOTE - PHYSICAL EXAMINATION
CONSTITUTIONAL: NAD  SKIN: Warm dry  HEAD: NCAT  EYES: NL inspection  ENT: MMM  NECK: Supple; non tender.  CARD: tachycardic, regulalr  RESP: CTAB  ABD: S/NT no R/G  EXT: no pedal edema  NEURO: Grossly unremarkable  PSYCH: Cooperative, appropriate.

## 2024-09-20 NOTE — H&P ADULT - ATTENDING COMMENTS
89-year-old female with a history of hypertension, hyperlipidemia, CHF, CKD not on dialysis, CAD, paroxysmal A-fib on Eliquis, anemia presents to  ED from home with complaint of weakness over the past week that worsened today. noted to have R pna on CT  ED course - was noted SVT- Cardizem drip was started then stopped,  HR improved, ICU consulted. pt started on abtx, later BP soft,  received IVF- and noted hypoxia to 60's, placed on NRB, ICU reconsulted.    Vital Signs Last 24 Hrs  T(C): 36.4 (21 Sep 2024 01:17), Max: 37 (20 Sep 2024 12:00)  T(F): 97.6 (21 Sep 2024 01:17), Max: 98.6 (20 Sep 2024 12:00)  HR: 78 (21 Sep 2024 01:17) (65 - 150)  BP: 117/77 (21 Sep 2024 01:17) (74/61 - 120/69)  BP(mean): 87 (20 Sep 2024 18:20) (74 - 90)  RR: 24 (21 Sep 2024 01:17) (14 - 25)  SpO2: 91% (21 Sep 2024 01:17) (91% - 100%)    GENERAL- NAD  EAR/NOSE/MOUTH/THROAT -  MMM  EYES- VARGAS, conjunctiva and Sclera clear  NECK- supple  RESPIRATORY-  rales to auscultation bilaterally  CARDIOVASCULAR - SIS2, RRR  GI - soft NT BS present  EXTREMITIES- no pedal edema  NEUROLOGY- no gross focal deficits      Labs reviewed:     CXR personally reviewed: R pna    < from: CT Chest No Cont (09.20.24 @ 22:10) >    IMPRESSION:  1.  Right upper lobe and right middle lobe pneumonia.  2.  Bilateral pleural effusions and associated atelectasis.    ECG reviewed and interpreted: svt 160, later improved to sinus 76    A/P-  # Severe Sepsis, acute hypoxic resp failure, lactic acidosis, likely secondary to Pneumonia  - ADMIT:  Tele , Rocephin, Zithromax  - s/p 2L bolus in ED  - 250 L bolus ordered   - RVP negative   - Legionella/strep urine antigen   - Nebs prn sob/wheezing  - Incentive spirometry  - NC/NRB O2 prn sob, hypoxia with goal >92%, ICU consulted - ABG ordered- consider high flow if abg shows hypoxia  - Cbc, cmp, coags, mg, phos   - f/u Sputum culture  - s/p Ceftriaxone in ED, C/w Ceftriaxone 1g and Azithromycin 500mg   - f/u repeat lactate   - f/u BCx x2 and UCx    # DANE on CKD 3  Creatinine: 1.95 mg/dL (09.20.24 @ 19:31)  Creatinine: 1.06 mg/dL (07.05.24 @ 06:44)  avoid nephrotoxic agents,   am labs     # CP- r/o ACS  - Trop I elevated to 141, 157  - c/w atorvastatin 40mg qd and Plavix 75mg qd  - f/u TTE  - Cardiology consult    [# Elevated d-dimer  - D dimer 662, corrected to be 435  - f/u b/l LE doppler  - Eliquis    # HFrEF  - f/u repeat TTE  - Last TTE: 05/2024: Recent TTE with EF 60-65%, normal LV function, mildly enlarged LA and mild valvular disease  - will hold Entresto and Farxiga for now du to soft BP  - TTE with Doppler (w/Cont) (07.25.21 @ 13:58) >EF (Teicholtz): 29 %      #HTN  - pt on metoprolol 25mg and hydralazine 50 mg BID  - hold hydralazine for now given low BP   - c/w BB with hold parameters since pt was noted to be in SVT initially was briefly placed on Cardizem drip later stopped, HR improved     #  paroxysmal Afib  - EKG: t wave inversions anterior leads, PVCs  - c/w Eliquis    # dvt ppx: Eliquis 2.5 mg BID    - full code 89-year-old female with a history of hypertension, hyperlipidemia, CHF, CKD not on dialysis, CAD, paroxysmal A-fib on Eliquis, anemia presents to  ED from home with complaint of weakness over the past week that worsened today. noted to have R pna on CT  ED course - was noted SVT- Cardizem drip was started then stopped,  HR improved, ICU consulted. pt started on abtx, later BP soft,  received IVF- and noted hypoxia again noted- placed on NRB, ICU reconsulted, sats improved to 96% with duoenb treatment.     Vital Signs Last 24 Hrs  T(C): 36.4 (21 Sep 2024 01:17), Max: 37 (20 Sep 2024 12:00)  T(F): 97.6 (21 Sep 2024 01:17), Max: 98.6 (20 Sep 2024 12:00)  HR: 78 (21 Sep 2024 01:17) (65 - 150)  BP: 117/77 (21 Sep 2024 01:17) (74/61 - 120/69)  BP(mean): 87 (20 Sep 2024 18:20) (74 - 90)  RR: 24 (21 Sep 2024 01:17) (14 - 25)  SpO2: 91% (21 Sep 2024 01:17) (91% - 100%)    GENERAL- NAD  EAR/NOSE/MOUTH/THROAT -  MMM  EYES- VARGAS, conjunctiva and Sclera clear  NECK- supple  RESPIRATORY-  rales to auscultation bilaterally  CARDIOVASCULAR - SIS2, RRR  GI - soft NT BS present  EXTREMITIES- no pedal edema  NEUROLOGY- no gross focal deficits      Labs reviewed:     CXR personally reviewed: R pna    < from: CT Chest No Cont (09.20.24 @ 22:10) >    IMPRESSION:  1.  Right upper lobe and right middle lobe pneumonia.  2.  Bilateral pleural effusions and associated atelectasis.    ECG reviewed and interpreted: svt 160, later improved to sinus 76    A/P-  # Severe Sepsis, acute hypoxic resp failure, lactic acidosis, likely secondary to Pneumonia  - ADMIT:  Tele , Rocephin, Zithromax  - s/p 2L bolus in ED, ABG ordered   - RVP negative   - Legionella/strep urine antigen   - Nebs prn sob/wheezing  - Incentive spirometry  - NC/NRB O2 prn sob, hypoxia with goal >92%, ICU consulted - ABG ordered- consider high flow if abg shows hypoxia  - Cbc, cmp, coags, mg, phos   - f/u Sputum culture  - C/w Ceftriaxone 1g and Azithromycin 500mg   - f/u repeat lactate  in am   - f/u BCx x2 and UCx    # DANE on CKD 3  Creatinine: 1.95 mg/dL (09.20.24 @ 19:31)  Creatinine: 1.06 mg/dL (07.05.24 @ 06:44)  avoid nephrotoxic agents,   am labs     # CP- r/o ACS  - Trop I elevated to 141, 157 likely demand   - c/w atorvastatin,  and Plavix   - f/u TTE  - Cardiology consult    # Elevated d-dimer  - D dimer 662, corrected to be 435  - f/u b/l LE doppler  - Eliquis    # HFrEF, improved   - f/u repeat TTE  - Last TTE: 05/2024: Recent TTE with EF 60-65%, normal LV function, mildly enlarged LA and mild valvular disease  - will hold Entresto and Farxiga for now du to soft BP  - TTE with Doppler (w/Cont) (07.25.21 @ 13:58) >EF (Teicholtz): 29 %      #HTN  - pt on metoprolol 25mg and hydralazine 50 mg BID- will hold    - c/w BB with hold parameters since pt was noted to be in SVT initially was briefly placed on Cardizem drip later stopped, HR improved     #  paroxysmal Afib  - EKG: t wave inversions anterior leads, PVCs  - c/w Eliquis    # dvt ppx: Eliquis 2.5 mg BID    - full code 89-year-old female with a history of hypertension, hyperlipidemia, CHF, CKD not on dialysis, CAD, paroxysmal A-fib on Eliquis, anemia presents to  ED from home with complaint of weakness over the past week that worsened today. noted to have R pna on CT  ED course - was noted SVT- Cardizem drip was started then stopped,  HR improved, ICU consulted. pt started on abtx, later BP soft,  received IVF- and noted hypoxia again noted- placed on NRB, ICU reconsulted, sats improved to 96% with Duoneb treatment.     Vital Signs Last 24 Hrs  T(F): 97.6 (21 Sep 2024 01:17), Max: 98.6 (20 Sep 2024 12:00)  HR: 78 (21 Sep 2024 01:17) (65 - 150)  BP: 117/77 (21 Sep 2024 01:17) (74/61 - 120/69)  BP(mean): 87 (20 Sep 2024 18:20) (74 - 90)  RR: 24 (21 Sep 2024 01:17) (14 - 25)  SpO2: 91% (21 Sep 2024 01:17) (91% - 100%)    GENERAL- NAD  EYES- VARGAS, conjunctiva and Sclera clear  NECK- supple  RESPIRATORY-  rales to auscultation bilaterally  CARDIOVASCULAR - SIS2, RRR  GI - soft NT BS present  EXTREMITIES- no pedal edema  NEUROLOGY- no gross focal deficits      Labs reviewed:   CXR personally reviewed: R pna    CT Chest No Cont (09.20.24 @ 22:10) >  1.  Right upper lobe and right middle lobe pneumonia.  2.  Bilateral pleural effusions and associated atelectasis.    ECG reviewed and interpreted: svt 160, later improved to sinus 76    A/P-  # meeting criteria for Septic shock, acute hypoxic resp failure, lactic acidosis, likely secondary to Pneumonia  - (severe sepsis lactate >4)   - ADMIT:  Tele , Rocephin, Zithromax  - s/p 2L bolus in ED, ABG ordered   - RVP negative   - Legionella/strep urine antigen   - Nebs prn sob/wheezing  - Incentive spirometry  - NC/NRB O2 prn sob, hypoxia with goal >92%, ICU consulted - ABG ordered- consider high flow if abg shows hypoxia  - Cbc, cmp, coags, mg, phos   - f/u Sputum culture  - C/w Ceftriaxone 1g and Azithromycin 500mg   - f/u repeat lactate  in am   - f/u BCx x2 and UCx    # transaminitis- likely due to sepsis  monitor  hold statin for now    # DANE on CKD 3  Creatinine: 1.95 mg/dL (09.20.24 @ 19:31)  Creatinine: 1.06 mg/dL (07.05.24 @ 06:44)  avoid nephrotoxic agents,   am labs  nephro consult      # CP- r/o ACS  - Trop I elevated to 141, 157 likely demand   - c/w atorvastatin,  and Plavix   - f/u TTE  - Cardiology consult    # Elevated d-dimer  - D dimer 662, corrected to be 435  - f/u b/l LE doppler  - Eliquis  - CT Angio Chest Aorta w/wo IV Cont (05.26.24 @ 21:19) No aortic aneurysm or dissection.  - vq scan in am    # HFrEF, improved   - f/u repeat TTE  - Last TTE: 05/2024: Recent TTE with EF 60-65%, normal LV function, mildly enlarged LA and mild valvular disease  - will hold Entresto and Farxiga for now du to soft BP  - TTE with Doppler (w/Cont) (07.25.21 @ 13:58) >EF (Teicholtz): 29 %      #HTN  - pt on metoprolol 25mg and hydralazine 50 mg BID- will hold    - c/w BB with hold parameters since pt was noted to be in SVT initially was briefly placed on Cardizem drip later stopped, HR improved     #  paroxysmal Afib  - EKG: t wave inversions anterior leads, PVCs  - c/w Eliquis    # dvt ppx: Eliquis 2.5 mg BID    - full code 89-year-old female with a history of hypertension, hyperlipidemia, CHF, CKD not on dialysis, CAD, paroxysmal A-fib on Eliquis, anemia presents to  ED from home with complaint of weakness over the past week that worsened today. noted to have R pna on CT  ED course - was noted SVT- Cardizem drip was started then stopped,  HR improved, ICU consulted. pt started on abtx, later BP soft,  received IVF- and noted hypoxia again noted- placed on NRB, ICU reconsulted, sats improved to 96% with Duoneb treatment.     Vital Signs Last 24 Hrs  T(F): 97.6 (21 Sep 2024 01:17), Max: 98.6 (20 Sep 2024 12:00)  HR: 78 (21 Sep 2024 01:17) (65 - 150)  BP: 117/77 (21 Sep 2024 01:17) (74/61 - 120/69)  BP(mean): 87 (20 Sep 2024 18:20) (74 - 90)  RR: 24 (21 Sep 2024 01:17) (14 - 25)  SpO2: 91% (21 Sep 2024 01:17) (91% - 100%)    GENERAL- NAD  EYES- VARGAS, conjunctiva and Sclera clear  NECK- supple  RESPIRATORY-  rales to auscultation bilaterally  CARDIOVASCULAR - SIS2, RRR  GI - soft NT BS present  EXTREMITIES- no pedal edema  NEUROLOGY- no gross focal deficits      Labs reviewed:   CXR personally reviewed: R pna    CT Chest No Cont (09.20.24 @ 22:10) >  1.  Right upper lobe and right middle lobe pneumonia.  2.  Bilateral pleural effusions and associated atelectasis.    ECG reviewed and interpreted: svt 160, later improved to sinus 76    A/P-  # meeting criteria for Septic shock, acute hypoxic resp failure, metabolic, lactic acidosis, likely secondary to Pneumonia  - (severe sepsis lactate >4)   - ADMIT:  Tele , Rocephin, Zithromax  - ABG - mild acidosis- ICU consulted   - s/p 2L bolus in ED, ABG ordered   - RVP negative   - Legionella/strep urine antigen   - Nebs prn sob/wheezing  - Incentive spirometry  - NC/NRB O2 prn sob, hypoxia with goal >92%, ICU consulted - ABG ordered- consider high flow if abg shows hypoxia  - Cbc, cmp, coags, mg, phos   - f/u Sputum culture  - C/w Ceftriaxone 1g and Azithromycin 500mg   - f/u repeat lactate  in am   - f/u BCx x2 and UCx    # transaminitis- likely due to sepsis  monitor  hold statin for now    # DANE on CKD 3  Creatinine: 1.95 mg/dL (09.20.24 @ 19:31)  Creatinine: 1.06 mg/dL (07.05.24 @ 06:44)  avoid nephrotoxic agents,   am labs  nephro consult      # CP- r/o ACS  - Trop I elevated to 141, 157 likely demand   - c/w atorvastatin,  and Plavix   - f/u TTE  - Cardiology consult    # Elevated d-dimer  - D dimer 662, corrected to be 435  - f/u b/l LE doppler  - Eliquis  - CT Angio Chest Aorta w/wo IV Cont (05.26.24 @ 21:19) No aortic aneurysm or dissection.  - vq scan in am    # HFrEF, improved   - f/u repeat TTE  - Last TTE: 05/2024: Recent TTE with EF 60-65%, normal LV function, mildly enlarged LA and mild valvular disease  - will hold Entresto and Farxiga for now du to soft BP  - TTE with Doppler (w/Cont) (07.25.21 @ 13:58) >EF (Roberticholtz): 29 %      #HTN  - pt on metoprolol 25mg and hydralazine 50 mg BID  - c/w BB with hold parameters since pt was noted to be in SVT initially was briefly placed on Cardizem drip later stopped, HR improved     #  paroxysmal Afib  - EKG: t wave inversions anterior leads, PVCs  - c/w Eliquis    # dvt ppx: Eliquis 2.5 mg BID    - full code

## 2024-09-20 NOTE — ED PROVIDER NOTE - PROGRESS NOTE DETAILS
ss Patient intitally had SVT, ho of pAfib hypotensive. Given 5mg cardizem >> HR and sxs improved .    Re-alerted that HR elevated again. Will give additional 5mg. Pt BP stable, asxm, no cp, sob.   Plan to consult cardio ss spoke to nephrologist Dr Dodge - recommended avoid CT PE if possible and treat for PE. BP improved. PT sxs improved . Plan to admit to ICU

## 2024-09-20 NOTE — ED ADULT NURSE NOTE - NSICDXFAMILYHX_GEN_ALL_CORE_FT
FAMILY HISTORY:  Father  Still living? Unknown  FH: MI (myocardial infarction), Age at diagnosis: Age Unknown    Mother  Still living? Unknown  FH: MI (myocardial infarction), Age at diagnosis: Age Unknown     Purse String (Intermediate) Text: Given the location of the defect and the characteristics of the surrounding skin a purse string intermediate closure was deemed most appropriate.  Undermining was performed circumfirentially around the surgical defect.  A purse string suture was then placed and tightened.

## 2024-09-20 NOTE — H&P ADULT - NSHPREVIEWOFSYSTEMS_GEN_ALL_CORE
REVIEW OF SYSTEMS:  CONSTITUTIONAL: + weakness  EYES/ENT: No visual changes;  No vertigo or throat pain   NECK: No pain or stiffness  RESPIRATORY: + cough and sob  CARDIOVASCULAR: + chest pain  GASTROINTESTINAL: No abdominal or epigastric pain. No nausea, vomiting, or hematemesis; No diarrhea or constipation. No melena or hematochezia.  GENITOURINARY: No dysuria, frequency or hematuria  NEUROLOGICAL: No numbness   SKIN: + dry skin  All other review of systems is negative unless indicated above

## 2024-09-20 NOTE — ED ADULT NURSE NOTE - OBJECTIVE STATEMENT
89 yr old female to ED for complaints of weakness. Patient brought in by EMS from home with complaint of weakness for the last few days. Patient denies fevers, chills, nausea, vomiting. Patient with history of HTN and Anemia. EKG and Labs obtained.

## 2024-09-20 NOTE — H&P ADULT - NSHPPHYSICALEXAM_GEN_ALL_CORE
Vital Signs Last 24 Hrs  T(C): 36.8 (20 Sep 2024 12:23), Max: 37 (20 Sep 2024 12:00)  T(F): 98.3 (20 Sep 2024 12:23), Max: 98.6 (20 Sep 2024 12:00)  HR: 68 (20 Sep 2024 21:00) (66 - 150)  BP: 106/94 (20 Sep 2024 21:00) (74/61 - 109/78)  BP(mean): 87 (20 Sep 2024 18:20) (74 - 90)  RR: 25 (20 Sep 2024 21:00) (14 - 25)  SpO2: 97% (20 Sep 2024 21:00) (93% - 100%)    Parameters below as of 20 Sep 2024 21:00  Patient On (Oxygen Delivery Method): nasal cannula  O2 Flow (L/min): 2      PHYSICAL EXAM:  Constitutional: NAD, lethargic  HEENT:  Normal Hearing, MMM  Neck: Soft and supple,   Respiratory: + rhonchi on ausculation   Cardiovascular: S1 and S2, irregular rate and rhythm,   Gastrointestinal: Bowel Sounds present, soft, nontender, nondistended,   Extremities: No peripheral edema  Vascular: 2+ peripheral pulses  Neurological: A/O x 3, no focal deficits  Musculoskeletal: Moving all extremities   Skin: No rashes appreciated, dry flaky LE

## 2024-09-21 ENCOUNTER — RESULT REVIEW (OUTPATIENT)
Age: 89
End: 2024-09-21

## 2024-09-21 NOTE — PROGRESS NOTE ADULT - SUBJECTIVE AND OBJECTIVE BOX
89-year-old female with a history of hypertension, hyperlipidemia, CHF, CKD, CAD, paroxysmal A-fib on Eliquis, anemia presents to  ED from home with complaint of weakness over the past week that worsened today.

## 2024-09-21 NOTE — DIETITIAN INITIAL EVALUATION ADULT - PERTINENT LABORATORY DATA
09-21    150[H]  |  111[H]  |  72[H]  ----------------------------<  129[H]  3.8   |  19[L]  |  2.21[H]    Ca    9.3      21 Sep 2024 09:10  Phos  7.8     09-21  Mg     2.4     09-21    TPro  5.0[L]  /  Alb  2.0[L]  /  TBili  0.7  /  DBili  x   /  AST  88[H]  /  ALT  86[H]  /  AlkPhos  148[H]  09-21  POCT Blood Glucose.: 177 mg/dL (09-21-24 @ 07:35)  A1C with Estimated Average Glucose Result: 5.3 % (05-26-24 @ 06:43)  A1C with Estimated Average Glucose Result: 5.0 % (12-18-23 @ 14:41)

## 2024-09-21 NOTE — EEG REPORT - NS EEG TEXT BOX
Patient Name:PAYAM PAPPAS  Medical ID:[id number]  YOB: 1935  Age:89  Recording Duration:Sep 21, 2024 2:58 PM - Sep 21, 2024   Recording Total Time: >2hrs  Ordering Physician:DR GATICA  Primary Indication:Cardiac Arrest  Location:ICU/Floor      Smart Voicemail  Technical Description:    EEG performed with limited number of EEG electrodes/channels for expedited completion and evaluation for possible seizures.  The Smart Voicemail EEG recording device consists of a 10-electrode headband, an EEG recorder with the Brain Stethoscope and Clarity (auto seizure detection) features, and a cloud portal for continuous seizure monitoring, EEG data storing, and remote EEG reviewing. With the Brain Stethoscope and Clarity features, spot-checking and continuous seizure detection were available.    Interpretation:  PDR (Hz): not discerned  Slowing: mostly attenuated  IEDs:  none   Seizures: none  Sleep transients:  not discerned  Artifacts: abundant Myogenic artifacts     Impression:  Within the technical limitations of the reduced electrode recording:  Generalized attenuation/slowing indicative of severe diffuse/multifocal cerebral dysfunction.   Scalp myogenic artifact  No seizures.  Agree with clarity review software as displayed.    Consider DC recording.    Joseph Cortez MD FAES  Director, Continuous EEG Monitoring Program, [facility] and [facility]   and Epilepsy Fellowship ,   Department of Neurology, [employer] [facility] [employer]    [facility] EEG Reading Room Ph#: [phone]  Epilepsy Answering Service after 5PM and before 8:30AM: Ph#: [phone]     Patient Name:PAYAM PAPPAS  Medical ID:[id number]  YOB: 1935  Age:89  Recording Duration:Sep 21, 2024 2:58 PM - Sep 21, 2024 6:24   Recording Total Time: 3:13hrs  Ordering Physician:DR GATICA  Primary Indication:Cardiac Arrest  Location:ICU/Floor      Risk I/O  Technical Description:    EEG performed with limited number of EEG electrodes/channels for expedited completion and evaluation for possible seizures.  The Risk I/O EEG recording device consists of a 10-electrode headband, an EEG recorder with the Brain Stethoscope and Clarity (auto seizure detection) features, and a cloud portal for continuous seizure monitoring, EEG data storing, and remote EEG reviewing. With the Brain Stethoscope and Clarity features, spot-checking and continuous seizure detection were available.    Interpretation:  PDR (Hz): not discerned  Slowing: mostly attenuated  IEDs:  after 4:40pm there were bursts of brief <1-1sec repetitive spikes near 5-6hz  Seizures: no evolving patterns  Sleep transients:  not discerned  Artifacts: abundant Myogenic artifacts     Impression:  Within the technical limitations of the reduced electrode recording:  Generalized attenuation/slowing indicative of severe diffuse/multifocal cerebral dysfunction.   In latter portion, burst suppression evident with mixed spike discharge component.  Scalp myogenic artifact  Agree with clarity review software as displayed.    Consider DC recording.    Joseph Cortez MD FAES  Director, Continuous EEG Monitoring Program, [facility] and [facility]   and Epilepsy Fellowship ,   Department of Neurology, [facility] EEG Reading Room Ph#: [phone]  Epilepsy Answering Service after 5PM and before 8:30AM: Ph#: [phone]

## 2024-09-21 NOTE — PROVIDER CONTACT NOTE (EICU) - ACTION/TREATMENT ORDERED:
E-alerted by bedside team, requesting stat CXR and fent 50mcg IVP x1 or CT placement by bedside provider. Orders placed as requested, results to be followed up by bedside provider.

## 2024-09-21 NOTE — DIETITIAN INITIAL EVALUATION ADULT - SIGNS/SYMPTOMS
PI on  pt is intubated, NPO 	 As per family, pt only tolerates puree or Minced & Moist foods at home PO <75% and 11.7% wt loss x 3 weeks

## 2024-09-21 NOTE — PROCEDURE NOTE - NSPOSTCAREGUIDE_GEN_A_CORE
Verbal/written post procedure instructions were given to patient/caregiver
Verbal/written post procedure instructions were given to patient/caregiver
Verbal/written post procedure instructions were given to patient/caregiver/Instructed patient/caregiver to follow-up with primary care physician/Instructed patient/caregiver regarding signs and symptoms of infection/Keep the cast/splint/dressing clean and dry/Care for catheter as per unit/ICU protocols

## 2024-09-21 NOTE — PATIENT PROFILE ADULT - FALL HARM RISK - HARM RISK INTERVENTIONS

## 2024-09-21 NOTE — PATIENT PROFILE ADULT - FUNCTIONAL ASSESSMENT - BASIC MOBILITY 4.
[General Appearance - Alert] : alert [General Appearance - In No Acute Distress] : in no acute distress [General Appearance - Well Nourished] : well nourished [General Appearance - Well-Appearing] : healthy appearing [Oriented To Time, Place, And Person] : oriented to person, place, and time [Impaired Insight] : insight and judgment were intact [Affect] : the affect was normal [Memory Recent] : recent memory was not impaired [Person] : oriented to person [Place] : oriented to place [Time] : oriented to time [Short Term Intact] : short term memory intact [Cranial Nerves Optic (II)] : visual acuity intact bilaterally,  pupils equal round and reactive to light [Cranial Nerves Oculomotor (III)] : extraocular motion intact [Cranial Nerves Trigeminal (V)] : facial sensation intact symmetrically [Cranial Nerves Facial (VII)] : face symmetrical [Cranial Nerves Vestibulocochlear (VIII)] : hearing was intact bilaterally [Cranial Nerves Accessory (XI - Cranial And Spinal)] : head turning and shoulder shrug symmetric [Cranial Nerves Hypoglossal (XII)] : there was no tongue deviation with protrusion [Motor Tone] : muscle tone was normal in all four extremities [Motor Strength] : muscle strength was normal in all four extremities [Sensation Tactile Decrease] : light touch was intact [Sclera] : the sclera and conjunctiva were normal [PERRL With Normal Accommodation] : pupils were equal in size, round, reactive to light, with normal accommodation [Outer Ear] : the ears and nose were normal in appearance [Both Tympanic Membranes Were Examined] : both tympanic membranes were normal [Neck Appearance] : the appearance of the neck was normal [] : no respiratory distress [Respiration, Rhythm And Depth] : normal respiratory rhythm and effort [Apical Impulse] : the apical impulse was normal [Heart Rate And Rhythm] : heart rate was normal and rhythm regular [No CVA Tenderness] : no ~M costovertebral angle tenderness [No Spinal Tenderness] : no spinal tenderness [Involuntary Movements] : no involuntary movements were seen [FreeTextEntry8] : no drift, walking slowly, but alternating, with good balance 2 = A lot of assistance

## 2024-09-21 NOTE — CONSULT NOTE ADULT - CRITICAL CARE ATTENDING COMMENT
pt seen and examined through out the day multiple times  requiring to start amio for rate control  pt remains in multi organ failure on multi pressor shock  patient likely not going to survive hospital course.   d/w family multiple times and want CPR  from medical perspective CPR will be futile.  overall prognosis grim

## 2024-09-21 NOTE — DIETITIAN INITIAL EVALUATION ADULT - PERTINENT MEDS FT
MEDICATIONS  (STANDING):  azithromycin  IVPB 500 milliGRAM(s) IV Intermittent every 24 hours  cefTRIAXone   IVPB 1000 milliGRAM(s) IV Intermittent every 24 hours  chlorhexidine 0.12% Liquid 15 milliLiter(s) Oral Mucosa every 12 hours  chlorhexidine 2% Cloths 1 Application(s) Topical <User Schedule>  dextrose 50% Injectable 25 Gram(s) IV Push once  fentaNYL   Infusion. 0.5 MICROgram(s)/kG/Hr (2.16 mL/Hr) IV Continuous <Continuous>  glucagon  Injectable 1 milliGRAM(s) IntraMuscular once  norepinephrine Infusion 0.05 MICROgram(s)/kG/Min (4.04 mL/Hr) IV Continuous <Continuous>  sodium bicarbonate  Infusion 0.522 mEq/kG/Hr (150 mL/Hr) IV Continuous <Continuous>    MEDICATIONS  (PRN):  ondansetron Injectable 4 milliGRAM(s) IV Push every 8 hours PRN Nausea and/or Vomiting  sodium chloride 0.9% lock flush 10 milliLiter(s) IV Push every 1 hour PRN Pre/post blood products, medications, blood draw, and to maintain line patency

## 2024-09-21 NOTE — DIETITIAN INITIAL EVALUATION ADULT - NUTRITIONGOAL OUTCOME2
Pt will meet at least 75% or > of the estimated nutritional needs during hospitalization.  Pt will tolerate diet & meet at least 75% or > of the estimated nutritional needs during hospitalization.

## 2024-09-21 NOTE — DIETITIAN INITIAL EVALUATION ADULT - ORAL INTAKE PTA/DIET HISTORY
As per niece at the bed side, pt ate PTA a puree or Minced & Moist diet consistency, nice provide homemade foods. Nice noted pt had decreased appetite x 3 weeks, last week ate very little, mostly apple sauce, yogurt, some of the meals served, and Ensure supplement.

## 2024-09-21 NOTE — PROVIDER CONTACT NOTE (EICU) - SITUATION
E Llerted by bedside team with request for Phenylephrine order as recommended by ICU MD ( MD currently assisting pt in another ICU bed). Pt remains hypotensive despite Levophed and Vasopressin infusion  Phenylephrine order placed in EMR to start at 0.2 mcg/kg/ min with titration parameters

## 2024-09-21 NOTE — DIETITIAN INITIAL EVALUATION ADULT - ADD RECOMMEND
Resume diet to Easy to chew as medically feasible.  Suggest adding MVI and Vit C to promote wound healing.   [place] pt's food preferences as feasible with prescribed diet.   Obtain and record PO intake and weights daily  Resume diet to pureed as medically feasible.  Suggest adding MVI due to prolonged time w/ poor appetite.   Suggest adding MVI and Vit C to promote wound healing.   Honor pt's food preferences as feasible with prescribed diet.   Obtain and record PO intake and weights daily

## 2024-09-21 NOTE — CONSULT NOTE ADULT - ASSESSMENT
Assessment:  Adamaris Herrera is an 89 year old woman, patient of Dr. Daniel with past medical history of Coronary artery disease (s/p LARISSA to LCx in 2021), Paroxysmal atrial fibrillation (on Eliquis), Hypertension, Hyperlipidemia, HFpEF, Chronic kidney disease and Anemia who was brought in by EMS on 9/20/24 due to weakness, found to have septic shock secondary to right lung pneumonia with acute hypoxic respiratory failure, with episode of SVT and now s/p PEA cardiac arrest the morning of 9/21/24, s/p ACLS protocol, currently intubated and sedated in ICU.     Cardiology consulted due to cardiac arrest which occurred this morning. Chart review indicates that patient had an SVT, received Cardizem drip briefly, was found to be bradycardic to the 30s and in cardiac arrest, requiring CPR, epinephrine, bicarbonate and calcium. ECG on admission was consistent with SVT, low voltage and old anterior infarct pattern, similar to prior ECG. Prior echo report in May consistent with normal LVEF 60-65%, mild aortic valve stenosis. Telemetry reviewed and consistent with episodes of atrial tachycardia, atrial fibrillation and junctional rhythm. D-dimer markedly elevated. Troponins rising.     Recommendations:  [] PEA Cardiac arrest: Possibly secondary to acute hypoxic respiratory failure from pneumonia. Ventilator management per ICU team. Now s/p chest tube. Check stat echo to evaluate LVEF. Continue IV antibiotics, consider Infectious Disease consult. Check blood cultures. Continue pressor support to maintain MAP > 65.   [] NSTEMI, history of CAD, PAF: Cannot rule out progression in CAD, given cardiac arrest, and known multivessel CAD, although there are no reports of VT/VF. Follow up echo. Continue to trend troponin until it peaks. Anticoagulation on hold due to bloody secretions. Not candidate for ischemic evaluation due to clinical instability.  [] Acidosis and acute renal failure: Follow up Nephrology consult     Prognosis appears guarded. Recommend goals of care discussion. We will continue to follow along. Reviewed with Dr. Peña.    Gurdeep Murphy MD  Cardiology

## 2024-09-21 NOTE — DIETITIAN NUTRITION RISK NOTIFICATION - TREATMENT: THE FOLLOWING DIET HAS BEEN RECOMMENDED
Diet, NPO (09-21-24 @ 09:10) [Active]    Advance diet when medical feasible to Pureed   Supplement: Ensure High Protein BID (provide 350 calories, 20 gm protein per 8 oz)

## 2024-09-21 NOTE — PROCEDURE NOTE - ADDITIONAL PROCEDURE DETAILS
cxr obtained noted possible pntx  patient positioned lateral and noted lung sliding present on US  patient postioned on back, lung sliding still present.  however patient having high peak pressures, suspect rib fractures from CPR  noted pocket of fluid on right,   placed chest tube in pleural effusion + gush of air  500cc drained 08-Apr-2020 15:30

## 2024-09-21 NOTE — DIETITIAN NUTRITION RISK NOTIFICATION - ADDITIONAL COMMENTS/DIETITIAN RECOMMENDATIONS
Suggest adding MVI  [place] pt's food preferences as feasible with prescribed diet.  Obtain and record PO intake and weights daily

## 2024-09-21 NOTE — PROVIDER CONTACT NOTE (EICU) - BACKGROUND
88 y/o F admitted to ICU s/p PEA cardiac arrest, ROSC, Severe Sepsis, acute hypoxic resp failure secondary to PNA,  lactic acidosis,  DANE on CKD, NSTEMI , paroxysmal a fib, pneumothorax likely rib fracture  post CPR

## 2024-09-21 NOTE — PROCEDURE NOTE - NSPOSTPRCRAD_GEN_A_CORE
central line located in the/central line located in the superior vena cava/depth of insertion/no pneumothorax/post-procedure radiography performed

## 2024-09-21 NOTE — CONSULT NOTE ADULT - SUBJECTIVE AND OBJECTIVE BOX
patient admitted with PNA (right sided, large infiltrate on CT chest)   -consult called for hypoxia and patient was started on NRB mask    -on my assessment patient has pulse ox on her finger, and ear, tracing flat.   _i tried on several different fingers, pulse ox tracing remains flat.  -it is impossible to say at this time if hypoxia is real or just bad pulse ox tracing,       PLAN:    -check STAt ABG, evalaute PO@ and saturation on ABG for confirmation   -keep attempting to obtain arterial/pulsatile like pulse ox tracing, have respiratory help find adequate tracing    -have asked resident team to reach out to me once ABg results available, if true hypoxia patient would benefit fro high flow nasal canula        TIME SPENT:  35 minutes of  time spent providing medical care for patient's acute illness/conditions that impairs at least one vital organ system and/or poses a high risk of imminent or life threatening deterioration in the patient's condition. It includes time spent evaluating and treating the patient's acute illness as well as time spent reviewing labs, radiology, discussing goals of care with patient and/or patient's family, and discussing the case with a multidisciplinary team, including the eICU, in an effort to prevent further life threatening deterioration or end organ damage. This time is independent of any procedures performed.    DATE OF ENTRY OF THIS NOTE IS EQUAL TO THE DATE OF SERVICES RENDERED  patient admitted with PNA (right sided, large infiltrate on CT chest)   -consult called for hypoxia and patient was started on NRB mask    -on my assessment patient has pulse ox on her finger, and ear, tracing flat.   _i tried on several different fingers, pulse ox tracing remains flat.  -it is impossible to say at this time if hypoxia is real or just bad pulse ox tracing,   -patient awake, alert conversant and without any complaints, does have a slight cough      PLAN:    -check STAt ABG, evalaute PO2 and saturation on ABG for confirmation   -keep attempting to obtain arterial/pulsatile like pulse ox tracing, have respiratory help find adequate tracing    -have asked resident team to reach out to me once ABG results available,   -however the most important thing for medical team at this time is to establish adequate pulse ox tracing, or will need serial ABGS for saturation          TIME SPENT:  35 minutes of  time spent providing medical care for patient's acute illness/conditions that impairs at least one vital organ system and/or poses a high risk of imminent or life threatening deterioration in the patient's condition. It includes time spent evaluating and treating the patient's acute illness as well as time spent reviewing labs, radiology, discussing goals of care with patient and/or patient's family, and discussing the case with a multidisciplinary team, including the eICU, in an effort to prevent further life threatening deterioration or end organ damage. This time is independent of any procedures performed.    DATE OF ENTRY OF THIS NOTE IS EQUAL TO THE DATE OF SERVICES RENDERED

## 2024-09-21 NOTE — PROCEDURE NOTE - NSINDICATIONS_GEN_A_CORE
arterial puncture to obtain ABG's/blood sampling/cannulation purposes/monitoring purposes
critical illness/emergency venous access/hypertonic/irritant infusion/venous access/volume resuscitation
hemothorax

## 2024-09-21 NOTE — DIETITIAN INITIAL EVALUATION ADULT - NUTRITIONGOAL OUTCOME1
Pt will show wound healing improvement during hospitalization.  Pt will meet at least 75% or > of the estimated nutritional needs during hospitalization.

## 2024-09-21 NOTE — PROCEDURE NOTE - NSICDXPROCEDURE_GEN_ALL_CORE_FT
PROCEDURES:  Ultrasound guidance for placement of central venous catheter (CVC) 21-Sep-2024 10:36:50  Jelly Mendoza N

## 2024-09-21 NOTE — DIETITIAN INITIAL EVALUATION ADULT - OTHER INFO
H&P: 89 y/o F with a h/o HTN, HLD, CHF, CKD not on dialysis, CAD, paroxysmal A-fib on Eliquis, anemia presents to  ED from home with complaint of weakness over the past week that worsened today. Pt being admitted for Severe Sepsis, acute hypoxic resp failure, lactic acidosis, likely secondary to Pneumonia.     This am call blue, pt was intubated, NPO.    H&P: 88 y/o F with a h/o HTN, HLD, CHF, CKD not on dialysis, CAD, paroxysmal A-fib on Eliquis, anemia presents to  ED from home with complaint of weakness over the past week that worsened today. Pt being admitted for Severe Sepsis, acute hypoxic resp failure, lactic acidosis, likely secondary to Pneumonia.     This am call blue, pt was intubated, NPO. Visited pt this am, family at the bed side. Nice reported pt's dietary hx. Pt has difficulties x chewing/swallowing. NKFA. No GI distress reported. Unknown last BM. UBW: 120 Lbs 3 weeks ago, current weight 106.2 Lbs. No edema. Skin WDL, as per nursing flowsheet.    H&P: 89 y/o F with a h/o HTN, HLD, CHF, CKD not on dialysis, CAD, paroxysmal A-fib on Eliquis, anemia presents to  ED from home with complaint of weakness over the past week that worsened today. Pt being admitted for Severe Sepsis, acute hypoxic resp failure, lactic acidosis, likely secondary to Pneumonia.     This am call blue, due to bradycardia, cardiac arrest, pt was intubated, NPO. Visited pt this am, family at the bed side. Nice reported pt's dietary hx. Pt has difficulties x chewing/swallowing. NKFA. No GI distress reported. Unknown last BM. UBW: 120 Lbs 3 weeks ago, current weight 106.2 Lbs. No edema. Skin WDL, as per nursing flowsheet.

## 2024-09-21 NOTE — CONSULT NOTE ADULT - CONVERSATION DETAILS
Sejal Caraballo updated on events of this morning and patients overall status. Patients prognosis is poor.  CPR was ongoing x approx 20 min, likely with multiple rib fractures, now with new pneumothorax requiring chest tube  placement. Elevated troponins, new DANE on CKD, severe acidosis and metabolic derangements, high peak pressures and requiring 100% Fio2. Unable to determine what underlying mental status will be after prolonged down time. Likelihood of patient losing pulse again is high. If CPR required again the liklihood of a good outcome is poor. At this time family would like everything done. Will discuss code status with other family members.

## 2024-09-21 NOTE — CONSULT NOTE ADULT - SUBJECTIVE AND OBJECTIVE BOX
ADAMARIS PAPPAS  [id number]      HPI:    Admaaris Pappas is an 89 year old woman, patient of Dr. Daniel with past medical history of Coronary artery disease (s/p LARISSA to LCx in 2021), Paroxysmal atrial fibrillation (on Eliquis), Hypertension, Hyperlipidemia, HFpEF, Chronic kidney disease and Anemia who was brought in by EMS on 9/20/24 due to weakness, found to have septic shock secondary to pneumonia with acute hypoxic respiratory failure, with episode of SVT and now s/p PEA cardiac arrest the morning of 9/21/24, s/p ACLS protocol, currently intubated and sedated in ICU.     ALLERGIES:  No Known Allergies      CURRENT MEDICATIONS:  azithromycin  IVPB 500 milliGRAM(s) IV Intermittent every 24 hours  cefTRIAXone   IVPB 1000 milliGRAM(s) IV Intermittent every 24 hours  chlorhexidine 0.12% Liquid 15 milliLiter(s) Oral Mucosa every 12 hours  chlorhexidine 2% Cloths 1 Application(s) Topical <User Schedule>  dextrose 50% Injectable 25 Gram(s) IV Push once  fentaNYL   Infusion. 0.5 MICROgram(s)/kG/Hr IV Continuous <Continuous>  glucagon  Injectable 1 milliGRAM(s) IntraMuscular once  norepinephrine Infusion 0.05 MICROgram(s)/kG/Min IV Continuous <Continuous>  ondansetron Injectable 4 milliGRAM(s) IV Push every 8 hours PRN  sodium bicarbonate  Infusion 0.522 mEq/kG/Hr IV Continuous <Continuous>  sodium chloride 0.9% lock flush 10 milliLiter(s) IV Push every 1 hour PRN      ROS:  All 10 systems reviewed and positives noted in HPI    OBJECTIVE:    VITAL SIGNS:  Vital Signs Last 24 Hrs  T(C): 36.7 (21 Sep 2024 04:53), Max: 36.8 (20 Sep 2024 12:23)  T(F): 98.1 (21 Sep 2024 04:53), Max: 98.3 (20 Sep 2024 12:23)  HR: 62 (21 Sep 2024 10:00) (55 - 150)  BP: 104/56 (21 Sep 2024 10:00) (92/75 - 123/81)  BP(mean): 71 (21 Sep 2024 10:00) (71 - 93)  RR: 31 (21 Sep 2024 10:00) (14 - 31)  SpO2: 89% (21 Sep 2024 08:51) (89% - 100%)    Parameters below as of 21 Sep 2024 04:53  Patient On (Oxygen Delivery Method): nasal cannula  O2 Flow (L/min): 3      PHYSICAL EXAM:  General: intubated, sedated        LABS:                        11.0   22.34 )-----------( 145      ( 21 Sep 2024 09:10 )             36.1     09-21    150[H]  |  111[H]  |  72[H]  ----------------------------<  129[H]  3.8   |  19[L]  |  2.21[H]    Ca    9.3      21 Sep 2024 09:10  Phos  7.8     09-21  Mg     2.4     09-21    TPro  5.0[L]  /  Alb  2.0[L]  /  TBili  0.7  /  DBili  x   /  AST  88[H]  /  ALT  86[H]  /  AlkPhos  148[H]  09-21        PT/INR - ( 21 Sep 2024 09:10 )   PT: 31.2 sec;   INR: 2.75 ratio         PTT - ( 21 Sep 2024 09:10 )  PTT:36.0 sec        Coronary angiogram (2021):  LM: normal  LAD: proximal 40% stenosis, mid 30% stenosis   LCx: proximal 40% stenosis, mid 90% stenosis s/p LARISSA  OM1: 40% stenosis  OM2: 40% stenosis   RCA: proximal 30% stenosis, mid 50% stenosis, distal 20% stenosis   RPDA: 30% stenosis       TTE (5/2024):  LVEF 60-65%  Mild-moderate tricuspid regurgitation.  Mild aortic valve stenosis  Small pericardial effusion    ECG (9/20/24): SVT, low voltage, old anterior infarct (similar infarct pattern to recent outpatient ECG in 8/2024)

## 2024-09-21 NOTE — CONSULT NOTE ADULT - ASSESSMENT
ICU CONSULT  Location of Patient :  [facility] [id number] ( [facility])  Attending requesting Consult:Jovanni Peña  Chief Complaint :     Reason For consult :      Initial HPI on admission:  HPI:  89-year-old female with a history of hypertension, hyperlipidemia, CHF, CKD, CAD, paroxysmal A-fib on Eliquis, anemia presents to  ED from home with complaint of weakness over the past week that worsened today. Pt states she usually is abl to walk with her cane and/or walker but today she could barely move. Pt reports she developed a cough earlier today that is productive of bloody sputum. Pt states she is unsure if she had a fever.     In the ED BP was 74/6, , Temp 98.3, RR 16 and O2 93 on 4L NC. Lactate was 4.4 > 2.1, CT chest: Right upper lobe and right middle lobe pneumonia. Patient received 1x dose of Rocephin and Zithromax. S/p 2L NS bolus.  (20 Sep 2024 23:10)      BRIEF HOSPITAL COURSE:     PAST MEDICAL & SURGICAL HISTORY:  HTN (hypertension)  HLD (hyperlipidemia)  Hyperkalemia  CKD (chronic kidney disease)  Atherosclerosis  CAD (coronary artery disease)  Stage 4 chronic kidney disease  Cyst of pancreas  H/O CHF  Diabetes mellitus  S/P hysterectomy  Pancreatic cyst  H/O endoscopy  History of cardioversion        Allergies    No Known Allergies    Intolerances      FAMILY HISTORY:  FH: MI (myocardial infarction) (Father, Mother)      Social history:  unknown      Review of Systems: unable to assess, obtunded       Medications:  MEDICATIONS  (STANDING):  azithromycin  IVPB 500 milliGRAM(s) IV Intermittent every 24 hours  cefTRIAXone   IVPB 1000 milliGRAM(s) IV Intermittent every 24 hours  chlorhexidine 0.12% Liquid 15 milliLiter(s) Oral Mucosa every 12 hours  chlorhexidine 2% Cloths 1 Application(s) Topical <User Schedule>  dextrose 50% Injectable 25 Gram(s) IV Push once  fentaNYL   Infusion. 0.5 MICROgram(s)/kG/Hr (2.16 mL/Hr) IV Continuous <Continuous>  glucagon  Injectable 1 milliGRAM(s) IntraMuscular once  norepinephrine Infusion 0.05 MICROgram(s)/kG/Min (4.04 mL/Hr) IV Continuous <Continuous>  sodium bicarbonate  Infusion 0.522 mEq/kG/Hr (150 mL/Hr) IV Continuous <Continuous>    MEDICATIONS  (PRN):  ondansetron Injectable 4 milliGRAM(s) IV Push every 8 hours PRN Nausea and/or Vomiting  sodium chloride 0.9% lock flush 10 milliLiter(s) IV Push every 1 hour PRN Pre/post blood products, medications, blood draw, and to maintain line patency      Antibiotics History  azithromycin  IVPB 500 milliGRAM(s) IV Intermittent once, 09-20-24 @ 21:59, Stop order after: 1 Doses  azithromycin  IVPB 500 milliGRAM(s) IV Intermittent every 24 hours, 09-21-24 @ 00:52, Stop order after: 3 Doses  cefTRIAXone   IVPB 1000 milliGRAM(s) IV Intermittent once, 09-20-24 @ 21:59, Stop order after: 1 Doses  cefTRIAXone   IVPB 1000 milliGRAM(s) IV Intermittent every 24 hours, 09-21-24 @ 00:39, Stop order after: 5 Days        Home Medications:  Last Order Reconciliation Date: Not Done  AmLODIPine: 2.5 once a day (09-21-24)  ascorbic acid 500 mg oral tablet: 1 tab(s) orally once a day (09-21-24)  atorvastatin 40 mg oral tablet: 1 tab(s) orally once a day (at bedtime) (09-21-24)  clopidogrel 75 mg oral tablet: 1 tab(s) orally once a day (09-21-24)  dapagliflozin 10 mg oral tablet: 1 tab(s) orally every 24 hours (09-21-24)  Eliquis 2.5 mg oral tablet: 1 tab(s) orally 2 times a day (09-21-24)  Entresto 24 mg-26 mg oral tablet: 1 tab(s) orally 2 times a day (09-21-24)  ferrous sulfate 325 mg (65 mg elemental iron) oral tablet: 1 tab(s) orally once a day (09-21-24)  hydrALAZINE 50 mg oral tablet: 1 tab(s) orally 2 times a day (09-21-24)  metoprolol succinate 25 mg oral tablet, extended release: 1 tab(s) orally once a day (at bedtime) (09-21-24)  Multiple Vitamins oral tablet: 1 tab(s) orally once a day (09-21-24)  pantoprazole 40 mg oral delayed release tablet: 1 tab(s) orally 2 times a day (09-21-24)  polyethylene glycol 3350 oral powder for reconstitution: 17 gram(s) orally once a day (09-21-24)  Senna 8.6 mg oral tablet: 1 tab(s) orally once a day as needed for  constipation (09-21-24)        LABS:                        11.0   22.34 )-----------( 145      ( 21 Sep 2024 09:10 )             36.1     09-21    150[H]  |  111[H]  |  72[H]  ----------------------------<  129[H]  3.8   |  19[L]  |  2.21[H]    Ca    9.3      21 Sep 2024 09:10  Phos  7.8     09-21  Mg     2.4     09-21    TPro  5.0[L]  /  Alb  2.0[L]  /  TBili  0.7  /  DBili  x   /  AST  88[H]  /  ALT  86[H]  /  AlkPhos  148[H]  09-21    HIT ab -- 09-21 @ 09:10  HIT ab EIA --  D Dimer -[id number]  HIT ab -- 09-20 @ 12:50  HIT ab EIA --  D Dimer -[id number]          PT/INR - ( 21 Sep 2024 09:10 )   PT: 31.2 sec;   INR: 2.75 ratio         PTT - ( 21 Sep 2024 09:10 )  PTT:36.0 sec  Urinalysis Basic - ( 21 Sep 2024 09:10 )    Color: x / Appearance: x / SG: x / pH: x  Gluc: 129 mg/dL / Ketone: x  / Bili: x / Urobili: x   Blood: x / Protein: x / Nitrite: x   Leuk Esterase: x / RBC: x / WBC x   Sq Epi: x / Non Sq Epi: x / Bacteria: x          CULTURES: (if applicable)        ABG - ( 21 Sep 2024 08:20 )  pH, Arterial: <7.00 pH, Blood: x     /  pCO2: 56    /  pO2: 62    / HCO3: 14    / Base Excess: -17.5 /  SaO2: 80.1          CAPILLARY BLOOD GLUCOSE      POCT Blood Glucose.: 177 mg/dL (21 Sep 2024 07:35)      RADIOLOGY  CXR:      CT:    ECHO:      VITALS:  T(C): 36.7 (09-21-24 @ 04:53), Max: 37 (09-20-24 @ 12:00)  T(F): 98.1 (09-21-24 @ 04:53), Max: 98.6 (09-20-24 @ 12:00)  HR: 62 (09-21-24 @ 10:00) (55 - 150)  BP: 104/56 (09-21-24 @ 10:00) (74/61 - 123/81)  BP(mean): 71 (09-21-24 @ 10:00) (71 - 93)  ABP: 93/29 (09-21-24 @ 10:00) (93/29 - 104/37)  ABP(mean): 52 (09-21-24 @ 10:00) (52 - 61)  RR: 31 (09-21-24 @ 10:00) (14 - 31)  SpO2: 89% (09-21-24 @ 08:51) (89% - 100%)    Ins and Outs       Height (cm): 144.8 (09-20-24 @ 12:00)  Weight (kg): 43.1 (09-20-24 @ 12:00)  BMI (kg/m2): 20.6 (09-20-24 @ 12:00)    Device: Avea, Mode: AC/ CMV (Assist Control/ Continuous Mandatory Ventilation), RR (machine): 26, RR (patient): 26, TV (machine): 450, TV (patient): 400, FiO2: 100, PEEP: 5, ITime: 0.9, PIP: 41    I&O's Detail      Physical Examination:  GENERAL:               Alert, Oriented, No acute distress.    HEENT:                    Pupils equal, reactive to light.  Symmetric. No JVD, Moist MM  PULM:                     Bilateral air entry, Clear to auscultation bilaterally, no significant sputum production, No Rales, No Rhonchi, No Wheezing  CVS:                         S1, S2,  No Murmur  ABD:                        Soft, nondistended, nontender, normoactive bowel sounds,   EXT:                         No edema, nontender, No Cyanosis or Clubbing   Vascular:                Warm Extremities, Normal Capillary refill, Normal Distal Pulses  SKIN:                       Warm and well perfused, no rashes noted.   NEURO:                  Alert, oriented, interactive, nonfocal, follows commands  PSYC:                      Calm, + Insight.     89 y/o F now admitted to ICU     ## s/p PEA cardiac arrest, ROSC  ##Severe Sepsis, acute hypoxic resp failure secondary to PNA  ## lactic acidosis  ## DANE on CKD  ## elevated troponins, NSTEMI   ## paroxysmal a fib  ## pneumothorax likely rib fracture  post CPR      Plan:  - monitor mental status closely  - pt with poor mental status  but expected post cardiac arrest, hold sedation   - monitor and maintain MAP > 65, add pressors as needed  - trend troponins / EKG  - patient not a candidate for hypothermia protocol   - new onset a fib, was on eliquis and plavix  - hold all anticoagulants in setting of bloody secretions   - patient requiring  100% Fio2, with elevated peak pressures   - titrate vent settings to minimize dacia- trauma, titrtae Fio2 to maintain spo2 > 92%  - s/p R pigtail insertion, maintain chest tube to suction, monitor output   - NPO  - oviedo for strict I & O  - monitoring urine output and maintain > 0.5cc/kg/hour  - monitor and trend electrolytes replace as needed   - continue bicarb drip     Prognosis poor  Patient is in critical condition requiring ICU level of care  Patient seen and evaluated with Dr Mann who agrees with above stated plan of care  Family at bedside and updated  FULL Code- goals of care ongoing          88 y/o F now admitted to ICU     ## s/p PEA cardiac arrest, ROSC  ##Severe Sepsis, acute hypoxic resp failure secondary to pneumonia and now with CPR active hemoptysis   ##Suspected anoxic encephelopathy  ##Septic and cardiogenic shock combined  ## Acute systolic chf  ## lactic acidosis with severe metabolic acidosis   ## DANE on CKD  ## elevated troponin NSTEMI   ## paroxysmal a fib  ## pneumothorax likely rib fracture  post CPR  ## Non occlusive right Fem DVT    possible underlying PE, but doubt based on echo showing acute systolic chf.   ## ? pntx on cxr s/p Right chest tube     Plan:  - Admit to ICU   - not a candidate for hypothermia protocol as patient septic, bleeding and on a/c (eliquis)  - monitor mental status closely, as day progressed noted twitching, benzo given and connected to cerebell possible anoxia  - pt with poor mental status  but expected post cardiac arrest, hold sedation , unless seizure present   - monitor and maintain MAP > 65, add pressors as needed, patient pressor requirements increased thorugh out day, now on levo, , vaso and herminia   - trend troponins / EKG   - new onset a fib, was on eliquis and plavix prior   - hold all anticoagulants in setting of bloody secretions ? restart in am  - patient requiring  100% Fio2, with elevated peak pressures   - titrate vent settings to minimize dacia- trauma, titrtae Fio2 to maintain spo2 > 92%  - s/p R pigtail insertion, maintain chest tube to suction, monitor output   - NPO  - oviedo for strict I & O  - monitoring urine output and maintain > 0.5cc/kg/hour  - monitor and trend electrolytes replace as needed   - continue bicarb drip   -  due to avitve hemoptysis as clogging airway and at time limiting ventilation, not a candidate for a/c , not a candidate for TPA   - continue chest tube to suction, draining serous fluids, samples sent for evaluation    Prognosis poor  Patient is in critical condition requiring ICU level of care  Patient seen and evaluated with Dr Mann who agrees with above stated plan of care  Family at bedside and updated  FULL Code- goals of care ongoing

## 2024-09-21 NOTE — CONSULT NOTE ADULT - SUBJECTIVE AND OBJECTIVE BOX
Follow-up Critical Care Progress Note  Chief Complaint : Weakness          No new events overnight.  Denies SOB/CP.       Allergies :No Known Allergies      PAST MEDICAL & SURGICAL HISTORY:  HTN (hypertension)    HLD (hyperlipidemia)    Hyperkalemia    CKD (chronic kidney disease)    Atherosclerosis    CAD (coronary artery disease)    Stage 4 chronic kidney disease    Cyst of pancreas    H/O CHF    Diabetes mellitus    S/P hysterectomy    Pancreatic cyst    H/O endoscopy    History of cardioversion        Medications:  MEDICATIONS  (STANDING):  azithromycin  IVPB 500 milliGRAM(s) IV Intermittent every 24 hours  cefTRIAXone   IVPB 1000 milliGRAM(s) IV Intermittent every 24 hours  chlorhexidine 0.12% Liquid 15 milliLiter(s) Oral Mucosa every 12 hours  chlorhexidine 2% Cloths 1 Application(s) Topical <User Schedule>  dextrose 50% Injectable 25 Gram(s) IV Push once  fentaNYL   Infusion. 0.5 MICROgram(s)/kG/Hr (2.16 mL/Hr) IV Continuous <Continuous>  glucagon  Injectable 1 milliGRAM(s) IntraMuscular once  norepinephrine Infusion 0.05 MICROgram(s)/kG/Min (4.04 mL/Hr) IV Continuous <Continuous>  sodium bicarbonate  Infusion 0.522 mEq/kG/Hr (150 mL/Hr) IV Continuous <Continuous>    MEDICATIONS  (PRN):  ondansetron Injectable 4 milliGRAM(s) IV Push every 8 hours PRN Nausea and/or Vomiting  sodium chloride 0.9% lock flush 10 milliLiter(s) IV Push every 1 hour PRN Pre/post blood products, medications, blood draw, and to maintain line patency      Antibiotics History  azithromycin  IVPB 500 milliGRAM(s) IV Intermittent once, 09-20-24 @ 21:59, Stop order after: 1 Doses  azithromycin  IVPB 500 milliGRAM(s) IV Intermittent every 24 hours, 09-21-24 @ 00:52, Stop order after: 3 Doses  cefTRIAXone   IVPB 1000 milliGRAM(s) IV Intermittent once, 09-20-24 @ 21:59, Stop order after: 1 Doses  cefTRIAXone   IVPB 1000 milliGRAM(s) IV Intermittent every 24 hours, 09-21-24 @ 00:39, Stop order after: 5 Days      Heme Medications       GI Medications      COVID  09-20-24 @ 13:20  COVID -   NotDetec      COVID Biomarkers    09-21-24 @ 09:10 ESR --  ---  CRP --  ---  DDimer  9034[H]   ---   LDH --   ---   Ferritin --    09-20-24 @ 12:50 ESR --  ---  CRP --  ---  DDimer  662[H]   ---   LDH --   ---   Ferritin --            Trend Cardiac Enzymes  09-21-24 @ 09:10  WZG-NDCIQ-THQGN-CPKMM/Trop I - -- - --  - --  -  --  /  166.4[H]  09-21-24 @ 07:15  AVS-XRBAA-JHSLM-CPKMM/Trop I - -- - --  - --  -  --  /  150.0[H]  09-21-24 @ 00:36  SKN-VYYXO-KHMGW-CPKMM/Trop I - -- - --  - --  -  --  /  157.6[H]  09-20-24 @ 12:50  UZT-JUMKN-WKDFX-CPKMM/Trop I - -- - --  - --  -  --  /  141.2[H]    Trend BNP  06-26-24 @ 06:29   -  5463[H]  05-27-24 @ 07:33   -  4568[H]    Procalcitonin Trend    WBC Trend  09-21-24 @ 09:10   -  22.34[H]  09-21-24 @ 07:15   -  22.98[H]  09-20-24 @ 12:50   -  17.01[H]    H/H Trend  09-21-24 @ 09:10   -   11.0[L]/ 36.1  09-21-24 @ 07:15   -   10.1[L]/ 35.4  09-20-24 @ 12:50   -   11.6/ 37.5    Stool Occult Blood  09-20-24 @ 15:40   -   Negative  07-02-24 @ 05:15   -   Negative    Platelet Trend  09-21-24 @ 09:10   -  145[L]  09-21-24 @ 07:15   -  149[L]  09-20-24 @ 12:50   -  173    Trend Sodium  09-21-24 @ 09:10   -  150[H]  09-21-24 @ 07:15   -  144  09-20-24 @ 19:31   -  145  09-20-24 @ 12:50   -  148[H]    Trend Potassium  09-21-24 @ 09:10   -  3.8  09-21-24 @ 07:15   -  4.6  09-20-24 @ 19:31   -  4.3  09-20-24 @ 12:50   -  4.8    Trend Bun/Cr  09-21-24 @ 09:10  BUN/CR -  72[H] / 2.21[H]  09-21-24 @ 07:15  BUN/CR -  69[H] / 1.95[H]  09-20-24 @ 19:31  BUN/CR -  73[H] / 1.95[H]  09-20-24 @ 12:50  BUN/CR -  81[H] / 2.24[H]    Lactic Acid Trend  09-21-24 @ 09:10   -   8.6[HH]  09-21-24 @ 00:50   -   2.5[H]  09-20-24 @ 19:31   -   2.1[H]  09-20-24 @ 13:20   -   4.4[HH]    ABG Trend  09-21-24 @ 08:20   - <7.00[LL]/56[H]/62[L]/80.1[L]  09-21-24 @ 03:31   - 7.32[L]/26[L]/79[L]/95.1  07-25-21 @ 23:20   - 7.42/30[L]/132[H]/99[H]  07-25-21 @ 15:50   - 7.44/36/130[H]/99[H]  07-24-21 @ 10:12   - 7.33[L]/25[L]/170[H]/99[H]  07-24-21 @ 02:00   - 7.29[L]/28[L]/155[H]/99[H]    Trend AST/ALT/ALK Phos/Bili  09-21-24 @ 09:10   88[H]/86[H]/148[H]/0.7  09-21-24 @ 07:15   45[H]/82[H]/148[H]/0.8  09-20-24 @ 19:31   50[H]/92[H]/173[H]/0.9  09-20-24 @ 12:50   54[H]/87[H]/169[H]/1.1  07-01-24 @ 07:10   17/14/43/0.5  06-30-24 @ 06:35   15/15/45/0.2  06-29-24 @ 06:47   18/13/50/0.2  06-28-24 @ 07:00   16/13/53/0.2  06-27-24 @ 07:52   19/14/53/0.2  06-26-24 @ 06:29   20/12/60/0.4  06-25-24 @ 06:55   23/14/61/0.5  06-24-24 @ 15:47   25/10/59/0.3      Ammonia Trend      Amylase / Lipase Trend      Albumin Trend  09-21-24 @ 09:10   -   2.0[L]  09-21-24 @ 07:15   -   2.4[L]  09-20-24 @ 19:31   -   2.8[L]  09-20-24 @ 12:50   -   2.7[L]  07-01-24 @ 07:10   -   2.1[L]  06-30-24 @ 06:35   -   2.1[L]      PTT - PT - INR Trend  09-21-24 @ 09:10   -   36.0[H] - 31.2[H] - 2.75[H]  09-20-24 @ 12:50   -   27.3 - 18.6[H] - 1.66[H]  06-24-24 @ 15:47   -   31.6 - 16.6[H] - 1.43[H]    Glucose Trend  09-21-24 @ 09:10   -  129[H] -- --  09-21-24 @ 07:35   -  -- -- 177[H]  09-21-24 @ 07:15   -  157[H] -- --  09-20-24 @ 19:31   -  108[H] -- --  09-20-24 @ 12:50   -  93 -- --    A1C with Estimated Average Glucose Result: 5.3 % [4.0 - 5.6] (05-26-24 @ 06:43)      LABS:                        11.0   22.34 )-----------( 145      ( 21 Sep 2024 09:10 )             36.1     09-21    150[H]  |  111[H]  |  72[H]  ----------------------------<  129[H]  3.8   |  19[L]  |  2.21[H]    Ca    9.3      21 Sep 2024 09:10  Phos  7.8     09-21  Mg     2.4     09-21    TPro  5.0[L]  /  Alb  2.0[L]  /  TBili  0.7  /  DBili  x   /  AST  88[H]  /  ALT  86[H]  /  AlkPhos  148[H]  09-21    HIT ab -- 09-21 @ 09:10  HIT ab EIA --  D Dimer -9034  HIT ab -- 09-20 @ 12:50  HIT ab EIA --  D Dimer -662          PT/INR - ( 21 Sep 2024 09:10 )   PT: 31.2 sec;   INR: 2.75 ratio         PTT - ( 21 Sep 2024 09:10 )  PTT:36.0 sec  Urinalysis Basic - ( 21 Sep 2024 09:10 )    Color: x / Appearance: x / SG: x / pH: x  Gluc: 129 mg/dL / Ketone: x  / Bili: x / Urobili: x   Blood: x / Protein: x / Nitrite: x   Leuk Esterase: x / RBC: x / WBC x   Sq Epi: x / Non Sq Epi: x / Bacteria: x              CULTURES: (if applicable)        ABG - ( 21 Sep 2024 08:20 )  pH, Arterial: <7.00 pH, Blood: x     /  pCO2: 56    /  pO2: 62    / HCO3: 14    / Base Excess: -17.5 /  SaO2: 80.1              CAPILLARY BLOOD GLUCOSE      POCT Blood Glucose.: 177 mg/dL (21 Sep 2024 07:35)      RADIOLOGY  CXR:      CT:    ECHO:      VITALS:  T(C): 36.7 (09-21-24 @ 04:53), Max: 37 (09-20-24 @ 12:00)  T(F): 98.1 (09-21-24 @ 04:53), Max: 98.6 (09-20-24 @ 12:00)  HR: 62 (09-21-24 @ 10:00) (55 - 150)  BP: 104/56 (09-21-24 @ 10:00) (74/61 - 123/81)  BP(mean): 71 (09-21-24 @ 10:00) (71 - 93)  ABP: 93/29 (09-21-24 @ 10:00) (93/29 - 104/37)  ABP(mean): 52 (09-21-24 @ 10:00) (52 - 61)  RR: 31 (09-21-24 @ 10:00) (14 - 31)  SpO2: 89% (09-21-24 @ 08:51) (89% - 100%)  CVP(mm Hg): --  CVP(cm H2O): --    Ins and Outs       Height (cm): 144.8 (09-20-24 @ 12:00)  Weight (kg): 43.1 (09-20-24 @ 12:00)  BMI (kg/m2): 20.6 (09-20-24 @ 12:00)    Device: Avea, Mode: AC/ CMV (Assist Control/ Continuous Mandatory Ventilation), RR (machine): 26, RR (patient): 26, TV (machine): 450, TV (patient): 400, FiO2: 100, PEEP: 5, ITime: 0.9, PIP: 41    I&O's Detail      Physical Examination:  GENERAL:               Alert, Oriented, No acute distress.    HEENT:                    Pupils equal, reactive to light.  Symmetric. No JVD, Moist MM  PULM:                     Bilateral air entry, Clear to auscultation bilaterally, no significant sputum production, No Rales, No Rhonchi, No Wheezing  CVS:                         S1, S2,  No Murmur  ABD:                        Soft, nondistended, nontender, normoactive bowel sounds,   EXT:                         No edema, nontender, No Cyanosis or Clubbing   Vascular:                Warm Extremities, Normal Capillary refill, Normal Distal Pulses  SKIN:                       Warm and well perfused, no rashes noted.   NEURO:                  Alert, oriented, interactive, nonfocal, follows commands  PSYC:                      Calm, + Insight.             ICU CONSULT  Location of Patient :  [facility] [id number] [id number] ( [facility])  Attending requesting Consult:Jovanni Peña  Chief Complaint :     Reason For consult : s/p cardiac arrest       Initial HPI on admission:  HPI:  89-year-old female with a history of hypertension, hyperlipidemia, CHF, CKD, CAD, paroxysmal A-fib on Eliquis, anemia presents to  ED from home with complaint of weakness over the past week that worsened today. Pt states she usually is abl to walk with her cane and/or walker but today she could barely move. Pt reports she developed a cough earlier today that is productive of bloody sputum. Pt states she is unsure if she had a fever.     In the ED BP was 74/6, , Temp 98.3, RR 16 and O2 93 on 4L NC. Lactate was 4.4 > 2.1, CT chest: Right upper lobe and right middle lobe pneumonia. Patient received 1x dose of Rocephin and Zithromax. S/p 2L NS bolus.  (20 Sep 2024 23:10)      BRIEF HOSPITAL COURSE:  Was admitted to [facility] overnight where she was briefly on Cardizem drip for SVT. This morning RRT called as patient became bradycardic to rate 30s but found to be pulses in cardiac arrest. CPR initiated. Pt received 4 doses epi, one bicarb, 1 amp calcium. Intubated and ROSC achieved. Pt brought to iCU for further management     PAST MEDICAL & SURGICAL HISTORY:  HTN (hypertension)  HLD (hyperlipidemia)  Hyperkalemia  CKD (chronic kidney disease)  Atherosclerosis  CAD (coronary artery disease)  Stage 4 chronic kidney disease  Cyst of pancreas  H/O CHF  Diabetes mellitus  S/P hysterectomy  Pancreatic cyst  H/O endoscopy  History of cardioversion        Allergies    No Known Allergies    Intolerances      FAMILY HISTORY:  FH: MI (myocardial infarction) (Father, Mother)      Social history:  unknown      Review of Systems: unable to assess, obtunded       Medications:  MEDICATIONS  (STANDING):  azithromycin  IVPB 500 milliGRAM(s) IV Intermittent every 24 hours  cefTRIAXone   IVPB 1000 milliGRAM(s) IV Intermittent every 24 hours  chlorhexidine 0.12% Liquid 15 milliLiter(s) Oral Mucosa every 12 hours  chlorhexidine 2% Cloths 1 Application(s) Topical <User Schedule>  dextrose 50% Injectable 25 Gram(s) IV Push once  fentaNYL   Infusion. 0.5 MICROgram(s)/kG/Hr (2.16 mL/Hr) IV Continuous <Continuous>  glucagon  Injectable 1 milliGRAM(s) IntraMuscular once  norepinephrine Infusion 0.05 MICROgram(s)/kG/Min (4.04 mL/Hr) IV Continuous <Continuous>  sodium bicarbonate  Infusion 0.522 mEq/kG/Hr (150 mL/Hr) IV Continuous <Continuous>    MEDICATIONS  (PRN):  ondansetron Injectable 4 milliGRAM(s) IV Push every 8 hours PRN Nausea and/or Vomiting  sodium chloride 0.9% lock flush 10 milliLiter(s) IV Push every 1 hour PRN Pre/post blood products, medications, blood draw, and to maintain line patency      Antibiotics History  azithromycin  IVPB 500 milliGRAM(s) IV Intermittent once, 09-20-24 @ 21:59, Stop order after: 1 Doses  azithromycin  IVPB 500 milliGRAM(s) IV Intermittent every 24 hours, 09-21-24 @ 00:52, Stop order after: 3 Doses  cefTRIAXone   IVPB 1000 milliGRAM(s) IV Intermittent once, 09-20-24 @ 21:59, Stop order after: 1 Doses  cefTRIAXone   IVPB 1000 milliGRAM(s) IV Intermittent every 24 hours, 09-21-24 @ 00:39, Stop order after: 5 Days        Home Medications:  Last Order Reconciliation Date: Not Done  AmLODIPine: 2.5 once a day (09-21-24)  ascorbic acid 500 mg oral tablet: 1 tab(s) orally once a day (09-21-24)  atorvastatin 40 mg oral tablet: 1 tab(s) orally once a day (at bedtime) (09-21-24)  clopidogrel 75 mg oral tablet: 1 tab(s) orally once a day (09-21-24)  dapagliflozin 10 mg oral tablet: 1 tab(s) orally every 24 hours (09-21-24)  Eliquis 2.5 mg oral tablet: 1 tab(s) orally 2 times a day (09-21-24)  Entresto 24 mg-26 mg oral tablet: 1 tab(s) orally 2 times a day (09-21-24)  ferrous sulfate 325 mg (65 mg elemental iron) oral tablet: 1 tab(s) orally once a day (09-21-24)  hydrALAZINE 50 mg oral tablet: 1 tab(s) orally 2 times a day (09-21-24)  metoprolol succinate 25 mg oral tablet, extended release: 1 tab(s) orally once a day (at bedtime) (09-21-24)  Multiple Vitamins oral tablet: 1 tab(s) orally once a day (09-21-24)  pantoprazole 40 mg oral delayed release tablet: 1 tab(s) orally 2 times a day (09-21-24)  polyethylene glycol 3350 oral powder for reconstitution: 17 gram(s) orally once a day (09-21-24)  Senna 8.6 mg oral tablet: 1 tab(s) orally once a day as needed for  constipation (09-21-24)        LABS:                        11.0   22.34 )-----------( 145      ( 21 Sep 2024 09:10 )             36.1     09-21    150[H]  |  111[H]  |  72[H]  ----------------------------<  129[H]  3.8   |  19[L]  |  2.21[H]    Ca    9.3      21 Sep 2024 09:10  Phos  7.8     09-21  Mg     2.4     09-21    TPro  5.0[L]  /  Alb  2.0[L]  /  TBili  0.7  /  DBili  x   /  AST  88[H]  /  ALT  86[H]  /  AlkPhos  148[H]  09-21    HIT ab -- 09-21 @ 09:10  HIT ab EIA --  D Dimer -9034  HIT ab -- 09-20 @ 12:50  HIT ab EIA --  D Dimer -662          PT/INR - ( 21 Sep 2024 09:10 )   PT: 31.2 sec;   INR: 2.75 ratio         PTT - ( 21 Sep 2024 09:10 )  PTT:36.0 sec  Urinalysis Basic - ( 21 Sep 2024 09:10 )    Color: x / Appearance: x / SG: x / pH: x  Gluc: 129 mg/dL / Ketone: x  / Bili: x / Urobili: x   Blood: x / Protein: x / Nitrite: x   Leuk Esterase: x / RBC: x / WBC x   Sq Epi: x / Non Sq Epi: x / Bacteria: x          CULTURES: (if applicable)        ABG - ( 21 Sep 2024 08:20 )  pH, Arterial: <7.00 pH, Blood: x     /  pCO2: 56    /  pO2: 62    / HCO3: 14    / Base Excess: -17.5 /  SaO2: 80.1          CAPILLARY BLOOD GLUCOSE      POCT Blood Glucose.: 177 mg/dL (21 Sep 2024 07:35)        VITALS:  T(C): 36.7 (09-21-24 @ 04:53), Max: 37 (09-20-24 @ 12:00)  T(F): 98.1 (09-21-24 @ 04:53), Max: 98.6 (09-20-24 @ 12:00)  HR: 62 (09-21-24 @ 10:00) (55 - 150)  BP: 104/56 (09-21-24 @ 10:00) (74/61 - 123/81)  BP(mean): 71 (09-21-24 @ 10:00) (71 - 93)  ABP: 93/29 (09-21-24 @ 10:00) (93/29 - 104/37)  ABP(mean): 52 (09-21-24 @ 10:00) (52 - 61)  RR: 31 (09-21-24 @ 10:00) (14 - 31)  SpO2: 89% (09-21-24 @ 08:51) (89% - 100%)    Ins and Outs       Height (cm): 144.8 (09-20-24 @ 12:00)  Weight (kg): 43.1 (09-20-24 @ 12:00)  BMI (kg/m2): 20.6 (09-20-24 @ 12:00)    Device: Avea, Mode: AC/ CMV (Assist Control/ Continuous Mandatory Ventilation), RR (machine): 26, RR (patient): 26, TV (machine): 450, TV (patient): 400, FiO2: 100, PEEP: 5, ITime: 0.9, PIP: 41    I&O's Detail      Physical Examination:  GENERAL:               obtunded   HEENT:                    Pupils equal,nonreactive   PULM:                     Bilateral air entry, course lung sounds bilaterally, frothy bloody secretions   CVS:                         S1, S2,  No Murmur  ABD:                        Soft, nondistended,   EXT:                         No edema, nontender, No Cyanosis or Clubbing   Vascular:                cool extremieites   SKIN:                       cool  no rashes noted.   NEURO:                  no gag, obtunded    ICU CONSULT  Location of Patient :  [facility] [id number] [id number] ( [facility])  Attending requesting Consult:Jovanni Peña  Chief Complaint :     Reason For consult : s/p cardiac arrest       Initial HPI on admission:  HPI:  89-year-old female with a history of hypertension, hyperlipidemia, CHF, CKD, CAD, paroxysmal A-fib on Eliquis, anemia presents to  ED from home with complaint of weakness over the past week that worsened today. Pt states she usually is abl to walk with her cane and/or walker but today she could barely move. Pt reports she developed a cough earlier today that is productive of bloody sputum. Pt states she is unsure if she had a fever.     In the ED BP was 74/6, , Temp 98.3, RR 16 and O2 93 on 4L NC. Lactate was 4.4 > 2.1, CT chest: Right upper lobe and right middle lobe pneumonia. Patient received 1x dose of Rocephin and Zithromax. S/p 2L NS bolus.  (20 Sep 2024 23:10)      BRIEF HOSPITAL COURSE:  Was admitted to [facility] overnight where she was briefly on Cardizem drip for SVT. This morning RRT called as patient became bradycardic to rate 30s but found to be pulses in cardiac arrest. CPR initiated. Pt received 4 doses epi, one bicarb, 1 amp calcium. Intubated and ROSC achieved. Pt brought to iCU for further management   in ICU shock progressively worse, Echo showed low EF and started on .  family updated multiple times thorugh out day     PAST MEDICAL & SURGICAL HISTORY:  HTN (hypertension)  HLD (hyperlipidemia)  Hyperkalemia  CKD (chronic kidney disease)  Atherosclerosis  CAD (coronary artery disease)  Stage 4 chronic kidney disease  Cyst of pancreas  H/O CHF  Diabetes mellitus  S/P hysterectomy  Pancreatic cyst  H/O endoscopy  History of cardioversion        Allergies    No Known Allergies    Intolerances      FAMILY HISTORY:  FH: MI (myocardial infarction) (Father, Mother)      Social history:  unknown      Review of Systems: unable to assess, obtunded       Medications:  MEDICATIONS  (STANDING):  azithromycin  IVPB 500 milliGRAM(s) IV Intermittent every 24 hours  cefTRIAXone   IVPB 1000 milliGRAM(s) IV Intermittent every 24 hours  chlorhexidine 0.12% Liquid 15 milliLiter(s) Oral Mucosa every 12 hours  chlorhexidine 2% Cloths 1 Application(s) Topical <User Schedule>  dextrose 50% Injectable 25 Gram(s) IV Push once  fentaNYL   Infusion. 0.5 MICROgram(s)/kG/Hr (2.16 mL/Hr) IV Continuous <Continuous>  glucagon  Injectable 1 milliGRAM(s) IntraMuscular once  norepinephrine Infusion 0.05 MICROgram(s)/kG/Min (4.04 mL/Hr) IV Continuous <Continuous>  sodium bicarbonate  Infusion 0.522 mEq/kG/Hr (150 mL/Hr) IV Continuous <Continuous>    MEDICATIONS  (PRN):  ondansetron Injectable 4 milliGRAM(s) IV Push every 8 hours PRN Nausea and/or Vomiting  sodium chloride 0.9% lock flush 10 milliLiter(s) IV Push every 1 hour PRN Pre/post blood products, medications, blood draw, and to maintain line patency      Antibiotics History  azithromycin  IVPB 500 milliGRAM(s) IV Intermittent once, 24 @ 21:59, Stop order after: 1 Doses  azithromycin  IVPB 500 milliGRAM(s) IV Intermittent every 24 hours, 24 @ 00:52, Stop order after: 3 Doses  cefTRIAXone   IVPB 1000 milliGRAM(s) IV Intermittent once, 24 @ 21:59, Stop order after: 1 Doses  cefTRIAXone   IVPB 1000 milliGRAM(s) IV Intermittent every 24 hours, 24 @ 00:39, Stop order after: 5 Days        Home Medications:  Last Order Reconciliation Date: Not Done  AmLODIPine: 2.5 once a day (24)  ascorbic acid 500 mg oral tablet: 1 tab(s) orally once a day (24)  atorvastatin 40 mg oral tablet: 1 tab(s) orally once a day (at bedtime) (24)  clopidogrel 75 mg oral tablet: 1 tab(s) orally once a day (24)  dapagliflozin 10 mg oral tablet: 1 tab(s) orally every 24 hours (24)  Eliquis 2.5 mg oral tablet: 1 tab(s) orally 2 times a day (24)  Entresto 24 mg-26 mg oral tablet: 1 tab(s) orally 2 times a day (24)  ferrous sulfate 325 mg (65 mg elemental iron) oral tablet: 1 tab(s) orally once a day (24)  hydrALAZINE 50 mg oral tablet: 1 tab(s) orally 2 times a day (24)  metoprolol succinate 25 mg oral tablet, extended release: 1 tab(s) orally once a day (at bedtime) (24)  Multiple Vitamins oral tablet: 1 tab(s) orally once a day (24)  pantoprazole 40 mg oral delayed release tablet: 1 tab(s) orally 2 times a day (24)  polyethylene glycol 3350 oral powder for reconstitution: 17 gram(s) orally once a day (24)  Senna 8.6 mg oral tablet: 1 tab(s) orally once a day as needed for  constipation (24)        LABS:                        11.0   22.34 )-----------( 145      ( 21 Sep 2024 09:10 )             36.1         150[H]  |  111[H]  |  72[H]  ----------------------------<  129[H]  3.8   |  19[L]  |  2.21[H]    Ca    9.3      21 Sep 2024 09:10  Phos  7.8       Mg     2.4         TPro  5.0[L]  /  Alb  2.0[L]  /  TBili  0.7  /  DBili  x   /  AST  88[H]  /  ALT  86[H]  /  AlkPhos  148[H]      HIT ab --  @ 09:10  HIT ab EIA --  D Dimer -[id number]  HIT ab --  @ 12:50  HIT ab EIA --  D Dimer -[id number]    < from: TTE Echo Complete w/o Contrast w/ Doppler (24 @ 11:11) >    Summary:   1. Severely decreased global left ventricular systolic function.   2. Left ventricular ejection fraction, by visual estimation, is 25 to 30%.   3. Severe global left ventricle hypokinesis. Abnormal septal motion.   4. The right ventricle is not well visualized, appears to have reduced systolic function.   5. The left atrium is normal in size.   6. The right atrium is normal in size.   7. Mild thickening of the anterior and posterior mitral valve leaflets.   8. Mild mitral valve regurgitation.   9. Aortic valve leaflet thickening and calcification with restricted   leaflet opening. Aortic valve area unable to be calculated due to   suboptimal assessment of LVOT and aortic valve Doppler velocities.  10. There is mild aortic root calcification.  11. Large pleural effusion in the left lateral region.  12. There is no evidence of pericardial effusion.    [id number] Gurdeep Murphy MD Electronically signed on   2024 at 1:21:39 PM        < end of copied text >    < from: US Duplex Venous Lower Ext Complete, Bilateral (24 @ 14:13) >  IMPRESSION:      Nonocclusive thrombosis within the right common femoral vein, right   femoral vein and right popliteal vein. Nonocclusive thrombosis has the   appearance of sequela of chronic venous thrombosis however correlate   clinically to exclude acute on chronic thrombosis.    < end of copied text >      PT/INR - ( 21 Sep 2024 09:10 )   PT: 31.2 sec;   INR: 2.75 ratio         PTT - ( 21 Sep 2024 09:10 )  PTT:36.0 sec  Urinalysis Basic - ( 21 Sep 2024 09:10 )    Color: x / Appearance: x / SG: x / pH: x  Gluc: 129 mg/dL / Ketone: x  / Bili: x / Urobili: x   Blood: x / Protein: x / Nitrite: x   Leuk Esterase: x / RBC: x / WBC x   Sq Epi: x / Non Sq Epi: x / Bacteria: x                 VITALS:  T(C): 36.7 (24 @ 04:53), Max: 37 (24 @ 12:00)  T(F): 98.1 (24 @ 04:53), Max: 98.6 (24 @ 12:00)  HR: 62 (24 @ 10:00) (55 - 150)  BP: 104/56 (24 @ 10:00) (74/61 - 123/81)  BP(mean): 71 (24 @ 10:00) (71 - 93)  ABP: 93/29 (24 @ 10:00) (93/29 - 104/37)  ABP(mean): 52 (24 @ 10:00) (52 - 61)  RR: 31 (24 @ 10:00) (14 - 31)  SpO2: 89% (24 @ 08:51) (89% - 100%)    Ins and Outs       Height (cm): 144.8 (24 @ 12:00)  Weight (kg): 43.1 (24 @ 12:00)  BMI (kg/m2): 20.6 (24 @ 12:00)    Device: Avea, Mode: AC/ CMV (Assist Control/ Continuous Mandatory Ventilation), RR (machine): 26, RR (patient): 26, TV (machine): 450, TV (patient): 400, FiO2: 100, PEEP: 5, ITime: 0.9, PIP: 41    I&O's Detail      Physical Examination:  GENERAL:               obtunded   HEENT:                    Pupils equal,nonreactive   PULM:                     Bilateral air entry, course lung sounds bilaterally, frothy bloody secretions   CVS:                         S1, S2,  No Murmur  ABD:                        Soft, nondistended,   EXT:                         No edema, nontender, No Cyanosis or Clubbing   Vascular:                cool extremieites   SKIN:                       cool  no rashes noted.   NEURO:                  no gag, obtunded

## 2024-09-21 NOTE — CONSULT NOTE ADULT - SUBJECTIVE AND OBJECTIVE BOX
Patient chart reviewed, full consult to follow.     Thank you for the courtesy of this consultation. [facility] NEPHROLOGY SERVICES, [facility]  NEPHROLOGY AND HYPERTENSION  [address]  [address]  [place] [address]  [phone]    MD MIC PETERSON MD YELENA ROSENBERG, MD BINNY KOSHY, MD CHRISTOPHER CAPUTO, MD EDWARD BOVER, MD      Information from chart:  "Patient is a 89y old  Female who presents with a chief complaint of PNA and r/o ACS (21 Sep 2024 12:13)    HPI:  89-year-old female with a history of hypertension, hyperlipidemia, CHF, CKD, CAD, paroxysmal A-fib on Eliquis, anemia presents to  ED from home with complaint of weakness over the past week that worsened today. Pt states she usually is abl to walk with her cane and/or walker but today she could barely move. Pt reports she developed a cough earlier today that is productive of bloody sputum. Pt states she is unsure if she had a fever.     In the ED BP was 74/6, , Temp 98.3, RR 16 and O2 93 on 4L NC. Lactate was 4.4 > 2.1, CT chest: Right upper lobe and right middle lobe pneumonia. Patient received 1x dose of Rocephin and Zithromax. S/p 2L NS bolus.  (20 Sep 2024 23:10)   "    Vent dependent   Events noted    PAST MEDICAL & SURGICAL HISTORY:  HTN (hypertension)      HLD (hyperlipidemia)      Hyperkalemia      CKD (chronic kidney disease)      Atherosclerosis      CAD (coronary artery disease)      Stage 4 chronic kidney disease      Cyst of pancreas      H/O CHF      Diabetes mellitus      S/P hysterectomy      Pancreatic cyst      H/O endoscopy      History of cardioversion        FAMILY HISTORY:  FH: MI (myocardial infarction) (Father, Mother)      Allergies    No Known Allergies    Intolerances      Home Medications:  AmLODIPine: 2.5 once a day (21 Sep 2024 00:00)  ascorbic acid 500 mg oral tablet: 1 tab(s) orally once a day (21 Sep 2024 00:00)  clopidogrel 75 mg oral tablet: 1 tab(s) orally once a day (21 Sep 2024 00:00)  Eliquis 2.5 mg oral tablet: 1 tab(s) orally 2 times a day (21 Sep 2024 00:56)  Entresto 24 mg-26 mg oral tablet: 1 tab(s) orally 2 times a day (21 Sep 2024 00:00)  ferrous sulfate 325 mg (65 mg elemental iron) oral tablet: 1 tab(s) orally once a day (21 Sep 2024 00:00)  hydrALAZINE 50 mg oral tablet: 1 tab(s) orally 2 times a day (21 Sep 2024 00:56)  Multiple Vitamins oral tablet: 1 tab(s) orally once a day (21 Sep 2024 00:00)  pantoprazole 40 mg oral delayed release tablet: 1 tab(s) orally 2 times a day (21 Sep 2024 00:00)  polyethylene glycol 3350 oral powder for reconstitution: 17 gram(s) orally once a day (21 Sep 2024 00:00)  Senna 8.6 mg oral tablet: 1 tab(s) orally once a day as needed for  constipation (21 Sep 2024 00:00)    MEDICATIONS  (STANDING):  aMIOdarone Infusion 1 mG/Min (33.3 mL/Hr) IV Continuous <Continuous>  azithromycin  IVPB 500 milliGRAM(s) IV Intermittent every 24 hours  cefTRIAXone   IVPB 1000 milliGRAM(s) IV Intermittent every 24 hours  chlorhexidine 0.12% Liquid 15 milliLiter(s) Oral Mucosa every 12 hours  chlorhexidine 2% Cloths 1 Application(s) Topical <User Schedule>  dextrose 50% Injectable 25 Gram(s) IV Push once  DOBUTamine Infusion 2.5 MICROgram(s)/kG/Min (1.62 mL/Hr) IV Continuous <Continuous>  glucagon  Injectable 1 milliGRAM(s) IntraMuscular once  norepinephrine Infusion 0.05 MICROgram(s)/kG/Min (4.04 mL/Hr) IV Continuous <Continuous>  phenylephrine    Infusion 0.2 MICROgram(s)/kG/Min (3.23 mL/Hr) IV Continuous <Continuous>  sodium bicarbonate  Infusion 0.522 mEq/kG/Hr (150 mL/Hr) IV Continuous <Continuous>  vasopressin Infusion 0.02 Unit(s)/Min (3 mL/Hr) IV Continuous <Continuous>    MEDICATIONS  (PRN):  fentaNYL    Injectable 50 MICROGram(s) IV Push every 4 hours PRN Agitation  ondansetron Injectable 4 milliGRAM(s) IV Push every 8 hours PRN Nausea and/or Vomiting  sodium chloride 0.9% lock flush 10 milliLiter(s) IV Push every 1 hour PRN Pre/post blood products, medications, blood draw, and to maintain line patency    Vital Signs Last 24 Hrs  T(C): 34.4 (21 Sep 2024 16:00), Max: 36.7 (21 Sep 2024 04:53)  T(F): 94 (21 Sep 2024 16:00), Max: 98.1 (21 Sep 2024 04:53)  HR: 122 (21 Sep 2024 17:05) (55 - 144)  BP: 94/75 (21 Sep 2024 16:00) (92/75 - 129/98)  BP(mean): 83 (21 Sep 2024 16:00) (66 - 113)  RR: 20 (21 Sep 2024 16:00) (15 - 37)  SpO2: 100% (21 Sep 2024 16:00) (64% - 100%)    Parameters below as of 21 Sep 2024 16:00  Patient On (Oxygen Delivery Method): ventilator    O2 Concentration (%): 100  Mode: AC/ CMV (Assist Control/ Continuous Mandatory Ventilation)  RR (machine): 26  TV (machine): 450  FiO2: 100  PEEP: 7  ITime: 0.9  PIP: 43    Daily     Daily Weight in k.2 (21 Sep 2024 04:53)    24 @ 07:01  -  24 @ 17:47  --------------------------------------------------------  IN: 1697.5 mL / OUT: 1300 mL / NET: 397.5 mL      CAPILLARY BLOOD GLUCOSE      POCT Blood Glucose.: 162 mg/dL (21 Sep 2024 17:34)  POCT Blood Glucose.: 162 mg/dL (21 Sep 2024 12:15)  POCT Blood Glucose.: 177 mg/dL (21 Sep 2024 07:35)    PHYSICAL EXAM:      T(C): 34.4 (24 @ 16:00), Max: 36.7 (24 @ 04:53)  HR: 122 (24 @ 17:05) (55 - 144)  BP: 94/75 (24 @ 16:00) (92/75 - 129/98)  RR: 20 (24 @ 16:00) (15 - 37)  SpO2: 100% (24 @ 16:00) (64% - 100%)  Wt(kg): --  Lungs clear  Heart S1S2  Abd soft NT ND  Extremities:   tr edema                  149[H]  |  113[H]  |  73[H]  ----------------------------<  209[H]  4.4   |  22  |  2.23[H]    Ca    8.6      21 Sep 2024 12:11  Phos  6.8       Mg     2.1         TPro  4.8[L]  /  Alb  1.8[L]  /  TBili  0.8  /  DBili  x   /  AST  93[H]  /  ALT  88[H]  /  AlkPhos  150[H]                            12.4   15.71 )-----------( 174      ( 21 Sep 2024 12:11 )             41.1     Creatinine Trend: 2.23<--, 2.21<--, 1.95<--, 1.95<--, 2.24<--  Urinalysis Basic - ( 21 Sep 2024 12:11 )    Color: Yellow / Appearance: Clear / S.011 / pH: x  Gluc: 209 mg/dL / Ketone: Negative mg/dL  / Bili: Negative / Urobili: 0.2 mg/dL   Blood: x / Protein: 30 mg/dL / Nitrite: Negative   Leuk Esterase: Negative / RBC: 4 /HPF / WBC 1 /HPF   Sq Epi: x / Non Sq Epi: x / Bacteria: Occasional /HPF      ABG - ( 21 Sep 2024 13:30 )  pH, Arterial: 7.28  pH, Blood: x     /  pCO2: 35    /  pO2: 69    / HCO3: 16    / Base Excess: -10.3 /  SaO2: 92.3          Trend Bun/Cr  24 @ 12:11  BUN/CR -  73[H] / 2.23[H]  24 @ 09:10  BUN/CR -  72[H] / 2.21[H]  24 @ 07:15  BUN/CR -  69[H] / 1.95[H]  24 @ 19:31  BUN/CR -  73[H] / 1.95[H]  24 @ 12:50  BUN/CR -  81[H] / 2.24[H]  24 @ 06:44  BUN/CR -  41[H] / 1.06  24 @ 06:07  BUN/CR -  35[H] / 1.14  24 @ 06:09  BUN/CR -  27[H] / 0.94  24 @ 06:18  BUN/CR -  27[H] / 0.95  24 @ 07:10  BUN/CR -  25[H] / 0.96  24 @ 06:35  BUN/CR -  30[H] / 1.06  24 @ 06:47  BUN/CR -  25[H] / 1.08          Assessment   DANE suspected ischemic ATN    Plan  Continue supportive care  Overall prognosis poor    Preston Shannon MD

## 2024-09-21 NOTE — PROCEDURE NOTE - NSPROCDETAILS_GEN_ALL_CORE
Seldinger technique/dressing applied/secured in place/ultrasound assessment of fluid (location)
guidewire recovered/lumen(s) aspirated and flushed/sterile dressing applied/sterile technique, catheter placed/ultrasound guidance with use of sterile gel and probe cove

## 2024-09-22 NOTE — CHART NOTE - NSCHARTNOTEFT_GEN_A_CORE
ANCELMO Osmansissy Caraballo was informed. She states that she is on her way.
Called to the bedside in location by ELAINE villanueva o evaluate the status of patient. Identity was confirmed by wrist band.   The following person(s) were in the room during examination:    Physical Examination:  No signs of respiratory effort: No spontaneous respirations, No respiratory sounds on auscultation.   No response to verbal stimuli: eyes remained closed, no facial changes or sounds made by patient   No response to painful stimuli: nonresponsive sternal rub, corneal reflex absent  No pupillary response to light: fixed and dilated  No central pulse: carotid, radial, femoral evaluated   No heart sounds on auscultation; EKG rhythm strip shows asystole.        Patient pronounced dead at 16:52.  Attending notified.  Family declines autopsy.
Patient with more frequent myoclonic jerks and with increasing pressors requirements. Lactic now 11 despite addition of inotropic medication. Prognosis grave. Patient very unstable and at high risk for death. I called nisissy Rashmi to update and advised family come to bedside as uncertain if patient will make it through the night. Rashmi reports pt still wants everything done and if she looses her pulse would want CPR. Family will be coming to hospital.

## 2024-09-22 NOTE — GOALS OF CARE CONVERSATION - ADVANCED CARE PLANNING - CONVERSATION DETAILS
Family meeting had   re discussed current condidtion  discussed ongoing medical conditions  they request a compassionate wean from vent and stopping all medications  understand patient will  shortly after  they realize this is paient best interest and patients own wishes  MOLST form will be filled

## 2024-09-22 NOTE — PROGRESS NOTE ADULT - SUBJECTIVE AND OBJECTIVE BOX
PAYAM PAPPAS  [id number]      Chief Complaint: Septic shock/Right lung pneumonia/Acute hypoxic respiratory failure/Acute renal failure/Cardiac arrest    Interval History: The patient remains intubated and sedated on pressor support.    Tele: sinus 80s BPM, NSVT       Current meds:   aMIOdarone Infusion 0.5 mG/Min IV Continuous <Continuous>  azithromycin  IVPB 500 milliGRAM(s) IV Intermittent every 24 hours  cefTRIAXone   IVPB 1000 milliGRAM(s) IV Intermittent every 24 hours  chlorhexidine 0.12% Liquid 15 milliLiter(s) Oral Mucosa every 12 hours  chlorhexidine 2% Cloths 1 Application(s) Topical <User Schedule>  dextrose 50% Injectable 25 Gram(s) IV Push once  DOBUTamine Infusion 5 MICROgram(s)/kG/Min IV Continuous <Continuous>  fentaNYL    Injectable 50 MICROGram(s) IV Push every 4 hours PRN  glucagon  Injectable 1 milliGRAM(s) IntraMuscular once  norepinephrine Infusion 1 MICROgram(s)/kG/Min IV Continuous <Continuous>  ondansetron Injectable 4 milliGRAM(s) IV Push every 8 hours PRN  phenylephrine    Infusion 0.2 MICROgram(s)/kG/Min IV Continuous <Continuous>  sodium bicarbonate  Infusion 0.522 mEq/kG/Hr IV Continuous <Continuous>  sodium chloride 0.9% lock flush 10 milliLiter(s) IV Push every 1 hour PRN  vasopressin Infusion 0.02 Unit(s)/Min IV Continuous <Continuous>      Objective:     Vital Signs:   T(C): 34.4 (09-21-24 @ 16:00), Max: 35 (09-21-24 @ 12:00)  HR: 92 (09-22-24 @ 08:05) (55 - 144)  BP: 116/77 (09-22-24 @ 04:00) (83/63 - 136/107)  RR: 31 (09-22-24 @ 07:00) (15 - 37)  SpO2: 98% (09-22-24 @ 08:05) (64% - 100%)  Wt(kg): --    Physical Exam:   General: intubated, sedated  CVS: RRR, s1, s2  Pulm: coarse breath sounds  Ext: no lower extremity edema  Neuro: intubated, sedated        Labs:   22 Sep 2024 05:03    151    |  107    |  63     ----------------------------<  242    3.7     |  17     |  2.06     Ca    7.6        22 Sep 2024 05:03  Phos  5.5       22 Sep 2024 05:03  Mg     1.9       22 Sep 2024 05:03    TPro  4.2    /  Alb  1.5    /  TBili  1.6    /  DBili  x      /  AST  139    /  ALT  89     /  AlkPhos  111    22 Sep 2024 05:03                          10.3   26.45 )-----------( 95       ( 22 Sep 2024 05:03 )             34.1     PT/INR - ( 21 Sep 2024 09:10 )   PT: 31.2 sec;   INR: 2.75 ratio         PTT - ( 21 Sep 2024 09:10 )  PTT:36.0 sec        Coronary angiogram (2021):  LM: normal  LAD: proximal 40% stenosis, mid 30% stenosis   LCx: proximal 40% stenosis, mid 90% stenosis s/p LARISSA  OM1: 40% stenosis  OM2: 40% stenosis   RCA: proximal 30% stenosis, mid 50% stenosis, distal 20% stenosis   RPDA: 30% stenosis       TTE (5/2024):  LVEF 60-65%  Mild-moderate tricuspid regurgitation.  Mild aortic valve stenosis  Small pericardial effusion    TTE (9/21/24):   1. Severely decreased global left ventricular systolic function.   2. Left ventricular ejection fraction, by visual estimation, is 25 to   30%.   3. Severe global left ventricle hypokinesis. Abnormal septal motion.   4. The right ventricle is not well visualized, appears to have reduced systolic function.   5. The left atrium is normal in size.   6. The right atrium is normal in size.   7. Mild thickening of the anterior and posterior mitral valve leaflets.   8. Mild mitral valve regurgitation.   9. Aortic valve leaflet thickening and calcification with restricted   leaflet opening. Aortic valve area unable to be calculated due to   suboptimal assessment of LVOT and aortic valve Doppler velocities.  10. There is mild aortic root calcification.  11. Large pleural effusion in the left lateral region.  12. There is no evidence of pericardial effusion.      ECG (9/20/24): SVT, low voltage, old anterior infarct (similar infarct pattern to recent outpatient ECG in 8/2024)

## 2024-09-22 NOTE — PROGRESS NOTE ADULT - ASSESSMENT
Assessment:  Adamaris Herrera is an 89 year old woman, patient of Dr. Daniel with past medical history of Coronary artery disease (s/p LARISSA to LCx in 2021), Paroxysmal atrial fibrillation (on Eliquis), Hypertension, Hyperlipidemia, HFpEF, Chronic kidney disease and Anemia who was brought in by EMS on 9/20/24 due to weakness, found to have septic shock secondary to right lung pneumonia with acute hypoxic respiratory failure, with episode of SVT and now s/p PEA cardiac arrest the morning of 9/21/24, s/p ACLS protocol, currently intubated and sedated in ICU.     Cardiology consulted due to cardiac arrest which occurred on 9/21 morning. Chart review indicates that patient had an SVT, received Cardizem drip briefly, was found to be bradycardic to the 30s and in cardiac arrest, requiring CPR, epinephrine, bicarbonate and calcium. ECG on admission was consistent with SVT, low voltage and old anterior infarct pattern, similar to prior ECG. Prior echo report in May consistent with normal LVEF 60-65%, mild aortic valve stenosis. Telemetry reviewed and consistent with episodes of atrial tachycardia, atrial fibrillation and junctional rhythm. D-dimer markedly elevated. Troponins rising.     Recommendations:  [] PEA Cardiac arrest: Likely secondary to acute hypoxic respiratory failure from pneumonia. Ventilator management per ICU team. Now s/p chest tube. Echo now consistent with drop in LVEF to 25-30%. Recommend Dobutamine in addition to current pressors to keep MAP > 65.   [] Acute hypoxic respiratory failure: Secondary to pneumonia. Blood cultures now positive with gram positive bacteremia. Continue IV antibiotics, consider Infectious Disease consult.   [] NSTEMI, history of CAD, PAF: Cannot rule out progression in CAD, given cardiac arrest, and known multivessel CAD, although there are no reports of VT/VF. Continue to trend troponin until it peaks. Would consider anticoagulation, especially as patient now with right leg DVT (chronicity unknown), however patient with worsening thrombocytopenia.  Not candidate for ischemic evaluation due to clinical instability on multiple pressors and thrombocytopenia  [] Acidosis and acute renal failure: Follow up Nephrology consult     Prognosis appears poor at this time. Continue goals of care discussion with family. We will continue to follow along. Discussed with Dr. Peña.    Gurdeep Murphy MD  Cardiology      
89-year-old female with a history of hypertension, hyperlipidemia, CHF, CKD not on dialysis, CAD, paroxysmal A-fib on Eliquis, anemia presents to  ED from home with complaint of weakness over the past week that worsened today.    Pt being admitted for:    #Severe Sepsis, acute hypoxic resp failure, lactic acidosis, likely secondary to Pneumonia  - ADMIT:  Tele   - WBC 17,  Lactate 4.4>2.1  - s/p 2L bolus in ED  - Pulse ox q 8 hrs  - CT chest: as above: Right pneumonia and pleural effusions   - RVP negative   - Legionella/strep urine antigen   - Nebs prn sob/wheezing  - Incentive spirometry  - NC O2 prn sob, hypoxia with goal >92%  - Cbc, cmp, coags, mg, phos   - f/u Sputum culture  - s/p Ceftriaxone in ED, C/w Ceftriaxone 1g and Azithromycin 500mg   - f/u repeat lactate   - f/u BCx x2 and UCx    #r/o ACS  - Trop I elevated to 141, f/u repeat  - EKG as above  - c/w Plavix 75mg qd  - F/U AM lipid panel  - DASH diet  - f/u TTE  - Cardiology consult    # Elevated d-dimer  - Ddimer 662, corrected to be 435  - f/u b/l LE doppler  - F/u VQ scan    # DANE on CKD  - Cr is 1.9 ( Baseline ~ 1.0)  - hold nephrotoxic meds  - f/u AM labs    #HFimpEF,  - f/u repeat TTE  - Last TTE: 05/2024: Recent TTE with EF 60-65%, normal LV function, mildly enlarged LA and mild valvular disease  - will hold Entresto and Fa rxiga for now du to soft BP  - TTE with Doppler (w/Cont) (07.25.21 @ 13:58) >EF (Teicholtz): 29 %      #HTN  - pt on metoprolol 25mg and hydralazine 50 mg BID  - hold hydralazine for now given low BP   - c/w BB with hold parameters since pt was noted to be in SVT initially was briefly placed on Cardizem drip later stopped, HR improved     #paroxysmal Afib  - EKG: t wave inversions anterior leads, PVCs  - c/w Eliquis    - dvt ppx: Eliquis 2.5 mg BID    - full code    - d/w Dr. Swanson             
Physical Examination:  GENERAL:               Intubated, with random clonic movements,   HEENT:                    No JVD, Dry MM  PULM:                     Bilateral air entry, Clear to auscultation bilaterally, no significant sputum production, ++ Rales, No Rhonchi, No Wheezing  CVS:                         S1, S2,  ++ Murmur  ABD:                        Soft, nondistended, nontender, normoactive bowel sounds,   EXT:                         + edema, nontender, + Cyanosis or Clubbing   Vascular:                cool Extremities,    NEURO:                  unresponsive  PSYC:                      Calm, no  Insight.        Assessment  S/p PEA cardiac arrest, ROSC now with suspected anoxic encephelopathy and possible seizures  Severe Sepsis, acute hypoxic resp failure secondary to pneumonia and now with CPR with Bacteremia  hemoptysis Resolved  Septic and cardiogenic shock combined  Acute systolic chf  lactic acidosis with severe metabolic acidosis   DANE on CKD  Elevated troponin NSTEMI   Paroxysmal a fib  pneumothorax likely rib fracture  post CPR s/p Right chest tube   Non occlusive right Fem DVT    possible underlying PE, but doubt based on echo showing acute systolic chf.   possible pulmonary embolism       Plan  Patient having siezure like activity will give ativan, start valproic acid and check ceribell  repeat lactic acid  remains on multi pressor shock,   attempts to flush chest tube draining pl fluid but not air  patient too unstable for CT imaging due to multipressor shock,   will pull back et tube  Hemoptysis and bloody secretions improved, will check Coags, with elevated INR, will hold a/c  Shock difficult to manage to get to map 65-70  patient requiring  100% Fio2, with elevated peak pressures   s/p R pigtail insertion, maintain chest tube to suction, monitor output   NPO  oviedo for strict I & O  increasing K, will give lokelma   monitoring urine output and maintain > 0.5cc/kg/hour       Prognosis poor      PMD:				                   Notified(Date):  Family Updated:  Linda		                                 Date: 9/22      Sedation & Analgesia:	  Diet/Nutrition:		Diet, NPO (09-21-24 @ 09:10) [Active]  GI PPx:			NPO  DVT Ppx:		venodynes/ elevated INR       Activity:		    Head of Bed:               35-45 Deg  Glycemic Control:          dextrose 50% Injectable 25 Gram(s) IV Push once  glucagon  Injectable 1 milliGRAM(s) IntraMuscular once  sodium bicarbonate  Infusion 0.522 mEq/kG/Hr IV Continuous <Continuous>  vasopressin Infusion 0.02 Unit(s)/Min IV Continuous <Continuous>      Lines: PIV  CENTRAL LINE: 	[ ] YES [ ] NO	                    LOCATION:   	                       DATE INSERTED:   	                    REMOVE:  [ ] YES [ ] NO    A-LINE:  	                [ ] YES [ ] NO                      LOCATION:   	                       DATE INSERTED: 		            REMOVE:  [ ] YES [ ] NO    OVIEDO: 		        [ ] YES [ ] NO  		                                       DATE INSERTED:		            REMOVE:  [ ] YES [ ] NO      Restraints were deemed necessary to prevent removal of life-sustaining devices [  ] YES   [   x ]  NO    Disposition: ICU    Goals of Care: Full code  multiple conversations had  if HR stops, CPR is futile   palliative care eval

## 2024-09-22 NOTE — DISCHARGE NOTE FOR THE EXPIRED PATIENT - HOSPITAL COURSE
89-year-old female with a history of hypertension, hyperlipidemia, CHF, CKD, CAD, paroxysmal A-fib on Eliquis, anemia presents to  ED from home with complaint of weakness over the past week that worsened today. Pt states she usually is abl to walk with her cane and/or walker but today she could barely move. Pt reports she developed a cough earlier today that is productive of bloody sputum. Pt states she is unsure if she had a fever.     In the ED BP was 74/6, , Temp 98.3, RR 16 and O2 93 on 4L NC. Lactate was 4.4 > 2.1, CT chest: Right upper lobe and right middle lobe pneumonia. S/p 2L NS bolus. Broad spectrum antibiotics initiated with Rocephin and Zithromax    Bcx/Ucx sent, RVP negative.     Pt  admitted to tele overnight where she was briefly on Cardizem drip for SVT. This morning RRT called as patient became bradycardic to rate 30s but found to be pulses in cardiac arrest. CPR initiated. Pt received 4 doses epi, one bicarb, 1 amp calcium. Intubated and ROSC achieved. Pt brought to iCU for further management   in ICU shock progressively worse, Echo showed low EF and started on .  family updated multiple times thorugh out day   Hypoxic worsening and patient brought to ICU and intubated for acute respiratory distress    To be continued,,,,,         89-year-old female with a history of hypertension, hyperlipidemia, CHF, CKD, CAD, paroxysmal A-fib on Eliquis, anemia presents to  ED from home with complaint of weakness over the past week that worsened today. Pt states she usually is abl to walk with her cane and/or walker but today she could barely move. Pt reports she developed a cough earlier today that is productive of bloody sputum. Pt states she is unsure if she had a fever.     In the ED BP was 74/6, , Temp 98.3, RR 16 and O2 93 on 4L NC. Lactate was 4.4 > 2.1, CT chest: Right upper lobe and right middle lobe pneumonia. S/p 2L NS bolus. Broad spectrum antibiotics initiated with Rocephin and Zithromax    Bcx/Ucx sent, RVP negative.     Pt  admitted to [facility] where she was briefly on Cardizem drip for SVT.  RRT called as patient became bradycardic to rate 30s but found to be pulses in cardiac arrest. CPR initiated. Pt received 4 doses epi, one bicarb, 1 amp calcium. Intubated and ROSC achieved after prolonged downtime of about 20 minutes. Pt brought to  ICU for further management  Shock state  progressively worsening requiring multiple pressors.  Echo showing  low EF and started on Dobutamine     Rt mid axillary pigtail chest tube  placed for pneumothorax/hemothorax  likely 2/2 chest compressions with serosanguinous drainage,  however lung not re-expanding,  requiring additional Rt anterior pigtail     Pt w/ myoclonic  jerking anf seizures likely 2/2 anoxic brain injury-stared on antiepileptics .  Lactic acidosis with severe metabolic acidosis and multi system organ failure.    Family update on poor prognosis and likelihood of no meaningful neurological recovery. Decision made for compassionate extubation and removal of life sustaining measures, including pressors. Updated [facility] in chart. Pt  at 16:52.              89-year-old female with a history of hypertension, hyperlipidemia, CHF, CKD, CAD, paroxysmal A-fib on Eliquis, anemia presents to  ED from home with complaint of weakness over the past week that worsened today. Pt states she usually is abl to walk with her cane and/or walker but today she could barely move. Pt reports she developed a cough earlier today that is productive of bloody sputum. Pt states she is unsure if she had a fever.     In the ED BP was 74/6, , Temp 98.3, RR 16 and O2 93 on 4L NC. Lactate was 4.4 > 2.1, CT chest: Right upper lobe and right middle lobe pneumonia. S/p 2L NS bolus. Broad spectrum antibiotics initiated with Rocephin and Zithromax   she was initially  admitted to tele after she was briefly on Cardizem drip for SVT.  RRT called as patient became bradycardic to rate 30s but found to be pulses in cardiac arrest. CPR initiated. Pt received 4 doses epi, one bicarb, 1 amp calcium. Intubated and ROSC achieved after prolonged downtime of about 20 minutes. Pt brought to  ICU for further management  patient in multifactorial shock both cardiogenic and septic and  progressively worsening requiring multiple pressors.  Echo showing  low EF and started on Dobutamine, workup also showed Streptococcus pneumoniae pneumonia and Bacteremia. Post cardiac care complicated with seizure like activity suspecting seizures     Rt mid axillary pigtail chest tube  placed for pneumothorax/hemothorax  likely 2/2 chest compressions with serosanguinous drainage,  however lung not re-expanding,  requiring additional right anterior pigtail     Pt w/ myoclonic  jerking anf seizures likely 2/2 anoxic brain injury-stared on antiepileptics .  Lactic acidosis with severe metabolic acidosis and multi system organ failure.    Family update on poor prognosis and likelihood of no meaningful neurological recovery. Decision made for compassionate extubation and removal of life sustaining measures, including pressors. Updated [facility] in chart. Pt  at 16:52.

## 2024-09-22 NOTE — PROGRESS NOTE ADULT - SUBJECTIVE AND OBJECTIVE BOX
Follow-up Critical Care Progress Note  Chief Complaint : Weakness          Allergies :No Known Allergies      PAST MEDICAL & SURGICAL HISTORY:  HTN (hypertension)  HLD (hyperlipidemia)  Hyperkalemia  CKD (chronic kidney disease)  Atherosclerosis  CAD (coronary artery disease)  Stage 4 chronic kidney disease  Cyst of pancreas  H/O CHF  Diabetes mellitus  S/P hysterectomy  Pancreatic cyst  H/O endoscopy  History of cardioversion        Medications:  MEDICATIONS  (STANDING):  aMIOdarone Infusion 0.5 mG/Min (16.7 mL/Hr) IV Continuous <Continuous>  azithromycin  IVPB 500 milliGRAM(s) IV Intermittent every 24 hours  cefTRIAXone   IVPB 1000 milliGRAM(s) IV Intermittent every 24 hours  chlorhexidine 0.12% Liquid 15 milliLiter(s) Oral Mucosa every 12 hours  chlorhexidine 2% Cloths 1 Application(s) Topical <User Schedule>  dextrose 50% Injectable 25 Gram(s) IV Push once  DOBUTamine Infusion 5 MICROgram(s)/kG/Min (3.23 mL/Hr) IV Continuous <Continuous>  glucagon  Injectable 1 milliGRAM(s) IntraMuscular once  norepinephrine Infusion 1 MICROgram(s)/kG/Min (40.4 mL/Hr) IV Continuous <Continuous>  phenylephrine    Infusion 0.2 MICROgram(s)/kG/Min (3.23 mL/Hr) IV Continuous <Continuous>  sodium bicarbonate  Infusion 0.522 mEq/kG/Hr (150 mL/Hr) IV Continuous <Continuous>  vasopressin Infusion 0.02 Unit(s)/Min (3 mL/Hr) IV Continuous <Continuous>    MEDICATIONS  (PRN):  fentaNYL    Injectable 50 MICROGram(s) IV Push every 4 hours PRN Agitation  ondansetron Injectable 4 milliGRAM(s) IV Push every 8 hours PRN Nausea and/or Vomiting  sodium chloride 0.9% lock flush 10 milliLiter(s) IV Push every 1 hour PRN Pre/post blood products, medications, blood draw, and to maintain line patency      Antibiotics History  azithromycin  IVPB 500 milliGRAM(s) IV Intermittent once, 09-20-24 @ 21:59, Stop order after: 1 Doses  azithromycin  IVPB 500 milliGRAM(s) IV Intermittent every 24 hours, 09-21-24 @ 00:52, Stop order after: 3 Doses  cefTRIAXone   IVPB 1000 milliGRAM(s) IV Intermittent once, 09-20-24 @ 21:59, Stop order after: 1 Doses  cefTRIAXone   IVPB 1000 milliGRAM(s) IV Intermittent every 24 hours, 09-21-24 @ 00:39, Stop order after: 5 Days      Heme Medications       GI Medications      COVID  09-20-24 @ 13:20  [facility]      COVID Biomarkers    09-21-24 @ 09:10 ESR --  ---  CRP --  ---  DDimer  9034[H]   ---   [H]   ---   Ferritin --    09-20-24 @ 12:50 ESR --  ---  CRP --  ---  DDimer  662[H]   ---   LDH --   ---   Ferritin --            Trend Cardiac Enzymes  09-22-24 @ 05:03  USI-NEEIQ-TAFUA-CPKMM/Trop I - -- - --  - --  -  --  /  352.0[H]  09-22-24 @ 00:23  ODP-QOYRH-BTLYO-CPKMM/Trop I - -- - --  - --  -  --  /  336.6[H]  09-21-24 @ 18:00  NPW-ACIQE-TPYXO-CPKMM/Trop I - -- - --  - --  -  --  /  306.8[H]  09-21-24 @ 12:11  QOO-LABKN-NUSWD-CPKMM/Trop I - -- - --  - --  -  --  /  262.3[H]  09-21-24 @ 09:10  KMV-LNOVS-JSSFB-CPKMM/Trop I - -- - --  - --  -  --  /  166.4[H]  09-21-24 @ 07:15  SJD-AJPYG-TCQWQ-CPKMM/Trop I - -- - --  - --  -  --  /  150.0[H]    Trend BNP  09-21-24 @ 12:11   -  99895[H]  06-26-24 @ 06:29   -  5463[H]  05-27-24 @ 07:33   -  4568[H]    Procalcitonin Trend    WBC Trend  09-22-24 @ 05:03   -  26.45[H]  09-22-24 @ 00:23   -  27.95[H]  09-21-24 @ 12:11   -  15.71[H]  09-21-24 @ 09:10   -  22.34[H]  09-21-24 @ 07:15   -  22.98[H]  09-20-24 @ 12:50   -  17.01[H]    H/H Trend  09-22-24 @ 05:03   -   10.3[L]/ 34.1[L]  09-22-24 @ 00:23   -   11.0[L]/ 35.0  09-21-24 @ 12:11   -   12.4/ 41.1  09-21-24 @ 09:10   -   11.0[L]/ 36.1  09-21-24 @ 07:15   -   10.1[L]/ 35.4  09-20-24 @ 12:50   -   11.6/ 37.5    Stool Occult Blood  09-20-24 @ 15:40   -   Negative  07-02-24 @ 05:15   -   Negative    Platelet Trend  09-22-24 @ 05:03   -  95[L]  09-22-24 @ 00:23   -  109[L]  09-21-24 @ 12:11   -  174  09-21-24 @ 09:10   -  145[L]  09-21-24 @ 07:15   -  149[L]  09-20-24 @ 12:50   -  173    Trend Sodium  09-22-24 @ 05:03   -  151[H]  09-22-24 @ 00:23   -  148[H]  09-21-24 @ 18:00   -  148[H]  09-21-24 @ 12:11   -  149[H]  09-21-24 @ 09:10   -  150[H]  09-21-24 @ 07:15   -  144    Trend Potassium  09-22-24 @ 05:03   -  3.7  09-22-24 @ 00:23   -  3.8  09-21-24 @ 18:00   -  3.9  09-21-24 @ 12:11   -  4.4  09-21-24 @ 09:10   -  3.8  09-21-24 @ 07:15   -  4.6    Trend Bun/Cr  09-22-24 @ 05:03  BUN/CR -  63[H] / 2.06[H]  09-22-24 @ 00:23  BUN/CR -  64[H] / 2.10[H]  09-21-24 @ 18:00  BUN/CR -  71[H] / 2.19[H]  09-21-24 @ 12:11  BUN/CR -  73[H] / 2.23[H]  09-21-24 @ 09:10  BUN/CR -  72[H] / 2.21[H]  09-21-24 @ 07:15  BUN/CR -  69[H] / 1.95[H]    Lactic Acid Trend  09-21-24 @ 18:00   -   11.6[HH]  09-21-24 @ 12:30   -   9.0[HH]  09-21-24 @ 09:10   -   8.6[HH]  09-21-24 @ 00:50   -   2.5[H]  09-20-24 @ 19:31   -   2.1[H]  09-20-24 @ 13:20   -   4.4[HH]    ABG Trend  09-22-24 @ 05:50   - 7.27[L]/33/57[L]/86.1[L]  09-21-24 @ 13:30   - 7.28[L]/35/69[L]/92.3[L]  09-21-24 @ 11:00   - 7.26[L]/45[H]/55[L]/81.5[L]  09-21-24 @ 08:20   - <7.00[LL]/56[H]/62[L]/80.1[L]  09-21-24 @ 03:31   - 7.32[L]/26[L]/79[L]/95.1  07-25-21 @ 23:20   - 7.42/30[L]/132[H]/99[H]  07-25-21 @ 15:50   - 7.44/36/130[H]/99[H]  07-24-21 @ 10:12   - 7.33[L]/25[L]/170[H]/99[H]  07-24-21 @ 02:00   - 7.29[L]/28[L]/155[H]/99[H]    Trend AST/ALT/ALK Phos/Bili  09-22-24 @ 05:03   139[H]/89[H]/111/1.6[H]  09-22-24 @ 00:23   119[H]/82[H]/114/1.2  09-21-24 @ 18:00   93[H]/82[H]/120/0.9  09-21-24 @ 12:11   93[H]/88[H]/150[H]/0.8  09-21-24 @ 09:10   88[H]/86[H]/148[H]/0.7  09-21-24 @ 07:15   45[H]/82[H]/148[H]/0.8  09-20-24 @ 19:31   50[H]/92[H]/173[H]/0.9  09-20-24 @ 12:50   54[H]/87[H]/169[H]/1.1  07-01-24 @ 07:10   17/14/43/0.5  06-30-24 @ 06:35   15/15/45/0.2  06-29-24 @ 06:47   18/13/50/0.2  06-28-24 @ 07:00   16/13/53/0.2      Ammonia Trend      Amylase / Lipase Trend      Albumin Trend  09-22-24 @ 05:03   -   1.5[L]  09-22-24 @ 00:23   -   1.5[L]  09-21-24 @ 18:00   -   1.6[L]  09-21-24 @ 12:11   -   1.8[L]  09-21-24 @ 09:10   -   2.0[L]  09-21-24 @ 07:15   -   2.4[L]      PTT - PT - INR Trend  09-21-24 @ 09:10   -   36.0[H] - 31.2[H] - 2.75[H]  09-20-24 @ 12:50   -   27.3 - 18.6[H] - 1.66[H]  06-24-24 @ 15:47   -   31.6 - 16.6[H] - 1.43[H]    Glucose Trend  09-22-24 @ 09:03   -  -- -- 255[H]  09-22-24 @ 05:03   -  242[H] -- --  09-22-24 @ 00:26   -  -- -- 258[H]  09-22-24 @ 00:23   -  266[H] -- --  09-21-24 @ 18:00   -  279[H] -- --  09-21-24 @ 17:34   -  -- -- 162[H]  09-21-24 @ 12:15   -  -- -- 162[H]  09-21-24 @ 12:11   -  209[H] -- --  09-21-24 @ 09:10   -  129[H] -- --  09-21-24 @ 07:35   -  -- -- 177[H]    A1C with Estimated Average Glucose Result: 6.0 % *H* [4.0 - 5.6] (09-21-24 @ 07:15)  A1C with Estimated Average Glucose Result: 5.3 % [4.0 - 5.6] (05-26-24 @ 06:43)      LABS:                        10.3   26.45 )-----------( 95       ( 22 Sep 2024 05:03 )             34.1     09-22    151[H]  |  107  |  63[H]  ----------------------------<  242[H]  3.7   |  17[L]  |  2.06[H]    Ca    7.6[L]      22 Sep 2024 05:03  Phos  5.5     09-22  Mg     1.9     09-22    TPro  4.2[L]  /  Alb  1.5[L]  /  TBili  1.6[H]  /  DBili  x   /  AST  139[H]  /  ALT  89[H]  /  AlkPhos  111  09-22    HIT ab -- 09-21 @ 09:10  HIT ab EIA --  D Dimer -9034  HIT ab -- 09-20 @ 12:50  HIT ab EIA --  D Dimer -662  Fluid characteristics  -- 09-21 @ 09:08  pH 7.2    tprot 2.6    Cell count  Appearance Slightly Cloudy  Fluid type --  BF lymph 11  color Yellow  eosinophil --  PMN --  Mesothelial 2  Monocyte 13  Other body cells --          PT/INR - ( 21 Sep 2024 09:10 )   PT: 31.2 sec;   INR: 2.75 ratio         PTT - ( 21 Sep 2024 09:10 )  PTT:36.0 sec  Urinalysis Basic - ( 22 Sep 2024 05:03 )    Color: x / Appearance: x / SG: x / pH: x  Gluc: 242 mg/dL / Ketone: x  / Bili: x / Urobili: x   Blood: x / Protein: x / Nitrite: x   Leuk Esterase: x / RBC: x / WBC x   Sq Epi: x / Non Sq Epi: x / Bacteria: x              CULTURES: (if applicable)    Culture - Fungal, Body Fluid (collected 09-21-24 @ 09:08)  Source: Pleural Fl Pleural Fluid  Preliminary Report (09-22-24 @ 07:46):    Testing in progress    Culture - Body Fluid with Gram Stain (collected 09-21-24 @ 09:08)  Source: Pleural Fl Pleural Fluid  Gram Stain (09-21-24 @ 19:13):    polymorphonuclear leukocytes seen    No organisms seen    by cytocentrifuge    Culture - Blood (collected 09-20-24 @ 23:00)  Source: .Blood Blood  Gram Stain (09-21-24 @ 21:53):    Growth in aerobic and anaerobic bottles: Gram positive cocci in pairs  Preliminary Report (09-21-24 @ 21:53):    Growth in aerobic and anaerobic bottles: Gram positive cocci in pairs    Direct identification is available within approximately 3-5    hours either by Blood Panel Multiplexed PCR or Direct    MALDI-TOF. Details: [email or web address]  Organism: Blood Culture PCR (09-21-24 @ 23:02)  Organism: Blood Culture PCR (09-21-24 @ 23:02)      Method Type: PCR      -  Streptococcus pneumoniae: Detec    Culture - Blood (collected 09-20-24 @ 23:00)  Source: .Blood Blood  Gram Stain (09-21-24 @ 23:51):    Growth in aerobic bottle: Gram positive cocci in pairs    Growth in anaerobic bottle: Gram positive cocci in pairs  Preliminary Report (09-21-24 @ 23:51):    Growth in aerobic bottle: Gram positive cocci in pairs    Growth in anaerobic bottle: Gram positive cocci in pairs    Culture - Urine (collected 09-20-24 @ 17:31)  Source: Clean Catch None  Final Report (09-22-24 @ 07:05):    >=3 organisms. Probable collection contamination.          ABG - ( 22 Sep 2024 05:50 )  pH, Arterial: 7.27  pH, Blood: x     /  pCO2: 33    /  pO2: 57    / HCO3: 15    / Base Excess: -11.7 /  SaO2: 86.1              CAPILLARY BLOOD GLUCOSE      POCT Blood Glucose.: 255 mg/dL (22 Sep 2024 09:03)      RADIOLOGY  CXR:      CT:    ECHO:      VITALS:  T(C): 34.4 (09-21-24 @ 16:00), Max: 35 (09-21-24 @ 12:00)  T(F): 94 (09-21-24 @ 16:00), Max: 95 (09-21-24 @ 12:00)  HR: 92 (09-22-24 @ 08:05) (62 - 144)  BP: 116/77 (09-22-24 @ 04:00) (83/63 - 136/107)  BP(mean): 91 (09-22-24 @ 04:00) (66 - 126)  ABP: 111/48 (09-22-24 @ 07:00) (83/41 - 125/52)  ABP(mean): 72 (09-22-24 @ 07:00) (52 - 79)  RR: 31 (09-22-24 @ 07:00) (15 - 37)  SpO2: 98% (09-22-24 @ 08:05) (64% - 100%)  CVP(mm Hg): --  CVP(cm H2O): --    Ins and Outs     09-21-24 @ 07:01  -  09-22-24 @ 07:00  --------------------------------------------------------  IN: 6410.7 mL / OUT: 3475 mL / NET: 2935.7 mL        Height (cm): 144.8 (09-20-24 @ 12:00)  Weight (kg): 43.1 (09-20-24 @ 12:00)  BMI (kg/m2): 20.6 (09-20-24 @ 12:00)    Device: Avea, Mode: AC/ CMV (Assist Control/ Continuous Mandatory Ventilation), RR (machine): 26, RR (patient): 26, TV (machine): 450, TV (patient): 400, FiO2: 100, PEEP: 7, ITime: 0.9, MAP: 22, PIP: 52    I&O's Detail    21 Sep 2024 07:01  -  22 Sep 2024 07:00  --------------------------------------------------------  IN:    Amiodarone: 333.1 mL    Amiodarone: 150.3 mL    DOBUTamine: 8 mL    DOBUTamine: 41.6 mL    IV PiggyBack: 300 mL    Norepinephrine: 1364.8 mL    Phenylephrine: 798.9 mL    Sodium Bicarbonate: 3300 mL    Vasopressin: 114 mL  Total IN: 6410.7 mL    OUT:    Chest Tube (mL): 1600 mL    FentaNYL: 0 mL    Indwelling Catheter - Urethral (mL): 1875 mL  Total OUT: 3475 mL    Total NET: 2935.7 mL       Follow-up Critical Care Progress Note  Chief Complaint : Weakness      Patient seen and examined  having tonic clonic movements  remains on multi pressor shock      Allergies :No Known Allergies      PAST MEDICAL & SURGICAL HISTORY:  HTN (hypertension)  HLD (hyperlipidemia)  Hyperkalemia  CKD (chronic kidney disease)  Atherosclerosis  CAD (coronary artery disease)  Stage 4 chronic kidney disease  Cyst of pancreas  H/O CHF  Diabetes mellitus  S/P hysterectomy  Pancreatic cyst  H/O endoscopy  History of cardioversion        Medications:  MEDICATIONS  (STANDING):  aMIOdarone Infusion 0.5 mG/Min (16.7 mL/Hr) IV Continuous <Continuous>  azithromycin  IVPB 500 milliGRAM(s) IV Intermittent every 24 hours  cefTRIAXone   IVPB 1000 milliGRAM(s) IV Intermittent every 24 hours  chlorhexidine 0.12% Liquid 15 milliLiter(s) Oral Mucosa every 12 hours  chlorhexidine 2% Cloths 1 Application(s) Topical <User Schedule>  dextrose 50% Injectable 25 Gram(s) IV Push once  DOBUTamine Infusion 5 MICROgram(s)/kG/Min (3.23 mL/Hr) IV Continuous <Continuous>  glucagon  Injectable 1 milliGRAM(s) IntraMuscular once  norepinephrine Infusion 1 MICROgram(s)/kG/Min (40.4 mL/Hr) IV Continuous <Continuous>  phenylephrine    Infusion 0.2 MICROgram(s)/kG/Min (3.23 mL/Hr) IV Continuous <Continuous>  sodium bicarbonate  Infusion 0.522 mEq/kG/Hr (150 mL/Hr) IV Continuous <Continuous>  vasopressin Infusion 0.02 Unit(s)/Min (3 mL/Hr) IV Continuous <Continuous>    MEDICATIONS  (PRN):  fentaNYL    Injectable 50 MICROGram(s) IV Push every 4 hours PRN Agitation  ondansetron Injectable 4 milliGRAM(s) IV Push every 8 hours PRN Nausea and/or Vomiting  sodium chloride 0.9% lock flush 10 milliLiter(s) IV Push every 1 hour PRN Pre/post blood products, medications, blood draw, and to maintain line patency      Antibiotics History  azithromycin  IVPB 500 milliGRAM(s) IV Intermittent once, 09-20-24 @ 21:59, Stop order after: 1 Doses  azithromycin  IVPB 500 milliGRAM(s) IV Intermittent every 24 hours, 09-21-24 @ 00:52, Stop order after: 3 Doses  cefTRIAXone   IVPB 1000 milliGRAM(s) IV Intermittent once, 09-20-24 @ 21:59, Stop order after: 1 Doses  cefTRIAXone   IVPB 1000 milliGRAM(s) IV Intermittent every 24 hours, 09-21-24 @ 00:39, Stop order after: 5 Days         COVID  09-20-24 @ 13:20  COVID -   [facility]      COVID Biomarkers    09-21-24 @ 09:10 ESR --  ---  CRP --  ---  DDimer  9034[H]   ---   [H]   ---   Ferritin --    09-20-24 @ 12:50 ESR --  ---  CRP --  ---  DDimer  662[H]   ---   LDH --   ---   Ferritin --            Trend Cardiac Enzymes  09-22-24 @ 05:03  PVJ-CPCXR-JAKAF-CPKMM/Trop I - -- - --  - --  -  --  /  352.0[H]  09-22-24 @ 00:23  ATD-NEZSA-WSJPB-CPKMM/Trop I - -- - --  - --  -  --  /  336.6[H]  09-21-24 @ 18:00  ATE-GDDCS-JSKBY-CPKMM/Trop I - -- - --  - --  -  --  /  306.8[H]  09-21-24 @ 12:11  FRE-QDMCT-OHWQJ-CPKMM/Trop I - -- - --  - --  -  --  /  262.3[H]  09-21-24 @ 09:10  TIH-OABCH-ZJFLA-CPKMM/Trop I - -- - --  - --  -  --  /  166.4[H]  09-21-24 @ 07:15  RQI-UTOJY-UBIHC-CPKMM/Trop I - -- - --  - --  -  --  /  150.0[H]    Trend BNP  09-21-24 @ 12:11   -  90331[H]  06-26-24 @ 06:29   -  5463[H]  05-27-24 @ 07:33   -  4568[H]    Procalcitonin Trend    WBC Trend  09-22-24 @ 05:03   -  26.45[H]  09-22-24 @ 00:23   -  27.95[H]  09-21-24 @ 12:11   -  15.71[H]  09-21-24 @ 09:10   -  22.34[H]  09-21-24 @ 07:15   -  22.98[H]  09-20-24 @ 12:50   -  17.01[H]    H/H Trend  09-22-24 @ 05:03   -   10.3[L]/ 34.1[L]  09-22-24 @ 00:23   -   11.0[L]/ 35.0  09-21-24 @ 12:11   -   12.4/ 41.1  09-21-24 @ 09:10   -   11.0[L]/ 36.1  09-21-24 @ 07:15   -   10.1[L]/ 35.4  09-20-24 @ 12:50   -   11.6/ 37.5    Stool Occult Blood  09-20-24 @ 15:40   -   Negative  07-02-24 @ 05:15   -   Negative    Platelet Trend  09-22-24 @ 05:03   -  95[L]  09-22-24 @ 00:23   -  109[L]  09-21-24 @ 12:11   -  174  09-21-24 @ 09:10   -  145[L]  09-21-24 @ 07:15   -  149[L]  09-20-24 @ 12:50   -  173    Trend Sodium  09-22-24 @ 05:03   -  151[H]  09-22-24 @ 00:23   -  148[H]  09-21-24 @ 18:00   -  148[H]  09-21-24 @ 12:11   -  149[H]  09-21-24 @ 09:10   -  150[H]  09-21-24 @ 07:15   -  144    Trend Potassium  09-22-24 @ 05:03   -  3.7  09-22-24 @ 00:23   -  3.8  09-21-24 @ 18:00   -  3.9  09-21-24 @ 12:11   -  4.4  09-21-24 @ 09:10   -  3.8  09-21-24 @ 07:15   -  4.6    Trend Bun/Cr  09-22-24 @ 05:03  BUN/CR -  63[H] / 2.06[H]  09-22-24 @ 00:23  BUN/CR -  64[H] / 2.10[H]  09-21-24 @ 18:00  BUN/CR -  71[H] / 2.19[H]  09-21-24 @ 12:11  BUN/CR -  73[H] / 2.23[H]  09-21-24 @ 09:10  BUN/CR -  72[H] / 2.21[H]  09-21-24 @ 07:15  BUN/CR -  69[H] / 1.95[H]    Lactic Acid Trend  09-21-24 @ 18:00   -   11.6[HH]  09-21-24 @ 12:30   -   9.0[HH]  09-21-24 @ 09:10   -   8.6[HH]  09-21-24 @ 00:50   -   2.5[H]  09-20-24 @ 19:31   -   2.1[H]  09-20-24 @ 13:20   -   4.4[HH]    ABG Trend  09-22-24 @ 05:50   - 7.27[L]/33/57[L]/86.1[L]  09-21-24 @ 13:30   - 7.28[L]/35/69[L]/92.3[L]  09-21-24 @ 11:00   - 7.26[L]/45[H]/55[L]/81.5[L]  09-21-24 @ 08:20   - <7.00[LL]/56[H]/62[L]/80.1[L]  09-21-24 @ 03:31   - 7.32[L]/26[L]/79[L]/95.1  07-25-21 @ 23:20   - 7.42/30[L]/132[H]/99[H]  07-25-21 @ 15:50   - 7.44/36/130[H]/99[H]  07-24-21 @ 10:12   - 7.33[L]/25[L]/170[H]/99[H]  07-24-21 @ 02:00   - 7.29[L]/28[L]/155[H]/99[H]    Trend AST/ALT/ALK Phos/Bili  09-22-24 @ 05:03   139[H]/89[H]/111/1.6[H]  09-22-24 @ 00:23   119[H]/82[H]/114/1.2  09-21-24 @ 18:00   93[H]/82[H]/120/0.9  09-21-24 @ 12:11   93[H]/88[H]/150[H]/0.8  09-21-24 @ 09:10   88[H]/86[H]/148[H]/0.7  09-21-24 @ 07:15   45[H]/82[H]/148[H]/0.8  09-20-24 @ 19:31   50[H]/92[H]/173[H]/0.9  09-20-24 @ 12:50   54[H]/87[H]/169[H]/1.1  07-01-24 @ 07:10   17/14/43/0.5  06-30-24 @ 06:35   15/15/45/0.2  06-29-24 @ 06:47   18/13/50/0.2  06-28-24 @ 07:00   16/13/53/0.2      Ammonia Trend      Amylase / Lipase Trend      Albumin Trend  09-22-24 @ 05:03   -   1.5[L]  09-22-24 @ 00:23   -   1.5[L]  09-21-24 @ 18:00   -   1.6[L]  09-21-24 @ 12:11   -   1.8[L]  09-21-24 @ 09:10   -   2.0[L]  09-21-24 @ 07:15   -   2.4[L]      PTT - PT - INR Trend  09-21-24 @ 09:10   -   36.0[H] - 31.2[H] - 2.75[H]  09-20-24 @ 12:50   -   27.3 - 18.6[H] - 1.66[H]  06-24-24 @ 15:47   -   31.6 - 16.6[H] - 1.43[H]    Glucose Trend  09-22-24 @ 09:03   -  -- -- 255[H]  09-22-24 @ 05:03   -  242[H] -- --  09-22-24 @ 00:26   -  -- -- 258[H]  09-22-24 @ 00:23   -  266[H] -- --  09-21-24 @ 18:00   -  279[H] -- --  09-21-24 @ 17:34   -  -- -- 162[H]  09-21-24 @ 12:15   -  -- -- 162[H]  09-21-24 @ 12:11   -  209[H] -- --  09-21-24 @ 09:10   -  129[H] -- --  09-21-24 @ 07:35   -  -- -- 177[H]    A1C with Estimated Average Glucose Result: 6.0 % *H* [4.0 - 5.6] (09-21-24 @ 07:15)  A1C with Estimated Average Glucose Result: 5.3 % [4.0 - 5.6] (05-26-24 @ 06:43)      LABS:                        10.3   26.45 )-----------( 95       ( 22 Sep 2024 05:03 )             34.1     09-22    151[H]  |  107  |  63[H]  ----------------------------<  242[H]  3.7   |  17[L]  |  2.06[H]    Ca    7.6[L]      22 Sep 2024 05:03  Phos  5.5     09-22  Mg     1.9     09-22    TPro  4.2[L]  /  Alb  1.5[L]  /  TBili  1.6[H]  /  DBili  x   /  AST  139[H]  /  ALT  89[H]  /  AlkPhos  111  09-22    HIT ab -- 09-21 @ 09:10  HIT ab EIA --  D Dimer -9034  HIT ab -- 09-20 @ 12:50  HIT ab EIA --  D Dimer -662  Fluid characteristics  -- 09-21 @ 09:08  pH 7.2    tprot 2.6    Cell count  Appearance Slightly Cloudy  Fluid type --  BF lymph 11  color Yellow  eosinophil --  PMN --  Mesothelial 2  Monocyte 13  Other body cells --           CULTURES: (if applicable)    Culture - Fungal, Body Fluid (collected 09-21-24 @ 09:08)  Source: Pleural Fl Pleural Fluid  Preliminary Report (09-22-24 @ 07:46):    Testing in progress    Culture - Body Fluid with Gram Stain (collected 09-21-24 @ 09:08)  Source: Pleural Fl Pleural Fluid  Gram Stain (09-21-24 @ 19:13):    polymorphonuclear leukocytes seen    No organisms seen    by cytocentrifuge    Culture - Blood (collected 09-20-24 @ 23:00)  Source: .Blood Blood  Gram Stain (09-21-24 @ 21:53):    Growth in aerobic and anaerobic bottles: Gram positive cocci in pairs  Preliminary Report (09-21-24 @ 21:53):    Growth in aerobic and anaerobic bottles: Gram positive cocci in pairs    Direct identification is available within approximately 3-5    hours either by Blood Panel Multiplexed PCR or Direct    MALDI-TOF. Details: [email or web address]  Organism: Blood Culture PCR (09-21-24 @ 23:02)  Organism: Blood Culture PCR (09-21-24 @ 23:02)      Method Type: PCR      -  Streptococcus pneumoniae: Detec    Culture - Blood (collected 09-20-24 @ 23:00)  Source: .Blood Blood  Gram Stain (09-21-24 @ 23:51):    Growth in aerobic bottle: Gram positive cocci in pairs    Growth in anaerobic bottle: Gram positive cocci in pairs  Preliminary Report (09-21-24 @ 23:51):    Growth in aerobic bottle: Gram positive cocci in pairs    Growth in anaerobic bottle: Gram positive cocci in pairs    Culture - Urine (collected 09-20-24 @ 17:31)  Source: Clean Catch None  Final Report (09-22-24 @ 07:05):    >=3 organisms. Probable collection contamination.          ABG - ( 22 Sep 2024 05:50 )  pH, Arterial: 7.27  pH, Blood: x     /  pCO2: 33    /  pO2: 57    / HCO3: 15    / Base Excess: -11.7 /  SaO2: 86.1            RADIOLOGY  CXR:  loculated right pntx  et tube in place  chest tube in place    CT:    ECHO:      VITALS:  T(C): 34.4 (09-21-24 @ 16:00), Max: 35 (09-21-24 @ 12:00)  T(F): 94 (09-21-24 @ 16:00), Max: 95 (09-21-24 @ 12:00)  HR: 92 (09-22-24 @ 08:05) (62 - 144)  BP: 116/77 (09-22-24 @ 04:00) (83/63 - 136/107)  BP(mean): 91 (09-22-24 @ 04:00) (66 - 126)  ABP: 111/48 (09-22-24 @ 07:00) (83/41 - 125/52)  ABP(mean): 72 (09-22-24 @ 07:00) (52 - 79)  RR: 31 (09-22-24 @ 07:00) (15 - 37)  SpO2: 98% (09-22-24 @ 08:05) (64% - 100%)  CVP(mm Hg): --  CVP(cm H2O): --    Ins and Outs     09-21-24 @ 07:01  -  09-22-24 @ 07:00  --------------------------------------------------------  IN: 6410.7 mL / OUT: 3475 mL / NET: 2935.7 mL        Height (cm): 144.8 (09-20-24 @ 12:00)  Weight (kg): 43.1 (09-20-24 @ 12:00)  BMI (kg/m2): 20.6 (09-20-24 @ 12:00)    Device: Avea, Mode: AC/ CMV (Assist Control/ Continuous Mandatory Ventilation), RR (machine): 26, RR (patient): 26, TV (machine): 450, TV (patient): 400, FiO2: 100, PEEP: 7, ITime: 0.9, MAP: 22, PIP: 52    I&O's Detail    21 Sep 2024 07:01  -  22 Sep 2024 07:00  --------------------------------------------------------  IN:    Amiodarone: 333.1 mL    Amiodarone: 150.3 mL    DOBUTamine: 8 mL    DOBUTamine: 41.6 mL    IV PiggyBack: 300 mL    Norepinephrine: 1364.8 mL    Phenylephrine: 798.9 mL    Sodium Bicarbonate: 3300 mL    Vasopressin: 114 mL  Total IN: 6410.7 mL    OUT:    Chest Tube (mL): 1600 mL    FentaNYL: 0 mL    Indwelling Catheter - Urethral (mL): 1875 mL  Total OUT: 3475 mL    Total NET: 2935.7 mL

## 2024-09-22 NOTE — PROGRESS NOTE ADULT - NUTRITIONAL ASSESSMENT
This patient has been assessed with a concern for Malnutrition and has been determined to have a diagnosis/diagnoses of Severe protein-calorie malnutrition.    This patient is being managed with:   Diet NPO-  Entered: Sep 21 2024  9:06AM

## 2024-09-23 LAB
-  CLINDAMYCIN: SIGNIFICANT CHANGE UP
-  ERYTHROMYCIN: SIGNIFICANT CHANGE UP
-  LEVOFLOXACIN: SIGNIFICANT CHANGE UP
-  TETRACYCLINE: SIGNIFICANT CHANGE UP
-  TRIMETHOPRIM/SULFAMETHOXAZOLE: SIGNIFICANT CHANGE UP
-  VANCOMYCIN: SIGNIFICANT CHANGE UP
METHOD TYPE: SIGNIFICANT CHANGE UP

## 2024-09-23 NOTE — EEG REPORT - NS EEG TEXT BOX
Date: 9/22/24 10:58 am - 12:56 pm  Duration: 2 hr    [facility]  Technical Description:    EEG performed with limited number of EEG electrodes/channels for expedited completion and evaluation for possible seizures.  The [employer]ibReferrizer EEG recording device consists of a 10-electrode headband, an EEG recorder with the Brain Stethoscope and Clarity (auto seizure detection) features, and a cloud portal for continuous seizure monitoring, EEG data storing, and remote EEG reviewing. With the Brain Stethoscope and Clarity features, spot-checking and continuous seizure detection were available.    Interpretation:  PDR (Hz): Absent  Slowing: Background is diffusely suppressed  IEDs and Seizures: Frequent bursts of possible generalized spikes at 7-9 Hz vs. muscle and movement artifact, later decreasing in duration and occasionally appearing as sporadic discharges vs. artifact. Occasionally, these are >10 seconds in the beginning of the study concerning for possible generalized seizures.  Sleep transients: No state change present.  Artifacts: Frontal maximal blink artifacts with stimulation.  Myogenic and motion artifacts noted, sharply contoured.    Impression:  Within the technical limitations of the reduced electrode recording:  Frequent bursts of possible generalized spikes and possible occasional generalized seizures in the beginning of recording, vs. cannot exclude artifact such as due to subcortical myoclonus vs. other artifact.  Severe diffuse cerebral dysfunction is nonspecific in etiology.   Patient was pronounced dead on 9/22/24 16:52, after [employer] recording was completed.    Sariah Olivares MD  Attending Physician, [facility]     EEG Reading Room Ph#:  [phone]  Epilepsy Answering Service after 5PM and before 8:30AM: Ph#: [phone]

## 2024-09-24 LAB
-  CEFTRIAXONE (MENINGITIDIS): SIGNIFICANT CHANGE UP
-  CEFTRIAXONE (NON-MENINGITIDIS): SIGNIFICANT CHANGE UP
-  CLINDAMYCIN: SIGNIFICANT CHANGE UP
-  ERYTHROMYCIN: SIGNIFICANT CHANGE UP
-  LEVOFLOXACIN: SIGNIFICANT CHANGE UP
-  PENICILLIN (MENINGITIDIS): SIGNIFICANT CHANGE UP
-  PENICILLIN (NON-MENINGITIDIS): SIGNIFICANT CHANGE UP
-  PENICILLIN (ORAL PENICILLIN V): SIGNIFICANT CHANGE UP
-  TETRACYCLINE: SIGNIFICANT CHANGE UP
-  TRIMETHOPRIM/SULFAMETHOXAZOLE: SIGNIFICANT CHANGE UP
-  VANCOMYCIN: SIGNIFICANT CHANGE UP
CULTURE RESULTS: ABNORMAL
CULTURE RESULTS: ABNORMAL
METHOD TYPE: SIGNIFICANT CHANGE UP
NON-GYNECOLOGICAL CYTOLOGY STUDY: SIGNIFICANT CHANGE UP
ORGANISM # SPEC MICROSCOPIC CNT: ABNORMAL
ORGANISM # SPEC MICROSCOPIC CNT: SIGNIFICANT CHANGE UP
SPECIMEN SOURCE: SIGNIFICANT CHANGE UP
SPECIMEN SOURCE: SIGNIFICANT CHANGE UP

## 2024-09-26 LAB
CULTURE RESULTS: SIGNIFICANT CHANGE UP
SPECIMEN SOURCE: SIGNIFICANT CHANGE UP

## 2024-10-15 PROCEDURE — 88312 SPECIAL STAINS GROUP 1: CPT

## 2024-10-15 PROCEDURE — 85610 PROTHROMBIN TIME: CPT

## 2024-10-15 PROCEDURE — 82945 GLUCOSE OTHER FLUID: CPT

## 2024-10-15 PROCEDURE — 84484 ASSAY OF TROPONIN QUANT: CPT

## 2024-10-15 PROCEDURE — 36600 WITHDRAWAL OF ARTERIAL BLOOD: CPT

## 2024-10-15 PROCEDURE — 87070 CULTURE OTHR SPECIMN AEROBIC: CPT

## 2024-10-15 PROCEDURE — 87637 SARSCOV2&INF A&B&RSV AMP PRB: CPT

## 2024-10-15 PROCEDURE — 36415 COLL VENOUS BLD VENIPUNCTURE: CPT

## 2024-10-15 PROCEDURE — 83605 ASSAY OF LACTIC ACID: CPT

## 2024-10-15 PROCEDURE — 82962 GLUCOSE BLOOD TEST: CPT

## 2024-10-15 PROCEDURE — 93005 ELECTROCARDIOGRAM TRACING: CPT

## 2024-10-15 PROCEDURE — 82042 OTHER SOURCE ALBUMIN QUAN EA: CPT

## 2024-10-15 PROCEDURE — 83735 ASSAY OF MAGNESIUM: CPT

## 2024-10-15 PROCEDURE — 85025 COMPLETE CBC W/AUTO DIFF WBC: CPT

## 2024-10-15 PROCEDURE — 85379 FIBRIN DEGRADATION QUANT: CPT

## 2024-10-15 PROCEDURE — 87899 AGENT NOS ASSAY W/OPTIC: CPT

## 2024-10-15 PROCEDURE — 80061 LIPID PANEL: CPT

## 2024-10-15 PROCEDURE — 71045 X-RAY EXAM CHEST 1 VIEW: CPT

## 2024-10-15 PROCEDURE — 88305 TISSUE EXAM BY PATHOLOGIST: CPT

## 2024-10-15 PROCEDURE — 82272 OCCULT BLD FECES 1-3 TESTS: CPT

## 2024-10-15 PROCEDURE — 96374 THER/PROPH/DIAG INJ IV PUSH: CPT

## 2024-10-15 PROCEDURE — 85730 THROMBOPLASTIN TIME PARTIAL: CPT

## 2024-10-15 PROCEDURE — 86900 BLOOD TYPING SEROLOGIC ABO: CPT

## 2024-10-15 PROCEDURE — 86850 RBC ANTIBODY SCREEN: CPT

## 2024-10-15 PROCEDURE — 87086 URINE CULTURE/COLONY COUNT: CPT

## 2024-10-15 PROCEDURE — 83036 HEMOGLOBIN GLYCOSYLATED A1C: CPT

## 2024-10-15 PROCEDURE — 89051 BODY FLUID CELL COUNT: CPT

## 2024-10-15 PROCEDURE — 87449 NOS EACH ORGANISM AG IA: CPT

## 2024-10-15 PROCEDURE — 87150 DNA/RNA AMPLIFIED PROBE: CPT

## 2024-10-15 PROCEDURE — 87075 CULTR BACTERIA EXCEPT BLOOD: CPT

## 2024-10-15 PROCEDURE — 83880 ASSAY OF NATRIURETIC PEPTIDE: CPT

## 2024-10-15 PROCEDURE — 83615 LACTATE (LD) (LDH) ENZYME: CPT

## 2024-10-15 PROCEDURE — 81001 URINALYSIS AUTO W/SCOPE: CPT

## 2024-10-15 PROCEDURE — 80053 COMPREHEN METABOLIC PANEL: CPT

## 2024-10-15 PROCEDURE — 85027 COMPLETE CBC AUTOMATED: CPT

## 2024-10-15 PROCEDURE — 71250 CT THORAX DX C-: CPT | Mod: MC

## 2024-10-15 PROCEDURE — 87077 CULTURE AEROBIC IDENTIFY: CPT

## 2024-10-15 PROCEDURE — 87040 BLOOD CULTURE FOR BACTERIA: CPT

## 2024-10-15 PROCEDURE — 96376 TX/PRO/DX INJ SAME DRUG ADON: CPT

## 2024-10-15 PROCEDURE — 88112 CYTOPATH CELL ENHANCE TECH: CPT

## 2024-10-15 PROCEDURE — 93970 EXTREMITY STUDY: CPT

## 2024-10-15 PROCEDURE — 99291 CRITICAL CARE FIRST HOUR: CPT

## 2024-10-15 PROCEDURE — 82803 BLOOD GASES ANY COMBINATION: CPT

## 2024-10-15 PROCEDURE — 87186 SC STD MICRODIL/AGAR DIL: CPT

## 2024-10-15 PROCEDURE — 94640 AIRWAY INHALATION TREATMENT: CPT

## 2024-10-15 PROCEDURE — 94002 VENT MGMT INPAT INIT DAY: CPT

## 2024-10-15 PROCEDURE — 84157 ASSAY OF PROTEIN OTHER: CPT

## 2024-10-15 PROCEDURE — 87015 SPECIMEN INFECT AGNT CONCNTJ: CPT

## 2024-10-15 PROCEDURE — 83986 ASSAY PH BODY FLUID NOS: CPT

## 2024-10-15 PROCEDURE — 87102 FUNGUS ISOLATION CULTURE: CPT

## 2024-10-15 PROCEDURE — 86901 BLOOD TYPING SEROLOGIC RH(D): CPT

## 2024-10-15 PROCEDURE — 93306 TTE W/DOPPLER COMPLETE: CPT

## 2024-10-15 PROCEDURE — 84100 ASSAY OF PHOSPHORUS: CPT

## 2024-10-15 PROCEDURE — 87205 SMEAR GRAM STAIN: CPT

## 2024-10-19 LAB
CULTURE RESULTS: SIGNIFICANT CHANGE UP
SPECIMEN SOURCE: SIGNIFICANT CHANGE UP

## 2024-11-15 ENCOUNTER — RX RENEWAL (OUTPATIENT)
Age: 89
End: 2024-11-15

## 2024-11-27 NOTE — ED PROVIDER NOTE - RELIEVING FACTORS
Medical Necessity Information: It is in your best interest to select a reason for this procedure from the list below. All of these items fulfill various CMS LCD requirements except the new and changing color options. Show Topical Anesthesia Variable?: Yes Render Post-Care Instructions In Note?: no Number Of Freeze-Thaw Cycles: 2 freeze-thaw cycles Medical Necessity Clause: This procedure was medically necessary because the lesions that were treated were: Detail Level: Detailed Duration Of Freeze Thaw-Cycle (Seconds): 5 Post-Care Instructions: I reviewed with the patient in detail post-care instructions. Patient is to wear sunprotection, and avoid picking at any of the treated lesions. Pt may apply Vaseline to crusted or scabbing areas. Spray Paint Text: The liquid nitrogen was applied to the skin utilizing a spray paint frosting technique. Consent: The patient's consent was obtained including but not limited to risks of crusting, scabbing, blistering, scarring, darker or lighter pigmentary change, recurrence, incomplete removal and infection. no

## 2025-02-12 NOTE — CONSULT NOTE ADULT - REASON FOR ADMISSION
Addended by: FERNANDEZ LOPEZ on: 2/12/2025 10:40 AM     Modules accepted: Orders, Level of Service     anemia, afib

## 2025-07-01 NOTE — DIETITIAN INITIAL EVALUATION ADULT - ADD RECOMMEND
1. Add Ensure HP BID  2. Add MVI, Vitamin C  3. Encourage protein rich foods and oral hydration  Detail Level: Zone Add High Risk Medication Management Associated Diagnosis?: No Length Of Therapy: 3 years

## (undated) DEVICE — BASIN EMESIS 10IN GRADUATED MAUVE

## (undated) DEVICE — BIOPSY FORCEP COLD DISP

## (undated) DEVICE — GOWN LG

## (undated) DEVICE — SALIVA EJECTOR (BLUE)

## (undated) DEVICE — BIOPSY FORCEP RADIAL JAW 4 STANDARD WITH NEEDLE

## (undated) DEVICE — LUBRICATING JELLY HR ONE SHOT 3G

## (undated) DEVICE — CLAMP BX HOT RAD JAW 3

## (undated) DEVICE — CONTAINER FORMALIN 80ML YELLOW

## (undated) DEVICE — DRSG CURITY GAUZE SPONGE 4 X 4" 12-PLY NON-STERILE

## (undated) DEVICE — DRSG 2X2

## (undated) DEVICE — PACK IV START WITH CHG

## (undated) DEVICE — TUBING IV SET GRAVITY 3Y 100" MACRO

## (undated) DEVICE — DRSG BANDAID 0.75X3"

## (undated) DEVICE — LINE BREATHE SAMPLNG

## (undated) DEVICE — UNDERPAD LINEN SAVER 17 X 24"

## (undated) DEVICE — ELCTR ECG CONDUCTIVE ADHESIVE

## (undated) DEVICE — BITE BLOCK ADULT 20 X 27MM (GREEN)

## (undated) DEVICE — DENTURE CUP PINK

## (undated) DEVICE — TUBING MEDI-VAC W MAXIGRIP CONNECTORS 1/4"X6'

## (undated) DEVICE — CATH IV SAFE BC 22G X 1" (BLUE)